# Patient Record
Sex: FEMALE | Race: WHITE | NOT HISPANIC OR LATINO | Employment: FULL TIME | URBAN - METROPOLITAN AREA
[De-identification: names, ages, dates, MRNs, and addresses within clinical notes are randomized per-mention and may not be internally consistent; named-entity substitution may affect disease eponyms.]

---

## 2014-06-11 LAB — HCV AB SERPL QL IA: NEGATIVE

## 2014-12-09 LAB — PAP SMEAR: NORMAL

## 2016-11-28 LAB
Lab: 1.4 (ref 1.2–4.9)
T3 UPTAKE: 23 (ref 24–39)
T4,THYROXINE: 6.2 (ref 4.5–12)
THYROPEROXIDASE AB SERPL-ACNC: 15 [IU]/ML (ref 0–34)
TRIIODOTHYRONINE (T-3), SERUM: 3 (ref 2–4.4)
TSH SERPL DL<=0.005 MIU/L-ACNC: 1.91 UIU/ML (ref 0.46–4.6)

## 2016-12-28 LAB
Lab: 1.3 (ref 1.2–4.9)
Lab: 116 (ref 71–160)
Lab: 119 (ref 71–180)
Lab: 5.4 (ref 4.5–12)
T3 UPTAKE: 24 (ref 24–39)
THYROPEROXIDASE AB SERPL-ACNC: 7 [IU]/ML (ref 0–34)
TRIIODOTHYRONINE (T-3), SERUM: 2.9 (ref 2–4.4)
TSH SERPL DL<=0.005 MIU/L-ACNC: 0.96 UIU/ML (ref 0.46–4.6)

## 2017-01-06 ENCOUNTER — OFFICE VISIT (OUTPATIENT)
Dept: NEUROLOGY | Facility: CLINIC | Age: 27
End: 2017-01-06
Payer: COMMERCIAL

## 2017-01-06 VITALS
HEIGHT: 64 IN | DIASTOLIC BLOOD PRESSURE: 70 MMHG | BODY MASS INDEX: 40.89 KG/M2 | SYSTOLIC BLOOD PRESSURE: 100 MMHG | WEIGHT: 239.5 LBS

## 2017-01-06 DIAGNOSIS — G43.009 MIGRAINE WITHOUT AURA AND WITHOUT STATUS MIGRAINOSUS, NOT INTRACTABLE: ICD-10-CM

## 2017-01-06 PROCEDURE — 1159F MED LIST DOCD IN RCRD: CPT | Mod: S$GLB,,, | Performed by: NEUROMUSCULOSKELETAL MEDICINE & OMM

## 2017-01-06 PROCEDURE — 99214 OFFICE O/P EST MOD 30 MIN: CPT | Mod: S$GLB,,, | Performed by: NEUROMUSCULOSKELETAL MEDICINE & OMM

## 2017-01-06 PROCEDURE — 99999 PR PBB SHADOW E&M-EST. PATIENT-LVL III: CPT | Mod: PBBFAC,,, | Performed by: NEUROMUSCULOSKELETAL MEDICINE & OMM

## 2017-01-06 RX ORDER — RIZATRIPTAN BENZOATE 10 MG/1
TABLET ORAL
Qty: 10 TABLET | Refills: 5 | Status: SHIPPED | OUTPATIENT
Start: 2017-01-06 | End: 2018-01-29 | Stop reason: SDUPTHER

## 2017-01-06 RX ORDER — MEDROXYPROGESTERONE ACETATE 5 MG/1
TABLET ORAL
Refills: 0 | COMMUNITY
Start: 2016-12-08 | End: 2017-01-24

## 2017-01-09 PROBLEM — G43.009 MIGRAINE WITHOUT AURA AND WITHOUT STATUS MIGRAINOSUS, NOT INTRACTABLE: Status: ACTIVE | Noted: 2017-01-09

## 2017-01-09 NOTE — PROGRESS NOTES
This history is dictated on [employer] word recognition program. There are word recognition mistakes that are occasionally missed on review.     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 She has a sister with migraine headaches.     Present Medical History: Patient presents for follow-up for her headaches.  MRI scan of the brain was normal.  Natalia has been working good for her headaches which occur approximately one-2 times per month.  She started using half tablets but now needs to use a whole tablet.  She does not feel like she needs any preventive medicine at this time.  Her TMJ problems have resolved.    Previous note: 1-8-16: This is a 25-year-old white female who presents with a history of 2 types of headaches. Her first headache is described as a frontal burning pain radiating through the head to the back of the head associated with pressure over the right eye with intensity of 6-7/10. This headache is associated with nausea, photophobia, sonophobia and triggered by smells. Headaches typically will last for one-2 days. She has tried multiple over-the-counter medications which do not work. She denies any aura but does have an arc of light that frequently occurs during the headache in her right eye vision. She was previously on Zoloft however this caused low blood sugar and she is had to stop it. Although she has grossly irregular periods the headaches are not any worse during her menstrual cycle. She has a sister with migraines and she tried one her Axert pills which did not help. She has never tried Imitrex.     Second type of headache is described as a right temporal mandibular joint pain which she associates with her wisdom teeth which she needs to schedule to have removed. She has some sensitivity with sensory changes radiating into the right cheek originating from the TMJ joint.  Gen. Appearance: Well-developed with no obvious deformities  Carotid arteries symmetrical  pulses  Peripheral vascular shows symmetrical pulses with no obvious edema or tenderness  Neurological Exam:  Mental status-alert and oriented to person, place, and time; attention span and concentration is good. Fund of knowledge-patient is aware of current events and able to give detailed history of the current problem.recent and remote memory seems intact. Language function is normal with no evidence of aphasia     Cranial nerves:Visual acuity to hand chart -normal; visual fields to confrontation normal;pupils were equal and reactive to light ; funduscopic examination was normal with sharp disc margins. external ocular movements were full with no nystagmus. Facial sensation to pinprick and corneal reflexes intact; Facial muscles were symmetrical. Hearing is unimpaired symmetrical finger rub; Tongue and palate movements were normal with swallowing unimpaired. Shoulder shrug was intact with good strength Speech was normal     Motor examination: Upper and Lower extremities - Normal and symmetrical;muscle tone was normal ; right-handed  Sensory examination:Upper and lower extremities - Pinprick and soft touch were normal and symmetrical. Vibration sense was normal at the toes for age 15-20 seconds  Deep tendon reflexes were 1-2+ symmetrical. Both plantar responses were flexor  Cerebellar examination upper: Normal finger to nose and rapid alternating movements  Gait: Steady with no ataxia; heel and toe walk normal  Romberg test: negative Tandem gait: Normal   Involuntary movements: Negative  Cervical examination: Full range of motion with no pain Cervical tenderness:negative  Lumbar examination: Low back tenderness-negative Sciatic notch tenderness-negative Straight leg raising test-negative  TMJ-percussion causes tenderness as well as pain with opening and closing the jaw on the right  Impression: Migraine without aura-probable hormonal related; 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

## 2017-01-24 ENCOUNTER — OFFICE VISIT (OUTPATIENT)
Dept: ENDOCRINOLOGY | Facility: CLINIC | Age: 27
End: 2017-01-24
Payer: COMMERCIAL

## 2017-01-24 VITALS
SYSTOLIC BLOOD PRESSURE: 120 MMHG | HEIGHT: 64 IN | WEIGHT: 232.13 LBS | DIASTOLIC BLOOD PRESSURE: 76 MMHG | BODY MASS INDEX: 39.63 KG/M2 | HEART RATE: 77 BPM

## 2017-01-24 DIAGNOSIS — E16.2 HYPOGLYCEMIA: Primary | ICD-10-CM

## 2017-01-24 PROCEDURE — 99214 OFFICE O/P EST MOD 30 MIN: CPT | Mod: S$GLB,,, | Performed by: INTERNAL MEDICINE

## 2017-01-24 PROCEDURE — 99999 PR PBB SHADOW E&M-EST. PATIENT-LVL III: CPT | Mod: PBBFAC,,, | Performed by: INTERNAL MEDICINE

## 2017-01-24 PROCEDURE — 1159F MED LIST DOCD IN RCRD: CPT | Mod: S$GLB,,, | Performed by: INTERNAL MEDICINE

## 2017-01-24 NOTE — MR AVS SNAPSHOT
[facility]  [address]  [place] [address]  Phone: [phone]  Fax: [phone]                  Krupa Melton   2017 2:00 PM   Office Visit    Description:  Female : 1990   Provider:  Estevan Dooley II, MD   Department:  [facility]           Reason for Visit     Hypoglycemia           Diagnoses this Visit        Comments    Hypoglycemia    -  Primary            To Do List           Future Appointments        Provider Department Dept Phone    2017 2:40 PM LAB, APPOINTMENT [place] [facility] [phone]      Goals (5 Years of Data)     None      Follow-Up and Disposition     Return in about 1 year (around 2018).      [facility]     [facility] Care [facility] -  Assistance  Registered nurses in the [facility] provide clinical advisement, health education, appointment booking, and other advisory services.  Call for this free service at [phone].             Medications           Message regarding Medications     Verify the changes and/or additions to your medication regime listed below are the same as discussed with your clinician today.  If any of these changes or additions are incorrect, please notify your healthcare provider.        STOP taking these medications     brexpiprazole (REXULTI) 0.5 mg Tab     ibuprofen (ADVIL,MOTRIN) 800 MG tablet TK 1 T PO  Q 6 TO 8 H PRN P    lamotrigine (LAMICTAL) 25 MG tablet     medroxyPROGESTERone (PROVERA) 5 MG tablet TK 1 T PO QD    PRENATAL VIT #91/FE FUM/FA/DHA (PRENATAL + DHA ORAL) Take 1 capsule by mouth once daily.           Verify that the below list of medications is an accurate representation of the medications you are currently taking.  If none reported, the list may be blank. If incorrect, please contact your healthcare provider. Carry this list with you in case of emergency.           Current Medications     blood sugar  "diagnostic (ACCU-CHEK SMARTVIEW TEST STRIP) Strp Use to check sugars 4 times daily dispense strips and lancets. accucheck veronica meter    metformin (GLUCOPHAGE-XR) 500 MG 24 hr tablet Take 1 tablet (500 mg total) by mouth daily with breakfast.    NATURE-THROID 48.75 mg Tab Take 2 tablets by mouth once daily.     rizatriptan (MAXALT) 10 MG tablet Take one tablet at onset of severe migraine.  May repeat in 2 hours but not to exceed 2 tablets in 24 hours           Clinical Reference Information           Vital Signs - Last Recorded  Most recent update: 1/24/2017  2:06 PM by Pam Nelson MA    BP Pulse Ht Wt LMP BMI    120/76 (BP Location: Left arm, Patient Position: Sitting, BP Method: Manual) 77 5' 4" (1.626 m) 105.3 kg (232 lb 2.3 oz) 12/17/2016 39.85 kg/m2      Blood Pressure          Most Recent Value    BP  120/76      Allergies as of 1/24/2017     No Known Allergies      Immunizations Administered on Date of Encounter - 1/24/2017     None      Orders Placed During Today's Visit     Future Labs/Procedures Expected by Expires    GLUCOSE, 2 HR. PC  1/24/2017 3/25/2018    TSH  1/24/2017 3/25/2018      "

## 2017-01-24 NOTE — PROGRESS NOTES
Subjective:       Patient ID: Krupa Melton is a 26 y.o. female.    Chief Complaint: Hypoglycemia  Consult from Dr. Alfred for hypoglycemia.  HPI     Ms. Melton is a 26 year old lady whose only medical history is of morbid obesity who presents as an urgent consult from primary care clinic for unexplained episodes of hypoglycemia. 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 She began having symptoms approximately 1 month ago and prior to this was in her usual state of health with no complaints. She notes that she has episodes of feeling unwell following by mental fogginess and mild confusion along with tremulousness and diaphoresis and when she would check her blood sugar at work found herself to be hypoglycemic, sometimes down to the 50's. The episodes almost exclusively happen in the late morning/early afternoon and only occasionally happen in the evening. It never happens in the morning and she has not noticed any issues overnight. At her PCP visit she felt unwell and her BGL at that time was 64. She does say that eating some food helps, but not completely and then the symptoms spontaneously resolve by the end of the day. She does note that she has been attempting to lose weight but has been unsuccessful.     She was recently diagnosed with ovarian cysts and has always had highly irregular menses and is undergoing workup for PCOS which her mother had. She has no other medical history and has never been a diabetic. Nobody in her family has a history of diabetes or other endocrine disorders. Her only family history is HTN and heart disease.     She is a never smoker, denies illicit or IV drug use, and is a social drinker though not much lately since having a baby.   Low glucose of 66 mg/dl about 1 month ago.  Review of Systems   Constitutional: Positive for fatigue ( attributes to being a new mother). Negative for activity change, appetite change  and unexpected weight change.   HENT: Negative for sore throat, trouble swallowing and voice change.    Eyes: Negative for photophobia and visual disturbance.   Respiratory: Negative for cough, shortness of breath and wheezing.    Cardiovascular: Negative for chest pain, palpitations and leg swelling.   Gastrointestinal: Positive for nausea. Negative for abdominal distention, abdominal pain, blood in stool, constipation, diarrhea and vomiting.   Endocrine: Negative for cold intolerance, heat intolerance, polydipsia, polyphagia and polyuria.   Genitourinary: Negative for difficulty urinating, dysuria, frequency and hematuria.   Musculoskeletal: Negative for arthralgias, back pain, gait problem, joint swelling, myalgias, neck pain and neck stiffness.   Skin: Negative for color change, pallor, rash and wound.   Neurological: Positive for dizziness, tremors, light-headedness and headaches. Negative for seizures, syncope, facial asymmetry, speech difficulty, weakness and numbness.   Psychiatric/Behavioral: Negative for sleep disturbance. The patient is nervous/anxious.        Objective:      Physical Exam   Constitutional: She is oriented to person, place, and time. She appears well-developed and well-nourished. No distress.   HENT:   Head: Normocephalic and atraumatic.   Right Ear: External ear normal.   Left Ear: External ear normal.   Nose: Nose normal.   Mouth/Throat: Oropharynx is clear and moist. No oropharyngeal exudate.   Eyes: Conjunctivae and EOM are normal. Pupils are equal, round, and reactive to light. Right eye exhibits no discharge. Left eye exhibits no discharge. No scleral icterus.   Neck: Normal range of motion. Neck supple. No JVD present. No tracheal deviation present. No thyromegaly present.   Cardiovascular: Normal rate, regular rhythm, normal heart sounds and intact distal pulses.  Exam reveals no gallop and no friction rub.    No murmur heard.  Pulmonary/Chest: Effort normal and breath sounds  normal. No stridor. No respiratory distress. She has no rales. She exhibits no tenderness.   Abdominal: Soft. Bowel sounds are normal. She exhibits no distension and no mass. There is no tenderness. There is no rebound and no guarding. No hernia.   Musculoskeletal: Normal range of motion. She exhibits no edema or tenderness.   Lymphadenopathy:     She has no cervical adenopathy.   Neurological: She is alert and oriented to person, place, and time. She has normal reflexes. She displays normal reflexes. No cranial nerve deficit. She exhibits normal muscle tone. Coordination normal.   Skin: Skin is warm and dry. No rash noted. She is not diaphoretic. No erythema. No pallor.   Psychiatric: She has a normal mood and affect. Her behavior is normal. Judgment and thought content normal.       Assessment:       No diagnosis found.  Plan:     Check TSH and glucose.

## 2017-06-10 ENCOUNTER — PATIENT MESSAGE (OUTPATIENT)
Dept: FAMILY MEDICINE | Facility: CLINIC | Age: 27
End: 2017-06-10

## 2017-06-10 ENCOUNTER — OFFICE VISIT (OUTPATIENT)
Dept: FAMILY MEDICINE | Facility: CLINIC | Age: 27
End: 2017-06-10
Payer: COMMERCIAL

## 2017-06-10 VITALS
DIASTOLIC BLOOD PRESSURE: 85 MMHG | OXYGEN SATURATION: 98 % | WEIGHT: 211.88 LBS | BODY MASS INDEX: 36.17 KG/M2 | HEART RATE: 98 BPM | HEIGHT: 64 IN | SYSTOLIC BLOOD PRESSURE: 134 MMHG | TEMPERATURE: 99 F

## 2017-06-10 DIAGNOSIS — R10.9 LEFT FLANK PAIN: Primary | ICD-10-CM

## 2017-06-10 DIAGNOSIS — M54.50 ACUTE LEFT-SIDED LOW BACK PAIN WITHOUT SCIATICA: ICD-10-CM

## 2017-06-10 DIAGNOSIS — Z87.442 HISTORY OF RENAL STONE: ICD-10-CM

## 2017-06-10 DIAGNOSIS — R10.9 FLANK PAIN: Primary | ICD-10-CM

## 2017-06-10 PROCEDURE — 99999 PR PBB SHADOW E&M-EST. PATIENT-LVL III: CPT | Mod: PBBFAC,,, | Performed by: NURSE PRACTITIONER

## 2017-06-10 PROCEDURE — 99214 OFFICE O/P EST MOD 30 MIN: CPT | Mod: S$GLB,,, | Performed by: NURSE PRACTITIONER

## 2017-06-10 RX ORDER — LISDEXAMFETAMINE DIMESYLATE 40 MG/1
40 CAPSULE ORAL DAILY
COMMUNITY
End: 2017-10-23 | Stop reason: CLARIF

## 2017-06-10 NOTE — PROGRESS NOTES
Subjective:       Patient ID: Krupa Melton is a 26 y.o. female.    Chief Complaint: Flank Pain    HPI Ms Melton is here for some L sided flank pain that began yesterday.  She denies any dysuria, frequency or fever. She has tried OTC NSAID without relief.  Review of Systems   Constitutional: Negative for fever.   Genitourinary: Negative for dysuria.   Musculoskeletal: Positive for back pain.       Objective:      Physical Exam   Constitutional: She is oriented to person, place, and time. She appears well-developed and well-nourished. She does not appear ill. No distress.   Cardiovascular: Normal rate, regular rhythm and normal heart sounds.  Exam reveals no friction rub.    No murmur heard.  Pulmonary/Chest: Effort normal and breath sounds normal. No respiratory distress. She has no wheezes. She has no rales.   Abdominal: There is CVA tenderness.   LCVA tenderness   Musculoskeletal: Normal range of motion. She exhibits no edema.   Neurological: She is alert and oriented to person, place, and time.   Psychiatric: She has a normal mood and affect. Her behavior is normal.   Vitals reviewed.      Assessment:       1. Flank pain    2. Acute left-sided low back pain without sciatica    3. History of renal stone        Plan:       Flank pain  -     POCT urinalysis, dipstick or tablet reag  -     CT Renal Stone Study ABD Pelvis WO; Future; Expected date: 06/10/2017    Acute left-sided low back pain without sciatica  -     CT Renal Stone Study ABD Pelvis WO; Future; Expected date: 06/10/2017    History of renal stone  -     CT Renal Stone Study ABD Pelvis WO; Future; Expected date: 06/10/2017    Trace blood in her urine.  She states that this feels exactly the same as when she had a renal stone in December 2016,will get imaging.  F/u if not improved  Go to ER for new worse or concerning symptoms    Verbalized understandng

## 2017-06-11 ENCOUNTER — TELEPHONE (OUTPATIENT)
Dept: FAMILY MEDICINE | Facility: CLINIC | Age: 27
End: 2017-06-11

## 2017-06-11 ENCOUNTER — HOSPITAL ENCOUNTER (OUTPATIENT)
Dept: RADIOLOGY | Facility: HOSPITAL | Age: 27
Discharge: HOME | End: 2017-06-11
Attending: NURSE PRACTITIONER
Payer: COMMERCIAL

## 2017-06-11 DIAGNOSIS — R10.9 FLANK PAIN: ICD-10-CM

## 2017-06-11 DIAGNOSIS — N20.0 NEPHROLITHIASIS: Primary | ICD-10-CM

## 2017-06-11 DIAGNOSIS — Z87.442 HISTORY OF RENAL STONE: ICD-10-CM

## 2017-06-11 DIAGNOSIS — M54.50 ACUTE LEFT-SIDED LOW BACK PAIN WITHOUT SCIATICA: ICD-10-CM

## 2017-06-11 PROCEDURE — 74176 CT ABD & PELVIS W/O CONTRAST: CPT | Mod: 26,,, | Performed by: RADIOLOGY

## 2017-06-11 PROCEDURE — 74176 CT ABD & PELVIS W/O CONTRAST: CPT | Mod: TC

## 2017-07-10 ENCOUNTER — OFFICE VISIT (OUTPATIENT)
Dept: PODIATRY | Facility: CLINIC | Age: 27
End: 2017-07-10
Payer: COMMERCIAL

## 2017-07-10 VITALS
WEIGHT: 215.31 LBS | SYSTOLIC BLOOD PRESSURE: 110 MMHG | DIASTOLIC BLOOD PRESSURE: 72 MMHG | BODY MASS INDEX: 36.76 KG/M2 | HEIGHT: 64 IN | HEART RATE: 75 BPM

## 2017-07-10 DIAGNOSIS — Z87.81 HISTORY OF FRACTURE OF RIGHT ANKLE: ICD-10-CM

## 2017-07-10 DIAGNOSIS — M77.41 METATARSALGIA OF RIGHT FOOT: ICD-10-CM

## 2017-07-10 DIAGNOSIS — M62.469 GASTROCNEMIUS EQUINUS, UNSPECIFIED LATERALITY: ICD-10-CM

## 2017-07-10 DIAGNOSIS — M72.2 PLANTAR FASCIITIS: Primary | ICD-10-CM

## 2017-07-10 PROCEDURE — 99999 PR PBB SHADOW E&M-EST. PATIENT-LVL III: CPT | Mod: PBBFAC,,, | Performed by: PODIATRIST

## 2017-07-10 PROCEDURE — 99203 OFFICE O/P NEW LOW 30 MIN: CPT | Mod: S$GLB,,, | Performed by: PODIATRIST

## 2017-07-10 RX ORDER — METHYLPREDNISOLONE 4 MG/1
TABLET ORAL
Qty: 1 PACKAGE | Refills: 0 | Status: SHIPPED | OUTPATIENT
Start: 2017-07-10 | End: 2017-11-24

## 2017-07-10 NOTE — PATIENT INSTRUCTIONS
Supportive shoes with stiff or rocker sole, arch support, wide or soft toe box as needed (Altra, HOKA,  shape up, MBT, New Balance, Tali, Naot, Propet, Vionoic, Fit flop sandals)    Full length orthotic brands to consider: spenco, superfeet, sof sole fit series, powerstep      (Varsity sports, Phidippides, [place] [employer], Masseys, Goodfeet, Cantilever, Feet First, Foot Solutions, Therapeutic shoes, SAS)    [email or web address]      1. Stretch calf and fascia 10x per day for 30 sec and before getting out of bed.  2. Supportive shoes at all times (conn, new balance, asics)    5. ICE massage with frozen water bottle 2x per day for 30 minutes.  6. Consider night splint and/or steroid injection    What Is Plantar Fasciitis?   The plantar fascia is a ligament-like band running from your heel to the ball of your foot. This band pulls on the heel bone, raising the arch of your foot as it pushes off the ground. But if your foot moves incorrectly, the plantar fascia may become strained. The fascia may swell and its tiny fibers may begin to fray, causing plantar fasciitis.  Causes  Plantar fasciitis is often caused by poor foot mechanics. If your foot flattens too much, the fascia may overstretch and swell. If your foot flattens too little, the fascia may ache from being pulled too tight.      Symptoms  With plantar fasciitis, the bottom of your foot may hurt when you stand, especially first thing in the morning. Pain usually occurs on the inside of the foot, near the spot where your heel and arch meet. Pain may lessen after a few steps, but it comes back after rest or with prolonged movement.  Related Problems  A heel spur is extra bone that may grow near the spot where the plantar fascia attaches to the heel. The heel spur may form in response to the plantar fascias tug on the heel bone.  Bursitis is the swelling of a bursa, a fluid-filled sac that reduces friction between a ligament and a bone.  Bursitis may develop if a swollen plantar fascia presses against a plantar bursa.  © 3069-5052 [employer]. [address] [place] [address], [place] [address]. All rights reserved. This information is not intended as a substitute for professional medical care. Always follow your healthcare professional's instructions.    Treating Plantar Fasciitis    First, your doctor relieves pain. Then, the cause of your problem may be found and corrected. If your pain is due to poor foot mechanics, custom-made shoe inserts (orthoses) may help.    Reduce Symptoms:  · To relieve mild symptoms, try aspirin, ibuprofen, or other medications as directed. Rubbing ice on the affected area may also help.  · To reduce severe pain and swelling, your doctor may prescribe pills or injections or a walking cast in some instances. Physical therapy, such as ultrasound or a daily stretching program, may also be recommended. Surgery is rarely required.  · To reduce symptoms caused by poor foot mechanics, your foot may be taped. This supports the arch and temporarily controls movement. Night splints may also help by stretching the fascia.    Control Movement  If taping helps, your doctor may prescribe orthoses. Built from plaster casts of your feet, these inserts control the way your foot moves. As a result, your symptoms should go away.  If Surgery Is Needed  Your doctor may consider surgery if other types of treatment don't control your pain. During surgery, the plantar fascia is partially cut to release tension. As you heal, fibrous tissue fills the space between the heel bone and the plantar fascia.   Reduce Overuse  Every time your foot strikes the ground, the plantar fascia is stretched. You can reduce the strain on the plantar fascia and the possibility of overuse by following these suggestions:  · Lose any excess weight.  · Avoid running on hard or uneven ground.  · Use orthoses at all times in your shoes and house slippers.  ©  6530-4303 [employer]. [address] [place] [address]. All rights reserved. This information is not intended as a substitute for professional medical care. Always follow your healthcare professional's instructions.            Lower Body Exercises: Calf Stretch    This exercise both stretches and strengthens your lower body to help your back. Do the exercise as often as suggested by your health care provider. As you work out, dont rush or strain. Use an exercise mat, pillow, or folded towel to protect your knees and other sensitive areas.  · Face a wall 2 feet away. Step toward the wall with one foot.  · Place both palms on the wall and bend your front knee.  · Lean forward, keeping the back leg straight and the heel on the floor.  · Hold for 20 seconds. Switch legs.  © 6586-4395 [employer]. [address] [place] [address]. All rights reserved. This information is not intended as a substitute for professional medical care. Always follow your healthcare professional's instructions.        - Pedifix metatarsal pads, Gel ball of foot pad/protector sleeve, Budin splints for 2nd and 3rd toes (PediFix Double-Toe  #P57)    - Gel toe sleeves for corns    (found at [employer].com, [facility], [employer] [facility]....)    - Supportive shoes with stiff or rocker sole (HOKA,  Shape-up, Dansko)    - Full length orthotic brands to consider: spenco, superfeet, sof sole fit series, Powerstep    (Varsity sports, [employer] fitness, J[employer], Therapeutic shoes, Quaam, Cantilever)    What is Metatarsalgia?  Metatarsalgia is pain in the ball of your foot. It is often caused by wearing shoes with thin soles and high heels. This puts extra pressure on the bones in the ball of the foot. Standing or walking on a hard surface for long periods also puts added pressure on the bones, causing pain. The pain can occur under any of the five  "metatarsal bones, although it's most common in the second. Bent or twisted toes and bunions can make the problem worse. So can being overweight. Sometimes high arches or arthritis can also cause metatarsalgia.    Inside the ball of your foot  The long bones in the middle of your foot are called the metatarsal bones. Each metatarsal bone ends in the ball of the foot. When you walk, these bones bear the weight of your body as your foot pushes off the ground. If there is more pressure on the end of one bone, it presses on the skin beneath it. This causes pain and inflammation in the ball of the foot (metatarsalgia). A callus (a hard growth of skin) may also form on the ball of the foot.    Symptoms  The most common symptom of metatarsalgia is pain in the ball of the foot. It may feel as if you have a stone in your shoe. The ball of the foot may also become red and inflamed, and a callus may form under the end of the metatarsal bone.   Date Last Reviewed: 9/29/2015 © 2000-2016 [employer]. [address] [place] [address]. All rights reserved. This information is not intended as a substitute for professional medical care. Always follow your healthcare professional's instructions.        Treating Metatarsalgia  Metatarsalgia is pain in the "ball of your foot," the area between your arch and your toes. The pain usually starts in one or more of the five bones in this area under the toes (these bones are called the metatarsals). Often, a callus builds up in this area.In most cases, wearing low-heeled, well-cushioned shoes and filing down the callus will relieve the pain. If these steps dont work, your healthcare provider may suggest surgery to remove part of the bone. To take pressure off the ball of your foot and relieve the pain, your healthcare provider may suggest that you do one or more of the following.  Wear shoes with padded soles  Wear shoes with thick padding in the soles. Keep heels low, " and make sure the shoe is wide across the ball of your foot and your toes.    Using a metatarsal pad  Put a metatarsal pad in your shoe. This lifts and takes pressure off the painful area. To insert the pad:  · Put a small lipstick ade on the callus.  · Step into the shoe to leave a ade on the insole.  · Peel the backing off the pad. Then put the pad in the shoe just behind the lipstick ade.  Inserts with built-in metatarsal pads may also be available.  Filing the callus  · Soak your foot in warm water for a few minutes to soften the skin.  · Dry the foot.  · Then gently rub the callus with a pumice stone or nail file to remove the hard skin. Stop if bleeding or pain occurs.  · Diabetes should get foot care from healthcare providers familiar with diabetes care.  Date Last Reviewed: 9/29/2015  © 3653-7536 [employer]. [address] [place] [address]. All rights reserved. This information is not intended as a substitute for professional medical care. Always follow your healthcare professional's instructions.      What Are Neuromas of the Foot?  The ball of your foot is the bottom part just behind your toes. Bands of tissue (ligaments) connect the bones in the ball of your foot. Nerves run between the bones and underneath the ligaments. When a nerve becomes pinched, this causes it to swell and become painful due to the thickening of the tissue that surrounds the nerve. The painful, swollen nerve is called a neuroma (also called Reza's neuroma).     A neuroma most often occurs at the base of either the third and fourth toes or the second and third toes.   What causes a neuroma?  Wearing tight or high-heeled shoes can cause a neuroma. Shoes that are too narrow or too pointed squeeze the bones in the ball of the foot. Shoes with high heels put extra pressure on the ends of the bones. When the bones are squeezed together, they pinch the nerve that runs between them.  Symptoms  The most common  symptom of a neuroma is pain in the ball of the foot between two toes. The pain may be dull or sharp. It may feel as if you have a stone in your shoe. You may also have tingling or numbness in one or both of the toes. Symptoms may occur after you have been walking or standing for a while. Taking off your shoes and rubbing the ball of your foot may decrease or relieve the pain.  Preventing future problems  To prevent a future neuroma, buy shoes with plenty of room across the ball of the foot and in the toes. This keeps the bones from being squeezed together. Wearing low-heeled shoes (less than 2 inches) also puts less pressure on the bones and nerves in the ball of the foot.   Date Last Reviewed: 9/10/2015  © 0085-6171 [employer]. [address] [place] [address], [place] [address]. All rights reserved. This information is not intended as a substitute for professional medical care. Always follow your healthcare professional's instructions.

## 2017-07-10 NOTE — PROGRESS NOTES
Subjective:      Patient ID: Krupa Melton is a 26 y.o. female.    Chief Complaint: Foot Problem (right ft. PCP Dr. Frazier 11/16/16) and Foot Pain    Krupa is a 26 y.o. female who presents to the clinic complaining of heel pain in the right foot, especially with the first step in the morning. She also has intermittent pain at ball of right lateral foot with massage and walking on some uneven surfaces. The pain is described as Aching and Sharp. The onset of the pain was gradual and has slightly improved over the past several (7-9) months. Krupa rates the pain as 6/10. She denies a history of trauma. Prior treatments include naproxen, .    Review of Systems   Constitution: Negative for chills, fever, weakness, malaise/fatigue and night sweats.   Cardiovascular: Negative for chest pain, leg swelling, orthopnea and palpitations.   Respiratory: Negative for cough, shortness of breath and wheezing.    Skin: Negative for color change, itching, poor wound healing and rash.   Musculoskeletal: Positive for joint pain. Negative for arthritis, gout, joint swelling, muscle weakness and myalgias.   Gastrointestinal: Negative for abdominal pain, constipation and nausea.   Neurological: Negative for disturbances in coordination, dizziness, focal weakness, numbness and tremors.           Objective:      Physical Exam   Constitutional: She is oriented to person, place, and time. Vital signs are normal. She appears well-developed. She is cooperative. No distress.   Cardiovascular: Intact distal pulses.    Pulses:       Dorsalis pedis pulses are 2+ on the right side, and 2+ on the left side.        Posterior tibial pulses are 2+ on the right side, and 2+ on the left side.   Musculoskeletal:        Right ankle: Normal.        Left ankle: Normal.        Right foot: There is normal range of motion, no bony tenderness, normal capillary refill, no crepitus and no deformity.        Left foot: There is normal range of motion, no bony  tenderness, normal capillary refill, no crepitus and no deformity.   Mild pain on palpation plantar medial and central heel, right. No pain with ROM or MMT. No pain with medial and lateral compression of heel.    Tenderness with palpation just proximal to plantar 3rd/4th metatarsal heads, right       Neurological: She is alert and oriented to person, place, and time. She has normal strength. No sensory deficit. She exhibits normal muscle tone. Gait normal.   Reflex Scores:       Achilles reflexes are 2+ on the right side and 2+ on the left side.  Negative Tinels sign and Anali's click, bilat.   Skin: Skin is intact. No ecchymosis and no lesion noted. No erythema. Nails show no clubbing.   No foot and ankle edema.  No open wounds.               Assessment:       Encounter Diagnoses   Name Primary?    Plantar fasciitis - Right Foot Yes    Gastrocnemius equinus, unspecified laterality     History of fracture of right ankle     Metatarsalgia of right foot          Plan:       Krupa was seen today for foot problem and foot pain.    Diagnoses and all orders for this visit:    Plantar fasciitis - Right Foot  -     X-Ray Foot Complete Right; Future  -     methylPREDNISolone (MEDROL DOSEPACK) 4 mg tablet; Use as instructed on dose pack    Gastrocnemius equinus, unspecified laterality    History of fracture of right ankle    Metatarsalgia of right foot  -     X-Ray Foot Complete Right; Future  -     methylPREDNISolone (MEDROL DOSEPACK) 4 mg tablet; Use as instructed on dose pack      I counseled the patient on her conditions, their implications and medical management.    1. Stretch calf and plantar fascia 10x per day for 30 sec and before getting out of bed.    2. Supportive shoes at all times    3. Orthotic, OTC. Will consider custom as needed.    4. NSAIDS daily x 2-3 weeks    5. ICE massage with frozen water bottle 2x per day for 30 minutes.    6. Will consider walking boot, cast, night splint, steroid injection and/or  physical therapy, MRI as needed.    F/u 6-8 weeks.

## 2017-10-23 ENCOUNTER — OFFICE VISIT (OUTPATIENT)
Dept: FAMILY MEDICINE | Facility: CLINIC | Age: 27
End: 2017-10-23
Payer: COMMERCIAL

## 2017-10-23 VITALS
BODY MASS INDEX: 39.37 KG/M2 | DIASTOLIC BLOOD PRESSURE: 80 MMHG | SYSTOLIC BLOOD PRESSURE: 120 MMHG | HEART RATE: 91 BPM | HEIGHT: 64 IN | TEMPERATURE: 98 F | OXYGEN SATURATION: 98 % | WEIGHT: 230.63 LBS

## 2017-10-23 DIAGNOSIS — Z00.00 ANNUAL PHYSICAL EXAM: Primary | ICD-10-CM

## 2017-10-23 DIAGNOSIS — R53.83 FATIGUE, UNSPECIFIED TYPE: ICD-10-CM

## 2017-10-23 DIAGNOSIS — E03.9 HYPOTHYROIDISM, UNSPECIFIED TYPE: ICD-10-CM

## 2017-10-23 DIAGNOSIS — M54.42 ACUTE LOW BACK PAIN WITH BILATERAL SCIATICA, UNSPECIFIED BACK PAIN LATERALITY: ICD-10-CM

## 2017-10-23 DIAGNOSIS — M54.41 ACUTE LOW BACK PAIN WITH BILATERAL SCIATICA, UNSPECIFIED BACK PAIN LATERALITY: ICD-10-CM

## 2017-10-23 PROCEDURE — 99395 PREV VISIT EST AGE 18-39: CPT | Mod: S$GLB,,, | Performed by: FAMILY MEDICINE

## 2017-10-23 PROCEDURE — 99999 PR PBB SHADOW E&M-EST. PATIENT-LVL III: CPT | Mod: PBBFAC,,, | Performed by: FAMILY MEDICINE

## 2017-10-23 RX ORDER — ESZOPICLONE 1 MG/1
TABLET, FILM COATED ORAL
Refills: 2 | COMMUNITY
Start: 2017-07-24 | End: 2018-01-29

## 2017-10-23 RX ORDER — CYCLOBENZAPRINE HCL 10 MG
10 TABLET ORAL 3 TIMES DAILY PRN
Qty: 40 TABLET | Refills: 0 | Status: SHIPPED | OUTPATIENT
Start: 2017-10-23 | End: 2017-11-02

## 2017-10-23 RX ORDER — LISDEXAMFETAMINE DIMESYLATE 30 MG/1
30 CAPSULE ORAL EVERY MORNING
COMMUNITY
End: 2017-10-27 | Stop reason: SDUPTHER

## 2017-10-23 NOTE — PATIENT INSTRUCTIONS

## 2017-10-25 NOTE — PROGRESS NOTES
Routine Office Visit    Patient Name: Krupa Melton    : 1990  MRN: [id number]    Subjective:  Krupa is a 27 y.o. female who presents today for     1. Back pain - low back pain - pt with hx of sciatic back pain with infrequent flare ups. She was helping her mom move and was lifting more than normally and now has low back/buttock pain radiating down to her knees (R>L). Pain is described as a burning pain that is aggravated with movement and alleviated with rest. She has been using tylenol and naprosyn with minimal relief of symptoms. She has also been using heating pad.   2. Depression / fatigue - pt was previously seeing psych, but since changing insurance has not been able to see her psychiatrist. She is on vyvanse and is trying to get prior authorization to see psychiatry. Dr. Snyder has a 6 month wait period. She states the vyvanse was helping her with her fatigue and binge eating disorder. She has been out of this medication since  and has gained back the weight she loss. She will try to see if her psychiatrist can write her new prescriptions as her old ones have . She plans on trying to find a new psychiatrist if she is unable to see her current psychiatrist.   3. Fatigue - she was diagnosed with having low T3 by her ob-gyn. She was referred to endocrinology who states that ob-gyn needed to manage her T3 due to starting her on medication. She is unable to follow-up with ob-gyn due to distance (in [place]) and would like to continue care here. She has been off this medication since May. While on it, she did find that it helped with her fatigue. She felt that the vyvanse helped more, but has not been on vyvanse either.     Review of Systems   Constitutional: Negative for chills and fever.   HENT: Negative for congestion.    Eyes: Negative for blurred vision.   Respiratory: Negative for cough.    Cardiovascular: Negative for chest pain.   Gastrointestinal: Negative for abdominal pain,  constipation, diarrhea, heartburn, nausea and vomiting.   Genitourinary: Negative for dysuria.   Musculoskeletal: Negative for myalgias.   Skin: Negative for itching and rash.   Neurological: Negative for dizziness and headaches.   Psychiatric/Behavioral: Negative for depression.       Active Problem List  Patient Active Problem List   Diagnosis    Hypoglycemia    Migraine without aura    TMJ syndrome    Nephrolithiasis    Migraine without aura and without status migrainosus, not intractable       Past Surgical History  Past Surgical History:   Procedure Laterality Date    ANKLE SURGERY       SECTION  10/2014    KIDNEY STONE SURGERY      TONSILLECTOMY      WISDOM TOOTH EXTRACTION         Family History  Family History   Problem Relation Age of Onset    Hypertension Mother     Hypertension Father     Heart disease Father     No Known Problems Sister     No Known Problems Daughter     No Known Problems Sister     No Known Problems Sister        Social History  Social History     Social History    Marital status: Single     Spouse name: N/A    Number of children: N/A    Years of education: N/A     Occupational History    Not on file.     Social History Main Topics    Smoking status: Never Smoker    Smokeless tobacco: Never Used    Alcohol use Yes    Drug use: No    Sexual activity: Yes     Partners: Male     Other Topics Concern    Not on file     Social History Narrative    No narrative on file       Medications and Allergies  Reviewed and updated.   Current Outpatient Prescriptions   Medication Sig    blood sugar diagnostic (ACCU-CHEK SMARTVIEW TEST STRIP) Strp Use to check sugars 4 times daily dispense strips and lancets. accucheck veronica meter    eszopiclone (LUNESTA) 1 MG Tab TK 1 TO 2 TS PO QHS PRN    lisdexamfetamine (VYVANSE) 30 MG capsule Take 30 mg by mouth every morning.    NATURE-THROID 48.75 mg Tab Take 2 tablets by mouth once daily.     rizatriptan (MAXALT) 10 MG  "tablet Take one tablet at onset of severe migraine.  May repeat in 2 hours but not to exceed 2 tablets in 24 hours    cyclobenzaprine (FLEXERIL) 10 MG tablet Take 1 tablet (10 mg total) by mouth 3 (three) times daily as needed.    methylPREDNISolone (MEDROL DOSEPACK) 4 mg tablet Use as instructed on dose pack     No current facility-administered medications for this visit.        Physical Exam  /80 (BP Location: Right arm, Patient Position: Sitting, BP Method: Medium (Manual))   Pulse 91   Temp 98.4 °F (36.9 °C) (Oral)   Ht 5' 4" (1.626 m)   Wt 104.6 kg (230 lb 9.6 oz)   LMP 10/16/2017   SpO2 98%   BMI 39.58 kg/m²   Physical Exam   Constitutional: She is oriented to person, place, and time. She appears well-developed and well-nourished.   HENT:   Head: Normocephalic and atraumatic.   Eyes: Conjunctivae and EOM are normal. Pupils are equal, round, and reactive to light.   Neck: Normal range of motion. Neck supple.   Cardiovascular: Normal rate, regular rhythm and normal heart sounds.  Exam reveals no gallop and no friction rub.    No murmur heard.  Pulmonary/Chest: Breath sounds normal. No respiratory distress.   Abdominal: Soft. Bowel sounds are normal. She exhibits no distension. There is no tenderness.   Musculoskeletal: Normal range of motion.   Lymphadenopathy:     She has no cervical adenopathy.   Neurological: She is alert and oriented to person, place, and time.   Skin: Skin is warm.   Psychiatric: She has a normal mood and affect.         Assessment/Plan:  Krupa Melton is a 27 y.o. female who presents today for :    Annual physical exam  -     CBC auto differential; Future; Expected date: 10/23/2017  -     Comprehensive metabolic panel; Future; Expected date: 10/23/2017  -     Lipid panel; Future; Expected date: 10/23/2017  -     TSH; Future; Expected date: 10/23/2017  Health Maintenance       Date Due Completion Date    HPV Vaccines (1 of 3 - Female 3 Dose Series) 08/12/2002 ---    " TETANUS VACCINE 08/12/2009 ---    Pap Smear 01/16/2015 1/16/2014 (Done)    Override on 1/16/2014: Done    Influenza Vaccine 08/01/2017 9/14/2015          Hypothyroidism, unspecified type  -     T3; Future; Expected date: 10/23/2017  -     T3, free; Future; Expected date: 10/23/2017  -     T4, free; Future; Expected date: 10/23/2017  Obtain MR from ob-gyn   F/u with T3 and refill natural thyroid after lab work completed     Fatigue, unspecified type  Recommend to f/u with psychiatry for prescription for vyvanse  Advise pt that if she is unable to see psychiatry soon and needs refill on vyvanse, can give 1 month supply     Acute low back pain with bilateral sciatica, unspecified back pain laterality  -     cyclobenzaprine (FLEXERIL) 10 MG tablet; Take 1 tablet (10 mg total) by mouth 3 (three) times daily as needed.  Dispense: 40 tablet; Refill: 0  Common side effects of this medication were discussed with the patient. Questions regarding medications were discussed during this visit.   Recommend strengthening / stretching exercises for back         Return in about 6 months (around 4/23/2018).

## 2017-10-26 ENCOUNTER — LAB VISIT (OUTPATIENT)
Dept: LAB | Facility: HOSPITAL | Age: 27
End: 2017-10-26
Attending: FAMILY MEDICINE
Payer: COMMERCIAL

## 2017-10-26 ENCOUNTER — PATIENT MESSAGE (OUTPATIENT)
Dept: FAMILY MEDICINE | Facility: CLINIC | Age: 27
End: 2017-10-26

## 2017-10-26 DIAGNOSIS — R53.83 FATIGUE, UNSPECIFIED TYPE: ICD-10-CM

## 2017-10-26 DIAGNOSIS — E03.9 HYPOTHYROIDISM, UNSPECIFIED TYPE: ICD-10-CM

## 2017-10-26 DIAGNOSIS — Z00.00 ANNUAL PHYSICAL EXAM: ICD-10-CM

## 2017-10-26 LAB
ALBUMIN SERPL BCP-MCNC: 3.9 G/DL
ALP SERPL-CCNC: 78 U/L
ALT SERPL W/O P-5'-P-CCNC: 12 U/L
ANION GAP SERPL CALC-SCNC: 7 MMOL/L
AST SERPL-CCNC: 21 U/L
BASOPHILS # BLD AUTO: 0.03 K/UL
BASOPHILS NFR BLD: 0.4 %
BILIRUB SERPL-MCNC: 0.6 MG/DL
BUN SERPL-MCNC: 20 MG/DL
CALCIUM SERPL-MCNC: 9.4 MG/DL
CHLORIDE SERPL-SCNC: 105 MMOL/L
CHOLEST SERPL-MCNC: 197 MG/DL
CHOLEST/HDLC SERPL: 3.2 {RATIO}
CO2 SERPL-SCNC: 28 MMOL/L
CREAT SERPL-MCNC: 0.8 MG/DL
DIFFERENTIAL METHOD: ABNORMAL
EOSINOPHIL # BLD AUTO: 0.1 K/UL
EOSINOPHIL NFR BLD: 1.2 %
ERYTHROCYTE [DISTWIDTH] IN BLOOD BY AUTOMATED COUNT: 12.4 %
EST. GFR  (AFRICAN AMERICAN): >60 ML/MIN/1.73 M^2
EST. GFR  (NON AFRICAN AMERICAN): >60 ML/MIN/1.73 M^2
GLUCOSE SERPL-MCNC: 83 MG/DL
HCT VFR BLD AUTO: 40.7 %
HDLC SERPL-MCNC: 61 MG/DL
HDLC SERPL: 31 %
HGB BLD-MCNC: 14.1 G/DL
LDLC SERPL CALC-MCNC: 119.2 MG/DL
LYMPHOCYTES # BLD AUTO: 3 K/UL
LYMPHOCYTES NFR BLD: 40.9 %
MCH RBC QN AUTO: 32.1 PG
MCHC RBC AUTO-ENTMCNC: 34.6 G/DL
MCV RBC AUTO: 93 FL
MONOCYTES # BLD AUTO: 0.6 K/UL
MONOCYTES NFR BLD: 7.6 %
NEUTROPHILS # BLD AUTO: 3.7 K/UL
NEUTROPHILS NFR BLD: 49.9 %
NONHDLC SERPL-MCNC: 136 MG/DL
PLATELET # BLD AUTO: 250 K/UL
PMV BLD AUTO: 9.7 FL
POTASSIUM SERPL-SCNC: 3.9 MMOL/L
PROT SERPL-MCNC: 7.5 G/DL
RBC # BLD AUTO: 4.39 M/UL
SODIUM SERPL-SCNC: 140 MMOL/L
T3 SERPL-MCNC: 103 NG/DL
T3FREE SERPL-MCNC: 2.5 PG/ML
T4 FREE SERPL-MCNC: 0.86 NG/DL
TRIGL SERPL-MCNC: 84 MG/DL
TSH SERPL DL<=0.005 MIU/L-ACNC: 2.24 UIU/ML
WBC # BLD AUTO: 7.4 K/UL

## 2017-10-26 PROCEDURE — 84480 ASSAY TRIIODOTHYRONINE (T3): CPT

## 2017-10-26 PROCEDURE — 84481 FREE ASSAY (FT-3): CPT

## 2017-10-26 PROCEDURE — 36415 COLL VENOUS BLD VENIPUNCTURE: CPT

## 2017-10-26 PROCEDURE — 84439 ASSAY OF FREE THYROXINE: CPT

## 2017-10-26 PROCEDURE — 80061 LIPID PANEL: CPT

## 2017-10-26 PROCEDURE — 80053 COMPREHEN METABOLIC PANEL: CPT

## 2017-10-26 PROCEDURE — 85025 COMPLETE CBC W/AUTO DIFF WBC: CPT

## 2017-10-26 PROCEDURE — 84443 ASSAY THYROID STIM HORMONE: CPT

## 2017-10-27 ENCOUNTER — PATIENT MESSAGE (OUTPATIENT)
Dept: FAMILY MEDICINE | Facility: CLINIC | Age: 27
End: 2017-10-27

## 2017-10-27 ENCOUNTER — TELEPHONE (OUTPATIENT)
Dept: FAMILY MEDICINE | Facility: CLINIC | Age: 27
End: 2017-10-27

## 2017-10-27 RX ORDER — LISDEXAMFETAMINE DIMESYLATE 30 MG/1
30 CAPSULE ORAL EVERY MORNING
Qty: 30 CAPSULE | Refills: 0 | Status: SHIPPED | OUTPATIENT
Start: 2017-10-27 | End: 2018-01-29

## 2017-11-15 ENCOUNTER — TELEPHONE (OUTPATIENT)
Dept: ADMINISTRATIVE | Facility: HOSPITAL | Age: 27
End: 2017-11-15

## 2017-11-22 ENCOUNTER — OFFICE VISIT (OUTPATIENT)
Dept: URGENT CARE | Facility: CLINIC | Age: 27
End: 2017-11-22
Payer: COMMERCIAL

## 2017-11-22 VITALS
BODY MASS INDEX: 39.27 KG/M2 | RESPIRATION RATE: 18 BRPM | DIASTOLIC BLOOD PRESSURE: 78 MMHG | OXYGEN SATURATION: 98 % | WEIGHT: 230 LBS | TEMPERATURE: 100 F | SYSTOLIC BLOOD PRESSURE: 135 MMHG | HEART RATE: 106 BPM | HEIGHT: 64 IN

## 2017-11-22 DIAGNOSIS — N30.00 ACUTE CYSTITIS WITHOUT HEMATURIA: Primary | ICD-10-CM

## 2017-11-22 LAB
B-HCG UR QL: NEGATIVE
BILIRUB UR QL STRIP: NEGATIVE
CTP QC/QA: YES
GLUCOSE UR QL STRIP: NEGATIVE
KETONES UR QL STRIP: NEGATIVE
LEUKOCYTE ESTERASE UR QL STRIP: POSITIVE
PH, POC UA: 6.5 (ref 5–8)
POC BLOOD, URINE: NEGATIVE
POC NITRATES, URINE: NEGATIVE
PROT UR QL STRIP: POSITIVE
SP GR UR STRIP: 1.01 (ref 1–1.03)
UROBILINOGEN UR STRIP-ACNC: ABNORMAL (ref 0.1–1.1)

## 2017-11-22 PROCEDURE — 81003 URINALYSIS AUTO W/O SCOPE: CPT | Mod: QW,S$GLB,, | Performed by: EMERGENCY MEDICINE

## 2017-11-22 PROCEDURE — 81025 URINE PREGNANCY TEST: CPT | Mod: S$GLB,,, | Performed by: EMERGENCY MEDICINE

## 2017-11-22 PROCEDURE — 99214 OFFICE O/P EST MOD 30 MIN: CPT | Mod: 25,S$GLB,, | Performed by: EMERGENCY MEDICINE

## 2017-11-22 RX ORDER — SULFAMETHOXAZOLE AND TRIMETHOPRIM 800; 160 MG/1; MG/1
1 TABLET ORAL 2 TIMES DAILY
Qty: 14 TABLET | Refills: 0 | Status: SHIPPED | OUTPATIENT
Start: 2017-11-22 | End: 2017-11-27 | Stop reason: ALTCHOICE

## 2017-11-22 RX ORDER — SULFAMETHOXAZOLE AND TRIMETHOPRIM 800; 160 MG/1; MG/1
1 TABLET ORAL 2 TIMES DAILY
Qty: 14 TABLET | Refills: 0 | Status: SHIPPED | OUTPATIENT
Start: 2017-11-22 | End: 2017-11-22 | Stop reason: SDUPTHER

## 2017-11-22 RX ORDER — PHENAZOPYRIDINE HYDROCHLORIDE 200 MG/1
200 TABLET, FILM COATED ORAL 3 TIMES DAILY PRN
Qty: 6 TABLET | Refills: 0 | Status: SHIPPED | OUTPATIENT
Start: 2017-11-22 | End: 2017-11-22 | Stop reason: SDUPTHER

## 2017-11-22 RX ORDER — PHENAZOPYRIDINE HYDROCHLORIDE 200 MG/1
200 TABLET, FILM COATED ORAL 3 TIMES DAILY PRN
Qty: 6 TABLET | Refills: 0 | Status: SHIPPED | OUTPATIENT
Start: 2017-11-22 | End: 2017-11-24

## 2017-11-22 NOTE — PROGRESS NOTES
"Subjective:       Patient ID: Krupa Melton is a 27 y.o. female.    Vitals:  height is 5' 4" (1.626 m) and weight is 104.3 kg (230 lb). Her temperature is 100.4 °F (38 °C) (abnormal). Her blood pressure is 135/78 and her pulse is 106. Her respiration is 18 and oxygen saturation is 98%.     Chief Complaint: Dysuria    This is a 27 y.o. female with Past Medical History:  No date: Hypoglycemia  No date: Kidney calculi   who presents today with a chief complaint of dysuria. Patient passed a kidney stone on Monday.  Has PCP appt in 3 d, no sore throat/cough, is not pregnant, states bactrim works well.        Dysuria    This is a new problem. The current episode started in the past 7 days (Sunday ). The problem occurs every urination. The problem has been unchanged. The quality of the pain is described as burning and aching. The pain is at a severity of 4/10. The pain is moderate. The maximum temperature recorded prior to her arrival was 100 - 100.9 F. She is sexually active. Associated symptoms include frequency, hematuria and urgency. Pertinent negatives include no chills, nausea or vomiting. She has tried nothing for the symptoms. Her past medical history is significant for kidney stones.     Review of Systems   Constitution: Negative for chills and fever.   Skin: Negative for itching.   Musculoskeletal: Negative for back pain.   Gastrointestinal: Negative for abdominal pain, nausea and vomiting.   Genitourinary: Positive for dysuria, frequency, hematuria and urgency. Negative for genital sores, missed menses and non-menstrual bleeding.       Objective:      Physical Exam   Constitutional: She is oriented to person, place, and time. She appears well-developed and well-nourished.   Overweight   HENT:   Head: Normocephalic and atraumatic.   Nose: Nose normal.   Mouth/Throat: Oropharynx is clear and moist.   Neck: Normal range of motion. Neck supple.   Cardiovascular: Normal rate, regular rhythm and normal heart " sounds.    Pulmonary/Chest: Breath sounds normal.   Abdominal: Soft. There is no tenderness. There is no guarding.   Left CVAT, no bladder discomfort to palp   Musculoskeletal: Normal range of motion.   Neurological: She is alert and oriented to person, place, and time.   Skin: Skin is warm and dry.   Psychiatric: She has a normal mood and affect. Her behavior is normal.       Assessment:       1. Acute cystitis without hematuria        Plan:         Acute cystitis without hematuria  -     POCT Urinalysis, Dipstick, Automated, W/O Scope  -     POCT urine pregnancy  -     sulfamethoxazole-trimethoprim 800-160mg (BACTRIM DS) 800-160 mg Tab; Take 1 tablet by mouth 2 (two) times daily.  Dispense: 14 tablet; Refill: 0  -     phenazopyridine (PYRIDIUM) 200 MG tablet; Take 1 tablet (200 mg total) by mouth 3 (three) times daily as needed for Pain.  Dispense: 6 tablet; Refill: 0      Jeovanny Marshall MD  Go to the Emergency Department for any problems  Call your PCP for follow up next available.

## 2017-11-22 NOTE — LETTER
November 22, 2017      [facility]  [address] [address], [address]  [place] [address]  Phone: [phone]  Fax: [phone]       Patient: Krupa Melton   YOB: 1990  Date of Visit: 11/22/2017    To Whom It May Concern:    Radha Melton  was at [facility] on 11/22/2017. She may return to work/school on 11/24/17, earlier if feels better  with no restrictions. If you have any questions or concerns, or if I can be of further assistance, please do not hesitate to contact me.    Sincerely,          Jeovanny Marshall MD

## 2017-11-22 NOTE — PATIENT INSTRUCTIONS
"Jeovanny Marshall MD  Go to the Emergency Department for any problems  Call your PCP for follow up next available.    Bladder Infection, Female (Adult)    Urine is normally doesn't have any bacteria in it. But bacteria can get into the urinary tract from the skin around the rectum. Or they can travel in the blood from elsewhere in the body. Once they are in your urinary tract, they can cause infection in the urethra (urethritis), the bladder (cystitis), or the kidneys (pyelonephritis).  The most common place for an infection is in the bladder. This is called a bladder infection. This is one of the most common infections in women. Most bladder infections are easily treated. They are not serious unless the infection spreads to the kidney.  The phrases "bladder infection," "UTI," and "cystitis" are often used to describe the same thing. But they are not always the same. Cystitis is an inflammation of the bladder. The most common cause of cystitis is an infection.  Symptoms  The infection causes inflammation in the urethra and bladder. This causes many of the symptoms. The most common symptoms of a bladder infection are:  · Pain or burning when urinating  · Having to urinate more often than usual  · Urgent need to urinate  · Only a small amount of urine comes out  · Blood in urine  · Abdominal discomfort. This is usually in the lower abdomen above the pubic bone.  · Cloudy urine  · Strong- or bad-smelling urine  · Unable to urinate (urinary retention)  · Unable to hold urine in (urinary incontinence)  · Fever  · Loss of appetite  · Confusion (in older adults)  Causes  Bladder infections are not contagious. You can't get one from someone else, from a toilet seat, or from sharing a bath.  The most common cause of bladder infections is bacteria from the bowels. The bacteria get onto the skin around the opening of the urethra. From there, they can get into the urine and travel up to the bladder, causing inflammation and " infection. This usually happens because of:  · Wiping improperly after urinating. Always wipe from front to back.  · Bowel incontinence  · Pregnancy  · Procedures such as having a catheter inserted  · Older age  · Not emptying your bladder. This can allow bacteria a chance to grow in your urine.  · Dehydration  · Constipation  · Sex  · Use of a diaphragm for birth control   Treatment  Bladder infections are diagnosed by a urine test. They are treated with antibiotics and usually clear up quickly without complications. Treatment helps prevent a more serious kidney infection.  Medicines  Medicines can help in the treatment of a bladder infection:  · Take antibiotics until they are used up, even if you feel better. It is important to finish them to make sure the infection has cleared.  · You can use acetaminophen or ibuprofen for pain, fever, or discomfort, unless another medicine was prescribed. If you have chronic liver or kidney disease, talk with your healthcare provider before using these medicines. Also talk with your provider if you've ever had a stomach ulcer or gastrointestinal bleeding, or are taking blood-thinner medicines.  · If you are given phenazopydridine to reduce burning with urination, it will cause your urine to become a bright orange color. This can stain clothing.  Care and prevention  These self-care steps can help prevent future infections:  · Drink plenty of fluids to prevent dehydration and flush out your bladder. Do this unless you must restrict fluids for other health reasons, or your doctor told you not to.  · Proper cleaning after going to the bathroom is important. Wipe from front to back after using the toilet to prevent the spread of bacteria.  · Urinate more often. Don't try to hold urine in for a long time.  · Wear loose-fitting clothes and cotton underwear. Avoid tight-fitting pants.  · Improve your diet and prevent constipation. Eat more fresh fruit and vegetables, and fiber, and  less junk and fatty foods.  · Avoid sex until your symptoms are gone.  · Avoid caffeine, alcohol, and spicy foods. These can irritate your bladder.  · Urinate right after intercourse to flush out your bladder.  · If you use birth control pills and have frequent bladder infections, discuss it with your doctor.  Follow-up care  Call your healthcare provider if all symptoms are not gone after 3 days of treatment. This is especially important if you have repeat infections.  If a culture was done, you will be told if your treatment needs to be changed. If directed, you can call to find out the results.  If X-rays were done, you will be told if the results will affect your treatment.  Call 911  Call 911 if any of the following occur:  · Trouble breathing  · Hard to wake up or confusion  · Fainting or loss of consciousness  · Rapid heart rate  When to seek medical advice  Call your healthcare provider right away if any of these occur:  · Fever of 100.4ºF (38.0ºC) or higher, or as directed by your healthcare provider  · Symptoms are not better by the third day of treatment  · Back or belly (abdominal) pain that gets worse  · Repeated vomiting, or unable to keep medicine down  · Weakness or dizziness  · Vaginal discharge  · Pain, redness, or swelling in the outer vaginal area (labia)  Date Last Reviewed: 10/1/2016  © 1468-7698 [employer]. [address] [place] [address], [place] [id number]. All rights reserved. This information is not intended as a substitute for professional medical care. Always follow your healthcare professional's instructions.        Urinary Tract Infections in Women    Urinary tract infections (UTIs) are most often caused by bacteria (germs). These bacteria enter the urinary tract. The bacteria may come from outside the body. Or they may travel from the skin outside the rectum or vagina into the urethra. Female anatomy makes it easy for bacteria from the bowel to enter a womans urinary tract,  which is the most common source of UTI. This means women develop UTIs more often than men. Pain in or around the urinary tract is a common UTI symptom. But the only way to know for sure if you have a UTI for the healthcare provider to test your urine. The two tests that may be done are the urinalysis and urine culture.  Types of UTIs  · Cystitis: A bladder infection (cystitis) is the most common UTI in women. You may have urgent or frequent urination. You may also have pain, burning when you urinate, and bloody urine.  · Urethritis: This is an inflamed urethra, which is the tube that carries urine from the bladder to outside the body. You may have lower stomach or back pain. You may also have urgent or frequent urination.  · Pyelonephritis: This is a kidney infection. If not treated, it can be serious and damage your kidneys. In severe cases, you may be hospitalized. You may have a fever and lower back pain.  Medicines to treat a UTI  Most UTIs are treated with antibiotics. These kill the bacteria. The length of time you need to take them depends on the type of infection. It may be as short as 3 days. If you have repeated UTIs, a low-dose antibiotic may be needed for several months. Take antibiotics exactly as directed. Dont stop taking them until all of the medicine is gone. If you stop taking the antibiotic too soon, the infection may not go away, and you may develop a resistance to the antibiotic. This can make it much harder to treat.  Lifestyle changes to treat and prevent UTIs  The lifestyle changes below will help get rid of your UTI. They may also help prevent future UTIs.  · Drink plenty of fluids. This includes water, juice, or other caffeine-free drinks. Fluids help flush bacteria out of your body.  · Empty your bladder. Always empty your bladder when you feel the urge to urinate. And always urinate before going to sleep. Urine that stays in your bladder can lead to infection. Try to urinate before and  after sex as well.  · Practice good personal hygiene. Wipe yourself from front to back after using the toilet. This helps keep bacteria from getting into the urethra.  · Use condoms during sex. These help prevent UTIs caused by sexually transmitted bacteria. Also, avoid using spermicides during sex. These can increase the risk of UTIs. Choose other forms of birth control instead. For women who tend to get UTIs after sex, a low-dose of a preventive antibiotic may be used. Be sure to discuss this option with your healthcare provider.  · Follow up with your healthcare provider as directed. He or she may test to make sure the infection has cleared. If needed, more treatment may be started.  Date Last Reviewed: 1/1/2017  © 2995-4750 [employer]. [address] [place] [address], [place] [address]. All rights reserved. This information is not intended as a substitute for professional medical care. Always follow your healthcare professional's instructions.

## 2017-11-24 ENCOUNTER — PATIENT MESSAGE (OUTPATIENT)
Dept: UROLOGY | Facility: CLINIC | Age: 27
End: 2017-11-24

## 2017-11-24 ENCOUNTER — OFFICE VISIT (OUTPATIENT)
Dept: UROLOGY | Facility: CLINIC | Age: 27
End: 2017-11-24
Payer: COMMERCIAL

## 2017-11-24 VITALS
HEART RATE: 101 BPM | SYSTOLIC BLOOD PRESSURE: 131 MMHG | BODY MASS INDEX: 39.48 KG/M2 | DIASTOLIC BLOOD PRESSURE: 86 MMHG | WEIGHT: 230 LBS

## 2017-11-24 DIAGNOSIS — N20.0 NEPHROLITHIASIS: Primary | ICD-10-CM

## 2017-11-24 PROCEDURE — 81002 URINALYSIS NONAUTO W/O SCOPE: CPT | Mod: S$GLB,,, | Performed by: STUDENT IN AN ORGANIZED HEALTH CARE EDUCATION/TRAINING PROGRAM

## 2017-11-24 PROCEDURE — 99999 PR PBB SHADOW E&M-EST. PATIENT-LVL III: CPT | Mod: PBBFAC,,, | Performed by: STUDENT IN AN ORGANIZED HEALTH CARE EDUCATION/TRAINING PROGRAM

## 2017-11-24 PROCEDURE — 99204 OFFICE O/P NEW MOD 45 MIN: CPT | Mod: 25,S$GLB,, | Performed by: STUDENT IN AN ORGANIZED HEALTH CARE EDUCATION/TRAINING PROGRAM

## 2017-11-24 PROCEDURE — 82365 CALCULUS SPECTROSCOPY: CPT

## 2017-11-24 NOTE — PROGRESS NOTES
Subjective:       Patient ID: Krupa Melton is a 27 y.o. female.    Chief Complaint:  nephrolithiasis  This is a 27 y.o.  female patient that is new to me. She has a history of dysuria and noted that she passed a kidney stone on . She was started on bactrim and pyridium at the urgent care and noted that her dysuria has improved.  Her last imaging was a CT scan performed on 6/10/17 - which demonstrated a small punctate left upper pole calculus. 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 Has experienced low grade temperature 99.6-100 degrees F. She has had kidney stones in the past, she was treated in 2016 (last year) at [facility]. She notes they proceeded with what sounds like a ureteroscopy and did not see a stone. Later when she still had pain they performed an IVP and discovered that she had a duplicated system and proceeded with a ureteroscopy but did not visualize a stone. She notes that she had a ureteral stent and it was very painful for her. Current symptoms significant for low grade fevers, she is taking bactrim. Pain is much improved.     Social history - works at [employer] as a nurse.     I reviewed Dr. Marshall's note from urgent care.     Lab Results   Component Value Date    CREATININE 0.8 10/26/2017     I personally reviewed the images: CT 6/10/17 - small punctate left upper pole renal calculus, duplicated system. Personally reviewed and showed the patient the images.   No results found in the last 24 hours.    ---  Past Medical History:   Diagnosis Date    Hypoglycemia     Kidney calculi        Past Surgical History:   Procedure Laterality Date    ANKLE SURGERY  2008     SECTION  10/2014    KIDNEY STONE SURGERY      TONSILLECTOMY      WISDOM TOOTH EXTRACTION         Family History   Problem Relation Age of Onset    Hypertension Mother     Hypertension Father     Heart disease Father     No Known  Problems Sister     No Known Problems Daughter     No Known Problems Sister     No Known Problems Sister        Social History   Substance Use Topics    Smoking status: Never Smoker    Smokeless tobacco: Never Used    Alcohol use Yes       Current Outpatient Prescriptions on File Prior to Visit   Medication Sig Dispense Refill    blood sugar diagnostic (ACCU-CHEK SMARTVIEW TEST STRIP) Strp Use to check sugars 4 times daily dispense strips and lancets. accucheck veronica meter 150 each 4    eszopiclone (LUNESTA) 1 MG Tab TK 1 TO 2 TS PO QHS PRN  2    lisdexamfetamine (VYVANSE) 30 MG capsule Take 1 capsule (30 mg total) by mouth every morning. 30 capsule 0    NATURE-THROID 48.75 mg Tab Take 2 tablets by mouth once daily.   0    phenazopyridine (PYRIDIUM) 200 MG tablet Take 1 tablet (200 mg total) by mouth 3 (three) times daily as needed for Pain. 6 tablet 0    rizatriptan (MAXALT) 10 MG tablet Take one tablet at onset of severe migraine.  May repeat in 2 hours but not to exceed 2 tablets in 24 hours 10 tablet 5    sulfamethoxazole-trimethoprim 800-160mg (BACTRIM DS) 800-160 mg Tab Take 1 tablet by mouth 2 (two) times daily. 14 tablet 0    [DISCONTINUED] methylPREDNISolone (MEDROL DOSEPACK) 4 mg tablet Use as instructed on dose pack 1 Package 0     No current facility-administered medications on file prior to visit.        Review of patient's allergies indicates:  No Known Allergies    Review of Systems   Constitutional: Negative for activity change.   HENT: Negative for congestion.    Eyes: Negative for visual disturbance.   Respiratory: Negative for shortness of breath.    Cardiovascular: Negative for chest pain.   Gastrointestinal: Negative for abdominal distention.   Musculoskeletal: Negative for gait problem.   Skin: Negative for color change.   Neurological: Negative for dizziness.   Psychiatric/Behavioral: Negative for agitation.       Objective:      Physical Exam   Constitutional: She is oriented to  person, place, and time. She appears well-developed.   HENT:   Head: Normocephalic and atraumatic.   Eyes: EOM are normal.   Neck: Normal range of motion.   Cardiovascular: Intact distal pulses.    Pulmonary/Chest: Effort normal.   Abdominal: Soft. She exhibits no distension. There is no tenderness.   Musculoskeletal: Normal range of motion.   Neurological: She is alert and oriented to person, place, and time.   Skin: Skin is warm and dry.   Psychiatric: She has a normal mood and affect.       Assessment:       1. Nephrolithiasis        Plan:       1. Kidney stone - patient passed it and brought with her to clinic. Will send for stone chemical analysis. Call on cell phone with stone chemical analysis results.   2. POCT urinalysis today - only +leukocytes. Patient's dysuria significantly improved. Offered to send for culture but her being on bactrim will likely result in the urine culture returning as no growth. She declined sending her urine for a culture.   3. General risk factors for kidney stones and the conservative measures to prevent kidney stones in the future were discussed with the patient in detail.  They were cautioned to try to limit salt and red meat intake.  The patient was encouraged to hydrate and add citrate to the diet with the addition of lesly or lemon juice to their water or alternatively with crystal light.    Nephrolithiasis  -     Urinary Stone Analysis

## 2017-11-27 ENCOUNTER — OFFICE VISIT (OUTPATIENT)
Dept: UROLOGY | Facility: CLINIC | Age: 27
End: 2017-11-27
Payer: COMMERCIAL

## 2017-11-27 VITALS
SYSTOLIC BLOOD PRESSURE: 144 MMHG | WEIGHT: 230 LBS | TEMPERATURE: 100 F | DIASTOLIC BLOOD PRESSURE: 90 MMHG | HEIGHT: 64 IN | BODY MASS INDEX: 39.27 KG/M2 | HEART RATE: 118 BPM

## 2017-11-27 DIAGNOSIS — R10.9 FLANK PAIN: ICD-10-CM

## 2017-11-27 DIAGNOSIS — N21.8 CALCULUS OF OTHER LOWER URINARY TRACT LOCATION: Primary | ICD-10-CM

## 2017-11-27 PROCEDURE — 99213 OFFICE O/P EST LOW 20 MIN: CPT | Mod: 25,S$GLB,, | Performed by: STUDENT IN AN ORGANIZED HEALTH CARE EDUCATION/TRAINING PROGRAM

## 2017-11-27 PROCEDURE — 99999 PR PBB SHADOW E&M-EST. PATIENT-LVL III: CPT | Mod: PBBFAC,,, | Performed by: STUDENT IN AN ORGANIZED HEALTH CARE EDUCATION/TRAINING PROGRAM

## 2017-11-27 PROCEDURE — 81002 URINALYSIS NONAUTO W/O SCOPE: CPT | Mod: S$GLB,,, | Performed by: STUDENT IN AN ORGANIZED HEALTH CARE EDUCATION/TRAINING PROGRAM

## 2017-11-27 PROCEDURE — 87086 URINE CULTURE/COLONY COUNT: CPT

## 2017-11-27 RX ORDER — KETOROLAC TROMETHAMINE 10 MG/1
10 TABLET, FILM COATED ORAL EVERY 6 HOURS
Qty: 20 TABLET | Refills: 0 | Status: SHIPPED | OUTPATIENT
Start: 2017-11-27 | End: 2018-01-29

## 2017-11-27 RX ORDER — CIPROFLOXACIN 500 MG/1
500 TABLET ORAL EVERY 12 HOURS
Qty: 14 TABLET | Refills: 0 | Status: SHIPPED | OUTPATIENT
Start: 2017-11-27 | End: 2017-12-04

## 2017-11-27 RX ORDER — KETOROLAC TROMETHAMINE 10 MG/1
10 TABLET, FILM COATED ORAL EVERY 6 HOURS
Qty: 20 TABLET | Refills: 0 | Status: SHIPPED | OUTPATIENT
Start: 2017-11-27 | End: 2017-11-27 | Stop reason: SDUPTHER

## 2017-11-27 RX ORDER — CIPROFLOXACIN 500 MG/1
500 TABLET ORAL EVERY 12 HOURS
Qty: 14 TABLET | Refills: 0 | Status: SHIPPED | OUTPATIENT
Start: 2017-11-27 | End: 2017-11-27 | Stop reason: SDUPTHER

## 2017-11-27 RX ORDER — CIPROFLOXACIN 500 MG/1
500 TABLET ORAL
COMMUNITY
End: 2017-11-27 | Stop reason: SDUPTHER

## 2017-11-27 NOTE — PROGRESS NOTES
Subjective:       Patient ID: Krupa Melton is a 27 y.o. female.    Chief Complaint:  followup  This is a 27 y.o.  female patient that is an established patient of mine. She has a history of dysuria and noted that she passed a kidney stone on Monday 11/20. She was started on bactrim and pyridium at the urgent care and noted that her dysuria has improved.  Her last imaging was a CT scan performed on 6/10/17 - which demonstrated a small punctate left upper pole calculus. 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 Has experienced low grade temperature 99.6-100 degrees F. She has had kidney stones in the past, she was treated in 2016 (last year) at [facility]. She notes they proceeded with what sounds like a ureteroscopy and did not see a stone. Later when she still had pain they performed an IVP and discovered that she had a duplicated system and proceeded with a ureteroscopy but did not visualize a stone. She notes that she had a ureteral stent and it was very painful for her. Current symptoms significant for low grade fevers, she is taking bactrim. Pain is much improved.     She saw me last week and at that time felt better and she brought the stone which we sent off for analysis and the results are still pending. She made another appointment because she is still having persistent low grade fevers, flank pain, and general feelings of fatigue. She notes that on Friday (day after Thanksgiving) she felt better and actually was able to do some shopping. But then later on that evening went to an urgent care because she started to feel worse and they started her on cipro. She has taken cipro since Friday, still has  low grade fevers 99F. She notes she still has left flank pain it is mild but uncomfortable. She is taking tylenol. Denies any sneezing, coughing, chills, other flu-like symptoms. 0    Social history - works at [employer] as a nurse.        Lab Results   Component Value Date    CREATININE 0.8 10/26/2017       ---  Past Medical History:   Diagnosis Date    Hypoglycemia     Kidney calculi     STD (sexually transmitted disease)     Urinary tract infection        Past Surgical History:   Procedure Laterality Date    ANKLE SURGERY       SECTION  10/2014    KIDNEY STONE SURGERY      TONSILLECTOMY      WISDOM TOOTH EXTRACTION         Family History   Problem Relation Age of Onset    Hypertension Mother     Hypertension Father     Heart disease Father     No Known Problems Sister     No Known Problems Daughter     No Known Problems Sister     No Known Problems Sister        Social History   Substance Use Topics    Smoking status: Never Smoker    Smokeless tobacco: Never Used    Alcohol use Yes       Current Outpatient Prescriptions on File Prior to Visit   Medication Sig Dispense Refill    blood sugar diagnostic (ACCU-CHEK SMARTVIEW TEST STRIP) Strp Use to check sugars 4 times daily dispense strips and lancets. accucheck veronica meter 150 each 4    eszopiclone (LUNESTA) 1 MG Tab TK 1 TO 2 TS PO QHS PRN  2    lisdexamfetamine (VYVANSE) 30 MG capsule Take 1 capsule (30 mg total) by mouth every morning. 30 capsule 0    NATURE-THROID 48.75 mg Tab Take 2 tablets by mouth once daily.   0    rizatriptan (MAXALT) 10 MG tablet Take one tablet at onset of severe migraine.  May repeat in 2 hours but not to exceed 2 tablets in 24 hours 10 tablet 5    [DISCONTINUED] sulfamethoxazole-trimethoprim 800-160mg (BACTRIM DS) 800-160 mg Tab Take 1 tablet by mouth 2 (two) times daily. 14 tablet 0     No current facility-administered medications on file prior to visit.        Review of patient's allergies indicates:  No Known Allergies    Review of Systems   Constitutional: Negative for activity change.   HENT: Negative for congestion.    Eyes: Negative for visual disturbance.   Respiratory: Negative for shortness of breath.    Cardiovascular:  Negative for chest pain.   Gastrointestinal: Negative for abdominal distention.   Musculoskeletal: Negative for gait problem.   Skin: Negative for color change.   Neurological: Negative for dizziness.   Psychiatric/Behavioral: Negative for agitation.       Objective:      Physical Exam   Constitutional: She is oriented to person, place, and time. She appears well-developed.   HENT:   Head: Normocephalic and atraumatic.   Eyes: EOM are normal.   Neck: Normal range of motion.   Cardiovascular: Intact distal pulses.    Pulmonary/Chest: Effort normal.   Abdominal: Soft. She exhibits no distension. There is tenderness (left flank tenderness to palpation).   Musculoskeletal: Normal range of motion.   Neurological: She is alert and oriented to person, place, and time.   Skin: Skin is warm and dry.   Psychiatric: She has a normal mood and affect.       Assessment:       1. Calculus of other lower urinary tract location    2. Flank pain        Plan:       1. Left urolithiasis - patient still with persistent flank pain and low grade fevers. Started on cipro by outside Er/urgent care. She has a 1 week course of antibiotics, I prescribed an additional week for a total of 2 weeks of antibiotics for presumed pyelonephritis. This may be a possible explanation for her persistent left flank pain. Will send urine for culture. I am expecting it to be no growth as she has been on antibiotics.   2. Will prescribe toradol to see if this will help ease her left flank pain. Sometimes passing a kidney stone may have some residual ureteral spasms which contributes to pain.   3. Will order a renal US to evaluate the anatomy of the left kidney, rule out hydronephrosis.   4. Continue tylenol prn.  5. If ever unable to tolerate PO antibiotics, must present to ER may need inpatient treatment.       Calculus of other lower urinary tract location  -     US Kidney Only; Future; Expected date: 11/27/2017  -     CULTURE, URINE  -     POCT URINE  DIPSTICK WITHOUT MICROSCOPE    Flank pain  -     POCT URINE DIPSTICK WITHOUT MICROSCOPE    Other orders  -     Discontinue: ketorolac (TORADOL) 10 mg tablet; Take 1 tablet (10 mg total) by mouth every 6 (six) hours.  Dispense: 20 tablet; Refill: 0  -     Discontinue: ciprofloxacin HCl (CIPRO) 500 MG tablet; Take 1 tablet (500 mg total) by mouth every 12 (twelve) hours.  Dispense: 14 tablet; Refill: 0  -     ciprofloxacin HCl (CIPRO) 500 MG tablet; Take 1 tablet (500 mg total) by mouth every 12 (twelve) hours.  Dispense: 14 tablet; Refill: 0  -     ketorolac (TORADOL) 10 mg tablet; Take 1 tablet (10 mg total) by mouth every 6 (six) hours.  Dispense: 20 tablet; Refill: 0

## 2017-11-27 NOTE — LETTER
November 27, 2017      [facility]  [address] [place] [address] Ave  [place] [address]  Phone: [phone]       Patient: Krupa Melton   YOB: 1990  Date of Visit: 11/27/2017    To Whom It May Concern:    Radha Melton  was at [facility] on 11/27/2017. She may return to work/school on Wednesday 11/29/17 with no restrictions. If you have any questions or concerns, or if I can be of further assistance, please do not hesitate to contact me.    Sincerely,    Jeaneth Beth MD

## 2017-11-28 ENCOUNTER — HOSPITAL ENCOUNTER (OUTPATIENT)
Dept: RADIOLOGY | Facility: HOSPITAL | Age: 27
Discharge: HOME | End: 2017-11-28
Attending: STUDENT IN AN ORGANIZED HEALTH CARE EDUCATION/TRAINING PROGRAM
Payer: COMMERCIAL

## 2017-11-28 DIAGNOSIS — N21.8 CALCULUS OF OTHER LOWER URINARY TRACT LOCATION: ICD-10-CM

## 2017-11-28 LAB — BACTERIA UR CULT: NO GROWTH

## 2017-11-28 PROCEDURE — 76770 US EXAM ABDO BACK WALL COMP: CPT | Mod: TC

## 2017-11-28 PROCEDURE — 76770 US EXAM ABDO BACK WALL COMP: CPT | Mod: 26,,, | Performed by: RADIOLOGY

## 2017-11-29 ENCOUNTER — TELEPHONE (OUTPATIENT)
Dept: UROLOGY | Facility: CLINIC | Age: 27
End: 2017-11-29

## 2017-11-29 LAB
ANNOTATION COMMENT IMP: NORMAL
COMPN STONE: NORMAL
SPECIMEN SOURCE: NORMAL
STONE ANALYSIS IR-IMP: NORMAL

## 2017-11-29 NOTE — TELEPHONE ENCOUNTER
----- Message from Jeaneth Beth MD sent at 11/28/2017  8:46 PM CST -----  Please call patient and notify of negative urine culture. Thank you.

## 2017-12-20 ENCOUNTER — OFFICE VISIT (OUTPATIENT)
Dept: URGENT CARE | Facility: CLINIC | Age: 27
End: 2017-12-20
Payer: COMMERCIAL

## 2017-12-20 VITALS
TEMPERATURE: 100 F | SYSTOLIC BLOOD PRESSURE: 132 MMHG | WEIGHT: 230 LBS | HEIGHT: 64 IN | RESPIRATION RATE: 18 BRPM | HEART RATE: 115 BPM | BODY MASS INDEX: 39.27 KG/M2 | DIASTOLIC BLOOD PRESSURE: 88 MMHG | OXYGEN SATURATION: 97 %

## 2017-12-20 DIAGNOSIS — R68.89 FLU-LIKE SYMPTOMS: Primary | ICD-10-CM

## 2017-12-20 DIAGNOSIS — Z20.828 EXPOSURE TO THE FLU: ICD-10-CM

## 2017-12-20 PROCEDURE — 99214 OFFICE O/P EST MOD 30 MIN: CPT | Mod: S$GLB,,, | Performed by: EMERGENCY MEDICINE

## 2017-12-20 RX ORDER — OSELTAMIVIR PHOSPHATE 75 MG/1
75 CAPSULE ORAL 2 TIMES DAILY
Qty: 10 CAPSULE | Refills: 0 | Status: SHIPPED | OUTPATIENT
Start: 2017-12-20 | End: 2017-12-25

## 2017-12-20 NOTE — PATIENT INSTRUCTIONS
Jeovanny Marshall MD  Go to the Emergency Department for any problems  Call your PCP for follow up next available.    Influenza (Adult)    Influenza is also called the flu. It is a viral illness that affects the air passages of your lungs. It is different from the common cold. The flu can easily be passed from one to person to another. It may be spread through the air by coughing and sneezing. Or it can be spread by touching the sick person and then touching your own eyes, nose, or mouth.  The flu starts 1 to 3 days after you are exposed to the flu virus. It may last for 1 to 2 weeks but many people feel tired or fatigued for many weeks afterward. You usually dont need to take antibiotics unless you have a complication. This might be an ear or sinus infection or pneumonia.  Symptoms of the flu may be mild or severe. They can include extreme tiredness (wanting to stay in bed all day), chills, fevers, muscle aches, soreness with eye movement, headache, and a dry, hacking cough.  Home care  Follow these guidelines when caring for yourself at home:  · Avoid being around cigarette smoke, whether yours or other peoples.  · Acetaminophen or ibuprofen will help ease your fever, muscle aches, and headache. Dont give aspirin to anyone younger than 18 who has the flu. Aspirin can harm the liver.  · Nausea and loss of appetite are common with the flu. Eat light meals. Drink 6 to 8 glasses of liquids every day. Good choices are water, sport drinks, soft drinks without caffeine, juices, tea, and soup. Extra fluids will also help loosen secretions in your nose and lungs.  · Over-the-counter cold medicines will not make the flu go away faster. But the medicines may help with coughing, sore throat, and congestion in your nose and sinuses. Dont use a decongestant if you have high blood pressure.  · Stay home until your fever has been gone for at least 24 hours without using medicine to reduce fever.  Follow-up care  Follow up with  "your healthcare provider, or as advised, if you are not getting better over the next week.  If you are age 65 or older, talk with your provider about getting a pneumococcal vaccine every 5 years. You should also get this vaccine if you have chronic asthma or COPD. All adults should get a flu vaccine every fall. Ask your provider about this.  When to seek medical advice  Call your healthcare provider right away if any of these occur:  · Cough with lots of colored mucus (sputum) or blood in your mucus  · Chest pain, shortness of breath, wheezing, or trouble breathing  · Severe headache, or face, neck, or ear pain  · New rash with fever  · Fever of 100.4°F (38°C) or higher, or as directed by your healthcare provider  · Confusion, behavior change, or seizure  · Severe weakness or dizziness  · You get a new fever or cough after getting better for a few days  Date Last Reviewed: 1/1/2017  © 8608-8662 [employer]. [address] [place] [address]. All rights reserved. This information is not intended as a substitute for professional medical care. Always follow your healthcare professional's instructions.        Viral Syndrome (Adult)  A viral illness may cause a number of symptoms. The symptoms depend on the part of the body that the virus affects. If it settles in your nose, throat, and lungs, it may cause cough, sore throat, congestion, and sometimes headache. If it settles in your stomach and intestinal tract, it may cause vomiting and diarrhea. Sometimes it causes vague symptoms like "aching all over," feeling tired, loss of appetite, or fever.  A viral illness usually lasts 1 to 2 weeks, but sometimes it lasts longer. In some cases, a more serious infection can look like a viral syndrome in the first few days of the illness. You may need another exam and additional tests to know the difference. Watch for the warning signs listed below.  Home care  Follow these guidelines for taking care of " yourself at home:  · If symptoms are severe, rest at home for the first 2 to 3 days.  · Stay away from cigarette smoke - both your smoke and the smoke from others.  · You may use over-the-counter acetaminophen or ibuprofen for fever, muscle aching, and headache, unless another medicine was prescribed for this. If you have chronic liver or kidney disease or ever had a stomach ulcer or GI bleeding, talk with your doctor before using these medicines. No one who is younger than 18 and ill with a fever should take aspirin. It may cause severe disease or death.  · Your appetite may be poor, so a light diet is fine. Avoid dehydration by drinking 8 to 12 8-ounce glasses of fluids each day. This may include water; orange juice; lemonade; apple, grape, and cranberry juice; clear fruit drinks; electrolyte replacement and sports drinks; and decaffeinated teas and coffee. If you have been diagnosed with a kidney disease, ask your doctor how much and what types of fluids you should drink to prevent dehydration. If you have kidney disease, drinking too much fluid can cause it build up in the your body and be dangerous to your health.  · Over-the-counter remedies won't shorten the length of the illness but may be helpful for cough, sore throat; and nasal and sinus congestion. Don't use decongestants if you have high blood pressure.  Follow-up care  Follow up with your healthcare provider if you do not improve over the next week.  Call 911  Get emergency medical care if any of the following occur:  · Convulsion  · Feeling weak, dizzy, or like you are going to faint  · Chest pain, shortness of breath, wheezing, or difficulty breathing  When to seek medical advice  Call your healthcare provider right away if any of these occur:  · Cough with lots of colored sputum (mucus) or blood in your sputum  · Chest pain, shortness of breath, wheezing, or difficulty breathing  · Severe headache; face, neck, or ear pain  · Severe, constant pain in  the lower right side of your belly (abdominal)  · Continued vomiting (cant keep liquids down)  · Frequent diarrhea (more than 5 times a day); blood (red or black color) or mucus in diarrhea  · Feeling weak, dizzy, or like you are going to faint  · Extreme thirst  · Fever of 100.4°F (38°C) or higher, or as directed by your healthcare provider  Date Last Reviewed: 9/25/2015  © 5706-1961 [employer]. [address] [place] [address]. All rights reserved. This information is not intended as a substitute for professional medical care. Always follow your healthcare professional's instructions.

## 2017-12-20 NOTE — PROGRESS NOTES
"Subjective:       Patient ID: Krupa Melton is a 27 y.o. female.    Vitals:  height is 5' 4" (1.626 m) and weight is 104.3 kg (230 lb). Her temperature is 100 °F (37.8 °C). Her blood pressure is 132/88 and her pulse is 115 (abnormal). Her respiration is 18 and oxygen saturation is 97%.     Chief Complaint: Fever    This is a 27 y.o. female with Past Medical History:  No date: Hypoglycemia  No date: Kidney calculi  No date: STD (sexually transmitted disease)  No date: Urinary tract infection   who presents today with a chief complaint of fever, body aches, and exposure to the flu.    States is not pregnant.      Fever    This is a new problem. The current episode started today. The problem occurs intermittently. The problem has been unchanged. The maximum temperature noted was 99 to 99.9 F. The temperature was taken using an oral thermometer. Associated symptoms include diarrhea and nausea. Pertinent negatives include no abdominal pain, chest pain, congestion, coughing, ear pain, headaches, sore throat or wheezing. She has tried nothing for the symptoms.     Review of Systems   Constitution: Positive for chills, decreased appetite, fever and malaise/fatigue.        Body Aches    HENT: Negative for congestion, ear pain, hoarse voice and sore throat.    Eyes: Negative for discharge and redness.   Cardiovascular: Negative for chest pain, dyspnea on exertion and leg swelling.   Respiratory: Negative for cough, shortness of breath, sputum production and wheezing.    Musculoskeletal: Negative for myalgias.   Gastrointestinal: Positive for diarrhea and nausea. Negative for abdominal pain.   Neurological: Negative for headaches.       Objective:      Physical Exam   Constitutional: She is oriented to person, place, and time.   Overweight   HENT:   Head: Normocephalic and atraumatic.   Right Ear: Tympanic membrane, external ear and ear canal normal.   Left Ear: Tympanic membrane, external ear and ear canal normal. "   Nose: Mucosal edema and rhinorrhea present. Right sinus exhibits no maxillary sinus tenderness and no frontal sinus tenderness. Left sinus exhibits no maxillary sinus tenderness and no frontal sinus tenderness.   Mouth/Throat: Uvula is midline, oropharynx is clear and moist and mucous membranes are normal.   Eyes: EOM are normal. Pupils are equal, round, and reactive to light.   Neck: Normal range of motion. Neck supple.   Cardiovascular: Normal rate, regular rhythm and normal heart sounds.    Pulmonary/Chest: Breath sounds normal.   Abdominal: Soft.   Musculoskeletal: Normal range of motion.   Lymphadenopathy:     She has no cervical adenopathy.   Neurological: She is alert and oriented to person, place, and time.   Skin: Skin is warm and dry.   Psychiatric: She has a normal mood and affect. Her behavior is normal.       Assessment:       1. Flu-like symptoms    2. Exposure to the flu        Plan:         Flu-like symptoms  -     oseltamivir (TAMIFLU) 75 MG capsule; Take 1 capsule (75 mg total) by mouth 2 (two) times daily.  Dispense: 10 capsule; Refill: 0    Exposure to the flu  -     oseltamivir (TAMIFLU) 75 MG capsule; Take 1 capsule (75 mg total) by mouth 2 (two) times daily.  Dispense: 10 capsule; Refill: 0      Jeovanny Marshall MD  Go to the Emergency Department for any problems  Call your PCP for follow up next available.

## 2017-12-20 NOTE — LETTER
December 20, 2017      [facility]  [address] [address], [address]  [place] [address]  Phone: [phone]  Fax: [phone]       Patient: Krupa Melton   YOB: 1990  Date of Visit: 12/20/2017    To Whom It May Concern:    Radha Melton  was at [facility] on 12/20/2017. She may return to work/school on 12/26/17, earlier if feels better and no fever for 24 hours with no restrictions. If you have any questions or concerns, or if I can be of further assistance, please do not hesitate to contact me.    Sincerely,          Jeovanny Marshall MD

## 2017-12-24 ENCOUNTER — TELEPHONE (OUTPATIENT)
Dept: URGENT CARE | Facility: CLINIC | Age: 27
End: 2017-12-24

## 2018-01-10 ENCOUNTER — OFFICE VISIT (OUTPATIENT)
Dept: PSYCHIATRY | Facility: CLINIC | Age: 28
End: 2018-01-10
Payer: COMMERCIAL

## 2018-01-10 VITALS
WEIGHT: 226.19 LBS | DIASTOLIC BLOOD PRESSURE: 86 MMHG | SYSTOLIC BLOOD PRESSURE: 137 MMHG | HEIGHT: 64 IN | BODY MASS INDEX: 38.62 KG/M2 | HEART RATE: 101 BPM

## 2018-01-10 DIAGNOSIS — F40.10 SOCIAL ANXIETY DISORDER: ICD-10-CM

## 2018-01-10 DIAGNOSIS — F33.1 MODERATE EPISODE OF RECURRENT MAJOR DEPRESSIVE DISORDER: Primary | ICD-10-CM

## 2018-01-10 PROCEDURE — 99999 PR PBB SHADOW E&M-EST. PATIENT-LVL III: CPT | Mod: PBBFAC,,, | Performed by: STUDENT IN AN ORGANIZED HEALTH CARE EDUCATION/TRAINING PROGRAM

## 2018-01-10 PROCEDURE — 99203 OFFICE O/P NEW LOW 30 MIN: CPT | Mod: S$GLB,,, | Performed by: STUDENT IN AN ORGANIZED HEALTH CARE EDUCATION/TRAINING PROGRAM

## 2018-01-10 RX ORDER — FLUOXETINE 10 MG/1
10 CAPSULE ORAL DAILY
Qty: 60 CAPSULE | Refills: 1 | Status: SHIPPED | OUTPATIENT
Start: 2018-01-10 | End: 2018-02-20 | Stop reason: SDUPTHER

## 2018-01-10 NOTE — PROGRESS NOTES
"Outpatient Psychiatry Initial Visit (MD/NP)    1/10/2018    Krupa Melton, a 27 y.o. female, presenting for initial evaluation visit. Met with patient.    Reason for Encounter: self-referral. Patient complains of depression & anxiety.    History of Present Illness:  27yoF w/a hx of depression & anxiety.  Noted she's had depression and anxiety for as long as she can remember.  Her dad was in  and they moved a lot when she was a child, from [place] -> [place] -> [place] -> [place] all before 6th grade.  Then moved to [place] in 6th grade.  Recalls sitting alone and feeling isolated in first grade.  Had a lot of social anxiety starting young.  Had trouble focusing or answering questions in class b/c she was paranoid others were judging her.  "I was always shy."  Still sometimes stutters when she's feeling socially anxious.  Continued to feel depression/anxiety.  Was not officially diagnosed with depression or anxiety until college.  Has never clearly seen a psychiatrist.  Was initially getting medications from PCP who was looking into thyroid abnormalities.  Tried wellbutrin, but started this at the same time as metformin, had a lot of diarrhea and she wasn't sure if wellbutrin caused this (now aware that metformin causes persistent diarrhea).  Took lexapro for the longest amount of time, but it made her feel numb.  More recently, she's been seeing a prescribing psychologist - Zully Olsen at [facility].  They obtained genetic testing and she's tried the other meds below w/o much success.  Does not have her genetic testing results with her, noted she'll try to obtain them.  Does recall it noted she has altered metabolism to drugs and tends to be sensitive to them.  Consistent with those results, she tried trazodone in the past and even at 25mg she had excessive next-day drowsiness.  "I could take 25mg benadryl and sleep for 12 hours."  DEBORA was prescribing naturethyroid, but then she saw " endocrinologist who said she didn't have to stop this but they wouldn't give it.  Her most recent thyroid labs are WNL, but she's concerned that her free T4 is close to the low range (result 0.86, range 0.71-1.51).  Unsure if the thyroid supplement helped with her depression.  Most recently, she was prescribed vyvanse for motivation & binge eating (by Zully Olsen, PhD); does not have ADHD.  She's had the same 1 mo prescription since Sept, not taking regularly.  Took today b/c she expected a long day.  Noted that it improved her depression initially (at first time taking the med), but it increases irritability, causes hyperfocus, she crashes late day, has worse depression before bed with crying, and it causes some insomnia.  She's no longer seeing Zully Olsen, PhD due to insurance change.  She works for [employer] and has to see an [facility] provider to be covered by insurance.  Also had a  providing therapy at [facility].  She would like to find another therapist, felt it was helpful.  Unsure what kind of therapy she had, did some journaling, longest time in therapy was Jan of last year until Aug when her insurance changed.    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  This, in addition to having a 2yo, makes it difficult to have a regular sleep schedule.  She gets insomnia, but also small doses of sleep aids can be overly sedating for her.  Takes melatonin which helps, but still has trouble staying asleep.  Also has some hx of missing antidepressants for a day or two, discussed she'd possibly do better on a longer acting med.  She believes her genetic analysis showed prozac may be one to avoid, but based on symptom goals we discussed trying prozac as a good option.  Due to binge eating, significant anxiety, needing a longer half life, concern for avoiding wt, low energy and lack of prior trial with this med.  Also discussed trying wellbutrin again for motivation/energy  "due to lack of adequate trial.    Has shared custody of her 4yo daughter with the child's father, but has the child for more time.  As an added stressor, she's been dating her current boyfriend x ~1yr and her boyfriend has had a lot of health issues.  He had bariatric surgery a year ago and has had many complications.  Had trouble with excess rapid wt loss (250->140lb), was in an ICU w/slow HR and electrolyte imbalance, has been on TPN, had fistula then infection and led to osteomyelitis.  Now today he's having surgery to have part of his sternum and clavicle removed.  They broke up for a couople mo in Feb.  Boyfriend is a ; ex-BF is a .      Past meds:  wellbutrin (started at same time as metformin and was having diarrhea & wt gain so stopped), lexapro (made her feel numb, lack of enjoyment), trintillex (made throw up a lot), lamictal (didn't do anything), trazodone (lowest dose thinks only 25mg, cutting in half, making very sleepy the next day), benadryl 25mg (will sleep for 12 hrs straight and feel very tired), Lunesta (still wakes in the night), ambien (horrible nightmares), enlyte (took for 1 month, unsure if it helped), Fetzima (seemed was helping but was $500/mo with insurance), vyvanse (some irritability, insomnia, crashing with more depression), topamax (does not know dose, did not lose weight)      Review Of Systems:     Psychiatric Review Of Systems - Is patient experiencing or having changes in:  sleep: yes, no trouble falling asleep, but trouble staying asleep; average 7-8hrs/night  appetite: yes, "I want to eat everything all the time", mostly sweets  weight: yes, was down to 211lbs in June ; I've lost and gained 50lbs my entire life (range 211 to 260, today 226)  energy/anergy: yes, low all the time  interest/pleasure/anhedonia: yes, "the only thing I do is watch netflix"; used to like reading but can't get through book now  somatic symptoms: yes, gets palpitations and chest pain " "when really anxious  anxiety/panic: yes, average 6/10, range 4-6/10 w/10 the highest  guilty/hopelessness: yes  concentration: yes, may be mostly related to social anxiety/depression  S.I.B.s/risky behavior: no, wanted to in HS but it hurt too much  Irritability: yes, "way worse on the vyvanse"  Racing thoughts: yes, "it's like constant" - money, bills, trying to keep up with things at work  Impulsive behaviors: yes, "I am when it comes to spending money which is part of the financial problem"; it gets better when I'm not as depressed  Manic sxs:  No, never  Paranoia: yes, just social related but a lot of social anxiety  AVH:no  SI:  No but has a couple times/mo, not active since college; lmited due to guilt of hurting others; "I know it's not gonna benefit anyone if I commit suicide"; "I wouldn't want to put my family through it"        PSYCHOSOCIAL HISTORY:       Home Meds: vyvanse, not daily    Allergies:  Review of patient's allergies indicates:  No Known Allergies    Past Medical History:    Past Medical History:   Diagnosis Date    Hypoglycemia     Kidney calculi     STD (sexually transmitted disease)     Urinary tract infection      No hx of seizures    Past Psychiatric History:  Previous Medication Trials: yes, please see HPI  Previous Psychiatric Hospitalizations:no  Previous Suicide Attempts: no  History of Violence: no  Outpatient psychiatrist: No, previously was seeing a prescribing psychologist      Social History:   Parents  when she was 16yoa; mom remarried recently; pt was shared custody w/2 younger sisters at that time; doesn't recall them fighting much before the divorce; good job of hiding it, they were actually confused when informed of divorce.  Dad lives in [facility].  mom lives in [place]  Marital Status: never , has boyfriend who is not father of child  Children: 2yo daughter; share custody but she watches her more  Employment Status/Info: ICU nurse at [employer]  Education: " "Nursing school, BSN  Housing Status: lives with daughter in her house  History of phys/sexual abuse: no, but parents really strict  Access to gun: no      Substance Use:  Recreational Drugs: denied  Use of Alcohol: not often, but when does binge drinks, normally ~2 drinks every 2 months  Tobacco Use:no  Rehab History:no    Legal History:  Past Charges/Incarcerations: no    Family Psychiatric History:    Dad - depression & anxiety, used to threaten suicide to get things, drinks a lot but never reall  Mom - a lot of anxiety, took lexapro around time of separation and takes xanax, she smokes and drinks a lot  Younger sister - depression, ?anorexia in the past  Paternal cousin Cousin - bipolar disorder, possibly from dad's side  Paternal grandmother - takes prozac      Current Evaluation:     Nutritional Screening: Considering the patient's height and weight, medications, medical history and preferences, should a referral be made to the dietitian? no and yes    Constitutional  Vitals:  Most recent vital signs, dated less than 90 days prior to this appointment, were reviewed.    Vitals:    01/10/18 0938   BP: 137/86   Pulse: 101   Weight: 102.6 kg (226 lb 3.2 oz)   Height: 5' 4" (1.626 m)        General:  unremarkable, age appropriate, well nourished     Musculoskeletal  Muscle Strength/Tone:  no dyskinesia, no dystonia, no tremor, no tic   Gait & Station:  non-ataxic     Psychiatric  Speech:  no latency; no press, soft, some inhibition   Mood & Affect:  depressed  dysphoric   Thought Process:  normal and logical   Associations:  intact   Thought Content:  normal, no suicidality, no homicidality, delusions, or paranoia   Insight:  intact   Judgement: behavior is adequate to circumstances   Orientation:  grossly intact   Memory: intact for content of interview   Language: grossly intact   Attention Span & Concentration:  able to focus   Fund of Knowledge:  intact and appropriate to age and level of education "       Relevant Elements of Neurological Exam: normal gait    Functioning in Relationships:  Spouse/partner: boyfriend with multiple health issues  Peers:   Employers: limitations with social anxiety    Laboratory Data  No visits with results within 1 Month(s) from this visit.   Latest known visit with results is:   Office Visit on 11/27/2017   Component Date Value Ref Range Status    Urine Culture, Routine 11/27/2017 No growth   Final         Medications  Outpatient Encounter Prescriptions as of 1/10/2018   Medication Sig Dispense Refill    blood sugar diagnostic (ACCU-CHEK SMARTVIEW TEST STRIP) Strp Use to check sugars 4 times daily dispense strips and lancets. accucheck veronica meter 150 each 4    eszopiclone (LUNESTA) 1 MG Tab TK 1 TO 2 TS PO QHS PRN  2    FLUoxetine (PROZAC) 10 MG capsule Take 1 capsule (10 mg total) by mouth once daily. Take 1 daily (10mg), can increase to 2 caps daily if no issues 60 capsule 1    ketorolac (TORADOL) 10 mg tablet Take 1 tablet (10 mg total) by mouth every 6 (six) hours. 20 tablet 0    lisdexamfetamine (VYVANSE) 30 MG capsule Take 1 capsule (30 mg total) by mouth every morning. 30 capsule 0    NATURE-THROID 48.75 mg Tab Take 2 tablets by mouth once daily.   0    rizatriptan (MAXALT) 10 MG tablet Take one tablet at onset of severe migraine.  May repeat in 2 hours but not to exceed 2 tablets in 24 hours 10 tablet 5     No facility-administered encounter medications on file as of 1/10/2018.            Assessment - Diagnosis - Goals:     Impression:     ICD-10-CM ICD-9-CM   1. Moderate episode of recurrent major depressive disorder F33.1 296.32   2. Social anxiety disorder F40.10 300.23       Strengths and Liabilities: Strength: Patient accepts guidance/feedback, Strength: Patient is expressive/articulate., Strength: Patient is intelligent., Strength: Patient is motivated for change., Strength: Patient has positive support network., Strength: Patient has reasonable judgment.,  Strength: Patient is stable.    Treatment Goals:  Specify outcomes written in observable, behavioral terms:   Anxiety: eliminating all anxiety symptoms (SCL-90-R scores in normal range)  Depression: eliminating all depressive symptoms (BDI score <10 for 1 month)    Treatment Plan/Recommendations:   · The treatment plan and follow up plan were reviewed with the patient.    Meds:  - Start prozac 10mg daily, can increase to 20mg daily if no issues  - Continue Melatonin qhs PRN insomnia  - Continue benadryl 12.5mg qhs PRN insomnia  - Wants to try Enlyte in the future    - May consider continuing vyvanse in the future for motivation, binge eating, social anxiety; but after her depression/anxiety improves    - Note, she takes Rizatriptan maybe twice/month for migraine; discussed risks w/SSRI    - She will obtain her prior genetic testing regarding medications and bring to future appointment    Therapy:  - Encouraged to start therapy here at [employer], pt noted she'll make appointment    Health:  - Has PCP here  - May want referral to wt loss clinic      Risks, benefits, side effects and alternative treatments discussed with patient. Patient agrees with the current plan as documented.  Encouraged Patient to keep future appointments.  Take medications as prescribed and abstain from substance abuse.  Pt to present to ED for thoughts to harm herself or others        Return to Clinic: 3-4 weeks      Total time: >60min  Consulting clinician was informed of the encounter and consult note.      Sonia Pennington MD  Psychiatry PGY-3  [phone]

## 2018-01-24 ENCOUNTER — PATIENT MESSAGE (OUTPATIENT)
Dept: FAMILY MEDICINE | Facility: CLINIC | Age: 28
End: 2018-01-24

## 2018-01-29 ENCOUNTER — OFFICE VISIT (OUTPATIENT)
Dept: FAMILY MEDICINE | Facility: CLINIC | Age: 28
End: 2018-01-29
Payer: COMMERCIAL

## 2018-01-29 ENCOUNTER — PATIENT MESSAGE (OUTPATIENT)
Dept: NEUROLOGY | Facility: CLINIC | Age: 28
End: 2018-01-29

## 2018-01-29 VITALS
WEIGHT: 231.69 LBS | TEMPERATURE: 98 F | OXYGEN SATURATION: 98 % | BODY MASS INDEX: 39.55 KG/M2 | SYSTOLIC BLOOD PRESSURE: 120 MMHG | HEIGHT: 64 IN | DIASTOLIC BLOOD PRESSURE: 80 MMHG | HEART RATE: 102 BPM

## 2018-01-29 DIAGNOSIS — F33.1 MODERATE EPISODE OF RECURRENT MAJOR DEPRESSIVE DISORDER: ICD-10-CM

## 2018-01-29 DIAGNOSIS — G43.009 MIGRAINE WITHOUT AURA AND WITHOUT STATUS MIGRAINOSUS, NOT INTRACTABLE: ICD-10-CM

## 2018-01-29 DIAGNOSIS — N76.0 ACUTE VAGINITIS: Primary | ICD-10-CM

## 2018-01-29 PROBLEM — N30.00 ACUTE CYSTITIS WITHOUT HEMATURIA: Status: RESOLVED | Noted: 2017-11-22 | Resolved: 2018-01-29

## 2018-01-29 PROBLEM — R68.89 FLU-LIKE SYMPTOMS: Status: RESOLVED | Noted: 2017-12-20 | Resolved: 2018-01-29

## 2018-01-29 LAB
CANDIDA RRNA VAG QL PROBE: NEGATIVE
G VAGINALIS RRNA GENITAL QL PROBE: NEGATIVE
T VAGINALIS RRNA GENITAL QL PROBE: NEGATIVE

## 2018-01-29 PROCEDURE — 87480 CANDIDA DNA DIR PROBE: CPT

## 2018-01-29 PROCEDURE — 87491 CHLMYD TRACH DNA AMP PROBE: CPT

## 2018-01-29 PROCEDURE — 99999 PR PBB SHADOW E&M-EST. PATIENT-LVL IV: CPT | Mod: PBBFAC,,, | Performed by: FAMILY MEDICINE

## 2018-01-29 PROCEDURE — 99214 OFFICE O/P EST MOD 30 MIN: CPT | Mod: S$GLB,,, | Performed by: FAMILY MEDICINE

## 2018-01-29 NOTE — PROGRESS NOTES
Routine Office Visit    Patient Name: Krupa Melton    : 1990  MRN: [id number]    Subjective:  Krupa is a 27 y.o. female who presents today for     1. Vaginal discharge - started 1 week ago. Pt c/o thick whitish discharge that is described as an irritating feeling. She also notes associated pelvic pain. There is some associated burning sensation associated with urination, but symptoms are resolving. She used an otc treatment, one day yeast treatment, which did not help. She is now using a 7 day treatment, which has resolved some of the irritation. Irregular menses - last period in October; pt does have IUD      Review of Systems   Constitutional: Negative for chills and fever.   HENT: Negative for congestion.    Eyes: Negative for blurred vision.   Respiratory: Negative for cough.    Cardiovascular: Negative for chest pain.   Gastrointestinal: Negative for abdominal pain, constipation, diarrhea, heartburn, nausea and vomiting.   Genitourinary: Negative for dysuria.   Musculoskeletal: Negative for myalgias.   Skin: Negative for itching and rash.   Neurological: Negative for dizziness and headaches.   Psychiatric/Behavioral: Negative for depression.       Active Problem List  Patient Active Problem List   Diagnosis    Hypoglycemia    Migraine without aura    TMJ syndrome    Migraine without aura and without status migrainosus, not intractable    Exposure to the flu    Moderate episode of recurrent major depressive disorder    Social anxiety disorder       Past Surgical History  Past Surgical History:   Procedure Laterality Date    ANKLE SURGERY       SECTION  10/2014    KIDNEY STONE SURGERY      TONSILLECTOMY      WISDOM TOOTH EXTRACTION         Family History  Family History   Problem Relation Age of Onset    Hypertension Mother     Hypertension Father     Heart disease Father     No Known Problems Sister     No Known Problems Daughter     No Known Problems Sister     No  "Known Problems Sister        Social History  Social History     Social History    Marital status: Single     Spouse name: N/A    Number of children: N/A    Years of education: N/A     Occupational History    Not on file.     Social History Main Topics    Smoking status: Never Smoker    Smokeless tobacco: Never Used    Alcohol use Yes    Drug use: No    Sexual activity: Yes     Partners: Male     Other Topics Concern    Not on file     Social History Narrative    No narrative on file       Medications and Allergies  Reviewed and updated.   Current Outpatient Prescriptions   Medication Sig    blood sugar diagnostic (ACCU-CHEK SMARTVIEW TEST STRIP) Strp Use to check sugars 4 times daily dispense strips and lancets. accucheck veronica meter    FLUoxetine (PROZAC) 10 MG capsule Take 1 capsule (10 mg total) by mouth once daily. Take 1 daily (10mg), can increase to 2 caps daily if no issues    rizatriptan (MAXALT) 10 MG tablet Take one tablet at onset of severe migraine.  May repeat in 2 hours but not to exceed 2 tablets in 24 hours     No current facility-administered medications for this visit.        Physical Exam  /80 (BP Location: Left arm, Patient Position: Sitting, BP Method: Large (Manual))   Pulse 102   Temp 98.4 °F (36.9 °C) (Oral)   Ht 5' 4" (1.626 m)   Wt 105.1 kg (231 lb 11.3 oz)   SpO2 98%   BMI 39.77 kg/m²   Physical Exam   Constitutional: She is oriented to person, place, and time. She appears well-developed and well-nourished.   HENT:   Head: Normocephalic and atraumatic.   Eyes: Conjunctivae and EOM are normal. Pupils are equal, round, and reactive to light.   Neck: Normal range of motion. Neck supple.   Cardiovascular: Normal rate, regular rhythm and normal heart sounds.  Exam reveals no gallop and no friction rub.    No murmur heard.  Pulmonary/Chest: Breath sounds normal. No respiratory distress.   Abdominal: Soft. Bowel sounds are normal. She exhibits no distension. There is no " tenderness.   Musculoskeletal: Normal range of motion.   Lymphadenopathy:     She has no cervical adenopathy.   Neurological: She is alert and oriented to person, place, and time.   Skin: Skin is warm.   Psychiatric: She has a normal mood and affect.         Assessment/Plan:  Krupa Melton is a 27 y.o. female who presents today for :    Acute vaginitis  -     Vaginosis Screen by DNA Probe  -     C. trachomatis/N. gonorrhoeae by AMP DNA Vagina  Will f/u with cutlure and treat as indicated    Migraine without aura and without status migrainosus, not intractable  The current medical regimen is effective;  continue present plan and medications.    Moderate episode of recurrent major depressive disorder  The current medical regimen is effective;  continue present plan and medications.        Follow-up if symptoms worsen or fail to improve.

## 2018-01-29 NOTE — PATIENT INSTRUCTIONS
Preventing Vaginal Infection  These steps can help you stay comfortable during treatment of a vaginal infection. They also help prevent vaginal infections in the future.  Keeping a healthy balance  Factors that change the normal balance in the vagina can lead to a vaginal infection. To help keep the balance normal, try these tips:  · Change out of wet bathing suits and damp exercise clothes as soon as possible. Yeast thrive in a warm, moist environment.  · Avoid wearing tight pants. Choose cotton underwear and pantyhose that have a cotton crotch. Cotton keeps you cooler and drier than synthetics.  · Don't douche unless directed by your health care provider. Douching can destroy friendly bacteria and change the vagina's normal balance.  · Wipe from front to back after using the toilet. This prevents bacteria from spreading from the anus to the vulva.  · Wash the vulva with mild, unscented soap or with plain water.  · Wash your diaphragm, spermicide applicators, and sex toys with mild soap and water after use. Dry them thoroughly before putting them away.  · Change tampons often (every 2 hours to 4 hours). Leaving a tampon in for too long may disrupt the balance of vaginal bacteria.  · Avoid vaginal sprays, scented toilet paper and soaps, and deodorant tampons or pads, which can cause vaginal irritation  Staying healthy overall  Good overall health can help you resist infection. To be healthier:  · Help protect yourself from STDs by using latex condoms for intercourse. Ask your health care provider for more information about safer sex.  · Eat a variety of healthy foods.  · Exercise regularly.  · Get enough rest and sleep.  · Maintain a healthy weight. If you need to lose weight, ask your health care provider for advice on how to start.  Date Last Reviewed: 5/18/2015  © 5415-7117 [employer]. [address] [place] [address], [place] [address]. All rights reserved. This information is not intended as a substitute  for professional medical care. Always follow your healthcare professional's instructions.

## 2018-01-30 LAB
C TRACH DNA SPEC QL NAA+PROBE: NOT DETECTED
N GONORRHOEA DNA SPEC QL NAA+PROBE: NOT DETECTED

## 2018-01-30 RX ORDER — RIZATRIPTAN BENZOATE 10 MG/1
TABLET ORAL
Qty: 10 TABLET | Refills: 5 | Status: SHIPPED | OUTPATIENT
Start: 2018-01-30 | End: 2019-04-15 | Stop reason: SDUPTHER

## 2018-02-20 ENCOUNTER — OFFICE VISIT (OUTPATIENT)
Dept: PSYCHIATRY | Facility: CLINIC | Age: 28
End: 2018-02-20
Payer: COMMERCIAL

## 2018-02-20 VITALS
BODY MASS INDEX: 39.33 KG/M2 | HEART RATE: 78 BPM | SYSTOLIC BLOOD PRESSURE: 138 MMHG | WEIGHT: 230.38 LBS | DIASTOLIC BLOOD PRESSURE: 79 MMHG | HEIGHT: 64 IN

## 2018-02-20 DIAGNOSIS — F40.10 SOCIAL ANXIETY DISORDER: ICD-10-CM

## 2018-02-20 DIAGNOSIS — F33.1 MODERATE EPISODE OF RECURRENT MAJOR DEPRESSIVE DISORDER: Primary | ICD-10-CM

## 2018-02-20 DIAGNOSIS — F41.9 ANXIETY: ICD-10-CM

## 2018-02-20 PROCEDURE — 3008F BODY MASS INDEX DOCD: CPT | Mod: S$GLB,,, | Performed by: STUDENT IN AN ORGANIZED HEALTH CARE EDUCATION/TRAINING PROGRAM

## 2018-02-20 PROCEDURE — 99213 OFFICE O/P EST LOW 20 MIN: CPT | Mod: S$GLB,,, | Performed by: STUDENT IN AN ORGANIZED HEALTH CARE EDUCATION/TRAINING PROGRAM

## 2018-02-20 PROCEDURE — 99999 PR PBB SHADOW E&M-EST. PATIENT-LVL II: CPT | Mod: PBBFAC,,, | Performed by: STUDENT IN AN ORGANIZED HEALTH CARE EDUCATION/TRAINING PROGRAM

## 2018-02-20 RX ORDER — FLUOXETINE HYDROCHLORIDE 40 MG/1
40 CAPSULE ORAL DAILY
Qty: 60 CAPSULE | Refills: 2 | Status: SHIPPED | OUTPATIENT
Start: 2018-02-20 | End: 2018-03-21

## 2018-02-20 RX ORDER — ARIPIPRAZOLE 5 MG/1
TABLET ORAL
Qty: 15 TABLET | Refills: 1 | Status: SHIPPED | OUTPATIENT
Start: 2018-02-20 | End: 2018-03-21

## 2018-02-20 RX ORDER — GABAPENTIN 100 MG/1
100 CAPSULE ORAL 2 TIMES DAILY PRN
Qty: 60 CAPSULE | Refills: 1 | Status: SHIPPED | OUTPATIENT
Start: 2018-02-20 | End: 2018-11-12

## 2018-02-20 NOTE — PROGRESS NOTES
Outpatient Psychiatry Follow-Up Visit (MD/NP)    2/20/2018    Clinical Status of Patient:  Outpatient (Ambulatory)    Chief Complaint:  Krupa Melton is a 27 y.o. female who presents today for follow-up of depression and anxiety.  Met with patient.      26yo w/depression/anxiety & social anxiety here for f/u.  At our initial eval (1/10/18), pt was started on prozac and continued on melatonin/benadryl 12.5mg & discussed considering enlyte in the future.  She described anxiety since she was a child.  Moving around a lot [place] -> [place] -> [place] -> [place] before 6th grade.  [place] isolated w/social anxiety since grade school, stutters w/anxiety. Dx'd depression in college, but has never seen a psychiatrist.  Was getting meds from PCP, investigating thyroid.  Took lexapro a long time, made her feel numb.  Recently seeing prescribing psychologist - Zully Olsen at [facility], they obtained genetic tests, she'll bring to session.  Tried other meds w/o much success.  Genetic tests showed she tends to be sensitive to meds.  Consistent w/results, she has excessive drowsiness w/25mg or trazodone or benadryl despite insomnia w/o.  Was taking naturethyroid from OBGYN, but endocrine did not continue med.  Her most recent thyroid labs are WNL, but she's concerned that her free T4 is close to the low range (result 0.86, range 0.71-1.51).  Her prescribing psychologist recently prescribed vyvanse for motivation & binge eating (no ADHD), has had same 1 mo script since Sept not taking regularly (AEs:  Irritability, hyperfocus, crashes late day, worse depression before bed w/crying, some insomnia).      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  Would also like to find another therapist.  Pt has hx of forgetting to take antidepressants for a day or 2.  Also has issue w/binge eating, weight, low energy, motivation.  Discussed prozac vs wellbutrin.    Current  Meds:  Prozac 20mg daily (took 30mg a couple days then cut back b/c didn't want to run out)  Vyvanse (stopped since last appt)  Melatonin 3mg qhs    Interval History and Content of Current Session:  Interim Events/Subjective Report/Content of Current Session:  TODAY, pt reported feeling no change whatsoever on the prozac aside from less crying spells.  Started at 10mg x 4 day, then 20mg a little over a month.  She tried 30mg a couple days hoping to see more effect, but didn't notice much and went back to 20mg b/c she was worried about running out.  No adverse effects.  She's sleeping fine w/melatonin 3mg, but tired all the time, the fatigue was consistent before starting the prozac.  Discussed dropping melatonin to 1.5mg.  Would really like some energy.  Noted she's never really felt much effect from any antidepressants despite her excessive effect w/sedating meds.  Also vyvanse caused increased anxiety, so she had a clear effect from that med.  She's unsure if the prozac has helped w/binge eating, noted she still binges but didn't keep track of her frequency of binges prior to starting prozac.  Pt appeared hopeless regarding whether any med could help, but open to trying pretty much anything.  We discussed going up further on her prozac to 40mg for a few days, if no improvement, she can start Abilify 2.5mg daily for augmentation.  Also discussed trial of low dose neurontin PRN anxiety.  Full psych ROS below, no SI/HI/AVH.        Past meds:  wellbutrin (started at same time as metformin and was having diarrhea & wt gain so stopped), lexapro (made her feel numb, lack of enjoyment), trintillex (made throw up a lot), lamictal got up to 75mg daily (didn't do anything), trazodone (lowest dose thinks only 25mg, cutting in half, making very sleepy the next day), benadryl 25mg (will sleep for 12 hrs straight and feel very tired), vistaril 10mg (made too tired), Lunesta (still wakes in the night), ambien (horrible  "nightmares), enlyte (took for 1 month, unsure if it helped), Fetzima (seemed was helping but was $500/mo with insurance), vyvanse (exacerbation of anxiety, chest tightness, too wired, irritability, insomnia, crashing with more depression), topamax (does not know dose, did not lose weight)    Review of Systems     Psychiatric Review Of Systems - Is patient experiencing or having changes in:  sleep: yes, with melatonin 3mg sleeping 7-8hrs/night; sleeps 10 hrs if not working  appetite: yes, still wants to eat all the time   weight: yes, thinks has lost a little weight intentionally  energy/anergy: yes, tired all the time  interest/pleasure/anhedonia: yes, "the only thing I do is watch netflix"; used to like reading but can't get through book now  somatic symptoms: yes, gets palpitations and chest pain when really anxious; all the same as last appt  Less crying spells is only thing she's really noticed changed  anxiety/panic: yes, average 4-5/10 w/10 the highest; really bad day is 7-8/10  guilty/hopelessness: yes  concentration: yes, may be mostly related to social anxiety/depression  S.I.B.s/risky behavior: no, wanted to in HS but it hurt too much  Irritability: maybe slightly better; was a lot worse on the vyvanse  Racing thoughts: yes, "it's like constant" - money, bills, trying to keep up with things at work  Impulsive behaviors: yes, "I booked a trip to [place] so I'd say yeah" and got a cat  Manic sxs:  No, never; only felt very talkative irritable, impulsive on vyvanse, couldn't sit still on vyvanse  Paranoia: yes, just social related but a lot of social anxiety  AVH: no  SI:  No but has a couple passive thoughts/mo but won't act, not active since college; lmited due to guilt of hurting others; "I know it's not gonna benefit anyone if I commit suicide"; "I wouldn't want to put my family through it"      Past Medical, Family and Social History: The patient's past medical, family and social history have been " "reviewed and updated as appropriate within the electronic medical record - see encounter notes.    ICU nurse at [employer].  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  Has a boyfriend of ~1 yr and still communicates w/father of 2yo due to shared custody.  Current boyfriend () w/a lot of health issues after bariatric surgery -> infections.      Parents  when she was 16yoa; mom remarried recently; pt was shared custody w/2 younger sisters at that time; doesn't recall them fighting much before the divorce; good job of hiding it, pt & sibs were actually confused when informed of divorce.  Dad lives in [facility].  Mom lives in [place].  Pt's never been .  Lives w/daughter in her house.  Denied drugs.  Doesn't drink often, but when does she binge drinks.  Otherwise ~2 drinks every 2 mo.        Family Psychiatric History:  Dad - depression & anxiety, used to threaten suicide to get things, drinks a lot  Mom - a lot of anxiety, took lexapro around time of separation and takes xanax, she smokes and drinks a lot  Younger sister - depression, ?anorexia in the past  Paternal Cousin - bipolar disorder, possibly from dad's side  Paternal grandmother - takes prozac      Compliance: yes  Hx of forgetting to take antidepressants for a couple days at a time.    Side effects: None    Risk Parameters:  Patient reports no suicidal ideation  Patient reports no homicidal ideation  Patient reports no self-injurious behavior  Patient reports no violent behavior    Exam (detailed: at least 9 elements; comprehensive: all 15 elements)   Constitutional  Vitals:  Most recent vital signs, dated less than 90 days prior to this appointment, were reviewed.   Vitals:    02/20/18 1102   BP: 138/79   Pulse: 78   Weight: 104.5 kg (230 lb 6.1 oz)   Height: 5' 4" (1.626 m)        General:  unremarkable, age appropriate, well nourished     Musculoskeletal  Muscle Strength/Tone:  no dystonia, no tremor, no tic   Gait & Station:  non-ataxic "     Psychiatric  Speech:  no latency; no press   Mood & Affect:  depressed  dysphoric   Thought Process:  normal and logical   Associations:  intact   Thought Content:  normal, no suicidality, no homicidality, delusions, or paranoia   Insight:  has awareness of illness   Judgement: behavior is adequate to circumstances   Orientation:  grossly intact   Memory: intact for content of interview   Language: grossly intact   Attention Span & Concentration:  able to focus   Fund of Knowledge:  intact and appropriate to age and level of education     Assessment and Diagnosis   Status/Progress: Based on the examination today, the patient's problem(s) is/are inadequately controlled.  New problems have not been presented today.   Diagnostic uncertainty and Lack of compliance are complicating management of the primary condition.  The working differential for this patient includes MDD, JAVI, social anxiety, R/o substance induced mood disorder (yet denying regular use), R/o cluster B traits.     General Impression:     ICD-10-CM ICD-9-CM   1. Moderate episode of recurrent major depressive disorder F33.1 296.32   2. Social anxiety disorder F40.10 300.23   3. Anxiety F41.9 300.00   R/o Cluster B Traits    Intervention/Counseling/Treatment Plan     Meds:  - Increase prozac 20mg -> 40mg daily   - Start abilify 2.5mg daily (will start after trial of higher dose of prozac)  - Continue Melatonin qhs PRN insomnia (drop to 1.5mg qhs)  - Start Neurontin 100mg BID PRN anxiety    Future Med Options:  - May want to try Enlyte in the future  - May consider wellbutrin for motivation/energy/depression, but watch for interaction w/prozac  - May consider continuing vyvanse in the future for motivation, binge eating, social anxiety; but after her depression/anxiety improves    Other:  - Note, she takes Rizatriptan maybe twice/month for migraine; discussed risks w/SSRI  - She will obtain her prior genetic testing regarding medications and bring to  future appointment      Therapy:  - Appt scheduled to see Dr. Bradley and begin therapy    Health:  - Has PCP here  - May want referral to wt loss clinic      Risks, benefits, side effects and alternative treatments discussed with patient. Patient agrees with the current plan as documented.  Encouraged Patient to keep future appointments.  Take medications as prescribed and abstain from substance abuse.  Pt to present to ED for thoughts to harm herself or others        Return to Clinic: ~4 weeks        Sonia Pennington MD  Psychiatry PGY-3  [phone]

## 2018-03-12 ENCOUNTER — PATIENT MESSAGE (OUTPATIENT)
Dept: PSYCHIATRY | Facility: CLINIC | Age: 28
End: 2018-03-12

## 2018-03-13 ENCOUNTER — PATIENT MESSAGE (OUTPATIENT)
Dept: PSYCHIATRY | Facility: CLINIC | Age: 28
End: 2018-03-13

## 2018-03-21 ENCOUNTER — OFFICE VISIT (OUTPATIENT)
Dept: PSYCHIATRY | Facility: CLINIC | Age: 28
End: 2018-03-21
Payer: COMMERCIAL

## 2018-03-21 VITALS
SYSTOLIC BLOOD PRESSURE: 127 MMHG | HEIGHT: 64 IN | WEIGHT: 235 LBS | DIASTOLIC BLOOD PRESSURE: 77 MMHG | HEART RATE: 97 BPM | BODY MASS INDEX: 40.12 KG/M2

## 2018-03-21 DIAGNOSIS — F40.10 SOCIAL ANXIETY DISORDER: ICD-10-CM

## 2018-03-21 DIAGNOSIS — F41.9 ANXIETY: ICD-10-CM

## 2018-03-21 DIAGNOSIS — F90.0 ATTENTION DEFICIT HYPERACTIVITY DISORDER (ADHD), PREDOMINANTLY INATTENTIVE TYPE: ICD-10-CM

## 2018-03-21 DIAGNOSIS — F33.1 MODERATE EPISODE OF RECURRENT MAJOR DEPRESSIVE DISORDER: Primary | ICD-10-CM

## 2018-03-21 PROCEDURE — 99999 PR PBB SHADOW E&M-EST. PATIENT-LVL III: CPT | Mod: PBBFAC,,, | Performed by: STUDENT IN AN ORGANIZED HEALTH CARE EDUCATION/TRAINING PROGRAM

## 2018-03-21 PROCEDURE — 99213 OFFICE O/P EST LOW 20 MIN: CPT | Mod: S$GLB,,, | Performed by: STUDENT IN AN ORGANIZED HEALTH CARE EDUCATION/TRAINING PROGRAM

## 2018-03-21 RX ORDER — METHYLPHENIDATE HYDROCHLORIDE 10 MG/1
10 TABLET ORAL 2 TIMES DAILY
Qty: 60 TABLET | Refills: 0 | Status: SHIPPED | OUTPATIENT
Start: 2018-03-21 | End: 2018-05-19 | Stop reason: ALTCHOICE

## 2018-03-21 RX ORDER — SERTRALINE HYDROCHLORIDE 50 MG/1
50 TABLET, FILM COATED ORAL DAILY
Qty: 30 TABLET | Refills: 1 | Status: SHIPPED | OUTPATIENT
Start: 2018-03-21 | End: 2018-08-01 | Stop reason: SDUPTHER

## 2018-03-21 NOTE — PROGRESS NOTES
Outpatient Psychiatry Follow-Up Visit (MD/NP)    3/21/2018    Clinical Status of Patient:  Outpatient (Ambulatory)    Chief Complaint:  Krupa Melton is a 27 y.o. female who presents today for follow-up of depression and anxiety.  Met with patient.      26yo w/depression/anxiety & social anxiety here for f/u.  At our initial eval (1/10/18), pt was started on prozac and continued on melatonin/benadryl 12.5mg & discussed considering enlyte in the future.  She described anxiety since she was a child.  Moving around a lot [place] -> [place] -> [place] -> [place] before 6th grade.  [place] isolated w/social anxiety since grade school, stutters w/anxiety. Dx'd depression in college, but has never seen a psychiatrist.  Was getting meds from PCP, investigating thyroid.  Took lexapro a long time, made her feel numb.  Recently seeing prescribing psychologist - Zully Olsen at [facility], they obtained genetic tests, she'll bring to session.  Tried other meds w/o much success.  Genetic tests showed she tends to be sensitive to meds.  Consistent w/results, she has excessive drowsiness w/25mg or trazodone or benadryl despite insomnia w/o.  Was taking naturethyroid from OBGYN, but endocrine did not continue med.  Her most recent thyroid labs are WNL, but she's concerned that her free T4 is close to the low range (result 0.86, range 0.71-1.51).  Her prescribing psychologist recently prescribed vyvanse for motivation & binge eating (no ADHD), has had same 1 mo script since Sept not taking regularly (AEs:  Irritability, hyperfocus, crashes late day, worse depression before bed w/crying, some insomnia).      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  Would also like to find another therapist.  Pt has hx of forgetting to take antidepressants for a day or 2.  Also has issue w/binge eating, weight, low energy, motivation.      Current Meds:  Currently none  Prozac 40mg,  "stopped 4 days ago due to AEs  Abilify 2.5mg only tried for 2 days  Melatonin 3mg qhs (not currently taking, hasn't needed)  Neurontin 100mg PRN anxiety(tried a few times qhs, not much effect)    Interval History and Content of Current Session:  Interim Events/Subjective Report/Content of Current Session:  Pt last seen 2/20/18 and at that time we went up on her prozac and added abilify.  She didn't really feel much effect from prozac initially, so this was just a trial.  Since last appt, she sent this writer a message via [facility] to report constant fatigue on prozac, was instructed to try moving dose to night.  She also noted that she tried abilify a couple days and it made her not sleep for 38hrs straight.    TODAY, has been off prozac x 4 days.  Gave it an adequate trial, but constantly felt very heavy, fatigued, even when moving dose to nighttime.  As noted above, the abilify caused insomnia despite her taking it in the morning.  She did not feel restless, no movement side effects.  Did note some muscle twitching in her leg, unsure if it was related to abilify.  However, she was up 38hrs straight and laying in bed with "couldn't turn brain off".  Still felt tired, but couldn't sleep.    Pt brought her genetic testing today.  Spent time going through results (below) and comparing to our started list of past med trials and effects.  Also, reviewed in detail hx of other possible diagnoses again.      +social anxiety sxs, +JAVI sxs, +ADHD sxs, ?OCPD - perfectionistic and bothered by messes, no OCD    On discussion, a lot of pt's anxiety may be related to trouble attention and feeling overwhelmed.  But also she reports a lot of +JAVI and social anxiety that are in addition to the anxiety related to trouble w/regulating attention.  Completed ADHD scale below and she scored 34 on part B (highly likely to have adhd).  She noted having trouble with these sxs her entire life.  Was on vyvanse just before presenting to this " clinic, but primarily for weight loss.    Primary Current Concerns:  Low motivation, chronic fatigue, chronic trouble w/attention and productivity, which all increase her anxiety and depression, social anxiety.      Both Zoloft and Ritalin are in green on her genetic testing.  She hasn't tried either.  Discussed starting Zoloft x 1 week, then may try low dose of ritalin for attention.  She will contact this writer via [facility] w/updates.    Full psych ROS below, no SI/HI/AVH.        Past meds:  wellbutrin (inadequate trial; started at same time as metformin and was having diarrhea & wt gain so stopped), lexapro (made her feel numb, lack of enjoyment), trintillex (made throw up a lot, took 2 days vomited the whole time), lamictal (unsure, per her records trouble w/compliance so stopped w/o adequate trial), trazodone (lowest dose thinks only 25mg, cutting in half, making very sleepy the next day), benadryl 25mg (will sleep for 12 hrs straight and feel very tired), vistaril 10mg (made too tired), Lunesta (still wakes in the night), ambien (horrible nightmares), enlyte (took for 1 month, unsure if it helped), Fetzima up to 40mg (seemed was helping but was $500/mo with insurance), vyvanse (exacerbation of anxiety, chest tightness, too wired, irritability, insomnia, crashing with more depression), topamax (does not know dose, did not lose weight), Rexulti (nothing bad, couldn't afford), abilify (trouble w/sleep, up 38 hrs straight despite feeling tired, couldn't turn brain off), prozac (less crying spells but very sleepy, heavy feeling even w/night dose trial), neurontin 100mg (didn't feel much)    Has anything ever made you feel better?  Thinks she did with either fetzima 40mg (green on genetic testing, notes from appt said wasn't working) or rexulti 1mg (rexulti too new to be on genetic testing below)      Gene testing:  RED:  Wellbutrin (may need lower doses, increase risk AEs), remeron, elavil, clomipramine,  "desipramine, doxepin, cymbalta, fluvoxamine, imipramine, nortriptyline, vortioxetine, paroxetine  Propranolol  Thorazine, abilify, rexulti, fanapt, perphenazine, risperdal, mellaril  Tegretol  Clonidine, straterra    YELLOW:  Trazodone (lower doses may be required), selegiline , citalopram (difficult to predict dose adjustment due to conflicting variations in metabolism), lexapro (difficult to predict dose adjustment due to conflicting variations in metabolism), prozac (difficult to predict dose adjustment due to conflicting variations in metabolism), effexor (difficult to predict dose adjustment due to conflicting variations in metabolism)  Librium, clorazepate, diazepam, lorazepam, oxazepam  Prolixin, zyprexa, seroquel, clozaril, haldol  Vyvanse, adderall, dextroamphetamine    GREEN:  Pristiq, fetzima (old insurance paid only part and new none), Zoloft (never tried), Viibryd  Alprazolam, buspirone, clonazepam, eszopiclone, temazepam, zolpidem  Asenapine, lurasidone, paliperidone, thiothixene, ziprasidone  Lamictal, trileptal, depakote  Focalin, intuniv, ritalin, concerta, metadate, daytrana    No Proven Genetic Markers:  Neurontin, topamax, lithium      Review of Systems     Psychiatric Review Of Systems - Is patient experiencing or having changes in:  sleep: better, not taking anything, sleeping 8-9hrs/night, wakes a few times, better effort to get in bed on time  appetite: yes, still wants to eat all the time   weight: yes, thinks gained  energy/anergy: yes, tired all the time  interest/pleasure/anhedonia: yes, "the only thing I do is watch netflix"; used to like reading but can't get through book now  somatic symptoms: yes, gets palpitations and chest pain when really anxious; all the same as last appt; pressure when feeling overwhelmed w/too many things to do  Less crying spells is only thing she's really noticed changed  anxiety/panic: yes, 5-6/10 w/10 highest  guilty/hopelessness: yes, some  concentration: " "yes, since childhood  S.I.B.s/risky behavior: no, wanted to in HS but it hurt too much  Irritability: yes, maybe slightly better; was a lot worse on the vyvanse  Racing thoughts: yes, "it's like constant" - money, bills, trying to keep up with things at work, organization trouble  Impulsive behaviors: yes, "I booked a trip to [place] so I'd say yeah" and got a cat  Manic sxs:  No, never; only felt very talkative irritable, impulsive on vyvanse, couldn't sit still on vyvanse  Paranoia: yes, just social related but a lot of social anxiety  AVH: no  SI:  No but has a couple passive thoughts/mo but won't act, not active since college; lmited due to guilt of hurting others; "I know it's not gonna benefit anyone if I commit suicide"; "I wouldn't want to put my family through it"    +social anxiety sxs, +JAVI sxs, +ADHD sxs, ?OCPD - perfectionistic and bothered by laura, no OCD        03/21/18  1140         ADULT ADHD Part A   How often do you have trouble wrapping up the final details of a project once the chanllenging parts have been done? Often     How often do you have difficulty getting things in order when you have to do a task that requires organization? Sometimes     How often do you have problems remembering appointments or obligations? Often     When you have a task that requires a lot of thought, how often do you avoid or delay getting started? Very often     How often do you fidget or squirm with your hands or feet when you have to sit down for a long time? Very often     How often do you feel overly active and compelled to do things, like you were driven by a motor? Never     Part A Score 16 (calculated)     ADULT ADHD Part B   How often do you make careless mistakes when you have to work on a boring or difficult project? Often  by Sonia Pennington MD  at 03/21/18 1140    How often do you have difficulty keeping your attention when you are doing boring or repetitive work? Often     How often do you have " difficulty concentrating on what people say to you, even when they are speaking to you directly? Often  by Sonia Pennington MD  at 03/21/18 1140    How often do you misplace or have difficulty finding things at home or at work? Often  by Sonia Pennington MD  at 03/21/18 1140    How often are you distracted by activity or noise around you? Often  by Sonia Pennington MD  at 03/21/18 1140    How often do you leave your seat in meetings or other situations in which you are expected to remain seated? Often     How often do you feel restless or fidgety? Often     How often do you have difficulty unwinding and relaxing when you have time to yourself? Often     How often do you find yourself talking too much when you are in social situations? Rarely     When you're in a conversation, how often do you find yourself finishing the sentences of the people you are talking to before they can finish them themselves? Very often  by Sonia Pennington MD  at 03/21/18 1140    How often do you have difficulty waiting your turn in situations when turn taking is required? Sometimes     How often do you interrupt others when they are busy? Often     Part B Score 34 (calculated)           ADHD Assessment Instructions:  Scoring Part A & Part B separately  0-16 in each part = Unlikely to have ADHD  17-23 = Likely to have ADHD  24 or + = Highly likely to have ADHD   Add the patients score for Part A (Inattentive)   Add the patients score for Part B (Hyperactive/Impulsive)   If the score is in the likely or highly likely category for either Part A or Part B, the patient  has symptoms consistent with ADHD and a more thorough clinical evaluation to understand  impairments and history is warranted.   If the score is in the unlikely category for either Part A or Part B, but you still suspect ADHD,  consider evaluating them for impairments based on the symptoms present. Sometimes adults with  ADHD suffer  significant impairment due to only a few symptoms.   An adult with ADHD may have symptoms that manifest quite differently when compared with a  child. The ASRS checklist reflects the adult manifestation of ADHD symptoms.        Past Medical, Family and Social History: The patient's past medical, family and social history have been reviewed and updated as appropriate within the electronic medical record - see encounter notes.    ICU nurse at [employer].  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  Has a boyfriend of ~1 yr and still communicates w/father of 2yo due to shared custody.  Current boyfriend () w/a lot of health issues after bariatric surgery -> infections.      Parents  when she was 16yoa; mom remarried recently; pt was shared custody w/2 younger sisters at that time; doesn't recall them fighting much before the divorce; good job of hiding it, pt & sibs were actually confused when informed of divorce.  Dad lives in [place].  Mom lives in [place].  Pt's never been .  Lives w/daughter in her house.  Denied drugs.  Doesn't drink often, but when does she binge drinks.  Otherwise ~2 drinks every 2 mo.        Family Psychiatric History:  Dad - depression & anxiety, used to threaten suicide to get things, drinks a lot  Mom - a lot of anxiety, took lexapro around time of separation and takes xanax, she smokes and drinks a lot  Younger sister - depression, ?anorexia in the past  Paternal Cousin - bipolar disorder, possibly from dad's side  Paternal grandmother - takes prozac      Compliance: yes  Hx of forgetting to take antidepressants for a couple days at a time.    Side effects: None    Risk Parameters:  Patient reports no suicidal ideation  Patient reports no homicidal ideation  Patient reports no self-injurious behavior  Patient reports no violent behavior    Exam (detailed: at least 9 elements; comprehensive: all 15 elements)   Constitutional  Vitals:  Most recent vital signs, dated less  "than 90 days prior to this appointment, were reviewed.   Vitals:    03/21/18 1059   BP: 127/77   Pulse: 97   Weight: 106.6 kg (235 lb 0.2 oz)   Height: 5' 4" (1.626 m)        General:  unremarkable, age appropriate, well nourished     Musculoskeletal  Muscle Strength/Tone:  no dystonia, no tremor, no tic   Gait & Station:  non-ataxic     Psychiatric  Speech:  no latency; no press   Mood & Affect:  depressed  dysphoric   Thought Process:  normal and logical   Associations:  intact   Thought Content:  normal, no suicidality, no homicidality, delusions, or paranoia   Insight:  has awareness of illness   Judgement: behavior is adequate to circumstances   Orientation:  grossly intact   Memory: intact for content of interview   Language: grossly intact   Attention Span & Concentration:  able to focus   Fund of Knowledge:  intact and appropriate to age and level of education     Assessment and Diagnosis   Status/Progress: Based on the examination today, the patient's problem(s) is/are inadequately controlled.  New problems have not been presented today.   Diagnostic uncertainty and Lack of compliance are complicating management of the primary condition.  The working differential for this patient includes MDD, JAVI, social anxiety, ADHD, R/o substance induced mood disorder (yet denying regular use), R/o cluster B traits.     General Impression:   No diagnosis found.     Intervention/Counseling/Treatment Plan     Meds:  - Already Stopped prozac and abilify  - Start Zoloft 50mg daily  - Will wait 1 week, then start trial of Ritalin 10mg daily to 10mg BID    PRNs:  - Can Increase Neurontin 100mg -> 300mg daily PRN anxiety  - Continue Melatonin 1.5mg qhs PRN insomnia (appears to be taking rarely)    Future Med Options:  - May want to retry Enlyte in the future      Other:  - Note, she takes Rizatriptan maybe twice/month for migraine; discussed risks w/SSRI  - Upload genetic testing to chart      Therapy:  - Appt scheduled to see " Dr. Bradley and begin therapy    [facility]:  - Has PCP here  - May want referral to wt loss clinic      Risks, benefits, side effects and alternative treatments discussed with patient. Patient agrees with the current plan as documented.  Encouraged Patient to keep future appointments.  Take medications as prescribed and abstain from substance abuse.  Pt to present to ED for thoughts to harm herself or others        Return to Clinic: ~4 weeks        Sonia Pennington MD  Psychiatry PGY-3  [phone]

## 2018-05-07 ENCOUNTER — LAB VISIT (OUTPATIENT)
Dept: LAB | Facility: HOSPITAL | Age: 28
End: 2018-05-07
Attending: FAMILY MEDICINE
Payer: COMMERCIAL

## 2018-05-07 ENCOUNTER — OFFICE VISIT (OUTPATIENT)
Dept: FAMILY MEDICINE | Facility: CLINIC | Age: 28
End: 2018-05-07
Payer: COMMERCIAL

## 2018-05-07 VITALS
HEART RATE: 87 BPM | SYSTOLIC BLOOD PRESSURE: 110 MMHG | TEMPERATURE: 99 F | OXYGEN SATURATION: 97 % | HEIGHT: 64 IN | BODY MASS INDEX: 39.55 KG/M2 | DIASTOLIC BLOOD PRESSURE: 80 MMHG | WEIGHT: 231.69 LBS

## 2018-05-07 DIAGNOSIS — R73.9 BLOOD GLUCOSE ELEVATED: ICD-10-CM

## 2018-05-07 DIAGNOSIS — E16.2 HYPOGLYCEMIA: ICD-10-CM

## 2018-05-07 DIAGNOSIS — R73.9 BLOOD GLUCOSE ELEVATED: Primary | ICD-10-CM

## 2018-05-07 LAB
ESTIMATED AVG GLUCOSE: 94 MG/DL
GLUCOSE SERPL-MCNC: 74 MG/DL (ref 70–110)
HBA1C MFR BLD HPLC: 4.9 %

## 2018-05-07 PROCEDURE — 3008F BODY MASS INDEX DOCD: CPT | Mod: CPTII,S$GLB,, | Performed by: FAMILY MEDICINE

## 2018-05-07 PROCEDURE — 99214 OFFICE O/P EST MOD 30 MIN: CPT | Mod: S$GLB,,, | Performed by: FAMILY MEDICINE

## 2018-05-07 PROCEDURE — 36415 COLL VENOUS BLD VENIPUNCTURE: CPT | Mod: PO

## 2018-05-07 PROCEDURE — 83036 HEMOGLOBIN GLYCOSYLATED A1C: CPT

## 2018-05-07 PROCEDURE — 82948 REAGENT STRIP/BLOOD GLUCOSE: CPT | Mod: S$GLB,,, | Performed by: FAMILY MEDICINE

## 2018-05-07 PROCEDURE — 99999 PR PBB SHADOW E&M-EST. PATIENT-LVL III: CPT | Mod: PBBFAC,,, | Performed by: FAMILY MEDICINE

## 2018-05-07 NOTE — PROGRESS NOTES
Routine Office Visit    Patient Name: Krupa Melton    : 1990  MRN: [id number]    Subjective:  Krupa is a 27 y.o. female who presents today for     1. Weakness and fatigue - pt went biking yesterday and rode approximately 30 miles. She drank 2 bottles of water on her trip. She states after her exercise, she felt very weak and lightheaded. She was concerned about having low blood glucose. She checked her blood glucose and noted it was 201 and fasting this AM it was 163. She is concerned she may have diabetes. She states she usually has hypoglycemia and not hyperglycemia. Pt is on a diet regimen and has lost 7#. She is on a low carbohydrate regimen.     Review of Systems   Constitutional: Positive for malaise/fatigue. Negative for chills and fever.   HENT: Negative for congestion.    Eyes: Negative for blurred vision.   Respiratory: Negative for cough.    Cardiovascular: Negative for chest pain.   Gastrointestinal: Negative for abdominal pain, constipation, diarrhea, heartburn, nausea and vomiting.   Genitourinary: Negative for dysuria.   Musculoskeletal: Negative for myalgias.   Skin: Negative for itching and rash.   Neurological: Negative for dizziness and headaches.   Psychiatric/Behavioral: Negative for depression.       Active Problem List  Patient Active Problem List   Diagnosis    Hypoglycemia    Migraine without aura    TMJ syndrome    Migraine without aura and without status migrainosus, not intractable    Exposure to the flu    Moderate episode of recurrent major depressive disorder    Social anxiety disorder    Anxiety    Attention deficit hyperactivity disorder (ADHD), predominantly inattentive type       Past Surgical History  Past Surgical History:   Procedure Laterality Date    ANKLE SURGERY       SECTION  10/2014    KIDNEY STONE SURGERY      TONSILLECTOMY      WISDOM TOOTH EXTRACTION         Family History  Family History   Problem Relation Age of Onset     "Hypertension Mother     Hypertension Father     Heart disease Father     No Known Problems Sister     No Known Problems Daughter     No Known Problems Sister     No Known Problems Sister        Social History  Social History     Social History    Marital status: Single     Spouse name: N/A    Number of children: N/A    Years of education: N/A     Occupational History    Not on file.     Social History Main Topics    Smoking status: Never Smoker    Smokeless tobacco: Never Used    Alcohol use Yes    Drug use: No    Sexual activity: Yes     Partners: Male     Other Topics Concern    Not on file     Social History Narrative    No narrative on file       Medications and Allergies  Reviewed and updated.   Current Outpatient Prescriptions   Medication Sig    blood sugar diagnostic (ACCU-CHEK SMARTVIEW TEST STRIP) Strp Use to check sugars 4 times daily dispense strips and lancets. accucheck veronica meter    rizatriptan (MAXALT) 10 MG tablet Take one tablet at onset of severe migraine.  May repeat in 2 hours but not to exceed 2 tablets in 24 hours    sertraline (ZOLOFT) 50 MG tablet Take 1 tablet (50 mg total) by mouth once daily.    gabapentin (NEURONTIN) 100 MG capsule Take 1 capsule (100 mg total) by mouth 2 (two) times daily as needed.    methylphenidate HCl (RITALIN) 10 MG tablet Take 1 tablet (10 mg total) by mouth 2 (two) times daily.     No current facility-administered medications for this visit.        Physical Exam  /80 (BP Location: Right arm, Patient Position: Sitting, BP Method: Medium (Manual))   Pulse 87   Temp 98.5 °F (36.9 °C) (Oral)   Ht 5' 4" (1.626 m)   Wt 105.1 kg (231 lb 11.3 oz)   SpO2 97%   BMI 39.77 kg/m²   Physical Exam   Constitutional: She is oriented to person, place, and time. She appears well-developed and well-nourished.   HENT:   Head: Normocephalic and atraumatic.   Eyes: Conjunctivae and EOM are normal. Pupils are equal, round, and reactive to light.   Neck: " Normal range of motion. Neck supple.   Cardiovascular: Normal rate, regular rhythm and normal heart sounds.  Exam reveals no gallop and no friction rub.    No murmur heard.  Pulmonary/Chest: Breath sounds normal. No respiratory distress.   Abdominal: Soft. Bowel sounds are normal. She exhibits no distension. There is no tenderness.   Musculoskeletal: Normal range of motion.   Lymphadenopathy:     She has no cervical adenopathy.   Neurological: She is alert and oriented to person, place, and time.   Skin: Skin is warm.   Psychiatric: She has a normal mood and affect.         Assessment/Plan:  Krupa Melton is a 27 y.o. female who presents today for :    Problem List Items Addressed This Visit        Endocrine    Hypoglycemia  Noted in chart  Continue to monitor        Other Visit Diagnoses     Blood glucose elevated    -  Primary    Relevant Medications    blood sugar diagnostic (ACCU-CHEK SMARTVIEW TEST STRIP) Strp    Other Relevant Orders    Hemoglobin A1c    POCT Glucose (Completed)    Pt brought in her glucometer. Our poct blood glucose was 74. Her glucometer measured her blood glucose of 140. Glucometer is off. Needs to be re calibrated.             Follow-up if symptoms worsen or fail to improve.

## 2018-05-19 ENCOUNTER — OFFICE VISIT (OUTPATIENT)
Dept: URGENT CARE | Facility: CLINIC | Age: 28
End: 2018-05-19
Payer: COMMERCIAL

## 2018-05-19 VITALS
TEMPERATURE: 98 F | HEART RATE: 82 BPM | BODY MASS INDEX: 39.44 KG/M2 | WEIGHT: 231 LBS | DIASTOLIC BLOOD PRESSURE: 73 MMHG | SYSTOLIC BLOOD PRESSURE: 116 MMHG | HEIGHT: 64 IN | RESPIRATION RATE: 18 BRPM | OXYGEN SATURATION: 98 %

## 2018-05-19 DIAGNOSIS — B35.4 TINEA CORPORIS: Primary | ICD-10-CM

## 2018-05-19 PROCEDURE — 99214 OFFICE O/P EST MOD 30 MIN: CPT | Mod: S$GLB,,, | Performed by: NURSE PRACTITIONER

## 2018-05-19 RX ORDER — RIZATRIPTAN BENZOATE 10 MG/1
TABLET ORAL
COMMUNITY
End: 2018-11-12

## 2018-05-19 RX ORDER — TERBINAFINE HYDROCHLORIDE 250 MG/1
250 TABLET ORAL DAILY
Qty: 14 TABLET | Refills: 0 | Status: SHIPPED | OUTPATIENT
Start: 2018-05-19 | End: 2018-06-02

## 2018-05-19 RX ORDER — METFORMIN HYDROCHLORIDE 500 MG/1
TABLET ORAL
COMMUNITY
End: 2018-05-19 | Stop reason: ALTCHOICE

## 2018-05-19 RX ORDER — PHENAZOPYRIDINE HYDROCHLORIDE 200 MG/1
TABLET, FILM COATED ORAL
COMMUNITY
End: 2018-05-19 | Stop reason: ALTCHOICE

## 2018-05-19 NOTE — PROGRESS NOTES
"Subjective:       Patient ID: Krupa Melton is a 27 y.o. female.    Vitals:  height is 5' 4" (1.626 m) and weight is 104.8 kg (231 lb). Her oral temperature is 97.6 °F (36.4 °C). Her blood pressure is 116/73 and her pulse is 82. Her respiration is 18 and oxygen saturation is 98%.     Chief Complaint: Rash (right groin)    Rash   This is a recurrent problem. The current episode started in the past 7 days. The problem has been gradually worsening since onset. The affected locations include the groin. The rash is characterized by redness, peeling, pain, dryness and draining. She was exposed to nothing. Pertinent negatives include no fever, joint pain, shortness of breath or sore throat. Treatments tried: powder and yeast infection cream. The treatment provided no relief.     Review of Systems   Constitution: Negative for chills and fever.   HENT: Negative for sore throat.    Respiratory: Negative for shortness of breath.    Skin: Positive for rash. Negative for itching.   Musculoskeletal: Negative for joint pain.       Objective:      Physical Exam   Constitutional: She is oriented to person, place, and time. She appears well-developed and well-nourished.   HENT:   Head: Normocephalic and atraumatic. Head is without abrasion, without contusion and without laceration.   Right Ear: External ear normal.   Left Ear: External ear normal.   Nose: Nose normal.   Mouth/Throat: Oropharynx is clear and moist.   Eyes: Conjunctivae, EOM and lids are normal. Pupils are equal, round, and reactive to light.   Neck: Trachea normal, full passive range of motion without pain and phonation normal. Neck supple.   Cardiovascular: Normal rate, regular rhythm and normal heart sounds.    Pulmonary/Chest: Effort normal and breath sounds normal. No stridor. No respiratory distress.   Musculoskeletal: Normal range of motion.   Lymphadenopathy:        Right: No inguinal adenopathy present.        Left: No inguinal adenopathy present. "   Neurological: She is alert and oriented to person, place, and time.   Skin: Skin is warm, dry and intact. Capillary refill takes less than 2 seconds. No abrasion, no bruising, no burn, no ecchymosis, no laceration, no lesion and no rash noted. There is erythema (excoriated and erythamatous area to bilateral skin folds (see diagram)). No cyanosis. Nails show no clubbing.        Psychiatric: She has a normal mood and affect. Her speech is normal and behavior is normal. Judgment and thought content normal. Cognition and memory are normal.   Nursing note and vitals reviewed.      Assessment:       1. Tinea corporis        Plan:         Tinea corporis  -     terbinafine HCl (LAMISIL) 250 mg tablet; Take 1 tablet (250 mg total) by mouth once daily.  Dispense: 14 tablet; Refill: 0      Patient Instructions   Please follow up with your Primary care provider within 2-5 days if your signs and symptoms have not resolved or worsen.     If your condition worsens or fails to improve we recommend that you receive another evaluation at the emergency room immediately or contact your primary medical clinic to discuss your concerns.    You must understand that you have received an Urgent Care treatment only and that you may be released before all of your medical problems are known or treated.   You, the patient, will arrange for follow up care as instructed.       Fungal Skin Infection (Tinea)    A fungal infection occurs when too much fungus grows on or in the body. Fungus normally lives on the skin in small amounts and does not cause harm. But when too much grows on the skin, it causes an infection. This is also known as tinea. Fungal skin infections are common and not usually serious.  The infection often starts as a small red area the size of a pea. The skin may turn dry and flaky. The area may itch. As the fungus grows, it spreads out in a red [place]. Because of how it looks, fungal skin infection is often called ringworm, but  it is not caused by a worm. Fungal skin infections can occur on many parts of the body. They can grow on the head, chest, arms, or legs. They can occur on the buttocks. On the feet, fungal infection is known as athletes foot. It causes itchy, sometimes painful sores between the toes and the bottom or sides of the feet. In the groin, the rash is called jock itch.  People with weak immune systems can get a fungal infection more easily. This includes people with diabetes or HIV, or who are being treated for cancer. In these cases, the fungal infection can spread and cause severe illness. Fungal infections are also more common in people who are overweight.  In most cases, treatment is done with antifungal cream or ointment. If the infection is on your scalp, you may take oral medicine. In some cases, a tiny piece of the skin (biopsy) may be taken. This is so it can be tested in a lab.  Common fungal infections are treated with creams on the skin or oral medicine.  Home care  Follow all instructions when using antifungal cream or ointment on your skin. Your healthcare provider may advise using cornstarch powder to keep your skin dry or petroleum jelly to provide a barrier.  General care:  · If you were prescribed an oral medicine, read the patient information. Talk with your healthcare provider about the risks and side effects.  · Let your skin dry completely after bathing. Carefully dry your feet and between your toes.  · Dress in loose cotton clothing.  · Dont scratch the affected area. This can delay healing and may spread the infection. It can also cause a bacterial infection.  · Keep your skin clean, but dont wash the skin too much. This can irritate your skin.  · Keep in mind that it may take a week before the fungus starts to go away. It can take 2 to 4 weeks to fully clear. To prevent it from coming back, use the medicine until the rash is all gone.  Follow-up care  Follow up with your healthcare provider  if the rash does not get better after 10 days of treatment. Also follow up if the rash spreads to other parts of your body.  When to seek medical advice  Call your healthcare provider right away if any of these occur:  · Fever of 100.4°F (38°C) or higher  · Redness or swelling that gets worse  · Pain that gets worse  · Foul-smelling fluid leaking from the skin  Date Last Reviewed: 11/1/2016  © 9124-2448 [employer]. [address] [place] [address], [place] [address]. All rights reserved. This information is not intended as a substitute for professional medical care. Always follow your healthcare professional's instructions.

## 2018-05-19 NOTE — LETTER
May 19, 2018      [facility]  [address] [address], [address]  [place] [address]  Phone: [phone]  Fax: [phone]       Patient: Krupa Melton   YOB: 1990  Date of Visit: 05/19/2018    To Whom It May Concern:    Radha Melton  was at [facility] on 05/19/2018. She may return to work/school on 5/21/2018 with no restrictions. If you have any questions or concerns, or if I can be of further assistance, please do not hesitate to contact me.    Sincerely,    Alysia Mejia, NP

## 2018-05-19 NOTE — PATIENT INSTRUCTIONS
Please follow up with your Primary care provider within 2-5 days if your signs and symptoms have not resolved or worsen.     If your condition worsens or fails to improve we recommend that you receive another evaluation at the emergency room immediately or contact your primary medical clinic to discuss your concerns.    You must understand that you have received an Urgent Care treatment only and that you may be released before all of your medical problems are known or treated.   You, the patient, will arrange for follow up care as instructed.       Fungal Skin Infection (Tinea)    A fungal infection occurs when too much fungus grows on or in the body. Fungus normally lives on the skin in small amounts and does not cause harm. But when too much grows on the skin, it causes an infection. This is also known as tinea. Fungal skin infections are common and not usually serious.  The infection often starts as a small red area the size of a pea. The skin may turn dry and flaky. The area may itch. As the fungus grows, it spreads out in a red [place]. Because of how it looks, fungal skin infection is often called ringworm, but it is not caused by a worm. Fungal skin infections can occur on many parts of the body. They can grow on the head, chest, arms, or legs. They can occur on the buttocks. On the feet, fungal infection is known as athletes foot. It causes itchy, sometimes painful sores between the toes and the bottom or sides of the feet. In the groin, the rash is called jock itch.  People with weak immune systems can get a fungal infection more easily. This includes people with diabetes or HIV, or who are being treated for cancer. In these cases, the fungal infection can spread and cause severe illness. Fungal infections are also more common in people who are overweight.  In most cases, treatment is done with antifungal cream or ointment. If the infection is on your scalp, you may take oral medicine. In some cases, a  tiny piece of the skin (biopsy) may be taken. This is so it can be tested in a lab.  Common fungal infections are treated with creams on the skin or oral medicine.  Home care  Follow all instructions when using antifungal cream or ointment on your skin. Your healthcare provider may advise using cornstarch powder to keep your skin dry or petroleum jelly to provide a barrier.  General care:  · If you were prescribed an oral medicine, read the patient information. Talk with your healthcare provider about the risks and side effects.  · Let your skin dry completely after bathing. Carefully dry your feet and between your toes.  · Dress in loose cotton clothing.  · Dont scratch the affected area. This can delay healing and may spread the infection. It can also cause a bacterial infection.  · Keep your skin clean, but dont wash the skin too much. This can irritate your skin.  · Keep in mind that it may take a week before the fungus starts to go away. It can take 2 to 4 weeks to fully clear. To prevent it from coming back, use the medicine until the rash is all gone.  Follow-up care  Follow up with your healthcare provider if the rash does not get better after 10 days of treatment. Also follow up if the rash spreads to other parts of your body.  When to seek medical advice  Call your healthcare provider right away if any of these occur:  · Fever of 100.4°F (38°C) or higher  · Redness or swelling that gets worse  · Pain that gets worse  · Foul-smelling fluid leaking from the skin  Date Last Reviewed: 11/1/2016  © 3295-5212 [employer]. [address] [place] [address], [place] [address]. All rights reserved. This information is not intended as a substitute for professional medical care. Always follow your healthcare professional's instructions.

## 2018-05-24 ENCOUNTER — TELEPHONE (OUTPATIENT)
Dept: URGENT CARE | Facility: CLINIC | Age: 28
End: 2018-05-24

## 2018-05-29 ENCOUNTER — OFFICE VISIT (OUTPATIENT)
Dept: URGENT CARE | Facility: CLINIC | Age: 28
End: 2018-05-29
Payer: COMMERCIAL

## 2018-05-29 VITALS
WEIGHT: 231 LBS | HEIGHT: 64 IN | DIASTOLIC BLOOD PRESSURE: 79 MMHG | OXYGEN SATURATION: 97 % | HEART RATE: 90 BPM | TEMPERATURE: 100 F | RESPIRATION RATE: 18 BRPM | BODY MASS INDEX: 39.44 KG/M2 | SYSTOLIC BLOOD PRESSURE: 117 MMHG

## 2018-05-29 DIAGNOSIS — R50.9 FEVER, UNSPECIFIED FEVER CAUSE: ICD-10-CM

## 2018-05-29 DIAGNOSIS — Z20.818 STREP THROAT EXPOSURE: Primary | ICD-10-CM

## 2018-05-29 PROBLEM — J02.9 ACUTE PHARYNGITIS: Status: ACTIVE | Noted: 2018-05-29

## 2018-05-29 PROCEDURE — 96372 THER/PROPH/DIAG INJ SC/IM: CPT | Mod: S$GLB,,, | Performed by: EMERGENCY MEDICINE

## 2018-05-29 PROCEDURE — 99214 OFFICE O/P EST MOD 30 MIN: CPT | Mod: 25,S$GLB,, | Performed by: EMERGENCY MEDICINE

## 2018-05-29 RX ORDER — BETAMETHASONE SODIUM PHOSPHATE AND BETAMETHASONE ACETATE 3; 3 MG/ML; MG/ML
6 INJECTION, SUSPENSION INTRA-ARTICULAR; INTRALESIONAL; INTRAMUSCULAR; SOFT TISSUE
Status: COMPLETED | OUTPATIENT
Start: 2018-05-29 | End: 2018-05-29

## 2018-05-29 RX ORDER — PENICILLIN V POTASSIUM 500 MG/1
500 TABLET, FILM COATED ORAL 2 TIMES DAILY
Qty: 20 TABLET | Refills: 0 | Status: SHIPPED | OUTPATIENT
Start: 2018-05-29 | End: 2018-06-08

## 2018-05-29 RX ADMIN — BETAMETHASONE SODIUM PHOSPHATE AND BETAMETHASONE ACETATE 6 MG: 3; 3 INJECTION, SUSPENSION INTRA-ARTICULAR; INTRALESIONAL; INTRAMUSCULAR; SOFT TISSUE at 06:05

## 2018-05-29 NOTE — PROGRESS NOTES
"Subjective:       Patient ID: Krupa Melton is a 27 y.o. female.    Vitals:  height is 5' 4" (1.626 m) and weight is 104.8 kg (231 lb). Her temperature is 100 °F (37.8 °C). Her blood pressure is 117/79 and her pulse is 90. Her respiration is 18 and oxygen saturation is 97%.     Chief Complaint: Sore Throat    This is a 27 y.o. female with   who presents today with a chief complaint of a sore throat and a fever since Saturday.  Daughter with strep throat.   Pt. States is not pregnant.        Sore Throat    This is a new problem. The current episode started in the past 7 days. The problem has been unchanged. Maximum temperature: 101.3 on Sunday. The pain is at a severity of 2/10. The pain is mild. Associated symptoms include swollen glands. Pertinent negatives include no abdominal pain, congestion, coughing, ear pain, headaches, hoarse voice or shortness of breath. Associated symptoms comments: Fatigue . She has had exposure to strep. She has tried acetaminophen and NSAIDs for the symptoms. The treatment provided mild relief.     Review of Systems   Constitution: Positive for fever. Negative for chills and malaise/fatigue.   HENT: Positive for sore throat. Negative for congestion, ear pain and hoarse voice.    Eyes: Negative for discharge and redness.   Cardiovascular: Negative for chest pain, dyspnea on exertion and leg swelling.   Respiratory: Negative for cough, shortness of breath, sputum production and wheezing.    Musculoskeletal: Negative for myalgias.   Gastrointestinal: Negative for abdominal pain and nausea.   Neurological: Negative for headaches.       Objective:      Physical Exam   Constitutional: She is oriented to person, place, and time.   Overweight   HENT:   Head: Normocephalic and atraumatic.   Right Ear: Tympanic membrane, external ear and ear canal normal.   Left Ear: Tympanic membrane, external ear and ear canal normal.   Nose: Nose normal. Right sinus exhibits no maxillary sinus " tenderness and no frontal sinus tenderness. Left sinus exhibits no maxillary sinus tenderness and no frontal sinus tenderness.   Mouth/Throat: Uvula is midline and mucous membranes are normal. Posterior oropharyngeal erythema present. No oropharyngeal exudate or posterior oropharyngeal edema.   Eyes: EOM are normal. Pupils are equal, round, and reactive to light.   Neck: Normal range of motion. Neck supple.    Bilat anterior cervical LN tender to palp   Cardiovascular: Normal rate, regular rhythm and normal heart sounds.    Pulmonary/Chest: Breath sounds normal.   Musculoskeletal: Normal range of motion.   Neurological: She is alert and oriented to person, place, and time.   Skin: Skin is warm and dry.   Psychiatric: She has a normal mood and affect. Her behavior is normal.       Assessment:       1. Strep throat exposure    2. Fever, unspecified fever cause        Plan:         Strep throat exposure  -     POCT rapid strep A  -     betamethasone acetate-betamethasone sodium phosphate injection 6 mg; Inject 1 mL (6 mg total) into the muscle one time.  -     penicillin v potassium (VEETID) 500 MG tablet; Take 1 tablet (500 mg total) by mouth 2 (two) times daily.  Dispense: 20 tablet; Refill: 0    Fever, unspecified fever cause  -     penicillin v potassium (VEETID) 500 MG tablet; Take 1 tablet (500 mg total) by mouth 2 (two) times daily.  Dispense: 20 tablet; Refill: 0      Jeovanny Marshall MD  Go to the Emergency Department for any problems  Call your PCP for follow up next available.

## 2018-05-29 NOTE — PATIENT INSTRUCTIONS
Jeovanny Marshall MD  Go to the Emergency Department for any problems  Call your PCP for follow up next available.    Febrile Illness, Uncertain Cause (Adult)  You have a fever, but the cause is not certain. A fever is a natural reaction of the body to an illness such as infection due to a virus or bacteria. In most cases, the temperature itself is not harmful. It actually helps the body fight infections. A fever does not need to be treated unless you feel very uncomfortable.  Sometimes a fever can be an early sign of a more serious infection, so make sure to follow up if your condition worsens.  Home care  Unless given other instructions by your healthcare provider, follow these guidelines when caring for yourself at home.  General care  · If your symptoms are severe, rest at home for the first 2 to 3 days. When you resume activity, don't let yourself get too tired.  · Do not smoke. Also avoid being exposed to secondhand smoke.  · Your appetite may be poor, so a light diet is fine. Avoid dehydration by drinking 6 to 8 glasses of fluids per day (such as water, soft drinks, sports drinks, juices, tea, or soup). Extra fluids will help loosen secretions in the nose and lungs.  Medicines  · You can take acetaminophen or ibuprofen for pain, unless you were given a different fever-reducing/pain medicine to use. (Note: If you have chronic liver or kidney disease or have ever had a stomach ulcer or gastrointestinal bleeding, talk with your healthcare provider before using these medicines. Also talk to your provider if you are taking medicine to prevent blood clots.) Aspirin should never be given to anyone younger than 18 years of age who is ill with a viral infection or fever. It may cause severe liver or brain damage.  · If you were given antibiotics, take them until they are used up, or your healthcare provider tells you to stop. It is important to finish the antibiotics even though you feel better. This is to make sure the  infection has cleared. Be aware that antibiotics are not usually given for a fever with an unknown cause.  · Over-the-counter medicines will not shorten the duration of the illness. However, they may be helpful for the following symptoms: cough, sore throat, or nasal and sinus congestion. Ask your pharmacist for product suggestions. (Note: Do not use decongestants if you have high blood pressure.)  Follow-up care  Follow up with your healthcare provider, or as advised.  · If a culture was done, you will be notified if your treatment needs to be changed. You can call as directed for the results.  · If X-rays, a CT, or an ultrasound were done, a specialist will review them. You will be notified of any findings that may affect your care.  Call 911  Contact emergency services right away if any of these occur:  · Trouble breathing or swallowing, or wheezing  · Chest pain  · Confusion  · Extreme drowsiness or trouble awakening  · Fainting or loss of consciousness  · Rapid heart rate  · Low blood pressure  · Vomiting blood, or large amounts of blood in stool  · Seizure  When to seek medical advice  Call your healthcare provider right away if any of these occur:  · Cough with lots of colored sputum (mucus) or blood in your sputum  · Severe headache  · Face, neck, throat, or ear pain  · Feeling drowsy  · Abdominal pain  · Repeated vomiting or diarrhea  · Joint pain or a new rash  · Burning when urinating  · Fever of 100.4°F (38°C) or higher, that does not get better after taking fever-reducing medicine  · Feeling weak or dizzy  Date Last Reviewed: 7/30/2015  © 1366-8024 [employer]. [address] [place] [address], [place] [address]. All rights reserved. This information is not intended as a substitute for professional medical care. Always follow your healthcare professional's instructions.        Pharyngitis: Strep (Presumed)    You have pharyngitis (sore throat). The cause is thought to be the streptococcus, or  strep, bacterium. Strep throat infection can cause throat pain that is worse when swallowing, aching all over, headache, and fever. The infection may be spread by coughing, kissing, or touching others after touching your mouth or nose. Antibiotic medications are given to treat the infection.  Home care  · Rest at home. Drink plenty of fluids to avoid dehydration.  · No work or school for the first 2 days of taking the antibiotics. After this time, you will not be contagious. You can then return to work or school if you are feeling better.   · The antibiotic medication must be taken for the full 10 days, even if you feel better. This is very important to ensure the infection is treated. It is also important to prevent drug-resistant organisms from developing. If you were given an antibiotic shot, no more antibiotics are needed.  · You may use acetaminophen or ibuprofen to control pain or fever, unless another medicine was prescribed for this. If you have chronic liver or kidney disease or ever had a stomach ulcer or GI bleeding, talk with your doctor before using these medicines.  · Throat lozenges or a throat-numbing sprays can help reduce throat pain. Gargling with warm salt water can also help. Dissolve 1/2 teaspoon of salt in 1 8 ounce glass of warm water.   · Avoid salty or spicy foods, which can irritate the throat.  Follow-up care  Follow up with your healthcare provider or our staff if you are not improving over the next week.  When to seek medical advice  Call your healthcare provider right away if any of these occur:  · Fever as directed by your doctor.   · New or worsening ear pain, sinus pain, or headache  · Painful lumps in the back of neck  · Stiff neck  · Lymph nodes are getting larger  · Inability to swallow liquids, excessive drooling, or inability to open mouth wide due to throat pain  · Signs of dehydration (very dark urine or no urine, sunken eyes, dizziness)  · Trouble breathing or noisy  breathing  · Muffled voice  · New rash  Date Last Reviewed: 4/13/2015  © 4884-0460 [employer]. [address] [place] [address], [place] [address]. All rights reserved. This information is not intended as a substitute for professional medical care. Always follow your healthcare professional's instructions.

## 2018-05-30 ENCOUNTER — OFFICE VISIT (OUTPATIENT)
Dept: PSYCHIATRY | Facility: CLINIC | Age: 28
End: 2018-05-30
Payer: COMMERCIAL

## 2018-05-30 DIAGNOSIS — F33.1 MODERATE EPISODE OF RECURRENT MAJOR DEPRESSIVE DISORDER: Primary | ICD-10-CM

## 2018-05-30 DIAGNOSIS — F40.10 SOCIAL ANXIETY DISORDER: ICD-10-CM

## 2018-05-30 PROCEDURE — 90791 PSYCH DIAGNOSTIC EVALUATION: CPT | Mod: S$GLB,,, | Performed by: SOCIAL WORKER

## 2018-05-31 NOTE — PROGRESS NOTES
Psychiatry Initial Visit (PhD/LCSW)  Diagnostic Interview - CPT 99876    Date: 5/30/2018    Site: [facility]    Referral source: Dr. Pennington    Clinical status of patient: Outpatient    Krupa Melton, a 27 y.o. female, for initial evaluation visit.  Met with patient.    Chief complaint/reason for encounter: depression and anxiety    History of present illness: Pt is 45 minutes late for appointment, stating she thought her appointment was at 11:30 instead of 11.  She has her 3 year old daughter with her as she is sick today and could not go to .  Pt was seen during my lunch hour.  She and her daughter live with her finace who has recently moved in with them.  They are planning to get  in December.  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  She states she has social anxiety about her weight which she has never been able to lose.  She is quiet around people and often misperceived by others due to this.  She states she has never been able to make her anxious and negative thoughts go away.  She states she is here because Dr. Pennington told her she needed to go to therapy.    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  Her parents are  and mother lives in [place] with her .  Pt has 2 sisters who live here and she close to them.  Her father also lives in the area.  Dad was in the  and not around a  lot when she was a child.  She has a 3 year old daughter from a man who she had ended the relationship with before she knew she was pregnant.  He is involved somewhat in her daughter's lif.    Substance use:   Alcohol: infrequent   Drugs: none   Tobacco: none   Caffeine: none    Current medications and drug reactions (include OTC, herbal): see medication list     Strengths and liabilities: Strength: Patient accepts guidance/feedback, Strength: Patient is expressive/articulate., Strength: Patient is intelligent., Strength: Patient is motivated for change., Strength: Patient is physically healthy., Strength: Patient has positive support network., Strength: Patient has reasonable judgment., Strength: Patient is stable., Liability: Patient lacks coping skills.    Current Evaluation:     Mental Status Exam:  General Appearance:  unremarkable, age appropriate, overweight   Speech: normal tone, normal rate, normal pitch, normal volume      Level of Cooperation: cooperative      Thought Processes: normal and logical   Mood: steady      Thought Content: normal, no suicidality, no homicidality, delusions, or paranoia   Affect: congruent and appropriate, guarded   Orientation: Oriented x3   Memory: intact   Attention Span & Concentration: intact   Fund of General Knowledge: intact and appropriate to age and level of education   Abstract Reasoning: did not assess   Judgment & Insight: fair     Language  intact     Diagnostic Impression - Plan:       ICD-10-CM ICD-9-CM   1. Moderate episode of recurrent major depressive disorder F33.1 296.32   2. Social anxiety disorder F40.10 300.23       Plan:individual psychotherapy and medication management by physician    Return to Clinic: 1 month    Length of Service (minutes): 45

## 2018-07-02 ENCOUNTER — OFFICE VISIT (OUTPATIENT)
Dept: PSYCHIATRY | Facility: CLINIC | Age: 28
End: 2018-07-02
Payer: COMMERCIAL

## 2018-07-02 DIAGNOSIS — F90.0 ATTENTION DEFICIT HYPERACTIVITY DISORDER (ADHD), PREDOMINANTLY INATTENTIVE TYPE: ICD-10-CM

## 2018-07-02 DIAGNOSIS — F40.10 SOCIAL ANXIETY DISORDER: ICD-10-CM

## 2018-07-02 DIAGNOSIS — F33.1 MODERATE EPISODE OF RECURRENT MAJOR DEPRESSIVE DISORDER: Primary | ICD-10-CM

## 2018-07-02 PROCEDURE — 90834 PSYTX W PT 45 MINUTES: CPT | Mod: S$GLB,,, | Performed by: SOCIAL WORKER

## 2018-07-03 NOTE — PROGRESS NOTES
Individual Psychotherapy (PhD/LCSW)    7/2/2018    Site:  [facility]         Therapeutic Intervention: Met with patient.  Outpatient - Insight oriented psychotherapy 45 min - CPT code 17372    Chief complaint/reason for encounter: depression and anxiety     Interval history and content of current session: First follow-up with pt.  She talks about recent trip to [place] to visit her mother and step father. She states her biggest problem is lack of energy and not wanting to do anything but stay in bed when she is off work.  She describes her house as a mess and she doesn't feel like cleaning up; she gets overwhelmed if she tries to do something and then gives up doing anything.  Discussed how this has been an issue for her for some time and no medication she has taken has energized her except for Vyvanse and that energized her too much.  She states she had a test to determine which medications she is sensitive to and there is only a small list as she is overly sensitive to most medications.  Also discussed her low self esteem and possible origins for that despite her feeling that she had a good childhood.  She cites her social anxiety beginning in annabelle high and her weight gain at that time as major factors.  She is the only severely overweight person in her immediate family.  She states she is definitely a binge and emotional eater and has lost weight at various times but cannot maintain it.  Her fiance has had weight loss surgery but she does not want to go that route.    Treatment plan:  · Target symptoms: depression, lack of focus, anxiety   · Why chosen therapy is appropriate versus another modality: relevant to diagnosis  · Outcome monitoring methods: self-report, observation  · Therapeutic intervention type: insight oriented psychotherapy    Risk parameters:  Patient reports no suicidal ideation  Patient reports no homicidal ideation  Patient reports no self-injurious behavior  Patient reports no violent  behavior    Verbal deficits: None    Patient's response to intervention:  The patient's response to intervention is accepting.    Progress toward goals and other mental status changes:  The patient's progress toward goals is limited.    Diagnosis:     ICD-10-CM ICD-9-CM   1. Moderate episode of recurrent major depressive disorder F33.1 296.32   2. Social anxiety disorder F40.10 300.23   3. Attention deficit hyperactivity disorder (ADHD), predominantly inattentive type F90.0 314.00       Plan:  individual psychotherapy and medication management by physician    Return to clinic: 1 month    Length of Service (minutes): 45

## 2018-07-26 ENCOUNTER — LAB VISIT (OUTPATIENT)
Dept: LAB | Facility: HOSPITAL | Age: 28
End: 2018-07-26
Attending: OBSTETRICS & GYNECOLOGY
Payer: COMMERCIAL

## 2018-07-26 DIAGNOSIS — E28.2 POLYCYSTIC OVARIES: Primary | ICD-10-CM

## 2018-07-26 PROCEDURE — 84402 ASSAY OF FREE TESTOSTERONE: CPT

## 2018-07-26 PROCEDURE — 84403 ASSAY OF TOTAL TESTOSTERONE: CPT

## 2018-07-26 PROCEDURE — 83002 ASSAY OF GONADOTROPIN (LH): CPT

## 2018-07-26 PROCEDURE — 83001 ASSAY OF GONADOTROPIN (FSH): CPT

## 2018-07-26 PROCEDURE — 36415 COLL VENOUS BLD VENIPUNCTURE: CPT

## 2018-07-26 PROCEDURE — 83498 ASY HYDROXYPROGESTERONE 17-D: CPT

## 2018-07-27 ENCOUNTER — PATIENT MESSAGE (OUTPATIENT)
Dept: FAMILY MEDICINE | Facility: CLINIC | Age: 28
End: 2018-07-27

## 2018-07-27 LAB
FSH SERPL-ACNC: 7.7 MIU/ML
LH SERPL-ACNC: 8.9 MIU/ML
TESTOST SERPL-MCNC: NORMAL NG/DL

## 2018-07-30 LAB
17OHP SERPL-MCNC: 85 NG/DL
MAYO MISCELLANEOUS RESULT (REF): NORMAL
TESTOST FREE SERPL-MCNC: 2.2 PG/ML

## 2018-08-01 ENCOUNTER — OFFICE VISIT (OUTPATIENT)
Dept: PSYCHIATRY | Facility: CLINIC | Age: 28
End: 2018-08-01
Payer: COMMERCIAL

## 2018-08-01 VITALS
WEIGHT: 236.25 LBS | BODY MASS INDEX: 40.33 KG/M2 | HEIGHT: 64 IN | HEART RATE: 89 BPM | DIASTOLIC BLOOD PRESSURE: 77 MMHG | SYSTOLIC BLOOD PRESSURE: 130 MMHG

## 2018-08-01 DIAGNOSIS — F33.1 MODERATE EPISODE OF RECURRENT MAJOR DEPRESSIVE DISORDER: Primary | ICD-10-CM

## 2018-08-01 DIAGNOSIS — F90.0 ATTENTION DEFICIT HYPERACTIVITY DISORDER (ADHD), PREDOMINANTLY INATTENTIVE TYPE: ICD-10-CM

## 2018-08-01 DIAGNOSIS — F40.10 SOCIAL ANXIETY DISORDER: ICD-10-CM

## 2018-08-01 DIAGNOSIS — F41.1 GAD (GENERALIZED ANXIETY DISORDER): ICD-10-CM

## 2018-08-01 PROBLEM — J02.9 ACUTE PHARYNGITIS: Status: RESOLVED | Noted: 2018-05-29 | Resolved: 2018-08-01

## 2018-08-01 PROBLEM — R50.9 FEVER: Status: RESOLVED | Noted: 2018-05-29 | Resolved: 2018-08-01

## 2018-08-01 PROCEDURE — 3008F BODY MASS INDEX DOCD: CPT | Mod: CPTII,S$GLB,, | Performed by: STUDENT IN AN ORGANIZED HEALTH CARE EDUCATION/TRAINING PROGRAM

## 2018-08-01 PROCEDURE — 99213 OFFICE O/P EST LOW 20 MIN: CPT | Mod: S$GLB,,, | Performed by: STUDENT IN AN ORGANIZED HEALTH CARE EDUCATION/TRAINING PROGRAM

## 2018-08-01 PROCEDURE — 99999 PR PBB SHADOW E&M-EST. PATIENT-LVL II: CPT | Mod: PBBFAC,,, | Performed by: STUDENT IN AN ORGANIZED HEALTH CARE EDUCATION/TRAINING PROGRAM

## 2018-08-01 RX ORDER — SERTRALINE HYDROCHLORIDE 50 MG/1
50 TABLET, FILM COATED ORAL DAILY
Qty: 30 TABLET | Refills: 1 | Status: SHIPPED | OUTPATIENT
Start: 2018-08-01 | End: 2018-08-01 | Stop reason: SDUPTHER

## 2018-08-01 RX ORDER — METHYLPHENIDATE HYDROCHLORIDE 10 MG/1
TABLET ORAL
Qty: 60 TABLET | Refills: 0 | Status: SHIPPED | OUTPATIENT
Start: 2018-08-01 | End: 2018-11-12

## 2018-08-01 RX ORDER — SERTRALINE HYDROCHLORIDE 50 MG/1
50 TABLET, FILM COATED ORAL DAILY
Qty: 30 TABLET | Refills: 1 | Status: SHIPPED | OUTPATIENT
Start: 2018-08-01 | End: 2018-11-12

## 2018-08-01 RX ORDER — ETONOGESTREL AND ETHINYL ESTRADIOL .12; .015 MG/D; MG/D
INSERT, EXTENDED RELEASE VAGINAL
COMMUNITY
Start: 2018-07-25 | End: 2018-11-12

## 2018-08-01 NOTE — PATIENT INSTRUCTIONS
RTC 4-6 weeks or sooner if needed, please call or message via [facility] with any questions or concerns. Concentrate on consistency with medications.

## 2018-08-01 NOTE — PROGRESS NOTES
Outpatient Psychiatry Follow-Up Visit (MD/NP)    8/1/2018    Clinical Status of Patient:  Outpatient (Ambulatory)    Chief Complaint:  Krupa Melton is a 27 y.o. female who presents today for follow-up of depression and anxiety.  Formerly a patient of Dr. Pennington, last seen 3/21/18; chart reviewed. Met with patient.      Brief previous care summary   Krupa Melton is a 26yo w/depression/anxiety & social anxiety here for f/u of depression and anxiety.  Pt previously seeing prescribing Psychologist and obtained genetic testing (below). At initial eval (1/10/18) with Dr. Pennington, pt was started on prozac and continued on melatonin/benadryl 12.5mg & discussed considering enlyte in the future. Genetic tests showed she tends to be sensitive to meds.  Consistent w/results, she has excessive drowsiness w/25mg or trazodone or benadryl despite insomnia w/o.  Her most recent thyroid labs are WNL, but was concerned that her free T4 is close to the low range (result 0.86, range 0.71-1.51).  Her prescribing psychologist had recently prescribed vyvanse for motivation & binge eating (not ADHD), but had not been taking regularly (AEs:  Irritability, hyperfocus, crashes late day, worse depression before bed w/crying, some insomnia).  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  Would also like to find another therapist.  Pt has hx of forgetting to take antidepressants for a day or 2.  Also has issue w/binge eating, weight, low energy, motivation.  Had previously been on Lexapro which caused emptional numbness.     At visit 2/20/18, increased Prozac and added Abilify. Has since stopped both. Fatigue with Prozac, slept 38h with Abilify. At last visit in March, started Zoloft and, shortly after, Ritalin for likely ADHD sx (on self-eval, scored 16 on part A and 34 on Part B).     Substance: drinks once every 2-3 months, a drink or two with dinner usually. Got drunk  "once a few weeks ago. Never tried illicit drugs including cannabis. Nonsmoker    Recent med trials  Prozac 40mg, stopped before last visit due to AEs  Abilify 2.5mg only tried for 2 days  Melatonin 3mg qhs   Neurontin 100mg PRN anxiety (tried a few times, not much effect)  Zoloft  Ritalin    Interval History and Content of Current Session:  Interim Events/Subjective Report/Content of Current Session:  TODAY, patient reports Zoloft caused fatigue when she takes it in the morning. On average, only taking it 3-4 nights a week. Has taken it at night about twice and noticed a slight AM hangover effect, but again, not taking it consistently.  Has taken Ritalin 10mg qAM about 6 times and found that it made her hand have a slight but not bothersome tremor. Did not notice any benefit to her attention, but never took it on a morning without the Zoloft, so unsure if the Zoloft's sedating effect may have masked any helpful effects.   Reports continued low mood, averaging 4/10, anxiety symptoms 6/10, both social and generalized. Very tired during the day, sleep is currently fair (though had a period of about a week a few weeks ago where sleep was horrible without known reason). ADHD symptoms (restlessness, poor concentration, fidgeting, driven by a motor) unchanged, though she does mention she is surprised at a potential ADHD diagnosis given that she made good grades in school and was able to study for hours in college. Denies increased irritability. Admits to thinking about death a lot and wishing her problems would "fall into place" but denies active SI. No HI. No AVH. Feels overwhelmed. 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 Her 4 year old daughter is doing well--patient is a single parent, which is a stressor. Daughter is in day care. Does say that she feels "better the last 2 days because I got rid of a lot of stuff" from her house.  Plans to get  in December.       Per " "chart review:  Past meds:  wellbutrin (inadequate trial; started at same time as metformin and was having diarrhea & wt gain so stopped), lexapro (made her feel numb, lack of enjoyment), trintillex (made throw up a lot, took 2 days vomited the whole time), lamictal (unsure, per her records trouble w/compliance so stopped w/o adequate trial), trazodone (lowest dose thinks only 25mg, cutting in half, making very sleepy the next day), benadryl 25mg (will sleep for 12 hrs straight and feel very tired), vistaril 10mg (made too tired), Lunesta (still wakes in the night and gets terrible nightmares), ambien (horrible nightmares), enlyte (took for 1 month, unsure if it helped), Fetzima up to 40mg (seemed was helping but was $500/mo with insurance), vyvanse (helped mood but felt "wired," exacerbation of anxiety, chest tightness, too wired, irritability, insomnia, crashing with more depression even at low doses (eg 20mg). Could not sit still on this but very productive at work, overly focused, would stay up too late at night), topamax (does not know dose, did not lose weight), Rexulti (nothing bad, couldn't afford), abilify (trouble w/sleep, up 38 hrs straight despite feeling tired, couldn't turn brain off), prozac (less crying spells but very sleepy, heavy feeling even w/night dose trial), neurontin 100mg (didn't feel much)    Has anything ever made you feel better?  Thinks she did with either fetzima 40mg (green on genetic testing, notes from appt said wasn't working) or rexulti 1mg (rexulti too new to be on genetic testing below)      Gene testing:    RED:  Wellbutrin (may need lower doses, increase risk AEs), remeron, elavil, clomipramine, desipramine, doxepin, cymbalta, fluvoxamine, imipramine, nortriptyline, vortioxetine, paroxetine  Propranolol  Thorazine, abilify, rexulti, fanapt, perphenazine, risperdal, mellaril  Tegretol  Clonidine, straterra    YELLOW:  Trazodone (lower doses may be required), selegiline , " "citalopram (difficult to predict dose adjustment due to conflicting variations in metabolism), lexapro (difficult to predict dose adjustment due to conflicting variations in metabolism), prozac (difficult to predict dose adjustment due to conflicting variations in metabolism), effexor (difficult to predict dose adjustment due to conflicting variations in metabolism)  Librium, clorazepate, diazepam, lorazepam, oxazepam  Prolixin, zyprexa, seroquel, clozaril, haldol  Vyvanse, adderall, dextroamphetamine    GREEN:  Pristiq, fetzima (old insurance paid only part and new none), Zoloft (never tried), Viibryd  Alprazolam, buspirone, clonazepam, eszopiclone, temazepam, zolpidem  Asenapine, lurasidone, paliperidone, thiothixene, ziprasidone  Lamictal, trileptal, depakote  Focalin, intuniv, ritalin, concerta, metadate, daytrana    No Proven Genetic Markers:  Neurontin, topamax, lithium      Review of Systems     Psychiatric Review Of Systems - Is patient experiencing or having changes in:  sleep: really variable  appetite: yes, still always hungry but thinks she eats a lot for psychological reasons (eg boredom, stress)  weight: yes, some gain  energy/anergy: yes, tired all the time still  interest/pleasure/anhedonia: yes, unchanged, still cannot get through a book due to lack of interest  somatic symptoms: yes, gets palpitations and chest pain when really anxious; unchanged from last appt;   Same number of crying spells   anxiety/panic: yes, 6-7/10 w/10 highest  guilty/hopelessness: yes, more guilty than hopeless  concentration: yes, ongoing  S.I.B.s/risky behavior: no   Irritability: yes, unchanged but better than when was on Vyvanse  Racing thoughts: yes, about "stuff I need to do, my to-do list"  Impulsive behaviors: no, and no recent impulsive spending "August is a no-spend month" and is optimistic about it  Manic sxs:  No, never; (confirms that as per chart: only felt very talkative irritable, impulsive on vyvanse, " couldn't sit still on vyvanse)  Paranoia: yes, just social related but a lot of social anxiety  AVH: no  SI:  No, has a couple passive thoughts/mo but won't act, never any planning or intention (says only period of active SI was during postpartum depression)    +social anxiety sxs, +JAVI sxs, +ADHD sxs, ?OCPD - perfectionistic and bothered by messes, no OCD      At last visit scored 16 on part A and 34 on part B  ADHD Assessment Instructions:  Scoring Part A & Part B separately  0-16 in each part = Unlikely to have ADHD  17-23 = Likely to have ADHD  24 or + = Highly likely to have ADHD   Add the patients score for Part A (Inattentive)   Add the patients score for Part B (Hyperactive/Impulsive)   If the score is in the likely or highly likely category for either Part A or Part B, the patient  has symptoms consistent with ADHD and a more thorough clinical evaluation to understand  impairments and history is warranted.   If the score is in the unlikely category for either Part A or Part B, but you still suspect ADHD,  consider evaluating them for impairments based on the symptoms present. Sometimes adults with  ADHD suffer significant impairment due to only a few symptoms.   An adult with ADHD may have symptoms that manifest quite differently when compared with a  child. The ASRS checklist reflects the adult manifestation of ADHD symptoms.    :  Past Medical, Family and Social History: The patient's past medical, family and social history have been reviewed and updated as appropriate within the electronic medical record - see encounter notes.  Switched Mirena to Nuvaring, taking prenatal vitamin  Per chart review:  ICU nurse at [employer].  Has a 4yo daughter w/shared custody.  Has a boyfriend of ~1 yr and still communicates w/father of 4yo due to shared custody.  Current boyfriend () w/a lot of health issues after bariatric surgery -> infections.      Parents  when she was 16yoa; mom  "remarried recently; pt was shared custody w/2 younger sisters at that time; doesn't recall them fighting much before the divorce; good job of hiding it, pt & sibs were actually confused when informed of divorce.  Dad lives in [facility] [place] [facility].  Mom lives in [place].  Pt's never been .  Lives w/daughter in her house.  Denied drugs.  Doesn't drink often, but when does she binge drinks.  Otherwise ~2 drinks every 2 mo.      Family Psychiatric History:  Dad - depression & anxiety, used to threaten suicide to get things, drinks a lot  Mom - a lot of anxiety, took lexapro around time of separation and takes xanax, she smokes and drinks a lot  Younger sister - depression, ?anorexia in the past  Paternal Cousin - bipolar disorder, possibly from dad's side  Paternal grandmother - takes prozac      Compliance: yes  Hx of forgetting to take antidepressants for a couple days at a time. Continues to take Zoloft spottily and Ritalin seldom if at all    Side effects: None    Risk Parameters:  Patient reports no suicidal ideation  Patient reports no homicidal ideation  Patient reports no self-injurious behavior  Patient reports no violent behavior    Exam (detailed: at least 9 elements; comprehensive: all 15 elements)   Constitutional  Vitals:  Most recent vital signs, dated less than 90 days prior to this appointment, were reviewed.   There were no vitals filed for this visit.     General:  unremarkable, age appropriate, well nourished     Musculoskeletal  Muscle Strength/Tone:  no dystonia, no tremor, no tic   Gait & Station:  non-ataxic     Psychiatric  Speech:  no latency; no press   Mood & Affect:  depressed "overall it hasn't changed"  dysphoric   Thought Process:  normal and logical   Associations:  intact   Thought Content:  normal, no suicidality, no homicidality, delusions, or paranoia   Insight:  has awareness of illness   Judgement: behavior is adequate to circumstances   Orientation:  grossly intact   Memory: intact for " content of interview   Language: grossly intact   Attention Span & Concentration:  able to focus   Fund of Knowledge:  intact and appropriate to age and level of education     Assessment and Diagnosis   Status/Progress: Based on the examination today, the patient's problem(s) is/are inadequately controlled.  New problems have not been presented today.   Diagnostic uncertainty and Lack of compliance are complicating management of the primary condition.  The working differential for this patient includes MDD, JAVI, social anxiety, ADHD,  R/o cluster B traits.     General Impression:     ICD-10-CM ICD-9-CM   1. Moderate episode of recurrent major depressive disorder F33.1 296.32   2. Social anxiety disorder F40.10 300.23   3. Attention deficit hyperactivity disorder (ADHD), predominantly inattentive type F90.0 314.00   4. JAVI (generalized anxiety disorder) F41.1 300.02        Intervention/Counseling/Treatment Plan     Meds:  - Continue Zoloft 50mg nightly, with improved compliance this time. Will take at night due to sedating effect. Refilled today.    - may consider return to [facility] or [facility] in the future now that she has better insurance, if no effect with proper trial of Zoloft  - Continue trial of Ritalin 10mg daily to 10mg BID, will take in AM without Zoloft and see if there is any effect once not masked by sedating effect from Zoloft. Refilled today    PRNs:  - Can Continue Neurontin 100mg (not taking much, does not feel much effect) (no refill given today)  - Continue Melatonin 1.5mg qhs PRN insomnia (rarely taking if ever)    Future Med Options:  - May want to retry [facility] in the future      Other:  - Note, she still takes Rizatriptan maybe twice/month for migraine; discussed risks w/SSRI  - Upload genetic testing to chart      Therapy:  - Began therapy with Dr. Bradley, saw twice so far. Has not liked either appointment so far; plans to attend next scheduled appointment on 8/13 and then decide.      Health:  - Has PCP here      Risks, benefits, side effects and alternative treatments discussed with patient. Patient agrees with the current plan as documented.  Encouraged Patient to keep future appointments.  Take medications as prescribed and abstain from substance abuse.  Pt to present to ED for thoughts to harm herself or others        Return to Clinic:  4-6 weeks        Peewee Guerra MD  Resident, [facility]   Pager [phone]

## 2018-08-13 ENCOUNTER — OFFICE VISIT (OUTPATIENT)
Dept: PSYCHIATRY | Facility: CLINIC | Age: 28
End: 2018-08-13
Payer: COMMERCIAL

## 2018-08-13 DIAGNOSIS — F40.10 SOCIAL ANXIETY DISORDER: ICD-10-CM

## 2018-08-13 DIAGNOSIS — F33.1 MODERATE EPISODE OF RECURRENT MAJOR DEPRESSIVE DISORDER: Primary | ICD-10-CM

## 2018-08-13 PROCEDURE — 90834 PSYTX W PT 45 MINUTES: CPT | Mod: S$GLB,,, | Performed by: SOCIAL WORKER

## 2018-08-13 NOTE — PROGRESS NOTES
Individual Psychotherapy (PhD/LCSW)    8/13/2018    Site:  [facility]         Therapeutic Intervention: Met with patient.  Outpatient - Insight oriented psychotherapy 45 min - CPT code 56806    Chief complaint/reason for encounter: depression and anxiety     Interval history and content of current session: Pt seen for individual session.  She brings in CBT packet which I gave her and we discussed which of the techniques described that she thought useful.  Discussed her awareness of a constant stream of negative thoughts that are always going through her mind and which keep her anxious and depressed.  She is able to think of more balanced thoughts but is not yet able to call up those thoughts easily.  Discussed how this is a difficult process to accomplish, it's easier said than done and how she needs to work on it to be able to do it.  She also gives additional information about her wedding plans, worries about the future and fialexander's ADHD which keeps him changing what he wants to do frequently.  She has not been taking her Zoloft because it upset her stomach too much and says the Ritalin makes her feel restless but not productive so she is basically not taking any medication at this time.    Treatment plan:  · Target symptoms: depression, lack of focus, anxiety   · Why chosen therapy is appropriate versus another modality: relevant to diagnosis  · Outcome monitoring methods: self-report, observation  · Therapeutic intervention type: insight oriented psychotherapy    Risk parameters:  Patient reports no suicidal ideation  Patient reports no homicidal ideation  Patient reports no self-injurious behavior  Patient reports no violent behavior    Verbal deficits: None    Patient's response to intervention:  The patient's response to intervention is accepting.    Progress toward goals and other mental status changes:  The patient's progress toward goals is limited.    Diagnosis:     ICD-10-CM ICD-9-CM   1. Moderate  episode of recurrent major depressive disorder F33.1 296.32   2. Social anxiety disorder F40.10 300.23       Plan:  individual psychotherapy and medication management by physician    Return to clinic: 1 month    Length of Service (minutes): 45

## 2018-10-15 ENCOUNTER — DOCUMENTATION ONLY (OUTPATIENT)
Dept: BARIATRICS | Facility: CLINIC | Age: 28
End: 2018-10-15

## 2018-10-15 NOTE — PROGRESS NOTES
Bariatric Surgery Online Course Form Submission  Someone has submitted a Bariatric Surgery Online Course Form Submission on this page.  Date: 10- 10:59:02  Patient's Name: Krupa Melton  YOB: 1990 Email: [email or web address]  Phone: [phone]   Patient Address: [address] [place] [address]  Preferred Surgical Location: [facility]   I certify that I am 18 years of age or older: Yes   Confirmation Code: [id number]  Verification of Bariatric Seminar: yes  Insurance Information  Insurance or Self Pay? Insurance - Fill out fields below  Insurance Company Name: [employer]   Type of Coverage/Coverage Plan (i.e. PPO, HMO):   Group Name:   Subscriber Name: Krupa Geronimo   Member Name (patient's name): Krupa Melton   Member ID/Policy #:   Plan Effective Date:   Card Issuance date:

## 2018-10-22 ENCOUNTER — TELEPHONE (OUTPATIENT)
Dept: BARIATRICS | Facility: CLINIC | Age: 28
End: 2018-10-22

## 2018-10-22 NOTE — TELEPHONE ENCOUNTER
Called patient to schedule consult appointment. Patient understands date, time and location of appointment.

## 2018-10-30 ENCOUNTER — LAB VISIT (OUTPATIENT)
Dept: LAB | Facility: HOSPITAL | Age: 28
End: 2018-10-30
Attending: OBSTETRICS & GYNECOLOGY
Payer: COMMERCIAL

## 2018-10-30 DIAGNOSIS — E28.2 POLYCYSTIC OVARIES: Primary | ICD-10-CM

## 2018-10-30 PROCEDURE — 84481 FREE ASSAY (FT-3): CPT

## 2018-10-30 PROCEDURE — 36415 COLL VENOUS BLD VENIPUNCTURE: CPT

## 2018-10-31 LAB — T3FREE SERPL-MCNC: 2.6 PG/ML

## 2018-11-09 ENCOUNTER — TELEPHONE (OUTPATIENT)
Dept: BARIATRICS | Facility: CLINIC | Age: 28
End: 2018-11-09

## 2018-11-09 ENCOUNTER — PATIENT MESSAGE (OUTPATIENT)
Dept: BARIATRICS | Facility: CLINIC | Age: 28
End: 2018-11-09

## 2018-11-09 NOTE — TELEPHONE ENCOUNTER
Called patient to reschedule her 4:00pm consult to just thirty minutes earlier. Patients voicemail is full, unable to leave a message.

## 2018-11-12 ENCOUNTER — INITIAL CONSULT (OUTPATIENT)
Dept: BARIATRICS | Facility: CLINIC | Age: 28
End: 2018-11-12
Payer: COMMERCIAL

## 2018-11-12 VITALS
BODY MASS INDEX: 39.67 KG/M2 | HEIGHT: 64 IN | WEIGHT: 232.38 LBS | SYSTOLIC BLOOD PRESSURE: 128 MMHG | TEMPERATURE: 94 F | DIASTOLIC BLOOD PRESSURE: 80 MMHG

## 2018-11-12 DIAGNOSIS — E66.01 MORBID OBESITY WITH BMI OF 40.0-44.9, ADULT: Primary | ICD-10-CM

## 2018-11-12 DIAGNOSIS — E16.2 HYPOGLYCEMIA: ICD-10-CM

## 2018-11-12 DIAGNOSIS — K21.9 GASTROESOPHAGEAL REFLUX DISEASE, ESOPHAGITIS PRESENCE NOT SPECIFIED: ICD-10-CM

## 2018-11-12 PROBLEM — Z20.828 EXPOSURE TO THE FLU: Status: RESOLVED | Noted: 2017-12-20 | Resolved: 2018-11-12

## 2018-11-12 PROCEDURE — 99205 OFFICE O/P NEW HI 60 MIN: CPT | Mod: S$GLB,,, | Performed by: PHYSICIAN ASSISTANT

## 2018-11-12 PROCEDURE — 3008F BODY MASS INDEX DOCD: CPT | Mod: CPTII,S$GLB,, | Performed by: PHYSICIAN ASSISTANT

## 2018-11-12 PROCEDURE — 99999 PR PBB SHADOW E&M-EST. PATIENT-LVL IV: CPT | Mod: PBBFAC,,, | Performed by: PHYSICIAN ASSISTANT

## 2018-11-12 RX ORDER — CHLORHEXIDINE GLUCONATE ORAL RINSE 1.2 MG/ML
SOLUTION DENTAL
COMMUNITY
Start: 2018-09-19 | End: 2018-11-12

## 2018-11-12 RX ORDER — METFORMIN HYDROCHLORIDE 500 MG/1
TABLET, EXTENDED RELEASE ORAL
COMMUNITY
Start: 2018-08-21 | End: 2019-02-04

## 2018-11-12 RX ORDER — HYDROCODONE BITARTRATE AND ACETAMINOPHEN 5; 325 MG/1; MG/1
TABLET ORAL
COMMUNITY
Start: 2018-09-26 | End: 2018-11-12

## 2018-11-12 RX ORDER — SPIRONOLACTONE 100 MG/1
TABLET, FILM COATED ORAL
COMMUNITY
Start: 2018-08-21 | End: 2019-02-04

## 2018-11-12 RX ORDER — OMEPRAZOLE 20 MG/1
20 CAPSULE, DELAYED RELEASE ORAL 2 TIMES DAILY
Qty: 28 CAPSULE | Refills: 0 | Status: SHIPPED | OUTPATIENT
Start: 2018-11-12 | End: 2019-02-04 | Stop reason: ALTCHOICE

## 2018-11-12 RX ORDER — PROGESTERONE 200 MG/1
CAPSULE ORAL
COMMUNITY
Start: 2018-08-21 | End: 2019-02-04

## 2018-11-12 RX ORDER — IBUPROFEN 800 MG/1
TABLET ORAL
COMMUNITY
Start: 2018-09-19 | End: 2018-11-12

## 2018-11-12 RX ORDER — LEVOTHYROXINE, LIOTHYRONINE 38; 9 UG/1; UG/1
TABLET ORAL
COMMUNITY
Start: 2018-08-21 | End: 2019-02-04 | Stop reason: ALTCHOICE

## 2018-11-12 NOTE — PROGRESS NOTES
BARIATRIC NEW PATIENT EVALUATION    CHIEF COMPLAINT:   Morbid Obesity Body mass index is 40.52 kg/m². and inability to lose weight and maintaining weight loss.    HISTORY OF PRESENT ILLNESS:  Krupa Melton  is a 28 y.o. female presenting for morbid obesity Body mass index is 40.52 kg/m². and inability to lose weight and maintain weight loss . Pt states that she started gaining weight in middle school and it never got better. States summer before senior year, she went to a weight loss camp and three weeks in she broke her ankle and had to leave, states was out of activity for 2-3 months and she gained lots of her weight back. 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  States that her college experience she gained a lot The patient has tried diets (HCGZ diet) 21 day challenge (calorie restriction to either 500-800 calories), weight watchers. Currently working out with  (riding her bike 15 miles three times daily along with , states that she has done that for about 3 months, and only lose loss 8 lbs. Pt states that she lost 30 lbs in 3 months in 2016 with a weight loss competition at work, but states she did it the unhealthy way. States Lowest weight 199. Highest about 260. States that she is a mild emotional eater.    Pt states that she has heartburn all day every day. Flares up with red substances like tomatoes, one cup of coffee, anything with acid or caffeine makes it worse.    PAST MEDICAL HISTORY:  Past Medical History:   Diagnosis Date    Anxiety     Depression     GERD (gastroesophageal reflux disease)     Hypertension     gestational     Hypoglycemia     Kidney calculi         STD (sexually transmitted disease)     Urinary tract infection          PAST SURGICAL HISTORY:  Past Surgical History:   Procedure Laterality Date    ANKLE SURGERY       SECTION  10/2014     KIDNEY STONE SURGERY      TONSILLECTOMY      WISDOM TOOTH EXTRACTION         FAMILY HISTORY:  Family History   Problem Relation Age of Onset    Hypertension Mother     Hypertension Father     Heart disease Father     No Known Problems Sister     Nephrolithiasis Daughter     No Known Problems Sister     No Known Problems Sister        SOCIAL HISTORY:  Social History     Socioeconomic History    Marital status: Single     Spouse name: Not on file    Number of children: Not on file    Years of education: Not on file    Highest education level: Not on file   Social Needs    Financial resource strain: Not on file    Food insecurity - worry: Not on file    Food insecurity - inability: Not on file    Transportation needs - medical: Not on file    Transportation needs - non-medical: Not on file   Occupational History    Not on file   Tobacco Use    Smoking status: Never Smoker    Smokeless tobacco: Never Used   Substance and Sexual Activity    Alcohol use: Yes     Frequency: Never     Comment: socaillu     Drug use: No    Sexual activity: Yes     Partners: Male     Birth control/protection: None, Condom   Other Topics Concern    Not on file   Social History Narrative    Not on file       MEDICATIONS:  Current Outpatient Medications   Medication Sig Dispense Refill    blood sugar diagnostic (ACCU-CHEK SMARTVIEW TEST STRIP) Strp Use to check sugars 4 times daily dispense strips and lancets. accucheck veronica meter 150 each 4    multivit with calcium,iron,min (WOMEN'S DAILY MULTIVITAMIN ORAL) Women's Daily Multivitamin      rizatriptan (MAXALT) 10 MG tablet Take one tablet at onset of severe migraine.  May repeat in 2 hours but not to exceed 2 tablets in 24 hours 10 tablet 5    spironolactone (ALDACTONE) 100 MG tablet       metFORMIN (GLUCOPHAGE-XR) 500 MG 24 hr tablet       NP THYROID 60 mg Tab       progesterone (PROMETRIUM) 200 MG capsule        No current facility-administered medications for  "this visit.        ALLERGIES:  Review of patient's allergies indicates:  No Known Allergies    ROS:  Review of Systems   Constitutional: Negative for chills, fever and weight loss.   HENT: Negative for tinnitus.    Eyes: Negative for blurred vision and double vision.   Respiratory: Negative for cough, sputum production and shortness of breath.    Cardiovascular: Negative for chest pain, palpitations and leg swelling.   Gastrointestinal: Positive for diarrhea (medication induced metformin ) and heartburn. Negative for abdominal pain, blood in stool, constipation, nausea and vomiting.   Genitourinary: Negative for dysuria and hematuria.   Musculoskeletal: Positive for back pain (sciatica) and joint pain (right ankle right wrist ). Negative for myalgias and neck pain.   Skin: Negative for rash (intermittent, under pannus).   Neurological: Negative for dizziness, tingling, seizures, weakness and headaches.   Psychiatric/Behavioral: Positive for depression. Negative for hallucinations, memory loss, substance abuse and suicidal ideas. The patient is nervous/anxious.        PE:  Vitals:    11/12/18 1456   BP: 128/80   Temp: (!) 94 °F (34.4 °C)   Weight: 105.4 kg (232 lb 5.8 oz)   Height: 5' 3.5" (1.613 m)       Physical Exam   Constitutional: She is oriented to person, place, and time. She appears well-developed and well-nourished.   HENT:   Head: Normocephalic and atraumatic.   Eyes: EOM are normal. Pupils are equal, round, and reactive to light.   Neck: Normal range of motion. Neck supple. No JVD present. No thyromegaly present.   Cardiovascular: Normal rate, regular rhythm, normal heart sounds and intact distal pulses.   No murmur heard.  Pulmonary/Chest: Effort normal and breath sounds normal.   Abdominal: Soft. Bowel sounds are normal. There is no tenderness. No hernia.   Musculoskeletal: Normal range of motion. She exhibits tenderness (ankle ). She exhibits no edema.   Neurological: She is alert and oriented to " person, place, and time. She displays normal reflexes.   Skin: Skin is warm and dry. Capillary refill takes less than 2 seconds. She is not diaphoretic. No erythema.   Psychiatric: She has a normal mood and affect. Her behavior is normal. Judgment and thought content normal.        DIAGNOSIS:  1. Morbid Obesity with Body mass index is 40.52 kg/m². and inability to lose weight and maintaining weight loss .  2. Co-morbidities: GERD anxiety and depression    PLAN:  The patient is Good candidate for Bariatric Surgery. she is interested in sleeve gastrectomy with Dr. Alfonso. The surgery and post-op care were discussed in detail with the patient. All questions were answered.     Explained to patient that bypass may be better for her due to her dieting history and she cannot seem to lose weight along with the hx of significant GERD, pt states that she does not want to have the complications.    she understands that bariatric surgery is a tool to aid in weight loss and that she needs to be committed to the diet and exercise post-operatively for successful weight loss.  Discussed expected weight loss outcomes after surgery which is 50% of the excess weight on her frame. Discussed with patient that bariatric surgery is not the easy way out and that it will take plenty of dedication on the patient's part to be successful. Also discussed the possibility of weight regain if the patient strays from the diet guidelines or exercise requirements. Patient verbalized understanding and wishes to proceed with the work-up.    ORDERS:  1. Chest X-Ray, EKG and EGD  2. Psychological Consult, Bariatric Dietician Consult and PCP Clearance  3. Bariatric Labs: BMP, CBC, Folate Serum, H. pylori, HgA1C, Hepatic Panel/LFT, Iron & TIBC, Lipid Profile, Magnesium, Phosphate, T3, T4, TSH, Free T4, Vitamin B12, and Vitamin B1.  4. Mental health contract     Primary Physician: Roshni Frazier MD  RTC: As scheduled.    60 minute visit, over 50% of time  spent counseling patient face to face on surgical options, risks, benefits, expected diet, recommended exercise regimen, and expected weight loss.

## 2018-11-12 NOTE — LETTER
November 12, 2018        Roshni Frazier MD  [address]  [place] [address]             [facility] - Bariatric Surgery  [address]  [place] [address]  Phone: [phone]  Fax: [phone]   Patient: Krupa Melton   MR Number: [id number]   YOB: 1990   Date of Visit: 11/12/2018       Dear Dr. Frazier:    Thank you for referring Krupa Melton to me for evaluation. Below are the relevant portions of my assessment and plan of care.            If you have questions, please do not hesitate to call me. I look forward to following Krupa along with you.    Sincerely,      Desmond Luna PA-C           CC  No Recipients

## 2018-11-12 NOTE — PATIENT INSTRUCTIONS
Prior to surgery you will need to complete:  - Dietitian consult and follow up appointments as needed  - PCP clearance  - Labs  - Chest X-ray  - EKG  - Psychological evaluation, Please call psychiatry [phone] to schedule  - EGD    In preparation for bariatric surgery, please complete the following:   · Discuss your current medications with your primary care provider, remember your medications will need to be crushed, chewable, or in liquid form for the first 3-6 months after a gastric bypass or sleeve.  For a gastric band, your medications will need to be crushed indefinitely.    · If you take a blood thinner such as: Coumadin (warfarin), Pradaxa (dabigatran), or Plavix (clopidogrel), you will need to speak with your prescribing provider on how or if this medication can be stopped before surgery.   · If you take a medication for depression or anxiety, you will need to begin crushing or opening the capsule 1-3 months prior to surgery.  Remember to discuss this with the psychologist or psychiatrist that you see.   · If you take medication for arthritis on a daily basis that is considered a non-steroidal anti-inflammatory (NSAID), please discuss with your prescribing physician an alternative medication.  After having gastric bypass or gastric sleeve, this group of medications is not appropriate to take due to increased risk of bleeding stomach ulcers.      DEFINITIONS  Appointments: Pre-scheduled meetings or consultations with any physician, advanced practice provider, dietitian, or psychologist, and labs, imaging studies, sleep studies, etc.   Late cancellation: Cancelling an appointment 24-48 hours prior to scheduled time.  No-Show appointment:  is when    You do NOT arrive to your appointment at the time its scheduled.   You call to cancel or cancel via [facility] less than 24 hours in advance of your scheduled appointment   You show up 15 minutes AFTER your scheduled appointment time without any  notification of being late.     POLICY  1. You are allowed up to 3 cancellations for appointments.    After 3 cancellations your case will be placed on hold for 2 months and after that time you can resume the program.   2. You are allowed only 1 no-show for an appointment.    You will be re-scheduled one time and if there is a 2nd no-show at any point, your case will be placed on hold for 3 months.  After 3 months you can resume the program.     3. Upon resuming the program after being placed on hold for either above mentioned reasons, if you have a late cancel or no show for any appointment, the bariatric team will review if youre an appropriate candidate for surgery at the monthly meeting.

## 2018-11-13 ENCOUNTER — DOCUMENTATION ONLY (OUTPATIENT)
Dept: BARIATRICS | Facility: CLINIC | Age: 28
End: 2018-11-13

## 2018-11-13 ENCOUNTER — HOSPITAL ENCOUNTER (OUTPATIENT)
Dept: CARDIOLOGY | Facility: CLINIC | Age: 28
Discharge: HOME | End: 2018-11-13
Payer: COMMERCIAL

## 2018-11-13 ENCOUNTER — HOSPITAL ENCOUNTER (OUTPATIENT)
Dept: RADIOLOGY | Facility: HOSPITAL | Age: 28
Discharge: HOME | End: 2018-11-13
Attending: PHYSICIAN ASSISTANT
Payer: COMMERCIAL

## 2018-11-13 ENCOUNTER — CLINICAL SUPPORT (OUTPATIENT)
Dept: BARIATRICS | Facility: CLINIC | Age: 28
End: 2018-11-13
Payer: COMMERCIAL

## 2018-11-13 VITALS — WEIGHT: 231.94 LBS | BODY MASS INDEX: 40.44 KG/M2

## 2018-11-13 DIAGNOSIS — E66.01 MORBID OBESITY WITH BMI OF 40.0-44.9, ADULT: ICD-10-CM

## 2018-11-13 DIAGNOSIS — Z71.3 DIETARY COUNSELING: ICD-10-CM

## 2018-11-13 DIAGNOSIS — E16.2 HYPOGLYCEMIA: ICD-10-CM

## 2018-11-13 PROCEDURE — 71046 X-RAY EXAM CHEST 2 VIEWS: CPT | Mod: 26,,, | Performed by: RADIOLOGY

## 2018-11-13 PROCEDURE — 99499 UNLISTED E&M SERVICE: CPT | Mod: S$GLB,,, | Performed by: DIETITIAN, REGISTERED

## 2018-11-13 PROCEDURE — 71046 X-RAY EXAM CHEST 2 VIEWS: CPT | Mod: TC,FY

## 2018-11-13 PROCEDURE — 97802 MEDICAL NUTRITION INDIV IN: CPT | Mod: S$GLB,,, | Performed by: DIETITIAN, REGISTERED

## 2018-11-13 PROCEDURE — 93000 ELECTROCARDIOGRAM COMPLETE: CPT | Mod: S$GLB,,, | Performed by: INTERNAL MEDICINE

## 2018-11-13 NOTE — PROGRESS NOTES
NUTRITIONAL CONSULT    Referring Physician: Parag Alfonso M.D.  Reason for MNT Referral: Initial assessment for sleeve gastrectomy work-up    PAST MEDICAL HISTORY:   28 y.o. female  Body mass index is 40.44 kg/m².     Pt states that she started gaining weight in middle school and it never got better. States summer before senior year, she went to a weight loss camp and three weeks in she broke her ankle and had to leave, states was out of activity for 2-3 months and she gained lots of her weight back. 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  States that her college experience she gained a lot The patient has tried diets (HCGZ diet) 21 day challenge (calorie restriction to either 500-800 calories), weight watchers. Pt states that she lost 30 lbs in 3 months in 2016 with a weight loss competition at work, but states she did it the unhealthy way. States Lowest weight 199. Highest about 260. States that she is a mild emotional eater.    Currently using [employer] meal prep and exercising (riding her bike 15 miles 2-3 times weekly, along with  1-2 times/week doing circuit weights) States that over the past 3 months she has only lost 8 lbs.     Pt is getting  next month and going on a honeymoon to [place] in January. She will not be having the surgery until after that.    Past Medical History:   Diagnosis Date    Anxiety     Depression     GERD (gastroesophageal reflux disease)     Hypertension     gestational     Hypoglycemia     Kidney calculi     2016    STD (sexually transmitted disease)     Urinary tract infection        CLINICAL DATA:  28 y.o.-year-old White female.  Height: 5 ft. 3 in.  Weight: 231 lbs  IBW: 135 lbs  BMI: 40.4  The patient's goal weight (50% EBW): 183 lbs  Personal goal weight: under 200 lbs    Goal for Bariatric Surgery: to improve health, to improve quality of life,  to lose weight and to prevent future medical conditions    NUTRITIONAL NEEDS:  5589-7405 Calories (for 1-2 lbs wt loss per week)   Grams Protein    NUTRITION & HEALTH HISTORY:  Greatest challenge: sweets, starchy CHO and emotional eating    Current diet recall:     - coffee with hazelnut creamer and Splenda  B: 1.5 scrambled eggs, banana (or [employer] meal - egg, sausage, mini waffle)  L 2pm: 3 mozz cheese sticks wrapped in pepperoni (or [employer] meal - meatballs, roasted veg, mashed sweet potatoes)  - kiwi strawberry [employer]  - bottle water  D 5:30pm: [employer] chicken nuggets, fries, Coke (or [employer] meal - lasagna and broccoli)    Current Diet:  Meal pattern: 3 meals + snacking  Protein supplements: none  Snacking: candy, fruit, yogurt  Vegetables: Likes a few. Caesar salad, cucumbers, celery, carrots, broccoli, green beans, grilled asparagus. Eats 2-3 times per week.  Fruits: Likes a variety. Eats daily.  Beverages: water, diet soda, coffee with sugar and Fairlife 2% milk for cereal  Dining out: 3-4 times Weekly. Mostly fast food, restaurants and take-out. 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  Cooking at home: Weekly. Mostly baked, grilled, smothered and fried meat, fish, starchy CHO and vegetables.    Exercise:  Past exercise: Adequate    Current exercise: Adequate  Restrictions to exercise: none    Vitamins / Minerals / Herbs:   MV      Labs:   none available at this time    Food Allergies:   None known  Mild lactose intolerance    Social:  Works regular daytime shifts. Varied hours. Nurse.  Lives with her keena and her daughter (4yrs).  Grocery shopping and food prep done by the pt. She also uses [employer] meal delivery.   Patient believes the household will be supportive after surgery.  Alcohol: Socially. Once/mth. Wine or cocktail.  Smoking: None.    ASSESSMENT:  · Patient reports attempts at weight loss, only to regain lost weight.  · Patient demonstrated  knowledge of healthy eating behaviors and exercise patterns; admits to not eating healthy and not exercising at this point.  · Patient demonstrates willingness to change lifestyle and make behavior modifications as evidenced by good exercise, dietary changes, including protein drinks and increased vegetables.    Insurance requires no medically supervised diet prior to consideration for bariatric surgery.    Barriers to Education: none    Stage of change: action    NUTRITION DIAGNOSIS:    Obesity related to Excessive carbohydrate intake as evidence by BMI.    BARIATRIC DIET DISCUSSION/PLAN:  Discussed diet after surgery and related to patient's food record.  Reviewed nutrition guidelines for before and after surgery.  Answered all questions.  Continue to review Bariatric Nutrition Guidebook at home and call with any questions.  Work on Bariatric Nutrition Checklist.  Work on expanding variety of vegetables.  Work on gradually cutting back on starchy CHO in the diet.  1200-calorie diet.  5-6 meals per day.  Start including protein supplements in the diet plan daily.    RECOMMENDATIONS:  Patient is a potential candidate for bariatric surgery - Sleeve.  Will follow up by phone in January [facility] for [facility]her and to see if she needs to schedule RD appointment if she has been way off track with diet.    Patient verbalized understanding.    Communicated nutrition plan with bariatric team.    SESSION TIME:  60 minutes

## 2018-11-14 ENCOUNTER — TELEPHONE (OUTPATIENT)
Dept: ENDOSCOPY | Facility: HOSPITAL | Age: 28
End: 2018-11-14

## 2018-12-05 ENCOUNTER — PATIENT MESSAGE (OUTPATIENT)
Dept: FAMILY MEDICINE | Facility: CLINIC | Age: 28
End: 2018-12-05

## 2018-12-05 ENCOUNTER — LAB VISIT (OUTPATIENT)
Dept: LAB | Facility: HOSPITAL | Age: 28
End: 2018-12-05
Attending: FAMILY MEDICINE
Payer: COMMERCIAL

## 2018-12-05 DIAGNOSIS — N92.6 MISSED MENSES: Primary | ICD-10-CM

## 2018-12-05 DIAGNOSIS — N92.6 MISSED MENSES: ICD-10-CM

## 2018-12-05 LAB — HCG INTACT+B SERPL-ACNC: <1.2 MIU/ML

## 2018-12-05 PROCEDURE — 84702 CHORIONIC GONADOTROPIN TEST: CPT

## 2018-12-05 PROCEDURE — 36415 COLL VENOUS BLD VENIPUNCTURE: CPT

## 2019-01-10 ENCOUNTER — PATIENT MESSAGE (OUTPATIENT)
Dept: PSYCHIATRY | Facility: CLINIC | Age: 29
End: 2019-01-10

## 2019-01-11 ENCOUNTER — OFFICE VISIT (OUTPATIENT)
Dept: URGENT CARE | Facility: CLINIC | Age: 29
End: 2019-01-11
Payer: COMMERCIAL

## 2019-01-11 ENCOUNTER — TELEPHONE (OUTPATIENT)
Dept: BARIATRICS | Facility: CLINIC | Age: 29
End: 2019-01-11

## 2019-01-11 ENCOUNTER — PATIENT MESSAGE (OUTPATIENT)
Dept: BARIATRICS | Facility: CLINIC | Age: 29
End: 2019-01-11

## 2019-01-11 VITALS
BODY MASS INDEX: 41.11 KG/M2 | SYSTOLIC BLOOD PRESSURE: 118 MMHG | DIASTOLIC BLOOD PRESSURE: 83 MMHG | TEMPERATURE: 99 F | RESPIRATION RATE: 16 BRPM | WEIGHT: 232 LBS | HEART RATE: 75 BPM | HEIGHT: 63 IN | OXYGEN SATURATION: 99 %

## 2019-01-11 DIAGNOSIS — R05.9 COUGH: ICD-10-CM

## 2019-01-11 DIAGNOSIS — J32.9 SINUSITIS, UNSPECIFIED CHRONICITY, UNSPECIFIED LOCATION: Primary | ICD-10-CM

## 2019-01-11 PROCEDURE — 99214 OFFICE O/P EST MOD 30 MIN: CPT | Mod: 25,S$GLB,, | Performed by: NURSE PRACTITIONER

## 2019-01-11 PROCEDURE — 96372 THER/PROPH/DIAG INJ SC/IM: CPT | Mod: S$GLB,,, | Performed by: FAMILY MEDICINE

## 2019-01-11 PROCEDURE — 96372 PR INJECTION,THERAP/PROPH/DIAG2ST, IM OR SUBCUT: ICD-10-PCS | Mod: S$GLB,,, | Performed by: FAMILY MEDICINE

## 2019-01-11 PROCEDURE — 3008F PR BODY MASS INDEX (BMI) DOCUMENTED: ICD-10-PCS | Mod: CPTII,S$GLB,, | Performed by: NURSE PRACTITIONER

## 2019-01-11 PROCEDURE — 3008F BODY MASS INDEX DOCD: CPT | Mod: CPTII,S$GLB,, | Performed by: NURSE PRACTITIONER

## 2019-01-11 PROCEDURE — 99214 PR OFFICE/OUTPT VISIT, EST, LEVL IV, 30-39 MIN: ICD-10-PCS | Mod: 25,S$GLB,, | Performed by: NURSE PRACTITIONER

## 2019-01-11 RX ORDER — DEXAMETHASONE SODIUM PHOSPHATE 100 MG/10ML
6 INJECTION INTRAMUSCULAR; INTRAVENOUS
Status: COMPLETED | OUTPATIENT
Start: 2019-01-11 | End: 2019-01-11

## 2019-01-11 RX ORDER — BENZONATATE 100 MG/1
100 CAPSULE ORAL EVERY 6 HOURS PRN
Qty: 30 CAPSULE | Refills: 1 | Status: SHIPPED | OUTPATIENT
Start: 2019-01-11 | End: 2019-02-04 | Stop reason: ALTCHOICE

## 2019-01-11 RX ORDER — AMOXICILLIN AND CLAVULANATE POTASSIUM 875; 125 MG/1; MG/1
1 TABLET, FILM COATED ORAL 2 TIMES DAILY
Qty: 20 TABLET | Refills: 0 | Status: SHIPPED | OUTPATIENT
Start: 2019-01-11 | End: 2019-01-21

## 2019-01-11 RX ADMIN — DEXAMETHASONE SODIUM PHOSPHATE 6 MG: 100 INJECTION INTRAMUSCULAR; INTRAVENOUS at 01:01

## 2019-01-11 NOTE — TELEPHONE ENCOUNTER
Called to check in. Voice mailbox full, unable to leave message. Will try emailing through my chart.

## 2019-01-11 NOTE — TELEPHONE ENCOUNTER
----- Message from Yue Gan sent at 11/13/2018  1:53 PM CST -----  Regarding: check in  Called to check in after wedding and honeymoon.

## 2019-01-11 NOTE — LETTER
January 11, 2019      [facility]  [address] [address], [address]  [place] [address]  Phone: [phone]  Fax: [phone]       Patient: Krupa Melton   YOB: 1990  Date of Visit: 01/11/2019    To Whom It May Concern:    Radha Melton  was at [facility] on 01/11/2019. She may return to work/school on 1/14/2019 or fever free x 24 hours with no restrictions. If you have any questions or concerns, or if I can be of further assistance, please do not hesitate to contact me.    Sincerely,      Alysia Mejia, NP

## 2019-01-11 NOTE — PROGRESS NOTES
"Subjective:       Patient ID: Krupa Melton is a 28 y.o. female.    Vitals:  height is 5' 3" (1.6 m) and weight is 105.2 kg (232 lb). Her oral temperature is 98.8 °F (37.1 °C). Her blood pressure is 118/83 and her pulse is 75. Her respiration is 16 and oxygen saturation is 99%.     Chief Complaint: Cough    Patient presents today with cough producing green sputum production. She says she became sick December 30 th, with sore throat. Her sore throat has subsided and feels better , but now she right ear pain and her chest hurts from coughing so much. She says she has a constant drip draining.       Cough   This is a new problem. The current episode started 1 to 4 weeks ago. The problem has been gradually improving. The problem occurs every few minutes. The cough is productive of sputum. Associated symptoms include ear pain and postnasal drip. Pertinent negatives include no chest pain, chills, fever, headaches, myalgias, rash, sore throat or shortness of breath. Nothing aggravates the symptoms. Treatments tried: mucinex , sudafed  The treatment provided mild relief.       Constitution: Negative for chills, fatigue and fever.   HENT: Positive for ear pain, congestion, postnasal drip, sinus pain and sinus pressure. Negative for sore throat.    Neck: Negative for painful lymph nodes.   Cardiovascular: Negative for chest pain and leg swelling.   Eyes: Negative for double vision and blurred vision.   Respiratory: Positive for cough and sputum production. Negative for shortness of breath.    Gastrointestinal: Negative for nausea, vomiting and diarrhea.   Genitourinary: Negative for dysuria, frequency, urgency and history of kidney stones.   Musculoskeletal: Negative for joint pain, joint swelling, muscle cramps and muscle ache.   Skin: Negative for color change, pale, rash and bruising.   Allergic/Immunologic: Negative for seasonal allergies.   Neurological: Negative for dizziness, history of vertigo, " light-headedness, passing out and headaches.   Hematologic/Lymphatic: Negative for swollen lymph nodes.   Psychiatric/Behavioral: Negative for nervous/anxious, sleep disturbance and depression. The patient is not nervous/anxious.        Objective:      Physical Exam   Constitutional: She is oriented to person, place, and time. She appears well-developed and well-nourished. She is cooperative.  Non-toxic appearance. She does not appear ill. No distress.   HENT:   Head: Normocephalic and atraumatic.   Right Ear: Hearing, tympanic membrane, external ear and ear canal normal.   Left Ear: Hearing, tympanic membrane, external ear and ear canal normal.   Nose: Nose normal. No mucosal edema, rhinorrhea or nasal deformity. No epistaxis. Right sinus exhibits no maxillary sinus tenderness and no frontal sinus tenderness. Left sinus exhibits no maxillary sinus tenderness and no frontal sinus tenderness.   Mouth/Throat: Uvula is midline, oropharynx is clear and moist and mucous membranes are normal. No trismus in the jaw. Normal dentition. No uvula swelling. No posterior oropharyngeal erythema.   Eyes: Conjunctivae and lids are normal. No scleral icterus.   Sclera clear bilat   Neck: Trachea normal, full passive range of motion without pain and phonation normal. Neck supple.   Cardiovascular: Normal rate, regular rhythm, normal heart sounds, intact distal pulses and normal pulses.   Pulmonary/Chest: Effort normal and breath sounds normal. No respiratory distress.   Abdominal: Soft. Normal appearance and bowel sounds are normal. She exhibits no distension. There is no tenderness.   Musculoskeletal: Normal range of motion. She exhibits no edema or deformity.   Neurological: She is alert and oriented to person, place, and time. She exhibits normal muscle tone. Coordination normal.   Skin: Skin is warm, dry and intact. She is not diaphoretic. No pallor.   Psychiatric: She has a normal mood and affect. Her speech is normal and  "behavior is normal. Judgment and thought content normal. Cognition and memory are normal.   Nursing note and vitals reviewed.      Assessment:       1. Sinusitis, unspecified chronicity, unspecified location    2. Cough        Plan:         Sinusitis, unspecified chronicity, unspecified location  -     amoxicillin-clavulanate 875-125mg (AUGMENTIN) 875-125 mg per tablet; Take 1 tablet by mouth 2 (two) times daily. for 10 days  Dispense: 20 tablet; Refill: 0  -     dexamethasone injection 6 mg    Cough  -     dexamethasone injection 6 mg  -     benzonatate (TESSALON PERLES) 100 MG capsule; Take 1 capsule (100 mg total) by mouth every 6 (six) hours as needed for Cough.  Dispense: 30 capsule; Refill: 1      Patient Instructions   Please follow up with your Primary care provider within 2-5 days if your signs and symptoms have not resolved or worsen.  The usual course of cold symptoms are 10-14 days.     If your condition worsens or fails to improve we recommend that you receive another evaluation at the emergency room immediately or contact your primary medical clinic to discuss your concerns.     You must understand that you have received an Urgent Care treatment only and that you may be released before all of your medical problems are known or treated.   You, the patient, will arrange for follow up care as instructed.     Tylenol or Ibuprofen can also be used as directed for pain/fever unless you have an allergy to them or medical condition such as stomach ulcers, kidney or liver disease or blood thinners etc for which you should not be taking these type of medications.     Take over the counter cough medication as directed as needed for cough.  You should avoid medications with pseudoephedrine or phenylephrine (any medication with "D") if you have high blood pressure as this can cause an elevation in your blood pressure. Instead consider Corcidin HBP as needed to prevent an elevated blood pressure.     Natural " remedies of symptoms (as needed) include humidification, saline nasal sprays, and/or steamy showers.  Increase fluids, warm tea with honey, cough drops as needed.  You may also use salt water gargles for sore throat.    IF you received a steroid shot today - As discussed, this can elevate your blood pressure, elevate your blood sugar, water weight gain, nervous energy, redness to the face and dimpling of the skin at the injection site.   You have been given Augmentin for an infection today.  Please take all the antibiotic as prescribed on the bottle.      Augmentin is hard on the stomach and should always be taken with food.      Augmentin can cause diarrhea.  As with any antibiotics, use probiotics and/or high culture yogurt about 2 hours apart from the antibiotic and about 1 week after the antibiotic to replace the gut robert lost with antibiotic use.      If you are female and on BCP use additional methods to prevent pregnancy while on antibiotics and for one cycle after.       Sinusitis (Antibiotic Treatment)    The sinuses are air-filled spaces within the bones of the face. They connect to the inside of the nose. Sinusitis is an inflammation of the tissue lining the sinus cavity. Sinus inflammation can occur during a cold. It can also be due to allergies to pollens and other particles in the air. Sinusitis can cause symptoms of sinus congestion and fullness. A sinus infection causes fever, headache and facial pain. There is often green or yellow drainage from the nose or into the back of the throat (post-nasal drip). You have been given antibiotics to treat this condition.  Home care:  · Take the full course of antibiotics as instructed. Do not stop taking them, even if you feel better.  · Drink plenty of water, hot tea, and other liquids. This may help thin mucus. It also may promote sinus drainage.  · Heat may help soothe painful areas of the face. Use a towel soaked in hot water. Or,  the shower and  direct the hot spray onto your face. Using a vaporizer along with a menthol rub at night may also help.   · An expectorant containing guaifenesin may help thin the mucus and promote drainage from the sinuses.  · Over-the-counter decongestants may be used unless a similar medicine was prescribed. Nasal sprays work the fastest. Use one that contains phenylephrine or oxymetazoline. First blow the nose gently. Then use the spray. Do not use these medicines more often than directed on the label or symptoms may get worse. You may also use tablets containing pseudoephedrine. Avoid products that combine ingredients, because side effects may be increased. Read labels. You can also ask the pharmacist for help. (NOTE: Persons with high blood pressure should not use decongestants. They can raise blood pressure.)  · Over-the-counter antihistamines may help if allergies contributed to your sinusitis.    · Do not use nasal rinses or irrigation during an acute sinus infection, unless told to by your health care provider. Rinsing may spread the infection to other sinuses.  · Use acetaminophen or ibuprofen to control pain, unless another pain medicine was prescribed. (If you have chronic liver or kidney disease or ever had a stomach ulcer, talk with your doctor before using these medicines. Aspirin should never be used in anyone under 18 years of age who is ill with a fever. It may cause severe liver damage.)  · Don't smoke. This can worsen symptoms.  Follow-up care  Follow up with your healthcare provider or our staff if you are not improving within the next week.  When to seek medical advice  Call your healthcare provider if any of these occur:  · Facial pain or headache becoming more severe  · Stiff neck  · Unusual drowsiness or confusion  · Swelling of the forehead or eyelids  · Vision problems, including blurred or double vision  · Fever of 100.4ºF (38ºC) or higher, or as directed by your healthcare  provider  · Seizure  · Breathing problems  · Symptoms not resolving within 10 days  Date Last Reviewed: 4/13/2015  © 8446-6364 [employer]. [address] [place] [address], [place] [address]. All rights reserved. This information is not intended as a substitute for professional medical care. Always follow your healthcare professional's instructions.

## 2019-01-11 NOTE — PATIENT INSTRUCTIONS
"Please follow up with your Primary care provider within 2-5 days if your signs and symptoms have not resolved or worsen.  The usual course of cold symptoms are 10-14 days.     If your condition worsens or fails to improve we recommend that you receive another evaluation at the emergency room immediately or contact your primary medical clinic to discuss your concerns.     You must understand that you have received an Urgent Care treatment only and that you may be released before all of your medical problems are known or treated.   You, the patient, will arrange for follow up care as instructed.     Tylenol or Ibuprofen can also be used as directed for pain/fever unless you have an allergy to them or medical condition such as stomach ulcers, kidney or liver disease or blood thinners etc for which you should not be taking these type of medications.     Take over the counter cough medication as directed as needed for cough.  You should avoid medications with pseudoephedrine or phenylephrine (any medication with "D") if you have high blood pressure as this can cause an elevation in your blood pressure. Instead consider Corcidin HBP as needed to prevent an elevated blood pressure.     Natural remedies of symptoms (as needed) include humidification, saline nasal sprays, and/or steamy showers.  Increase fluids, warm tea with honey, cough drops as needed.  You may also use salt water gargles for sore throat.    IF you received a steroid shot today - As discussed, this can elevate your blood pressure, elevate your blood sugar, water weight gain, nervous energy, redness to the face and dimpling of the skin at the injection site.   You have been given Augmentin for an infection today.  Please take all the antibiotic as prescribed on the bottle.      Augmentin is hard on the stomach and should always be taken with food.      Augmentin can cause diarrhea.  As with any antibiotics, use probiotics and/or high culture yogurt about 2 " hours apart from the antibiotic and about 1 week after the antibiotic to replace the gut robert lost with antibiotic use.      If you are female and on BCP use additional methods to prevent pregnancy while on antibiotics and for one cycle after.       Sinusitis (Antibiotic Treatment)    The sinuses are air-filled spaces within the bones of the face. They connect to the inside of the nose. Sinusitis is an inflammation of the tissue lining the sinus cavity. Sinus inflammation can occur during a cold. It can also be due to allergies to pollens and other particles in the air. Sinusitis can cause symptoms of sinus congestion and fullness. A sinus infection causes fever, headache and facial pain. There is often green or yellow drainage from the nose or into the back of the throat (post-nasal drip). You have been given antibiotics to treat this condition.  Home care:  · Take the full course of antibiotics as instructed. Do not stop taking them, even if you feel better.  · Drink plenty of water, hot tea, and other liquids. This may help thin mucus. It also may promote sinus drainage.  · Heat may help soothe painful areas of the face. Use a towel soaked in hot water. Or,  the shower and direct the hot spray onto your face. Using a vaporizer along with a menthol rub at night may also help.   · An expectorant containing guaifenesin may help thin the mucus and promote drainage from the sinuses.  · Over-the-counter decongestants may be used unless a similar medicine was prescribed. Nasal sprays work the fastest. Use one that contains phenylephrine or oxymetazoline. First blow the nose gently. Then use the spray. Do not use these medicines more often than directed on the label or symptoms may get worse. You may also use tablets containing pseudoephedrine. Avoid products that combine ingredients, because side effects may be increased. Read labels. You can also ask the pharmacist for help. (NOTE: Persons with high blood  pressure should not use decongestants. They can raise blood pressure.)  · Over-the-counter antihistamines may help if allergies contributed to your sinusitis.    · Do not use nasal rinses or irrigation during an acute sinus infection, unless told to by your health care provider. Rinsing may spread the infection to other sinuses.  · Use acetaminophen or ibuprofen to control pain, unless another pain medicine was prescribed. (If you have chronic liver or kidney disease or ever had a stomach ulcer, talk with your doctor before using these medicines. Aspirin should never be used in anyone under 18 years of age who is ill with a fever. It may cause severe liver damage.)  · Don't smoke. This can worsen symptoms.  Follow-up care  Follow up with your healthcare provider or our staff if you are not improving within the next week.  When to seek medical advice  Call your healthcare provider if any of these occur:  · Facial pain or headache becoming more severe  · Stiff neck  · Unusual drowsiness or confusion  · Swelling of the forehead or eyelids  · Vision problems, including blurred or double vision  · Fever of 100.4ºF (38ºC) or higher, or as directed by your healthcare provider  · Seizure  · Breathing problems  · Symptoms not resolving within 10 days  Date Last Reviewed: 4/13/2015  © 1993-3487 [employer]. [address] [place] [address], [place] [address]. All rights reserved. This information is not intended as a substitute for professional medical care. Always follow your healthcare professional's instructions.

## 2019-01-14 ENCOUNTER — TELEPHONE (OUTPATIENT)
Dept: URGENT CARE | Facility: CLINIC | Age: 29
End: 2019-01-14

## 2019-01-14 ENCOUNTER — PATIENT MESSAGE (OUTPATIENT)
Dept: BARIATRICS | Facility: CLINIC | Age: 29
End: 2019-01-14

## 2019-01-18 DIAGNOSIS — N94.10 DYSPAREUNIA, FEMALE: Primary | ICD-10-CM

## 2019-01-31 ENCOUNTER — OFFICE VISIT (OUTPATIENT)
Dept: OBSTETRICS AND GYNECOLOGY | Facility: CLINIC | Age: 29
End: 2019-01-31
Attending: OBSTETRICS & GYNECOLOGY
Payer: COMMERCIAL

## 2019-01-31 VITALS
WEIGHT: 240.31 LBS | SYSTOLIC BLOOD PRESSURE: 108 MMHG | HEIGHT: 63 IN | BODY MASS INDEX: 42.58 KG/M2 | DIASTOLIC BLOOD PRESSURE: 64 MMHG

## 2019-01-31 DIAGNOSIS — N83.209 CYST OF OVARY, UNSPECIFIED LATERALITY: ICD-10-CM

## 2019-01-31 DIAGNOSIS — N97.0 ANOVULATION: ICD-10-CM

## 2019-01-31 DIAGNOSIS — R19.8 ALTERNATING CONSTIPATION AND DIARRHEA: ICD-10-CM

## 2019-01-31 DIAGNOSIS — R10.2 PELVIC PAIN IN FEMALE: Primary | ICD-10-CM

## 2019-01-31 DIAGNOSIS — E03.9 HYPOTHYROIDISM, UNSPECIFIED TYPE: ICD-10-CM

## 2019-01-31 PROCEDURE — 99204 PR OFFICE/OUTPT VISIT, NEW, LEVL IV, 45-59 MIN: ICD-10-PCS | Mod: 25,S$GLB,, | Performed by: OBSTETRICS & GYNECOLOGY

## 2019-01-31 PROCEDURE — 99999 PR PBB SHADOW E&M-EST. PATIENT-LVL III: CPT | Mod: PBBFAC,,, | Performed by: OBSTETRICS & GYNECOLOGY

## 2019-01-31 PROCEDURE — 3008F BODY MASS INDEX DOCD: CPT | Mod: CPTII,S$GLB,, | Performed by: OBSTETRICS & GYNECOLOGY

## 2019-01-31 PROCEDURE — 99999 PR PBB SHADOW E&M-EST. PATIENT-LVL III: ICD-10-PCS | Mod: PBBFAC,,, | Performed by: OBSTETRICS & GYNECOLOGY

## 2019-01-31 PROCEDURE — 3008F PR BODY MASS INDEX (BMI) DOCUMENTED: ICD-10-PCS | Mod: CPTII,S$GLB,, | Performed by: OBSTETRICS & GYNECOLOGY

## 2019-01-31 PROCEDURE — 99204 OFFICE O/P NEW MOD 45 MIN: CPT | Mod: 25,S$GLB,, | Performed by: OBSTETRICS & GYNECOLOGY

## 2019-01-31 RX ORDER — MEDROXYPROGESTERONE ACETATE 10 MG/1
10 TABLET ORAL DAILY
Qty: 10 TABLET | Refills: 0 | Status: SHIPPED | OUTPATIENT
Start: 2019-01-31 | End: 2019-02-04 | Stop reason: ALTCHOICE

## 2019-01-31 NOTE — PROGRESS NOTES
"CC:ovarian cyst    HPI:patient is a 29 yo female  who presents to discuss u/s demonstrating ovarian cyst.  Pelvic pain is worsening over the past 6 months and is daily, reports pain with intercourse, especially with certain positions.  Reports alternating constipation and diarrhea.  Periods are irregular and long, reports she is on CD #50 today, two previous were 38 and 39 days long.      ROS:  GENERAL: Feeling well overall. Denies fever or chills.   SKIN: Denies rash or lesions.   HEAD: Denies head injury or headache.   NODES: Denies enlarged lymph nodes.   CHEST: Denies chest pain or shortness of breath.   CARDIOVASCULAR: Denies palpitations or left sided chest pain.   ABDOMEN: No abdominal pain, constipation, diarrhea, nausea, vomiting or rectal bleeding.   URINARY: No dysuria, hematuria, or burning on urination.  REPRODUCTIVE: See HPI.   BREASTS: Denies pain, lumps, or nipple discharge.   HEMATOLOGIC: No easy bruisability or excessive bleeding.   MUSCULOSKELETAL: Denies joint pain or swelling.   NEUROLOGIC: Denies syncope or weakness.   PSYCHIATRIC: Denies depression, anxiety or mood swings.    PE:   /64   Ht 5' 3" (1.6 m)   Wt 109 kg (240 lb 4.8 oz)   LMP 2018   BMI 42.57 kg/m²     APPEARANCE: Well nourished, well developed, White female in no acute distress. Obese   NODES: no cervical, supraclavicular, or inguinal lymphadenopathy  BREASTS: Symmetrical, no skin changes or visible lesions. No palpable masses, nipple discharge or adenopathy bilaterally.  ABDOMEN: Soft. No tenderness or masses. No distention. No hernias palpated. No CVA tenderness. Obese  VULVA: No lesions. Normal external female genitalia.  URETHRAL MEATUS: Normal size and location, no lesions, no prolapse.  URETHRA: No masses, tenderness, or prolapse.  VAGINA: Moist. No lesions or lacerations noted. No abnormal discharge present. No odor present.   CERVIX: No lesions or discharge. No cervical motion tenderness.   UTERUS: " Normal size, regular shape, mobile, non-tender. anteverted  ADNEXA: No tenderness. No fullness or masses palpated in the adnexal regions.   ANUS PERINEUM: Normal.      Diagnosis:  1. Pelvic pain in female    2. Cyst of ovary, unspecified laterality    3. Hypothyroidism, unspecified type    4. Anovulation    5. Alternating constipation and diarrhea        Plan:   Provera for oligomenorrhea  reimaging of cyst in 6 weeks  Will consider diagnostic scope for pain if not improvement or if cyst is enlarged  Discussed diet changes and weight loss to normalize cycles and achieve pregnancy  Orders Placed This Encounter    US Pelvis Comp with Transvag NON-OB (xpd    medroxyPROGESTERone (PROVERA) 10 MG tablet         Follow-up with me based on pain, results    Coty Crocker DO

## 2019-02-01 ENCOUNTER — TELEPHONE (OUTPATIENT)
Dept: BARIATRICS | Facility: CLINIC | Age: 29
End: 2019-02-01

## 2019-02-01 NOTE — TELEPHONE ENCOUNTER
Bariatric Nutrition  Called to check in on progress with dietary changes in preparation for surgery.    Pt states she plans to start today:  - eating more fruits and vegetables  - less sugar    Example:    B: eggs, yogurt, fruit  Sn: banana or granola bar  L: grilled chicken over salad  Sn: celery/bell pepper with Ranch  D: grilled chicken or fish, vegetables    Exercise:   - Plans to start walking on incline at the gym and riding bicycle  - Goal of 4-5 days/week    Pt states she had to cancel psych appt and EGD d/t being sick and then transportation issues. She is planning to reschedule these appointments soon.    Will set reminder to call back in 2 weeks to check on progress.

## 2019-02-04 ENCOUNTER — OFFICE VISIT (OUTPATIENT)
Dept: UROLOGY | Facility: CLINIC | Age: 29
End: 2019-02-04
Payer: COMMERCIAL

## 2019-02-04 VITALS
BODY MASS INDEX: 42.57 KG/M2 | HEIGHT: 63 IN | HEART RATE: 84 BPM | TEMPERATURE: 99 F | SYSTOLIC BLOOD PRESSURE: 115 MMHG | DIASTOLIC BLOOD PRESSURE: 82 MMHG

## 2019-02-04 DIAGNOSIS — Q63.0 KIDNEY DUPLICATION: ICD-10-CM

## 2019-02-04 DIAGNOSIS — R30.0 DYSURIA: Primary | ICD-10-CM

## 2019-02-04 DIAGNOSIS — R35.0 FREQUENCY OF URINATION: ICD-10-CM

## 2019-02-04 DIAGNOSIS — Z87.442 HISTORY OF NEPHROLITHIASIS: ICD-10-CM

## 2019-02-04 DIAGNOSIS — R39.15 URGENCY OF URINATION: ICD-10-CM

## 2019-02-04 LAB
BACTERIA #/AREA URNS AUTO: ABNORMAL /HPF
BILIRUB SERPL-MCNC: ABNORMAL MG/DL
BILIRUB UR QL STRIP: NEGATIVE
BLOOD URINE, POC: 250
CLARITY UR REFRACT.AUTO: CLEAR
COLOR UR AUTO: YELLOW
COLOR, POC UA: ABNORMAL
GLUCOSE UR QL STRIP: ABNORMAL
GLUCOSE UR QL STRIP: NEGATIVE
HGB UR QL STRIP: ABNORMAL
KETONES UR QL STRIP: ABNORMAL
KETONES UR QL STRIP: NEGATIVE
LEUKOCYTE ESTERASE UR QL STRIP: ABNORMAL
LEUKOCYTE ESTERASE URINE, POC: ABNORMAL
MICROSCOPIC COMMENT: ABNORMAL
NITRITE UR QL STRIP: NEGATIVE
NITRITE, POC UA: ABNORMAL
PH UR STRIP: 6 [PH] (ref 5–8)
PH, POC UA: 5
PROT UR QL STRIP: NEGATIVE
PROTEIN, POC: ABNORMAL
RBC #/AREA URNS AUTO: 23 /HPF (ref 0–4)
SP GR UR STRIP: 1.02 (ref 1–1.03)
SPECIFIC GRAVITY, POC UA: 1.01
SQUAMOUS #/AREA URNS AUTO: 1 /HPF
URN SPEC COLLECT METH UR: ABNORMAL
UROBILINOGEN, POC UA: ABNORMAL
WBC #/AREA URNS AUTO: >100 /HPF (ref 0–5)

## 2019-02-04 PROCEDURE — 3008F BODY MASS INDEX DOCD: CPT | Mod: CPTII,S$GLB,, | Performed by: STUDENT IN AN ORGANIZED HEALTH CARE EDUCATION/TRAINING PROGRAM

## 2019-02-04 PROCEDURE — 87186 SC STD MICRODIL/AGAR DIL: CPT

## 2019-02-04 PROCEDURE — 87077 CULTURE AEROBIC IDENTIFY: CPT

## 2019-02-04 PROCEDURE — 87088 URINE BACTERIA CULTURE: CPT

## 2019-02-04 PROCEDURE — 99214 OFFICE O/P EST MOD 30 MIN: CPT | Mod: 25,S$GLB,, | Performed by: STUDENT IN AN ORGANIZED HEALTH CARE EDUCATION/TRAINING PROGRAM

## 2019-02-04 PROCEDURE — 99214 PR OFFICE/OUTPT VISIT, EST, LEVL IV, 30-39 MIN: ICD-10-PCS | Mod: 25,S$GLB,, | Performed by: STUDENT IN AN ORGANIZED HEALTH CARE EDUCATION/TRAINING PROGRAM

## 2019-02-04 PROCEDURE — 81001 URINALYSIS AUTO W/SCOPE: CPT

## 2019-02-04 PROCEDURE — 3008F PR BODY MASS INDEX (BMI) DOCUMENTED: ICD-10-PCS | Mod: CPTII,S$GLB,, | Performed by: STUDENT IN AN ORGANIZED HEALTH CARE EDUCATION/TRAINING PROGRAM

## 2019-02-04 PROCEDURE — 87086 URINE CULTURE/COLONY COUNT: CPT

## 2019-02-04 PROCEDURE — 81002 URINALYSIS NONAUTO W/O SCOPE: CPT | Mod: S$GLB,,, | Performed by: STUDENT IN AN ORGANIZED HEALTH CARE EDUCATION/TRAINING PROGRAM

## 2019-02-04 PROCEDURE — 81002 POCT URINE DIPSTICK WITHOUT MICROSCOPE: ICD-10-PCS | Mod: S$GLB,,, | Performed by: STUDENT IN AN ORGANIZED HEALTH CARE EDUCATION/TRAINING PROGRAM

## 2019-02-04 PROCEDURE — 99999 PR PBB SHADOW E&M-EST. PATIENT-LVL III: ICD-10-PCS | Mod: PBBFAC,,, | Performed by: STUDENT IN AN ORGANIZED HEALTH CARE EDUCATION/TRAINING PROGRAM

## 2019-02-04 PROCEDURE — 99999 PR PBB SHADOW E&M-EST. PATIENT-LVL III: CPT | Mod: PBBFAC,,, | Performed by: STUDENT IN AN ORGANIZED HEALTH CARE EDUCATION/TRAINING PROGRAM

## 2019-02-04 RX ORDER — CIPROFLOXACIN 500 MG/1
500 TABLET ORAL EVERY 12 HOURS
Qty: 28 TABLET | Refills: 0 | Status: SHIPPED | OUTPATIENT
Start: 2019-02-04 | End: 2019-02-18

## 2019-02-04 NOTE — PROGRESS NOTES
Subjective:       Patient ID: Krupa Melton is a 28 y.o. female.    Chief Complaint:  UTI  This is a 28 y.o.  female patient that is an established patient of mine.   She has a history of dysuria and noted that she passed a kidney stone on Monday 11/20. She was started on bactrim and pyridium at the urgent care and noted that her dysuria has improved.  Her last imaging was a CT scan performed on 6/10/17 - which demonstrated a small punctate left upper pole calculus. 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 Has experienced low grade temperature 99.6-100 degrees F. She has had kidney stones in the past, she was treated in 2016 (last year) at [facility]. She notes they proceeded with what sounds like a ureteroscopy and did not see a stone. Later when she still had pain they performed an IVP and discovered that she had a duplicated system and proceeded with a ureteroscopy but did not visualize a stone. She notes that she had a ureteral stent and it was very painful for her. Current symptoms significant for low grade fevers, she is taking bactrim. Pain is much improved.     She saw me last week and at that time felt better and she brought the stone which we sent off for analysis and the results are still pending. She made another appointment because she is still having persistent low grade fevers, flank pain, and general feelings of fatigue. She notes that on Friday (day after Thanksgiving) she felt better and actually was able to do some shopping. But then later on that evening went to an urgent care because she started to feel worse and they started her on cipro. She has taken cipro since Friday, still has  low grade fevers 99F. She notes she still has left flank pain it is mild but uncomfortable. She is taking tylenol. Denies any sneezing, coughing, chills, other flu-like symptoms. 0    We obtained an US 11/2017 - demonstrated a left sided  nonobstructing upper pole 4mm stone in the upper pole of her duplicated kidney.     2019  She returns back today for a visit for her symptoms of low grade fever, dysuria, frequency, lower back/hip pain consistent with her previous symptoms of a UTI. She has not had any antibiotics nor urine studies. She has not seen a provider for this yet.  She notes that she has been hydrating - 45 -60 ounces of water daily. She was slowly starting to feel better but decided to come in for urine studies and evaluation.     Also of note, she is trying to get pregnant! She has a textbook about fertility with her today.   Social history - works at [employer] as a nurse.       Lab Results   Component Value Date    CREATININE 0.7 2018       ---  Past Medical History:   Diagnosis Date    Anxiety     Depression     GERD (gastroesophageal reflux disease)     Hypertension     gestational     Hypoglycemia     Kidney calculi         STD (sexually transmitted disease)     Urinary tract infection        Past Surgical History:   Procedure Laterality Date    ANKLE SURGERY       SECTION  10/2014    KIDNEY STONE SURGERY      TONSILLECTOMY      WISDOM TOOTH EXTRACTION         Family History   Problem Relation Age of Onset    Hypertension Mother     Hypertension Father     Heart disease Father     No Known Problems Sister     Nephrolithiasis Daughter     No Known Problems Sister     No Known Problems Sister        Social History     Tobacco Use    Smoking status: Never Smoker    Smokeless tobacco: Never Used   Substance Use Topics    Alcohol use: Yes     Frequency: Never     Comment: socaillu     Drug use: No       Current Outpatient Medications on File Prior to Visit   Medication Sig Dispense Refill    multivit with calcium,iron,min (WOMEN'S DAILY MULTIVITAMIN ORAL) Women's Daily Multivitamin      rizatriptan (MAXALT) 10 MG tablet Take one tablet at onset of severe migraine.  May repeat in  2 hours but not to exceed 2 tablets in 24 hours 10 tablet 5    [DISCONTINUED] benzonatate (TESSALON PERLES) 100 MG capsule Take 1 capsule (100 mg total) by mouth every 6 (six) hours as needed for Cough. 30 capsule 1    [DISCONTINUED] blood sugar diagnostic (ACCU-CHEK SMARTVIEW TEST STRIP) Strp Use to check sugars 4 times daily dispense strips and lancets. accucheck veronica meter 150 each 4    [DISCONTINUED] medroxyPROGESTERone (PROVERA) 10 MG tablet Take 1 tablet (10 mg total) by mouth once daily. 10 tablet 0    [DISCONTINUED] metFORMIN (GLUCOPHAGE-XR) 500 MG 24 hr tablet       [DISCONTINUED] NP THYROID 60 mg Tab       [DISCONTINUED] omeprazole (PRILOSEC) 20 MG capsule Take 1 capsule (20 mg total) by mouth 2 (two) times daily. for 14 days 28 capsule 0    [DISCONTINUED] progesterone (PROMETRIUM) 200 MG capsule       [DISCONTINUED] spironolactone (ALDACTONE) 100 MG tablet        No current facility-administered medications on file prior to visit.        Review of patient's allergies indicates:  No Known Allergies    Review of Systems   Constitutional: Negative for activity change.   HENT: Negative for congestion.    Eyes: Negative for visual disturbance.   Respiratory: Negative for shortness of breath.    Cardiovascular: Negative for chest pain.   Gastrointestinal: Negative for abdominal distention.   Musculoskeletal: Negative for gait problem.   Skin: Negative for color change.   Neurological: Negative for dizziness.   Psychiatric/Behavioral: Negative for agitation.       Objective:      Physical Exam   Constitutional: She is oriented to person, place, and time. She appears well-developed.   HENT:   Head: Normocephalic and atraumatic.   Eyes: EOM are normal.   Neck: Normal range of motion.   Cardiovascular: Intact distal pulses.   Pulmonary/Chest: Effort normal.   Musculoskeletal: Normal range of motion.   Neurological: She is alert and oriented to person, place, and time.   Skin: Skin is warm and dry.    Psychiatric: She has a normal mood and affect.       Assessment:       1. Dysuria    2. Urgency of urination    3. Frequency of urination    4. History of nephrolithiasis    5. Kidney duplication        Plan:         1. Will send clean catch urine for urinalysis and culture to rule out UTI. Even if urine culture negative, will likely recommend for her to continue and finish antibiotics for possible pyelonephritis.   2. Cipro x 2 weeks.   3. Screening kidney US to continue monitoring her left nonobstructing stone in her duplicated kidney.     Dysuria  -     Urinalysis  -     Urinalysis Microscopic  -     Urine culture  -     ciprofloxacin HCl (CIPRO) 500 MG tablet; Take 1 tablet (500 mg total) by mouth every 12 (twelve) hours. for 14 days  Dispense: 28 tablet; Refill: 0  -     POCT URINE DIPSTICK WITHOUT MICROSCOPE    Urgency of urination  -     Urinalysis  -     Urinalysis Microscopic  -     Urine culture  -     ciprofloxacin HCl (CIPRO) 500 MG tablet; Take 1 tablet (500 mg total) by mouth every 12 (twelve) hours. for 14 days  Dispense: 28 tablet; Refill: 0  -     POCT URINE DIPSTICK WITHOUT MICROSCOPE    Frequency of urination  -     Urinalysis  -     Urinalysis Microscopic  -     Urine culture  -     ciprofloxacin HCl (CIPRO) 500 MG tablet; Take 1 tablet (500 mg total) by mouth every 12 (twelve) hours. for 14 days  Dispense: 28 tablet; Refill: 0  -     POCT URINE DIPSTICK WITHOUT MICROSCOPE    History of nephrolithiasis  -     US Retroperitoneal Complete (Kidney and; Future; Expected date: 02/04/2019    Kidney duplication

## 2019-02-06 LAB — BACTERIA UR CULT: NORMAL

## 2019-02-07 ENCOUNTER — TELEPHONE (OUTPATIENT)
Dept: UROLOGY | Facility: CLINIC | Age: 29
End: 2019-02-07

## 2019-02-07 NOTE — TELEPHONE ENCOUNTER
----- Message from Jeaneth Beth MD sent at 2/7/2019  8:29 AM CST -----  Please call patient and notify of positive culture and antibiotics. The urine culture grew out bacteria concerning for a urinary tract infection. It was sensitive to the cipro that I called in for her. She should continue and finish the entire course I called in for her.

## 2019-02-07 NOTE — TELEPHONE ENCOUNTER
Spoke with Ms Krupa about her urine culture, she stated that she has read her portal and will take the Cipro, I voiced my understanding.

## 2019-02-15 ENCOUNTER — DOCUMENTATION ONLY (OUTPATIENT)
Dept: BARIATRICS | Facility: CLINIC | Age: 29
End: 2019-02-15

## 2019-02-15 NOTE — PROGRESS NOTES
Called re: check in on dietary changes in preparation for bariatric surgery.    Pt states she is no longer interested in having surgery. She is not in a good/strong place mentally to make dietary changes. At times she has been making some better choices (grilled vs fried chicken, salads vs mac and cheese). Other times she feels out of control of her food choices, eating jackie cake at work, sweets and chips at home. No exercise. Eating healthy too monotonous. She also states she is afraid of having surgery and how her body/hair will look after.     Pt wishes to end work up. Dashboard Updated

## 2019-02-18 ENCOUNTER — PATIENT MESSAGE (OUTPATIENT)
Dept: OBSTETRICS AND GYNECOLOGY | Facility: CLINIC | Age: 29
End: 2019-02-18

## 2019-02-27 ENCOUNTER — PATIENT MESSAGE (OUTPATIENT)
Dept: BARIATRICS | Facility: CLINIC | Age: 29
End: 2019-02-27

## 2019-03-15 ENCOUNTER — OFFICE VISIT (OUTPATIENT)
Dept: OBSTETRICS AND GYNECOLOGY | Facility: CLINIC | Age: 29
End: 2019-03-15
Attending: OBSTETRICS & GYNECOLOGY
Payer: COMMERCIAL

## 2019-03-15 VITALS
HEIGHT: 63 IN | SYSTOLIC BLOOD PRESSURE: 118 MMHG | BODY MASS INDEX: 42.6 KG/M2 | WEIGHT: 240.44 LBS | DIASTOLIC BLOOD PRESSURE: 82 MMHG

## 2019-03-15 PROCEDURE — 3008F BODY MASS INDEX DOCD: CPT | Mod: CPTII,S$GLB,, | Performed by: OBSTETRICS & GYNECOLOGY

## 2019-03-15 PROCEDURE — 99213 OFFICE O/P EST LOW 20 MIN: CPT | Mod: S$GLB,,, | Performed by: OBSTETRICS & GYNECOLOGY

## 2019-03-15 PROCEDURE — 99213 PR OFFICE/OUTPT VISIT, EST, LEVL III, 20-29 MIN: ICD-10-PCS | Mod: S$GLB,,, | Performed by: OBSTETRICS & GYNECOLOGY

## 2019-03-15 PROCEDURE — 3008F PR BODY MASS INDEX (BMI) DOCUMENTED: ICD-10-PCS | Mod: CPTII,S$GLB,, | Performed by: OBSTETRICS & GYNECOLOGY

## 2019-03-15 NOTE — PROGRESS NOTES
"CC:Follow up ultrasound, fertility    HPI:Krupa Melton is a 29 yo female who presents to follow up with me regarding ultrasound, menstrual cycles etc... Reports that cycles have normalized after using progesterone at her last visit.  Is still working on weight loss and in interested in referrals.  Normal semen analysis for .    ROS:  GENERAL: Feeling well overall. Denies fever or chills.   SKIN: Denies rash or lesions.   HEAD: Denies head injury or headache.   NODES: Denies enlarged lymph nodes.   CHEST: Denies chest pain or shortness of breath.   CARDIOVASCULAR: Denies palpitations or left sided chest pain.   ABDOMEN: No abdominal pain, constipation, diarrhea, nausea, vomiting or rectal bleeding.   URINARY: No dysuria, hematuria, or burning on urination.  REPRODUCTIVE: See HPI.   BREASTS: Denies pain, lumps, or nipple discharge.   HEMATOLOGIC: No easy bruisability or excessive bleeding.   MUSCULOSKELETAL: Denies joint pain or swelling.   NEUROLOGIC: Denies syncope or weakness.   PSYCHIATRIC: Denies depression, anxiety or mood swings.    PE:   /82   Ht 5' 3" (1.6 m)   Wt 109.1 kg (240 lb 6.6 oz)   LMP 03/12/2019   BMI 42.59 kg/m²     APPEARANCE: Well nourished, well developed, White female in no acute distress.  NODES: no cervical, supraclavicular, or inguinal lymphadenopathy  BREASTS: Symmetrical, no skin changes or visible lesions. No palpable masses, nipple discharge or adenopathy bilaterally.  ABDOMEN: Soft. No tenderness or masses. No distention. No hernias palpated. No CVA tenderness.  VULVA: No lesions. Normal external female genitalia.  URETHRAL MEATUS: Normal size and location, no lesions, no prolapse.  URETHRA: No masses, tenderness, or prolapse.  VAGINA: Moist. No lesions or lacerations noted. No abnormal discharge present. No odor present.   CERVIX: No lesions or discharge. No cervical motion tenderness.   UTERUS: Normal size, regular shape, mobile, non-tender.  ADNEXA: No " tenderness. No fullness or masses palpated in the adnexal regions.   ANUS PERINEUM: Normal.      Diagnosis:  1. BMI 40.0-44.9, adult        Plan:   Referred to weight loss  Diet and exercise changes  Continue tracking cycles and using OPKs  Orders Placed This Encounter    Ambulatory Referral to Nutrition Services         Coty Crocker DO

## 2019-03-21 ENCOUNTER — TELEPHONE (OUTPATIENT)
Dept: INTERNAL MEDICINE | Facility: CLINIC | Age: 29
End: 2019-03-21

## 2019-03-28 ENCOUNTER — OFFICE VISIT (OUTPATIENT)
Dept: URGENT CARE | Facility: CLINIC | Age: 29
End: 2019-03-28
Payer: COMMERCIAL

## 2019-03-28 VITALS
RESPIRATION RATE: 16 BRPM | DIASTOLIC BLOOD PRESSURE: 79 MMHG | HEART RATE: 82 BPM | WEIGHT: 230 LBS | SYSTOLIC BLOOD PRESSURE: 125 MMHG | TEMPERATURE: 99 F | OXYGEN SATURATION: 99 % | HEIGHT: 63 IN | BODY MASS INDEX: 40.75 KG/M2

## 2019-03-28 DIAGNOSIS — B34.9 VIRAL SYNDROME: ICD-10-CM

## 2019-03-28 DIAGNOSIS — R51.9 INTRACTABLE HEADACHE, UNSPECIFIED CHRONICITY PATTERN, UNSPECIFIED HEADACHE TYPE: Primary | ICD-10-CM

## 2019-03-28 PROCEDURE — 96372 THER/PROPH/DIAG INJ SC/IM: CPT | Mod: S$GLB,,, | Performed by: FAMILY MEDICINE

## 2019-03-28 PROCEDURE — 99214 OFFICE O/P EST MOD 30 MIN: CPT | Mod: 25,S$GLB,, | Performed by: NURSE PRACTITIONER

## 2019-03-28 PROCEDURE — 3008F PR BODY MASS INDEX (BMI) DOCUMENTED: ICD-10-PCS | Mod: CPTII,S$GLB,, | Performed by: NURSE PRACTITIONER

## 2019-03-28 PROCEDURE — 99214 PR OFFICE/OUTPT VISIT, EST, LEVL IV, 30-39 MIN: ICD-10-PCS | Mod: 25,S$GLB,, | Performed by: NURSE PRACTITIONER

## 2019-03-28 PROCEDURE — 3008F BODY MASS INDEX DOCD: CPT | Mod: CPTII,S$GLB,, | Performed by: NURSE PRACTITIONER

## 2019-03-28 PROCEDURE — 96372 PR INJECTION,THERAP/PROPH/DIAG2ST, IM OR SUBCUT: ICD-10-PCS | Mod: S$GLB,,, | Performed by: FAMILY MEDICINE

## 2019-03-28 RX ORDER — ACETAMINOPHEN 500 MG
TABLET ORAL
COMMUNITY
Start: 2019-01-21 | End: 2019-06-06

## 2019-03-28 RX ORDER — ACETAMINOPHEN AND PHENYLEPHRINE HCL 325; 5 MG/1; MG/1
TABLET ORAL
COMMUNITY
End: 2019-06-06

## 2019-03-28 RX ORDER — KETOROLAC TROMETHAMINE 30 MG/ML
30 INJECTION, SOLUTION INTRAMUSCULAR; INTRAVENOUS
Status: COMPLETED | OUTPATIENT
Start: 2019-03-28 | End: 2019-03-28

## 2019-03-28 RX ORDER — ONDANSETRON 2 MG/ML
4 INJECTION INTRAMUSCULAR; INTRAVENOUS
Status: DISCONTINUED | OUTPATIENT
Start: 2019-03-28 | End: 2019-03-28

## 2019-03-28 RX ORDER — ONDANSETRON 4 MG/1
4 TABLET, ORALLY DISINTEGRATING ORAL EVERY 8 HOURS PRN
Qty: 12 TABLET | Refills: 0 | Status: SHIPPED | OUTPATIENT
Start: 2019-03-28 | End: 2019-06-06

## 2019-03-28 RX ORDER — ONDANSETRON 2 MG/ML
4 INJECTION INTRAMUSCULAR; INTRAVENOUS
Status: COMPLETED | OUTPATIENT
Start: 2019-03-28 | End: 2019-03-28

## 2019-03-28 RX ADMIN — ONDANSETRON 4 MG: 2 INJECTION INTRAMUSCULAR; INTRAVENOUS at 07:03

## 2019-03-28 RX ADMIN — KETOROLAC TROMETHAMINE 30 MG: 30 INJECTION, SOLUTION INTRAMUSCULAR; INTRAVENOUS at 07:03

## 2019-03-28 NOTE — PROGRESS NOTES
"Subjective:       Patient ID: Krupa Melton is a 28 y.o. female.    Vitals:  height is 5' 3" (1.6 m) and weight is 104.3 kg (230 lb). Her oral temperature is 98.8 °F (37.1 °C). Her blood pressure is 125/79 and her pulse is 82. Her respiration is 16 and oxygen saturation is 99%.     Chief Complaint: Migraine    Patient presents today with migraine , diarrhea , and vomiting for two days now. Symptoms started with migraine . No other symptoms reported .     Migraine    This is a recurrent problem. The current episode started yesterday. The problem occurs constantly. The problem has been gradually worsening. The pain is located in the right unilateral (back of neck ) region. The pain does not radiate. The pain quality is not similar to prior headaches. The quality of the pain is described as throbbing. The pain is at a severity of 7/10. The pain is moderate. Associated symptoms include nausea, neck pain and vomiting. Associated symptoms comments: Jaw pain   . Exacerbated by: light. Treatments tried: xofran  The treatment provided no relief. Her past medical history is significant for migraine headaches.       Neck: Positive for neck pain.   Gastrointestinal: Positive for nausea, vomiting and diarrhea.   Neurological: Positive for headaches and history of migraines.       Objective:      Physical Exam   Constitutional: She is oriented to person, place, and time. She appears well-developed and well-nourished.   HENT:   Head: Normocephalic and atraumatic.   Right Ear: External ear normal.   Left Ear: External ear normal.   Nose: Nose normal.   Mouth/Throat: Mucous membranes are normal.   Eyes: Pupils are equal, round, and reactive to light. Conjunctivae, EOM and lids are normal.   Neck: Trachea normal and full passive range of motion without pain. Neck supple.   Cardiovascular: Normal rate, regular rhythm and normal heart sounds.   Pulmonary/Chest: Effort normal and breath sounds normal. No respiratory distress. "   Abdominal: Soft. Normal appearance and bowel sounds are normal. She exhibits no distension, no abdominal bruit, no pulsatile midline mass and no mass. There is no tenderness.   Musculoskeletal: Normal range of motion. She exhibits no edema.   Neurological: She is alert and oriented to person, place, and time. She has normal strength. She is not disoriented. No cranial nerve deficit or sensory deficit. She displays a negative Romberg sign. Coordination and gait normal. GCS eye subscore is 4. GCS verbal subscore is 5. GCS motor subscore is 6.   Skin: Skin is warm, dry and intact. She is not diaphoretic. No pallor.   Psychiatric: She has a normal mood and affect. Her speech is normal and behavior is normal. Judgment and thought content normal. Cognition and memory are normal.   Nursing note and vitals reviewed.      Assessment:       1. Intractable headache, unspecified chronicity pattern, unspecified headache type    2. Viral syndrome        Plan:         Intractable headache, unspecified chronicity pattern, unspecified headache type  -     ketorolac injection 30 mg  -     Discontinue: ondansetron injection 4 mg  -     ondansetron (ZOFRAN-ODT) 4 MG TbDL; Take 1 tablet (4 mg total) by mouth every 8 (eight) hours as needed (nausea).  Dispense: 12 tablet; Refill: 0  -     ondansetron injection 4 mg    Viral syndrome  -     ondansetron (ZOFRAN-ODT) 4 MG TbDL; Take 1 tablet (4 mg total) by mouth every 8 (eight) hours as needed (nausea).  Dispense: 12 tablet; Refill: 0  -     ondansetron injection 4 mg

## 2019-03-31 ENCOUNTER — TELEPHONE (OUTPATIENT)
Dept: URGENT CARE | Facility: CLINIC | Age: 29
End: 2019-03-31

## 2019-04-15 DIAGNOSIS — G43.009 MIGRAINE WITHOUT AURA AND WITHOUT STATUS MIGRAINOSUS, NOT INTRACTABLE: ICD-10-CM

## 2019-04-15 RX ORDER — RIZATRIPTAN BENZOATE 10 MG/1
TABLET ORAL
Qty: 10 TABLET | Refills: 5 | Status: SHIPPED | OUTPATIENT
Start: 2019-04-15 | End: 2019-06-06

## 2019-05-13 ENCOUNTER — LAB VISIT (OUTPATIENT)
Dept: LAB | Facility: OTHER | Age: 29
End: 2019-05-13
Attending: OBSTETRICS & GYNECOLOGY
Payer: COMMERCIAL

## 2019-05-13 ENCOUNTER — OFFICE VISIT (OUTPATIENT)
Dept: OBSTETRICS AND GYNECOLOGY | Facility: CLINIC | Age: 29
End: 2019-05-13
Attending: OBSTETRICS & GYNECOLOGY
Payer: COMMERCIAL

## 2019-05-13 VITALS
HEIGHT: 63 IN | BODY MASS INDEX: 43.32 KG/M2 | DIASTOLIC BLOOD PRESSURE: 68 MMHG | SYSTOLIC BLOOD PRESSURE: 110 MMHG | WEIGHT: 244.5 LBS

## 2019-05-13 DIAGNOSIS — N92.6 MISSED PERIOD: Primary | ICD-10-CM

## 2019-05-13 DIAGNOSIS — I10 HYPERTENSION, UNSPECIFIED TYPE: ICD-10-CM

## 2019-05-13 DIAGNOSIS — F41.9 ANXIETY: ICD-10-CM

## 2019-05-13 DIAGNOSIS — Z86.39 HISTORY OF THYROID DISORDER: ICD-10-CM

## 2019-05-13 DIAGNOSIS — N92.6 MISSED PERIOD: ICD-10-CM

## 2019-05-13 LAB
ABO + RH BLD: NORMAL
ANION GAP SERPL CALC-SCNC: 5 MMOL/L (ref 8–16)
B-HCG UR QL: POSITIVE
BASOPHILS # BLD AUTO: 0.04 K/UL (ref 0–0.2)
BASOPHILS NFR BLD: 0.4 % (ref 0–1.9)
BLD GP AB SCN CELLS X3 SERPL QL: NORMAL
BUN SERPL-MCNC: 13 MG/DL (ref 6–20)
CALCIUM SERPL-MCNC: 9.6 MG/DL (ref 8.7–10.5)
CHLORIDE SERPL-SCNC: 105 MMOL/L (ref 95–110)
CO2 SERPL-SCNC: 28 MMOL/L (ref 23–29)
CREAT SERPL-MCNC: 0.8 MG/DL (ref 0.5–1.4)
CTP QC/QA: YES
DIFFERENTIAL METHOD: ABNORMAL
EOSINOPHIL # BLD AUTO: 0.1 K/UL (ref 0–0.5)
EOSINOPHIL NFR BLD: 0.7 % (ref 0–8)
ERYTHROCYTE [DISTWIDTH] IN BLOOD BY AUTOMATED COUNT: 12.5 % (ref 11.5–14.5)
EST. GFR  (AFRICAN AMERICAN): >60 ML/MIN/1.73 M^2
EST. GFR  (NON AFRICAN AMERICAN): >60 ML/MIN/1.73 M^2
GLUCOSE SERPL-MCNC: 85 MG/DL (ref 70–110)
HCT VFR BLD AUTO: 39.5 % (ref 37–48.5)
HGB BLD-MCNC: 13.9 G/DL (ref 12–16)
LYMPHOCYTES # BLD AUTO: 3.4 K/UL (ref 1–4.8)
LYMPHOCYTES NFR BLD: 33.6 % (ref 18–48)
MCH RBC QN AUTO: 32 PG (ref 27–31)
MCHC RBC AUTO-ENTMCNC: 35.2 G/DL (ref 32–36)
MCV RBC AUTO: 91 FL (ref 82–98)
MONOCYTES # BLD AUTO: 0.7 K/UL (ref 0.3–1)
MONOCYTES NFR BLD: 6.8 % (ref 4–15)
NEUTROPHILS # BLD AUTO: 6 K/UL (ref 1.8–7.7)
NEUTROPHILS NFR BLD: 58.3 % (ref 38–73)
PLATELET # BLD AUTO: 327 K/UL (ref 150–350)
PMV BLD AUTO: 10.4 FL (ref 9.2–12.9)
POTASSIUM SERPL-SCNC: 3.8 MMOL/L (ref 3.5–5.1)
RBC # BLD AUTO: 4.35 M/UL (ref 4–5.4)
RH BLD: NORMAL
SODIUM SERPL-SCNC: 138 MMOL/L (ref 136–145)
T4 FREE SERPL-MCNC: 0.97 NG/DL (ref 0.71–1.51)
TSH SERPL DL<=0.005 MIU/L-ACNC: 1.4 UIU/ML (ref 0.4–4)
WBC # BLD AUTO: 10.25 K/UL (ref 3.9–12.7)

## 2019-05-13 PROCEDURE — 86762 RUBELLA ANTIBODY: CPT

## 2019-05-13 PROCEDURE — 84443 ASSAY THYROID STIM HORMONE: CPT

## 2019-05-13 PROCEDURE — 87491 CHLMYD TRACH DNA AMP PROBE: CPT

## 2019-05-13 PROCEDURE — 81025 URINE PREGNANCY TEST: CPT | Mod: S$GLB,,, | Performed by: OBSTETRICS & GYNECOLOGY

## 2019-05-13 PROCEDURE — 87340 HEPATITIS B SURFACE AG IA: CPT

## 2019-05-13 PROCEDURE — 86592 SYPHILIS TEST NON-TREP QUAL: CPT

## 2019-05-13 PROCEDURE — 3078F DIAST BP <80 MM HG: CPT | Mod: CPTII,S$GLB,, | Performed by: OBSTETRICS & GYNECOLOGY

## 2019-05-13 PROCEDURE — 36415 COLL VENOUS BLD VENIPUNCTURE: CPT

## 2019-05-13 PROCEDURE — 86901 BLOOD TYPING SEROLOGIC RH(D): CPT

## 2019-05-13 PROCEDURE — 99214 OFFICE O/P EST MOD 30 MIN: CPT | Mod: S$GLB,,, | Performed by: OBSTETRICS & GYNECOLOGY

## 2019-05-13 PROCEDURE — 86703 HIV-1/HIV-2 1 RESULT ANTBDY: CPT

## 2019-05-13 PROCEDURE — 85025 COMPLETE CBC W/AUTO DIFF WBC: CPT

## 2019-05-13 PROCEDURE — 3074F SYST BP LT 130 MM HG: CPT | Mod: CPTII,S$GLB,, | Performed by: OBSTETRICS & GYNECOLOGY

## 2019-05-13 PROCEDURE — 81220 CFTR GENE COM VARIANTS: CPT

## 2019-05-13 PROCEDURE — 99214 PR OFFICE/OUTPT VISIT, EST, LEVL IV, 30-39 MIN: ICD-10-PCS | Mod: S$GLB,,, | Performed by: OBSTETRICS & GYNECOLOGY

## 2019-05-13 PROCEDURE — 81025 POCT URINE PREGNANCY: ICD-10-PCS | Mod: S$GLB,,, | Performed by: OBSTETRICS & GYNECOLOGY

## 2019-05-13 PROCEDURE — 3074F PR MOST RECENT SYSTOLIC BLOOD PRESSURE < 130 MM HG: ICD-10-PCS | Mod: CPTII,S$GLB,, | Performed by: OBSTETRICS & GYNECOLOGY

## 2019-05-13 PROCEDURE — 86901 BLOOD TYPING SEROLOGIC RH(D): CPT | Mod: 91

## 2019-05-13 PROCEDURE — 3078F PR MOST RECENT DIASTOLIC BLOOD PRESSURE < 80 MM HG: ICD-10-PCS | Mod: CPTII,S$GLB,, | Performed by: OBSTETRICS & GYNECOLOGY

## 2019-05-13 PROCEDURE — 84439 ASSAY OF FREE THYROXINE: CPT

## 2019-05-13 PROCEDURE — 99999 PR PBB SHADOW E&M-EST. PATIENT-LVL III: ICD-10-PCS | Mod: PBBFAC,,, | Performed by: OBSTETRICS & GYNECOLOGY

## 2019-05-13 PROCEDURE — 99999 PR PBB SHADOW E&M-EST. PATIENT-LVL III: CPT | Mod: PBBFAC,,, | Performed by: OBSTETRICS & GYNECOLOGY

## 2019-05-13 PROCEDURE — 87086 URINE CULTURE/COLONY COUNT: CPT

## 2019-05-13 PROCEDURE — 80048 BASIC METABOLIC PNL TOTAL CA: CPT

## 2019-05-13 PROCEDURE — 3008F PR BODY MASS INDEX (BMI) DOCUMENTED: ICD-10-PCS | Mod: CPTII,S$GLB,, | Performed by: OBSTETRICS & GYNECOLOGY

## 2019-05-13 PROCEDURE — 3008F BODY MASS INDEX DOCD: CPT | Mod: CPTII,S$GLB,, | Performed by: OBSTETRICS & GYNECOLOGY

## 2019-05-13 NOTE — PROGRESS NOTES
"HPI: Pt is a 28 y.o. female who presents complaining of missed menses and (+) home UPT. She denies vaginal bleeding or abdominal pain. She does have nausea and vomiting.     ROS:  GENERAL: Feeling well overall.   SKIN: Denies rash or lesions.   HEAD: Denies head injury or headache.   NODES: Denies enlarged lymph nodes.   CHEST: Denies chest pain or shortness of breath.   CARDIOVASCULAR: Denies palpitations or left sided chest pain.   ABDOMEN: No abdominal pain, constipation, diarrhea or rectal bleeding.   URINARY: No dysuria, hematuria, or burning on urination.  REPRODUCTIVE: See HPI.   BREASTS: Denies pain, lumps, or nipple discharge.   HEMATOLOGIC: No easy bruisability or excessive bleeding.   MUSCULOSKELETAL: Denies joint pain or swelling.   NEUROLOGIC: Denies syncope or weakness.   PSYCHIATRIC: Denies depression, anxiety or mood swings.    PE:   /68   Ht 5' 3" (1.6 m)   Wt 110.9 kg (244 lb 7.8 oz)   LMP 04/12/2019 (Approximate) Comment: OPK + 5/1/19  BMI 43.31 kg/m²     APPEARANCE: Well nourished, well developed, in no acute distress.  AFFECT: WNL, alert and oriented x 3.  SKIN: No acne or hirsutism.  NECK: Neck symmetric, without masses or thyromegaly.  NODES: No inguinal, cervical, axillary or femoral lymph node enlargement.  CHEST: Good respiratory effort.   ABDOMEN: Soft. No tenderness or masses. No hepatosplenomegaly. No hernias.  BREASTS: Symmetrical, no skin changes or visible lesions. No palpable masses, adenopathy, or nipple discharge bilaterally.  PELVIC: External female genitalia without lesions. Female hair distribution. Adequate perineal body, Normal urethral meatus. Vagina moist and well rugated without lesions or discharge. There is no significant cystocele or rectocele. Cervix pink without lesions, discharge or tenderness. Uterus is 5 week size, regular, mobile and nontender. Adnexa without masses.  EXTREMITIES: No edema.    PROCEDURES:  - UPT: positive  - Urine dip: negative  - Dating " U/S: to be scheduled with GynUS      Diagnosis:  1. Missed period    2. Hypertension, unspecified type    3. Anxiety    4. History of thyroid disorder        Plan:     Orders Placed This Encounter    Urine culture    HIV 1/2 Ag/Ab (4th Gen)    RPR    Hepatitis B surface antigen    Rubella antibody, IgG    CBC auto differential    Basic metabolic panel    Cystic Fibrosis Screen    TSH    T4, free    POCT Urine Pregnancy    Type & Screen    US MFM Procedure ([facility])    US OB/GYN Procedure ([facility])       - Rx: Prenatal vitamins  - She does want 1st trimester screening with MFM.     Patient was counseled today on routine 1st trimester precautions, including vaginal bleeding and abdominal pain. We also discussed proper weight gain based on the [employer]'s recommendations based on her pre-pregnancy weight, foods to avoid in pregnancy (i.e. sushi, fish that are high in mercury, cold deli meat, and unpasteurized cheeses), environmental precautions such as cat litter, and prenatal vitamin options (i.e. stool softener, DHA).     Total face to face time spent with the patient was 30 minutes and over half spent in counseling on the above related issues.     Follow-up with me in 4 weeks for OB check

## 2019-05-14 LAB
BACTERIA UR CULT: NORMAL
C TRACH DNA SPEC QL NAA+PROBE: NOT DETECTED
HBV SURFACE AG SERPL QL IA: NEGATIVE
HIV 1+2 AB+HIV1 P24 AG SERPL QL IA: NEGATIVE
N GONORRHOEA DNA SPEC QL NAA+PROBE: NOT DETECTED
RPR SER QL: NORMAL
RUBV IGG SER-ACNC: 135 IU/ML
RUBV IGG SER-IMP: REACTIVE

## 2019-05-15 LAB — CFTR MUT ANL BLD/T: NORMAL

## 2019-05-16 ENCOUNTER — PATIENT MESSAGE (OUTPATIENT)
Dept: OBSTETRICS AND GYNECOLOGY | Facility: CLINIC | Age: 29
End: 2019-05-16

## 2019-06-06 ENCOUNTER — INITIAL PRENATAL (OUTPATIENT)
Dept: OBSTETRICS AND GYNECOLOGY | Facility: CLINIC | Age: 29
End: 2019-06-06
Attending: OBSTETRICS & GYNECOLOGY
Payer: COMMERCIAL

## 2019-06-06 VITALS — WEIGHT: 245.56 LBS | SYSTOLIC BLOOD PRESSURE: 130 MMHG | DIASTOLIC BLOOD PRESSURE: 80 MMHG | BODY MASS INDEX: 43.5 KG/M2

## 2019-06-06 DIAGNOSIS — N92.6 MISSED PERIOD: ICD-10-CM

## 2019-06-06 DIAGNOSIS — Z34.90 PREGNANCY WITH ONE FETUS, ANTEPARTUM: Primary | ICD-10-CM

## 2019-06-06 PROCEDURE — 76801 PR US, OB <14WKS, TRANSABD, SINGLE GESTATION: ICD-10-PCS | Mod: S$GLB,,, | Performed by: OBSTETRICS & GYNECOLOGY

## 2019-06-06 PROCEDURE — 99999 PR PBB SHADOW E&M-EST. PATIENT-LVL II: CPT | Mod: PBBFAC,,, | Performed by: OBSTETRICS & GYNECOLOGY

## 2019-06-06 PROCEDURE — 0502F PR SUBSEQUENT PRENATAL CARE: ICD-10-PCS | Mod: CPTII,S$GLB,, | Performed by: OBSTETRICS & GYNECOLOGY

## 2019-06-06 PROCEDURE — 0502F SUBSEQUENT PRENATAL CARE: CPT | Mod: CPTII,S$GLB,, | Performed by: OBSTETRICS & GYNECOLOGY

## 2019-06-06 PROCEDURE — 76801 OB US < 14 WKS SINGLE FETUS: CPT | Mod: S$GLB,,, | Performed by: OBSTETRICS & GYNECOLOGY

## 2019-06-06 PROCEDURE — 99999 PR PBB SHADOW E&M-EST. PATIENT-LVL II: ICD-10-PCS | Mod: PBBFAC,,, | Performed by: OBSTETRICS & GYNECOLOGY

## 2019-06-06 NOTE — PROCEDURES
Procedures   Obstetrical ultrasound completed today.  See report in imaging section of [facility].

## 2019-06-08 ENCOUNTER — PATIENT MESSAGE (OUTPATIENT)
Dept: ENDOSCOPY | Facility: HOSPITAL | Age: 29
End: 2019-06-08

## 2019-07-11 ENCOUNTER — ROUTINE PRENATAL (OUTPATIENT)
Dept: OBSTETRICS AND GYNECOLOGY | Facility: CLINIC | Age: 29
End: 2019-07-11
Attending: OBSTETRICS & GYNECOLOGY
Payer: COMMERCIAL

## 2019-07-11 ENCOUNTER — PROCEDURE VISIT (OUTPATIENT)
Dept: MATERNAL FETAL MEDICINE | Facility: CLINIC | Age: 29
End: 2019-07-11
Attending: OBSTETRICS & GYNECOLOGY
Payer: COMMERCIAL

## 2019-07-11 ENCOUNTER — LAB VISIT (OUTPATIENT)
Dept: LAB | Facility: OTHER | Age: 29
End: 2019-07-11
Attending: OBSTETRICS & GYNECOLOGY
Payer: COMMERCIAL

## 2019-07-11 VITALS
BODY MASS INDEX: 43.15 KG/M2 | SYSTOLIC BLOOD PRESSURE: 130 MMHG | DIASTOLIC BLOOD PRESSURE: 90 MMHG | WEIGHT: 243.63 LBS

## 2019-07-11 VITALS — WEIGHT: 247.38 LBS | BODY MASS INDEX: 43.82 KG/M2

## 2019-07-11 DIAGNOSIS — F33.1 MODERATE EPISODE OF RECURRENT MAJOR DEPRESSIVE DISORDER: Primary | ICD-10-CM

## 2019-07-11 DIAGNOSIS — Z36.82 ENCOUNTER FOR NUCHAL TRANSLUCENCY TESTING: ICD-10-CM

## 2019-07-11 DIAGNOSIS — Z36.82 ENCOUNTER FOR NUCHAL TRANSLUCENCY TESTING: Primary | ICD-10-CM

## 2019-07-11 DIAGNOSIS — N92.6 MISSED PERIOD: ICD-10-CM

## 2019-07-11 DIAGNOSIS — Z34.90 PREGNANCY WITH ONE FETUS, ANTEPARTUM: ICD-10-CM

## 2019-07-11 DIAGNOSIS — F41.9 ANXIETY: ICD-10-CM

## 2019-07-11 DIAGNOSIS — I10 CHRONIC HYPERTENSION: ICD-10-CM

## 2019-07-11 PROCEDURE — 84163 PAPPA SERUM: CPT

## 2019-07-11 PROCEDURE — 36415 COLL VENOUS BLD VENIPUNCTURE: CPT

## 2019-07-11 PROCEDURE — 99499 UNLISTED E&M SERVICE: CPT | Mod: S$GLB,,, | Performed by: OBSTETRICS & GYNECOLOGY

## 2019-07-11 PROCEDURE — 99999 PR PBB SHADOW E&M-EST. PATIENT-LVL II: ICD-10-PCS | Mod: PBBFAC,,, | Performed by: OBSTETRICS & GYNECOLOGY

## 2019-07-11 PROCEDURE — 76813 PR US, OB NUCHAL, TRANSABDOM/TRANSVAG, FIRST GESTATION: ICD-10-PCS | Mod: S$GLB,,, | Performed by: OBSTETRICS & GYNECOLOGY

## 2019-07-11 PROCEDURE — 99499 NO LOS: ICD-10-PCS | Mod: S$GLB,,, | Performed by: OBSTETRICS & GYNECOLOGY

## 2019-07-11 PROCEDURE — 76813 OB US NUCHAL MEAS 1 GEST: CPT | Mod: S$GLB,,, | Performed by: OBSTETRICS & GYNECOLOGY

## 2019-07-11 PROCEDURE — 0502F SUBSEQUENT PRENATAL CARE: CPT | Mod: CPTII,S$GLB,, | Performed by: OBSTETRICS & GYNECOLOGY

## 2019-07-11 PROCEDURE — 0502F PR SUBSEQUENT PRENATAL CARE: ICD-10-PCS | Mod: CPTII,S$GLB,, | Performed by: OBSTETRICS & GYNECOLOGY

## 2019-07-11 PROCEDURE — 99999 PR PBB SHADOW E&M-EST. PATIENT-LVL II: CPT | Mod: PBBFAC,,, | Performed by: OBSTETRICS & GYNECOLOGY

## 2019-07-11 NOTE — PROGRESS NOTES
Obstetrical ultrasound completed today.  See report in imaging section of [facility].

## 2019-07-15 LAB — INTEGRATED SCN PATIENT-IMP NAR: NORMAL

## 2019-07-19 ENCOUNTER — PATIENT MESSAGE (OUTPATIENT)
Dept: ADMINISTRATIVE | Facility: HOSPITAL | Age: 29
End: 2019-07-19

## 2019-07-23 ENCOUNTER — HOSPITAL ENCOUNTER (OUTPATIENT)
Dept: PERINATAL CARE | Facility: OTHER | Age: 29
Discharge: HOME | End: 2019-07-23
Attending: STUDENT IN AN ORGANIZED HEALTH CARE EDUCATION/TRAINING PROGRAM
Payer: COMMERCIAL

## 2019-07-23 ENCOUNTER — INITIAL PRENATAL (OUTPATIENT)
Dept: OBSTETRICS AND GYNECOLOGY | Facility: CLINIC | Age: 29
End: 2019-07-23
Payer: COMMERCIAL

## 2019-07-23 VITALS — SYSTOLIC BLOOD PRESSURE: 132 MMHG | WEIGHT: 251 LBS | DIASTOLIC BLOOD PRESSURE: 82 MMHG | BODY MASS INDEX: 44.46 KG/M2

## 2019-07-23 DIAGNOSIS — E66.9 OBESITY (BMI 35.0-39.9 WITHOUT COMORBIDITY): Primary | ICD-10-CM

## 2019-07-23 DIAGNOSIS — E66.9 OBESITY (BMI 35.0-39.9 WITHOUT COMORBIDITY): ICD-10-CM

## 2019-07-23 PROCEDURE — 76815 OB US LIMITED FETUS(S): CPT | Mod: 26,,, | Performed by: OBSTETRICS & GYNECOLOGY

## 2019-07-23 PROCEDURE — 76801 OB US < 14 WKS SINGLE FETUS: CPT

## 2019-07-23 PROCEDURE — 0502F PR SUBSEQUENT PRENATAL CARE: ICD-10-PCS | Mod: CPTII,S$GLB,, | Performed by: ADVANCED PRACTICE MIDWIFE

## 2019-07-23 PROCEDURE — 99999 PR PBB SHADOW E&M-EST. PATIENT-LVL III: ICD-10-PCS | Mod: PBBFAC,,, | Performed by: ADVANCED PRACTICE MIDWIFE

## 2019-07-23 PROCEDURE — 76815 PR  US,PREGNANT UTERUS,LIMITED, 1/> FETUSES: ICD-10-PCS | Mod: 26,,, | Performed by: OBSTETRICS & GYNECOLOGY

## 2019-07-23 PROCEDURE — 99999 PR PBB SHADOW E&M-EST. PATIENT-LVL III: CPT | Mod: PBBFAC,,, | Performed by: ADVANCED PRACTICE MIDWIFE

## 2019-07-23 PROCEDURE — 0502F SUBSEQUENT PRENATAL CARE: CPT | Mod: CPTII,S$GLB,, | Performed by: ADVANCED PRACTICE MIDWIFE

## 2019-07-23 NOTE — PROGRESS NOTES
Krupa Melton is a 28 y.o. , presents today for a consultation/ possible transfer of prenatal care within the [facility] system.  She desires a / RASHEED and interested in options with CNM team and birth center and possible natural options    C/C: transfer of prenatal care    Dating via 7 week sonogram  Reports no FM (only 13 weeks), denies VB, LOF or cramping.     SOCIAL HISTORY: Denies emotional/mental/physical/sexual violence or abuse. Feels safe at home.   Reports Chronic hypertension (on baby asa), c/s secondary to arrest of dilation x 10h, morbid obesity    Review of patient's allergies indicates:  No Known Allergies  Past Medical History:   Diagnosis Date    Anxiety     Depression     GERD (gastroesophageal reflux disease)     Hypertension     gestational     Hypoglycemia     Kidney calculi     2016    Migraines     STD (sexually transmitted disease)     Urinary tract infection      Past Surgical History:   Procedure Laterality Date    ANKLE SURGERY       SECTION  10/2014    KIDNEY STONE SURGERY      TONSILLECTOMY      WISDOM TOOTH EXTRACTION       Past Surgical History:   Procedure Laterality Date    ANKLE SURGERY       SECTION  10/2014    KIDNEY STONE SURGERY      TONSILLECTOMY      WISDOM TOOTH EXTRACTION       OB History    Para Term  AB Living   2 1 1     1   SAB TAB Ectopic Multiple Live Births           1      # Outcome Date GA Lbr Boni/2nd Weight Sex Delivery Anes PTL Lv   2 Current            1 Term 10/14/14 41w2d   F CS-LTranv EPI  KAVITHA      Complications: Failure to Progress in Second Stage, Failure to progress in labor, Incisional infection, Gestational HTN     OB History        2    Para   1    Term   1            AB        Living   1       SAB        TAB        Ectopic        Multiple        Live Births   1               Social History     Socioeconomic History    Marital status:      Spouse name: Not on  file    Number of children: Not on file    Years of education: Not on file    Highest education level: Not on file   Occupational History    Not on file   Social Needs    Financial resource strain: Not on file    Food insecurity:     Worry: Not on file     Inability: Not on file    Transportation needs:     Medical: Not on file     Non-medical: Not on file   Tobacco Use    Smoking status: Never Smoker    Smokeless tobacco: Never Used   Substance and Sexual Activity    Alcohol use: Yes     Frequency: Never     Comment: socially when no pregnant    Drug use: No    Sexual activity: Yes     Partners: Male     Birth control/protection: None, Condom   Lifestyle    Physical activity:     Days per week: Not on file     Minutes per session: Not on file    Stress: Not on file   Relationships    Social connections:     Talks on phone: Not on file     Gets together: Not on file     Attends Gnosticist service: Not on file     Active member of club or organization: Not on file     Attends meetings of clubs or organizations: Not on file     Relationship status: Not on file   Other Topics Concern    Not on file   Social History Narrative    Not on file     Family History   Problem Relation Age of Onset    Hypertension Mother     Hypertension Father     Heart disease Father     No Known Problems Sister     Nephrolithiasis Daughter     No Known Problems Sister     No Known Problems Sister      Social History     Substance and Sexual Activity   Sexual Activity Yes    Partners: Male    Birth control/protection: None, Condom       OBJECTIVE: see prenatal flow sheet  /82   Wt 113.9 kg (250 lb 15.9 oz)   LMP 04/12/2019 (Approximate) Comment: OPK + 5/1/19  BMI 44.46 kg/m²   Constitutional/Gen: NAD, appears stated age, well groomed  Lung: normal resp effort  Extremities: FROM, with no edema or tenderness.  Neurologic: A&O x 4, non-focal, cranial nerves 2-12 grossly intact  Psych: affect appropriate and  without signs of mood, thought or memory difficulty appreciated    ASSESSMENT:  28 y.o. female  at 13w6d for possible transfer of prenatal care  Body mass index is 44.46 kg/m².  Patient Active Problem List   Diagnosis    Hypoglycemia    TMJ syndrome    Migraine without aura and without status migrainosus, not intractable    Moderate episode of recurrent major depressive disorder    Social anxiety disorder    Anxiety    Attention deficit hyperactivity disorder (ADHD), predominantly inattentive type    history of gestational Hypertension starting at 30 weeks.    Pregnancy with one fetus, antepartum    Chronic hypertension       PLAN:  1. Transfer of prenatal care within [facility] system  -- Continue prenatal care      2. BMI 44  -- Discussed IOM recommended weight gain of:   Weight category Pre pre BMI  Recommended TWG   Underweight Less than 18.5 28-40    Normal Weight 18.5-24.9  25-35    Overweight 25-29.9  15-25    Obese   30 and greater  11-20   -- Discussed criteria for delivery at [facility] r/t excessive pre-preg weight or excessive weight gain:   Pre-pregnancy BMI over 40 or excess pregnancy weight gain defined as:   Pre-preg BMI < 18.5; Excess weight gain = > 60 pound   Pre-preg BMI 18.5-24.9;  Excess weight gain = > 53 pounds   Pre-preg BMI 25-29.9;  Excess weight gain = > 38 pounds   Pre-preg BMI > 30;  Excess weight gain = > 30 pounds    3. Oriented to practice and midwifery service  -- Pregnancy education and couseling; handouts and booklet provided  -- She has not already attended [facility] and has not toured facility  -- Reviewed anticipated prenatal course of care and how to contact us  -- Reviewed [facility] guidelines and admission, exclusion, and transfer criteria  -- Precautions/warning signs reviewed  -- Common complaints of pregnancy  -- Routine prenatal labs including HIV  -- Ultrasounds  -- Childbirth education/hospital/[facility] facilities  -- Nutrition, prepregnant BMI, and recommended weight  gain  -- Toxoplasmosis precautions (Cats/Raw Meat)  -- Sexual activity and exercise  -- Environmental/Work hazards  -- Travel  -- Tobacco (Ask, Advise, Assess, Assist, and Arrange), as well as alcohol and drug use  -- Use of any medications (Including supplements, Vitamins, Herbs, or OTC Drugs)  -- Domestic violence screen negative    4. Reviewed genetic testing options. (discussed with MD team as well)    6.  w/ co-morbidities and obesity: not ABC candidate discussed benefits of midwifery care with patient and risk she may end up with MD care and /or need for co-management of care and she may decide to just stay with MD team. She is not a candidate for tub labor or birth due to BMI.      7. Reviewed warning signs, precautions, and how/when to contact us.       Updated Medication List:  Current Outpatient Medications   Medication Sig Dispense Refill    multivit with calcium,iron,min (WOMEN'S DAILY MULTIVITAMIN ORAL) Women's Daily Multivitamin      ranitidine (ZANTAC) 150 MG tablet        No current facility-administered medications for this visit.          Tita Baptiste CNM, MSN  2019  3:27 PM      2019 3:27 PM

## 2019-08-05 ENCOUNTER — PATIENT MESSAGE (OUTPATIENT)
Dept: OBSTETRICS AND GYNECOLOGY | Facility: CLINIC | Age: 29
End: 2019-08-05

## 2019-08-09 RX ORDER — BUTALBITAL, ACETAMINOPHEN AND CAFFEINE 50; 325; 40 MG/1; MG/1; MG/1
1 TABLET ORAL EVERY 4 HOURS PRN
Qty: 30 TABLET | Refills: 1 | Status: SHIPPED | OUTPATIENT
Start: 2019-08-09 | End: 2019-09-08

## 2019-08-12 ENCOUNTER — ROUTINE PRENATAL (OUTPATIENT)
Dept: OBSTETRICS AND GYNECOLOGY | Facility: CLINIC | Age: 29
End: 2019-08-12
Attending: OBSTETRICS & GYNECOLOGY
Payer: COMMERCIAL

## 2019-08-12 ENCOUNTER — LAB VISIT (OUTPATIENT)
Dept: LAB | Facility: OTHER | Age: 29
End: 2019-08-12
Attending: OBSTETRICS & GYNECOLOGY
Payer: COMMERCIAL

## 2019-08-12 ENCOUNTER — PATIENT MESSAGE (OUTPATIENT)
Dept: ADMINISTRATIVE | Facility: OTHER | Age: 29
End: 2019-08-12

## 2019-08-12 VITALS
WEIGHT: 248.88 LBS | DIASTOLIC BLOOD PRESSURE: 68 MMHG | BODY MASS INDEX: 44.09 KG/M2 | SYSTOLIC BLOOD PRESSURE: 114 MMHG

## 2019-08-12 DIAGNOSIS — Z34.90 PREGNANCY WITH ONE FETUS, ANTEPARTUM: ICD-10-CM

## 2019-08-12 DIAGNOSIS — Z34.92 PREGNANCY WITH ONE FETUS IN SECOND TRIMESTER: ICD-10-CM

## 2019-08-12 DIAGNOSIS — Z67.91 RH NEGATIVE STATE IN ANTEPARTUM PERIOD: ICD-10-CM

## 2019-08-12 DIAGNOSIS — I10 CHRONIC HYPERTENSION: ICD-10-CM

## 2019-08-12 DIAGNOSIS — O26.899 RH NEGATIVE STATE IN ANTEPARTUM PERIOD: ICD-10-CM

## 2019-08-12 DIAGNOSIS — I10 HYPERTENSION, UNSPECIFIED TYPE: ICD-10-CM

## 2019-08-12 DIAGNOSIS — Z34.92 PREGNANCY WITH ONE FETUS IN SECOND TRIMESTER: Primary | ICD-10-CM

## 2019-08-12 PROCEDURE — 99999 PR PBB SHADOW E&M-EST. PATIENT-LVL III: CPT | Mod: PBBFAC,,, | Performed by: OBSTETRICS & GYNECOLOGY

## 2019-08-12 PROCEDURE — 99999 PR PBB SHADOW E&M-EST. PATIENT-LVL III: ICD-10-PCS | Mod: PBBFAC,,, | Performed by: OBSTETRICS & GYNECOLOGY

## 2019-08-12 PROCEDURE — 0502F SUBSEQUENT PRENATAL CARE: CPT | Mod: CPTII,S$GLB,, | Performed by: OBSTETRICS & GYNECOLOGY

## 2019-08-12 PROCEDURE — 36415 COLL VENOUS BLD VENIPUNCTURE: CPT

## 2019-08-12 PROCEDURE — 0502F PR SUBSEQUENT PRENATAL CARE: ICD-10-PCS | Mod: CPTII,S$GLB,, | Performed by: OBSTETRICS & GYNECOLOGY

## 2019-08-12 RX ORDER — BUTALBITAL, ACETAMINOPHEN AND CAFFEINE 50; 325; 40 MG/1; MG/1; MG/1
1 TABLET ORAL EVERY 4 HOURS PRN
Qty: 30 TABLET | Refills: 2 | Status: SHIPPED | OUTPATIENT
Start: 2019-08-12 | End: 2019-09-12

## 2019-08-12 NOTE — PROGRESS NOTES
Recent migraine, fioricet Rx given.  Reports headaches are less frequent than usual, discussed mag oxide for ppx as needed.  Will likely stay with this group rather than transfer to CNM practice.  Starting ASA.  Will get serum integrated 2 today.  F/U in 4 weeks or PRN

## 2019-08-15 LAB
# FETUSES US: NORMAL
AFP MOM SERPL: 1
AFP SERPL-MCNC: 27.4 NG/ML
AGE AT DELIVERY: 28
B-HCG MOM SERPL: 1.77
B-HCG SERPL-ACNC: 39.5 IU/ML
COLLECT DATE BLD: NORMAL
COLLECT DATE: NORMAL
FET TS 21 RISK FROM MAT AGE: NORMAL
GA (DAYS): 1 D
GA (WEEKS): 12 WK
GA METHOD: NORMAL
GEST. AGE (DAYS) 2ND SAMPLE (SI2): 5
GEST. AGE (WKS) 2ND SAMPLE (SI2): 16
IDDM PATIENT QL: NORMAL
INHIBIN A MOM SERPL: 1.17
INHIBIN A SERPL-MCNC: 141.4 PG/ML
INTEGRATED SCN PATIENT-IMP NAR: NORMAL
INTEGRATED SCN PATIENT-IMP: NEGATIVE
PAPP-A MOM SERPL: 1.35
PAPP-A SERPL-MCNC: 486.5 NG/ML
SMOKING STATUS FTND: NO
TS 18 RISK FETUS: NORMAL
TS 21 RISK FETUS: NORMAL
U ESTRIOL MOM SERPL: 0.93
U ESTRIOL SERPL-MCNC: 0.86 NG/ML

## 2019-08-19 NOTE — PROGRESS NOTES
Doing well, discussed prior delivery and desire for TOLAC and possible tub labor, discussed limitations of our practice for tub birth, will consider CNM practive.  Needs HBA1c today.  MFM scan ordered for NT/ sequentia, all discussed.  F/U in 4 weeks.

## 2019-08-26 NOTE — PROGRESS NOTES
Continuing to have some headaches, will monitor.  MFM for NT.  Discussed serum integrated as NT could not be measured.   Will consider change to ABC.  F/U in 4 weeks either location

## 2019-08-29 ENCOUNTER — PROCEDURE VISIT (OUTPATIENT)
Dept: MATERNAL FETAL MEDICINE | Facility: CLINIC | Age: 29
End: 2019-08-29
Payer: COMMERCIAL

## 2019-08-29 DIAGNOSIS — O99.212 OBESITY AFFECTING PREGNANCY IN SECOND TRIMESTER: ICD-10-CM

## 2019-08-29 DIAGNOSIS — Z36.89 ENCOUNTER FOR ULTRASOUND TO CHECK FETAL GROWTH: Primary | ICD-10-CM

## 2019-08-29 DIAGNOSIS — Z34.90 PREGNANCY WITH ONE FETUS, ANTEPARTUM: ICD-10-CM

## 2019-08-29 DIAGNOSIS — Z36.89 ENCOUNTER FOR FETAL ANATOMIC SURVEY: ICD-10-CM

## 2019-08-29 PROCEDURE — 76811 OB US DETAILED SNGL FETUS: CPT | Mod: S$GLB,,, | Performed by: OBSTETRICS & GYNECOLOGY

## 2019-08-29 PROCEDURE — 99499 NO LOS: ICD-10-PCS | Mod: S$GLB,,, | Performed by: OBSTETRICS & GYNECOLOGY

## 2019-08-29 PROCEDURE — 76811 PR US, OB FETAL EVAL & EXAM, TRANSABDOM,FIRST GESTATION: ICD-10-PCS | Mod: S$GLB,,, | Performed by: OBSTETRICS & GYNECOLOGY

## 2019-08-29 PROCEDURE — 99499 UNLISTED E&M SERVICE: CPT | Mod: S$GLB,,, | Performed by: OBSTETRICS & GYNECOLOGY

## 2019-09-04 ENCOUNTER — PATIENT MESSAGE (OUTPATIENT)
Dept: ADMINISTRATIVE | Facility: OTHER | Age: 29
End: 2019-09-04

## 2019-09-10 ENCOUNTER — PATIENT MESSAGE (OUTPATIENT)
Dept: OBSTETRICS AND GYNECOLOGY | Facility: CLINIC | Age: 29
End: 2019-09-10

## 2019-09-12 ENCOUNTER — ROUTINE PRENATAL (OUTPATIENT)
Dept: OBSTETRICS AND GYNECOLOGY | Facility: CLINIC | Age: 29
End: 2019-09-12
Attending: OBSTETRICS & GYNECOLOGY
Payer: COMMERCIAL

## 2019-09-12 VITALS
BODY MASS INDEX: 44.44 KG/M2 | WEIGHT: 250.88 LBS | DIASTOLIC BLOOD PRESSURE: 78 MMHG | SYSTOLIC BLOOD PRESSURE: 122 MMHG

## 2019-09-12 DIAGNOSIS — E55.9 VITAMIN D DEFICIENCY: ICD-10-CM

## 2019-09-12 DIAGNOSIS — O09.892 SUPERVISION OF OTHER HIGH RISK PREGNANCIES, SECOND TRIMESTER: Primary | ICD-10-CM

## 2019-09-12 PROCEDURE — 0502F PR SUBSEQUENT PRENATAL CARE: ICD-10-PCS | Mod: CPTII,S$GLB,, | Performed by: OBSTETRICS & GYNECOLOGY

## 2019-09-12 PROCEDURE — 99999 PR PBB SHADOW E&M-EST. PATIENT-LVL II: CPT | Mod: PBBFAC,,, | Performed by: OBSTETRICS & GYNECOLOGY

## 2019-09-12 PROCEDURE — 0502F SUBSEQUENT PRENATAL CARE: CPT | Mod: CPTII,S$GLB,, | Performed by: OBSTETRICS & GYNECOLOGY

## 2019-09-12 PROCEDURE — 99999 PR PBB SHADOW E&M-EST. PATIENT-LVL II: ICD-10-PCS | Mod: PBBFAC,,, | Performed by: OBSTETRICS & GYNECOLOGY

## 2019-09-12 NOTE — PROGRESS NOTES
Good FM. Denies VB, LOF, CTX. Labor, ROM and bleeding precautions. Vit D ordered given def in the past. F/U 4 weeks with glucola.

## 2019-10-01 ENCOUNTER — HOSPITAL ENCOUNTER (OUTPATIENT)
Dept: RADIOLOGY | Facility: OTHER | Age: 29
Discharge: HOME | End: 2019-10-01
Attending: OBSTETRICS & GYNECOLOGY
Payer: COMMERCIAL

## 2019-10-01 ENCOUNTER — OFFICE VISIT (OUTPATIENT)
Dept: OBSTETRICS AND GYNECOLOGY | Facility: CLINIC | Age: 29
End: 2019-10-01
Attending: OBSTETRICS & GYNECOLOGY
Payer: COMMERCIAL

## 2019-10-01 ENCOUNTER — OFFICE VISIT (OUTPATIENT)
Dept: UROLOGY | Facility: CLINIC | Age: 29
End: 2019-10-01
Payer: COMMERCIAL

## 2019-10-01 VITALS
SYSTOLIC BLOOD PRESSURE: 117 MMHG | WEIGHT: 251 LBS | HEART RATE: 103 BPM | HEIGHT: 64 IN | DIASTOLIC BLOOD PRESSURE: 68 MMHG | BODY MASS INDEX: 42.85 KG/M2

## 2019-10-01 VITALS
DIASTOLIC BLOOD PRESSURE: 88 MMHG | SYSTOLIC BLOOD PRESSURE: 140 MMHG | BODY MASS INDEX: 42.95 KG/M2 | WEIGHT: 251.56 LBS | HEIGHT: 64 IN

## 2019-10-01 DIAGNOSIS — O26.899 RH NEGATIVE STATE IN ANTEPARTUM PERIOD: ICD-10-CM

## 2019-10-01 DIAGNOSIS — R31.9 HEMATURIA, UNSPECIFIED TYPE: ICD-10-CM

## 2019-10-01 DIAGNOSIS — O09.892 SUPERVISION OF OTHER HIGH RISK PREGNANCIES, SECOND TRIMESTER: ICD-10-CM

## 2019-10-01 DIAGNOSIS — Z87.442 HISTORY OF RENAL STONE: ICD-10-CM

## 2019-10-01 DIAGNOSIS — Q62.5 DUPLICATED URETER, RIGHT: ICD-10-CM

## 2019-10-01 DIAGNOSIS — Z67.91 RH NEGATIVE STATE IN ANTEPARTUM PERIOD: ICD-10-CM

## 2019-10-01 DIAGNOSIS — R10.9 FLANK PAIN: Primary | ICD-10-CM

## 2019-10-01 DIAGNOSIS — N20.0 NEPHROLITHIASIS: ICD-10-CM

## 2019-10-01 DIAGNOSIS — I10 CHRONIC HYPERTENSION: Primary | ICD-10-CM

## 2019-10-01 DIAGNOSIS — R10.9 ACUTE LEFT FLANK PAIN: ICD-10-CM

## 2019-10-01 DIAGNOSIS — Z34.90 PREGNANCY WITH ONE FETUS, ANTEPARTUM: ICD-10-CM

## 2019-10-01 LAB
BILIRUB SERPL-MCNC: ABNORMAL MG/DL
BLOOD URINE, POC: 50
COLOR, POC UA: YELLOW
GLUCOSE UR QL STRIP: ABNORMAL
KETONES UR QL STRIP: ABNORMAL
LEUKOCYTE ESTERASE URINE, POC: ABNORMAL
NITRITE, POC UA: ABNORMAL
PH, POC UA: 6
PROTEIN, POC: ABNORMAL
SPECIFIC GRAVITY, POC UA: 1.01
UROBILINOGEN, POC UA: ABNORMAL

## 2019-10-01 PROCEDURE — 99213 OFFICE O/P EST LOW 20 MIN: CPT | Mod: S$GLB,,, | Performed by: OBSTETRICS & GYNECOLOGY

## 2019-10-01 PROCEDURE — 76770 US EXAM ABDO BACK WALL COMP: CPT | Mod: TC

## 2019-10-01 PROCEDURE — 81002 POCT URINE DIPSTICK WITHOUT MICROSCOPE: ICD-10-PCS | Mod: S$GLB,,, | Performed by: NURSE PRACTITIONER

## 2019-10-01 PROCEDURE — 3008F BODY MASS INDEX DOCD: CPT | Mod: CPTII,S$GLB,, | Performed by: OBSTETRICS & GYNECOLOGY

## 2019-10-01 PROCEDURE — 87086 URINE CULTURE/COLONY COUNT: CPT | Mod: 59

## 2019-10-01 PROCEDURE — 3008F BODY MASS INDEX DOCD: CPT | Mod: CPTII,S$GLB,, | Performed by: NURSE PRACTITIONER

## 2019-10-01 PROCEDURE — 76770 US RETROPERITONEAL COMPLETE: ICD-10-PCS | Mod: 26,,, | Performed by: RADIOLOGY

## 2019-10-01 PROCEDURE — 99214 OFFICE O/P EST MOD 30 MIN: CPT | Mod: 25,S$GLB,, | Performed by: NURSE PRACTITIONER

## 2019-10-01 PROCEDURE — 3008F PR BODY MASS INDEX (BMI) DOCUMENTED: ICD-10-PCS | Mod: CPTII,S$GLB,, | Performed by: OBSTETRICS & GYNECOLOGY

## 2019-10-01 PROCEDURE — 99213 PR OFFICE/OUTPT VISIT, EST, LEVL III, 20-29 MIN: ICD-10-PCS | Mod: S$GLB,,, | Performed by: OBSTETRICS & GYNECOLOGY

## 2019-10-01 PROCEDURE — 76770 US EXAM ABDO BACK WALL COMP: CPT | Mod: 26,,, | Performed by: RADIOLOGY

## 2019-10-01 PROCEDURE — 3008F PR BODY MASS INDEX (BMI) DOCUMENTED: ICD-10-PCS | Mod: CPTII,S$GLB,, | Performed by: NURSE PRACTITIONER

## 2019-10-01 PROCEDURE — 81002 URINALYSIS NONAUTO W/O SCOPE: CPT | Mod: S$GLB,,, | Performed by: NURSE PRACTITIONER

## 2019-10-01 PROCEDURE — 87088 URINE BACTERIA CULTURE: CPT

## 2019-10-01 PROCEDURE — 99214 PR OFFICE/OUTPT VISIT, EST, LEVL IV, 30-39 MIN: ICD-10-PCS | Mod: 25,S$GLB,, | Performed by: NURSE PRACTITIONER

## 2019-10-01 RX ORDER — OXYCODONE AND ACETAMINOPHEN 5; 325 MG/1; MG/1
1 TABLET ORAL EVERY 4 HOURS PRN
Qty: 30 TABLET | Refills: 0 | Status: SHIPPED | OUTPATIENT
Start: 2019-10-01 | End: 2020-03-10

## 2019-10-01 RX ORDER — ONDANSETRON 4 MG/1
4 TABLET, FILM COATED ORAL 2 TIMES DAILY
Qty: 30 TABLET | Refills: 0 | Status: SHIPPED | OUTPATIENT
Start: 2019-10-01 | End: 2020-03-10

## 2019-10-01 NOTE — PROGRESS NOTES
Patient was seen by ED at [facility] this am, was in and out in 30 minutes.  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  Has had some improvement in pain with percocet but not marked improvement.  Will get renal ultrasound today as well as labs to eval WBC count and Cr.   Strict precautions for pyelo given.

## 2019-10-01 NOTE — PROGRESS NOTES
"Subjective:      Krupa Melton is a 29 y.o. female who was referred by Dr. Crocker for evaluation of her flank pain.  She is an established patient of Dr. Calls.    The patient has a history of nephrolithiasis. Ureteroscopy was performed in 2016 for a stone at [facility]. 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     CT scan in 2017 showed a 3 mm left renal stone.     The patient is currently 23 weeks pregnant. She presented to the ED early this morning with left flank pain that radiates to her left inguinal area that began Sunday afternoon. The pain has been intermittent but severe at times. + nausea, denies vomiting. Also reports urinary frequency and urgency. Denies dysuria and hematuria.     CHARLES was competed earlier today showing "a duplicated collecting system on the left with mild hydronephrosis of the upper pole moiety.  A discrete renal calculus is not identified. Findings could be due to gravid state, although a recently passed small calculus may also be considered."    The following portions of the patient's history were reviewed and updated as appropriate: allergies, current medications, past family history, past medical history, past social history, past surgical history and problem list.    Review of Systems  Constitutional: no fever or chills  ENT: no nasal congestion or sore throat  Respiratory: no cough or shortness of breath  Cardiovascular: no chest pain or palpitations  Gastrointestinal: no nausea or vomiting, tolerating diet  Genitourinary: as per HPI  Hematologic/Lymphatic: no easy bruising or lymphadenopathy  Musculoskeletal: no arthralgias or myalgias  Neurological: no seizures or tremors  Behavioral/Psych: no auditory or visual hallucinations     Objective:   Vital Signs:  Vitals:    10/01/19 1510   BP: 117/68   Pulse: 103       Physical Exam   General: alert and oriented, no acute distress  Head: " "normocephalic, atraumatic  Neck: supple, no lymphadenopathy, normal ROM, no masses  Respiratory: Symmetric expansion, non-labored breathing  Cardiovascular: regular rate and rhythm, nomal pulses, no peripheral edema  Abdomen: soft, non tender, non distended, no palpable masses, no hernias, no hepatomegaly or splenomegaly  Pelvic: not examined   Lymphatic: no inguinal nodes  Skin: normal coloration and turgor, no rashes, no suspicious skin lesions noted  Neuro: alert and oriented x3, no gross deficits  Psych: normal judgment and insight, normal mood/affect and non-anxious  +CVA tenderness, left    Lab Review   Urinalysis demonstrates positive for leukocytes (trace), red blood cells (50 katheryn/mL)  Lab Results   Component Value Date    WBC 10.25 05/13/2019    HGB 13.9 05/13/2019    HCT 39.5 05/13/2019    MCV 91 05/13/2019     05/13/2019     Lab Results   Component Value Date    CREATININE 0.8 05/13/2019    BUN 13 05/13/2019       Imaging  (all images personally reviewed; agree with report below)  CHARLES (10/1/19) -"Duplicated collecting system on the left with mild hydronephrosis of the upper pole moiety.  A discrete renal calculus is not identified.  Findings could be due to gravid state, although a recently passed small calculus may also be considered."    Assessment:   Flank pain  Nephrolithiasis    Plan:   Krupa was seen today for back pain and follow-up.    Diagnoses and all orders for this visit:    Flank pain  -     POCT URINE DIPSTICK WITHOUT MICROSCOPE  -     Urine culture  -     ondansetron (ZOFRAN) 4 MG tablet; Take 1 tablet (4 mg total) by mouth 2 (two) times daily.    Nephrolithiasis    Other orders  -     oxyCODONE-acetaminophen (PERCOCET) 5-325 mg per tablet; Take 1 tablet by mouth every 4 (four) hours as needed.    Plan: Discussed with Dr. Em   --Discussed US findings with the patient   --She is likely passing the 3 mm left renal stone noted on CT from 2017. Will hold on CT stone study at this " time to reduce radiation risk for baby and proceed with trial of passage  --Zofran and percocet PRN   --Push fluids   --Strain all urine   --Discussed indications to present to ED, including fever, intractable pain, and intractable nausea/vomitting  --Follow up in 1 week with CT stone study if stone has not passed

## 2019-10-02 ENCOUNTER — PATIENT MESSAGE (OUTPATIENT)
Dept: UROLOGY | Facility: CLINIC | Age: 29
End: 2019-10-02

## 2019-10-02 ENCOUNTER — ANESTHESIA (OUTPATIENT)
Dept: SURGERY | Facility: OTHER | Age: 29
DRG: 818 | End: 2019-10-02
Payer: COMMERCIAL

## 2019-10-02 ENCOUNTER — ANESTHESIA (OUTPATIENT)
Dept: EMERGENCY MEDICINE | Facility: OTHER | Age: 29
End: 2019-10-02

## 2019-10-02 ENCOUNTER — ANESTHESIA EVENT (OUTPATIENT)
Dept: SURGERY | Facility: OTHER | Age: 29
DRG: 818 | End: 2019-10-02
Payer: COMMERCIAL

## 2019-10-02 ENCOUNTER — HOSPITAL ENCOUNTER (INPATIENT)
Facility: OTHER | Age: 29
LOS: 3 days | Discharge: HOME | DRG: 818 | End: 2019-10-05
Attending: OBSTETRICS & GYNECOLOGY | Admitting: PEDIATRICS
Payer: COMMERCIAL

## 2019-10-02 ENCOUNTER — ANESTHESIA EVENT (OUTPATIENT)
Dept: EMERGENCY MEDICINE | Facility: OTHER | Age: 29
End: 2019-10-02

## 2019-10-02 DIAGNOSIS — Z67.91 RH NEGATIVE STATE IN ANTEPARTUM PERIOD: ICD-10-CM

## 2019-10-02 DIAGNOSIS — Z3A.24 24 WEEKS GESTATION OF PREGNANCY: ICD-10-CM

## 2019-10-02 DIAGNOSIS — N20.1 LEFT URETERAL CALCULUS: ICD-10-CM

## 2019-10-02 DIAGNOSIS — Q62.5 DUPLICATED LEFT RENAL COLLECTING SYSTEM: ICD-10-CM

## 2019-10-02 DIAGNOSIS — N20.0 NEPHROLITHIASIS: ICD-10-CM

## 2019-10-02 DIAGNOSIS — O26.899 RH NEGATIVE STATE IN ANTEPARTUM PERIOD: ICD-10-CM

## 2019-10-02 DIAGNOSIS — O99.212 OBESITY AFFECTING PREGNANCY IN SECOND TRIMESTER: ICD-10-CM

## 2019-10-02 DIAGNOSIS — I10 CHRONIC HYPERTENSION: ICD-10-CM

## 2019-10-02 DIAGNOSIS — O23.02 PYELONEPHRITIS AFFECTING PREGNANCY IN SECOND TRIMESTER: Primary | ICD-10-CM

## 2019-10-02 DIAGNOSIS — Z87.442 HISTORY OF NEPHROLITHIASIS: ICD-10-CM

## 2019-10-02 LAB
BASOPHILS # BLD AUTO: 0.04 K/UL (ref 0–0.2)
BASOPHILS NFR BLD: 0.3 % (ref 0–1.9)
BILIRUB UR QL STRIP: NEGATIVE
CLARITY UR: CLEAR
COLOR UR: YELLOW
DIFFERENTIAL METHOD: ABNORMAL
EOSINOPHIL # BLD AUTO: 0.1 K/UL (ref 0–0.5)
EOSINOPHIL NFR BLD: 0.4 % (ref 0–8)
ERYTHROCYTE [DISTWIDTH] IN BLOOD BY AUTOMATED COUNT: 12.9 % (ref 11.5–14.5)
GLUCOSE UR QL STRIP: NEGATIVE
HCT VFR BLD AUTO: 35.4 % (ref 37–48.5)
HGB BLD-MCNC: 12.3 G/DL (ref 12–16)
HGB UR QL STRIP: ABNORMAL
IMM GRANULOCYTES # BLD AUTO: 0.07 K/UL (ref 0–0.04)
IMM GRANULOCYTES NFR BLD AUTO: 0.5 % (ref 0–0.5)
INFLUENZA A, MOLECULAR: NEGATIVE
INFLUENZA B, MOLECULAR: NEGATIVE
KETONES UR QL STRIP: NEGATIVE
LEUKOCYTE ESTERASE UR QL STRIP: NEGATIVE
LYMPHOCYTES # BLD AUTO: 1.6 K/UL (ref 1–4.8)
LYMPHOCYTES NFR BLD: 11.3 % (ref 18–48)
MCH RBC QN AUTO: 31.6 PG (ref 27–31)
MCHC RBC AUTO-ENTMCNC: 34.7 G/DL (ref 32–36)
MCV RBC AUTO: 91 FL (ref 82–98)
MONOCYTES # BLD AUTO: 0.7 K/UL (ref 0.3–1)
MONOCYTES NFR BLD: 5.2 % (ref 4–15)
NEUTROPHILS # BLD AUTO: 11.5 K/UL (ref 1.8–7.7)
NEUTROPHILS NFR BLD: 82.3 % (ref 38–73)
NITRITE UR QL STRIP: NEGATIVE
NRBC BLD-RTO: 0 /100 WBC
PH UR STRIP: 8 [PH] (ref 5–8)
PLATELET # BLD AUTO: 275 K/UL (ref 150–350)
PMV BLD AUTO: 10.5 FL (ref 9.2–12.9)
PROT UR QL STRIP: NEGATIVE
RBC # BLD AUTO: 3.89 M/UL (ref 4–5.4)
SP GR UR STRIP: 1.01 (ref 1–1.03)
SPECIMEN SOURCE: NORMAL
URN SPEC COLLECT METH UR: ABNORMAL
UROBILINOGEN UR STRIP-ACNC: NEGATIVE EU/DL
WBC # BLD AUTO: 14 K/UL (ref 3.9–12.7)

## 2019-10-02 PROCEDURE — 99221 PR INITIAL HOSPITAL CARE,LEVL I: ICD-10-PCS | Mod: 25,,, | Performed by: PEDIATRICS

## 2019-10-02 PROCEDURE — 52332 PR CYSTOSCOPY,INSERT URETERAL STENT: ICD-10-PCS | Mod: LT,,, | Performed by: UROLOGY

## 2019-10-02 PROCEDURE — C2617 STENT, NON-COR, TEM W/O DEL: HCPCS | Performed by: UROLOGY

## 2019-10-02 PROCEDURE — 99223 1ST HOSP IP/OBS HIGH 75: CPT | Mod: ,,, | Performed by: UROLOGY

## 2019-10-02 PROCEDURE — 59025 PR FETAL 2N-STRESS TEST: ICD-10-PCS | Mod: 26,,, | Performed by: OBSTETRICS & GYNECOLOGY

## 2019-10-02 PROCEDURE — 99221 1ST HOSP IP/OBS SF/LOW 40: CPT | Mod: 25,,, | Performed by: PEDIATRICS

## 2019-10-02 PROCEDURE — 25000003 PHARM REV CODE 250: Performed by: STUDENT IN AN ORGANIZED HEALTH CARE EDUCATION/TRAINING PROGRAM

## 2019-10-02 PROCEDURE — 25000003 PHARM REV CODE 250: Performed by: NURSE ANESTHETIST, CERTIFIED REGISTERED

## 2019-10-02 PROCEDURE — 85025 COMPLETE CBC W/AUTO DIFF WBC: CPT

## 2019-10-02 PROCEDURE — 96374 THER/PROPH/DIAG INJ IV PUSH: CPT

## 2019-10-02 PROCEDURE — C1769 GUIDE WIRE: HCPCS | Performed by: UROLOGY

## 2019-10-02 PROCEDURE — 36415 COLL VENOUS BLD VENIPUNCTURE: CPT

## 2019-10-02 PROCEDURE — 37000009 HC ANESTHESIA EA ADD 15 MINS: Performed by: UROLOGY

## 2019-10-02 PROCEDURE — 63600175 PHARM REV CODE 636 W HCPCS: Performed by: STUDENT IN AN ORGANIZED HEALTH CARE EDUCATION/TRAINING PROGRAM

## 2019-10-02 PROCEDURE — 76998 PR  ULTRASONIC GUIDANCE, INTRAOPERATIVE: ICD-10-PCS | Mod: 26,,, | Performed by: UROLOGY

## 2019-10-02 PROCEDURE — 96361 HYDRATE IV INFUSION ADD-ON: CPT

## 2019-10-02 PROCEDURE — 76998 US GUIDE INTRAOP: CPT | Mod: 26,,, | Performed by: UROLOGY

## 2019-10-02 PROCEDURE — 36000706: Performed by: UROLOGY

## 2019-10-02 PROCEDURE — 81003 URINALYSIS AUTO W/O SCOPE: CPT

## 2019-10-02 PROCEDURE — 11000001 HC ACUTE MED/SURG PRIVATE ROOM

## 2019-10-02 PROCEDURE — 37000008 HC ANESTHESIA 1ST 15 MINUTES: Performed by: UROLOGY

## 2019-10-02 PROCEDURE — 87502 INFLUENZA DNA AMP PROBE: CPT

## 2019-10-02 PROCEDURE — 52332 CYSTOSCOPY AND TREATMENT: CPT | Mod: LT,,, | Performed by: UROLOGY

## 2019-10-02 PROCEDURE — 63600175 PHARM REV CODE 636 W HCPCS: Performed by: NURSE ANESTHETIST, CERTIFIED REGISTERED

## 2019-10-02 PROCEDURE — 25000003 PHARM REV CODE 250: Performed by: OBSTETRICS & GYNECOLOGY

## 2019-10-02 PROCEDURE — C1758 CATHETER, URETERAL: HCPCS | Performed by: UROLOGY

## 2019-10-02 PROCEDURE — 63600175 PHARM REV CODE 636 W HCPCS: Performed by: OBSTETRICS & GYNECOLOGY

## 2019-10-02 PROCEDURE — 71000033 HC RECOVERY, INTIAL HOUR: Performed by: UROLOGY

## 2019-10-02 PROCEDURE — 99223 PR INITIAL HOSPITAL CARE,LEVL III: ICD-10-PCS | Mod: ,,, | Performed by: UROLOGY

## 2019-10-02 PROCEDURE — 36000707: Performed by: UROLOGY

## 2019-10-02 PROCEDURE — 59025 FETAL NON-STRESS TEST: CPT | Mod: 26,,, | Performed by: OBSTETRICS & GYNECOLOGY

## 2019-10-02 PROCEDURE — 99284 PR EMERGENCY DEPT VISIT,LEVEL IV: ICD-10-PCS | Mod: 25,,, | Performed by: OBSTETRICS & GYNECOLOGY

## 2019-10-02 PROCEDURE — 99284 EMERGENCY DEPT VISIT MOD MDM: CPT | Mod: 25,,, | Performed by: OBSTETRICS & GYNECOLOGY

## 2019-10-02 PROCEDURE — 99285 EMERGENCY DEPT VISIT HI MDM: CPT | Mod: 25

## 2019-10-02 DEVICE — STENT URETERAL UNIV 6FR 24CM: Type: IMPLANTABLE DEVICE | Site: URETER | Status: FUNCTIONAL

## 2019-10-02 RX ORDER — FENTANYL CITRATE 50 UG/ML
INJECTION, SOLUTION INTRAMUSCULAR; INTRAVENOUS
Status: DISCONTINUED | OUTPATIENT
Start: 2019-10-02 | End: 2019-10-02

## 2019-10-02 RX ORDER — DIPHENHYDRAMINE HYDROCHLORIDE 50 MG/ML
25 INJECTION INTRAMUSCULAR; INTRAVENOUS EVERY 4 HOURS PRN
Status: DISCONTINUED | OUTPATIENT
Start: 2019-10-02 | End: 2019-10-05 | Stop reason: HOSPADM

## 2019-10-02 RX ORDER — SUCCINYLCHOLINE CHLORIDE 20 MG/ML
INJECTION INTRAMUSCULAR; INTRAVENOUS
Status: DISCONTINUED | OUTPATIENT
Start: 2019-10-02 | End: 2019-10-02

## 2019-10-02 RX ORDER — MEPERIDINE HYDROCHLORIDE 25 MG/ML
12.5 INJECTION INTRAMUSCULAR; INTRAVENOUS; SUBCUTANEOUS ONCE AS NEEDED
Status: DISCONTINUED | OUTPATIENT
Start: 2019-10-02 | End: 2019-10-02 | Stop reason: HOSPADM

## 2019-10-02 RX ORDER — LIDOCAINE HCL/PF 100 MG/5ML
SYRINGE (ML) INTRAVENOUS
Status: DISCONTINUED | OUTPATIENT
Start: 2019-10-02 | End: 2019-10-02

## 2019-10-02 RX ORDER — SODIUM CHLORIDE, SODIUM LACTATE, POTASSIUM CHLORIDE, CALCIUM CHLORIDE 600; 310; 30; 20 MG/100ML; MG/100ML; MG/100ML; MG/100ML
INJECTION, SOLUTION INTRAVENOUS CONTINUOUS
Status: DISCONTINUED | OUTPATIENT
Start: 2019-10-02 | End: 2019-10-05 | Stop reason: HOSPADM

## 2019-10-02 RX ORDER — OXYCODONE HYDROCHLORIDE 5 MG/1
5 TABLET ORAL
Status: DISCONTINUED | OUTPATIENT
Start: 2019-10-02 | End: 2019-10-02 | Stop reason: HOSPADM

## 2019-10-02 RX ORDER — OXYCODONE HYDROCHLORIDE 5 MG/1
5 TABLET ORAL EVERY 6 HOURS PRN
Status: DISCONTINUED | OUTPATIENT
Start: 2019-10-02 | End: 2019-10-05 | Stop reason: HOSPADM

## 2019-10-02 RX ORDER — PRENATAL WITH FERROUS FUM AND FOLIC ACID 3080; 920; 120; 400; 22; 1.84; 3; 20; 10; 1; 12; 200; 27; 25; 2 [IU]/1; [IU]/1; MG/1; [IU]/1; MG/1; MG/1; MG/1; MG/1; MG/1; MG/1; UG/1; MG/1; MG/1; MG/1; MG/1
1 TABLET ORAL DAILY
Status: DISCONTINUED | OUTPATIENT
Start: 2019-10-02 | End: 2019-10-05 | Stop reason: HOSPADM

## 2019-10-02 RX ORDER — ACETAMINOPHEN 500 MG
1000 TABLET ORAL ONCE
Status: COMPLETED | OUTPATIENT
Start: 2019-10-02 | End: 2019-10-02

## 2019-10-02 RX ORDER — ACETAMINOPHEN 325 MG/1
650 TABLET ORAL EVERY 4 HOURS PRN
Status: DISCONTINUED | OUTPATIENT
Start: 2019-10-02 | End: 2019-10-02

## 2019-10-02 RX ORDER — HYDROMORPHONE HYDROCHLORIDE 2 MG/ML
0.4 INJECTION, SOLUTION INTRAMUSCULAR; INTRAVENOUS; SUBCUTANEOUS EVERY 5 MIN PRN
Status: DISCONTINUED | OUTPATIENT
Start: 2019-10-02 | End: 2019-10-02 | Stop reason: HOSPADM

## 2019-10-02 RX ORDER — AMOXICILLIN 250 MG
1 CAPSULE ORAL NIGHTLY PRN
Status: DISCONTINUED | OUTPATIENT
Start: 2019-10-02 | End: 2019-10-05 | Stop reason: HOSPADM

## 2019-10-02 RX ORDER — ONDANSETRON 8 MG/1
8 TABLET, ORALLY DISINTEGRATING ORAL EVERY 8 HOURS PRN
Status: DISCONTINUED | OUTPATIENT
Start: 2019-10-02 | End: 2019-10-05 | Stop reason: HOSPADM

## 2019-10-02 RX ORDER — SIMETHICONE 80 MG
1 TABLET,CHEWABLE ORAL EVERY 6 HOURS PRN
Status: DISCONTINUED | OUTPATIENT
Start: 2019-10-02 | End: 2019-10-05 | Stop reason: HOSPADM

## 2019-10-02 RX ORDER — SODIUM CHLORIDE 0.9 % (FLUSH) 0.9 %
3 SYRINGE (ML) INJECTION
Status: DISCONTINUED | OUTPATIENT
Start: 2019-10-02 | End: 2019-10-05 | Stop reason: HOSPADM

## 2019-10-02 RX ORDER — DIPHENHYDRAMINE HCL 25 MG
25 CAPSULE ORAL EVERY 4 HOURS PRN
Status: DISCONTINUED | OUTPATIENT
Start: 2019-10-02 | End: 2019-10-05 | Stop reason: HOSPADM

## 2019-10-02 RX ORDER — ROCURONIUM BROMIDE 10 MG/ML
INJECTION, SOLUTION INTRAVENOUS
Status: DISCONTINUED | OUTPATIENT
Start: 2019-10-02 | End: 2019-10-02

## 2019-10-02 RX ORDER — ACETAMINOPHEN 325 MG/1
650 TABLET ORAL EVERY 4 HOURS
Status: DISCONTINUED | OUTPATIENT
Start: 2019-10-02 | End: 2019-10-05

## 2019-10-02 RX ORDER — PROPOFOL 10 MG/ML
VIAL (ML) INTRAVENOUS
Status: DISCONTINUED | OUTPATIENT
Start: 2019-10-02 | End: 2019-10-02

## 2019-10-02 RX ORDER — ONDANSETRON 2 MG/ML
4 INJECTION INTRAMUSCULAR; INTRAVENOUS DAILY PRN
Status: DISCONTINUED | OUTPATIENT
Start: 2019-10-02 | End: 2019-10-02 | Stop reason: HOSPADM

## 2019-10-02 RX ORDER — OXYCODONE HYDROCHLORIDE 5 MG/1
10 TABLET ORAL EVERY 6 HOURS PRN
Status: DISCONTINUED | OUTPATIENT
Start: 2019-10-02 | End: 2019-10-05 | Stop reason: HOSPADM

## 2019-10-02 RX ORDER — DIPHENHYDRAMINE HYDROCHLORIDE 50 MG/ML
25 INJECTION INTRAMUSCULAR; INTRAVENOUS EVERY 6 HOURS PRN
Status: DISCONTINUED | OUTPATIENT
Start: 2019-10-02 | End: 2019-10-02 | Stop reason: HOSPADM

## 2019-10-02 RX ADMIN — ACETAMINOPHEN 1000 MG: 500 TABLET ORAL at 08:10

## 2019-10-02 RX ADMIN — ACETAMINOPHEN 650 MG: 325 TABLET, FILM COATED ORAL at 10:10

## 2019-10-02 RX ADMIN — FENTANYL CITRATE 50 MCG: 50 INJECTION, SOLUTION INTRAMUSCULAR; INTRAVENOUS at 05:10

## 2019-10-02 RX ADMIN — LIDOCAINE HYDROCHLORIDE 50 MG: 20 INJECTION, SOLUTION INTRAVENOUS at 04:10

## 2019-10-02 RX ADMIN — FENTANYL CITRATE 50 MCG: 50 INJECTION, SOLUTION INTRAMUSCULAR; INTRAVENOUS at 04:10

## 2019-10-02 RX ADMIN — ROCURONIUM BROMIDE 10 MG: 10 INJECTION, SOLUTION INTRAVENOUS at 04:10

## 2019-10-02 RX ADMIN — OXYCODONE HYDROCHLORIDE 10 MG: 5 TABLET ORAL at 09:10

## 2019-10-02 RX ADMIN — SODIUM CHLORIDE, SODIUM LACTATE, POTASSIUM CHLORIDE, AND CALCIUM CHLORIDE: .6; .31; .03; .02 INJECTION, SOLUTION INTRAVENOUS at 09:10

## 2019-10-02 RX ADMIN — ONDANSETRON 8 MG: 8 TABLET, ORALLY DISINTEGRATING ORAL at 10:10

## 2019-10-02 RX ADMIN — SUCCINYLCHOLINE CHLORIDE 120 MG: 20 INJECTION, SOLUTION INTRAMUSCULAR; INTRAVENOUS at 04:10

## 2019-10-02 RX ADMIN — CARBOXYMETHYLCELLULOSE SODIUM 2 DROP: 2.5 SOLUTION/ DROPS OPHTHALMIC at 04:10

## 2019-10-02 RX ADMIN — ACETAMINOPHEN 650 MG: 325 TABLET, FILM COATED ORAL at 07:10

## 2019-10-02 RX ADMIN — ACETAMINOPHEN 1000 MG: 500 TABLET ORAL at 02:10

## 2019-10-02 RX ADMIN — CEFTRIAXONE SODIUM 2 G: 2 INJECTION, SOLUTION INTRAVENOUS at 08:10

## 2019-10-02 RX ADMIN — SODIUM CHLORIDE 750 ML: 0.9 INJECTION, SOLUTION INTRAVENOUS at 06:10

## 2019-10-02 RX ADMIN — PROPOFOL 180 MG: 10 INJECTION, EMULSION INTRAVENOUS at 04:10

## 2019-10-02 NOTE — H&P
[facility]  Obstetrics  History & Physical    Patient Name: Krupa Melton  MRN: [id number]  Admission Date: 10/2/2019  Primary Care Provider: Roshni Frazier MD    Subjective:     Principal Problem:Pyelonephritis affecting pregnancy in second trimester    History of Present Illness:     Krupa Melton is a 29 y.o. [id number] at 24w0d presents complaining of fever at home 100.9 orally x1. Patient denies sick contacts. Patient reports flank pain since this weekend and saw primary OB who referred her to urology. 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 Left renal US showed hydronephrosis, read pasted below. Urology noticed 3mm stone from 2017 CT and blood on urine dipstick. Patient counseled to return if had fever at home. On presentation patient afebrile but tachycardic to 110s. Denies dysuria, reports ongoing flank pain. Urine culture from yesterday pending.     In [facility] patient had continued leukocytosis with left shift and negative flu swab. Clinical symptoms improved with rocephin and fluids. Patient reported had history of stents placed in 2016 for stone.     Pregnancy complicated by above problems as well as history LTCS, records reviewed.  AOD at 4cm x 10h .      Retroperitoneal US 10/1:     Duplicated collecting system on the left with mild hydronephrosis of the upper pole moiety.  A discrete renal calculus is not identified.  Findings could be due to gravid state, although a recently passed small calculus may also be considered.        OB History    Para Term  AB Living   2 1 1 0 0 1   SAB TAB Ectopic Multiple Live Births   0 0 0 0 1      # Outcome Date GA Lbr Boni/2nd Weight Sex Delivery Anes PTL Lv   2 Current            1 Term 10/14/14 41w2d   F CS-LTranv EPI  KAVITHA      Complications: Failure to Progress in Second Stage, Failure to progress in labor, Incisional infection, Gestational HTN     Past Medical History:   Diagnosis Date    Anxiety      Depression     GERD (gastroesophageal reflux disease)     Hypertension     gestational     Hypoglycemia     Kidney calculi     2016    Migraines     STD (sexually transmitted disease)     Urinary tract infection      Past Surgical History:   Procedure Laterality Date    ANKLE SURGERY Right 2008     SECTION  10/2014    KIDNEY STONE SURGERY      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)    TONSILLECTOMY      WISDOM TOOTH EXTRACTION           (Not in a hospital admission)    Review of patient's allergies indicates:  No Known Allergies     Family History     Problem Relation (Age of Onset)    Breast cancer Paternal Grandmother    Heart disease Father    Hypertension Mother, Father    Nephrolithiasis Daughter    No Known Problems Sister, Sister, Sister        Tobacco Use    Smoking status: Never Smoker    Smokeless tobacco: Never Used   Substance and Sexual Activity    Alcohol use: Yes     Frequency: Never     Comment: socially when no pregnant    Drug use: No    Sexual activity: Yes     Partners: Male     Birth control/protection: None, Condom     Review of Systems   Constitutional: Negative.    HENT: Negative.    Eyes: Negative.    Respiratory: Negative.    Cardiovascular: Negative.    Gastrointestinal: Positive for abdominal pain.   Endocrine: Negative.    Genitourinary: Positive for flank pain.   Musculoskeletal: Positive for back pain.   Integumentary:  Negative.   Neurological: Negative.    Hematological: Negative.    Psychiatric/Behavioral: Negative.    Breast: negative.       Objective:     Vital Signs (Most Recent):  Temp: 98.8 °F (37.1 °C) (10/02/19 07)  Pulse: 107 (10/02/19 08)  Resp: 19 (10/02/19 08)  BP: 127/81 (10/02/19 0801)  SpO2: (!) 90 % (10/02/19 0805) Vital Signs (24h Range):  Temp:  [98.8 °F (37.1 °C)-99.8 °F (37.7 °C)] 98.8 °F (37.1 °C)  Pulse:  [] 107  Resp:  [16-20] 19  SpO2:  [90 %-100 %] 90 %  BP: (117-140)/(63-88)  127/81        There is no height or weight on file to calculate BMI.    FHT: Cat 1 (reassuring)  TOCO:  none    Physical Exam:   Constitutional: She appears well-developed and well-nourished.    HENT:   Head: Normocephalic.   Right Ear: External ear normal.   Left Ear: External ear normal.      Cardiovascular: Normal rate, regular rhythm and normal heart sounds.     Pulmonary/Chest: Effort normal and breath sounds normal. No respiratory distress. She has no wheezes.        Abdominal: Soft. Bowel sounds are normal. She exhibits no distension and no abdominal incision.                   Psychiatric: She has a normal mood and affect. Her behavior is normal. Judgment and thought content normal.       Cervix:  deferred     Significant Labs:  Lab Results   Component Value Date    GROUPTRH A NEG 2019    HEPBSAG Negative 2019       I have personallly reviewed all pertinent lab results from the last 24 hours.    Assessment/Plan:     29 y.o. female  at 24w0d for:    * Pyelonephritis affecting pregnancy in second trimester  - Rocephin 2g q24  - NST BID  - No BMZ  - Urology consulted for possible stone (see nephrolithiasis problem)  - IVF  - Urine culture pending    History of nephrolithiasis  - Urology consulted  - 3mm stone seen on 2017 CT scan, repeat CT renal stone study now  - Risks of CT discussed, see care update note regarding risks discussed. Patient agrees to imaging    24 weeks gestation of pregnancy  - NST BID        Junior Arce MD  Obstetrics  [facility]

## 2019-10-02 NOTE — ASSESSMENT & PLAN NOTE
- Urology consulted  - 3mm stone seen on 2017 CT scan, repeat CT renal stone study now  - Risks of CT discussed, see care update note regarding risks discussed. Patient agrees to imaging

## 2019-10-02 NOTE — TRANSFER OF CARE
"Anesthesia Transfer of Care Note    Patient: Krupa Melton    Procedure(s) Performed: Procedure(s) (LRB):  CYSTOSCOPY, WITH URETERAL STENT INSERTION - Under ultrasound guidance (Left)    Patient location: PACU    Anesthesia Type: general    Transport from OR: Transported from OR on 2-3 L/min O2 by NC with adequate spontaneous ventilation    Post pain: adequate analgesia    Post assessment: no apparent anesthetic complications    Post vital signs: stable    Level of consciousness: responds to stimulation and awake    Nausea/Vomiting: no nausea/vomiting    Complications: none    Transfer of care protocol was followed      Last vitals:   Visit Vitals  BP (!) 114/59   Pulse (!) 115   Temp 36.8 °C (98.3 °F) (Oral)   Resp 18   Ht 5' 4" (1.626 m)   Wt 113.9 kg (251 lb)   LMP 04/12/2019 (Approximate)   SpO2 96%   Breastfeeding? No   BMI 43.08 kg/m²     "

## 2019-10-02 NOTE — ANESTHESIA PREPROCEDURE EVALUATION
10/02/2019  Krupa Melton is a 29 y.o., female.    Anesthesia Evaluation    I have reviewed the Patient Summary Reports.    I have reviewed the Nursing Notes.   I have reviewed the Medications.     Review of Systems  Anesthesia Hx:  No problems with previous Anesthesia  Denies Family Hx of Anesthesia complications.   Denies Personal Hx of Anesthesia complications.   Social:  Non-Smoker    Cardiovascular:   Hypertension    Renal/:   renal calculi    Hepatic/GI:   GERD    OB/GYN/PEDS:  24 weeks pregnant       Physical Exam  General:  Morbid Obesity    Airway/Jaw/Neck:  Airway Findings: Mouth Opening: Normal Mallampati: II      Dental:  Dental Findings: In tact        Mental Status:  Mental Status Findings:  Cooperative, Alert and Oriented         Anesthesia Plan  Type of Anesthesia, risks & benefits discussed:  Anesthesia Type:  general  Patient's Preference:   Intra-op Monitoring Plan: standard ASA monitors  Intra-op Monitoring Plan Comments:   Post Op Pain Control Plan: multimodal analgesia  Post Op Pain Control Plan Comments:   Induction:   IV  Beta Blocker:         Informed Consent: Patient understands risks and agrees with Anesthesia plan.  Questions answered. Anesthesia consent signed with patient.  ASA Score: 2  emergent   Day of Surgery Review of History & Physical:    H&P update referred to the surgeon.     Anesthesia Plan Notes: 24 weeks pregnant with ureteral stone         Ready For Surgery From Anesthesia Perspective.

## 2019-10-02 NOTE — ED PROVIDER NOTES
Encounter Date: 10/2/2019       History     Chief Complaint   Patient presents with    Fever     Krupa Melton is a 29 y.o. [id number] at 24w0d presents complaining of fever at home 100.9 orally x1. Patient denies sick contacts. Patient reports flank pain since this weekend and saw primary OB who referred her to urology. 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 Left renal US showed hydronephrosis, read pasted below. Urology noticed 3mm stone from 2017 CT and blood on urine dipstick. Patient counseled to return if had fever at home. On presentation patient afebrile but tachycardic to 110s. Denies dysuria, reports ongoing flank pain. Urine culture from yesterday pending.     Retroperitoneal US 10/1:    Duplicated collecting system on the left with mild hydronephrosis of the upper pole moiety.  A discrete renal calculus is not identified.  Findings could be due to gravid state, although a recently passed small calculus may also be considered.          Review of patient's allergies indicates:  No Known Allergies  Past Medical History:   Diagnosis Date    Anxiety     Depression     GERD (gastroesophageal reflux disease)     Hypertension     gestational     Hypoglycemia     Kidney calculi         Migraines     STD (sexually transmitted disease)     Urinary tract infection      Past Surgical History:   Procedure Laterality Date    ANKLE SURGERY Right 2008     SECTION  10/2014    KIDNEY STONE SURGERY      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)    TONSILLECTOMY      WISDOM TOOTH EXTRACTION       Family History   Problem Relation Age of Onset    Hypertension Mother     Hypertension Father     Heart disease Father     No Known Problems Sister     Nephrolithiasis Daughter     No Known Problems Sister     No Known Problems Sister     Breast cancer Paternal Grandmother         50s at diagnosis    Colon cancer Neg Hx     Ovarian  cancer Neg Hx      Social History     Tobacco Use    Smoking status: Never Smoker    Smokeless tobacco: Never Used   Substance Use Topics    Alcohol use: Yes     Frequency: Never     Comment: socially when no pregnant    Drug use: No     Review of Systems    Physical Exam     Initial Vitals [10/02/19 0600]   BP Pulse Resp Temp SpO2   135/73 (!) 113 20 99.8 °F (37.7 °C) 97 %      MAP       --         Physical Exam    Constitutional: She appears well-developed and well-nourished. No distress.   HENT:   Head: Normocephalic and atraumatic.   Cardiovascular: Normal rate and regular rhythm.   Pulmonary/Chest: No respiratory distress.   Musculoskeletal:   Mild L CVA tenderness    Neurological: She is alert and oriented to person, place, and time.   Psychiatric: She has a normal mood and affect.     OB LABOR EXAM:                       Comments: 135 BPM, 15x15 accels, no decels, mod variability       ED Course   Procedures  Labs Reviewed   INFLUENZA A & B BY MOLECULAR   CBC W/ AUTO DIFFERENTIAL   URINALYSIS          Imaging Results    None          Medical Decision Making:   ED Management:  - CBC with continued leukocytosis, increased left shift. Will treat empirically with rocephin for pyelo given hx and clinical picture  - Message sent to urology for recs as just seen yesterday - recommend CT renal and will follow up with consult note  - Flu swab negative  - Consents for delivery, blood signed  - Patient understands risks of abd CT radiation to fetus and per ACOG below threshold for hazard (see H and P for official note)  - MFM consulted, primary OB aware  - Urine culture pending              Attending Attestation:   Physician Attestation Statement for Resident:  As the supervising MD   Physician Attestation Statement: I have personally seen and examined this patient.   I agree with the above history. -:   As the supervising MD I agree with the above PE.    As the supervising MD I agree with the above treatment,  course, plan, and disposition.  I have reviewed and agree with the residents interpretation of the following: lab data and x-rays.  I have reviewed the following: old records at this facility.                    ED Course as of Oct 02 0704   Wed Oct 02, 2019   0640 Fever 100.9 at home. Pt has hx of urinary stone and double collecting renal system.    [LB]   0641 +left flank pain  Collecting labs, IVF  Will contact urology    [LB]      ED Course User Index  [LB] Dayanna Goddard MD     Clinical Impression:       ICD-10-CM ICD-9-CM   1. Pyelonephritis affecting pregnancy in second trimester O23.02 646.63     590.80   2. 24 weeks gestation of pregnancy Z3A.24 V22.2   3. Obesity affecting pregnancy in second trimester O99.212 649.13   4. Duplicated left renal collecting system Q62.5 753.4   5. Rh negative state in antepartum period O09.899 V23.89    Z67.91    6. Chronic hypertension I10 401.9   7. History of nephrolithiasis Z87.442 V13.01                                Junior Arce MD  Resident  10/02/19 0915       Felecia Jaramillo MD  10/02/19 1127

## 2019-10-02 NOTE — SUBJECTIVE & OBJECTIVE
Past Medical History:   Diagnosis Date    Anxiety     Depression     GERD (gastroesophageal reflux disease)     Hypertension     gestational     Hypoglycemia     Kidney calculi     2016    Migraines     STD (sexually transmitted disease)     Urinary tract infection        Past Surgical History:   Procedure Laterality Date    ANKLE SURGERY Right 2008     SECTION  10/2014    KIDNEY STONE SURGERY      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)    TONSILLECTOMY      WISDOM TOOTH EXTRACTION         Review of patient's allergies indicates:  No Known Allergies    Family History     Problem Relation (Age of Onset)    Breast cancer Paternal Grandmother    Heart disease Father    Hypertension Mother, Father    Nephrolithiasis Daughter    No Known Problems Sister, Sister, Sister          Tobacco Use    Smoking status: Never Smoker    Smokeless tobacco: Never Used   Substance and Sexual Activity    Alcohol use: Yes     Frequency: Never     Comment: socially when no pregnant    Drug use: No    Sexual activity: Yes     Partners: Male     Birth control/protection: None, Condom       Review of Systems   Constitutional: Negative for appetite change and chills.   HENT: Negative for congestion, sore throat and trouble swallowing.    Eyes: Negative for pain and itching.   Respiratory: Negative for cough and shortness of breath.    Cardiovascular: Negative for chest pain, palpitations and leg swelling.   Gastrointestinal: Positive for abdominal pain and nausea. Negative for abdominal distention, constipation, diarrhea and vomiting.   Genitourinary: Positive for flank pain. Negative for difficulty urinating, dysuria, hematuria, nocturia and urgency.   Musculoskeletal: Negative for back pain, neck pain and neck stiffness.   Skin: Negative for rash and wound.   Neurological: Negative for dizziness and seizures.   Hematological: Negative for adenopathy. Does not bruise/bleed  easily.   Psychiatric/Behavioral: Negative for confusion. The patient is not nervous/anxious.    All other systems reviewed and are negative.      Objective:     Temp:  [97.2 °F (36.2 °C)-99.8 °F (37.7 °C)] 97.2 °F (36.2 °C)  Pulse:  [] 107  Resp:  [16-20] 16  SpO2:  [90 %-100 %] 97 %  BP: (108-136)/(60-88) 109/72     There is no height or weight on file to calculate BMI.           Drains     None                 Physical Exam   Vitals reviewed.  Constitutional: She is oriented to person, place, and time. She appears well-developed and well-nourished. No distress.   HENT:   Head: Normocephalic and atraumatic.   Neck: Normal range of motion.   Cardiovascular: Normal rate and regular rhythm.    Pulmonary/Chest: Effort normal and breath sounds normal. No respiratory distress.   Abdominal: Soft. She exhibits no distension and no mass. There is no tenderness. There is no rebound and no guarding.   Gravid uterus   Genitourinary:   Genitourinary Comments: No CVA tenderness   Musculoskeletal: Normal range of motion.   Neurological: She is alert and oriented to person, place, and time.   Skin: Skin is warm and dry. No rash noted. She is not diaphoretic. No erythema.     Psychiatric: She has a normal mood and affect. Her behavior is normal.       Significant Labs:    BMP:  Recent Labs   Lab 10/01/19  1326      K 3.7      CO2 22*   BUN 8   CREATININE 0.8   CALCIUM 9.2       CBC:  Recent Labs   Lab 10/01/19  1326 10/02/19  0650   WBC 13.47* 14.00*   HGB 11.8* 12.3   HCT 33.8* 35.4*    275       Blood Culture: No results for input(s): LABBLOO in the last 168 hours.  Urine Culture: No results for input(s): LABURIN in the last 168 hours.  Urine Studies:   Recent Labs   Lab 10/01/19  0419 10/01/19  0848 10/02/19  0712   COLORU Yellow yellow Yellow   APPEARANCEUA Hazy*  --  Clear   PHUR 7.0 6 8.0   SPECGRAV 1.020 1.015 1.015   PROTEINUA Negative  --  Negative   GLUCUA Negative  --  Negative   KETONESU  Negative neg Negative   BILIRUBINUA Negative  --  Negative   OCCULTUA Trace*  --  Trace*   NITRITE Negative neg Negative   UROBILINOGEN Negative neg Negative   LEUKOCYTESUR Trace*  --  Negative   RBCUA 6*  --   --    WBCUA 3  --   --    BACTERIA Few*  --   --    SQUAMEPITHEL 7  --   --        Significant Imaging:  All pertinent imaging results/findings from the past 24 hours have been reviewed.

## 2019-10-02 NOTE — CARE UPDATE
Risk of CT renal discussed with patient. Agrees to study.    Counseled patient on ACOG recommendations regarding imaging in pregnancy ():     The American College of Obstetricians and Gynecologists Committee on Obstetric Practice makes the following recommendations regarding diagnostic imaging procedures during pregnancy and lactation:     Ultrasonography and magnetic resonance imaging (MRI) are not associated with risk and are the imaging techniques of choice for the pregnant patient, but they should be used prudently and only when use is expected to answer a relevant clinical question or otherwise provide medical benefit to the patient.     With few exceptions, radiation exposure through radiography, computed tomography (CT) scan, or nuclear medicine imaging techniques is at a dose much lower than the exposure associated with fetal harm. If these techniques are necessary in addition to ultrasonography or MRI or are more readily available for the diagnosis in question, they should not be withheld from a pregnant patient.     The use of gadolinium contrast with MRI should be limited; it may be used as a contrast agent in a pregnant woman only if it significantly improves diagnostic performance and is expected to improve fetal or maternal outcome.     Breastfeeding should not be interrupted after gadolinium administration or for iodinated IV contrast for CT

## 2019-10-02 NOTE — LETTER
October 5, 2019         [address]  [address]  [facility] [address]  Phone: [phone]  Fax: [phone]       Patient: Krupa Melton   YOB: 1990  Date of Visit: 10/05/2019    To Whom It May Concern:    Radha Melton  was at [facility] from 10/2/2019 -  10/05/2019. She may not return to work/school at this time. She will be unable to return to work until at least 10/10/2019.  If you have any questions or concerns, or if I can be of further assistance, please do not hesitate to contact me.    Sincerely,            Megha Mariscal MD

## 2019-10-02 NOTE — PROGRESS NOTES
Fever 101.3 this afternoon. Recommend to proceed with cystoscopy and L ureteral stent placement. Once infection cleared, will plan for outpatient ureteroscopy during second trimester (in 1-2 weeks).     Discussed possible risks including, but not limited to, worsening infection, bleeding, stent irritation, bladder spasms, ureteral injury, injury to surrounding structures,  labor, loss of pregnancy, etc. Will proceed with cystoscopy and L ureteral stent placement with ultrasound guidance.

## 2019-10-02 NOTE — HOSPITAL COURSE
10/02/2019 - Admit for empiric treatment of pyelo as well as CT renal and possible stent per urology  10/3/2019 - POD #1 s/p stent with urology. Symptomatic improvement. Temp to 100.4 at 2030.  10/05/2019 POD #3. Afebrile overnight with no acute events. Clinically improved. Medically stable for discharge to home later today. PM NST was reactive and reassuring. UTI grew alpha hemolytic strep. Will send home with RX to continue keflex for 7 days.

## 2019-10-02 NOTE — OP NOTE
[facility]  Surgery Department  Urology Operative Note    SUMMARY     Date of Procedure: 10/2/2019     Surgeon(s) and Role:     * Marsha Guevara MD - Primary    Assisting Surgeon: None    Pre-Operative Diagnosis: Duplicated left renal collecting system [Q62.5]  Pyelonephritis affecting pregnancy in second trimester [O23.02]  Nephrolithiasis [N20.0]  Left ureteral calculus [N20.1]    Post-Operative Diagnosis: Post-Op Diagnosis Codes:     * Duplicated left renal collecting system [Q62.5]     * Pyelonephritis affecting pregnancy in second trimester [O23.02]     * Nephrolithiasis [N20.0]     * Left ureteral calculus [N20.1]    Procedure: Procedure(s) (LRB):  CYSTOSCOPY  LEFT URETERAL STENT INSERTION  INTRAOPERATIVE ULTRASOUND    Anesthesia: Choice    Indication for Procedure: 28yo F at 24wk GA with 6mm L midureteral stone and fever. Planned for L ureteral stent today with delayed stone treatment. Known L ureteral duplication.    Description of Procedure: The patient was brought to operating room and placed under general anesthesia. Full time out procedures were performed identifying correct patient, procedure and laterality. Appropriate antibiotics with Ancef were given prior to commencement of surgery. The patient was placed in dorsal lithotomy position and prepped and draped in the usual sterile fashion.    A 22Fr rigid cystoscopy was placed per urethral and passed into the bladder. No urethral strictures were noted. Cystoscopy did not reveal any abnormality of the bladder. One left ureteral orifice was noted in the expected orthotopic position. This was expected to be the lower pole moiety ureter. A second ureteral orifice was not readily identified immediately. The orthotopic left UO was cannulated with Sensor wire. Intraoperative ultrasound was performed that was able to identify the wire in the lower pole of the left kidney as expected. Further evaluation of trigone was performed. A 5Fr  open ended ureteral catheter was used to help guide the Sensor wire. A second UO was noted just inferomedial to the previously identified UO. This location as consistent with upper pole moiety ureter. This was cannulated with Sensor wire. Intraoperative ultrasound confirmed the upper pole placement of the wire. There was no significant resistance noted in placing the wire. Mildly cloudy urine drained with placement of wire.     The guidewire was then exchanged for a 6x24 double-J ureteral stent under cystoscopic and ultrasound guidance.  The tubular structure of the stent was noted in the upper pole of the kidney on ultrasound. Good curl was noted in bladder. The string was removed from the stent prior to placement. The bladder was drained and the patient was awaken and transferred to PACU in stable condition. Post-operative fetal monitoring will be deferred to OB team.     Findings: Complete L ureteral duplication. L ureteral stent placed in upper pole ureter/moiety.     Complications: No    Estimated Blood Loss (EBL): 0cc    Drains: 6Fr x 24cm JJ stent in L ureter (upper pole), no string           Implants:   Implant Name Type Inv. Item Serial No.  Lot No. LRB No. Used   STENT URETERAL UNIV 6FR 24CM - [id number]  STENT URETERAL UNIV 6FR 24CM  [employer] [id number] Left 1       Specimens:   Specimen (12h ago, onward)    None                  Condition: Good    Disposition: PACU - hemodynamically stable.    Attestation: I was present and scrubbed for the entire procedure.

## 2019-10-02 NOTE — CONSULTS
[facility]  Urology  Consult Note    Patient Name: Krupa Melton  MRN: [id number]  Admission Date: 10/2/2019  Hospital Length of Stay: 0   Code Status: Full Code   Attending Provider: Dorina Chicas MD   Consulting Provider: Marsha Guevara MD  Primary Care Physician: Roshni Frazier MD  Principal Problem:Pyelonephritis affecting pregnancy in second trimester    Inpatient consult to Urology  Consult performed by: Marsha Guevara MD  Consult ordered by: Junior Arce MD          Subjective:     HPI:  28yo F 24wk GA with L flank pain starting 3 days ago. She has h/o nephrolithiasis, passed 7mm stone several years ago. She has known complete L collecting system duplication. CT shows 6mm L mid ureteral stone with proximal hydronephrosis of upper pole moeity. She had LG fever 100.6 at home prior to ER presentation today. She has been afebrile since then. Denies dysuria.     Past Medical History:   Diagnosis Date    Anxiety     Depression     GERD (gastroesophageal reflux disease)     Hypertension     gestational     Hypoglycemia     Kidney calculi     2016    Migraines     STD (sexually transmitted disease)     Urinary tract infection        Past Surgical History:   Procedure Laterality Date    ANKLE SURGERY Right 2008     SECTION  10/2014    KIDNEY STONE SURGERY      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)    TONSILLECTOMY      WISDOM TOOTH EXTRACTION         Review of patient's allergies indicates:  No Known Allergies    Family History     Problem Relation (Age of Onset)    Breast cancer Paternal Grandmother    Heart disease Father    Hypertension Mother, Father    Nephrolithiasis Daughter    No Known Problems Sister, Sister, Sister          Tobacco Use    Smoking status: Never Smoker    Smokeless tobacco: Never Used   Substance and Sexual Activity    Alcohol use: Yes     Frequency: Never     Comment:  socially when no pregnant    Drug use: No    Sexual activity: Yes     Partners: Male     Birth control/protection: None, Condom       Review of Systems   Constitutional: Negative for appetite change and chills.   HENT: Negative for congestion, sore throat and trouble swallowing.    Eyes: Negative for pain and itching.   Respiratory: Negative for cough and shortness of breath.    Cardiovascular: Negative for chest pain, palpitations and leg swelling.   Gastrointestinal: Positive for abdominal pain and nausea. Negative for abdominal distention, constipation, diarrhea and vomiting.   Genitourinary: Positive for flank pain. Negative for difficulty urinating, dysuria, hematuria, nocturia and urgency.   Musculoskeletal: Negative for back pain, neck pain and neck stiffness.   Skin: Negative for rash and wound.   Neurological: Negative for dizziness and seizures.   Hematological: Negative for adenopathy. Does not bruise/bleed easily.   Psychiatric/Behavioral: Negative for confusion. The patient is not nervous/anxious.    All other systems reviewed and are negative.      Objective:     Temp:  [97.2 °F (36.2 °C)-99.8 °F (37.7 °C)] 97.2 °F (36.2 °C)  Pulse:  [] 107  Resp:  [16-20] 16  SpO2:  [90 %-100 %] 97 %  BP: (108-136)/(60-88) 109/72     There is no height or weight on file to calculate BMI.           Drains     None                 Physical Exam   Vitals reviewed.  Constitutional: She is oriented to person, place, and time. She appears well-developed and well-nourished. No distress.   HENT:   Head: Normocephalic and atraumatic.   Neck: Normal range of motion.   Cardiovascular: Normal rate and regular rhythm.    Pulmonary/Chest: Effort normal and breath sounds normal. No respiratory distress.   Abdominal: Soft. She exhibits no distension and no mass. There is no tenderness. There is no rebound and no guarding.   Gravid uterus   Genitourinary:   Genitourinary Comments: No CVA tenderness   Musculoskeletal: Normal  range of motion.   Neurological: She is alert and oriented to person, place, and time.   Skin: Skin is warm and dry. No rash noted. She is not diaphoretic. No erythema.     Psychiatric: She has a normal mood and affect. Her behavior is normal.       Significant Labs:    BMP:  Recent Labs   Lab 10/01/19  1326      K 3.7      CO2 22*   BUN 8   CREATININE 0.8   CALCIUM 9.2       CBC:  Recent Labs   Lab 10/01/19  1326 10/02/19  0650   WBC 13.47* 14.00*   HGB 11.8* 12.3   HCT 33.8* 35.4*    275       Blood Culture: No results for input(s): LABBLOO in the last 168 hours.  Urine Culture: No results for input(s): LABURIN in the last 168 hours.  Urine Studies:   Recent Labs   Lab 10/01/19  0419 10/01/19  0848 10/02/19  0712   COLORU Yellow yellow Yellow   APPEARANCEUA Hazy*  --  Clear   PHUR 7.0 6 8.0   SPECGRAV 1.020 1.015 1.015   PROTEINUA Negative  --  Negative   GLUCUA Negative  --  Negative   KETONESU Negative neg Negative   BILIRUBINUA Negative  --  Negative   OCCULTUA Trace*  --  Trace*   NITRITE Negative neg Negative   UROBILINOGEN Negative neg Negative   LEUKOCYTESUR Trace*  --  Negative   RBCUA 6*  --   --    WBCUA 3  --   --    BACTERIA Few*  --   --    SQUAMEPITHEL 7  --   --        Significant Imaging:  All pertinent imaging results/findings from the past 24 hours have been reviewed.          Assessment and Plan:     Left ureteral calculus   - 6mm L mid ureteral stone in ureter draining upper pole moiety    - Trial of passage for now   - Consider Flomax if safe from OB standpoint   - Antibiotics / IVF   - If worsening clinic picture (fever, HD unstable), recommend L ureteral stent placement. Would hope to avoid if possible.   - If stone does not pass soon and urine cultures clear/no fevers, consider for ureteroscopy to treat stone.   - Strain urine    Duplicated left renal collecting system   - Upper pole obstruction 2/2 stone        VTE Risk Mitigation (From admission, onward)         Ordered      Place sequential compression device  Until discontinued      10/02/19 0908     IP VTE HIGH RISK PATIENT  Once      10/02/19 0908                Thank you for your consult. I will follow-up with patient. Please contact us if you have any additional questions.    Marsha Guevara MD  Urology  [facility]

## 2019-10-02 NOTE — ANESTHESIA PREPROCEDURE EVALUATION
10/02/2019  Krupa Melton is a 29 y.o., female.    Anesthesia Evaluation    I have reviewed the Patient Summary Reports.     I have reviewed the Medications.     Review of Systems  Anesthesia Hx:  No problems with previous Anesthesia Denies Hx of Anesthetic complications  History of prior surgery of interest to airway management or planning: (CS) Previous anesthesia: Epidural Denies Family Hx of Anesthesia complications.   Denies Personal Hx of Anesthesia complications.   Social:  Non-Smoker, No Alcohol Use    Cardiovascular:   Hypertension    Renal/:   renal calculi    OB/GYN/PEDS:  Planned  Delivery Pt had  of first child in  Neuraxial Anesthesia - Previous History (Pt had high level with epidural, chest was heavy)    Neurological:   Headaches    Psych:   depression ADHD         Physical Exam  General:  Well nourished    Airway/Jaw/Neck:  Airway Findings: Mouth Opening: Normal Tongue: Normal  General Airway Assessment: Adult  Mallampati: II     Eyes/Ears/Nose:  EYES/EARS/NOSE FINDINGS: Normal   Dental:  Dental Findings: In tact        Mental Status:  Mental Status Findings:  Cooperative, Alert and Oriented         Anesthesia Plan  Type of Anesthesia, risks & benefits discussed:  Anesthesia Type:  epidural, spinal, general, CSE  Patient's Preference:   Intra-op Monitoring Plan: standard ASA monitors  Intra-op Monitoring Plan Comments:   Post Op Pain Control Plan: multimodal analgesia, IV/PO Opioids PRN and per primary service following discharge from PACU  Post Op Pain Control Plan Comments:   Induction:   IV  Beta Blocker:  Patient is not currently on a Beta-Blocker (No further documentation required).       Informed Consent: Patient understands risks and agrees with Anesthesia plan.  Questions answered. Anesthesia consent signed with patient.  ASA Score: 2     Day of Surgery  Review of History & Physical:    H&P update referred to the surgeon.         Ready For Surgery From Anesthesia Perspective.

## 2019-10-02 NOTE — SUBJECTIVE & OBJECTIVE
OB History    Para Term  AB Living   2 1 1 0 0 1   SAB TAB Ectopic Multiple Live Births   0 0 0 0 1      # Outcome Date GA Lbr Boni/2nd Weight Sex Delivery Anes PTL Lv   2 Current            1 Term 10/14/14 41w2d   F CS-LTranv EPI  KAVITHA      Complications: Failure to Progress in Second Stage, Failure to progress in labor, Incisional infection, Gestational HTN     Past Medical History:   Diagnosis Date    Anxiety     Depression     GERD (gastroesophageal reflux disease)     Hypertension     gestational     Hypoglycemia     Kidney calculi         Migraines     STD (sexually transmitted disease)     Urinary tract infection      Past Surgical History:   Procedure Laterality Date    ANKLE SURGERY Right 2008     SECTION  10/2014    KIDNEY STONE SURGERY      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)    TONSILLECTOMY      WISDOM TOOTH EXTRACTION           (Not in a hospital admission)    Review of patient's allergies indicates:  No Known Allergies     Family History     Problem Relation (Age of Onset)    Breast cancer Paternal Grandmother    Heart disease Father    Hypertension Mother, Father    Nephrolithiasis Daughter    No Known Problems Sister, Sister, Sister        Tobacco Use    Smoking status: Never Smoker    Smokeless tobacco: Never Used   Substance and Sexual Activity    Alcohol use: Yes     Frequency: Never     Comment: socially when no pregnant    Drug use: No    Sexual activity: Yes     Partners: Male     Birth control/protection: None, Condom     Review of Systems   Constitutional: Negative.    HENT: Negative.    Eyes: Negative.    Respiratory: Negative.    Cardiovascular: Negative.    Gastrointestinal: Positive for abdominal pain.   Endocrine: Negative.    Genitourinary: Positive for flank pain.   Musculoskeletal: Positive for back pain.   Integumentary:  Negative.   Neurological: Negative.    Hematological: Negative.     Psychiatric/Behavioral: Negative.    Breast: negative.       Objective:     Vital Signs (Most Recent):  Temp: 98.8 °F (37.1 °C) (10/02/19 0712)  Pulse: 107 (10/02/19 0805)  Resp: 19 (10/02/19 0805)  BP: 127/81 (10/02/19 0801)  SpO2: (!) 90 % (10/02/19 0805) Vital Signs (24h Range):  Temp:  [98.8 °F (37.1 °C)-99.8 °F (37.7 °C)] 98.8 °F (37.1 °C)  Pulse:  [] 107  Resp:  [16-20] 19  SpO2:  [90 %-100 %] 90 %  BP: (117-140)/(63-88) 127/81        There is no height or weight on file to calculate BMI.    FHT: Cat 1 (reassuring)  TOCO:  none    Physical Exam:   Constitutional: She appears well-developed and well-nourished.    HENT:   Head: Normocephalic.   Right Ear: External ear normal.   Left Ear: External ear normal.      Cardiovascular: Normal rate, regular rhythm and normal heart sounds.     Pulmonary/Chest: Effort normal and breath sounds normal. No respiratory distress. She has no wheezes.        Abdominal: Soft. Bowel sounds are normal. She exhibits no distension and no abdominal incision.                   Psychiatric: She has a normal mood and affect. Her behavior is normal. Judgment and thought content normal.       Cervix:  deferred     Significant Labs:  Lab Results   Component Value Date    GROUPTRH A NEG 05/13/2019    HEPBSAG Negative 05/13/2019       I have personallly reviewed all pertinent lab results from the last 24 hours.

## 2019-10-02 NOTE — ASSESSMENT & PLAN NOTE
- 6mm L mid ureteral stone in ureter draining upper pole moiety    - Trial of passage for now   - Consider Flomax if safe from OB standpoint   - Antibiotics / IVF   - If worsening clinic picture (fever, HD unstable), recommend L ureteral stent placement. Would hope to avoid if possible.   - If stone does not pass soon and urine cultures clear/no fevers, consider for ureteroscopy to treat stone.   - Strain urine

## 2019-10-02 NOTE — ASSESSMENT & PLAN NOTE
- Rocephin 2g q24  - NST BID  - No BMZ  - Urology consulted for possible stone (see nephrolithiasis problem)  - IVF  - Urine culture pending

## 2019-10-03 LAB
ALBUMIN SERPL BCP-MCNC: 2.3 G/DL (ref 3.5–5.2)
ALP SERPL-CCNC: 59 U/L (ref 55–135)
ALT SERPL W/O P-5'-P-CCNC: 6 U/L (ref 10–44)
ANION GAP SERPL CALC-SCNC: 9 MMOL/L (ref 8–16)
AST SERPL-CCNC: 13 U/L (ref 10–40)
BACTERIA UR CULT: ABNORMAL
BASOPHILS # BLD AUTO: 0.03 K/UL (ref 0–0.2)
BASOPHILS NFR BLD: 0.2 % (ref 0–1.9)
BILIRUB SERPL-MCNC: 0.2 MG/DL (ref 0.1–1)
BUN SERPL-MCNC: 6 MG/DL (ref 6–20)
CALCIUM SERPL-MCNC: 8.1 MG/DL (ref 8.7–10.5)
CHLORIDE SERPL-SCNC: 105 MMOL/L (ref 95–110)
CO2 SERPL-SCNC: 22 MMOL/L (ref 23–29)
CREAT SERPL-MCNC: 0.6 MG/DL (ref 0.5–1.4)
DIFFERENTIAL METHOD: ABNORMAL
EOSINOPHIL # BLD AUTO: 0 K/UL (ref 0–0.5)
EOSINOPHIL NFR BLD: 0.2 % (ref 0–8)
ERYTHROCYTE [DISTWIDTH] IN BLOOD BY AUTOMATED COUNT: 12.9 % (ref 11.5–14.5)
EST. GFR  (AFRICAN AMERICAN): >60 ML/MIN/1.73 M^2
EST. GFR  (NON AFRICAN AMERICAN): >60 ML/MIN/1.73 M^2
GLUCOSE SERPL-MCNC: 100 MG/DL (ref 70–110)
HCT VFR BLD AUTO: 28.4 % (ref 37–48.5)
HGB BLD-MCNC: 9.9 G/DL (ref 12–16)
IMM GRANULOCYTES # BLD AUTO: 0.07 K/UL (ref 0–0.04)
IMM GRANULOCYTES NFR BLD AUTO: 0.6 % (ref 0–0.5)
LYMPHOCYTES # BLD AUTO: 1.2 K/UL (ref 1–4.8)
LYMPHOCYTES NFR BLD: 10.1 % (ref 18–48)
MCH RBC QN AUTO: 32.4 PG (ref 27–31)
MCHC RBC AUTO-ENTMCNC: 34.9 G/DL (ref 32–36)
MCV RBC AUTO: 93 FL (ref 82–98)
MONOCYTES # BLD AUTO: 0.9 K/UL (ref 0.3–1)
MONOCYTES NFR BLD: 7.6 % (ref 4–15)
NEUTROPHILS # BLD AUTO: 9.8 K/UL (ref 1.8–7.7)
NEUTROPHILS NFR BLD: 81.3 % (ref 38–73)
NRBC BLD-RTO: 0 /100 WBC
PLATELET # BLD AUTO: 218 K/UL (ref 150–350)
PMV BLD AUTO: 10.2 FL (ref 9.2–12.9)
POTASSIUM SERPL-SCNC: 3.7 MMOL/L (ref 3.5–5.1)
PROT SERPL-MCNC: 5.6 G/DL (ref 6–8.4)
RBC # BLD AUTO: 3.06 M/UL (ref 4–5.4)
SODIUM SERPL-SCNC: 136 MMOL/L (ref 136–145)
WBC # BLD AUTO: 12.03 K/UL (ref 3.9–12.7)

## 2019-10-03 PROCEDURE — 25000003 PHARM REV CODE 250: Performed by: STUDENT IN AN ORGANIZED HEALTH CARE EDUCATION/TRAINING PROGRAM

## 2019-10-03 PROCEDURE — 99231 SBSQ HOSP IP/OBS SF/LOW 25: CPT | Mod: ,,, | Performed by: PEDIATRICS

## 2019-10-03 PROCEDURE — 99232 SBSQ HOSP IP/OBS MODERATE 35: CPT | Mod: ,,, | Performed by: UROLOGY

## 2019-10-03 PROCEDURE — 11000001 HC ACUTE MED/SURG PRIVATE ROOM

## 2019-10-03 PROCEDURE — 63600175 PHARM REV CODE 636 W HCPCS: Performed by: STUDENT IN AN ORGANIZED HEALTH CARE EDUCATION/TRAINING PROGRAM

## 2019-10-03 PROCEDURE — 25000003 PHARM REV CODE 250: Performed by: OBSTETRICS & GYNECOLOGY

## 2019-10-03 PROCEDURE — 85025 COMPLETE CBC W/AUTO DIFF WBC: CPT

## 2019-10-03 PROCEDURE — 99232 PR SUBSEQUENT HOSPITAL CARE,LEVL II: ICD-10-PCS | Mod: ,,, | Performed by: UROLOGY

## 2019-10-03 PROCEDURE — 99231 PR SUBSEQUENT HOSPITAL CARE,LEVL I: ICD-10-PCS | Mod: ,,, | Performed by: PEDIATRICS

## 2019-10-03 PROCEDURE — 36415 COLL VENOUS BLD VENIPUNCTURE: CPT

## 2019-10-03 PROCEDURE — 80053 COMPREHEN METABOLIC PANEL: CPT

## 2019-10-03 PROCEDURE — 94761 N-INVAS EAR/PLS OXIMETRY MLT: CPT

## 2019-10-03 RX ORDER — BUTALBITAL, ACETAMINOPHEN AND CAFFEINE 50; 325; 40 MG/1; MG/1; MG/1
1 TABLET ORAL EVERY 4 HOURS PRN
Status: DISCONTINUED | OUTPATIENT
Start: 2019-10-03 | End: 2019-10-05 | Stop reason: HOSPADM

## 2019-10-03 RX ORDER — PANTOPRAZOLE SODIUM 40 MG/1
40 TABLET, DELAYED RELEASE ORAL DAILY
Status: DISCONTINUED | OUTPATIENT
Start: 2019-10-03 | End: 2019-10-05 | Stop reason: HOSPADM

## 2019-10-03 RX ADMIN — OXYCODONE HYDROCHLORIDE 10 MG: 5 TABLET ORAL at 05:10

## 2019-10-03 RX ADMIN — SODIUM CHLORIDE, SODIUM LACTATE, POTASSIUM CHLORIDE, AND CALCIUM CHLORIDE: .6; .31; .03; .02 INJECTION, SOLUTION INTRAVENOUS at 09:10

## 2019-10-03 RX ADMIN — ACETAMINOPHEN 650 MG: 325 TABLET, FILM COATED ORAL at 09:10

## 2019-10-03 RX ADMIN — OXYCODONE HYDROCHLORIDE 10 MG: 5 TABLET ORAL at 11:10

## 2019-10-03 RX ADMIN — CEFTRIAXONE SODIUM 2 G: 2 INJECTION, SOLUTION INTRAVENOUS at 07:10

## 2019-10-03 RX ADMIN — SODIUM CHLORIDE, SODIUM LACTATE, POTASSIUM CHLORIDE, AND CALCIUM CHLORIDE: .6; .31; .03; .02 INJECTION, SOLUTION INTRAVENOUS at 04:10

## 2019-10-03 RX ADMIN — SODIUM CHLORIDE, SODIUM LACTATE, POTASSIUM CHLORIDE, AND CALCIUM CHLORIDE: .6; .31; .03; .02 INJECTION, SOLUTION INTRAVENOUS at 12:10

## 2019-10-03 RX ADMIN — PANTOPRAZOLE SODIUM 40 MG: 40 TABLET, DELAYED RELEASE ORAL at 09:10

## 2019-10-03 RX ADMIN — PRENATAL VIT W/ FE FUMARATE-FA TAB 27-0.8 MG 1 TABLET: 27-0.8 TAB at 09:10

## 2019-10-03 NOTE — PLAN OF CARE
Pt stable throughout shift. Pt reports 6/10 pain with moving and walking. PRN medication given for pain. Pt reports pain relief from medication. VSS. NTS BID. Pt on regular diet and ambulating to bathroom with little to no assistance. Call light within reach.

## 2019-10-03 NOTE — ASSESSMENT & PLAN NOTE
- 6mm L mid ureteral stone in ureter draining upper pole moiety    - S/p L ureteral stent placement in ureter draining upper pole moiety under ultrasound guidance 10/2   - Once infection cleared, will plan for outpatient ureteroscopy during second trimester (in 1-2 weeks).   - Consider Flomax if safe from OB standpoint   - Antibiotics / IVF   - Strain all urine

## 2019-10-03 NOTE — ASSESSMENT & PLAN NOTE
- Patient with flank pain, fevers on admission.  - CT 10/2: Mild hydroureteronephrosis of the left upper pole with a 6 mm calculus in the midportion of the ureter. There is a duplicated collecting system on the left.  No dilatation of the lower pole collecting system. Punctate nonobstructing calculi in the right interpolar kidney and left lower pole kidney.  - POD #1 s/p cysto, L ureteral stent placement per urology  - WBC 14 > 12  - Tlast: 100.4 @ 1930 10/2  - Continue rocephin 2g q24  - IVF until patient tolerating diet without nausea  - Urine culture pending

## 2019-10-03 NOTE — SUBJECTIVE & OBJECTIVE
POD #1 s/p cystoscopy, L ureteral stent placement per urology    Patient reports no contractions, active fetal movement, no vaginal bleeding , no loss of fluid. She continues to have flank pain and now complains of slight irritation when she urinates. Tmax of 100.4 at 1930 10/2.     Objective:     Vital Signs (Most Recent):  Temp: 99.7 °F (37.6 °C) (10/03/19 0434)  Pulse: 108 (10/03/19 0434)  Resp: 18 (10/03/19 0434)  BP: (!) 109/58 (10/03/19 0434)  SpO2: 96 % (10/03/19 0434) Vital Signs (24h Range):  Temp:  [97.2 °F (36.2 °C)-101.3 °F (38.5 °C)] 99.7 °F (37.6 °C)  Pulse:  [] 108  Resp:  [16-20] 18  SpO2:  [90 %-100 %] 96 %  BP: (108-136)/(56-88) 109/58     Weight: 113.9 kg (251 lb)  Body mass index is 43.08 kg/m².      Intake/Output Summary (Last 24 hours) at 10/3/2019 0709  Last data filed at 10/3/2019 0500  Gross per 24 hour   Intake 1954.17 ml   Output 3100 ml   Net -1145.83 ml       Cervical Exam: Deferred     Significant Labs:  Recent Lab Results       10/03/19  0616   10/02/19  0712        Albumin 2.3       Alkaline Phosphatase 59       ALT 6       Anion Gap 9       Appearance, UA   Clear     AST 13       Baso # 0.03       Basophil% 0.2       Bilirubin (UA)   Negative     BILIRUBIN TOTAL 0.2  Comment:  For infants and newborns, interpretation of results should be based  on gestational age, weight and in agreement with clinical  observations.  Premature Infant recommended reference ranges:  Up to 24 hours.............<8.0 mg/dL  Up to 48 hours............<12.0 mg/dL  3-5 days..................<15.0 mg/dL  6-29 days.................<15.0 mg/dL         BUN, Bld 6       Calcium 8.1       Chloride 105       CO2 22       Color, UA   Yellow     Creatinine 0.6       Differential Method Automated       eGFR if  >60       eGFR if non  >60  Comment:  Calculation used to obtain the estimated glomerular filtration  rate (eGFR) is the CKD-EPI equation.          Eos # 0.0        Eosinophil% 0.2       Glucose 100       Glucose, UA   Negative     Gran # (ANC) 9.8       Gran% 81.3       Hematocrit 28.4       Hemoglobin 9.9       Immature Grans (Abs) 0.07  Comment:  Mild elevation in immature granulocytes is non specific and   can be seen in a variety of conditions including stress response,   acute inflammation, trauma and pregnancy. Correlation with other   laboratory and clinical findings is essential.         Immature Granulocytes 0.6       Ketones, UA   Negative     Leukocytes, UA   Negative     Lymph # 1.2       Lymph% 10.1       MCH 32.4       MCHC 34.9       MCV 93       Mono # 0.9       Mono% 7.6       MPV 10.2       NITRITE UA   Negative     nRBC 0       Occult Blood UA   Trace     pH, UA   8.0     Platelets 218       Potassium 3.7       PROTEIN TOTAL 5.6       Protein, UA   Negative  Comment:  Recommend a 24 hour urine protein or a urine   protein/creatinine ratio if globulin induced proteinuria is  clinically suspected.       RBC 3.06       RDW 12.9       Sodium 136       Specific Gravity, UA   1.015     Specimen UA   Urine, Clean Catch     UROBILINOGEN UA   Negative     WBC 12.03             Physical Exam:   Constitutional: She is oriented to person, place, and time. She appears well-developed and well-nourished. No distress.    HENT:   Head: Normocephalic and atraumatic.    Eyes: Conjunctivae and EOM are normal.    Neck: Normal range of motion. Neck supple.    Cardiovascular: Normal rate and regular rhythm.     Pulmonary/Chest: Effort normal. No respiratory distress.        Abdominal: Soft. She exhibits no distension. There is no tenderness.             Musculoskeletal: Moves all extremeties. She exhibits no edema or tenderness.       Neurological: She is alert and oriented to person, place, and time.    Skin: Skin is warm and dry. No rash noted. She is not diaphoretic.    Psychiatric: She has a normal mood and affect. Her behavior is normal.

## 2019-10-03 NOTE — ANESTHESIA POSTPROCEDURE EVALUATION
Anesthesia Post Evaluation    Patient: Krupa Melton    Procedure(s) Performed: Procedure(s) (LRB):  CYSTOSCOPY, WITH URETERAL STENT INSERTION - Under ultrasound guidance (Left)    Final Anesthesia Type: general  Patient location during evaluation: PACU  Patient participation: Yes- Able to Participate  Level of consciousness: awake and alert  Post-procedure vital signs: reviewed and stable  Pain management: adequate  Airway patency: patent  PONV status at discharge: No PONV  Anesthetic complications: no      Cardiovascular status: blood pressure returned to baseline  Respiratory status: unassisted and room air  Hydration status: euvolemic  Follow-up not needed.          Vitals Value Taken Time   /59 10/2/2019  6:00 PM   Temp 36.8 °C (98.3 °F) 10/2/2019  6:00 PM   Pulse 115 10/2/2019  6:00 PM   Resp 18 10/2/2019  6:00 PM   SpO2 96 % 10/2/2019  6:00 PM         Event Time     Out of Recovery 18:14:29          Pain/Scott Score: Pain Rating Prior to Med Admin: 6 (10/2/2019  7:28 PM)  Pain Rating Post Med Admin: 1 (10/2/2019  3:10 PM)  Scott Score: 10 (10/2/2019  6:00 PM)

## 2019-10-03 NOTE — PROGRESS NOTES
[facility]  Urology  Progress Note    Patient Name: Krupa Melton  MRN: [id number]  Admission Date: 10/2/2019  Hospital Length of Stay: 1 days  Code Status: Full Code   Attending Provider: Dorina Chicas MD   Primary Care Physician: Roshni Frazier MD    Subjective:     HPI:  28yo F 24wk GA with L flank pain starting 3 days ago. She has h/o nephrolithiasis, passed 7mm stone several years ago. She has known complete L collecting system duplication. CT shows 6mm L mid ureteral stone with proximal hydronephrosis of upper pole moeity. She had LG fever 100.6 at home prior to ER presentation today. She has been afebrile since then. Denies dysuria.     Interval History:     S/p L ureteral stent placement in ureter draining upper pole moiety  WBC trending down  Cr WNL and UOP excellent  Afebrile overnight, vital signs stable    Objective:     Temp:  [97.2 °F (36.2 °C)-101.3 °F (38.5 °C)] 99.7 °F (37.6 °C)  Pulse:  [] 108  Resp:  [16-20] 18  SpO2:  [92 %-100 %] 96 %  BP: (108-136)/(56-76) 109/58     Body mass index is 43.08 kg/m².           Drains     None                 Physical Exam   Nursing note and vitals reviewed.  Constitutional: She is oriented to person, place, and time. She appears well-developed and well-nourished. No distress.   HENT:   Head: Normocephalic and atraumatic.   Eyes: Pupils are equal, round, and reactive to light. Right eye exhibits no discharge. Left eye exhibits no discharge.   Abdominal: Soft. She exhibits no distension. There is no tenderness.   Gravid  L CVA tenderness   Neurological: She is alert and oriented to person, place, and time.   Skin: Skin is warm and dry.     Psychiatric: She has a normal mood and affect. Her behavior is normal. Judgment and thought content normal.       Significant Labs:    BMP:  Recent Labs   Lab 10/01/19  1326 10/03/19  0616    136   K 3.7 3.7    105   CO2 22* 22*   BUN 8 6   CREATININE 0.8 0.6   CALCIUM 9.2 8.1*        CBC:   Recent Labs   Lab 10/01/19  1326 10/02/19  0650 10/03/19  0616   WBC 13.47* 14.00* 12.03   HGB 11.8* 12.3 9.9*   HCT 33.8* 35.4* 28.4*    275 218       All pertinent labs results from the past 24 hours have been reviewed.    Significant Imaging:  All pertinent imaging results/findings from the past 24 hours have been reviewed.      Assessment/Plan:     Left ureteral calculus   - 6mm L mid ureteral stone in ureter draining upper pole moiety    - S/p L ureteral stent placement in ureter draining upper pole moiety under ultrasound guidance 10/2   - Once infection cleared, will plan for outpatient ureteroscopy during second trimester (in 1-2 weeks).   - Consider Flomax if safe from OB standpoint   - Antibiotics / IVF   - Strain all urine    Duplicated left renal collecting system   - Upper pole obstruction 2/2 stone s/p stent placement in ureter draining upper pole moiety 10/2        VTE Risk Mitigation (From admission, onward)         Ordered     Place sequential compression device  Until discontinued      10/02/19 0908     IP VTE HIGH RISK PATIENT  Once      10/02/19 0908                Berny Alvarez MD  Urology  [facility]

## 2019-10-03 NOTE — SUBJECTIVE & OBJECTIVE
Interval History:     S/p L ureteral stent placement in ureter draining upper pole moiety  WBC trending down  Cr WNL and UOP excellent  Afebrile overnight, vital signs stable    Objective:     Temp:  [97.2 °F (36.2 °C)-101.3 °F (38.5 °C)] 99.7 °F (37.6 °C)  Pulse:  [] 108  Resp:  [16-20] 18  SpO2:  [92 %-100 %] 96 %  BP: (108-136)/(56-76) 109/58     Body mass index is 43.08 kg/m².           Drains     None                 Physical Exam   Nursing note and vitals reviewed.  Constitutional: She is oriented to person, place, and time. She appears well-developed and well-nourished. No distress.   HENT:   Head: Normocephalic and atraumatic.   Eyes: Pupils are equal, round, and reactive to light. Right eye exhibits no discharge. Left eye exhibits no discharge.   Abdominal: Soft. She exhibits no distension. There is no tenderness.   Gravid  L CVA tenderness   Neurological: She is alert and oriented to person, place, and time.   Skin: Skin is warm and dry.     Psychiatric: She has a normal mood and affect. Her behavior is normal. Judgment and thought content normal.       Significant Labs:    BMP:  Recent Labs   Lab 10/01/19  1326 10/03/19  0616    136   K 3.7 3.7    105   CO2 22* 22*   BUN 8 6   CREATININE 0.8 0.6   CALCIUM 9.2 8.1*       CBC:   Recent Labs   Lab 10/01/19  1326 10/02/19  0650 10/03/19  0616   WBC 13.47* 14.00* 12.03   HGB 11.8* 12.3 9.9*   HCT 33.8* 35.4* 28.4*    275 218       All pertinent labs results from the past 24 hours have been reviewed.    Significant Imaging:  All pertinent imaging results/findings from the past 24 hours have been reviewed.

## 2019-10-03 NOTE — PROGRESS NOTES
[facility]  Obstetrics  Antepartum Progress Note    Patient Name: Krupa Melton  MRN: [id number]  Admission Date: 10/2/2019  Hospital Length of Stay: 1 days  Attending Physician: Dorina Chicas MD  Primary Care Provider: Roshni Frazier MD    Subjective:     Principal Problem:Pyelonephritis affecting pregnancy in second trimester    HPI:     Krupa Melton is a 29 y.o. [id number] at 24w0d presents complaining of fever at home 100.9 orally x1. Patient denies sick contacts. Patient reports flank pain since this weekend and saw primary OB who referred her to urology. 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 Left renal US showed hydronephrosis, read pasted below. Urology noticed 3mm stone from 2017 CT and blood on urine dipstick. Patient counseled to return if had fever at home. On presentation patient afebrile but tachycardic to 110s. Denies dysuria, reports ongoing flank pain. Urine culture from yesterday pending.     In [facility] patient had continued leukocytosis with left shift and negative flu swab. Clinical symptoms improved with rocephin and fluids. Patient reported had history of stents placed in 2016 for stone.      Retroperitoneal US 10/1:     Duplicated collecting system on the left with mild hydronephrosis of the upper pole moiety.  A discrete renal calculus is not identified.  Findings could be due to gravid state, although a recently passed small calculus may also be considered.    Hospital Course:  10/02/2019 - Admitted for empiric treatment of pyelo as well as CT renal. To OR with urology for stent placement.    Subjective:  POD #1 s/p cystoscopy, L ureteral stent placement per urology    Patient reports no contractions, active fetal movement, no vaginal bleeding , no loss of fluid. She continues to have flank pain and now complains of slight irritation when she urinates. Tmax of 100.4 at 1930 10/2.     Objective:     Vital Signs (Most Recent):  Temp: 99.7 °F  (37.6 °C) (10/03/19 0434)  Pulse: 108 (10/03/19 0434)  Resp: 18 (10/03/19 0434)  BP: (!) 109/58 (10/03/19 0434)  SpO2: 96 % (10/03/19 0434) Vital Signs (24h Range):  Temp:  [97.2 °F (36.2 °C)-101.3 °F (38.5 °C)] 99.7 °F (37.6 °C)  Pulse:  [] 108  Resp:  [16-20] 18  SpO2:  [90 %-100 %] 96 %  BP: (108-136)/(56-88) 109/58     Weight: 113.9 kg (251 lb)  Body mass index is 43.08 kg/m².      Intake/Output Summary (Last 24 hours) at 10/3/2019 0709  Last data filed at 10/3/2019 0500  Gross per 24 hour   Intake 1954.17 ml   Output 3100 ml   Net -1145.83 ml       Cervical Exam: Deferred     Significant Labs:  Recent Lab Results       10/03/19  0616   10/02/19  0712        Albumin 2.3       Alkaline Phosphatase 59       ALT 6       Anion Gap 9       Appearance, UA   Clear     AST 13       Baso # 0.03       Basophil% 0.2       Bilirubin (UA)   Negative     BILIRUBIN TOTAL 0.2  Comment:  For infants and newborns, interpretation of results should be based  on gestational age, weight and in agreement with clinical  observations.  Premature Infant recommended reference ranges:  Up to 24 hours.............<8.0 mg/dL  Up to 48 hours............<12.0 mg/dL  3-5 days..................<15.0 mg/dL  6-29 days.................<15.0 mg/dL         BUN, Bld 6       Calcium 8.1       Chloride 105       CO2 22       Color, UA   Yellow     Creatinine 0.6       Differential Method Automated       eGFR if  >60       eGFR if non  >60  Comment:  Calculation used to obtain the estimated glomerular filtration  rate (eGFR) is the CKD-EPI equation.          Eos # 0.0       Eosinophil% 0.2       Glucose 100       Glucose, UA   Negative     Gran # (ANC) 9.8       Gran% 81.3       Hematocrit 28.4       Hemoglobin 9.9       Immature Grans (Abs) 0.07  Comment:  Mild elevation in immature granulocytes is non specific and   can be seen in a variety of conditions including stress response,   acute inflammation, trauma and  pregnancy. Correlation with other   laboratory and clinical findings is essential.         Immature Granulocytes 0.6       Ketones, UA   Negative     Leukocytes, UA   Negative     Lymph # 1.2       Lymph% 10.1       MCH 32.4       MCHC 34.9       MCV 93       Mono # 0.9       Mono% 7.6       MPV 10.2       NITRITE UA   Negative     nRBC 0       Occult Blood UA   Trace     pH, UA   8.0     Platelets 218       Potassium 3.7       PROTEIN TOTAL 5.6       Protein, UA   Negative  Comment:  Recommend a 24 hour urine protein or a urine   protein/creatinine ratio if globulin induced proteinuria is  clinically suspected.       RBC 3.06       RDW 12.9       Sodium 136       Specific Gravity, UA   1.015     Specimen UA   Urine, Clean Catch     UROBILINOGEN UA   Negative     WBC 12.03             Physical Exam:   Constitutional: She is oriented to person, place, and time. She appears well-developed and well-nourished. No distress.    HENT:   Head: Normocephalic and atraumatic.    Eyes: Conjunctivae and EOM are normal.    Neck: Normal range of motion. Neck supple.    Cardiovascular: Normal rate and regular rhythm.     Pulmonary/Chest: Effort normal. No respiratory distress.        Abdominal: Soft. She exhibits no distension. There is no tenderness.             Musculoskeletal: Moves all extremeties. She exhibits no edema or tenderness.       Neurological: She is alert and oriented to person, place, and time.    Skin: Skin is warm and dry. No rash noted. She is not diaphoretic.    Psychiatric: She has a normal mood and affect. Her behavior is normal.       Assessment/Plan:     29 y.o. female  at 24w1d for:    * Pyelonephritis affecting pregnancy in second trimester  - Patient with flank pain, fevers on admission.  - CT 10/2: Mild hydroureteronephrosis of the left upper pole with a 6 mm calculus in the midportion of the ureter. There is a duplicated collecting system on the left.  No dilatation of the lower pole collecting  system. Punctate nonobstructing calculi in the right interpolar kidney and left lower pole kidney.  - POD #1 s/p cysto, L ureteral stent placement per urology  - WBC 14 > 12  - Tlast: 100.4 @ 1930 10/2  - Continue rocephin 2g q24  - IVF until patient tolerating diet without nausea  - Urine culture pending    24 weeks gestation of pregnancy  - NST BID  - BMZ deferred          Megha Mariscal MD  Obstetrics  [facility]

## 2019-10-04 ENCOUNTER — TELEPHONE (OUTPATIENT)
Dept: UROLOGY | Facility: CLINIC | Age: 29
End: 2019-10-04

## 2019-10-04 LAB
BASOPHILS # BLD AUTO: 0.03 K/UL (ref 0–0.2)
BASOPHILS NFR BLD: 0.3 % (ref 0–1.9)
DIFFERENTIAL METHOD: ABNORMAL
EOSINOPHIL # BLD AUTO: 0.1 K/UL (ref 0–0.5)
EOSINOPHIL NFR BLD: 1.2 % (ref 0–8)
ERYTHROCYTE [DISTWIDTH] IN BLOOD BY AUTOMATED COUNT: 13.1 % (ref 11.5–14.5)
HCT VFR BLD AUTO: 29 % (ref 37–48.5)
HGB BLD-MCNC: 9.8 G/DL (ref 12–16)
IMM GRANULOCYTES # BLD AUTO: 0.06 K/UL (ref 0–0.04)
IMM GRANULOCYTES NFR BLD AUTO: 0.6 % (ref 0–0.5)
LYMPHOCYTES # BLD AUTO: 2 K/UL (ref 1–4.8)
LYMPHOCYTES NFR BLD: 18 % (ref 18–48)
MCH RBC QN AUTO: 31.5 PG (ref 27–31)
MCHC RBC AUTO-ENTMCNC: 33.8 G/DL (ref 32–36)
MCV RBC AUTO: 93 FL (ref 82–98)
MONOCYTES # BLD AUTO: 1.1 K/UL (ref 0.3–1)
MONOCYTES NFR BLD: 10.3 % (ref 4–15)
NEUTROPHILS # BLD AUTO: 7.6 K/UL (ref 1.8–7.7)
NEUTROPHILS NFR BLD: 69.6 % (ref 38–73)
NRBC BLD-RTO: 0 /100 WBC
PLATELET # BLD AUTO: 212 K/UL (ref 150–350)
PMV BLD AUTO: 9.7 FL (ref 9.2–12.9)
RBC # BLD AUTO: 3.11 M/UL (ref 4–5.4)
WBC # BLD AUTO: 10.89 K/UL (ref 3.9–12.7)

## 2019-10-04 PROCEDURE — 63600175 PHARM REV CODE 636 W HCPCS: Performed by: STUDENT IN AN ORGANIZED HEALTH CARE EDUCATION/TRAINING PROGRAM

## 2019-10-04 PROCEDURE — 25000003 PHARM REV CODE 250: Performed by: STUDENT IN AN ORGANIZED HEALTH CARE EDUCATION/TRAINING PROGRAM

## 2019-10-04 PROCEDURE — 85025 COMPLETE CBC W/AUTO DIFF WBC: CPT

## 2019-10-04 PROCEDURE — 99233 PR SUBSEQUENT HOSPITAL CARE,LEVL III: ICD-10-PCS | Mod: ,,, | Performed by: UROLOGY

## 2019-10-04 PROCEDURE — 99231 SBSQ HOSP IP/OBS SF/LOW 25: CPT | Mod: 25,,, | Performed by: OBSTETRICS & GYNECOLOGY

## 2019-10-04 PROCEDURE — 94761 N-INVAS EAR/PLS OXIMETRY MLT: CPT

## 2019-10-04 PROCEDURE — 59025 FETAL NON-STRESS TEST: CPT | Mod: 26,,, | Performed by: OBSTETRICS & GYNECOLOGY

## 2019-10-04 PROCEDURE — 25000003 PHARM REV CODE 250: Performed by: OBSTETRICS & GYNECOLOGY

## 2019-10-04 PROCEDURE — 99233 SBSQ HOSP IP/OBS HIGH 50: CPT | Mod: ,,, | Performed by: UROLOGY

## 2019-10-04 PROCEDURE — 36415 COLL VENOUS BLD VENIPUNCTURE: CPT

## 2019-10-04 PROCEDURE — 99231 PR SUBSEQUENT HOSPITAL CARE,LEVL I: ICD-10-PCS | Mod: 25,,, | Performed by: OBSTETRICS & GYNECOLOGY

## 2019-10-04 PROCEDURE — 59025 PR FETAL 2N-STRESS TEST: ICD-10-PCS | Mod: 26,,, | Performed by: OBSTETRICS & GYNECOLOGY

## 2019-10-04 PROCEDURE — 11000001 HC ACUTE MED/SURG PRIVATE ROOM

## 2019-10-04 RX ORDER — CEPHALEXIN 500 MG/1
500 CAPSULE ORAL EVERY 6 HOURS
Status: DISCONTINUED | OUTPATIENT
Start: 2019-10-04 | End: 2019-10-05 | Stop reason: HOSPADM

## 2019-10-04 RX ADMIN — SODIUM CHLORIDE, SODIUM LACTATE, POTASSIUM CHLORIDE, AND CALCIUM CHLORIDE: .6; .31; .03; .02 INJECTION, SOLUTION INTRAVENOUS at 01:10

## 2019-10-04 RX ADMIN — PANTOPRAZOLE SODIUM 40 MG: 40 TABLET, DELAYED RELEASE ORAL at 09:10

## 2019-10-04 RX ADMIN — CEPHALEXIN 500 MG: 500 CAPSULE ORAL at 08:10

## 2019-10-04 RX ADMIN — CEFTRIAXONE SODIUM 2 G: 2 INJECTION, SOLUTION INTRAVENOUS at 07:10

## 2019-10-04 RX ADMIN — PRENATAL VIT W/ FE FUMARATE-FA TAB 27-0.8 MG 1 TABLET: 27-0.8 TAB at 09:10

## 2019-10-04 RX ADMIN — ACETAMINOPHEN 650 MG: 325 TABLET, FILM COATED ORAL at 07:10

## 2019-10-04 NOTE — PROGRESS NOTES
Urine culture collected on 10/1 resulted today. Positive for alpha hemolytic strep. Susceptibility not performed. Discussed with Dr. Ordonez with infectious disease who recommended switching to keflex for further treatment. Rocephin discontinued. Will start keflex 500 mg q 6 hours.    Megha Mariscal MD  OBGYN PGY-2

## 2019-10-04 NOTE — PLAN OF CARE
Pt doing well.  Febrile x 1 occurrence, otherwise VS stable.  Complaining of 3-5/10 pain when active. Pt denies LOF, vaginal bleeding, and contractions.  Pt reports positive fetal movement. Pt denies headaches, blurry vision, and epigastric pain.  Urinary output and reflexes WNL.

## 2019-10-04 NOTE — PROGRESS NOTES
[facility]  Obstetrics  Antepartum Progress Note    Patient Name: Krupa Melton  MRN: [id number]  Admission Date: 10/2/2019  Hospital Length of Stay: 2 days  Attending Physician: Dorina Chicas MD  Primary Care Provider: Roshni Frazier MD    Subjective:     Principal Problem:Pyelonephritis affecting pregnancy in second trimester    HPI:     Krupa Melton is a 29 y.o. [id number] at 24w0d presents complaining of fever at home 100.9 orally x1. Patient denies sick contacts. Patient reports flank pain since this weekend and saw primary OB who referred her to urology. 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 Left renal US showed hydronephrosis, read pasted below. Urology noticed 3mm stone from 2017 CT and blood on urine dipstick. Patient counseled to return if had fever at home. On presentation patient afebrile but tachycardic to 110s. Denies dysuria, reports ongoing flank pain. Urine culture from yesterday pending.     In [facility] patient had continued leukocytosis with left shift and negative flu swab. Clinical symptoms improved with rocephin and fluids. Patient reported had history of stents placed in 2016 for stone.      Retroperitoneal US 10/1:     Duplicated collecting system on the left with mild hydronephrosis of the upper pole moiety.  A discrete renal calculus is not identified.  Findings could be due to gravid state, although a recently passed small calculus may also be considered.    Hospital Course:  10/02/2019 - Admit for empiric treatment of pyelo as well as CT renal and possible stent per urology  10/3/2019 - POD #1 s/p stent with urology. Symptomatic improvement. Temp to 100.4 at 2030.    Obstetric HPI:  Patient reports nocontractions, active fetal movement, no vaginal bleeding, no loss of fluid. She continues to have mild flank pain but states that it is improved. Mild irritation with urination but also feels that this is improved.     Objective:     Vital  Signs (Most Recent):  Temp: 97.8 °F (36.6 °C) (10/04/19 0341)  Pulse: 95 (10/04/19 0341)  Resp: 16 (10/04/19 0341)  BP: (!) 112/59 (10/04/19 0341)  SpO2: 98 % (10/04/19 0341) Vital Signs (24h Range):  Temp:  [97.8 °F (36.6 °C)-100.4 °F (38 °C)] 97.8 °F (36.6 °C)  Pulse:  [] 95  Resp:  [16] 16  SpO2:  [95 %-99 %] 98 %  BP: ()/(51-63) 112/59     Weight: 113.9 kg (251 lb)  Body mass index is 43.08 kg/m².    AM NST not yet performed.    No intake or output data in the 24 hours ending 10/04/19 0649    Cervical Exam: Deferred     Significant Labs:  Recent Lab Results     None          Physical Exam:   Constitutional: She is oriented to person, place, and time. She appears well-developed and well-nourished. No distress.    HENT:   Head: Normocephalic and atraumatic.    Eyes: Conjunctivae and EOM are normal.    Neck: Normal range of motion. Neck supple.    Cardiovascular: Normal rate and regular rhythm.     Pulmonary/Chest: Effort normal. No respiratory distress.        Abdominal: Soft. She exhibits no distension. There is no tenderness.   Gravid             Musculoskeletal: Moves all extremeties. She exhibits no edema or tenderness.       Neurological: She is alert and oriented to person, place, and time.    Skin: Skin is warm and dry. No rash noted. She is not diaphoretic.    Psychiatric: She has a normal mood and affect. Her behavior is normal.       Assessment/Plan:     29 y.o. female  at 24w2d for:    * Pyelonephritis affecting pregnancy in second trimester  - Patient with flank pain, fevers on admission.  - CT 10/2: Mild hydroureteronephrosis of the left upper pole with a 6 mm calculus in the midportion of the ureter. There is a duplicated collecting system on the left.  No dilatation of the lower pole collecting system. Punctate nonobstructing calculi in the right interpolar kidney and left lower pole kidney.  - POD #2 s/p cysto, L ureteral stent placement per urology  - WBC 14 > 12 > pending  -  Tlast: 100.4 @ 2030 10/3  - Continue rocephin 2g q24  - IVF until patient tolerating diet without nausea  - Urine culture pending    History of nephrolithiasis  - Urology consulted  - 3mm stone seen on 2017 CT scan, repeat CT renal stone study now  - Risks of CT discussed, see care update note regarding risks discussed. Patient agrees to imaging    24 weeks gestation of pregnancy  - NST BID  - BMZ deferred          Megha Mariscal MD  Obstetrics  [facility]

## 2019-10-04 NOTE — TELEPHONE ENCOUNTER
----- Message from Skyler Em MD sent at 10/4/2019  4:03 PM CDT -----  Pt needs ureteroscopy   She is in the hospital but will probably go home tomorrow  Please see Rosangela to check urine cs then schedule this with Dr Padilla or Dr Guevara    Thanks    Sc

## 2019-10-04 NOTE — SUBJECTIVE & OBJECTIVE
Interval History:     Still endorsing stent pain. UOP good. WBC trending down. Febrile overnight, vital signs stable.    Objective:     Temp:  [97.8 °F (36.6 °C)-100.4 °F (38 °C)] 97.8 °F (36.6 °C)  Pulse:  [] 95  Resp:  [16] 16  SpO2:  [95 %-98 %] 98 %  BP: ()/(51-63) 112/59     Body mass index is 43.08 kg/m².           Drains     None                 Physical Exam   Nursing note and vitals reviewed.  Constitutional: She is oriented to person, place, and time. She appears well-developed and well-nourished. No distress.   HENT:   Head: Normocephalic and atraumatic.   Eyes: Pupils are equal, round, and reactive to light. Right eye exhibits no discharge. Left eye exhibits no discharge.   Abdominal: Soft. She exhibits no distension. There is no tenderness.   Gravid  L CVA tenderness   Neurological: She is alert and oriented to person, place, and time.   Skin: Skin is warm and dry.     Psychiatric: She has a normal mood and affect. Her behavior is normal. Judgment and thought content normal.       Significant Labs:    BMP:  Recent Labs   Lab 10/01/19  1326 10/03/19  0616    136   K 3.7 3.7    105   CO2 22* 22*   BUN 8 6   CREATININE 0.8 0.6   CALCIUM 9.2 8.1*       CBC:   Recent Labs   Lab 10/02/19  0650 10/03/19  0616 10/04/19  0700   WBC 14.00* 12.03 10.89   HGB 12.3 9.9* 9.8*   HCT 35.4* 28.4* 29.0*    218 212       All pertinent labs results from the past 24 hours have been reviewed.    Significant Imaging:  All pertinent imaging results/findings from the past 24 hours have been reviewed.

## 2019-10-04 NOTE — ASSESSMENT & PLAN NOTE
- Patient with flank pain, fevers on admission.  - CT 10/2: Mild hydroureteronephrosis of the left upper pole with a 6 mm calculus in the midportion of the ureter. There is a duplicated collecting system on the left.  No dilatation of the lower pole collecting system. Punctate nonobstructing calculi in the right interpolar kidney and left lower pole kidney.  - POD #2 s/p cysto, L ureteral stent placement per urology  - WBC 14 > 12 > pending  - Tlast: 100.4 @ 2030 10/3  - Continue rocephin 2g q24  - IVF until patient tolerating diet without nausea  - Urine culture pending

## 2019-10-04 NOTE — PROGRESS NOTES
[facility]  Urology  Progress Note    Patient Name: Krupa Melton  MRN: [id number]  Admission Date: 10/2/2019  Hospital Length of Stay: 2 days  Code Status: Full Code   Attending Provider: Dorina Chicas MD   Primary Care Physician: Roshni Frazier MD    Subjective:     HPI:  28yo F 24wk GA with L flank pain starting 3 days ago. She has h/o nephrolithiasis, passed 7mm stone several years ago. She has known complete L collecting system duplication. CT shows 6mm L mid ureteral stone with proximal hydronephrosis of upper pole moeity. She had LG fever 100.6 at home prior to ER presentation today. She has been afebrile since then. Denies dysuria.     Interval History:     Still endorsing stent pain. UOP good. WBC trending down. Febrile overnight, vital signs stable.    Objective:     Temp:  [97.8 °F (36.6 °C)-100.4 °F (38 °C)] 97.8 °F (36.6 °C)  Pulse:  [] 95  Resp:  [16] 16  SpO2:  [95 %-98 %] 98 %  BP: ()/(51-63) 112/59     Body mass index is 43.08 kg/m².           Drains     None                 Physical Exam   Nursing note and vitals reviewed.  Constitutional: She is oriented to person, place, and time. She appears well-developed and well-nourished. No distress.   HENT:   Head: Normocephalic and atraumatic.   Eyes: Pupils are equal, round, and reactive to light. Right eye exhibits no discharge. Left eye exhibits no discharge.   Abdominal: Soft. She exhibits no distension. There is no tenderness.   Gravid  L CVA tenderness   Neurological: She is alert and oriented to person, place, and time.   Skin: Skin is warm and dry.     Psychiatric: She has a normal mood and affect. Her behavior is normal. Judgment and thought content normal.       Significant Labs:    BMP:  Recent Labs   Lab 10/01/19  1326 10/03/19  0616    136   K 3.7 3.7    105   CO2 22* 22*   BUN 8 6   CREATININE 0.8 0.6   CALCIUM 9.2 8.1*       CBC:   Recent Labs   Lab 10/02/19  0650 10/03/19  0616  10/04/19  0700   WBC 14.00* 12.03 10.89   HGB 12.3 9.9* 9.8*   HCT 35.4* 28.4* 29.0*    218 212       All pertinent labs results from the past 24 hours have been reviewed.    Significant Imaging:  All pertinent imaging results/findings from the past 24 hours have been reviewed.      Assessment/Plan:     Left ureteral calculus   - 6mm L mid ureteral stone in ureter draining upper pole moiety    - S/p L ureteral stent placement in ureter draining upper pole moiety under ultrasound guidance 10/2   - Once infection cleared, will plan for outpatient ureteroscopy during second trimester (in 1-2 weeks).   - Consider Flomax if safe from OB standpoint   - Antibiotics / IVF   - Strain all urine    Duplicated left renal collecting system   - Upper pole obstruction 2/2 stone s/p stent placement in ureter draining upper pole moiety 10/2        VTE Risk Mitigation (From admission, onward)         Ordered     Place sequential compression device  Until discontinued      10/02/19 0908     IP VTE HIGH RISK PATIENT  Once      10/02/19 0908                Berny Alvarez MD  Urology  [facility]

## 2019-10-04 NOTE — PLAN OF CARE
10/04/19 1515   Discharge Assessment   Assessment Type Discharge Planning Assessment   Confirmed/corrected address and phone number on facesheet? Yes   Assessment information obtained from? Patient   Prior to hospitilization cognitive status: Alert/Oriented   Prior to hospitalization functional status: Independent   Current cognitive status: Alert/Oriented   Current Functional Status: Independent   Lives With child(med), dependent;spouse   Able to Return to Prior Arrangements yes   Do you have any problems affording any of your prescribed medications? No   Is the patient taking medications as prescribed? yes   Does the patient have transportation home? Yes   Transportation Anticipated family or friend will provide   Discharge Plan A Home with family       Introduced self to pt and explained sw role. No anticipated d/c needs at this time. Should any d/c needs arise, please consult social work. Emotional support provided.    Pam Mccall LCSW    [facility]  [email or web address]    (phone) [phone] or  [place]. [phone]  (fax) [phone]

## 2019-10-04 NOTE — TELEPHONE ENCOUNTER
All,    I  scheduled her on Wednesday to see Rosangela, with a preop to follow, although she will most likely not need it.  She would prefer to have her procedure done at [facility].  Thanks.

## 2019-10-04 NOTE — TELEPHONE ENCOUNTER
All,    I scheduled her on Wednesday to see Rosangela, with a preop to follow, although she will most likely not need it.  She would prefer to have her procedure done at [facility].  Thanks.

## 2019-10-04 NOTE — SUBJECTIVE & OBJECTIVE
Obstetric HPI:  Patient reports nocontractions, active fetal movement, no vaginal bleeding, no loss of fluid. She continues to have mild flank pain but states that it is improved. Mild irritation with urination but also feels that this is improved.     Objective:     Vital Signs (Most Recent):  Temp: 97.8 °F (36.6 °C) (10/04/19 0341)  Pulse: 95 (10/04/19 0341)  Resp: 16 (10/04/19 0341)  BP: (!) 112/59 (10/04/19 0341)  SpO2: 98 % (10/04/19 0341) Vital Signs (24h Range):  Temp:  [97.8 °F (36.6 °C)-100.4 °F (38 °C)] 97.8 °F (36.6 °C)  Pulse:  [] 95  Resp:  [16] 16  SpO2:  [95 %-99 %] 98 %  BP: ()/(51-63) 112/59     Weight: 113.9 kg (251 lb)  Body mass index is 43.08 kg/m².    AM NST not yet performed.    No intake or output data in the 24 hours ending 10/04/19 0649    Cervical Exam: Deferred     Significant Labs:  Recent Lab Results     None          Physical Exam:   Constitutional: She is oriented to person, place, and time. She appears well-developed and well-nourished. No distress.    HENT:   Head: Normocephalic and atraumatic.    Eyes: Conjunctivae and EOM are normal.    Neck: Normal range of motion. Neck supple.    Cardiovascular: Normal rate and regular rhythm.     Pulmonary/Chest: Effort normal. No respiratory distress.        Abdominal: Soft. She exhibits no distension. There is no tenderness.   Gravid             Musculoskeletal: Moves all extremeties. She exhibits no edema or tenderness.       Neurological: She is alert and oriented to person, place, and time.    Skin: Skin is warm and dry. No rash noted. She is not diaphoretic.    Psychiatric: She has a normal mood and affect. Her behavior is normal.

## 2019-10-04 NOTE — TELEPHONE ENCOUNTER
----- Message from Marsha Guevara MD sent at 10/4/2019  4:30 PM CDT -----  I sent a message to my staff earlier about her today also to get her scheduled (though I don't see an appt yet). 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 I hope to be able to get her in and stone treated in next 2-3 weeks at either [facility] or . Thanks!      ----- Message -----  From: Skyler Em MD  Sent: 10/4/2019   4:03 PM CDT  To: Marsha Guevara MD, #    Pt needs ureteroscopy   She is in the hospital but will probably go home tomorrow  Please see Rosangela to check urine cs then schedule this with Dr Padilla or Dr Guevara    Thanks    Sc

## 2019-10-05 VITALS
BODY MASS INDEX: 42.85 KG/M2 | OXYGEN SATURATION: 97 % | RESPIRATION RATE: 16 BRPM | WEIGHT: 251 LBS | SYSTOLIC BLOOD PRESSURE: 127 MMHG | DIASTOLIC BLOOD PRESSURE: 69 MMHG | HEIGHT: 64 IN | HEART RATE: 84 BPM | TEMPERATURE: 98 F

## 2019-10-05 PROCEDURE — 99238 PR HOSPITAL DISCHARGE DAY,<30 MIN: ICD-10-PCS | Mod: 25,,, | Performed by: OBSTETRICS & GYNECOLOGY

## 2019-10-05 PROCEDURE — 99238 HOSP IP/OBS DSCHRG MGMT 30/<: CPT | Mod: 25,,, | Performed by: OBSTETRICS & GYNECOLOGY

## 2019-10-05 PROCEDURE — 59025 PR FETAL 2N-STRESS TEST: ICD-10-PCS | Mod: 26,,, | Performed by: OBSTETRICS & GYNECOLOGY

## 2019-10-05 PROCEDURE — 99232 PR SUBSEQUENT HOSPITAL CARE,LEVL II: ICD-10-PCS | Mod: ,,, | Performed by: UROLOGY

## 2019-10-05 PROCEDURE — 94761 N-INVAS EAR/PLS OXIMETRY MLT: CPT

## 2019-10-05 PROCEDURE — 63600175 PHARM REV CODE 636 W HCPCS: Performed by: STUDENT IN AN ORGANIZED HEALTH CARE EDUCATION/TRAINING PROGRAM

## 2019-10-05 PROCEDURE — 59025 FETAL NON-STRESS TEST: CPT | Mod: 26,,, | Performed by: OBSTETRICS & GYNECOLOGY

## 2019-10-05 PROCEDURE — 99232 SBSQ HOSP IP/OBS MODERATE 35: CPT | Mod: ,,, | Performed by: UROLOGY

## 2019-10-05 PROCEDURE — 25000003 PHARM REV CODE 250: Performed by: STUDENT IN AN ORGANIZED HEALTH CARE EDUCATION/TRAINING PROGRAM

## 2019-10-05 RX ORDER — ACETAMINOPHEN 325 MG/1
650 TABLET ORAL EVERY 4 HOURS PRN
Status: DISCONTINUED | OUTPATIENT
Start: 2019-10-05 | End: 2019-10-05 | Stop reason: HOSPADM

## 2019-10-05 RX ORDER — CEPHALEXIN 500 MG/1
500 CAPSULE ORAL EVERY 6 HOURS
Qty: 52 CAPSULE | Refills: 0 | Status: SHIPPED | OUTPATIENT
Start: 2019-10-05 | End: 2019-10-18

## 2019-10-05 RX ORDER — OXYCODONE HYDROCHLORIDE 5 MG/1
5 TABLET ORAL EVERY 6 HOURS PRN
Qty: 12 TABLET | Refills: 0 | Status: SHIPPED | OUTPATIENT
Start: 2019-10-05 | End: 2019-10-09

## 2019-10-05 RX ORDER — OXYBUTYNIN CHLORIDE 5 MG/1
5 TABLET ORAL 3 TIMES DAILY
Status: DISCONTINUED | OUTPATIENT
Start: 2019-10-05 | End: 2019-10-05 | Stop reason: HOSPADM

## 2019-10-05 RX ORDER — CEPHALEXIN 500 MG/1
500 CAPSULE ORAL EVERY 6 HOURS
Qty: 24 CAPSULE | Refills: 0 | Status: SHIPPED | OUTPATIENT
Start: 2019-10-05 | End: 2019-10-05

## 2019-10-05 RX ORDER — OXYBUTYNIN CHLORIDE 5 MG/1
5 TABLET ORAL 3 TIMES DAILY
Qty: 90 TABLET | Refills: 11 | Status: SHIPPED | OUTPATIENT
Start: 2019-10-05 | End: 2020-03-10

## 2019-10-05 RX ADMIN — OXYCODONE HYDROCHLORIDE 10 MG: 5 TABLET ORAL at 12:10

## 2019-10-05 RX ADMIN — CEPHALEXIN 500 MG: 500 CAPSULE ORAL at 12:10

## 2019-10-05 RX ADMIN — CEPHALEXIN 500 MG: 500 CAPSULE ORAL at 05:10

## 2019-10-05 RX ADMIN — SODIUM CHLORIDE, SODIUM LACTATE, POTASSIUM CHLORIDE, AND CALCIUM CHLORIDE: .6; .31; .03; .02 INJECTION, SOLUTION INTRAVENOUS at 05:10

## 2019-10-05 NOTE — ASSESSMENT & PLAN NOTE
- Urology consulted  - 3mm stone seen on 2017 CT scan, repeat CT renal stone study now  - Risks of CT discussed, see care update note regarding risks discussed. Patient agreed to imaging: Mild hydroureteronephrosis of the left upper pole with a 6 mm calculus in the midportion of the ureter.  There is a duplicated collecting system on the left.  No dilatation of the lower pole collecting system.    Punctate nonobstructing calculi in the right interpolar kidney and left lower pole kidney.

## 2019-10-05 NOTE — PROGRESS NOTES
[facility]  Urology  Progress Note    Patient Name: Krupa Melton  MRN: [id number]  Admission Date: 10/2/2019  Hospital Length of Stay: 3 days  Code Status: Full Code   Attending Provider: Dorina Chicas MD   Primary Care Physician: Roshni Frazier MD    Subjective:     HPI:  30yo F 24wk GA with L flank pain starting 3 days ago. She has h/o nephrolithiasis, passed 7mm stone several years ago. She has known complete L collecting system duplication. CT shows 6mm L mid ureteral stone with proximal hydronephrosis of upper pole moeity. She had LG fever 100.6 at home prior to ER presentation today. She has been afebrile since then. Denies dysuria.     Interval History: Tolerating Stent.  Looking forward to going home.    Review of Systems   Constitutional: Negative.    HENT: Negative.    Eyes: Negative.    Respiratory: Negative for cough, chest tightness and shortness of breath.    Cardiovascular: Negative for chest pain.   Gastrointestinal: Negative.  Negative for constipation, diarrhea and nausea.   Musculoskeletal: Negative.    Neurological: Negative.    Psychiatric/Behavioral: Negative.      Objective:     Temp:  [96.3 °F (35.7 °C)-99.6 °F (37.6 °C)] 98.4 °F (36.9 °C)  Pulse:  [79-98] 84  Resp:  [16-20] 16  SpO2:  [95 %-99 %] 97 %  BP: (106-130)/(55-74) 127/69     Body mass index is 43.08 kg/m².           Drains     None                 Physical Exam   Nursing note and vitals reviewed.  Constitutional: She is oriented to person, place, and time. She appears well-developed.   HENT:   Head: Normocephalic.   Eyes: Conjunctivae are normal.   Neck: Normal range of motion. No tracheal deviation present. No thyromegaly present.   Cardiovascular: Normal rate, normal heart sounds and normal pulses.    Pulmonary/Chest: Effort normal and breath sounds normal. No respiratory distress. She has no wheezes.   Abdominal: Soft. She exhibits no distension and no mass. There is no hepatosplenomegaly. There is  no tenderness. There is no rebound, no guarding and no CVA tenderness. No hernia.   Musculoskeletal: Normal range of motion. She exhibits no edema or tenderness.   Lymphadenopathy:     She has no cervical adenopathy.   Neurological: She is alert and oriented to person, place, and time.   Skin: Skin is warm and dry. No rash noted. No erythema.     Psychiatric: She has a normal mood and affect. Her behavior is normal. Judgment and thought content normal.       Significant Labs:    BMP:  Recent Labs   Lab 10/01/19  1326 10/03/19  0616    136   K 3.7 3.7    105   CO2 22* 22*   BUN 8 6   CREATININE 0.8 0.6   CALCIUM 9.2 8.1*       CBC:   Recent Labs   Lab 10/02/19  0650 10/03/19  0616 10/04/19  0700   WBC 14.00* 12.03 10.89   HGB 12.3 9.9* 9.8*   HCT 35.4* 28.4* 29.0*    218 212       Blood Culture: No results for input(s): LABBLOO in the last 168 hours.  Urine Culture:   Recent Labs   Lab 10/01/19  0442 10/01/19  1546   LABURIN No significant growth ALPHA HEMOLYTIC STREP  50,000 - 99,999 cfu/ml  Susceptibility testing not routinely performed  *       Significant Imaging:                    Assessment/Plan:     Left ureteral calculus   - 6mm L mid ureteral stone in ureter draining upper pole moiety    - S/p L ureteral stent placement in ureter draining upper pole moiety under ultrasound guidance 10/2   - Once infection cleared, will plan for outpatient ureteroscopy during second trimester (in 1-2 weeks).    Ditropan for spasms     - Antibiotics / IVF   - Strain all urine    Okay to go home from a  standpoint  Follow up this next week with Dr. Guevara to set up ureteroscopy      Duplicated left renal collecting system   - Upper pole obstruction 2/2 stone s/p stent placement in ureter draining upper pole moiety 10/2        VTE Risk Mitigation (From admission, onward)         Ordered     Place sequential compression device  Until discontinued      10/02/19 0908     IP VTE HIGH RISK PATIENT  Once       10/02/19 0908                W Cirilo Vazquez MD  Urology  [facility]

## 2019-10-05 NOTE — SUBJECTIVE & OBJECTIVE
Interval History: Tolerating Stent.  Looking forward to going home.    Review of Systems   Constitutional: Negative.    HENT: Negative.    Eyes: Negative.    Respiratory: Negative for cough, chest tightness and shortness of breath.    Cardiovascular: Negative for chest pain.   Gastrointestinal: Negative.  Negative for constipation, diarrhea and nausea.   Musculoskeletal: Negative.    Neurological: Negative.    Psychiatric/Behavioral: Negative.      Objective:     Temp:  [96.3 °F (35.7 °C)-99.6 °F (37.6 °C)] 98.4 °F (36.9 °C)  Pulse:  [79-98] 84  Resp:  [16-20] 16  SpO2:  [95 %-99 %] 97 %  BP: (106-130)/(55-74) 127/69     Body mass index is 43.08 kg/m².           Drains     None                 Physical Exam   Nursing note and vitals reviewed.  Constitutional: She is oriented to person, place, and time. She appears well-developed.   HENT:   Head: Normocephalic.   Eyes: Conjunctivae are normal.   Neck: Normal range of motion. No tracheal deviation present. No thyromegaly present.   Cardiovascular: Normal rate, normal heart sounds and normal pulses.    Pulmonary/Chest: Effort normal and breath sounds normal. No respiratory distress. She has no wheezes.   Abdominal: Soft. She exhibits no distension and no mass. There is no hepatosplenomegaly. There is no tenderness. There is no rebound, no guarding and no CVA tenderness. No hernia.   Musculoskeletal: Normal range of motion. She exhibits no edema or tenderness.   Lymphadenopathy:     She has no cervical adenopathy.   Neurological: She is alert and oriented to person, place, and time.   Skin: Skin is warm and dry. No rash noted. No erythema.     Psychiatric: She has a normal mood and affect. Her behavior is normal. Judgment and thought content normal.       Significant Labs:    BMP:  Recent Labs   Lab 10/01/19  1326 10/03/19  0616    136   K 3.7 3.7    105   CO2 22* 22*   BUN 8 6   CREATININE 0.8 0.6   CALCIUM 9.2 8.1*       CBC:   Recent Labs   Lab  10/02/19  0650 10/03/19  0616 10/04/19  0700   WBC 14.00* 12.03 10.89   HGB 12.3 9.9* 9.8*   HCT 35.4* 28.4* 29.0*    218 212       Blood Culture: No results for input(s): LABBLOO in the last 168 hours.  Urine Culture:   Recent Labs   Lab 10/01/19  0442 10/01/19  1546   LABURIN No significant growth ALPHA HEMOLYTIC STREP  50,000 - 99,999 cfu/ml  Susceptibility testing not routinely performed  *       Significant Imaging:

## 2019-10-05 NOTE — PROGRESS NOTES
[facility]  Obstetrics  Antepartum Progress Note    Patient Name: Krupa Melton  MRN: [id number]  Admission Date: 10/2/2019  Hospital Length of Stay: 3 days  Attending Physician: Dorina Chicas MD  Primary Care Provider: Roshni Frazier MD    Subjective:     Principal Problem:Pyelonephritis affecting pregnancy in second trimester    HPI:     Krupa Melton is a 29 y.o. [id number] at 24w0d presents complaining of fever at home 100.9 orally x1. Patient denies sick contacts. Patient reports flank pain since this weekend and saw primary OB who referred her to urology. 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 Left renal US showed hydronephrosis, read pasted below. Urology noticed 3mm stone from 2017 CT and blood on urine dipstick. Patient counseled to return if had fever at home. On presentation patient afebrile but tachycardic to 110s. Denies dysuria, reports ongoing flank pain. Urine culture from yesterday pending.     In [facility] patient had continued leukocytosis with left shift and negative flu swab. Clinical symptoms improved with rocephin and fluids. Patient reported had history of stents placed in 2016 for stone.      Retroperitoneal US 10/1:     Duplicated collecting system on the left with mild hydronephrosis of the upper pole moiety.  A discrete renal calculus is not identified.  Findings could be due to gravid state, although a recently passed small calculus may also be considered.    Hospital Course:  10/02/2019 - Admit for empiric treatment of pyelo as well as CT renal and possible stent per urology  10/3/2019 - POD #1 s/p stent with urology. Symptomatic improvement. Temp to 100.4 at 2030.  10/05/2019 POD #3. Afebrile overnight with no acute events. Clinically improved. Medically stable for discharge to home later today. PM NST was reactive and reassuring. UTI grew alpha hemolytic strep. Will send home with RX to continue keflex for 7 days.    Obstetric HPI:  POD  #3. Afebrile overnight with no acute events. Clinically improved. Medically stable for discharge to home later today. PM NST was reactive and reassuring. UTI grew alpha hemolytic strep. Will send home with RX to continue keflex for 7 days.    Patient denies contractions, reports active fetal movement, denies vaginal bleeding, denies loss of fluid      Objective:     Vital Signs (Most Recent):  Temp: 98.8 °F (37.1 °C) (10/05/19 0020)  Pulse: 94 (10/05/19 0020)  Resp: 18 (10/05/19 0020)  BP: (!) 109/55 (10/05/19 0020)  SpO2: 99 % (10/05/19 0020) Vital Signs (24h Range):  Temp:  [97.8 °F (36.6 °C)-99.6 °F (37.6 °C)] 98.8 °F (37.1 °C)  Pulse:  [91-98] 94  Resp:  [16-20] 18  SpO2:  [95 %-99 %] 99 %  BP: (106-130)/(55-74) 109/55     Weight: 113.9 kg (251 lb)  Body mass index is 43.08 kg/m².    PM NST:  FHT: Cat 1 (reassuring) 135 bpm, + 15 x 15 accels, - decels, moderate BTBV  TOCO: no contractions      Intake/Output Summary (Last 24 hours) at 10/5/2019 0139  Last data filed at 10/4/2019 1750  Gross per 24 hour   Intake --   Output 1580 ml   Net -1580 ml       Significant Labs:  Recent Lab Results       10/04/19  0700        Baso # 0.03     Basophil% 0.3     Differential Method Automated     Eos # 0.1     Eosinophil% 1.2     Gran # (ANC) 7.6     Gran% 69.6     Hematocrit 29.0     Hemoglobin 9.8     Immature Grans (Abs) 0.06  Comment:  Mild elevation in immature granulocytes is non specific and   can be seen in a variety of conditions including stress response,   acute inflammation, trauma and pregnancy. Correlation with other   laboratory and clinical findings is essential.       Immature Granulocytes 0.6     Lymph # 2.0     Lymph% 18.0     MCH 31.5     MCHC 33.8     MCV 93     Mono # 1.1     Mono% 10.3     MPV 9.7     nRBC 0     Platelets 212     RBC 3.11     RDW 13.1     WBC 10.89         Physical Exam:   Constitutional: She is oriented to person, place, and time. She appears well-developed and well-nourished. No  distress.    HENT:   Head: Normocephalic and atraumatic.    Eyes: Conjunctivae and EOM are normal.    Neck: Normal range of motion. Neck supple.    Cardiovascular: Normal rate and regular rhythm.     Pulmonary/Chest: Effort normal. No respiratory distress.        Abdominal: Soft. She exhibits no distension. There is no tenderness.   Gravid             Musculoskeletal: Moves all extremeties. She exhibits no edema or tenderness.       Neurological: She is alert and oriented to person, place, and time.    Skin: Skin is warm and dry. No rash noted. She is not diaphoretic.    Psychiatric: She has a normal mood and affect. Her behavior is normal.       Assessment/Plan:     29 y.o. female  at 24w3d for:    * Pyelonephritis affecting pregnancy in second trimester  - Patient with flank pain, fevers on admission.  - CT 10/2: Mild hydroureteronephrosis of the left upper pole with a 6 mm calculus in the midportion of the ureter. There is a duplicated collecting system on the left.  No dilatation of the lower pole collecting system. Punctate nonobstructing calculi in the right interpolar kidney and left lower pole kidney.  - POD #3 s/p cysto, L ureteral stent placement per urology  - WBC 14 > 12 >10.9  - Tlast: 100.4 @ 2030 10/3  - Urine culture growing alpha hemolytic strep   - s/p rocephin 2g q24, transitioned to keflex 500 q6h on 10/4   - will send with RX to compete 7-day course  - patient tolerating diet without nausea      History of nephrolithiasis  - Urology consulted  - 3mm stone seen on  CT scan, repeat CT renal stone study now  - Risks of CT discussed, see care update note regarding risks discussed. Patient agreed to imaging: Mild hydroureteronephrosis of the left upper pole with a 6 mm calculus in the midportion of the ureter.  There is a duplicated collecting system on the left.  No dilatation of the lower pole collecting system.    Punctate nonobstructing calculi in the right interpolar kidney and left  lower pole kidney.    24 weeks gestation of pregnancy  - NST BID  - BMZ deferred    Patient is medically stable for discharge to home today. Will plan to have her RTC for routine prenatal care as scheduled.    Milly Jones MD  Obstetrics  [facility]

## 2019-10-05 NOTE — DISCHARGE SUMMARY
[facility]  Obstetrics  Discharge Summary      Patient Name: Krupa Melton  MRN: [id number]  Admission Date: 10/2/2019  Hospital Length of Stay: 3 days  Discharge Date and Time:  10/05/2019 9:46 AM  Attending Physician: Dorina Chicas MD   Discharging Provider: Megha Mariscal MD   Primary Care Provider: Roshni Frazier MD    HPI:    Krupa Melton is a 29 y.o. [id number] at 24w0d presents complaining of fever at home 100.9 orally x1. Patient denies sick contacts. Patient reports flank pain since this weekend and saw primary OB who referred her to urology. 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 Left renal US showed hydronephrosis, read pasted below. Urology noticed 3mm stone from 2017 CT and blood on urine dipstick. Patient counseled to return if had fever at home. On presentation patient afebrile but tachycardic to 110s. Denies dysuria, reports ongoing flank pain. Urine culture from yesterday pending.     In [facility] patient had continued leukocytosis with left shift and negative flu swab. Clinical symptoms improved with rocephin and fluids. Patient reported had history of stents placed in 2016 for stone.      Retroperitoneal US 10/1:     Duplicated collecting system on the left with mild hydronephrosis of the upper pole moiety.  A discrete renal calculus is not identified.  Findings could be due to gravid state, although a recently passed small calculus may also be considered.    FHT: 135 bpm, moderate variability, +accels, no decels Cat 1 (reassuring)  TOCO:  None    Procedure(s) (LRB):  CYSTOSCOPY, WITH URETERAL STENT INSERTION - Under ultrasound guidance (Left)     Hospital Course:   10/02/2019 - Admit for empiric treatment of pyelo as well as CT renal and possible stent per urology  10/3/2019 - POD #1 s/p stent with urology. Symptomatic improvement. Temp to 100.4 at 2030.  10/05/2019 POD #3. Afebrile overnight with no acute events. Clinically improved. Medically  stable for discharge to home later today. PM NST was reactive and reassuring. UTI grew alpha hemolytic strep. Will send home with RX to continue keflex for 14 days. Will need prophylaxis after those 14 days through 6 weeks postpartum.     Consults (From admission, onward)        Status Ordering Provider     Inpatient consult to Urology  Once     Provider:  (Not yet assigned)    HEBER Samuel          Final Active Diagnoses:    Diagnosis Date Noted POA    PRINCIPAL PROBLEM:  Pyelonephritis affecting pregnancy in second trimester [O23.02] 10/02/2019 Yes    Duplicated left renal collecting system [Q62.5] 10/02/2019 Not Applicable    Obesity affecting pregnancy in second trimester [O99.212] 10/02/2019 Yes    24 weeks gestation of pregnancy [Z3A.24] 10/02/2019 Not Applicable    History of nephrolithiasis [Z87.442] 10/02/2019 Yes    Left ureteral calculus [N20.1] 10/02/2019 Yes      Problems Resolved During this Admission:        Immunizations     None          This patient has no babies on file.  Pending Diagnostic Studies:     None          Discharged Condition: good    Disposition: Home or Self Care    Follow Up:  Follow-up Information     Coty Crocker DO. Schedule an appointment as soon as possible for a visit in 1 week.    Specialty:  Obstetrics and Gynecology  Why:  Follow up  Contact information:  [address] [place] [address] [id number] [phone]                 Patient Instructions:      Diet Adult Regular     Notify your health care provider if you experience any of the following:  increased confusion or weakness     Notify your health care provider if you experience any of the following:  persistent dizziness, light-headedness, or visual disturbances     Notify your health care provider if you experience any of the following:  severe persistent headache     Notify your health care provider if you experience any of the following:  difficulty breathing or increased cough      Notify your health care provider if you experience any of the following:  severe uncontrolled pain     Notify your health care provider if you experience any of the following:  persistent nausea and vomiting or diarrhea     Notify your health care provider if you experience any of the following:  temperature >100.4     Activity as tolerated     Medications:  Current Discharge Medication List      START taking these medications    Details   cephALEXin (KEFLEX) 500 MG capsule Take 1 capsule (500 mg total) by mouth every 6 (six) hours. for 13 days  Qty: 52 capsule, Refills: 0      oxyCODONE (ROXICODONE) 5 MG immediate release tablet Take 1 tablet (5 mg total) by mouth every 6 (six) hours as needed.  Qty: 12 tablet, Refills: 0    Comments: Quantity prescribed more than 7 day supply? No         CONTINUE these medications which have NOT CHANGED    Details   BABY ASPIRIN ORAL Take by mouth once daily.      multivit with calcium,iron,min (WOMEN'S DAILY MULTIVITAMIN ORAL) Women's Daily Multivitamin      ondansetron (ZOFRAN) 4 MG tablet Take 1 tablet (4 mg total) by mouth 2 (two) times daily.  Qty: 30 tablet, Refills: 0    Associated Diagnoses: Flank pain      oxyCODONE-acetaminophen (PERCOCET) 5-325 mg per tablet Take 1 tablet by mouth every 4 (four) hours as needed.  Qty: 30 tablet, Refills: 0    Comments: Quantity prescribed more than 7 day supply? Yes, quantity medically necessary      ranitidine (ZANTAC) 150 MG tablet Take 150 mg by mouth once daily.              Megha Mariscal MD  Obstetrics  [facility]

## 2019-10-05 NOTE — SUBJECTIVE & OBJECTIVE
Obstetric HPI:  POD #3. Afebrile overnight with no acute events. Clinically improved. Medically stable for discharge to home later today. PM NST was reactive and reassuring. UTI grew alpha hemolytic strep. Will send home with RX to continue keflex for 7 days.    Patient denies contractions, reports active fetal movement, denies vaginal bleeding, denies loss of fluid      Objective:     Vital Signs (Most Recent):  Temp: 98.8 °F (37.1 °C) (10/05/19 0020)  Pulse: 94 (10/05/19 0020)  Resp: 18 (10/05/19 0020)  BP: (!) 109/55 (10/05/19 0020)  SpO2: 99 % (10/05/19 0020) Vital Signs (24h Range):  Temp:  [97.8 °F (36.6 °C)-99.6 °F (37.6 °C)] 98.8 °F (37.1 °C)  Pulse:  [91-98] 94  Resp:  [16-20] 18  SpO2:  [95 %-99 %] 99 %  BP: (106-130)/(55-74) 109/55     Weight: 113.9 kg (251 lb)  Body mass index is 43.08 kg/m².    PM NST:  FHT: Cat 1 (reassuring) 135 bpm, + 15 x 15 accels, - decels, moderate BTBV  TOCO: no contractions      Intake/Output Summary (Last 24 hours) at 10/5/2019 0139  Last data filed at 10/4/2019 1750  Gross per 24 hour   Intake --   Output 1580 ml   Net -1580 ml       Significant Labs:  Recent Lab Results       10/04/19  0700        Baso # 0.03     Basophil% 0.3     Differential Method Automated     Eos # 0.1     Eosinophil% 1.2     Gran # (ANC) 7.6     Gran% 69.6     Hematocrit 29.0     Hemoglobin 9.8     Immature Grans (Abs) 0.06  Comment:  Mild elevation in immature granulocytes is non specific and   can be seen in a variety of conditions including stress response,   acute inflammation, trauma and pregnancy. Correlation with other   laboratory and clinical findings is essential.       Immature Granulocytes 0.6     Lymph # 2.0     Lymph% 18.0     MCH 31.5     MCHC 33.8     MCV 93     Mono # 1.1     Mono% 10.3     MPV 9.7     nRBC 0     Platelets 212     RBC 3.11     RDW 13.1     WBC 10.89         Physical Exam:   Constitutional: She is oriented to person, place, and time. She appears well-developed and  well-nourished. No distress.    HENT:   Head: Normocephalic and atraumatic.    Eyes: Conjunctivae and EOM are normal.    Neck: Normal range of motion. Neck supple.    Cardiovascular: Normal rate and regular rhythm.     Pulmonary/Chest: Effort normal. No respiratory distress.        Abdominal: Soft. She exhibits no distension. There is no tenderness.   Gravid             Musculoskeletal: Moves all extremeties. She exhibits no edema or tenderness.       Neurological: She is alert and oriented to person, place, and time.    Skin: Skin is warm and dry. No rash noted. She is not diaphoretic.    Psychiatric: She has a normal mood and affect. Her behavior is normal.

## 2019-10-05 NOTE — ASSESSMENT & PLAN NOTE
- Patient with flank pain, fevers on admission.  - CT 10/2: Mild hydroureteronephrosis of the left upper pole with a 6 mm calculus in the midportion of the ureter. There is a duplicated collecting system on the left.  No dilatation of the lower pole collecting system. Punctate nonobstructing calculi in the right interpolar kidney and left lower pole kidney.  - POD #3 s/p cysto, L ureteral stent placement per urology  - WBC 14 > 12 >10.9  - Tlast: 100.4 @ 2030 10/3  - Urine culture growing alpha hemolytic strep   - s/p rocephin 2g q24, transitioned to keflex 500 q6h on 10/4   - will send with RX to compete 7-day course  - patient tolerating diet without nausea

## 2019-10-05 NOTE — ASSESSMENT & PLAN NOTE
- 6mm L mid ureteral stone in ureter draining upper pole moiety    - S/p L ureteral stent placement in ureter draining upper pole moiety under ultrasound guidance 10/2   - Once infection cleared, will plan for outpatient ureteroscopy during second trimester (in 1-2 weeks).    Ditropan for spasms     - Antibiotics / IVF   - Strain all urine    Okay to go home from a  standpoint  Follow up this next week with Dr. Guevara to set up ureteroscopy

## 2019-10-08 NOTE — H&P (VIEW-ONLY)
Subjective:      Krupa Melton is a 29 y.o. female who returns today regarding her nephrolithiasis.     The patient presented to the ED on 10/2 with left flank pain and fever (T max 100.6). She has h/o nephrolithiasis, passed 7mm stone several years ago. She has a known complete L collecting system duplication. CT showed a 6mm L mid ureteral stone with proximal hydronephrosis of upper pole moeity. Cysto with stent placement was performed under ultrasound guidance on 10/2 to drain the upper pole moiety. Urine culture was positive for alpha hemolytic strep. She remains on keflex.    She presents today to discuss treatment of the stone. Reports urinary frequency and urgency. Mild left flank pain. Denies fever/chills. Remains on keflex.     The following portions of the patient's history were reviewed and updated as appropriate: allergies, current medications, past family history, past medical history, past social history, past surgical history and problem list.    Review of Systems  Constitutional: no fever or chills  ENT: no nasal congestion or sore throat  Respiratory: no cough or shortness of breath  Cardiovascular: no chest pain or palpitations  Gastrointestinal: no nausea or vomiting, tolerating diet  Genitourinary: as per HPI  Hematologic/Lymphatic: no easy bruising or lymphadenopathy  Musculoskeletal: no arthralgias or myalgias  Neurological: no seizures or tremors  Behavioral/Psych: no auditory or visual hallucinations     Objective:   Vital Signs:   Vitals:    10/09/19 1302   BP: 119/78   Pulse: 77     Physical Exam   General: alert and oriented, no acute distress  Head: normocephalic, atraumatic  Neck: supple, no lymphadenopathy, normal ROM, no masses  Respiratory: Symmetric expansion, non-labored breathing  Cardiovascular: regular rate and rhythm, nomal pulses, no peripheral edema  Abdomen: soft, non tender, non distended, no palpable masses, no hernias, no hepatomegaly or splenomegaly  Pelvic:not  "examined   Lymphatic: no inguinal nodes  Skin: normal coloration and turgor, no rashes, no suspicious skin lesions noted  Neuro: alert and oriented x3, no gross deficits  Psych: normal judgment and insight, normal mood/affect and non-anxious  Mild left CVA tenderness    Lab Review   Urinalysis demonstrates positive for leukocytes (+), red blood cells (250 katheryn/mL)  Lab Results   Component Value Date    WBC 10.89 10/04/2019    HGB 9.8 (L) 10/04/2019    HCT 29.0 (L) 10/04/2019    MCV 93 10/04/2019     10/04/2019     Lab Results   Component Value Date    CREATININE 0.6 10/03/2019    BUN 6 10/03/2019       Imaging   (all images personally reviewed; agree with report below)  CT stone study (10/'2/19) -"Mild hydroureteronephrosis of the left upper pole with a 6 mm calculus in the midportion of the ureter.  There is a duplicated collecting system on the left.  No dilatation of the lower pole collecting system. Punctate nonobstructing calculi in the right interpolar kidney and left lower pole kidney."    Assessment:   Acute cystitis without hematuria  Ureteral stone with hydronephrosis  Urinary frequency  Urinary urgency     Plan:   Krupa was seen today for other.    Diagnoses and all orders for this visit:    Acute cystitis without hematuria  -     POCT URINE DIPSTICK WITHOUT MICROSCOPE  -     Urine culture    Ureteral stone with hydronephrosis  -     Case Request Operating Room: REMOVAL, CALCULUS, URETER, URETEROSCOPIC  -     Place in Outpatient; Standing  -     Vital Signs ; Standing  -     Insert peripheral IV; Standing  -     Notify physician ; Standing  -     Diet NPO; Standing  -     Place FATEMEH hose; Standing  -     Place sequential compression device; Standing  -     Diet NPO    Urinary frequency    Urinary urgency    Other orders  -     IP VTE LOW RISK PATIENT; Standing  -     Progressive Mobility Protocol (mobilize patient to their highest level of functioning at least twice daily); Standing  -     ceFAZolin " (ANCEF) 2 g in dextrose 5 % 50 mL IVPB  -     Progressive Mobility Protocol (mobilize patient to their highest level of functioning at least twice daily)  -     Progressive Mobility Protocol (mobilize patient to their highest level of functioning at least twice daily)  -     Progressive Mobility Protocol (mobilize patient to their highest level of functioning at least twice daily)    Plan:  --Urine culture today  --Continue keflex for now  --Advised of the options of watch and wait vs intervention via ureteroscopic approach vs ESWL. Will proceed with left ureteroscopy LL and stent placement under ultrasound guidance by Dr. Guevara on 10/16. Discussed the risks and benefits of the procedure. Answered all questions. Consent signed.   --Strain all urine.  --Encourage fluids.  --Recommend general preventive measures, to include increased hydration, low Na diet, and increased citrus intake  --Discussed indications to present to ED, including fever, intractable pain, and intractable nausea/vomitting

## 2019-10-09 ENCOUNTER — ANESTHESIA EVENT (OUTPATIENT)
Dept: SURGERY | Facility: OTHER | Age: 29
End: 2019-10-09
Payer: COMMERCIAL

## 2019-10-09 ENCOUNTER — HOSPITAL ENCOUNTER (OUTPATIENT)
Dept: PREADMISSION TESTING | Facility: OTHER | Age: 29
Discharge: HOME | End: 2019-10-09
Attending: OBSTETRICS & GYNECOLOGY | Admitting: OBSTETRICS & GYNECOLOGY
Payer: COMMERCIAL

## 2019-10-09 ENCOUNTER — OFFICE VISIT (OUTPATIENT)
Dept: UROLOGY | Facility: CLINIC | Age: 29
End: 2019-10-09
Payer: COMMERCIAL

## 2019-10-09 VITALS
DIASTOLIC BLOOD PRESSURE: 74 MMHG | TEMPERATURE: 99 F | SYSTOLIC BLOOD PRESSURE: 115 MMHG | HEART RATE: 83 BPM | WEIGHT: 249 LBS | BODY MASS INDEX: 42.51 KG/M2 | HEIGHT: 64 IN | OXYGEN SATURATION: 97 %

## 2019-10-09 VITALS
WEIGHT: 249 LBS | DIASTOLIC BLOOD PRESSURE: 78 MMHG | BODY MASS INDEX: 42.51 KG/M2 | HEART RATE: 77 BPM | HEIGHT: 64 IN | SYSTOLIC BLOOD PRESSURE: 119 MMHG

## 2019-10-09 DIAGNOSIS — R35.0 URINARY FREQUENCY: ICD-10-CM

## 2019-10-09 DIAGNOSIS — N30.00 ACUTE CYSTITIS WITHOUT HEMATURIA: Primary | ICD-10-CM

## 2019-10-09 DIAGNOSIS — R39.15 URINARY URGENCY: ICD-10-CM

## 2019-10-09 DIAGNOSIS — N13.2 URETERAL STONE WITH HYDRONEPHROSIS: ICD-10-CM

## 2019-10-09 LAB
BILIRUB SERPL-MCNC: ABNORMAL MG/DL
BLOOD URINE, POC: 250
COLOR, POC UA: ABNORMAL
GLUCOSE UR QL STRIP: ABNORMAL
KETONES UR QL STRIP: ABNORMAL
LEUKOCYTE ESTERASE URINE, POC: ABNORMAL
NITRITE, POC UA: ABNORMAL
PH, POC UA: 8
PROTEIN, POC: ABNORMAL
SPECIFIC GRAVITY, POC UA: 1
UROBILINOGEN, POC UA: ABNORMAL

## 2019-10-09 PROCEDURE — 3008F BODY MASS INDEX DOCD: CPT | Mod: CPTII,S$GLB,, | Performed by: NURSE PRACTITIONER

## 2019-10-09 PROCEDURE — 3008F PR BODY MASS INDEX (BMI) DOCUMENTED: ICD-10-PCS | Mod: CPTII,S$GLB,, | Performed by: NURSE PRACTITIONER

## 2019-10-09 PROCEDURE — 87086 URINE CULTURE/COLONY COUNT: CPT

## 2019-10-09 PROCEDURE — 99214 OFFICE O/P EST MOD 30 MIN: CPT | Mod: 25,S$GLB,, | Performed by: NURSE PRACTITIONER

## 2019-10-09 PROCEDURE — 99214 PR OFFICE/OUTPT VISIT, EST, LEVL IV, 30-39 MIN: ICD-10-PCS | Mod: 25,S$GLB,, | Performed by: NURSE PRACTITIONER

## 2019-10-09 PROCEDURE — 81002 POCT URINE DIPSTICK WITHOUT MICROSCOPE: ICD-10-PCS | Mod: S$GLB,,, | Performed by: NURSE PRACTITIONER

## 2019-10-09 PROCEDURE — 81002 URINALYSIS NONAUTO W/O SCOPE: CPT | Mod: S$GLB,,, | Performed by: NURSE PRACTITIONER

## 2019-10-09 RX ORDER — LIDOCAINE HYDROCHLORIDE 10 MG/ML
0.5 INJECTION, SOLUTION EPIDURAL; INFILTRATION; INTRACAUDAL; PERINEURAL ONCE
Status: CANCELLED | OUTPATIENT
Start: 2019-10-09 | End: 2019-10-09

## 2019-10-09 RX ORDER — SODIUM CHLORIDE, SODIUM LACTATE, POTASSIUM CHLORIDE, CALCIUM CHLORIDE 600; 310; 30; 20 MG/100ML; MG/100ML; MG/100ML; MG/100ML
INJECTION, SOLUTION INTRAVENOUS CONTINUOUS
Status: CANCELLED | OUTPATIENT
Start: 2019-10-09

## 2019-10-09 RX ORDER — ACETAMINOPHEN 500 MG
1000 TABLET ORAL
Status: CANCELLED | OUTPATIENT
Start: 2019-10-09 | End: 2019-10-09

## 2019-10-09 NOTE — PRE ADMISSION SCREENING
Patient here for preop appointment for surgery on 10/16/2019. Dr. Lucia with anesthesia requesting patient have fetal monitoring before and after procedure. Spoke with Hannah in Labor and Delivery who stated just to call them before and after procedure and someone will come do fetal monitoring.

## 2019-10-09 NOTE — DISCHARGE INSTRUCTIONS
PRE-ADMIT TESTING -  [phone]    [address]  [place] [facility]          Your surgery has been scheduled at [facility]. We are pleased to have the opportunity to serve you. For Further Information please call [phone].    On the day of surgery please report to the Information Desk on the 1st floor.    · CONTACT YOUR PHYSICIAN'S OFFICE THE DAY PRIOR TO YOUR SURGERY TO OBTAIN YOUR ARRIVAL TIME.     · The evening before surgery do not eat anything after 9 p.m. ( this includes hard candy, chewing gum and mints).  You may only have GATORADE, POWERADE AND WATER  from 9 p.m. until you leave your home.   DO NOT DRINK ANY LIQUIDS ON THE WAY TO THE HOSPITAL.      SPECIAL MEDICATION INSTRUCTIONS: TAKE medications checked off by the Anesthesiologist on your Medication List.    Angiogram Patients: Take medications as instructed by your physician, including aspirin.     Surgery Patients:    If you take ASPIRIN - Your PHYSICIAN/SURGEON will need to inform you IF/OR when you need to stop taking aspirin prior to your surgery.     Do Not take any medications containing IBUPROFEN.  Do Not Wear any make-up or dark nail polish   (especially eye make-up) to surgery. If you come to surgery with makeup on you will be required to remove the makeup or nail polish.    Do not shave your surgical area at least 5 days prior to your surgery. The surgical prep will be performed at the hospital according to Infection Control regulations.    Leave all valuables at home.   Do Not wear any jewelry or watches, including any metal in body piercings. Jewelry must be removed prior to coming to the hospital.  There is a possibility that rings that are unable to be removed may be cut off if they are on the surgical extremity.    Contact Lens must be removed before surgery. Either do not wear the contact lens or bring a case and solution for storage.  Please bring a container for eyeglasses or dentures as required.  Bring  any paperwork your physician has provided, such as consent forms,  history and physicals, doctor's orders, etc.   Bring comfortable clothes that are loose fitting to wear upon discharge. Take into consideration the type of surgery being performed.  Maintain your diet as advised per your physician the day prior to surgery.      Adequate rest the night before surgery is advised.   Park in the Parking lot behind the hospital or in the [facility] across the street from the parking lot. Parking is complimentary.  If you will be discharged the same day as your procedure, please arrange for a responsible adult to drive you home or to accompany you if traveling by taxi.   YOU WILL NOT BE PERMITTED TO DRIVE OR TO LEAVE THE HOSPITAL ALONE AFTER SURGERY.   It is strongly recommended that you arrange for someone to remain with you for the first 24 hrs following your surgery.    The Surgeon will speak to your family/visitor after your surgery regarding the outcome of your surgery and post op care.  The Surgeon may speak to you after your surgery, but there is a possibility you may not remember the details.  Please check with your family members regarding the conversation with the Surgeon.    We strongly recommend whoever is bringing you home be present for discharge instructions.  This will ensure a thorough understanding for your post op home care.      Thank you for your cooperation.  The Staff of [facility].                Bathing Instructions with Hibiclens     Shower the evening before and morning of your procedure with Hibiclens:   Wash your face with water and your regular face wash/soap   Apply Hibiclens directly on your skin or on a wet washcloth and wash gently. When showering: Move away from the shower stream when applying Hibiclens to avoid rinsing off too soon.   Rinse thoroughly with warm water   Do not dilute Hibiclens         Dry off as usual, do not use any deodorant,  powder, body lotions, perfume, after shave or cologne.

## 2019-10-09 NOTE — ANESTHESIA PREPROCEDURE EVALUATION
10/09/2019  Krupa Melton is a 29 y.o., female.    Anesthesia Evaluation    I have reviewed the Patient Summary Reports.    I have reviewed the Nursing Notes.   I have reviewed the Medications.     Review of Systems  Anesthesia Hx:  Hx of Anesthetic complications PONV Denies Family Hx of Anesthesia complications.   Denies Personal Hx of Anesthesia complications.   Social:  Non-Smoker    Hematology/Oncology:  Hematology Normal   Oncology Normal     EENT/Dental:EENT/Dental Normal   Cardiovascular:   Exercise tolerance: good Hypertension    Pulmonary:  Pulmonary Normal    Renal/:   renal calculi    Hepatic/GI:   GERD    Musculoskeletal:  Musculoskeletal Normal    OB/GYN/PEDS:  25 weeks pregnant   Neurological:   Headaches    Endocrine:  Endocrine Normal    Psych:   Psychiatric History anxiety depression          Physical Exam  General:  Morbid Obesity    Airway/Jaw/Neck:  Airway Findings: Mouth Opening: Normal Mallampati: II      Dental:  Dental Findings: In tact        Mental Status:  Mental Status Findings:  Cooperative, Alert and Oriented         Anesthesia Plan  Type of Anesthesia, risks & benefits discussed:  Anesthesia Type:  general  Patient's Preference:   Intra-op Monitoring Plan: standard ASA monitors  Intra-op Monitoring Plan Comments:   Post Op Pain Control Plan: multimodal analgesia and per primary service following discharge from PACU  Post Op Pain Control Plan Comments:   Induction:   IV  Beta Blocker:         Informed Consent: Patient understands risks and agrees with Anesthesia plan.  Questions answered. Anesthesia consent signed with patient.  ASA Score: 2  emergent   Day of Surgery Review of History & Physical:    H&P update referred to the surgeon.     Anesthesia Plan Notes: 25 weeks pregnant with ureteral stone. Will need fetal MONITORING.        Ready For Surgery From Anesthesia  Perspective.

## 2019-10-10 LAB — BACTERIA UR CULT: NO GROWTH

## 2019-10-11 ENCOUNTER — ROUTINE PRENATAL (OUTPATIENT)
Dept: OBSTETRICS AND GYNECOLOGY | Facility: CLINIC | Age: 29
End: 2019-10-11
Attending: OBSTETRICS & GYNECOLOGY
Payer: COMMERCIAL

## 2019-10-11 ENCOUNTER — PROCEDURE VISIT (OUTPATIENT)
Dept: MATERNAL FETAL MEDICINE | Facility: CLINIC | Age: 29
End: 2019-10-11
Attending: OBSTETRICS & GYNECOLOGY
Payer: COMMERCIAL

## 2019-10-11 VITALS
BODY MASS INDEX: 42.89 KG/M2 | DIASTOLIC BLOOD PRESSURE: 78 MMHG | WEIGHT: 249.88 LBS | SYSTOLIC BLOOD PRESSURE: 116 MMHG

## 2019-10-11 DIAGNOSIS — I10 CHRONIC HYPERTENSION: ICD-10-CM

## 2019-10-11 DIAGNOSIS — Z36.89 ENCOUNTER FOR ULTRASOUND TO CHECK FETAL GROWTH: ICD-10-CM

## 2019-10-11 DIAGNOSIS — Z34.90 PREGNANCY WITH ONE FETUS, ANTEPARTUM: ICD-10-CM

## 2019-10-11 DIAGNOSIS — Q62.5 DUPLICATED URETER, RIGHT: ICD-10-CM

## 2019-10-11 DIAGNOSIS — O26.899 RH NEGATIVE STATE IN ANTEPARTUM PERIOD: ICD-10-CM

## 2019-10-11 DIAGNOSIS — Z67.91 RH NEGATIVE STATE IN ANTEPARTUM PERIOD: ICD-10-CM

## 2019-10-11 PROCEDURE — 99499 NO LOS: ICD-10-PCS | Mod: S$GLB,,, | Performed by: OBSTETRICS & GYNECOLOGY

## 2019-10-11 PROCEDURE — 0502F PR SUBSEQUENT PRENATAL CARE: ICD-10-PCS | Mod: CPTII,S$GLB,, | Performed by: OBSTETRICS & GYNECOLOGY

## 2019-10-11 PROCEDURE — 0502F SUBSEQUENT PRENATAL CARE: CPT | Mod: CPTII,S$GLB,, | Performed by: OBSTETRICS & GYNECOLOGY

## 2019-10-11 PROCEDURE — 99499 UNLISTED E&M SERVICE: CPT | Mod: S$GLB,,, | Performed by: OBSTETRICS & GYNECOLOGY

## 2019-10-11 PROCEDURE — 76816 OB US FOLLOW-UP PER FETUS: CPT | Mod: S$GLB,,, | Performed by: OBSTETRICS & GYNECOLOGY

## 2019-10-11 PROCEDURE — 76816 PR  US,PREGNANT UTERUS,F/U,TRANSABD APP: ICD-10-PCS | Mod: S$GLB,,, | Performed by: OBSTETRICS & GYNECOLOGY

## 2019-10-11 NOTE — PROGRESS NOTES
Dwindling FMLA due to recent hospitalization, patient very tearful about this, discussed trying to donate time.    Will get all paperwork done.  Stent to come out next week, a lot of discomfort, cultures yesterday negative with urology.  Messaged Dr. Guevara about ppx abx through rest of preg.   Good FM, though discomfort on stent with movement.   precautions reinforced.  F/U in 2 weeks, glucose and u/s today.

## 2019-10-15 ENCOUNTER — TELEPHONE (OUTPATIENT)
Dept: UROLOGY | Facility: CLINIC | Age: 29
End: 2019-10-15

## 2019-10-16 ENCOUNTER — ANESTHESIA (OUTPATIENT)
Dept: SURGERY | Facility: OTHER | Age: 29
End: 2019-10-16
Payer: COMMERCIAL

## 2019-10-16 ENCOUNTER — HOSPITAL ENCOUNTER (OUTPATIENT)
Facility: OTHER | Age: 29
Discharge: HOME | End: 2019-10-16
Attending: UROLOGY | Admitting: UROLOGY
Payer: COMMERCIAL

## 2019-10-16 VITALS
HEART RATE: 87 BPM | TEMPERATURE: 98 F | RESPIRATION RATE: 18 BRPM | WEIGHT: 249 LBS | HEIGHT: 64 IN | OXYGEN SATURATION: 95 % | BODY MASS INDEX: 42.51 KG/M2 | SYSTOLIC BLOOD PRESSURE: 127 MMHG | DIASTOLIC BLOOD PRESSURE: 72 MMHG

## 2019-10-16 DIAGNOSIS — N30.00 ACUTE CYSTITIS WITHOUT HEMATURIA: ICD-10-CM

## 2019-10-16 DIAGNOSIS — Q62.5 DUPLICATED LEFT RENAL COLLECTING SYSTEM: ICD-10-CM

## 2019-10-16 DIAGNOSIS — N13.2 URETERAL STONE WITH HYDRONEPHROSIS: ICD-10-CM

## 2019-10-16 DIAGNOSIS — Z34.90 PREGNANCY WITH ONE FETUS, ANTEPARTUM: ICD-10-CM

## 2019-10-16 DIAGNOSIS — N20.1 LEFT URETERAL CALCULUS: Primary | ICD-10-CM

## 2019-10-16 PROCEDURE — 71000015 HC POSTOP RECOV 1ST HR: Performed by: UROLOGY

## 2019-10-16 PROCEDURE — 52356 PR CYSTO/URETERO W/LITHOTRIPSY: ICD-10-PCS | Mod: LT,,, | Performed by: UROLOGY

## 2019-10-16 PROCEDURE — 27201423 OPTIME MED/SURG SUP & DEVICES STERILE SUPPLY: Performed by: UROLOGY

## 2019-10-16 PROCEDURE — 63600175 PHARM REV CODE 636 W HCPCS: Performed by: ANESTHESIOLOGY

## 2019-10-16 PROCEDURE — 63600175 PHARM REV CODE 636 W HCPCS: Performed by: NURSE ANESTHETIST, CERTIFIED REGISTERED

## 2019-10-16 PROCEDURE — 37000008 HC ANESTHESIA 1ST 15 MINUTES: Performed by: UROLOGY

## 2019-10-16 PROCEDURE — 25000003 PHARM REV CODE 250: Performed by: NURSE ANESTHETIST, CERTIFIED REGISTERED

## 2019-10-16 PROCEDURE — C2617 STENT, NON-COR, TEM W/O DEL: HCPCS | Performed by: UROLOGY

## 2019-10-16 PROCEDURE — 63600175 PHARM REV CODE 636 W HCPCS: Performed by: UROLOGY

## 2019-10-16 PROCEDURE — 36000706: Performed by: UROLOGY

## 2019-10-16 PROCEDURE — 76998 PR  ULTRASONIC GUIDANCE, INTRAOPERATIVE: ICD-10-PCS | Mod: 26,,, | Performed by: UROLOGY

## 2019-10-16 PROCEDURE — 36000707: Performed by: UROLOGY

## 2019-10-16 PROCEDURE — 63600175 PHARM REV CODE 636 W HCPCS: Performed by: NURSE PRACTITIONER

## 2019-10-16 PROCEDURE — 63600175 PHARM REV CODE 636 W HCPCS: Performed by: SPECIALIST

## 2019-10-16 PROCEDURE — 76998 US GUIDE INTRAOP: CPT | Mod: 26,,, | Performed by: UROLOGY

## 2019-10-16 PROCEDURE — 25000003 PHARM REV CODE 250: Performed by: SPECIALIST

## 2019-10-16 PROCEDURE — 71000016 HC POSTOP RECOV ADDL HR: Performed by: UROLOGY

## 2019-10-16 PROCEDURE — 71000033 HC RECOVERY, INTIAL HOUR: Performed by: UROLOGY

## 2019-10-16 PROCEDURE — 37000009 HC ANESTHESIA EA ADD 15 MINS: Performed by: UROLOGY

## 2019-10-16 PROCEDURE — 52356 CYSTO/URETERO W/LITHOTRIPSY: CPT | Mod: LT,,, | Performed by: UROLOGY

## 2019-10-16 PROCEDURE — C1769 GUIDE WIRE: HCPCS | Performed by: UROLOGY

## 2019-10-16 DEVICE — STENT URETERAL UNIV 6FR 24CM: Type: IMPLANTABLE DEVICE | Site: URETER | Status: FUNCTIONAL

## 2019-10-16 RX ORDER — FENTANYL CITRATE 50 UG/ML
INJECTION, SOLUTION INTRAMUSCULAR; INTRAVENOUS
Status: DISCONTINUED | OUTPATIENT
Start: 2019-10-16 | End: 2019-10-16

## 2019-10-16 RX ORDER — ONDANSETRON 2 MG/ML
4 INJECTION INTRAMUSCULAR; INTRAVENOUS EVERY 12 HOURS PRN
Status: DISCONTINUED | OUTPATIENT
Start: 2019-10-16 | End: 2019-10-16 | Stop reason: HOSPADM

## 2019-10-16 RX ORDER — ACETAMINOPHEN 500 MG
1000 TABLET ORAL
Status: COMPLETED | OUTPATIENT
Start: 2019-10-16 | End: 2019-10-16

## 2019-10-16 RX ORDER — PROPOFOL 10 MG/ML
VIAL (ML) INTRAVENOUS
Status: DISCONTINUED | OUTPATIENT
Start: 2019-10-16 | End: 2019-10-16

## 2019-10-16 RX ORDER — MEPERIDINE HYDROCHLORIDE 25 MG/ML
12.5 INJECTION INTRAMUSCULAR; INTRAVENOUS; SUBCUTANEOUS ONCE AS NEEDED
Status: DISCONTINUED | OUTPATIENT
Start: 2019-10-16 | End: 2019-10-16 | Stop reason: HOSPADM

## 2019-10-16 RX ORDER — HYDROCODONE BITARTRATE AND ACETAMINOPHEN 5; 325 MG/1; MG/1
1 TABLET ORAL EVERY 6 HOURS PRN
Qty: 6 TABLET | Refills: 0 | Status: SHIPPED | OUTPATIENT
Start: 2019-10-16 | End: 2020-03-10

## 2019-10-16 RX ORDER — GLYCOPYRROLATE 0.2 MG/ML
INJECTION INTRAMUSCULAR; INTRAVENOUS
Status: DISCONTINUED | OUTPATIENT
Start: 2019-10-16 | End: 2019-10-16

## 2019-10-16 RX ORDER — CEFAZOLIN SODIUM 1 G/3ML
2 INJECTION, POWDER, FOR SOLUTION INTRAMUSCULAR; INTRAVENOUS
Status: DISCONTINUED | OUTPATIENT
Start: 2019-10-16 | End: 2019-10-16 | Stop reason: HOSPADM

## 2019-10-16 RX ORDER — ROCURONIUM BROMIDE 10 MG/ML
INJECTION, SOLUTION INTRAVENOUS
Status: DISCONTINUED | OUTPATIENT
Start: 2019-10-16 | End: 2019-10-16

## 2019-10-16 RX ORDER — NEOSTIGMINE METHYLSULFATE 1 MG/ML
INJECTION, SOLUTION INTRAVENOUS
Status: DISCONTINUED | OUTPATIENT
Start: 2019-10-16 | End: 2019-10-16

## 2019-10-16 RX ORDER — LIDOCAINE HYDROCHLORIDE 10 MG/ML
0.5 INJECTION, SOLUTION EPIDURAL; INFILTRATION; INTRACAUDAL; PERINEURAL ONCE
Status: DISCONTINUED | OUTPATIENT
Start: 2019-10-16 | End: 2019-10-16 | Stop reason: HOSPADM

## 2019-10-16 RX ORDER — HYDROCODONE BITARTRATE AND ACETAMINOPHEN 5; 325 MG/1; MG/1
1 TABLET ORAL EVERY 6 HOURS PRN
Qty: 6 TABLET | Refills: 0 | Status: SHIPPED | OUTPATIENT
Start: 2019-10-16 | End: 2019-10-16 | Stop reason: SDUPTHER

## 2019-10-16 RX ORDER — OXYCODONE HYDROCHLORIDE 5 MG/1
5 TABLET ORAL
Status: DISCONTINUED | OUTPATIENT
Start: 2019-10-16 | End: 2019-10-16 | Stop reason: HOSPADM

## 2019-10-16 RX ORDER — ACETAMINOPHEN 325 MG/1
650 TABLET ORAL EVERY 4 HOURS PRN
Status: DISCONTINUED | OUTPATIENT
Start: 2019-10-16 | End: 2019-10-16 | Stop reason: HOSPADM

## 2019-10-16 RX ORDER — SODIUM CHLORIDE 0.9 % (FLUSH) 0.9 %
3 SYRINGE (ML) INJECTION
Status: DISCONTINUED | OUTPATIENT
Start: 2019-10-16 | End: 2019-10-16 | Stop reason: HOSPADM

## 2019-10-16 RX ORDER — HYDROCODONE BITARTRATE AND ACETAMINOPHEN 5; 325 MG/1; MG/1
1 TABLET ORAL EVERY 4 HOURS PRN
Status: DISCONTINUED | OUTPATIENT
Start: 2019-10-16 | End: 2019-10-16 | Stop reason: HOSPADM

## 2019-10-16 RX ORDER — HYDROMORPHONE HYDROCHLORIDE 2 MG/ML
0.4 INJECTION, SOLUTION INTRAMUSCULAR; INTRAVENOUS; SUBCUTANEOUS EVERY 5 MIN PRN
Status: DISCONTINUED | OUTPATIENT
Start: 2019-10-16 | End: 2019-10-16 | Stop reason: HOSPADM

## 2019-10-16 RX ORDER — LIDOCAINE HCL/PF 100 MG/5ML
SYRINGE (ML) INTRAVENOUS
Status: DISCONTINUED | OUTPATIENT
Start: 2019-10-16 | End: 2019-10-16

## 2019-10-16 RX ORDER — SODIUM CHLORIDE, SODIUM LACTATE, POTASSIUM CHLORIDE, CALCIUM CHLORIDE 600; 310; 30; 20 MG/100ML; MG/100ML; MG/100ML; MG/100ML
INJECTION, SOLUTION INTRAVENOUS CONTINUOUS
Status: DISCONTINUED | OUTPATIENT
Start: 2019-10-16 | End: 2019-10-16 | Stop reason: HOSPADM

## 2019-10-16 RX ORDER — ONDANSETRON 2 MG/ML
4 INJECTION INTRAMUSCULAR; INTRAVENOUS DAILY PRN
Status: DISCONTINUED | OUTPATIENT
Start: 2019-10-16 | End: 2019-10-16 | Stop reason: HOSPADM

## 2019-10-16 RX ADMIN — FENTANYL CITRATE 100 MCG: 50 INJECTION, SOLUTION INTRAMUSCULAR; INTRAVENOUS at 11:10

## 2019-10-16 RX ADMIN — PROPOFOL 150 MG: 10 INJECTION, EMULSION INTRAVENOUS at 09:10

## 2019-10-16 RX ADMIN — NEOSTIGMINE METHYLSULFATE 5 MG: 1 INJECTION INTRAVENOUS at 10:10

## 2019-10-16 RX ADMIN — SODIUM CHLORIDE, SODIUM LACTATE, POTASSIUM CHLORIDE, AND CALCIUM CHLORIDE: 600; 310; 30; 20 INJECTION, SOLUTION INTRAVENOUS at 11:10

## 2019-10-16 RX ADMIN — ONDANSETRON 4 MG: 2 INJECTION INTRAMUSCULAR; INTRAVENOUS at 12:10

## 2019-10-16 RX ADMIN — ONDANSETRON 4 MG: 2 INJECTION INTRAMUSCULAR; INTRAVENOUS at 09:10

## 2019-10-16 RX ADMIN — CARBOXYMETHYLCELLULOSE SODIUM 2 DROP: 2.5 SOLUTION/ DROPS OPHTHALMIC at 09:10

## 2019-10-16 RX ADMIN — CEFAZOLIN 2 G: 330 INJECTION, POWDER, FOR SOLUTION INTRAMUSCULAR; INTRAVENOUS at 09:10

## 2019-10-16 RX ADMIN — ROCURONIUM BROMIDE 30 MG: 10 INJECTION, SOLUTION INTRAVENOUS at 09:10

## 2019-10-16 RX ADMIN — GLYCOPYRROLATE 0.8 MG: 0.2 INJECTION, SOLUTION INTRAMUSCULAR; INTRAVENOUS at 10:10

## 2019-10-16 RX ADMIN — LIDOCAINE HYDROCHLORIDE 50 MG: 20 INJECTION, SOLUTION INTRAVENOUS at 09:10

## 2019-10-16 RX ADMIN — SODIUM CHLORIDE, SODIUM LACTATE, POTASSIUM CHLORIDE, AND CALCIUM CHLORIDE: 600; 310; 30; 20 INJECTION, SOLUTION INTRAVENOUS at 08:10

## 2019-10-16 RX ADMIN — ACETAMINOPHEN 1000 MG: 500 TABLET, FILM COATED ORAL at 08:10

## 2019-10-16 RX ADMIN — FENTANYL CITRATE 100 MCG: 50 INJECTION, SOLUTION INTRAMUSCULAR; INTRAVENOUS at 09:10

## 2019-10-16 NOTE — INTERVAL H&P NOTE
The patient has been examined and the H&P has been reviewed:    I concur with the findings and no changes have occurred since H&P was written.    Anesthesia/Surgery risks, benefits and alternative options discussed and understood by patient/family.          Active Hospital Problems    Diagnosis  POA    Acute cystitis without hematuria [N30.00]  Yes      Resolved Hospital Problems   No resolved problems to display.

## 2019-10-16 NOTE — OR NURSING
FHTs verified in PACU; pt's vs stable; no distress noted; strings to inner Rt thigh taped down with tegaderm;

## 2019-10-16 NOTE — OR NURSING
Assisted up to bathroom, voided 200ccs clear yellow urine, assisted to dress for discharge, tolerated well, iv discontinued.

## 2019-10-16 NOTE — NURSING
fhts  monitored from [id number] before surgical procedure . 140bpm, +fm noted, no ctxs.  DR breaux notified.   Per DR Breaux 5 minute strip needed

## 2019-10-16 NOTE — ANESTHESIA POSTPROCEDURE EVALUATION
Anesthesia Post Evaluation    Patient: Krupa Melton    Procedure(s) Performed: Procedure(s) (LRB):  REMOVAL, CALCULUS, URETER, URETEROSCOPIC possible retro pyelo, possible lasere litho, possible stent (Left)  LITHOTRIPSY, USING LASER (Left)  ULTRASOUND GUIDANCE, for laser litho (Left)  STENT, URETERAL, remove and replace (Left)    Final Anesthesia Type: general  Patient location during evaluation: PACU  Patient participation: Yes- Able to Participate  Level of consciousness: awake and alert  Post-procedure vital signs: reviewed and stable  Pain management: adequate  Airway patency: patent  PONV status at discharge: No PONV  Anesthetic complications: no      Cardiovascular status: blood pressure returned to baseline  Respiratory status: unassisted and spontaneous ventilation  Hydration status: euvolemic  Follow-up not needed.          Vitals Value Taken Time   /58 10/16/2019 11:37 AM   Temp 36.6 °C (97.9 °F) 10/16/2019 11:14 AM   Pulse 91 10/16/2019 11:40 AM   Resp 16 10/16/2019 11:30 AM   SpO2 91 % 10/16/2019 11:40 AM   Vitals shown include unvalidated device data.      No case tracking events are documented in the log.      Pain/Scott Score: Pain Rating Prior to Med Admin: 3 (10/16/2019  8:05 AM)  Scott Score: 8 (10/16/2019 11:14 AM)

## 2019-10-16 NOTE — OP NOTE
[facility]  Surgery Department  Urology Operative Note    SUMMARY     Date of Procedure: 10/16/2019     Surgeon(s) and Role:     * Marsha Guevara MD - Primary    Assisting Surgeon: None    Pre-Operative Diagnosis: Ureteral stone with hydronephrosis [N13.2]; left ureteral calculus, duplicated left collecting system, pregnancy    Post-Operative Diagnosis: Post-Op Diagnosis Codes:     * Ureteral stone with hydronephrosis [N13.2], left ureteral calculus, duplicated left collecting system, pregnancy    Procedure: Procedure(s) (LRB):  Cystoscopy  Left ureteroscopy with laser lithotripsy  Stent exchange on left  Intraoperative ultrasound    Anesthesia: General    Indication for Procedure:  29-year-old female currently 26 weeks GA who previously underwent a left ureteral stent placement in the upper pole moiety of her left collecting system secondary to a 6 mm left mid ureteral stone.  She presents today for definitive treatment of this.    Description of Procedure:  She was brought to the operating room and placed under general anesthesia.  Full time-out procedures were performed identifying the correct patient and procedure and laterality.  Appropriate IV antibiotics with Ancef was given prior commencement of surgery.  Fetal monitoring was done per the OB team preoperatively.  She was placed in a dorsal lithotomy position and prepped and draped in usual sterile fashion.    A 22 Namibian rigid cystoscope was passed per urethra into the bladder. The previously placed left ureteral stent was noted to be coming from her upper pole moiety ureter which was more lateral and inferior from the lower pole moiety ureteral orifice.  The stent was noted to be moderately encrusted.  Of note the stent was only in place for 2 weeks.  The stent was grasped and gently removed retracted to the urethral meatus and secured to the drapes without further tension on the stent.  The cystoscope was replaced back into the  bladder and a Sensor wire was passed alongside the stent and up to the level of the kidney as confirmed on intraoperative ultrasound.    Gentle traction was placed on the stent was noted to have significant resistance therefore further attempts at stent removal this time were ceased.  Decision was made to perform the ureteroscopy alongside the stent for further evaluation and for stone treatment.    Next, a flexible ureteral scope was passed per urethra and then cannulated into the left ureteral orifice.  A 2nd wire was used to help guide the ureteral scope into the appropriate ureter.  The ureteral scope was passed alongside the stent and up to the level of the mid ureteral stone.  Two hundred micron laser fiber was then used to the fragment the stone into tiny pieces.  Once the stone was adequately dusted, attention was turned back to removal of the previously placed left ureteral stent.  The ureteral scope was passed alongside the stent was noted to have a curl in the stent consistent with encrustation in the proximal ureter.  There was no ureteral injury or other abnormality noted except for the curl noted as the stent in the ureter.  A wire was attempted to be passed through the lumen of the stent to straighten the stent which was unsuccessful due to encrustation of the lumen of the stent.  Multiple attempts with the flexible ureteral scope were made to help straighten the stent.  Cautious laser lithotripsy was done on the encrusted portion of the stent in attempt to straighten the stent.  This was unsuccessful with the flexible ureteral scope.    Decision was made to switch to the semi rigid ureteral scope.  This was then placed alongside the stent up to the level of the curl of the stent.  The semi rigid ureteral scope was able to help straighten the stent to allow for atraumatic removal.  Under direct visualization the stent was then removed ensuring that there was no damage to the ureter.  The semi-rigid  ureteral scope was then passed back into the ureter after the stent was removed in its entirety.  There were few small stone fragments that were visualized and were further dusted with the laser.  The flexible ureteral scope was then repassed into the ureter for full evaluation of the ureter and upper pole moiety.  There was no residual stones or other abnormalities noted.  The urethra ureteral scope was removed measuring no stone fragments or ureteral injuries noted.    The 22 Serbian rigid cystoscope was then replaced back into the bladder over the wire.  A 6 Serbian by 24 cm double-J ureteral stent on a string was then passed over the wire.  The stent was passed up to the level of the kidney as confirmed on intraoperative ultrasound.  The wire was removed and a good curl was noted in the distal portion of the stent.  The stent was able to be visualized on intraoperative ultrasound in the upper pole moiety in the appropriate position. The bladder was then drained a final time and the cystoscope was removed.  A Tegaderm dressing was used to secure the patient's stent to her inner right thigh.    Patient was awakened from general anesthesia and transferred to the PACU in a stable condition.  Fetal monitoring will be performed per protocol postoperatively.    Findings:  Very difficult stent removal due to moderate encrustation of the proximal portion of the stent.  Mid ureteral stone completely dusted, stone extraction not required.    Complications: No    Estimated Blood Loss (EBL): * No values recorded between 10/16/2019 12:00 AM and 10/16/2019  8:51 AM *    Drains:  6 Serbian by 24 cm double-J ureteral stent in the left upper pole moiety.  Stent on string placed.           Implants:   Implant Name Type Inv. Item Serial No.  Lot No. LRB No. Used   STENT URETERAL UNIV 6FR 24CM - [id number]  STENT URETERAL UNIV 6FR 24CM  Written. [id number] Left 1       Specimens:   Specimen (12h ago, onward)    None                   Condition: Good    Disposition: PACU - hemodynamically stable.    Attestation: I was present and scrubbed for the entire procedure.    Discharge Note    SUMMARY     Admit Date: 10/16/2019    Discharge Date and Time:  10/16/2019 12:09 PM    Hospital Course (synopsis of major diagnoses, care, treatment, and services provided during the course of the hospital stay):  Uncomplicated left ureteroscopy with laser lithotripsy directed by intraoperative ultrasound     Final Diagnosis: Post-Op Diagnosis Codes:     * Ureteral stone with hydronephrosis [N13.2], left ureteral calculus, duplicated left collecting system, pregnancy    Disposition: Home or Self Care    Follow Up/Patient Instructions:     Medications:  Reconciled Home Medications:      Medication List      START taking these medications    HYDROcodone-acetaminophen 5-325 mg per tablet  Commonly known as:  NORCO  Take 1 tablet by mouth every 6 (six) hours as needed for Pain.        CONTINUE taking these medications    BABY ASPIRIN ORAL  Take by mouth once daily.     cephALEXin 500 MG capsule  Commonly known as:  KEFLEX  Take 1 capsule (500 mg total) by mouth every 6 (six) hours. for 13 days     ondansetron 4 MG tablet  Commonly known as:  ZOFRAN  Take 1 tablet (4 mg total) by mouth 2 (two) times daily.     oxybutynin 5 MG Tab  Commonly known as:  DITROPAN  Take 1 tablet (5 mg total) by mouth 3 (three) times daily.     oxyCODONE-acetaminophen 5-325 mg per tablet  Commonly known as:  PERCOCET  Take 1 tablet by mouth every 4 (four) hours as needed.     WOMEN'S DAILY MULTIVITAMIN ORAL  Women's Daily Multivitamin          Discharge Procedure Orders   Diet general     Call MD for:  temperature >100.4     Call MD for:  persistent nausea and vomiting     Call MD for:  severe uncontrolled pain     Call MD for:  difficulty breathing, headache or visual disturbances     No dressing needed     Activity as tolerated     Follow-up Information     Marsha Guevara MD In  3 weeks.    Specialty:  Urology  Why:  For post-op follow up  Contact information:  [address] [place] [address] [id number] [phone]

## 2019-10-16 NOTE — TRANSFER OF CARE
"Anesthesia Transfer of Care Note    Patient: Krupa Melton    Procedure(s) Performed: Procedure(s) (LRB):  REMOVAL, CALCULUS, URETER, URETEROSCOPIC possible retro pyelo, possible lasere litho, possible stent (Left)  LITHOTRIPSY, USING LASER (Left)  ULTRASOUND GUIDANCE, for laser litho (Left)  STENT, URETERAL, remove and replace (Left)    Patient location: PACU    Anesthesia Type: general    Transport from OR: Transported from OR on 2-3 L/min O2 by NC with adequate spontaneous ventilation    Post pain: adequate analgesia    Post assessment: no apparent anesthetic complications    Post vital signs: stable    Level of consciousness: awake, alert and oriented    Nausea/Vomiting: no nausea/vomiting    Complications: none    Transfer of care protocol was followed      Last vitals:   Visit Vitals  /70 (BP Location: Right arm, Patient Position: Lying)   Pulse 74   Temp 37.3 °C (99.2 °F) (Oral)   Resp 16   Ht 5' 4" (1.626 m)   Wt 112.9 kg (249 lb)   LMP 04/12/2019 (Approximate)   SpO2 96%   Breastfeeding? No   BMI 42.74 kg/m²     "

## 2019-10-16 NOTE — DISCHARGE SUMMARY
[facility]  Discharge Note  Short Stay    Admit Date: 10/16/2019    Discharge Date and Time: 10/16/2019 11:11 AM      Attending Physician: Marsha Guevara MD     Discharge Provider: Berny Alvarez    Diagnoses:  Active Hospital Problems    Diagnosis  POA    *Left ureteral calculus [N20.1]  Yes    Duplicated left renal collecting system [Q62.5]  Not Applicable      Resolved Hospital Problems   No resolved problems to display.       Discharged Condition: good    Hospital Course: Patient was admitted for left ureteroscopy with laser lithotripsy and left ureteral stent exchange and tolerated the procedure well with no complications. The patient was discharged home in good condition on the same day.       Final Diagnoses: Same as principal problem.    Disposition: Home or Self Care    Follow up/Patient Instructions:    Medications:  Reconciled Home Medications:   Current Discharge Medication List      START taking these medications    Details   HYDROcodone-acetaminophen (NORCO) 5-325 mg per tablet Take 1 tablet by mouth every 6 (six) hours as needed for Pain.  Qty: 6 tablet, Refills: 0    Comments: Quantity prescribed more than 7 day supply? No         CONTINUE these medications which have NOT CHANGED    Details   BABY ASPIRIN ORAL Take by mouth once daily.      cephALEXin (KEFLEX) 500 MG capsule Take 1 capsule (500 mg total) by mouth every 6 (six) hours. for 13 days  Qty: 52 capsule, Refills: 0      multivit with calcium,iron,min (WOMEN'S DAILY MULTIVITAMIN ORAL) Women's Daily Multivitamin      ondansetron (ZOFRAN) 4 MG tablet Take 1 tablet (4 mg total) by mouth 2 (two) times daily.  Qty: 30 tablet, Refills: 0    Associated Diagnoses: Flank pain      oxybutynin (DITROPAN) 5 MG Tab Take 1 tablet (5 mg total) by mouth 3 (three) times daily.  Qty: 90 tablet, Refills: 11      oxyCODONE-acetaminophen (PERCOCET) 5-325 mg per tablet Take 1 tablet by mouth every 4 (four) hours as needed.  Qty: 30 tablet,  Refills: 0    Comments: Quantity prescribed more than 7 day supply? Yes, quantity medically necessary           Discharge Procedure Orders   Diet general     Call MD for:  temperature >100.4     Call MD for:  persistent nausea and vomiting     Call MD for:  severe uncontrolled pain     Call MD for:  difficulty breathing, headache or visual disturbances     No dressing needed     Activity as tolerated     Follow-up Information     Marsha Guevara MD In 3 weeks.    Specialty:  Urology  Why:  For post-op follow up  Contact information:  [address] [place] [id number]  [phone]                   Discharge Procedure Orders (must include Diet, Follow-up, Activity):   Discharge Procedure Orders (must include Diet, Follow-up, Activity)   Diet general     Call MD for:  temperature >100.4     Call MD for:  persistent nausea and vomiting     Call MD for:  severe uncontrolled pain     Call MD for:  difficulty breathing, headache or visual disturbances     No dressing needed     Activity as tolerated

## 2019-10-18 ENCOUNTER — PATIENT MESSAGE (OUTPATIENT)
Dept: OBSTETRICS AND GYNECOLOGY | Facility: CLINIC | Age: 29
End: 2019-10-18

## 2019-10-21 ENCOUNTER — PATIENT MESSAGE (OUTPATIENT)
Dept: OBSTETRICS AND GYNECOLOGY | Facility: CLINIC | Age: 29
End: 2019-10-21

## 2019-10-23 ENCOUNTER — TELEPHONE (OUTPATIENT)
Dept: OBSTETRICS AND GYNECOLOGY | Facility: CLINIC | Age: 29
End: 2019-10-23

## 2019-10-23 NOTE — TELEPHONE ENCOUNTER
----- Message from Rosalia Patricia sent at 10/23/2019 12:10 PM CDT -----  Contact: Self            Name of Who is Calling: YOLIE YOUNG [[id number]]      What is the request in detail: Pt states she would like to speak directly to the clinical staff to provide the dates she needs for FMLA. Pt states she prefers to speak to someone. Please contact to further discuss and advise.        Can the clinic reply by [facility]: n      [facility]: [phone]            Returned patient call, patient states someone wrote her about her FMLA papers. Inform patient it was someone from disability, number was given to the patient. Patient verbalized and understand

## 2019-10-25 ENCOUNTER — ROUTINE PRENATAL (OUTPATIENT)
Dept: OBSTETRICS AND GYNECOLOGY | Facility: CLINIC | Age: 29
End: 2019-10-25
Attending: OBSTETRICS & GYNECOLOGY
Payer: COMMERCIAL

## 2019-10-25 VITALS
SYSTOLIC BLOOD PRESSURE: 120 MMHG | BODY MASS INDEX: 43.23 KG/M2 | WEIGHT: 251.88 LBS | DIASTOLIC BLOOD PRESSURE: 78 MMHG

## 2019-10-25 DIAGNOSIS — I10 CHRONIC HYPERTENSION: ICD-10-CM

## 2019-10-25 DIAGNOSIS — Z30.09 BIRTH CONTROL COUNSELING: ICD-10-CM

## 2019-10-25 DIAGNOSIS — N30.00 ACUTE CYSTITIS WITHOUT HEMATURIA: ICD-10-CM

## 2019-10-25 DIAGNOSIS — Z67.91 RH NEGATIVE STATE IN ANTEPARTUM PERIOD: ICD-10-CM

## 2019-10-25 DIAGNOSIS — N20.0 KIDNEY CALCULI: ICD-10-CM

## 2019-10-25 DIAGNOSIS — O26.899 RH NEGATIVE STATE IN ANTEPARTUM PERIOD: ICD-10-CM

## 2019-10-25 DIAGNOSIS — Z34.90 PREGNANCY WITH ONE FETUS, ANTEPARTUM: Primary | ICD-10-CM

## 2019-10-25 DIAGNOSIS — Q62.5 DUPLICATED URETER, RIGHT: ICD-10-CM

## 2019-10-25 DIAGNOSIS — Q62.5 DUPLICATED LEFT RENAL COLLECTING SYSTEM: ICD-10-CM

## 2019-10-25 DIAGNOSIS — O99.891 BACK PAIN AFFECTING PREGNANCY IN THIRD TRIMESTER: ICD-10-CM

## 2019-10-25 DIAGNOSIS — M54.9 BACK PAIN AFFECTING PREGNANCY IN THIRD TRIMESTER: ICD-10-CM

## 2019-10-25 PROCEDURE — 0502F SUBSEQUENT PRENATAL CARE: CPT | Mod: CPTII,S$GLB,, | Performed by: OBSTETRICS & GYNECOLOGY

## 2019-10-25 PROCEDURE — 0502F PR SUBSEQUENT PRENATAL CARE: ICD-10-PCS | Mod: CPTII,S$GLB,, | Performed by: OBSTETRICS & GYNECOLOGY

## 2019-10-25 NOTE — PROGRESS NOTES
Doing well, stents out, stone removed.  Per Dr. Guevara, not a true diagnosis of pyelo, will not need PPX abx through pregnancy, has stopped.  Otherwise doing well.

## 2019-10-30 ENCOUNTER — CLINICAL SUPPORT (OUTPATIENT)
Dept: OBSTETRICS AND GYNECOLOGY | Facility: CLINIC | Age: 29
End: 2019-10-30
Attending: OBSTETRICS & GYNECOLOGY
Payer: COMMERCIAL

## 2019-10-30 ENCOUNTER — OFFICE VISIT (OUTPATIENT)
Dept: UROLOGY | Facility: CLINIC | Age: 29
End: 2019-10-30
Attending: UROLOGY
Payer: COMMERCIAL

## 2019-10-30 VITALS
WEIGHT: 251 LBS | BODY MASS INDEX: 42.85 KG/M2 | HEIGHT: 64 IN | DIASTOLIC BLOOD PRESSURE: 79 MMHG | HEART RATE: 93 BPM | SYSTOLIC BLOOD PRESSURE: 125 MMHG

## 2019-10-30 DIAGNOSIS — Z34.90 PREGNANCY WITH ONE FETUS, ANTEPARTUM: ICD-10-CM

## 2019-10-30 DIAGNOSIS — N20.0 NEPHROLITHIASIS: Primary | ICD-10-CM

## 2019-10-30 DIAGNOSIS — O26.899 RH NEGATIVE STATE IN ANTEPARTUM PERIOD: ICD-10-CM

## 2019-10-30 DIAGNOSIS — N20.1 LEFT URETERAL CALCULUS: ICD-10-CM

## 2019-10-30 DIAGNOSIS — Z67.91 RH NEGATIVE STATE IN ANTEPARTUM PERIOD: ICD-10-CM

## 2019-10-30 PROCEDURE — 99214 PR OFFICE/OUTPT VISIT, EST, LEVL IV, 30-39 MIN: ICD-10-PCS | Mod: S$GLB,,, | Performed by: UROLOGY

## 2019-10-30 PROCEDURE — 99214 OFFICE O/P EST MOD 30 MIN: CPT | Mod: S$GLB,,, | Performed by: UROLOGY

## 2019-10-30 PROCEDURE — 96372 RHO (D) IMMUNE GLOBULIN: ICD-10-PCS | Mod: 59,S$GLB,, | Performed by: OBSTETRICS & GYNECOLOGY

## 2019-10-30 PROCEDURE — 90715 TDAP VACCINE GREATER THAN OR EQUAL TO 7YO IM: ICD-10-PCS | Mod: S$GLB,,, | Performed by: OBSTETRICS & GYNECOLOGY

## 2019-10-30 PROCEDURE — 99999 PR PBB SHADOW E&M-EST. PATIENT-LVL I: CPT | Mod: PBBFAC,,,

## 2019-10-30 PROCEDURE — 99999 PR PBB SHADOW E&M-EST. PATIENT-LVL I: ICD-10-PCS | Mod: PBBFAC,,,

## 2019-10-30 PROCEDURE — 96372 THER/PROPH/DIAG INJ SC/IM: CPT | Mod: 59,S$GLB,, | Performed by: OBSTETRICS & GYNECOLOGY

## 2019-10-30 PROCEDURE — 3008F BODY MASS INDEX DOCD: CPT | Mod: CPTII,S$GLB,, | Performed by: UROLOGY

## 2019-10-30 PROCEDURE — 90715 TDAP VACCINE 7 YRS/> IM: CPT | Mod: S$GLB,,, | Performed by: OBSTETRICS & GYNECOLOGY

## 2019-10-30 PROCEDURE — 3008F PR BODY MASS INDEX (BMI) DOCUMENTED: ICD-10-PCS | Mod: CPTII,S$GLB,, | Performed by: UROLOGY

## 2019-10-30 RX ORDER — OMEPRAZOLE 20 MG/1
20 CAPSULE, DELAYED RELEASE ORAL DAILY
COMMUNITY
End: 2020-03-10

## 2019-10-30 NOTE — PROGRESS NOTES
"  Subjective:       Krupa Melton is a 29 y.o. female who is an established patient who was seen for evaluation of kidney stones.      She had 6mm L mid ureteral stone in duplicated L collecting system (in upper pole moiety ureter). She is currently pregnant at 28wk GA. She underwent initial stent placement due to fever and subsequent URS/LL/stent on 10/16/19. Stent was encrusted at time of surgery but able to be removed without issue.     She has been doing well since surgery. Pain resolved.    CT - 6mm L mid ureteral stone (treated), b/l stones x 1 in each kidney (about 2-3 mm)      The following portions of the patient's history were reviewed and updated as appropriate: allergies, current medications, past family history, past medical history, past social history, past surgical history and problem list.    Review of Systems  Constitutional: no fever or chills  ENT: no nasal congestion or sore throat  Respiratory: no cough or shortness of breath  Cardiovascular: no chest pain or palpitations  Gastrointestinal: no nausea or vomiting, tolerating diet  Genitourinary: as per HPI  Hematologic/Lymphatic: no easy bruising or lymphadenopathy  Musculoskeletal: no arthralgias or myalgias  Skin: no rashes or lesions  Neurological: no seizures or tremors  Behavioral/Psych: no auditory or visual hallucinations        Objective:    Vitals: /79 (BP Location: Left arm, Patient Position: Sitting)   Pulse 93   Ht 5' 4" (1.626 m)   Wt 113.9 kg (251 lb)   LMP 04/12/2019 (Approximate) Comment: OPK + 5/1/19  BMI 43.08 kg/m²     Physical Exam   General: well developed, well nourished in no acute distress  Head: normocephalic, atraumatic  Neck: supple, trachea midline, no obvious enlargement of thyroid  HEENT: EOMI, mucus membranes moist, sclera anicteric, no hearing impairment  Lungs: symmetric expansion, non-labored breathing  Cardiovascular: regular rate and rhythm, normal pulses  Abdomen: soft, non tender, non " distended, no palpable masses, no hepatosplenomegaly, no hernias, no CVA tenderness  Musculoskeletal: no peripheral edema, normal ROM in bilateral upper and lower extremities  Lymphatics: no cervical or inguinal lymphadenopathy  Skin: no rashes or lesions  Neuro: alert and oriented x 3, no gross deficits  Psych: normal judgment and insight, normal mood/affect and non-anxious  Genitourinary:   patient declined exam      Lab Review   Urine analysis today in clinic shows negative for all components     Lab Results   Component Value Date    WBC 14.84 (H) 10/11/2019    HGB 12.1 10/11/2019    HCT 34.3 (L) 10/11/2019    MCV 91 10/11/2019     (H) 10/11/2019     Lab Results   Component Value Date    CREATININE 0.6 10/03/2019    BUN 6 10/03/2019         Imaging  NA         Assessment/Plan:      1. Nephrolithiasis    - 6mm L mid ureteral stone in upper pole moiety   - Duplicated system (two UOs)   - CHARLES/KUB 6 months   - Recommend LithoLink at next visit for metabolic workup (after delivery).     2. Left ureteral calculus    - 6mm L mid ureteral stone - treated with URS in 2nd trimester   - CHARLES/KUB 6 mths         Follow up in 6 months

## 2019-11-11 ENCOUNTER — PROCEDURE VISIT (OUTPATIENT)
Dept: MATERNAL FETAL MEDICINE | Facility: CLINIC | Age: 29
End: 2019-11-11
Payer: COMMERCIAL

## 2019-11-11 DIAGNOSIS — Z36.89 ENCOUNTER FOR ULTRASOUND TO CHECK FETAL GROWTH: ICD-10-CM

## 2019-11-11 PROCEDURE — 76816 OB US FOLLOW-UP PER FETUS: CPT | Mod: S$GLB,,, | Performed by: PEDIATRICS

## 2019-11-11 PROCEDURE — 99499 NO LOS: ICD-10-PCS | Mod: S$GLB,,, | Performed by: PEDIATRICS

## 2019-11-11 PROCEDURE — 99499 UNLISTED E&M SERVICE: CPT | Mod: S$GLB,,, | Performed by: PEDIATRICS

## 2019-11-11 PROCEDURE — 76816 PR  US,PREGNANT UTERUS,F/U,TRANSABD APP: ICD-10-PCS | Mod: S$GLB,,, | Performed by: PEDIATRICS

## 2019-11-13 ENCOUNTER — TELEPHONE (OUTPATIENT)
Dept: OBSTETRICS AND GYNECOLOGY | Facility: CLINIC | Age: 29
End: 2019-11-13

## 2019-11-13 ENCOUNTER — PATIENT MESSAGE (OUTPATIENT)
Dept: OBSTETRICS AND GYNECOLOGY | Facility: CLINIC | Age: 29
End: 2019-11-13

## 2019-11-15 ENCOUNTER — PATIENT MESSAGE (OUTPATIENT)
Dept: OBSTETRICS AND GYNECOLOGY | Facility: CLINIC | Age: 29
End: 2019-11-15

## 2019-11-21 PROBLEM — R29.898 HIP WEAKNESS: Status: ACTIVE | Noted: 2019-11-21

## 2019-11-21 PROBLEM — M53.3 SI (SACROILIAC) PAIN: Status: ACTIVE | Noted: 2019-11-21

## 2019-11-22 ENCOUNTER — ROUTINE PRENATAL (OUTPATIENT)
Dept: OBSTETRICS AND GYNECOLOGY | Facility: CLINIC | Age: 29
End: 2019-11-22
Attending: OBSTETRICS & GYNECOLOGY
Payer: COMMERCIAL

## 2019-11-22 VITALS
SYSTOLIC BLOOD PRESSURE: 128 MMHG | DIASTOLIC BLOOD PRESSURE: 70 MMHG | WEIGHT: 252.44 LBS | BODY MASS INDEX: 43.33 KG/M2

## 2019-11-22 DIAGNOSIS — F41.9 ANXIETY: Primary | ICD-10-CM

## 2019-11-22 DIAGNOSIS — O26.899 RH NEGATIVE STATE IN ANTEPARTUM PERIOD: ICD-10-CM

## 2019-11-22 DIAGNOSIS — I10 CHRONIC HYPERTENSION: ICD-10-CM

## 2019-11-22 DIAGNOSIS — Q62.5 DUPLICATED URETER, RIGHT: ICD-10-CM

## 2019-11-22 DIAGNOSIS — Z34.90 PREGNANCY WITH ONE FETUS, ANTEPARTUM: ICD-10-CM

## 2019-11-22 DIAGNOSIS — Z67.91 RH NEGATIVE STATE IN ANTEPARTUM PERIOD: ICD-10-CM

## 2019-11-22 PROCEDURE — 0502F PR SUBSEQUENT PRENATAL CARE: ICD-10-PCS | Mod: CPTII,S$GLB,, | Performed by: OBSTETRICS & GYNECOLOGY

## 2019-11-22 PROCEDURE — 0502F SUBSEQUENT PRENATAL CARE: CPT | Mod: CPTII,S$GLB,, | Performed by: OBSTETRICS & GYNECOLOGY

## 2019-11-22 RX ORDER — BUSPIRONE HYDROCHLORIDE 10 MG/1
10 TABLET ORAL 2 TIMES DAILY
Qty: 60 TABLET | Refills: 11 | Status: SHIPPED | OUTPATIENT
Start: 2019-11-22 | End: 2020-03-10

## 2019-12-04 ENCOUNTER — LAB VISIT (OUTPATIENT)
Dept: LAB | Facility: HOSPITAL | Age: 29
End: 2019-12-04
Attending: OBSTETRICS & GYNECOLOGY
Payer: COMMERCIAL

## 2019-12-04 DIAGNOSIS — Z34.90 PREGNANCY WITH ONE FETUS, ANTEPARTUM: ICD-10-CM

## 2019-12-04 DIAGNOSIS — F41.9 ANXIETY: ICD-10-CM

## 2019-12-04 LAB
ALBUMIN SERPL BCP-MCNC: 2.8 G/DL (ref 3.5–5.2)
ALP SERPL-CCNC: 103 U/L (ref 55–135)
ALT SERPL W/O P-5'-P-CCNC: 6 U/L (ref 10–44)
ANION GAP SERPL CALC-SCNC: 7 MMOL/L (ref 8–16)
AST SERPL-CCNC: 12 U/L (ref 10–40)
BILIRUB SERPL-MCNC: 0.2 MG/DL (ref 0.1–1)
BUN SERPL-MCNC: 8 MG/DL (ref 6–20)
CALCIUM SERPL-MCNC: 8.8 MG/DL (ref 8.7–10.5)
CHLORIDE SERPL-SCNC: 107 MMOL/L (ref 95–110)
CO2 SERPL-SCNC: 23 MMOL/L (ref 23–29)
CREAT SERPL-MCNC: 0.7 MG/DL (ref 0.5–1.4)
EST. GFR  (AFRICAN AMERICAN): >60 ML/MIN/1.73 M^2
EST. GFR  (NON AFRICAN AMERICAN): >60 ML/MIN/1.73 M^2
GLUCOSE SERPL-MCNC: 96 MG/DL (ref 70–110)
POTASSIUM SERPL-SCNC: 3.7 MMOL/L (ref 3.5–5.1)
PROT SERPL-MCNC: 6.6 G/DL (ref 6–8.4)
SODIUM SERPL-SCNC: 137 MMOL/L (ref 136–145)
T4 FREE SERPL-MCNC: 0.67 NG/DL (ref 0.71–1.51)
TSH SERPL DL<=0.005 MIU/L-ACNC: 2.24 UIU/ML (ref 0.4–4)

## 2019-12-04 PROCEDURE — 84443 ASSAY THYROID STIM HORMONE: CPT

## 2019-12-04 PROCEDURE — 36415 COLL VENOUS BLD VENIPUNCTURE: CPT

## 2019-12-04 PROCEDURE — 84439 ASSAY OF FREE THYROXINE: CPT

## 2019-12-04 PROCEDURE — 83036 HEMOGLOBIN GLYCOSYLATED A1C: CPT

## 2019-12-04 PROCEDURE — 80053 COMPREHEN METABOLIC PANEL: CPT

## 2019-12-05 LAB
ESTIMATED AVG GLUCOSE: 94 MG/DL (ref 68–131)
HBA1C MFR BLD HPLC: 4.9 % (ref 4–5.6)

## 2019-12-06 ENCOUNTER — ROUTINE PRENATAL (OUTPATIENT)
Dept: OBSTETRICS AND GYNECOLOGY | Facility: CLINIC | Age: 29
End: 2019-12-06
Payer: COMMERCIAL

## 2019-12-06 VITALS
SYSTOLIC BLOOD PRESSURE: 120 MMHG | DIASTOLIC BLOOD PRESSURE: 86 MMHG | BODY MASS INDEX: 44.09 KG/M2 | WEIGHT: 256.81 LBS

## 2019-12-06 DIAGNOSIS — Z3A.33 33 WEEKS GESTATION OF PREGNANCY: Primary | ICD-10-CM

## 2019-12-06 PROCEDURE — 99999 PR PBB SHADOW E&M-EST. PATIENT-LVL III: CPT | Mod: PBBFAC,,, | Performed by: NURSE PRACTITIONER

## 2019-12-06 PROCEDURE — 0502F SUBSEQUENT PRENATAL CARE: CPT | Mod: CPTII,S$GLB,, | Performed by: NURSE PRACTITIONER

## 2019-12-06 PROCEDURE — 0502F PR SUBSEQUENT PRENATAL CARE: ICD-10-PCS | Mod: CPTII,S$GLB,, | Performed by: NURSE PRACTITIONER

## 2019-12-06 PROCEDURE — 99999 PR PBB SHADOW E&M-EST. PATIENT-LVL III: ICD-10-PCS | Mod: PBBFAC,,, | Performed by: NURSE PRACTITIONER

## 2019-12-06 NOTE — PATIENT INSTRUCTIONS
LABOR AND DELIVERY PHONE NUMBER, [phone] (OPEN 24/7, LOCATED ON 6TH FLOOR OF HOSPITAL)  [address] PHONE NUMBER, [phone] (OPEN MON-FRI, 8a-5p)

## 2019-12-06 NOTE — PROGRESS NOTES
Here for routine OB appt at 33w2d, with c/o fatigue. Felt good in the pregnancy until about a week ago and feels the LBP and fatigue more.  Reports good FM.  Denies LOF, denies VB, denies contractions. Still getting nose bleeds but they resolve quickly. Planning on working up to her due date. Previous c/s for FTP, interested in TOLAC. Daughter at home who is now 5. 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 [employer] [facility] in [place]. Plans to breast feed, already got pump. Using  for labor.   Reviewed warning signs of Labor and Preeclampsia.  Daily FM counts reinforced.  F/U scheduled 2 weeks  Labs/GBS/growth scan next visit

## 2019-12-07 ENCOUNTER — PATIENT MESSAGE (OUTPATIENT)
Dept: UROLOGY | Facility: CLINIC | Age: 29
End: 2019-12-07

## 2019-12-09 NOTE — TELEPHONE ENCOUNTER
I called the patient to see if she would like a appt. With the NP in the office .. She stated that the [ain left and hasn't returned and will wait to see if it does and contact us than.. B.N.

## 2019-12-17 ENCOUNTER — ROUTINE PRENATAL (OUTPATIENT)
Dept: OBSTETRICS AND GYNECOLOGY | Facility: CLINIC | Age: 29
End: 2019-12-17
Attending: OBSTETRICS & GYNECOLOGY
Payer: COMMERCIAL

## 2019-12-17 ENCOUNTER — PROCEDURE VISIT (OUTPATIENT)
Dept: MATERNAL FETAL MEDICINE | Facility: CLINIC | Age: 29
End: 2019-12-17
Attending: OBSTETRICS & GYNECOLOGY
Payer: COMMERCIAL

## 2019-12-17 VITALS — BODY MASS INDEX: 44.37 KG/M2 | SYSTOLIC BLOOD PRESSURE: 124 MMHG | DIASTOLIC BLOOD PRESSURE: 70 MMHG | WEIGHT: 258.5 LBS

## 2019-12-17 DIAGNOSIS — O26.899 RH NEGATIVE STATE IN ANTEPARTUM PERIOD: ICD-10-CM

## 2019-12-17 DIAGNOSIS — Z36.89 ENCOUNTER FOR ULTRASOUND TO CHECK FETAL GROWTH: ICD-10-CM

## 2019-12-17 DIAGNOSIS — R10.11 RUQ PAIN: Primary | ICD-10-CM

## 2019-12-17 DIAGNOSIS — Z34.90 PREGNANCY WITH ONE FETUS, ANTEPARTUM: ICD-10-CM

## 2019-12-17 DIAGNOSIS — O99.210 MATERNAL OBESITY AFFECTING PREGNANCY, ANTEPARTUM: ICD-10-CM

## 2019-12-17 DIAGNOSIS — I10 CHRONIC HYPERTENSION: ICD-10-CM

## 2019-12-17 DIAGNOSIS — Q62.5 DUPLICATED URETER, RIGHT: ICD-10-CM

## 2019-12-17 DIAGNOSIS — Z67.91 RH NEGATIVE STATE IN ANTEPARTUM PERIOD: ICD-10-CM

## 2019-12-17 PROCEDURE — 76816 OB US FOLLOW-UP PER FETUS: CPT | Mod: S$GLB,,, | Performed by: OBSTETRICS & GYNECOLOGY

## 2019-12-17 PROCEDURE — 76816 PR  US,PREGNANT UTERUS,F/U,TRANSABD APP: ICD-10-PCS | Mod: S$GLB,,, | Performed by: OBSTETRICS & GYNECOLOGY

## 2019-12-17 PROCEDURE — 0502F PR SUBSEQUENT PRENATAL CARE: ICD-10-PCS | Mod: CPTII,S$GLB,, | Performed by: OBSTETRICS & GYNECOLOGY

## 2019-12-17 PROCEDURE — 0502F SUBSEQUENT PRENATAL CARE: CPT | Mod: CPTII,S$GLB,, | Performed by: OBSTETRICS & GYNECOLOGY

## 2019-12-19 ENCOUNTER — LAB VISIT (OUTPATIENT)
Dept: LAB | Facility: HOSPITAL | Age: 29
End: 2019-12-19
Attending: OBSTETRICS & GYNECOLOGY
Payer: COMMERCIAL

## 2019-12-19 DIAGNOSIS — R10.11 RUQ PAIN: ICD-10-CM

## 2019-12-19 DIAGNOSIS — Z34.90 PREGNANCY WITH ONE FETUS, ANTEPARTUM: ICD-10-CM

## 2019-12-19 LAB
ALBUMIN SERPL BCP-MCNC: 2.7 G/DL (ref 3.5–5.2)
ALP SERPL-CCNC: 114 U/L (ref 55–135)
ALT SERPL W/O P-5'-P-CCNC: 6 U/L (ref 10–44)
ANION GAP SERPL CALC-SCNC: 4 MMOL/L (ref 8–16)
AST SERPL-CCNC: 12 U/L (ref 10–40)
BASOPHILS # BLD AUTO: 0.03 K/UL (ref 0–0.2)
BASOPHILS NFR BLD: 0.3 % (ref 0–1.9)
BILIRUB SERPL-MCNC: 0.3 MG/DL (ref 0.1–1)
BUN SERPL-MCNC: 9 MG/DL (ref 6–20)
CALCIUM SERPL-MCNC: 9.2 MG/DL (ref 8.7–10.5)
CHLORIDE SERPL-SCNC: 107 MMOL/L (ref 95–110)
CO2 SERPL-SCNC: 24 MMOL/L (ref 23–29)
CREAT SERPL-MCNC: 0.6 MG/DL (ref 0.5–1.4)
DIFFERENTIAL METHOD: ABNORMAL
EOSINOPHIL # BLD AUTO: 0.1 K/UL (ref 0–0.5)
EOSINOPHIL NFR BLD: 0.6 % (ref 0–8)
ERYTHROCYTE [DISTWIDTH] IN BLOOD BY AUTOMATED COUNT: 13 % (ref 11.5–14.5)
EST. GFR  (AFRICAN AMERICAN): >60 ML/MIN/1.73 M^2
EST. GFR  (NON AFRICAN AMERICAN): >60 ML/MIN/1.73 M^2
GLUCOSE SERPL-MCNC: 80 MG/DL (ref 70–110)
HCT VFR BLD AUTO: 33.7 % (ref 37–48.5)
HGB BLD-MCNC: 11.2 G/DL (ref 12–16)
IMM GRANULOCYTES # BLD AUTO: 0.08 K/UL (ref 0–0.04)
IMM GRANULOCYTES NFR BLD AUTO: 0.7 % (ref 0–0.5)
LYMPHOCYTES # BLD AUTO: 2.1 K/UL (ref 1–4.8)
LYMPHOCYTES NFR BLD: 18.1 % (ref 18–48)
MCH RBC QN AUTO: 30.9 PG (ref 27–31)
MCHC RBC AUTO-ENTMCNC: 33.2 G/DL (ref 32–36)
MCV RBC AUTO: 93 FL (ref 82–98)
MONOCYTES # BLD AUTO: 0.8 K/UL (ref 0.3–1)
MONOCYTES NFR BLD: 6.6 % (ref 4–15)
NEUTROPHILS # BLD AUTO: 8.5 K/UL (ref 1.8–7.7)
NEUTROPHILS NFR BLD: 73.7 % (ref 38–73)
NRBC BLD-RTO: 0 /100 WBC
PLATELET # BLD AUTO: 290 K/UL (ref 150–350)
PMV BLD AUTO: 9.8 FL (ref 9.2–12.9)
POTASSIUM SERPL-SCNC: 4 MMOL/L (ref 3.5–5.1)
PROT SERPL-MCNC: 6.7 G/DL (ref 6–8.4)
RBC # BLD AUTO: 3.63 M/UL (ref 4–5.4)
SODIUM SERPL-SCNC: 135 MMOL/L (ref 136–145)
WBC # BLD AUTO: 11.59 K/UL (ref 3.9–12.7)

## 2019-12-19 PROCEDURE — 80053 COMPREHEN METABOLIC PANEL: CPT

## 2019-12-19 PROCEDURE — 86703 HIV-1/HIV-2 1 RESULT ANTBDY: CPT

## 2019-12-19 PROCEDURE — 86592 SYPHILIS TEST NON-TREP QUAL: CPT

## 2019-12-19 PROCEDURE — 36415 COLL VENOUS BLD VENIPUNCTURE: CPT

## 2019-12-19 PROCEDURE — 85025 COMPLETE CBC W/AUTO DIFF WBC: CPT

## 2019-12-20 LAB
HIV 1+2 AB+HIV1 P24 AG SERPL QL IA: NEGATIVE
RPR SER QL: NORMAL

## 2019-12-27 ENCOUNTER — ROUTINE PRENATAL (OUTPATIENT)
Dept: OBSTETRICS AND GYNECOLOGY | Facility: CLINIC | Age: 29
End: 2019-12-27
Attending: OBSTETRICS & GYNECOLOGY
Payer: COMMERCIAL

## 2019-12-27 VITALS — BODY MASS INDEX: 44.9 KG/M2 | DIASTOLIC BLOOD PRESSURE: 64 MMHG | WEIGHT: 261.56 LBS | SYSTOLIC BLOOD PRESSURE: 110 MMHG

## 2019-12-27 DIAGNOSIS — F41.9 ANXIETY: ICD-10-CM

## 2019-12-27 DIAGNOSIS — I10 CHRONIC HYPERTENSION: ICD-10-CM

## 2019-12-27 DIAGNOSIS — Z67.91 RH NEGATIVE STATE IN ANTEPARTUM PERIOD: ICD-10-CM

## 2019-12-27 DIAGNOSIS — I10 HYPERTENSION, UNSPECIFIED TYPE: ICD-10-CM

## 2019-12-27 DIAGNOSIS — O26.899 RH NEGATIVE STATE IN ANTEPARTUM PERIOD: ICD-10-CM

## 2019-12-27 DIAGNOSIS — Q62.5 DUPLICATED URETER, RIGHT: ICD-10-CM

## 2019-12-27 DIAGNOSIS — N20.0 NEPHROLITHIASIS: ICD-10-CM

## 2019-12-27 DIAGNOSIS — Z34.90 PREGNANCY WITH ONE FETUS, ANTEPARTUM: Primary | ICD-10-CM

## 2019-12-27 PROCEDURE — 87186 SC STD MICRODIL/AGAR DIL: CPT | Mod: 59

## 2019-12-27 PROCEDURE — 87081 CULTURE SCREEN ONLY: CPT

## 2019-12-27 PROCEDURE — 87077 CULTURE AEROBIC IDENTIFY: CPT

## 2019-12-27 PROCEDURE — 0502F SUBSEQUENT PRENATAL CARE: CPT | Mod: CPTII,S$GLB,, | Performed by: OBSTETRICS & GYNECOLOGY

## 2019-12-27 PROCEDURE — 0502F PR SUBSEQUENT PRENATAL CARE: ICD-10-PCS | Mod: CPTII,S$GLB,, | Performed by: OBSTETRICS & GYNECOLOGY

## 2019-12-27 PROCEDURE — 87086 URINE CULTURE/COLONY COUNT: CPT

## 2019-12-27 PROCEDURE — 87088 URINE BACTERIA CULTURE: CPT

## 2019-12-27 PROCEDURE — 87184 SC STD DISK METHOD PER PLATE: CPT

## 2019-12-27 PROCEDURE — 87147 CULTURE TYPE IMMUNOLOGIC: CPT

## 2019-12-27 NOTE — PROGRESS NOTES
Patient seen and examined.  No complaints, denies VB/LOF/Ctxns, reports good fetal movement. Precautions for Bleeding/ ROM/ Decreased fetal movement/ Pre-E reviewed.  Urine culture and GBS today  F/U scheduled 1 weeks

## 2019-12-30 LAB
BACTERIA UR CULT: ABNORMAL
BACTERIA UR CULT: ABNORMAL

## 2019-12-31 ENCOUNTER — PATIENT MESSAGE (OUTPATIENT)
Dept: OBSTETRICS AND GYNECOLOGY | Facility: CLINIC | Age: 29
End: 2019-12-31

## 2019-12-31 DIAGNOSIS — N39.0 URINARY TRACT INFECTION WITHOUT HEMATURIA, SITE UNSPECIFIED: Primary | ICD-10-CM

## 2019-12-31 LAB — BACTERIA SPEC AEROBE CULT: ABNORMAL

## 2019-12-31 RX ORDER — NITROFURANTOIN 25; 75 MG/1; MG/1
100 CAPSULE ORAL 2 TIMES DAILY
Qty: 14 CAPSULE | Refills: 0 | Status: SHIPPED | OUTPATIENT
Start: 2019-12-31 | End: 2020-01-07

## 2020-01-01 ENCOUNTER — PATIENT MESSAGE (OUTPATIENT)
Dept: OBSTETRICS AND GYNECOLOGY | Facility: CLINIC | Age: 30
End: 2020-01-01

## 2020-01-03 ENCOUNTER — TELEPHONE (OUTPATIENT)
Dept: OBSTETRICS AND GYNECOLOGY | Facility: CLINIC | Age: 30
End: 2020-01-03

## 2020-01-03 ENCOUNTER — ROUTINE PRENATAL (OUTPATIENT)
Dept: OBSTETRICS AND GYNECOLOGY | Facility: CLINIC | Age: 30
End: 2020-01-03
Attending: OBSTETRICS & GYNECOLOGY
Payer: COMMERCIAL

## 2020-01-03 VITALS — BODY MASS INDEX: 44.9 KG/M2 | SYSTOLIC BLOOD PRESSURE: 118 MMHG | DIASTOLIC BLOOD PRESSURE: 64 MMHG | WEIGHT: 261.56 LBS

## 2020-01-03 DIAGNOSIS — Q62.5 DUPLICATED URETER, RIGHT: ICD-10-CM

## 2020-01-03 DIAGNOSIS — I10 CHRONIC HYPERTENSION: ICD-10-CM

## 2020-01-03 DIAGNOSIS — Z34.90 PREGNANCY WITH ONE FETUS, ANTEPARTUM: Primary | ICD-10-CM

## 2020-01-03 PROCEDURE — 0502F SUBSEQUENT PRENATAL CARE: CPT | Mod: CPTII,S$GLB,, | Performed by: OBSTETRICS & GYNECOLOGY

## 2020-01-03 PROCEDURE — 0502F PR SUBSEQUENT PRENATAL CARE: ICD-10-PCS | Mod: CPTII,S$GLB,, | Performed by: OBSTETRICS & GYNECOLOGY

## 2020-01-03 NOTE — TELEPHONE ENCOUNTER
----- Message from Sharon Sofia sent at 1/2/2020  1:43 PM CST -----  Contact: YOLIE YOUNG [[id number]]  Name of Who is Calling : YOLIE YOUNG [[id number]]    Patient is returning a call from staff in regards to rescheduling appointment  .....Please contact to further discuss and advise.    Can the clinic reply by [facility] : No    What Number to Call Back :  [phone]      Left message for patient to give the office a call back.

## 2020-01-03 NOTE — PROGRESS NOTES
Patient seen and examined.  No complaints, denies VB/LOF/Ctxns, reports good fetal movement. Precautions for Bleeding/ ROM/ Decreased fetal movement/ Pre-E reviewed.  Taking antibiotics for UTI, no symptoms of pyelo.  Urine cultures to be repeated at next visit.  F/U scheduled 1 weeks

## 2020-01-07 ENCOUNTER — PATIENT MESSAGE (OUTPATIENT)
Dept: ADMINISTRATIVE | Facility: HOSPITAL | Age: 30
End: 2020-01-07

## 2020-01-09 ENCOUNTER — HOSPITAL ENCOUNTER (OUTPATIENT)
Dept: PERINATAL CARE | Facility: OTHER | Age: 30
Discharge: HOME | End: 2020-01-09
Attending: OBSTETRICS & GYNECOLOGY
Payer: COMMERCIAL

## 2020-01-09 ENCOUNTER — ROUTINE PRENATAL (OUTPATIENT)
Dept: OBSTETRICS AND GYNECOLOGY | Facility: CLINIC | Age: 30
End: 2020-01-09
Attending: OBSTETRICS & GYNECOLOGY
Payer: COMMERCIAL

## 2020-01-09 VITALS
SYSTOLIC BLOOD PRESSURE: 118 MMHG | DIASTOLIC BLOOD PRESSURE: 72 MMHG | BODY MASS INDEX: 44.65 KG/M2 | WEIGHT: 260.13 LBS

## 2020-01-09 DIAGNOSIS — O16.3 ELEVATED BLOOD PRESSURE COMPLICATING PREGNANCY IN THIRD TRIMESTER, ANTEPARTUM: Primary | ICD-10-CM

## 2020-01-09 DIAGNOSIS — I10 CHRONIC HYPERTENSION: ICD-10-CM

## 2020-01-09 DIAGNOSIS — Q62.5 DUPLICATED URETER, RIGHT: ICD-10-CM

## 2020-01-09 DIAGNOSIS — O16.3 ELEVATED BLOOD PRESSURE COMPLICATING PREGNANCY IN THIRD TRIMESTER, ANTEPARTUM: ICD-10-CM

## 2020-01-09 LAB
CREAT UR-MCNC: 73 MG/DL (ref 15–325)
PROT UR-MCNC: <7 MG/DL (ref 0–15)
PROT/CREAT UR: NORMAL MG/G{CREAT} (ref 0–0.2)

## 2020-01-09 PROCEDURE — 99999 PR PBB SHADOW E&M-EST. PATIENT-LVL III: ICD-10-PCS | Mod: PBBFAC,,, | Performed by: OBSTETRICS & GYNECOLOGY

## 2020-01-09 PROCEDURE — 76815 OB US LIMITED FETUS(S): CPT | Mod: 26,,, | Performed by: OBSTETRICS & GYNECOLOGY

## 2020-01-09 PROCEDURE — 76819 FETAL BIOPHYS PROFIL W/O NST: CPT | Mod: 26,,, | Performed by: OBSTETRICS & GYNECOLOGY

## 2020-01-09 PROCEDURE — 76819 PR US, OB, FETAL BIOPHYSICAL, W/O NST: ICD-10-PCS | Mod: 26,,, | Performed by: OBSTETRICS & GYNECOLOGY

## 2020-01-09 PROCEDURE — 0502F SUBSEQUENT PRENATAL CARE: CPT | Mod: CPTII,S$GLB,, | Performed by: OBSTETRICS & GYNECOLOGY

## 2020-01-09 PROCEDURE — 59025 FETAL NON-STRESS TEST: CPT

## 2020-01-09 PROCEDURE — 76815 OB US LIMITED FETUS(S): CPT

## 2020-01-09 PROCEDURE — 82570 ASSAY OF URINE CREATININE: CPT

## 2020-01-09 PROCEDURE — 76815 PR  US,PREGNANT UTERUS,LIMITED, 1/> FETUSES: ICD-10-PCS | Mod: 26,,, | Performed by: OBSTETRICS & GYNECOLOGY

## 2020-01-09 PROCEDURE — 87086 URINE CULTURE/COLONY COUNT: CPT

## 2020-01-09 PROCEDURE — 99999 PR PBB SHADOW E&M-EST. PATIENT-LVL III: CPT | Mod: PBBFAC,,, | Performed by: OBSTETRICS & GYNECOLOGY

## 2020-01-09 PROCEDURE — 0502F PR SUBSEQUENT PRENATAL CARE: ICD-10-PCS | Mod: CPTII,S$GLB,, | Performed by: OBSTETRICS & GYNECOLOGY

## 2020-01-10 LAB — BACTERIA UR CULT: NO GROWTH

## 2020-01-14 ENCOUNTER — HOSPITAL ENCOUNTER (OUTPATIENT)
Dept: PERINATAL CARE | Facility: OTHER | Age: 30
Discharge: HOME | End: 2020-01-14
Attending: OBSTETRICS & GYNECOLOGY
Payer: COMMERCIAL

## 2020-01-14 ENCOUNTER — ROUTINE PRENATAL (OUTPATIENT)
Dept: OBSTETRICS AND GYNECOLOGY | Facility: CLINIC | Age: 30
End: 2020-01-14
Attending: OBSTETRICS & GYNECOLOGY
Payer: COMMERCIAL

## 2020-01-14 VITALS — WEIGHT: 261.5 LBS | DIASTOLIC BLOOD PRESSURE: 70 MMHG | SYSTOLIC BLOOD PRESSURE: 122 MMHG | BODY MASS INDEX: 44.89 KG/M2

## 2020-01-14 DIAGNOSIS — Z34.90 PREGNANCY WITH ONE FETUS, ANTEPARTUM: ICD-10-CM

## 2020-01-14 DIAGNOSIS — O26.899 RH NEGATIVE STATE IN ANTEPARTUM PERIOD: ICD-10-CM

## 2020-01-14 DIAGNOSIS — O16.3 ELEVATED BLOOD PRESSURE COMPLICATING PREGNANCY IN THIRD TRIMESTER, ANTEPARTUM: ICD-10-CM

## 2020-01-14 DIAGNOSIS — I10 CHRONIC HYPERTENSION: ICD-10-CM

## 2020-01-14 DIAGNOSIS — Q62.5 DUPLICATED URETER, RIGHT: ICD-10-CM

## 2020-01-14 DIAGNOSIS — Z67.91 RH NEGATIVE STATE IN ANTEPARTUM PERIOD: ICD-10-CM

## 2020-01-14 PROCEDURE — 76815 OB US LIMITED FETUS(S): CPT

## 2020-01-14 PROCEDURE — 0502F PR SUBSEQUENT PRENATAL CARE: ICD-10-PCS | Mod: CPTII,S$GLB,, | Performed by: OBSTETRICS & GYNECOLOGY

## 2020-01-14 PROCEDURE — 76818 PR US, OB, FETAL BIOPHYSICAL, W/NST: ICD-10-PCS | Mod: 26,,, | Performed by: OBSTETRICS & GYNECOLOGY

## 2020-01-14 PROCEDURE — 76818 FETAL BIOPHYS PROFILE W/NST: CPT | Mod: 26,,, | Performed by: OBSTETRICS & GYNECOLOGY

## 2020-01-14 PROCEDURE — 59025 FETAL NON-STRESS TEST: CPT

## 2020-01-14 PROCEDURE — 0502F SUBSEQUENT PRENATAL CARE: CPT | Mod: CPTII,S$GLB,, | Performed by: OBSTETRICS & GYNECOLOGY

## 2020-01-14 NOTE — PROGRESS NOTES
External os 2cm, internal os is closed, abled to check more easily.  Patient seen and examined.  No complaints, denies VB/LOF/Ctxns, reports good fetal movement. Precautions for Bleeding/ ROM/ Decreased fetal movement/ Pre-E reviewed.  F/U scheduled 1 weeks.

## 2020-01-15 ENCOUNTER — PATIENT MESSAGE (OUTPATIENT)
Dept: OBSTETRICS AND GYNECOLOGY | Facility: CLINIC | Age: 30
End: 2020-01-15

## 2020-01-17 ENCOUNTER — HOSPITAL ENCOUNTER (OUTPATIENT)
Dept: PERINATAL CARE | Facility: OTHER | Age: 30
Discharge: HOME | End: 2020-01-17
Attending: OBSTETRICS & GYNECOLOGY
Payer: COMMERCIAL

## 2020-01-17 DIAGNOSIS — O16.3 ELEVATED BLOOD PRESSURE COMPLICATING PREGNANCY IN THIRD TRIMESTER, ANTEPARTUM: ICD-10-CM

## 2020-01-17 PROCEDURE — 59025 FETAL NON-STRESS TEST: CPT

## 2020-01-17 PROCEDURE — 59025 FETAL NON-STRESS TEST: CPT | Mod: 26,,, | Performed by: OBSTETRICS & GYNECOLOGY

## 2020-01-17 PROCEDURE — 59025 PR FETAL 2N-STRESS TEST: ICD-10-PCS | Mod: 26,,, | Performed by: OBSTETRICS & GYNECOLOGY

## 2020-01-20 RX ORDER — AZITHROMYCIN 250 MG/1
TABLET, FILM COATED ORAL
Qty: 6 TABLET | Refills: 0 | Status: SHIPPED | OUTPATIENT
Start: 2020-01-20 | End: 2020-03-10

## 2020-01-20 RX ORDER — ALBUTEROL SULFATE 90 UG/1
2 AEROSOL, METERED RESPIRATORY (INHALATION) EVERY 6 HOURS PRN
Qty: 18 G | Refills: 0 | Status: SHIPPED | OUTPATIENT
Start: 2020-01-20 | End: 2020-03-10

## 2020-01-22 ENCOUNTER — HOSPITAL ENCOUNTER (INPATIENT)
Facility: OTHER | Age: 30
LOS: 3 days | Discharge: HOME | End: 2020-01-25
Attending: OBSTETRICS & GYNECOLOGY | Admitting: OBSTETRICS & GYNECOLOGY
Payer: COMMERCIAL

## 2020-01-22 ENCOUNTER — RESEARCH ENCOUNTER (OUTPATIENT)
Dept: RESEARCH | Facility: HOSPITAL | Age: 30
End: 2020-01-22

## 2020-01-22 ENCOUNTER — ANESTHESIA EVENT (OUTPATIENT)
Dept: OBSTETRICS AND GYNECOLOGY | Facility: OTHER | Age: 30
End: 2020-01-22
Payer: COMMERCIAL

## 2020-01-22 ENCOUNTER — ANESTHESIA (OUTPATIENT)
Dept: OBSTETRICS AND GYNECOLOGY | Facility: OTHER | Age: 30
End: 2020-01-22
Payer: COMMERCIAL

## 2020-01-22 DIAGNOSIS — I10 CHRONIC HYPERTENSION: ICD-10-CM

## 2020-01-22 PROBLEM — O13.3 GESTATIONAL HYPERTENSION, THIRD TRIMESTER: Status: ACTIVE | Noted: 2019-07-11

## 2020-01-22 PROBLEM — O13.3 GESTATIONAL HYPERTENSION, THIRD TRIMESTER: Status: RESOLVED | Noted: 2019-07-11 | Resolved: 2020-01-22

## 2020-01-22 LAB
ABO + RH BLD: NORMAL
BASOPHILS # BLD AUTO: 0.03 K/UL (ref 0–0.2)
BASOPHILS NFR BLD: 0.3 % (ref 0–1.9)
BLD GP AB SCN CELLS X3 SERPL QL: NORMAL
BLOOD GROUP ANTIBODIES SERPL: NORMAL
DIFFERENTIAL METHOD: ABNORMAL
EOSINOPHIL # BLD AUTO: 0.1 K/UL (ref 0–0.5)
EOSINOPHIL NFR BLD: 1.2 % (ref 0–8)
ERYTHROCYTE [DISTWIDTH] IN BLOOD BY AUTOMATED COUNT: 13.2 % (ref 11.5–14.5)
HCT VFR BLD AUTO: 31.4 % (ref 37–48.5)
HGB BLD-MCNC: 10.4 G/DL (ref 12–16)
IMM GRANULOCYTES # BLD AUTO: 0.09 K/UL (ref 0–0.04)
IMM GRANULOCYTES NFR BLD AUTO: 0.8 % (ref 0–0.5)
LYMPHOCYTES # BLD AUTO: 2.8 K/UL (ref 1–4.8)
LYMPHOCYTES NFR BLD: 25.3 % (ref 18–48)
MCH RBC QN AUTO: 29.9 PG (ref 27–31)
MCHC RBC AUTO-ENTMCNC: 33.1 G/DL (ref 32–36)
MCV RBC AUTO: 90 FL (ref 82–98)
MONOCYTES # BLD AUTO: 0.8 K/UL (ref 0.3–1)
MONOCYTES NFR BLD: 7 % (ref 4–15)
NEUTROPHILS # BLD AUTO: 7.3 K/UL (ref 1.8–7.7)
NEUTROPHILS NFR BLD: 65.4 % (ref 38–73)
NRBC BLD-RTO: 0 /100 WBC
PLATELET # BLD AUTO: 304 K/UL (ref 150–350)
PMV BLD AUTO: 10.5 FL (ref 9.2–12.9)
RBC # BLD AUTO: 3.48 M/UL (ref 4–5.4)
WBC # BLD AUTO: 11.21 K/UL (ref 3.9–12.7)

## 2020-01-22 PROCEDURE — 59200 INSERT CERVICAL DILATOR: CPT

## 2020-01-22 PROCEDURE — 25000003 PHARM REV CODE 250: Performed by: STUDENT IN AN ORGANIZED HEALTH CARE EDUCATION/TRAINING PROGRAM

## 2020-01-22 PROCEDURE — 99900035 HC TECH TIME PER 15 MIN (STAT)

## 2020-01-22 PROCEDURE — C1751 CATH, INF, PER/CENT/MIDLINE: HCPCS | Performed by: ANESTHESIOLOGY

## 2020-01-22 PROCEDURE — 62326 NJX INTERLAMINAR LMBR/SAC: CPT | Performed by: STUDENT IN AN ORGANIZED HEALTH CARE EDUCATION/TRAINING PROGRAM

## 2020-01-22 PROCEDURE — 85025 COMPLETE CBC W/AUTO DIFF WBC: CPT

## 2020-01-22 PROCEDURE — 51702 INSERT TEMP BLADDER CATH: CPT

## 2020-01-22 PROCEDURE — 86900 BLOOD TYPING SEROLOGIC ABO: CPT

## 2020-01-22 PROCEDURE — S0020 INJECTION, BUPIVICAINE HYDRO: HCPCS | Performed by: STUDENT IN AN ORGANIZED HEALTH CARE EDUCATION/TRAINING PROGRAM

## 2020-01-22 PROCEDURE — 27200710 HC EPIDURAL INFUSION PUMP SET: Performed by: ANESTHESIOLOGY

## 2020-01-22 PROCEDURE — 63600175 PHARM REV CODE 636 W HCPCS: Performed by: STUDENT IN AN ORGANIZED HEALTH CARE EDUCATION/TRAINING PROGRAM

## 2020-01-22 PROCEDURE — 59400 PRA FULL ROUT OBSTE CARE,VAGINAL DELIV: ICD-10-PCS | Mod: AA,ICN,, | Performed by: ANESTHESIOLOGY

## 2020-01-22 PROCEDURE — 72100002 HC LABOR CARE, 1ST 8 HOURS

## 2020-01-22 PROCEDURE — 11000001 HC ACUTE MED/SURG PRIVATE ROOM

## 2020-01-22 PROCEDURE — 27000181 HC CABLE, IUPC

## 2020-01-22 PROCEDURE — 59400 OBSTETRICAL CARE: CPT | Mod: AA,ICN,, | Performed by: ANESTHESIOLOGY

## 2020-01-22 PROCEDURE — 86870 RBC ANTIBODY IDENTIFICATION: CPT

## 2020-01-22 RX ORDER — METHYLERGONOVINE MALEATE 0.2 MG/ML
INJECTION INTRAVENOUS
Status: DISCONTINUED
Start: 2020-01-22 | End: 2020-01-22 | Stop reason: WASHOUT

## 2020-01-22 RX ORDER — FENTANYL CITRATE 50 UG/ML
INJECTION, SOLUTION INTRAMUSCULAR; INTRAVENOUS
Status: DISCONTINUED | OUTPATIENT
Start: 2020-01-22 | End: 2020-01-23

## 2020-01-22 RX ORDER — SIMETHICONE 80 MG
1 TABLET,CHEWABLE ORAL 4 TIMES DAILY PRN
Status: DISCONTINUED | OUTPATIENT
Start: 2020-01-22 | End: 2020-01-23

## 2020-01-22 RX ORDER — BUPIVACAINE HYDROCHLORIDE 2.5 MG/ML
INJECTION, SOLUTION EPIDURAL; INFILTRATION; INTRACAUDAL
Status: DISPENSED
Start: 2020-01-22 | End: 2020-01-22

## 2020-01-22 RX ORDER — ALBUTEROL SULFATE 90 UG/1
2 AEROSOL, METERED RESPIRATORY (INHALATION) EVERY 6 HOURS PRN
Status: DISCONTINUED | OUTPATIENT
Start: 2020-01-22 | End: 2020-01-25 | Stop reason: HOSPADM

## 2020-01-22 RX ORDER — BUSPIRONE HYDROCHLORIDE 10 MG/1
10 TABLET ORAL 2 TIMES DAILY
Status: DISCONTINUED | OUTPATIENT
Start: 2020-01-22 | End: 2020-01-25 | Stop reason: HOSPADM

## 2020-01-22 RX ORDER — BUPIVACAINE HYDROCHLORIDE 2.5 MG/ML
INJECTION, SOLUTION EPIDURAL; INFILTRATION; INTRACAUDAL
Status: DISPENSED
Start: 2020-01-22 | End: 2020-01-23

## 2020-01-22 RX ORDER — SODIUM CITRATE AND CITRIC ACID MONOHYDRATE 334; 500 MG/5ML; MG/5ML
30 SOLUTION ORAL ONCE
Status: CANCELLED | OUTPATIENT
Start: 2020-01-22 | End: 2020-01-22

## 2020-01-22 RX ORDER — FENTANYL/BUPIVACAINE/NS/PF 2MCG/ML-.1
PLASTIC BAG, INJECTION (ML) INJECTION
Status: DISPENSED
Start: 2020-01-22 | End: 2020-01-22

## 2020-01-22 RX ORDER — FENTANYL CITRATE 50 UG/ML
INJECTION, SOLUTION INTRAMUSCULAR; INTRAVENOUS
Status: COMPLETED
Start: 2020-01-22 | End: 2020-01-22

## 2020-01-22 RX ORDER — FENTANYL/BUPIVACAINE/NS/PF 2MCG/ML-.1
PLASTIC BAG, INJECTION (ML) INJECTION CONTINUOUS PRN
Status: DISCONTINUED | OUTPATIENT
Start: 2020-01-22 | End: 2020-01-23

## 2020-01-22 RX ORDER — CALCIUM CARBONATE 200(500)MG
500 TABLET,CHEWABLE ORAL 3 TIMES DAILY PRN
Status: DISCONTINUED | OUTPATIENT
Start: 2020-01-22 | End: 2020-01-23

## 2020-01-22 RX ORDER — PANTOPRAZOLE SODIUM 40 MG/1
40 TABLET, DELAYED RELEASE ORAL DAILY
Status: DISCONTINUED | OUTPATIENT
Start: 2020-01-22 | End: 2020-01-25 | Stop reason: HOSPADM

## 2020-01-22 RX ORDER — OXYTOCIN/RINGER'S LACTATE 30/500 ML
2 PLASTIC BAG, INJECTION (ML) INTRAVENOUS CONTINUOUS
Status: DISCONTINUED | OUTPATIENT
Start: 2020-01-22 | End: 2020-01-23

## 2020-01-22 RX ORDER — BUPIVACAINE HYDROCHLORIDE 2.5 MG/ML
INJECTION, SOLUTION INFILTRATION; PERINEURAL
Status: DISCONTINUED | OUTPATIENT
Start: 2020-01-22 | End: 2020-01-23

## 2020-01-22 RX ORDER — FAMOTIDINE 10 MG/ML
20 INJECTION INTRAVENOUS ONCE
Status: CANCELLED | OUTPATIENT
Start: 2020-01-22 | End: 2020-01-22

## 2020-01-22 RX ORDER — MISOPROSTOL 200 UG/1
TABLET ORAL
Status: DISPENSED
Start: 2020-01-22 | End: 2020-01-23

## 2020-01-22 RX ORDER — SODIUM CHLORIDE 9 MG/ML
INJECTION, SOLUTION INTRAVENOUS
Status: DISCONTINUED | OUTPATIENT
Start: 2020-01-22 | End: 2020-01-23

## 2020-01-22 RX ORDER — ONDANSETRON 8 MG/1
8 TABLET, ORALLY DISINTEGRATING ORAL EVERY 8 HOURS PRN
Status: DISCONTINUED | OUTPATIENT
Start: 2020-01-22 | End: 2020-01-23

## 2020-01-22 RX ORDER — SODIUM CHLORIDE 9 MG/ML
INJECTION, SOLUTION INTRAVENOUS CONTINUOUS
Status: DISCONTINUED | OUTPATIENT
Start: 2020-01-22 | End: 2020-01-23

## 2020-01-22 RX ORDER — LIDOCAINE HYDROCHLORIDE AND EPINEPHRINE 15; 5 MG/ML; UG/ML
INJECTION, SOLUTION EPIDURAL
Status: DISCONTINUED | OUTPATIENT
Start: 2020-01-22 | End: 2020-01-23

## 2020-01-22 RX ORDER — OXYTOCIN/RINGER'S LACTATE 30/500 ML
95 PLASTIC BAG, INJECTION (ML) INTRAVENOUS ONCE
Status: DISCONTINUED | OUTPATIENT
Start: 2020-01-22 | End: 2020-01-23

## 2020-01-22 RX ORDER — FENTANYL/BUPIVACAINE/NS/PF 2MCG/ML-.1
PLASTIC BAG, INJECTION (ML) INJECTION CONTINUOUS
Status: CANCELLED | OUTPATIENT
Start: 2020-01-22

## 2020-01-22 RX ORDER — OXYTOCIN/RINGER'S LACTATE 30/500 ML
334 PLASTIC BAG, INJECTION (ML) INTRAVENOUS ONCE
Status: DISCONTINUED | OUTPATIENT
Start: 2020-01-22 | End: 2020-01-23

## 2020-01-22 RX ORDER — TERBUTALINE SULFATE 1 MG/ML
0.25 INJECTION SUBCUTANEOUS
Status: DISCONTINUED | OUTPATIENT
Start: 2020-01-22 | End: 2020-01-23

## 2020-01-22 RX ADMIN — PENICILLIN G POTASSIUM 3 MILLION UNITS: 20000000 INJECTION, POWDER, FOR SOLUTION INTRAVENOUS at 03:01

## 2020-01-22 RX ADMIN — BUPIVACAINE HYDROCHLORIDE 10 ML: 2.5 INJECTION, SOLUTION INFILTRATION; PERINEURAL at 08:01

## 2020-01-22 RX ADMIN — SODIUM CHLORIDE, SODIUM LACTATE, POTASSIUM CHLORIDE, AND CALCIUM CHLORIDE 1000 ML: .6; .31; .03; .02 INJECTION, SOLUTION INTRAVENOUS at 08:01

## 2020-01-22 RX ADMIN — ONDANSETRON 8 MG: 8 TABLET, ORALLY DISINTEGRATING ORAL at 07:01

## 2020-01-22 RX ADMIN — DEXTROSE 5 MILLION UNITS: 50 INJECTION, SOLUTION INTRAVENOUS at 02:01

## 2020-01-22 RX ADMIN — FENTANYL CITRATE 100 MCG: 50 INJECTION, SOLUTION INTRAMUSCULAR; INTRAVENOUS at 04:01

## 2020-01-22 RX ADMIN — FENTANYL CITRATE 10 MCG: 50 INJECTION, SOLUTION INTRAMUSCULAR; INTRAVENOUS at 11:01

## 2020-01-22 RX ADMIN — Medication 2 MILLI-UNITS/MIN: at 02:01

## 2020-01-22 RX ADMIN — Medication 8 ML: at 04:01

## 2020-01-22 RX ADMIN — PENICILLIN G POTASSIUM 3 MILLION UNITS: 20000000 INJECTION, POWDER, FOR SOLUTION INTRAVENOUS at 10:01

## 2020-01-22 RX ADMIN — Medication 10 ML/HR: at 11:01

## 2020-01-22 RX ADMIN — PANTOPRAZOLE SODIUM 40 MG: 40 TABLET, DELAYED RELEASE ORAL at 10:01

## 2020-01-22 RX ADMIN — LIDOCAINE HYDROCHLORIDE,EPINEPHRINE BITARTRATE 3 ML: 15; .005 INJECTION, SOLUTION EPIDURAL; INFILTRATION; INTRACAUDAL; PERINEURAL at 11:01

## 2020-01-22 RX ADMIN — PENICILLIN G POTASSIUM 3 MILLION UNITS: 20000000 INJECTION, POWDER, FOR SOLUTION INTRAVENOUS at 06:01

## 2020-01-22 RX ADMIN — BUPIVACAINE HYDROCHLORIDE 1 ML: 2.5 INJECTION, SOLUTION INFILTRATION; PERINEURAL at 11:01

## 2020-01-22 RX ADMIN — Medication 30 MILLI-UNITS/MIN: at 11:01

## 2020-01-22 NOTE — PROGRESS NOTES
TXA .561 Consent note:    Ms. Geiger was admitted on today for induction of labor.  She was screened for eligibility in the Txa study and met all inclusion/exclusion criteria.  She was approached in private L&D suite and voluntarily consented to TXA trial.  We discussed the study in detail, including the purpose, numbers/length, procedures, risk/benefit, cost/payment, contacts (investigators/IRB), alternatives/voluntary nature/withdrawal and confidentiality/HIPPA.  Dr. Escalante was available for questions, but patient had no further questions. Patient did not consent to be contacted for future studies. The consent form was signed on today 2020 at 0923 and patient was given a copy of signed consent.  Patient will be randomized if a  is called.

## 2020-01-22 NOTE — ANESTHESIA PREPROCEDURE EVALUATION
"[facility]  Anesthesia Pre-Operative Evaluation       Patient Name: Krupa Geiger  YOB: 1990  MRN: [id number]    SUBJECTIVE:     Krupa Geiger is a 29 y.o. female  40w0d presenting for scheduled IOL.    Current pregnancy complicated by TOLAC, prev csx x1 with epidural for failure to progress, TMJ, nephrolithiasis, HTN, GERD, anxiety, PONV, migraines.    For this pregnancy, patient states she's a "maybe" for the epidural.  I discussed that given her TOLAC status she is at higher risk of complications and the benefits of having a functioning epidural in the case of an emergency.    Denies previous issues with neuraxial anesthesia.  Denies previous issues with general anesthesia.  Denies family history of issues with anesthesia.    Denies hx of seizures, strokes, heart failure, smoking, asthma, COPD, liver disease, kidney disease, bleeding disorders, taking blood thinners, back surgery.  Lab Results   Component Value Date     2020       OB History    Para Term  AB Living   2 1 1     1   SAB TAB Ectopic Multiple Live Births           1      # Outcome Date GA Lbr Boni/2nd Weight Sex Delivery Anes PTL Lv   2 Current            1 Term 10/14/14 41w2d   F CS-LTranv EPI  KAVITHA      Complications: Failure to Progress in Second Stage, Failure to progress in labor, Incisional infection, Gestational HTN       Past Surgical History:   Procedure Laterality Date    ANKLE SURGERY Right      SECTION  10/2014    CYSTOSCOPY W/ URETERAL STENT PLACEMENT Left 10/2/2019    Procedure: CYSTOSCOPY, WITH URETERAL STENT INSERTION - Under ultrasound guidance;  Surgeon: Marsha Guevara MD;  Location: [facility];  Service: Urology;  Laterality: Left;    KIDNEY STONE SURGERY      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)    LASER LITHOTRIPSY Left 10/16/2019    Procedure: LITHOTRIPSY, USING LASER;  Surgeon: Marsha SAMSON" MD [place];  Location: [facility];  Service: Urology;  Laterality: Left;    TONSILLECTOMY      ULTRASOUND GUIDANCE Left 10/16/2019    Procedure: ULTRASOUND GUIDANCE, for laser litho;  Surgeon: Marsha Guevara MD;  Location: [facility];  Service: Urology;  Laterality: Left;    URETEROSCOPIC REMOVAL OF URETERIC CALCULUS Left 10/16/2019    Procedure: REMOVAL, CALCULUS, URETER, URETEROSCOPIC possible retro pyelo, possible lasere litho, possible stent;  Surgeon: Marsha Guevara MD;  Location: [facility];  Service: Urology;  Laterality: Left;  25 WEEKS PREGNANT / FETAL MONITORING BEFORE AND AFTER PROCEDURE/   CALL PELON ELDER [phone]      WISDOM TOOTH EXTRACTION          Patient Active Problem List   Diagnosis    Hypoglycemia    TMJ syndrome    Nephrolithiasis    Migraine without aura and without status migrainosus, not intractable    Moderate episode of recurrent major depressive disorder    Social anxiety disorder    Anxiety    Attention deficit hyperactivity disorder (ADHD), predominantly inattentive type    history of gestational Hypertension starting at 30 weeks.    Pregnancy with one fetus, antepartum    Chronic hypertension    Rh negative state in antepartum period    Supervision of other high risk pregnancies, second trimester    Duplicated ureter, right    Duplicated left renal collecting system    Obesity affecting pregnancy in second trimester    24 weeks gestation of pregnancy    History of nephrolithiasis    Left ureteral calculus    Hip weakness    SI (sacroiliac) pain    Elevated blood pressure complicating pregnancy in third trimester, antepartum       Review of patient's allergies indicates:  No Known Allergies    Social History     Socioeconomic History    Marital status:      Spouse name: Not on file    Number of children: Not on file    Years of education: Not on file    Highest education level: Not on file   Occupational History    Not on file   Social Needs     Financial resource strain: Not on file    Food insecurity:     Worry: Not on file     Inability: Not on file    Transportation needs:     Medical: Not on file     Non-medical: Not on file   Tobacco Use    Smoking status: Never Smoker    Smokeless tobacco: Never Used   Substance and Sexual Activity    Alcohol use: Not Currently     Frequency: Never     Comment: socially when not pregnant    Drug use: No    Sexual activity: Yes     Partners: Male     Birth control/protection: None, Condom   Lifestyle    Physical activity:     Days per week: Not on file     Minutes per session: Not on file    Stress: Not on file   Relationships    Social connections:     Talks on phone: Not on file     Gets together: Not on file     Attends Restoration[employer] service: Not on file     Active member of club or organization: Not on file     Attends meetings of clubs or organizations: Not on file     Relationship status: Not on file   Other Topics Concern    Not on file   Social History Narrative    Not on file       OBJECTIVE:     Weight:  Wt Readings from Last 4 Encounters:   01/22/20 117.9 kg (260 lb)   01/14/20 118.6 kg (261 lb 8.2 oz)   01/09/20 118 kg (260 lb 2.3 oz)   01/03/20 118.6 kg (261 lb 9.2 oz)     Body mass index is 46.06 kg/m².    Outpatient Medications:  No current facility-administered medications on file prior to encounter.      Current Outpatient Medications on File Prior to Encounter   Medication Sig Dispense Refill    albuterol (VENTOLIN HFA) 90 mcg/actuation inhaler Inhale 2 puffs into the lungs every 6 (six) hours as needed for Wheezing. Rescue 18 g 0    azithromycin (ZITHROMAX Z-LYNETTE) 250 MG tablet Take 2 tablets (500 mg) on  Day 1,  followed by 1 tablet (250 mg) once daily on Days 2 through 5. 6 tablet 0    BABY ASPIRIN ORAL Take by mouth once daily.      busPIRone (BUSPAR) 10 MG tablet Take 1 tablet (10 mg total) by mouth 2 (two) times daily. 60 tablet 11    HYDROcodone-acetaminophen (NORCO) 5-325 mg per  tablet Take 1 tablet by mouth every 6 (six) hours as needed for Pain. (Patient not taking: Reported on 10/25/2019) 6 tablet 0    multivit with calcium,iron,min (WOMEN'S DAILY MULTIVITAMIN ORAL) Women's Daily Multivitamin      omeprazole (PRILOSEC) 20 MG capsule Take 20 mg by mouth once daily.      ondansetron (ZOFRAN) 4 MG tablet Take 1 tablet (4 mg total) by mouth 2 (two) times daily. (Patient not taking: Reported on 10/25/2019) 30 tablet 0    oxybutynin (DITROPAN) 5 MG Tab Take 1 tablet (5 mg total) by mouth 3 (three) times daily. (Patient not taking: Reported on 10/25/2019) 90 tablet 11    oxyCODONE-acetaminophen (PERCOCET) 5-325 mg per tablet Take 1 tablet by mouth every 4 (four) hours as needed. (Patient not taking: Reported on 10/25/2019) 30 tablet 0        Current Inpatient Medications:      BP Readings from Last 3 Encounters:   01/22/20 128/71   01/14/20 122/70   01/09/20 118/72         Chemistry        Component Value Date/Time     (L) 12/19/2019 1029    K 4.0 12/19/2019 1029     12/19/2019 1029    CO2 24 12/19/2019 1029    BUN 9 12/19/2019 1029    CREATININE 0.6 12/19/2019 1029    GLU 80 12/19/2019 1029        Component Value Date/Time    CALCIUM 9.2 12/19/2019 1029    ALKPHOS 114 12/19/2019 1029    AST 16 01/09/2020 1438    ALT 9 (L) 01/09/2020 1438    BILITOT 0.3 12/19/2019 1029    ESTGFRAFRICA >60 12/19/2019 1029    EGFRNONAA >60 12/19/2019 1029            Lab Results   Component Value Date    WBC 12.01 01/09/2020    HGB 10.8 (L) 01/09/2020    HCT 33.1 (L) 01/09/2020    MCV 91 01/09/2020     01/09/2020       No results for input(s): PT, INR, PROTIME, APTT in the last 72 hours.      Anesthesia Evaluation    I have reviewed the Patient Summary Reports.    I have reviewed the Nursing Notes.   I have reviewed the Medications.     Review of Systems  Anesthesia Hx:  Hx of Anesthetic complications PONV History of prior surgery of interest to airway management or planning: Denies Family  Hx of Anesthesia complications.  Personal Hx of Anesthesia complications, Post-Operative Nausea/Vomiting   Social:  Non-Smoker    Hematology/Oncology:  Hematology Normal   Oncology Normal     EENT/Dental:EENT/Dental Normal   Cardiovascular:   Exercise tolerance: good Hypertension    Pulmonary:  Pulmonary Normal    Renal/:   renal calculi    Hepatic/GI:   GERD    Musculoskeletal:  Musculoskeletal Normal    Neurological:   Headaches    Endocrine:  Endocrine Normal    Psych:   Psychiatric History anxiety depression          Physical Exam  General:  Morbid Obesity    Airway/Jaw/Neck:  Airway Findings: Mouth Opening: Normal Mallampati: II      Dental:  Dental Findings: In tact   Chest/Lungs:  Chest/Lungs Findings: Clear to auscultation, Normal Respiratory Rate     Heart/Vascular:  Heart Findings: Rate: Normal  Rhythm: Regular Rhythm  Sounds: Normal        Mental Status:  Mental Status Findings:  Cooperative, Alert and Oriented         Anesthesia Plan  Type of Anesthesia, risks & benefits discussed:  Anesthesia Type:  epidural, CSE, general, spinal  Patient's Preference:   Intra-op Monitoring Plan: standard ASA monitors  Intra-op Monitoring Plan Comments:   Post Op Pain Control Plan: multimodal analgesia, IV/PO Opioids PRN, epidural analgesia, per primary service following discharge from PACU and intrathecal opioid  Post Op Pain Control Plan Comments:   Induction:   IV  Beta Blocker:  Patient is not currently on a Beta-Blocker (No further documentation required).       Informed Consent: Patient understands risks and agrees with Anesthesia plan.  Questions answered. Anesthesia consent signed with patient.  ASA Score: 3     Day of Surgery Review of History & Physical:  There are no significant changes.  H&P update referred to the provider.         Ready For Surgery From Anesthesia Perspective.

## 2020-01-22 NOTE — H&P
HISTORY AND PHYSICAL                                                OBSTETRICS          Subjective:       Krupa Geiger is a 29 y.o.  female with IUP at 40w0d weeks gestation who presents for scheduled IOL (TOLAC).    Patient denies contractions, denies vaginal bleeding, denies LOF.   Fetal Movement: normal.    This IUP is complicated by LTCS x 1 for arrest of dilation (though likely was not given enough time), Rh neg, GBS pos, depression/anxiety, TMJ, and migraines. She also has a duplicated renal collecting system on the left with a history of stones.    Review of Systems   Constitutional: Negative for appetite change, chills, fatigue and fever.   Respiratory: Negative for cough and shortness of breath.    Cardiovascular: Negative for chest pain and leg swelling.   Gastrointestinal: Negative for abdominal pain, bloating, constipation, diarrhea, nausea and vomiting.   Genitourinary: Negative for dysuria, hematuria, pelvic pain, vaginal discharge and vaginal pain.   Musculoskeletal: Negative for myalgias.   Integumentary:  Negative for rash.   Psychiatric/Behavioral: Positive for depression. The patient is not nervous/anxious.        PMHx:   Past Medical History:   Diagnosis Date    Anxiety     Depression     GERD (gastroesophageal reflux disease)     Gestational hypertension, third trimester 2019    Hypertension     gestational     Hypoglycemia     Kidney calculi     2016    Migraines     PONV (postoperative nausea and vomiting)     STD (sexually transmitted disease)     Urinary tract infection        PSHx:   Past Surgical History:   Procedure Laterality Date    ANKLE SURGERY Right      SECTION  10/2014    CYSTOSCOPY W/ URETERAL STENT PLACEMENT Left 10/2/2019    Procedure: CYSTOSCOPY, WITH URETERAL STENT INSERTION - Under ultrasound guidance;  Surgeon: Marsha Guevara MD;  Location: [facility];  Service: Urology;  Laterality: Left;    KIDNEY STONE SURGERY       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)    LASER LITHOTRIPSY Left 10/16/2019    Procedure: LITHOTRIPSY, USING LASER;  Surgeon: Marsha Guevara MD;  Location: [facility];  Service: Urology;  Laterality: Left;    TONSILLECTOMY      ULTRASOUND GUIDANCE Left 10/16/2019    Procedure: ULTRASOUND GUIDANCE, for laser litho;  Surgeon: Marsha Guevara MD;  Location: [facility] [place] [facility];  Service: Urology;  Laterality: Left;    URETEROSCOPIC REMOVAL OF URETERIC CALCULUS Left 10/16/2019    Procedure: REMOVAL, CALCULUS, URETER, URETEROSCOPIC possible retro pyelo, possible lasere litho, possible stent;  Surgeon: Marsha Guevara MD;  Location: [facility] [place] [facility];  Service: Urology;  Laterality: Left;  25 WEEKS PREGNANT / FETAL MONITORING BEFORE AND AFTER PROCEDURE/   CALL PELON JERROD [phone]      WISDOM TOOTH EXTRACTION         All: Review of patient's allergies indicates:  No Known Allergies    Meds:   Medications Prior to Admission   Medication Sig Dispense Refill Last Dose    albuterol (VENTOLIN HFA) 90 mcg/actuation inhaler Inhale 2 puffs into the lungs every 6 (six) hours as needed for Wheezing. Rescue 18 g 0     azithromycin (ZITHROMAX Z-LYNETTE) 250 MG tablet Take 2 tablets (500 mg) on  Day 1,  followed by 1 tablet (250 mg) once daily on Days 2 through 5. 6 tablet 0     BABY ASPIRIN ORAL Take by mouth once daily.   Not Taking    busPIRone (BUSPAR) 10 MG tablet Take 1 tablet (10 mg total) by mouth 2 (two) times daily. 60 tablet 11 Taking    HYDROcodone-acetaminophen (NORCO) 5-325 mg per tablet Take 1 tablet by mouth every 6 (six) hours as needed for Pain. (Patient not taking: Reported on 10/25/2019) 6 tablet 0 Not Taking    multivit with calcium,iron,min (WOMEN'S DAILY MULTIVITAMIN ORAL) Women's Daily Multivitamin   Taking    omeprazole (PRILOSEC) 20 MG capsule Take 20 mg by mouth once daily.   Taking    ondansetron (ZOFRAN) 4 MG tablet Take 1 tablet (4 mg total) by mouth 2 (two)  times daily. (Patient not taking: Reported on 10/25/2019) 30 tablet 0 Not Taking    oxybutynin (DITROPAN) 5 MG Tab Take 1 tablet (5 mg total) by mouth 3 (three) times daily. (Patient not taking: Reported on 10/25/2019) 90 tablet 11 Not Taking    oxyCODONE-acetaminophen (PERCOCET) 5-325 mg per tablet Take 1 tablet by mouth every 4 (four) hours as needed. (Patient not taking: Reported on 10/25/2019) 30 tablet 0 Not Taking       SH:   Social History     Socioeconomic History    Marital status:      Spouse name: Not on file    Number of children: Not on file    Years of education: Not on file    Highest education level: Not on file   Occupational History    Not on file   Social Needs    Financial resource strain: Not on file    Food insecurity:     Worry: Not on file     Inability: Not on file    Transportation needs:     Medical: Not on file     Non-medical: Not on file   Tobacco Use    Smoking status: Never Smoker    Smokeless tobacco: Never Used   Substance and Sexual Activity    Alcohol use: Not Currently     Frequency: Never     Comment: socially when not pregnant    Drug use: No    Sexual activity: Yes     Partners: Male     Birth control/protection: None, Condom   Lifestyle    Physical activity:     Days per week: Not on file     Minutes per session: Not on file    Stress: Not on file   Relationships    Social connections:     Talks on phone: Not on file     Gets together: Not on file     Attends Shinto service: Not on file     Active member of club or organization: Not on file     Attends meetings of clubs or organizations: Not on file     Relationship status: Not on file   Other Topics Concern    Not on file   Social History Narrative    Not on file       FH:   Family History   Problem Relation Age of Onset    Hypertension Mother     Hypertension Father     Heart disease Father     No Known Problems Sister     Nephrolithiasis Daughter     No Known Problems Sister     No  "Known Problems Sister     Breast cancer Paternal Grandmother         50s at diagnosis    Colon cancer Neg Hx     Ovarian cancer Neg Hx        OBHx:   OB History    Para Term  AB Living   2 1 1 0 0 1   SAB TAB Ectopic Multiple Live Births   0 0 0 0 1      # Outcome Date GA Lbr Boni/2nd Weight Sex Delivery Anes PTL Lv   2 Current            1 Term 10/14/14 41w2d   F CS-LTranv EPI  KAVITHA      Complications: Failure to Progress in Second Stage, Failure to progress in labor, Incisional infection, Gestational HTN       Objective:       /71   Pulse 107   Temp 99 °F (37.2 °C) (Temporal)   Resp 20   Ht 5' 3" (1.6 m)   Wt 117.9 kg (260 lb)   LMP 2019 (Approximate) Comment: OPK + 19  SpO2 97%   Breastfeeding? No   BMI 46.06 kg/m²     Vitals:    20 0031 20 0033 20 0034   BP:   128/71   Pulse: 103  107   Resp:   20   Temp:  99 °F (37.2 °C)    TempSrc:  Temporal    SpO2: 97%     Weight:   117.9 kg (260 lb)   Height:   5' 3" (1.6 m)       General:   alert, appears stated age and cooperative, no apparent distress   HENT:  normocephalic, atraumatic   Eyes:  extraocular movements and conjunctivae normal   Neck:  supple, range of motion normal, no thyromegaly   Lungs:   no respiratory distress   Heart:   regular rate   Abdomen:  soft, non-tender, non-distended but gravid, no rebound or guarding    Extremities negative edema, negative erythema   FHT: 130, moderate BTBV, +accels, -decels;  Cat 1 (reassuring)                 TOCO: irritable   Presentations: cephalic by ultrasound   Cervix:     Dilation: Closed    Effacement: 75%    Station:  -3    Consistency: soft    Position: middle     Lab Review  Blood Type A NEG  GBBS: positive  Rubella: Immune  RPR: NR  HIV: negative  HepB: negative       Assessment:       40w0d weeks gestation with IOL    Active Hospital Problems   No active problems to display.      Resolved Hospital Problems    Diagnosis Date Resolved POA    Gestational " hypertension, third trimester [O13.3] 01/22/2020 Yes          Plan:     1. IOL (tolac)   Risks, benefits, alternatives and possible complications have been discussed in detail with the patient.   - Consents signed and to chart  - Admit to Labor and Delivery unit  - Pitocin, balloon when able   - PCN for GBS pos  - Epidural per Anesthesia  - Draw CBC, T&S  - Recheck in 2-4 hrs or PRN  - Post-Partum Hemorrhage risk - medium    2. Depression/anxiety  - continue home buspar    3. GERD  - pantoprazole daily    Sarai Espino MD  OB/GYN, PGY-4

## 2020-01-22 NOTE — PROGRESS NOTES
"LABOR NOTE    S:  Complaints: No.  Epidural working:  no  MD to bedside for cervical check.    O: /60   Pulse 93   Temp 98 °F (36.7 °C)   Resp 18   Ht 5' 3" (1.6 m)   Wt 117.9 kg (260 lb)   LMP 2019 (Approximate) Comment: OPK + 19  SpO2 98%   Breastfeeding? No   BMI 46.06 kg/m²       FHT: 140, moderate variability, + accels, - decels. Cat 1 (reassuring)  CTX: q 2-3 minutes  SVE: 5/90/-2, unchanged  FSE and IUPC in place - MVUs in the 190s-240s      ASSESSMENT:   29 y.o.  at 40w0d, TOLAC    FHT reassuring    Active Hospital Problems   No active problems to display.      Resolved Hospital Problems    Diagnosis Date Resolved POA    Gestational hypertension, third trimester [O13.3] 2020 Yes     0200: cl/60/-3, pitocin started  0600: 60/-3, cook placed, pit @ 10 mU/min  0945: 2/70/-3, cook in place  1330: 4/80/-3, cook removed  1400: ruptured spontaneously; 5/80/-2, FSE placed  1530: 5/80/-2  1800: 5/90/-2, pit @ 30 mU/min  2100: 5/90/-2, pit @ 30 mU/min, MVUs in the 190s-240s    PLAN:    Continue Close Maternal/Fetal Monitoring  Pitocin Augmentation per protocol  Recheck 2 hours or PRN    Fidelina Watson MD  OB/GYN  PGY-2    "

## 2020-01-22 NOTE — ANESTHESIA PROCEDURE NOTES
CSE    Patient location during procedure: OB  Start time: 1/22/2020 11:31 AM  Timeout: 1/22/2020 11:30 AM  End time: 1/22/2020 11:45 AM    Staffing  Authorizing Provider: Maeve Arzate MD  Performing Provider: Paxton Candelario MD    Preanesthetic Checklist  Completed: patient identified, site marked, surgical consent, pre-op evaluation, timeout performed, IV checked, risks and benefits discussed and monitors and equipment checked  CSE  Patient position: sitting  Prep: ChloraPrep  Patient monitoring: heart rate, frequent blood pressure checks and continuous pulse ox  Approach: midline  Spinal Needle  Needle type: pencil-tip   Needle gauge: 25 G  Needle length: 5 in  Epidural Needle  Injection technique: MARY saline  Needle type: Tuohy   Needle gauge: 18 G  Needle length: 3.5 in  Needle insertion depth: 7 cm  Location: L3-4  Needle localization: anatomical landmarks  Catheter  Catheter type: [employer]  Catheter size: 19 G  Catheter at skin depth: 12 cm  Test dose: lidocaine 1.5% with Epi 1-to-200,000  Additional Documentation: incremental injection, negative aspiration for CSF, negative aspiration for heme, no paresthesia on injection and negative test dose

## 2020-01-22 NOTE — PROGRESS NOTES
"LABOR NOTE    S:  Complaints: No.  Epidural working:  no  MD to bedside for cervical check.    O: /72   Pulse 94   Temp 98 °F (36.7 °C)   Resp 18   Ht 5' 3" (1.6 m)   Wt 117.9 kg (260 lb)   LMP 2019 (Approximate) Comment: OPK + 19  SpO2 98%   Breastfeeding? No   BMI 46.06 kg/m²       FHT: 135, moderate variability, + accels, - decels. Cat 1 (reassuring)  CTX: q 3 minutes  SVE: 70/-3, cook balloon in place      ASSESSMENT:   29 y.o.  at 40w0d, TOLAC    FHT reassuring    Active Hospital Problems   No active problems to display.      Resolved Hospital Problems    Diagnosis Date Resolved POA    Gestational hypertension, third trimester [O13.3] 2020 Yes     0200: cl/60/-3, pitocin started  0600: 60/-3, cook placed, pit @ 10 mU/min  0945: 70/-3, cook in place    PLAN:    Continue Close Maternal/Fetal Monitoring  Pitocin Augmentation per protocol  Recheck 2 hours or PRN    Zulma Fitzgerald M.D.   OB/GYN  PGY-1    "

## 2020-01-22 NOTE — PROGRESS NOTES
"LABOR NOTE    S:  Complaints: No.  Epidural working:  no  MD to bedside for cervical check.    O: /70   Pulse 86   Temp 99 °F (37.2 °C) (Temporal)   Resp 20   Ht 5' 3" (1.6 m)   Wt 117.9 kg (260 lb)   LMP 2019 (Approximate) Comment: OPK + 19  SpO2 98%   Breastfeeding? No   BMI 46.06 kg/m²       FHT: 135, moderate variability, + accels, - decels. Cat 1 (reassuring)  CTX: q 3 minutes  SVE: 160/-3, cook balloon placed      ASSESSMENT:   29 y.o.  at 40w0d, TOLAC    FHT reassuring    Active Hospital Problems   No active problems to display.      Resolved Hospital Problems    Diagnosis Date Resolved POA    Gestational hypertension, third trimester [O13.3] 2020 Yes     0200: cl/60/-3, pitocin started  0400: 1/60/-3, cook placed, pit @ 10 mU/min    PLAN:    Continue Close Maternal/Fetal Monitoring  Pitocin Augmentation per protocol  Recheck 2 hours or PRN    Sushma K. Reddy, MD  PGY-3, OBGYN  "

## 2020-01-22 NOTE — PROGRESS NOTES
Doing well.  Discussed buspar for anxiety.  TSH and free T4 today.  F/U in 2 weeks.  magalys lneed repeat urine cultures with next visit.

## 2020-01-22 NOTE — PROGRESS NOTES
"LABOR NOTE    S:  Complaints: No.  Epidural working:  no  MD to bedside for cervical check.    O: /72   Pulse 94   Temp 98 °F (36.7 °C)   Resp 18   Ht 5' 3" (1.6 m)   Wt 117.9 kg (260 lb)   LMP 2019 (Approximate) Comment: OPK + 19  SpO2 98%   Breastfeeding? No   BMI 46.06 kg/m²       FHT: 140, moderate variability, + accels, - decels. Cat 1 (reassuring)  CTX: q 2-4 minutes  SVE: 580/-2, patient ruptured spontaneously @ 1300. FSE placed for more adequate fetal monitoring.       ASSESSMENT:   29 y.o.  at 40w0d, TOLAC    FHT reassuring    Active Hospital Problems   No active problems to display.      Resolved Hospital Problems    Diagnosis Date Resolved POA    Gestational hypertension, third trimester [O13.3] 2020 Yes     0200: cl/60/-3, pitocin started  0600: 160/-3, cook placed, pit @ 10 mU/min  0945: 270/-3, cook in place  1330: 480/-3, cook removed  1400: ruptured spontaneously; 80/-2, FSE placed  1530: 580/-2    PLAN:    Continue Close Maternal/Fetal Monitoring  Pitocin Augmentation per protocol  Recheck 2 hours or PRN    Dorina Ragsdale M.D.  OBGLYNNN PGY1  "

## 2020-01-23 VITALS — TEMPERATURE: 99 F

## 2020-01-23 LAB
ABO + RH BLD: NORMAL
ABO + RH BLD: NORMAL
ALLENS TEST: ABNORMAL
ALLENS TEST: ABNORMAL
BASOPHILS # BLD AUTO: ABNORMAL K/UL (ref 0–0.2)
BASOPHILS NFR BLD: 1 % (ref 0–1.9)
BLD GP AB SCN CELLS X3 SERPL QL: NORMAL
BLD GP AB SCN CELLS X3 SERPL QL: NORMAL
DELSYS: ABNORMAL
DELSYS: ABNORMAL
DIFFERENTIAL METHOD: ABNORMAL
EOSINOPHIL # BLD AUTO: ABNORMAL K/UL (ref 0–0.5)
EOSINOPHIL NFR BLD: 0 % (ref 0–8)
ERYTHROCYTE [DISTWIDTH] IN BLOOD BY AUTOMATED COUNT: 13.1 % (ref 11.5–14.5)
FETAL CELL SCN BLD QL ROSETTE: NORMAL
FETAL CELL SCN BLD QL ROSETTE: NORMAL
HCO3 UR-SCNC: 22.6 MMOL/L (ref 24–28)
HCO3 UR-SCNC: 23.7 MMOL/L (ref 24–28)
HCT VFR BLD AUTO: 29.2 % (ref 37–48.5)
HGB BLD-MCNC: 9.6 G/DL (ref 12–16)
IMM GRANULOCYTES # BLD AUTO: ABNORMAL K/UL (ref 0–0.04)
IMM GRANULOCYTES NFR BLD AUTO: ABNORMAL % (ref 0–0.5)
LYMPHOCYTES # BLD AUTO: ABNORMAL K/UL (ref 1–4.8)
LYMPHOCYTES NFR BLD: 7 % (ref 18–48)
MCH RBC QN AUTO: 29.8 PG (ref 27–31)
MCHC RBC AUTO-ENTMCNC: 32.9 G/DL (ref 32–36)
MCV RBC AUTO: 91 FL (ref 82–98)
MODE: ABNORMAL
MODE: ABNORMAL
MONOCYTES # BLD AUTO: ABNORMAL K/UL (ref 0.3–1)
MONOCYTES NFR BLD: 5 % (ref 4–15)
NEUTROPHILS NFR BLD: 83 % (ref 38–73)
NEUTS BAND NFR BLD MANUAL: 4 %
NRBC BLD-RTO: 0 /100 WBC
PCO2 BLDA: 44.7 MMHG (ref 35–45)
PCO2 BLDA: 72.4 MMHG (ref 35–45)
PH SMN: 7.12 [PH] (ref 7.35–7.45)
PH SMN: 7.31 [PH] (ref 7.35–7.45)
PLATELET # BLD AUTO: 253 K/UL (ref 150–350)
PLATELET BLD QL SMEAR: ABNORMAL
PMV BLD AUTO: 10.2 FL (ref 9.2–12.9)
PO2 BLDA: 20 MMHG (ref 80–100)
PO2 BLDA: 25 MMHG (ref 80–100)
POC BE: -4 MMOL/L
POC BE: -6 MMOL/L
POC SATURATED O2: 19 % (ref 95–100)
POC SATURATED O2: 39 % (ref 95–100)
RBC # BLD AUTO: 3.22 M/UL (ref 4–5.4)
SAMPLE: ABNORMAL
SAMPLE: ABNORMAL
SITE: ABNORMAL
SITE: ABNORMAL
WBC # BLD AUTO: 24.08 K/UL (ref 3.9–12.7)

## 2020-01-23 PROCEDURE — 25000003 PHARM REV CODE 250: Performed by: STUDENT IN AN ORGANIZED HEALTH CARE EDUCATION/TRAINING PROGRAM

## 2020-01-23 PROCEDURE — 85027 COMPLETE CBC AUTOMATED: CPT

## 2020-01-23 PROCEDURE — 63600175 PHARM REV CODE 636 W HCPCS: Performed by: STUDENT IN AN ORGANIZED HEALTH CARE EDUCATION/TRAINING PROGRAM

## 2020-01-23 PROCEDURE — 85007 BL SMEAR W/DIFF WBC COUNT: CPT

## 2020-01-23 PROCEDURE — 99900035 HC TECH TIME PER 15 MIN (STAT)

## 2020-01-23 PROCEDURE — 72200005 HC VAGINAL DELIVERY LEVEL II

## 2020-01-23 PROCEDURE — 11000001 HC ACUTE MED/SURG PRIVATE ROOM

## 2020-01-23 PROCEDURE — 36415 COLL VENOUS BLD VENIPUNCTURE: CPT

## 2020-01-23 PROCEDURE — 82803 BLOOD GASES ANY COMBINATION: CPT

## 2020-01-23 PROCEDURE — 85461 HEMOGLOBIN FETAL: CPT

## 2020-01-23 PROCEDURE — 85461 HEMOGLOBIN FETAL: CPT | Mod: 91

## 2020-01-23 PROCEDURE — 86901 BLOOD TYPING SEROLOGIC RH(D): CPT | Mod: 91

## 2020-01-23 PROCEDURE — 86850 RBC ANTIBODY SCREEN: CPT

## 2020-01-23 PROCEDURE — 25000003 PHARM REV CODE 250

## 2020-01-23 RX ORDER — PRENATAL WITH FERROUS FUM AND FOLIC ACID 3080; 920; 120; 400; 22; 1.84; 3; 20; 10; 1; 12; 200; 27; 25; 2 [IU]/1; [IU]/1; MG/1; [IU]/1; MG/1; MG/1; MG/1; MG/1; MG/1; MG/1; UG/1; MG/1; MG/1; MG/1; MG/1
1 TABLET ORAL DAILY
Status: DISCONTINUED | OUTPATIENT
Start: 2020-01-23 | End: 2020-01-25 | Stop reason: HOSPADM

## 2020-01-23 RX ORDER — METHYLERGONOVINE MALEATE 0.2 MG/ML
INJECTION INTRAVENOUS
Status: DISPENSED
Start: 2020-01-23 | End: 2020-01-23

## 2020-01-23 RX ORDER — MISOPROSTOL 200 UG/1
TABLET ORAL
Status: COMPLETED
Start: 2020-01-23 | End: 2020-01-23

## 2020-01-23 RX ORDER — DIPHENHYDRAMINE HCL 25 MG
25 CAPSULE ORAL EVERY 4 HOURS PRN
Status: DISCONTINUED | OUTPATIENT
Start: 2020-01-23 | End: 2020-01-25 | Stop reason: HOSPADM

## 2020-01-23 RX ORDER — OXYTOCIN/RINGER'S LACTATE 30/500 ML
95 PLASTIC BAG, INJECTION (ML) INTRAVENOUS ONCE
Status: DISCONTINUED | OUTPATIENT
Start: 2020-01-23 | End: 2020-01-25 | Stop reason: HOSPADM

## 2020-01-23 RX ORDER — HYDROCODONE BITARTRATE AND ACETAMINOPHEN 10; 325 MG/1; MG/1
1 TABLET ORAL EVERY 4 HOURS PRN
Status: DISCONTINUED | OUTPATIENT
Start: 2020-01-23 | End: 2020-01-25 | Stop reason: HOSPADM

## 2020-01-23 RX ORDER — HYDROCORTISONE 25 MG/G
CREAM TOPICAL 3 TIMES DAILY PRN
Status: DISCONTINUED | OUTPATIENT
Start: 2020-01-23 | End: 2020-01-25 | Stop reason: HOSPADM

## 2020-01-23 RX ORDER — SIMETHICONE 80 MG
1 TABLET,CHEWABLE ORAL EVERY 6 HOURS PRN
Status: DISCONTINUED | OUTPATIENT
Start: 2020-01-23 | End: 2020-01-25 | Stop reason: HOSPADM

## 2020-01-23 RX ORDER — ONDANSETRON 8 MG/1
8 TABLET, ORALLY DISINTEGRATING ORAL EVERY 8 HOURS PRN
Status: DISCONTINUED | OUTPATIENT
Start: 2020-01-23 | End: 2020-01-25 | Stop reason: HOSPADM

## 2020-01-23 RX ORDER — HYDROCODONE BITARTRATE AND ACETAMINOPHEN 5; 325 MG/1; MG/1
1 TABLET ORAL EVERY 4 HOURS PRN
Status: DISCONTINUED | OUTPATIENT
Start: 2020-01-23 | End: 2020-01-25 | Stop reason: HOSPADM

## 2020-01-23 RX ORDER — CARBOPROST TROMETHAMINE 250 UG/ML
INJECTION, SOLUTION INTRAMUSCULAR
Status: DISPENSED
Start: 2020-01-23 | End: 2020-01-23

## 2020-01-23 RX ORDER — ACETAMINOPHEN 325 MG/1
650 TABLET ORAL EVERY 6 HOURS PRN
Status: DISCONTINUED | OUTPATIENT
Start: 2020-01-23 | End: 2020-01-25 | Stop reason: HOSPADM

## 2020-01-23 RX ORDER — DIPHENHYDRAMINE HYDROCHLORIDE 50 MG/ML
25 INJECTION INTRAMUSCULAR; INTRAVENOUS EVERY 4 HOURS PRN
Status: DISCONTINUED | OUTPATIENT
Start: 2020-01-23 | End: 2020-01-25 | Stop reason: HOSPADM

## 2020-01-23 RX ORDER — DOCUSATE SODIUM 100 MG/1
200 CAPSULE, LIQUID FILLED ORAL 2 TIMES DAILY PRN
Status: DISCONTINUED | OUTPATIENT
Start: 2020-01-23 | End: 2020-01-25 | Stop reason: HOSPADM

## 2020-01-23 RX ORDER — FENTANYL CITRATE 50 UG/ML
INJECTION, SOLUTION INTRAMUSCULAR; INTRAVENOUS
Status: COMPLETED
Start: 2020-01-23 | End: 2020-01-23

## 2020-01-23 RX ORDER — BUPIVACAINE HYDROCHLORIDE 2.5 MG/ML
INJECTION, SOLUTION EPIDURAL; INFILTRATION; INTRACAUDAL
Status: DISPENSED
Start: 2020-01-23 | End: 2020-01-23

## 2020-01-23 RX ORDER — OXYTOCIN 10 [USP'U]/ML
INJECTION, SOLUTION INTRAMUSCULAR; INTRAVENOUS
Status: DISPENSED
Start: 2020-01-23 | End: 2020-01-23

## 2020-01-23 RX ORDER — IBUPROFEN 600 MG/1
600 TABLET ORAL EVERY 6 HOURS
Status: DISCONTINUED | OUTPATIENT
Start: 2020-01-23 | End: 2020-01-25 | Stop reason: HOSPADM

## 2020-01-23 RX ADMIN — IBUPROFEN 600 MG: 600 TABLET, FILM COATED ORAL at 11:01

## 2020-01-23 RX ADMIN — FENTANYL CITRATE 100 MCG: 50 INJECTION, SOLUTION INTRAMUSCULAR; INTRAVENOUS at 02:01

## 2020-01-23 RX ADMIN — MISOPROSTOL 800 MCG: 200 TABLET ORAL at 03:01

## 2020-01-23 RX ADMIN — HYDROCODONE BITARTRATE AND ACETAMINOPHEN 1 TABLET: 5; 325 TABLET ORAL at 10:01

## 2020-01-23 RX ADMIN — PENICILLIN G POTASSIUM 3 MILLION UNITS: 20000000 INJECTION, POWDER, FOR SOLUTION INTRAVENOUS at 02:01

## 2020-01-23 RX ADMIN — IBUPROFEN 600 MG: 600 TABLET, FILM COATED ORAL at 06:01

## 2020-01-23 RX ADMIN — CEFTRIAXONE 1 G: 1 INJECTION, SOLUTION INTRAVENOUS at 05:01

## 2020-01-23 NOTE — PROGRESS NOTES
"LABOR NOTE    S:  Complaints: No.  Epidural working:  Yes but feeling lots of pressure    O: BP (!) 147/92   Pulse 92   Temp 98 °F (36.7 °C)   Resp 16   Ht 5' 3" (1.6 m)   Wt 117.9 kg (260 lb)   LMP 2019 (Approximate) Comment: OPK + 19  SpO2 99%   Breastfeeding? No   BMI 46.06 kg/m²       FHT: 160, min to moderate variability, - accels, + run of late decels now resolved. Cat 2 (overall reassuring)  CTX: q 3 minutes  SVE: 10/100/+1      ASSESSMENT:   29 y.o.  at 40w0d, TOLAC    FHT reassuring    Active Hospital Problems   No active problems to display.      Resolved Hospital Problems    Diagnosis Date Resolved POA    Gestational hypertension, third trimester [O13.3] 2020 Yes     0200: cl/60/-3, pitocin started  0600: 60/-3, cook placed, pit @ 10 mU/min  0945: 270/-3, cook in place  1330: 80/-3, cook removed  1400: ruptured spontaneously; 580/-2, FSE placed  1530: 5/80/-2  1800: 5/90/-2, pit @ 30 mU/min  2100: 5/90/-2, pit @ 30 mU/min, MVUs in the 190s-240s  2300: 7/90/-2  0230: 10/100/+1    PLAN:    Continue Close Maternal/Fetal Monitoring  Pitocin Augmentation per protocol  Temp 100.2 and FHR baseline up to 160 will recheck temp and start abx if needed    Sarai Espino MD  OB/GYN, PGY-4          "

## 2020-01-23 NOTE — ANESTHESIA POSTPROCEDURE EVALUATION
Anesthesia Post Evaluation    Patient: Krupa Geiger    Procedure(s) Performed: * No procedures listed *    Final Anesthesia Type: CSE    Patient location during evaluation: floor  Patient participation: Yes- Able to Participate  Level of consciousness: awake and alert  Post-procedure vital signs: reviewed and stable  Pain management: adequate  Airway patency: patent    PONV status at discharge: No PONV  Anesthetic complications: no      Cardiovascular status: blood pressure returned to baseline and hemodynamically stable  Respiratory status: unassisted, spontaneous ventilation and room air  Hydration status: euvolemic  Follow-up not needed.          Vitals Value Taken Time   /71 1/23/2020  8:18 AM   Temp 37.4 °C (99.3 °F) 1/23/2020  8:18 AM   Pulse 104 1/23/2020  8:18 AM   Resp 18 1/23/2020  8:18 AM   SpO2 95 % 1/23/2020  8:18 AM         No case tracking events are documented in the log.      Pain/Scott Score: No data recorded

## 2020-01-23 NOTE — L&D DELIVERY NOTE
[facility]  Vaginal Delivery   Operative Note    SUMMARY     MD to bedside as patient was complete/+1. After approximately 45 minutes of maternal pushing efforts, patient successfully achieved normal spontaneous vaginal delivery of live infant, was placed on mothers abdomen for skin to skin and bulb suctioning performed however was brought to the warmer shortly after due to fetal stunning requiring evaluation by nursing staff.     Infant delivered position OA over intact perineum.  Nuchal cord: Yes, cord reduced at perineum.    Manual delivery of placenta due to cord avulsion and IV pitocin given noting uterine atony with bleeding requiring cytotec 800 mcg NC x1.   2nd degree laceration noted and repaired in normal fashion with 2-0 Vicryl on CT.  Patient tolerated delivery well. Sponge needle and lap counted correctly x2. Additional rocephin was given due to manual placental delivery.     Indications: <principal problem not specified>  Pregnancy complicated by:   Patient Active Problem List   Diagnosis    Hypoglycemia    TMJ syndrome    Nephrolithiasis    Migraine without aura and without status migrainosus, not intractable    Moderate episode of recurrent major depressive disorder    Social anxiety disorder    Anxiety    Attention deficit hyperactivity disorder (ADHD), predominantly inattentive type    Pregnancy with one fetus, antepartum    Rh negative state in antepartum period    Supervision of other high risk pregnancies, second trimester    Duplicated ureter, right    Duplicated left renal collecting system    Obesity affecting pregnancy in second trimester    24 weeks gestation of pregnancy    History of nephrolithiasis    Left ureteral calculus    Hip weakness    SI (sacroiliac) pain    Elevated blood pressure complicating pregnancy in third trimester, antepartum     (spontaneous vaginal delivery)     Admitting GA: 40w1d    Delivery Information for Girl Krupa  Juanjo    Birth information:  YOB: 2019   Time of birth: 4:08 AM   Sex: female   Head Delivery Date/Time: 2020  4:08 AM   Delivery type: Vaginal, Spontaneous   Gestational Age: 40w1d    Delivery Providers    Delivering clinician:             Measurements    Weight:  3800 g  Length:  50.8 cm  Head circumference:  35.6 cm  Chest circumference:  35.6 cm         Apgars    Living status:  Living  Apgars:   1 min.:   5 min.:   10 min.:   15 min.:   20 min.:     Skin color:   1  1       Heart rate:   2  2       Reflex irritability:   1  2       Muscle tone:   1  2       Respiratory effort:   1  2       Total:   6  9       Apgars assigned by:  TANJA MARIA RN         Operative Delivery    Forceps attempted?:  No  Vacuum extractor attempted?:  No         Shoulder Dystocia    Shoulder dystocia present?:  No           Presentation    Presentation:  Vertex           Interventions/Resuscitation    Method:  Bulb Suctioning, Tactile Stimulation, Deep Suctioning, PPV, Blow By Oxygen       Cord    Vessels:  3 vessels  Complications:  Nuchal  Nuchal Intervention:  reduced  Nuchal Cord Description:  loose nuchal cord  Number of Loops:  1  Cord Blood Disposition:  Sent with Baby  Gases Sent?:  Yes  Stem Cell Collection (by MD):  No       Placenta    Placenta delivery date/time:  2020 0412  Placenta removal:  Spontaneous  Placenta appearance:  Intact  Placenta disposition:  discarded           Labor Events:       labor: No     Labor Onset Date/Time:         Dilation Complete Date/Time:         Start Pushing Date/Time:         Start Pushing Date/Time:       Rupture Date/Time:              Rupture type:           Fluid Amount: Small      Fluid Color: Clear      Fluid Odor:        Membrane Status: SRM (Spontaneous Rupture)               steroids: None     Antibiotics given for GBS: Yes     Induction: balloon dilation (Pink)     Indications for induction:  Elective     Augmentation:        Indications for augmentation: Ineffective Contraction Pattern     Labor complications: None     Additional complications:          Cervical ripening:                     Delivery:      Episiotomy: None     Indication for Episiotomy:       Perineal Lacerations: 2nd Repaired:  Yes   Periurethral Laceration:   Repaired:     Labial Laceration:   Repaired:     Sulcus Laceration:   Repaired:     Vaginal Laceration:   Repaired:     Cervical Laceration:   Repaired:     Repair suture:       Repair # of packets: 1     Last Value - EBL - Nursing (mL): 300     Sum - EBL - Nursing (mL): 300     Last Value - EBL - Anesthesia (mL):      Calculated QBL (mL):        Vaginal Sweep Performed: Yes     Surgicount Correct: Yes       Other providers:       Anesthesia    Method:  Epidural          Details (if applicable):  Trial of Labor      Categorization:      Priority:     Indications for :     Incision Type:       Additional  information:  Forceps:    Vacuum:    Breech:    Observed anomalies    Other (Comments):

## 2020-01-23 NOTE — LACTATION NOTE
01/23/20 1400   Equipment Type   Breast Pump Type double electric, hospital grade   Breast Pump Flange Type hard   Breast Pump Flange Size 21 mm   Breast Pumping   Breast Pumping Interventions frequent pumping encouraged   Breast Pumping double electric breast pump utilized   Breast pump initiated secondary to infant transfer to NICU. Pump use and care reviewed.

## 2020-01-23 NOTE — DISCHARGE INSTRUCTIONS
Breastfeeding Discharge Instructions       Feed the baby at the earliest sign of hunger or comfort  o Hands to mouth, sucking motions  o Rooting or searching for something to suck on  o Dont wait for crying - it is a sign of distress     The feedings may be 8-12 times per 24hrs and will not follow a schedule   Avoid pacifiers and bottles for the first 4 weeks   Alternate the breast you start the feeding with, or start with the breast that feels the fullest   Switch breasts when the baby takes himself off the breast or falls asleep   Keep offering breasts until the baby looks full, no longer gives hunger signs, and stays asleep when placed on his back in the crib   If the baby is sleepy and wont wake for a feeding, put the baby skin-to-skin dressed in a diaper against the mothers bare chest   Sleep near your baby   The baby should be positioned and latched on to the breast correctly  o Chest-to-chest, chin in the breast  o Babys lips are flipped outward  o Babys mouth is stretched open wide like a shout  o Babys sucking should feel like tugging to the mother  - The baby should be drinking at the breast:  o You should hear swallowing or gulping throughout the feeding  o You should see milk on the babys lips when he comes off the breast  o Your breasts should be softer when the baby is finished feeding  o The baby should look relaxed at the end of feedings  o After the 4th day and your milk is in:  o The babys poop should turn bright yellow and be loose, watery, and seedy  o The baby should have at least 3-4 poops the size of the palm of your hand per day  o The baby should have at least 5-6 wet diapers per day  o The urine should be light yellow in color  You should drink when you are thirsty and eat a healthy diet when you are    hungry.     Take naps to get the rest you need.   Take medications and/or drink alcohol only with permission of your obstetrician    or the babys pediatrician.  You can  also call the [facility],   ([phone]), Monday-Friday, 8am-5pm Central time, to get the most   up-to-date evidence-based information on the use of medications during   pregnancy and breastfeeding.      The baby should be examined by a pediatrician at 3-5 days of age.  Once your   milk comes in, the baby should be gaining at least ½ - 1oz each day and should be back to birthweight no later than 10-14 days of age.          Community Resources    [facility] Breastfeeding Warmline: [phone]   Local [facility] clinics: provide incentives and breastpumps to eligible mothers  [employer] ([employer]LI):  mother-to-mother support group website        www.[employer]  Local La Leche League mother-to-mother support groups:        www.[employer]        La Leche League [facility]   Dr. Rusty Alvarado website for latch videos and general information:        www.breastfeedinginc.ca  [facility] is a call center that provides information about the safety of taking medications while breastfeeding.  Call [phone], M-F, 8am-5pm, [place].  International Lactation Consultant Association provides resources for assistance:        www.ilca.org  [place] Breastfeeding [employer] provides informationand resources for parents  and the community    www.LaBreastfeedingSupport.org     Clara Phelps is a mom-to-mom support group:                             www.[employer].com//breastfeedng-support/  [employer] for Healthy Babies:  5-888-258-BABY(1419)  [employer]: a breastfeeding support group for women of color, [phone]

## 2020-01-23 NOTE — LACTATION NOTE
01/23/20 1210   Breasts WDL   Breast WDL WDL   Maternal Feeding Assessment   Maternal Emotional State relaxed   Infant Positioning clutch/football   Signs of Milk Transfer infant jaw motion present   Pain with Feeding no   Comfort Measures Before/During Feeding infant position adjusted;latch adjusted;maternal position adjusted   Latch Assistance yes   Reproductive Interventions   Breast Care: Breastfeeding open to air   Breastfeeding Assistance assisted with positioning;feeding cue recognition promoted;feeding on demand promoted;infant latch-on verified;infant suck/swallow verified;support offered   Lactation rounds. Latch assessment provided, infant latched football hold, nursing , latch shallow. LC assisted pt with repositioning and re- latching infant, infant spit up large amount of clear fluid out of mouth and nose. Bulb syringe used to suction nose and mouth. No color change noted. Infant latched to right breast  , football hold, nice wide latch good tugs and pulls. Coordinated suck swallows noted. Swallows audible with compression. Infant nursing without difficulty.   Lc reported spit up to MD and Sarai Martin RN.

## 2020-01-24 LAB — INJECT RH IG VOL PATIENT: NORMAL ML

## 2020-01-24 PROCEDURE — 27000181 HC CABLE, IUPC

## 2020-01-24 PROCEDURE — 63600175 PHARM REV CODE 636 W HCPCS: Performed by: STUDENT IN AN ORGANIZED HEALTH CARE EDUCATION/TRAINING PROGRAM

## 2020-01-24 PROCEDURE — 99024 PR POST-OP FOLLOW-UP VISIT: ICD-10-PCS | Mod: ,,, | Performed by: OBSTETRICS & GYNECOLOGY

## 2020-01-24 PROCEDURE — 99024 POSTOP FOLLOW-UP VISIT: CPT | Mod: ,,, | Performed by: OBSTETRICS & GYNECOLOGY

## 2020-01-24 PROCEDURE — 63600519 RHOGAM PHARM REV CODE 636 ALT 250 W HCPCS: Performed by: OBSTETRICS & GYNECOLOGY

## 2020-01-24 PROCEDURE — 11000001 HC ACUTE MED/SURG PRIVATE ROOM

## 2020-01-24 PROCEDURE — 99900035 HC TECH TIME PER 15 MIN (STAT)

## 2020-01-24 PROCEDURE — 72100003 HC LABOR CARE, EA. ADDL. 8 HRS

## 2020-01-24 PROCEDURE — 25000003 PHARM REV CODE 250: Performed by: STUDENT IN AN ORGANIZED HEALTH CARE EDUCATION/TRAINING PROGRAM

## 2020-01-24 RX ORDER — ENOXAPARIN SODIUM 100 MG/ML
40 INJECTION SUBCUTANEOUS EVERY 24 HOURS
Status: DISCONTINUED | OUTPATIENT
Start: 2020-01-24 | End: 2020-01-25 | Stop reason: HOSPADM

## 2020-01-24 RX ORDER — IBUPROFEN 600 MG/1
600 TABLET ORAL EVERY 6 HOURS
Qty: 30 TABLET | Refills: 1 | Status: SHIPPED | OUTPATIENT
Start: 2020-01-24 | End: 2021-02-12

## 2020-01-24 RX ADMIN — HYDROCODONE BITARTRATE AND ACETAMINOPHEN 1 TABLET: 5; 325 TABLET ORAL at 04:01

## 2020-01-24 RX ADMIN — HYDROCODONE BITARTRATE AND ACETAMINOPHEN 1 TABLET: 5; 325 TABLET ORAL at 09:01

## 2020-01-24 RX ADMIN — IBUPROFEN 600 MG: 600 TABLET, FILM COATED ORAL at 05:01

## 2020-01-24 RX ADMIN — PRENATAL VIT W/ FE FUMARATE-FA TAB 27-0.8 MG 1 TABLET: 27-0.8 TAB at 08:01

## 2020-01-24 RX ADMIN — DOCUSATE SODIUM 200 MG: 100 CAPSULE, LIQUID FILLED ORAL at 08:01

## 2020-01-24 RX ADMIN — ENOXAPARIN SODIUM 40 MG: 100 INJECTION SUBCUTANEOUS at 12:01

## 2020-01-24 RX ADMIN — HUMAN RHO(D) IMMUNE GLOBULIN 300 MCG: 300 INJECTION, SOLUTION INTRAMUSCULAR at 05:01

## 2020-01-24 RX ADMIN — PANTOPRAZOLE SODIUM 40 MG: 40 TABLET, DELAYED RELEASE ORAL at 08:01

## 2020-01-24 RX ADMIN — IBUPROFEN 600 MG: 600 TABLET, FILM COATED ORAL at 12:01

## 2020-01-24 NOTE — LACTATION NOTE
01/24/20 0925   Maternal Infant Feeding   Maternal Emotional State relaxed;independent   Equipment Type   Breast Pump Type double electric, hospital grade   Breast Pump Flange Type hard   Breast Pump Flange Size 21 mm   Breast Pumping   Breast Pumping Interventions frequent pumping encouraged   Breast Pumping double electric breast pump utilized   Reviewed pumping frequency and duration with use of Initiation setting. Mother recently pumped about 1 ml and is sitting at the bedside. Assisted with labeling of breast milk and reviewed storage guidelines. Plans to go to the NICU shortly and will bring EBM. Demonstrated cleaning of pump kit and use of quick clean bag. Pieces sterilized now. LC number written on board to call as needed for assistance or questions.

## 2020-01-24 NOTE — PROGRESS NOTES
POSTPARTUM PROGRESS NOTE     Krupa Geiger is a 29 y.o. female PPD #1 status post  at 40w1d in a pregnancy complicated by cHTN, Rh negative, MO, dep/anxiety. Patient is doing well this morning. She denies nausea, vomiting, fever or chills.  Patient reports mild abdominal pain that is adequately relieved by oral pain medications. Lochia is mild to moderate  and stable. Patient is voiding without difficulty and ambulating with no difficulty. She has passed flatus.  Patient does plan to breast feed. Dr. Crocker will manage contraception.     Objective:       Temp:  [98.1 °F (36.7 °C)-98.7 °F (37.1 °C)] 98.3 °F (36.8 °C)  Pulse:  [] 75  Resp:  [18] 18  SpO2:  [97 %-98 %] 98 %  BP: (118-132)/(63-69) 132/67    General:   alert, appears stated age and cooperative   Lungs:   Non-labored respirations    Heart:   regular rate and rhythm   Abdomen:  Soft, nondistended    Uterus:  firm located at the umblicus.    Extremities: no pedal edema noted     Lab Review  No results found for this or any previous visit (from the past 4 hour(s)).    I/O    Intake/Output Summary (Last 24 hours) at 2020 0944  Last data filed at 2020 1500  Gross per 24 hour   Intake --   Output 1350 ml   Net -1350 ml        Assessment:     Patient Active Problem List   Diagnosis    Hypoglycemia    TMJ syndrome    Nephrolithiasis    Migraine without aura and without status migrainosus, not intractable    Moderate episode of recurrent major depressive disorder    Social anxiety disorder    Anxiety    Attention deficit hyperactivity disorder (ADHD), predominantly inattentive type    Pregnancy with one fetus, antepartum    Rh negative state in antepartum period    Supervision of other high risk pregnancies, second trimester    Duplicated ureter, right    Duplicated left renal collecting system    Obesity affecting pregnancy in second trimester    24 weeks gestation of pregnancy    History of nephrolithiasis    Left  ureteral calculus    Hip weakness    SI (sacroiliac) pain    Elevated blood pressure complicating pregnancy in third trimester, antepartum     (spontaneous vaginal delivery)        Plan:   1. Postpartum care:  - Patient doing well. Continue routine management and advances.  - Continue PO pain meds. Pain well controlled.  - Heme: H/h 10/31 >>>   - Encourage ambulation  - Contraception to be discussed at 6 week pp visit  - Lactation consult PRN    2. cHTN  - BP: (118-132)/(63-69) 132/67  - No meds    3. Morbid obesity  - Pre-pregnancy BMI: 42  - Daily ppx Lovenox postpartum    4. Depression/Anxiety  - No meds   - Mood ok this AM    5. Rh negative  - Infant is A+  - Will need RhoGam prior to D/C        Dispo: As patient meets milestones, will plan to discharge PPD#2 .    Dorina Ragsdale M.D.  [facility] PGY1

## 2020-01-24 NOTE — ANESTHESIA POSTPROCEDURE EVALUATION
Anesthesia Post Evaluation    Patient: Krupa Geiger    Procedure(s) Performed: * No procedures listed *    Final Anesthesia Type: CSE    Patient location during evaluation: floor  Patient participation: Yes- Able to Participate  Level of consciousness: awake and alert and awake  Post-procedure vital signs: reviewed and stable  Pain management: adequate  Airway patency: patent    PONV status at discharge: No PONV  Anesthetic complications: no      Cardiovascular status: blood pressure returned to baseline  Respiratory status: unassisted  Hydration status: euvolemic  Follow-up not needed.          Vitals Value Taken Time   /67 1/24/2020  8:04 AM   Temp 36.8 °C (98.3 °F) 1/24/2020  8:04 AM   Pulse 75 1/24/2020  8:04 AM   Resp 18 1/24/2020  8:04 AM   SpO2 98 % 1/24/2020  8:04 AM         No case tracking events are documented in the log.      Pain/Scott Score: Pain Rating Prior to Med Admin: 3 (1/24/2020 12:40 PM)  Pain Rating Post Med Admin: 2 (1/24/2020  5:20 AM)

## 2020-01-25 VITALS
WEIGHT: 260 LBS | HEART RATE: 84 BPM | DIASTOLIC BLOOD PRESSURE: 76 MMHG | HEIGHT: 63 IN | OXYGEN SATURATION: 98 % | BODY MASS INDEX: 46.07 KG/M2 | TEMPERATURE: 98 F | SYSTOLIC BLOOD PRESSURE: 128 MMHG | RESPIRATION RATE: 18 BRPM

## 2020-01-25 PROCEDURE — 99900035 HC TECH TIME PER 15 MIN (STAT)

## 2020-01-25 PROCEDURE — 99024 PR POST-OP FOLLOW-UP VISIT: ICD-10-PCS | Mod: ,,, | Performed by: OBSTETRICS & GYNECOLOGY

## 2020-01-25 PROCEDURE — 25000003 PHARM REV CODE 250: Performed by: STUDENT IN AN ORGANIZED HEALTH CARE EDUCATION/TRAINING PROGRAM

## 2020-01-25 PROCEDURE — 94761 N-INVAS EAR/PLS OXIMETRY MLT: CPT

## 2020-01-25 PROCEDURE — 99024 POSTOP FOLLOW-UP VISIT: CPT | Mod: ,,, | Performed by: OBSTETRICS & GYNECOLOGY

## 2020-01-25 RX ORDER — HYDROCODONE BITARTRATE AND ACETAMINOPHEN 5; 325 MG/1; MG/1
1 TABLET ORAL EVERY 4 HOURS PRN
Qty: 8 TABLET | Refills: 0 | Status: SHIPPED | OUTPATIENT
Start: 2020-01-25 | End: 2020-03-10

## 2020-01-25 RX ADMIN — DOCUSATE SODIUM 200 MG: 100 CAPSULE, LIQUID FILLED ORAL at 09:01

## 2020-01-25 RX ADMIN — HYDROCODONE BITARTRATE AND ACETAMINOPHEN 1 TABLET: 5; 325 TABLET ORAL at 03:01

## 2020-01-25 RX ADMIN — IBUPROFEN 600 MG: 600 TABLET, FILM COATED ORAL at 05:01

## 2020-01-25 RX ADMIN — IBUPROFEN 600 MG: 600 TABLET, FILM COATED ORAL at 11:01

## 2020-01-25 RX ADMIN — HYDROCORTISONE 2.5%: 25 CREAM TOPICAL at 09:01

## 2020-01-25 RX ADMIN — PRENATAL VIT W/ FE FUMARATE-FA TAB 27-0.8 MG 1 TABLET: 27-0.8 TAB at 09:01

## 2020-01-25 RX ADMIN — IBUPROFEN 600 MG: 600 TABLET, FILM COATED ORAL at 01:01

## 2020-01-25 RX ADMIN — PANTOPRAZOLE SODIUM 40 MG: 40 TABLET, DELAYED RELEASE ORAL at 09:01

## 2020-01-25 NOTE — PROGRESS NOTES
POSTPARTUM PROGRESS NOTE     Krupa Geiger is a 29 y.o. female PPD #2 status post  at 40w1d in a pregnancy complicated by cHTN, Rh negative, MO, dep/anxiety. Patient is doing well this morning. She denies nausea, vomiting, fever or chills.  Patient reports mild abdominal pain that is adequately relieved by oral pain medications. Lochia is mild to moderate  and stable. Patient is voiding without difficulty and ambulating with no difficulty. She has passed flatus.  Patient does plan to breast feed. Dr. Crocker will manage contraception.     Objective:       Temp:  [97.8 °F (36.6 °C)-98.3 °F (36.8 °C)] 97.8 °F (36.6 °C)  Pulse:  [75-91] 85  Resp:  [18-19] 19  SpO2:  [97 %-98 %] 97 %  BP: (120-132)/(62-76) 120/62    General:   alert, appears stated age and cooperative   Lungs:   Non-labored respirations    Heart:   regular rate and rhythm   Abdomen:  Soft, nondistended    Uterus:  firm located at the umblicus.    Extremities: no pedal edema noted     Lab Review  No results found for this or any previous visit (from the past 4 hour(s)).    I/O  No intake or output data in the 24 hours ending 20 0744     Assessment:     Patient Active Problem List   Diagnosis    Hypoglycemia    TMJ syndrome    Nephrolithiasis    Migraine without aura and without status migrainosus, not intractable    Moderate episode of recurrent major depressive disorder    Social anxiety disorder    Anxiety    Attention deficit hyperactivity disorder (ADHD), predominantly inattentive type    Pregnancy with one fetus, antepartum    Rh negative state in antepartum period    Supervision of other high risk pregnancies, second trimester    Duplicated ureter, right    Duplicated left renal collecting system    Obesity affecting pregnancy in second trimester    24 weeks gestation of pregnancy    History of nephrolithiasis    Left ureteral calculus    Hip weakness    SI (sacroiliac) pain    Elevated blood pressure  complicating pregnancy in third trimester, antepartum     (spontaneous vaginal delivery)        Plan:   1. Postpartum care:  - Patient doing well. Continue routine management and advances.  - Continue PO pain meds. Pain well controlled.  - Heme: H/h 10/31 >>>   - Encourage ambulation  - Contraception to be discussed at 6 week pp visit  - Lactation consult PRN    2. cHTN  - BP: (120-132)/(62-76) 120/62  - No meds    3. Morbid obesity  - Pre-pregnancy BMI: 42  - Daily ppx Lovenox postpartum    4. Depression/Anxiety  - No meds   - Mood ok this AM    5. Rh negative  - Infant is A+  - Will need RhoGam prior to D/C        Dispo: As patient meets milestones, will plan to discharge PPD#2 .    Jackeline Chery MD   OBGYN, PGY-2

## 2020-01-25 NOTE — LACTATION NOTE
LC did DC teaching for NICU mother pumping for her baby. Pt has NICU Mothers Lactation Booklet for lactation. Reviewed how to work pump, keep track of pumpings, label breastmilk, clean pump parts and safely store and transport milk. Pt aware to pump 8 or more times a day for 15-20 minutes. Pt has a Spectra pump at home and is aware that she can use the Symphony breastpump at the baby's bedside when she visits. Mother has lactation phone number to call for further questions. Pt verbalized understanding and questions answered.

## 2020-01-25 NOTE — DISCHARGE SUMMARY
Delivery Discharge Summary  Obstetrics      Primary OB Clinician: Coty Crocker DO      Admission date: 2020  Discharge date: 2020    Disposition: To home, self care    Discharge Diagnosis List:      Patient Active Problem List   Diagnosis    Hypoglycemia    TMJ syndrome    Nephrolithiasis    Migraine without aura and without status migrainosus, not intractable    Moderate episode of recurrent major depressive disorder    Social anxiety disorder    Anxiety    Attention deficit hyperactivity disorder (ADHD), predominantly inattentive type    Pregnancy with one fetus, antepartum    Rh negative state in antepartum period    Supervision of other high risk pregnancies, second trimester    Duplicated ureter, right    Duplicated left renal collecting system    Obesity affecting pregnancy in second trimester    24 weeks gestation of pregnancy    History of nephrolithiasis    Left ureteral calculus    Hip weakness    SI (sacroiliac) pain    Elevated blood pressure complicating pregnancy in third trimester, antepartum     (spontaneous vaginal delivery)       Procedure:     Hospital Course:  Krupa Geiger is a 29 y.o. now , PPD #2 who was admitted on 2020 at 40w0d for IOL (TOLAC). Patient was subsequently admitted to labor and delivery unit with signed consents.     Labor course was uncomplicated and resulted in  without complications.     Please see delivery note for further details. Her postpartum course was uncomplicated. On discharge day, patient's pain is controlled with oral pain medications. Pt is tolerating ambulation without SOB or CP, and regular diet without N/V. Reports lochia is mild. Denies any HA, vision changes, F/C, LE swelling. Denies any breast pain/soreness.    Pt in stable condition and ready for discharge. She has been instructed to start and/or continue medications and follow up with her obstetrics provider as listed  below.    Pertinent studies:  CBC  Recent Labs   Lab 01/22/20  0030 01/23/20  1340   WBC 11.21 24.08*   HGB 10.4* 9.6*   HCT 31.4* 29.2*   MCV 90 91    253          Immunization History   Administered Date(s) Administered    DTP 1991, 1991, 02/04/1992, 02/18/1993, 08/30/1995    HIB 1991, 1991, 02/04/1992, 11/25/1992    HPV Quadrivalent 06/15/2007, 08/18/2007, 12/26/2007    Hepatitis B, Pediatric/Adolescent 05/12/1992, 05/29/1992, 04/07/1995, 05/08/1995, 12/23/2010    Influenza 12/26/2007, 09/01/2011    Influenza - Quadrivalent 09/14/2015    Influenza - Quadrivalent - PF (6 months and older) 10/27/2016, 10/04/2017, 10/29/2018    MMR 11/15/1992, 08/30/1995    Meningococcal Conjugate (MCV4P) 06/15/2007, 09/01/2011    OPV 1991, 1991, 02/04/1992, 02/18/1993, 08/30/1995    PPD Test 05/23/1992    Rho (D) Immune Globulin 10/30/2019    Rho (D) Immune Globulin - IM 01/24/2020    Tdap 09/01/2011, 10/30/2019        Delivery:    Episiotomy: None   Lacerations: 2nd   Repair suture:     Repair # of packets: 1   Blood loss (ml): 300     Birth information:  YOB: 2019   Time of birth: 4:08 AM   Sex: female   Delivery type: Vaginal, Spontaneous   Gestational Age: 40w1d    Delivery Clinician:      Other providers:       Additional  information:  Forceps:    Vacuum:    Breech:    Observed anomalies      Living?:           APGARS  One minute Five minutes Ten minutes   Skin color:         Heart rate:         Grimace:         Muscle tone:         Breathing:         Totals: 6  9        Placenta: Delivered:       appearance      Patient Instructions:   Current Discharge Medication List      START taking these medications    Details   !! HYDROcodone-acetaminophen (NORCO) 5-325 mg per tablet Take 1 tablet by mouth every 4 (four) hours as needed.  Qty: 8 tablet, Refills: 0    Comments: Quantity prescribed more than 7 day supply? No      ibuprofen (ADVIL,MOTRIN) 600 MG  tablet Take 1 tablet (600 mg total) by mouth every 6 (six) hours.  Qty: 30 tablet, Refills: 1       !! - Potential duplicate medications found. Please discuss with provider.      CONTINUE these medications which have NOT CHANGED    Details   albuterol (VENTOLIN HFA) 90 mcg/actuation inhaler Inhale 2 puffs into the lungs every 6 (six) hours as needed for Wheezing. Rescue  Qty: 18 g, Refills: 0      azithromycin (ZITHROMAX Z-LYNETTE) 250 MG tablet Take 2 tablets (500 mg) on  Day 1,  followed by 1 tablet (250 mg) once daily on Days 2 through 5.  Qty: 6 tablet, Refills: 0      BABY ASPIRIN ORAL Take by mouth once daily.      busPIRone (BUSPAR) 10 MG tablet Take 1 tablet (10 mg total) by mouth 2 (two) times daily.  Qty: 60 tablet, Refills: 11    Associated Diagnoses: Anxiety      !! HYDROcodone-acetaminophen (NORCO) 5-325 mg per tablet Take 1 tablet by mouth every 6 (six) hours as needed for Pain.  Qty: 6 tablet, Refills: 0    Comments: Quantity prescribed more than 7 day supply? No      multivit with calcium,iron,min (WOMEN'S DAILY MULTIVITAMIN ORAL) Women's Daily Multivitamin      omeprazole (PRILOSEC) 20 MG capsule Take 20 mg by mouth once daily.      ondansetron (ZOFRAN) 4 MG tablet Take 1 tablet (4 mg total) by mouth 2 (two) times daily.  Qty: 30 tablet, Refills: 0    Associated Diagnoses: Flank pain      oxybutynin (DITROPAN) 5 MG Tab Take 1 tablet (5 mg total) by mouth 3 (three) times daily.  Qty: 90 tablet, Refills: 11      oxyCODONE-acetaminophen (PERCOCET) 5-325 mg per tablet Take 1 tablet by mouth every 4 (four) hours as needed.  Qty: 30 tablet, Refills: 0    Comments: Quantity prescribed more than 7 day supply? Yes, quantity medically necessary       !! - Potential duplicate medications found. Please discuss with provider.          Discharge Procedure Orders   Diet Adult Regular     Other restrictions (specify):   Order Comments: No heavy lifting over 5 lbs  No baths until 6 week check up. Showers only.   No  driving until off narcotic pain medication/not feeling drowsy or dizzy when driving.     Notify your health care provider if you experience any of the following:  temperature >100.4     Notify your health care provider if you experience any of the following:  persistent nausea and vomiting or diarrhea     Notify your health care provider if you experience any of the following:  severe uncontrolled pain     Notify your health care provider if you experience any of the following:  redness, tenderness, or signs of infection (pain, swelling, redness, odor or green/yellow discharge around incision site)     Notify your health care provider if you experience any of the following:  difficulty breathing or increased cough     Notify your health care provider if you experience any of the following:  severe persistent headache     Notify your health care provider if you experience any of the following:  worsening rash     Notify your health care provider if you experience any of the following:  persistent dizziness, light-headedness, or visual disturbances     Notify your health care provider if you experience any of the following:  increased confusion or weakness     Notify your health care provider if you experience any of the following:   Order Comments: Call if you are saturating more than 1 pad an hour for 2 consecutive hours.       Follow-up Information     Coty Crocker DO In 6 weeks.    Specialty:  Obstetrics and Gynecology  Why:  postpartum visit  Contact information:  [address] [place], [place] [id number] [phone]             Coty Crocker DO In 1 week.    Specialty:  Obstetrics and Gynecology  Why:  Blood pressure check  Contact information:  [address] [place], [place] [id number] [phone]                    Jackeline Chery MD   OBGYN, PGY-2

## 2020-01-27 NOTE — PHYSICIAN QUERY
PT Name: Krupa Geiger  MR #: [id number]     PHYSICIAN QUERY -  OB HEMATOLOGICAL CLARIFICATION     CDS/: ROSA Carmichael,RNC-MMN         Contact information:[email or web address]  This form is a permanent document in the medical record.     Query Date: January 27, 2020  By submitting this query, we are merely seeking further clarification of documentation. Please utilize your independent clinical judgment when addressing the question(s) below.      Indicators Supporting Clinical Findings Location in Medical Record   X Documentation of uterine atony with bleeding, post-partum bleeding, or hemorrhage uterine atony with bleeding L&D Delivery note 1/25    Documentation of retained placenta     X Delivery type with EBL normal spontaneous vaginal delivery of live infant    Sum - EBL - Nursing (mL):300 L&D Delivery note 1/25   X H/H Hgb=9.6-10.4  Hct=29.2-31.4 LAB 1/22-1/23    Vital signs     X Medications   Treatment Manual delivery of placenta due to cord avulsion and IV pitocin given     requiring cytotec 800 mcg OK x1.  L&D Delivery note 1/25    Blood transfusion      Other          Provider, please specify the diagnosis or diagnoses associated with the above clinical findings:                   [   ]   Immediate post-partum hemorrhage               [   ]   Third stage hemorrhage               [ x  ]   Uterine atony with bleeding, clinically insignificant               [   ]   Other hematological diagnosis (please specify): ________________________________               [   ]   Clinically undetermined       Please document in your progress notes daily for the duration of treatment, until resolved, and include in your discharge summary.    Marlee Farooq DO

## 2020-01-27 NOTE — PROGRESS NOTES
Patient seen and examined.  No complaints, denies VB/LOF/Ctxns, reports good fetal movement. Precautions for Bleeding/ ROM/ Decreased fetal movement/ Pre-E reviewed.  Will need to continue PNT.  F/U scheduled 1 weeks

## 2020-02-20 ENCOUNTER — PATIENT MESSAGE (OUTPATIENT)
Dept: OBSTETRICS AND GYNECOLOGY | Facility: CLINIC | Age: 30
End: 2020-02-20

## 2020-03-10 ENCOUNTER — POSTPARTUM VISIT (OUTPATIENT)
Dept: OBSTETRICS AND GYNECOLOGY | Facility: CLINIC | Age: 30
End: 2020-03-10
Attending: OBSTETRICS & GYNECOLOGY
Payer: COMMERCIAL

## 2020-03-10 VITALS
HEIGHT: 63 IN | WEIGHT: 232.94 LBS | BODY MASS INDEX: 41.27 KG/M2 | SYSTOLIC BLOOD PRESSURE: 140 MMHG | DIASTOLIC BLOOD PRESSURE: 88 MMHG

## 2020-03-10 DIAGNOSIS — O10.019 BENIGN ESSENTIAL HTN, CHRONIC, ANTEPARTUM, UNSPECIFIED TRIMESTER: ICD-10-CM

## 2020-03-10 PROCEDURE — 0503F POSTPARTUM CARE VISIT: CPT | Mod: S$GLB,,, | Performed by: OBSTETRICS & GYNECOLOGY

## 2020-03-10 PROCEDURE — 0503F PR POSTPARTUM CARE VISIT: ICD-10-PCS | Mod: S$GLB,,, | Performed by: OBSTETRICS & GYNECOLOGY

## 2020-03-10 RX ORDER — LABETALOL 200 MG/1
200 TABLET, FILM COATED ORAL 2 TIMES DAILY
Qty: 60 TABLET | Refills: 11 | Status: SHIPPED | OUTPATIENT
Start: 2020-03-10 | End: 2020-07-02

## 2020-03-10 RX ORDER — LABETALOL 200 MG/1
200 TABLET, FILM COATED ORAL 2 TIMES DAILY
Qty: 60 TABLET | Refills: 11 | Status: SHIPPED | OUTPATIENT
Start: 2020-03-10 | End: 2020-03-10

## 2020-03-10 NOTE — PROGRESS NOTES
"  Subjective:       Krupa Geiger is a 29 y.o. female who is an established patient who was seen for evaluation of kidney stones.      She had 6mm L mid ureteral stone in duplicated L collecting system (in upper pole moiety ureter). She is currently pregnant at 28wk GA. She underwent initial stent placement due to fever and subsequent L URS/LL/stent on 10/16/19. Stent was encrusted at time of surgery but able to be removed without issue.     She has been doing well since surgery. Pain resolved.    Doing well 6 months later. 7 weeks post-partum now.     CT 10/19 - 6mm L mid ureteral stone (treated), b/l stones x 1 in each kidney (about 2-3 mm)    KUB 3/20 - possible 2mm LUP stone  CHARLES 3/20 - 2mm stone in R kidney, no hydro      The following portions of the patient's history were reviewed and updated as appropriate: allergies, current medications, past family history, past medical history, past social history, past surgical history and problem list.    Review of Systems  Constitutional: no fever or chills  ENT: no nasal congestion or sore throat  Respiratory: no cough or shortness of breath  Cardiovascular: no chest pain or palpitations  Gastrointestinal: no nausea or vomiting, tolerating diet  Genitourinary: as per HPI  Hematologic/Lymphatic: no easy bruising or lymphadenopathy  Musculoskeletal: no arthralgias or myalgias  Skin: no rashes or lesions  Neurological: no seizures or tremors  Behavioral/Psych: no auditory or visual hallucinations        Objective:    Vitals: /84 (BP Location: Right arm, Patient Position: Sitting, BP Method: X-Large (Automatic))   Ht 5' 3" (1.6 m)   Wt 105.2 kg (232 lb)   LMP 04/12/2019 (Approximate) Comment: OPK + 5/1/19  BMI 41.10 kg/m²     Physical Exam   General: well developed, well nourished in no acute distress  Head: normocephalic, atraumatic  Neck: supple, trachea midline, no obvious enlargement of thyroid  HEENT: EOMI, mucus membranes moist, sclera " anicteric, no hearing impairment  Lungs: symmetric expansion, non-labored breathing  Cardiovascular: regular rate and rhythm, normal pulses  Abdomen: soft, non tender, non distended, no palpable masses, no hepatosplenomegaly, no hernias, no CVA tenderness  Musculoskeletal: no peripheral edema, normal ROM in bilateral upper and lower extremities  Lymphatics: no cervical or inguinal lymphadenopathy  Skin: no rashes or lesions  Neuro: alert and oriented x 3, no gross deficits  Psych: normal judgment and insight, normal mood/affect and non-anxious  Genitourinary:   patient declined exam      Lab Review   Urine analysis today in clinic shows negative for all components     Lab Results   Component Value Date    WBC 24.08 (H) 01/23/2020    HGB 9.6 (L) 01/23/2020    HCT 29.2 (L) 01/23/2020    MCV 91 01/23/2020     01/23/2020     Lab Results   Component Value Date    CREATININE 0.6 12/19/2019    BUN 9 12/19/2019         Imaging  Images and reports were personally reviewed by me and discussed with patient  CT, CHARLES, KUB reviewed       Assessment/Plan:      1. Nephrolithiasis    - 6mm L mid ureteral stone in upper pole moiety (s/p URS)   - Duplicated system (two UOs)   - CHARLES/KUB 3/20 - stable stones   - Known b/l renal stones (2mm on CT 10/19)   - LithoLink for metabolic workup - ordered now   - KUB 6 mths     2. Left ureteral calculus    - 6mm L mid ureteral stone - treated with URS in 2nd trimester   - CHARLES/KUB done today - stable stones, no hydro       Follow up in 6 months w KUB

## 2020-03-10 NOTE — PROGRESS NOTES
"   Krupa Geiger is a 29 y.o.  who presents for a postpartum visit.  She is status post   6 weeks ago.  Her hospitalization was not complicated.  She is breastfeeding.  She desires condomsfor contraception.  She denies abdominal pain signs and symptoms of postpartum depression. Reports anxiety that is her baseline and trouble with sleep, declines medications or counseling        Past Medical History:   Diagnosis Date    Anxiety     Depression     GERD (gastroesophageal reflux disease)     Gestational hypertension, third trimester 2019    Hypertension     gestational     Hypoglycemia     Kidney calculi     2016    Migraines     PONV (postoperative nausea and vomiting)     STD (sexually transmitted disease)     Urinary tract infection        Objective:     BP (!) 140/88   Ht 5' 3" (1.6 m)   Wt 105.7 kg (232 lb 14.7 oz)   LMP 2019 (Approximate) Comment: OPK + 19  BMI 41.26 kg/m²     General: healthy, alert, no distress  Abdomen: no masses, hepatosplenomegaly, no hernias  External Genitalia: normal, well-healed, without lesions or masses  Vagina: normal, well-healed, physiologic discharge, without lesions  Cervix: normal, well-healed, without lesions  Uterus: normal size, well involuted, firm, non-tender  Adnexa: no masses palpable and nontender  Psych: BEHAVIOR: cooperative, MOOD: appropriate, SPEECH: {normal  Assessment:     Routine postpartum follow-up    Benign essential HTN, chronic, antepartum, unspecified trimester  -     Discontinue: labetaloL (NORMODYNE) 200 MG tablet; Take 1 tablet (200 mg total) by mouth 2 (two) times daily.  Dispense: 60 tablet; Refill: 11  -     labetaloL (NORMODYNE) 200 MG tablet; Take 1 tablet (200 mg total) by mouth 2 (two) times daily.  Dispense: 60 tablet; Refill: 11      BP check in 1 week at home or PRN, still has connected mom items.  Plan:     1. Return to clinic in 3-6 months for annual exam or PRN for BP control.  "

## 2020-03-11 ENCOUNTER — TELEPHONE (OUTPATIENT)
Dept: UROLOGY | Facility: CLINIC | Age: 30
End: 2020-03-11

## 2020-03-11 ENCOUNTER — HOSPITAL ENCOUNTER (OUTPATIENT)
Dept: RADIOLOGY | Facility: OTHER | Age: 30
Discharge: HOME | End: 2020-03-11
Attending: UROLOGY
Payer: COMMERCIAL

## 2020-03-11 ENCOUNTER — OFFICE VISIT (OUTPATIENT)
Dept: UROLOGY | Facility: CLINIC | Age: 30
End: 2020-03-11
Attending: UROLOGY
Payer: COMMERCIAL

## 2020-03-11 VITALS
HEIGHT: 63 IN | WEIGHT: 232 LBS | BODY MASS INDEX: 41.11 KG/M2 | SYSTOLIC BLOOD PRESSURE: 130 MMHG | DIASTOLIC BLOOD PRESSURE: 84 MMHG

## 2020-03-11 DIAGNOSIS — N20.0 NEPHROLITHIASIS: ICD-10-CM

## 2020-03-11 DIAGNOSIS — N20.1 LEFT URETERAL CALCULUS: ICD-10-CM

## 2020-03-11 DIAGNOSIS — N20.0 NEPHROLITHIASIS: Primary | ICD-10-CM

## 2020-03-11 PROCEDURE — 74018 RADEX ABDOMEN 1 VIEW: CPT | Mod: TC,FY

## 2020-03-11 PROCEDURE — 76770 US RETROPERITONEAL COMPLETE: ICD-10-PCS | Mod: 26,,, | Performed by: RADIOLOGY

## 2020-03-11 PROCEDURE — 74018 RADEX ABDOMEN 1 VIEW: CPT | Mod: 26,,, | Performed by: INTERNAL MEDICINE

## 2020-03-11 PROCEDURE — 3008F PR BODY MASS INDEX (BMI) DOCUMENTED: ICD-10-PCS | Mod: CPTII,S$GLB,, | Performed by: UROLOGY

## 2020-03-11 PROCEDURE — 76770 US EXAM ABDO BACK WALL COMP: CPT | Mod: TC

## 2020-03-11 PROCEDURE — 74018 XR KUB: ICD-10-PCS | Mod: 26,,, | Performed by: INTERNAL MEDICINE

## 2020-03-11 PROCEDURE — 3008F BODY MASS INDEX DOCD: CPT | Mod: CPTII,S$GLB,, | Performed by: UROLOGY

## 2020-03-11 PROCEDURE — 99214 PR OFFICE/OUTPT VISIT, EST, LEVL IV, 30-39 MIN: ICD-10-PCS | Mod: S$GLB,,, | Performed by: UROLOGY

## 2020-03-11 PROCEDURE — 99214 OFFICE O/P EST MOD 30 MIN: CPT | Mod: S$GLB,,, | Performed by: UROLOGY

## 2020-03-11 PROCEDURE — 76770 US EXAM ABDO BACK WALL COMP: CPT | Mod: 26,,, | Performed by: RADIOLOGY

## 2020-03-11 RX ORDER — NAPROXEN SODIUM 220 MG
220 TABLET ORAL
COMMUNITY
End: 2021-11-24

## 2020-03-11 NOTE — TELEPHONE ENCOUNTER
CALLED PATIENT LEFT DETAIL MESSAGE TO CONTACT THE OFFICE DO TO A U/S AND X-RAY IS NOT COMPLETED THE APPOINTMENT WILL NEED TO BE RESCHEDULE

## 2020-03-11 NOTE — TELEPHONE ENCOUNTER
----- Message from Nohelia Arrington MA sent at 3/11/2020 10:41 AM CDT -----  Contact: YOLIE SAENZ [[id number]]      ----- Message -----  From: Shane Bradshaw  Sent: 3/11/2020  10:05 AM CDT  To: Paola Larson Staff    Type:  Patient Returning Call    Who Called: YOLIE SAENZ [[id number]]     Who Left Message for Patient: Rama     Does the patient know what this is regarding?:yes      Best Call Back Number: [phone] (home)        Additional Information:

## 2020-03-12 DIAGNOSIS — E21.3 PARATHYROID HORMONE EXCESS: Primary | ICD-10-CM

## 2020-04-01 ENCOUNTER — PATIENT MESSAGE (OUTPATIENT)
Dept: FAMILY MEDICINE | Facility: CLINIC | Age: 30
End: 2020-04-01

## 2020-04-01 DIAGNOSIS — L03.039 PARONYCHIA OF TOE, UNSPECIFIED LATERALITY: Primary | ICD-10-CM

## 2020-04-06 NOTE — PROGRESS NOTES
Patient seen and examined.  No complaints, denies VB/LOF/Ctxns, reports good fetal movement. Precautions for Bleeding/ ROM/ Decreased fetal movement/ Pre-E reviewed.  Denies urinary pain.  Discussed TOLAC and possible induction dates  F/U scheduled 1 weeks

## 2020-04-21 DIAGNOSIS — Z01.84 ANTIBODY RESPONSE EXAMINATION: ICD-10-CM

## 2020-04-22 ENCOUNTER — LAB VISIT (OUTPATIENT)
Dept: LAB | Facility: HOSPITAL | Age: 30
End: 2020-04-22
Attending: INTERNAL MEDICINE
Payer: COMMERCIAL

## 2020-04-22 DIAGNOSIS — Z01.84 ANTIBODY RESPONSE EXAMINATION: ICD-10-CM

## 2020-04-22 LAB — SARS-COV-2 IGG SERPL QL IA: NEGATIVE

## 2020-04-22 PROCEDURE — 36415 COLL VENOUS BLD VENIPUNCTURE: CPT

## 2020-04-22 PROCEDURE — 86769 SARS-COV-2 COVID-19 ANTIBODY: CPT

## 2020-04-27 ENCOUNTER — OFFICE VISIT (OUTPATIENT)
Dept: PODIATRY | Facility: CLINIC | Age: 30
End: 2020-04-27
Payer: COMMERCIAL

## 2020-04-27 VITALS
BODY MASS INDEX: 40.49 KG/M2 | HEIGHT: 63 IN | RESPIRATION RATE: 16 BRPM | DIASTOLIC BLOOD PRESSURE: 78 MMHG | SYSTOLIC BLOOD PRESSURE: 110 MMHG | HEART RATE: 82 BPM | WEIGHT: 228.5 LBS

## 2020-04-27 DIAGNOSIS — L60.0 INGROWN NAIL: Primary | ICD-10-CM

## 2020-04-27 PROCEDURE — 11730 NAIL REMOVAL: ICD-10-PCS | Mod: TA,S$GLB,, | Performed by: PODIATRIST

## 2020-04-27 PROCEDURE — 3008F PR BODY MASS INDEX (BMI) DOCUMENTED: ICD-10-PCS | Mod: CPTII,S$GLB,, | Performed by: PODIATRIST

## 2020-04-27 PROCEDURE — 99999 PR PBB SHADOW E&M-EST. PATIENT-LVL III: ICD-10-PCS | Mod: PBBFAC,,, | Performed by: PODIATRIST

## 2020-04-27 PROCEDURE — 99213 OFFICE O/P EST LOW 20 MIN: CPT | Mod: 25,S$GLB,, | Performed by: PODIATRIST

## 2020-04-27 PROCEDURE — 11730 AVULSION NAIL PLATE SIMPLE 1: CPT | Mod: TA,S$GLB,, | Performed by: PODIATRIST

## 2020-04-27 PROCEDURE — 99999 PR PBB SHADOW E&M-EST. PATIENT-LVL III: CPT | Mod: PBBFAC,,, | Performed by: PODIATRIST

## 2020-04-27 PROCEDURE — 99213 PR OFFICE/OUTPT VISIT, EST, LEVL III, 20-29 MIN: ICD-10-PCS | Mod: 25,S$GLB,, | Performed by: PODIATRIST

## 2020-04-27 PROCEDURE — 3008F BODY MASS INDEX DOCD: CPT | Mod: CPTII,S$GLB,, | Performed by: PODIATRIST

## 2020-04-27 NOTE — PROCEDURES
Nail Removal  Date/Time: 4/27/2020 3:15 PM  Performed by: Faby Murry DPM  Authorized by: Faby Murry DPM       Location:  Left foot  Location detail:  Left big toe  Anesthesia:  Local infiltration  Local anesthetic: bupivacaine 0.5% without epinephrine  Anesthetic total (ml):  5  Preparation:  Skin prepped with alcohol and skin prepped with Betadine    Amount removed:  Complete  Wedge excision of skin of nail fold: No    Nail bed sutured?: No    Nail matrix removed:  None  Removed nail replaced and anchored: No    Dressing applied:  4x4, gauze roll and antibiotic ointment  Patient tolerance:  Patient tolerated the procedure well with no immediate complications     Time-out was performed with the patient the room

## 2020-04-27 NOTE — PROGRESS NOTES
Subjective:      Patient ID: Krupa Geiger is a 29 y.o. female.    Chief Complaint: Nail Problem (dropped dresser on left big toe 1yr.ago now infected) and PCP visit (Dr. Frazier last virtual visit 2020)    29 y.o. female presenting with left hallux pain.  Recently had a birth.  Patient tells me she dropped dresser door on left hallux a year ago.  It got inflamed and noticed some redness associated with pain.  Ambulating in Crocs. Redness has been there for 8 weeks. Has been soaking. Not taking any antibiotics. Aching and throbbing pain.     Review of Systems   Constitution: Negative for chills, decreased appetite, fever and malaise/fatigue.   HENT: Negative for congestion, ear discharge and sore throat.    Eyes: Negative for discharge and pain.   Cardiovascular: Negative for chest pain, claudication and leg swelling.   Respiratory: Negative for cough and shortness of breath.    Skin: Positive for color change. Negative for nail changes and rash.   Musculoskeletal: Negative for arthritis, joint pain, joint swelling and muscle weakness.        L hallux pain    Gastrointestinal: Negative for bloating, abdominal pain, diarrhea, nausea and vomiting.   Genitourinary: Negative for flank pain and hematuria.   Neurological: Negative for headaches, numbness and weakness.   Psychiatric/Behavioral: Negative for altered mental status.             Past Medical History:   Diagnosis Date    Anxiety     Depression     GERD (gastroesophageal reflux disease)     Gestational hypertension, third trimester 2019    Hypertension     gestational     Hypoglycemia     Kidney calculi     2016    Migraines     PONV (postoperative nausea and vomiting)     STD (sexually transmitted disease)     Urinary tract infection        Past Surgical History:   Procedure Laterality Date    ANKLE SURGERY Right      SECTION  10/2014    CYSTOSCOPY W/ URETERAL STENT PLACEMENT Left 10/2/2019    Procedure:  CYSTOSCOPY, WITH URETERAL STENT INSERTION - Under ultrasound guidance;  Surgeon: Marsha Guevara MD;  Location: [facility], operated by [facility] OR;  Service: Urology;  Laterality: Left;    KIDNEY STONE SURGERY      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)    LASER LITHOTRIPSY Left 10/16/2019    Procedure: LITHOTRIPSY, USING LASER;  Surgeon: Marsha Guevara MD;  Location: [facility], operated by [facility] OR;  Service: Urology;  Laterality: Left;    TONSILLECTOMY      ULTRASOUND GUIDANCE Left 10/16/2019    Procedure: ULTRASOUND GUIDANCE, for laser litho;  Surgeon: Marsha Guevara MD;  Location: [facility], operated by [facility] OR;  Service: Urology;  Laterality: Left;    URETEROSCOPIC REMOVAL OF URETERIC CALCULUS Left 10/16/2019    Procedure: REMOVAL, CALCULUS, URETER, URETEROSCOPIC possible retro pyelo, possible lasere litho, possible stent;  Surgeon: Marsha Guevara MD;  Location: [facility];  Service: Urology;  Laterality: Left;  25 WEEKS PREGNANT / FETAL MONITORING BEFORE AND AFTER PROCEDURE/   CALL PELON ELDER [phone]      WISDOM TOOTH EXTRACTION         Family History   Problem Relation Age of Onset    Hypertension Mother     Hypertension Father     Heart disease Father     No Known Problems Sister     Nephrolithiasis Daughter     No Known Problems Sister     No Known Problems Sister     Breast cancer Paternal Grandmother         50s at diagnosis    Colon cancer Neg Hx     Ovarian cancer Neg Hx        Social History     Socioeconomic History    Marital status:      Spouse name: Not on file    Number of children: Not on file    Years of education: Not on file    Highest education level: Not on file   Occupational History    Not on file   Social Needs    Financial resource strain: Not on file    Food insecurity:     Worry: Not on file     Inability: Not on file    Transportation needs:     Medical: Not on file     Non-medical: Not on file   Tobacco Use    Smoking status: Never Smoker    Smokeless tobacco:  "Never Used   Substance and Sexual Activity    Alcohol use: Not Currently     Frequency: Never     Comment: socially when not pregnant    Drug use: No    Sexual activity: Yes     Partners: Male     Birth control/protection: None, Condom   Lifestyle    Physical activity:     Days per week: Not on file     Minutes per session: Not on file    Stress: Not on file   Relationships    Social connections:     Talks on phone: Not on file     Gets together: Not on file     Attends [employer] service: Not on file     Active member of club or organization: Not on file     Attends meetings of clubs or organizations: Not on file     Relationship status: Not on file   Other Topics Concern    Not on file   Social History Narrative    Not on file       Current Outpatient Medications   Medication Sig Dispense Refill    ibuprofen (ADVIL,MOTRIN) 600 MG tablet Take 1 tablet (600 mg total) by mouth every 6 (six) hours. 30 tablet 1    multivit with calcium,iron,min (WOMEN'S DAILY MULTIVITAMIN ORAL) Women's Daily Multivitamin      naproxen sodium (ANAPROX) 220 MG tablet Take 220 mg by mouth every 12 (twelve) hours.      labetaloL (NORMODYNE) 200 MG tablet Take 1 tablet (200 mg total) by mouth 2 (two) times daily. (Patient not taking: Reported on 3/11/2020) 60 tablet 11     No current facility-administered medications for this visit.        Review of patient's allergies indicates:  No Known Allergies    Vitals:    04/27/20 1526   BP: 110/78   Pulse: 82   Resp: 16   Weight: 103.6 kg (228 lb 8 oz)   Height: 5' 3" (1.6 m)   PainSc:   4   PainLoc: Foot       Objective:      Physical Exam   Constitutional: She is oriented to person, place, and time. She appears well-developed and well-nourished. No distress.   HENT:   Nose: Nose normal.   Eyes: Conjunctivae are normal.   Neck: Normal range of motion.   Pulmonary/Chest: Effort normal. She exhibits no tenderness.   Abdominal: There is no tenderness.   Neurological: She is alert and " oriented to person, place, and time.   Psychiatric: She has a normal mood and affect. Her behavior is normal.       Vascular: Distal DP/PT pulses palpable 2/4. CRT < 3 sec to tips of toes. No vericosities noted to LEs. Hair growth present LE, warm to touch LE  L hallux: mild edema     Dermatologic:   L hallux: inflamed eponychium with partial loose of the nail plate from the nail bed. No injury to nail bed. No open wounds. Mild rubor and erythema around eponychium.     Musculoskeletal: MMT 5/5 in DF/PF/Inv/Ev resistance with no reproduction of pain in any direction.  L hallux: ttp around eponychium.     Neurological: Light touch, proprioception, and sharp/dull sensation are all intact. Protective threshold with the Alcolu-Wienstein monofilament is intact. Vibratory sensation intact.         Assessment:       Encounter Diagnosis   Name Primary?    Ingrown nail - Left Foot Yes         Plan:       Krupa was seen today for nail problem and pcp visit.    Diagnoses and all orders for this visit:    Ingrown nail - Left Foot      I counseled the patient on her conditions, their implications and medical management.    29 y.o. female with L hallux ingrown toenail.    -s/p TNA w/o phenol.  Tolerated well.  See procedure note.   -recommend soaking instruction.  Instruction was given to patient.  Recommend weight-bearing as tolerated in Crocs.    -The nature of the condition, options for management, as well as potential risks and complications were discussed in detail with patient. Patient was amenable to my recommendations and left my office fully informed and will follow up as instructed or sooner if necessary.    -Patient was advised of signs and symptoms of infection including redness, drainage, purulence, odor, streaking, fever, chills and I advised patient to seek medical attention (ER or urgent care) if these symptoms arise.   -f/u 1 week      Note dictated with voice recognition software, please excuse any grammatical  errors.

## 2020-04-28 ENCOUNTER — PATIENT MESSAGE (OUTPATIENT)
Dept: PODIATRY | Facility: CLINIC | Age: 30
End: 2020-04-28

## 2020-04-29 RX ORDER — CEPHALEXIN 500 MG/1
500 CAPSULE ORAL EVERY 12 HOURS
Qty: 20 CAPSULE | Refills: 0 | Status: SHIPPED | OUTPATIENT
Start: 2020-04-29 | End: 2020-05-09

## 2020-05-05 ENCOUNTER — TELEPHONE (OUTPATIENT)
Dept: PODIATRY | Facility: CLINIC | Age: 30
End: 2020-05-05

## 2020-06-26 ENCOUNTER — TELEPHONE (OUTPATIENT)
Dept: NEUROLOGY | Facility: CLINIC | Age: 30
End: 2020-06-26

## 2020-06-26 NOTE — TELEPHONE ENCOUNTER
----- Message from Yana Pedro sent at 6/26/2020  9:38 AM CDT -----  Regarding: Appointment Access  Pt called attempting to schedule an appointment due to migraines / requesting to change prescription  previously prescribed.    [phone] ( Cell )

## 2020-06-26 NOTE — TELEPHONE ENCOUNTER
----- Message from Yana Pedro sent at 6/26/2020  9:38 AM CDT -----  Regarding: Appointment Access  Pt called attempting to schedule an appointment due to migraines / requesting to change prescription  previously prescribed.    [phone] ( Cell )

## 2020-07-02 ENCOUNTER — OFFICE VISIT (OUTPATIENT)
Dept: ENDOCRINOLOGY | Facility: CLINIC | Age: 30
End: 2020-07-02
Payer: COMMERCIAL

## 2020-07-02 VITALS
WEIGHT: 227.19 LBS | HEART RATE: 78 BPM | HEIGHT: 64 IN | BODY MASS INDEX: 38.79 KG/M2 | DIASTOLIC BLOOD PRESSURE: 82 MMHG | SYSTOLIC BLOOD PRESSURE: 124 MMHG

## 2020-07-02 DIAGNOSIS — E21.3 HYPERPARATHYROIDISM: Primary | ICD-10-CM

## 2020-07-02 DIAGNOSIS — N20.0 NEPHROLITHIASIS: ICD-10-CM

## 2020-07-02 PROBLEM — Z34.90 PREGNANCY WITH ONE FETUS, ANTEPARTUM: Status: RESOLVED | Noted: 2019-07-11 | Resolved: 2020-07-02

## 2020-07-02 PROBLEM — Z67.91 RH NEGATIVE STATE IN ANTEPARTUM PERIOD: Status: RESOLVED | Noted: 2019-08-12 | Resolved: 2020-07-02

## 2020-07-02 PROBLEM — Z3A.24 24 WEEKS GESTATION OF PREGNANCY: Status: RESOLVED | Noted: 2019-10-02 | Resolved: 2020-07-02

## 2020-07-02 PROBLEM — Q62.5 DUPLICATED URETER, RIGHT: Status: RESOLVED | Noted: 2019-10-01 | Resolved: 2020-07-02

## 2020-07-02 PROBLEM — O09.892 SUPERVISION OF OTHER HIGH RISK PREGNANCIES, SECOND TRIMESTER: Status: RESOLVED | Noted: 2019-09-12 | Resolved: 2020-07-02

## 2020-07-02 PROBLEM — R29.898 HIP WEAKNESS: Status: RESOLVED | Noted: 2019-11-21 | Resolved: 2020-07-02

## 2020-07-02 PROBLEM — Z87.442 HISTORY OF NEPHROLITHIASIS: Status: RESOLVED | Noted: 2019-10-02 | Resolved: 2020-07-02

## 2020-07-02 PROBLEM — O99.212 OBESITY AFFECTING PREGNANCY IN SECOND TRIMESTER: Status: RESOLVED | Noted: 2019-10-02 | Resolved: 2020-07-02

## 2020-07-02 PROBLEM — O26.899 RH NEGATIVE STATE IN ANTEPARTUM PERIOD: Status: RESOLVED | Noted: 2019-08-12 | Resolved: 2020-07-02

## 2020-07-02 PROCEDURE — 3008F BODY MASS INDEX DOCD: CPT | Mod: CPTII,S$GLB,, | Performed by: INTERNAL MEDICINE

## 2020-07-02 PROCEDURE — 99999 PR PBB SHADOW E&M-EST. PATIENT-LVL III: ICD-10-PCS | Mod: PBBFAC,,, | Performed by: INTERNAL MEDICINE

## 2020-07-02 PROCEDURE — 99204 OFFICE O/P NEW MOD 45 MIN: CPT | Mod: S$GLB,,, | Performed by: INTERNAL MEDICINE

## 2020-07-02 PROCEDURE — 3008F PR BODY MASS INDEX (BMI) DOCUMENTED: ICD-10-PCS | Mod: CPTII,S$GLB,, | Performed by: INTERNAL MEDICINE

## 2020-07-02 PROCEDURE — 99204 PR OFFICE/OUTPT VISIT, NEW, LEVL IV, 45-59 MIN: ICD-10-PCS | Mod: S$GLB,,, | Performed by: INTERNAL MEDICINE

## 2020-07-02 PROCEDURE — 99999 PR PBB SHADOW E&M-EST. PATIENT-LVL III: CPT | Mod: PBBFAC,,, | Performed by: INTERNAL MEDICINE

## 2020-07-02 NOTE — PROGRESS NOTES
Subjective:      Patient ID: Krupa Geiger is a 29 y.o. female.    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  There is a duplicated collecting system on the left.  No dilatation of the lower pole collecting system.     Punctate nonobstructing calculi in the right interpolar kidney and left lower pole kidney.      Of note.... she was diagnosed with having low T3 by her ob-gyn. She was referred to endocrinology who states that ob-gyn needed to manage her T3 due to starting her on medication.  She is off thyroid hormone at this time    With regards to obesity, Body mass index is 39.61 kg/m².    Denies symptoms of hyperglycemia such as polyuria, polydipsia, nocturia, unexplained weight loss or blurred vision    Weight loss 33 lbs -breastfeeding now   No menses - her daughter is 5 months   Older daughter is 5 1/2  Took 9.5 months to get pregnant     She was told that she probably had PCOS   Menses irreg in the past   C/o scalp hair loss     +faigue   Had toby eval in the past that was neg         Review of Systems   Constitutional: Negative for unexpected weight change.   Eyes: Negative for visual disturbance.   Respiratory: Negative for shortness of breath.    Cardiovascular: Negative for chest pain.   Gastrointestinal: Negative for abdominal pain.   Musculoskeletal: Negative for arthralgias.   Skin: Negative for wound.   Neurological: Negative for headaches.   Hematological: Does  "not bruise/bleed easily.   Psychiatric/Behavioral: Negative for sleep disturbance.       Objective:     /82 (BP Location: Right arm, Patient Position: Sitting)   Pulse 78   Ht 5' 3.5" (1.613 m)   Wt 103 kg (227 lb 2.9 oz)   Breastfeeding Yes   BMI 39.61 kg/m²   BP Readings from Last 3 Encounters:   07/02/20 124/82   04/27/20 110/78   03/11/20 130/84     Wt Readings from Last 1 Encounters:   07/02/20 0958 103 kg (227 lb 2.9 oz)     Body mass index is 39.61 kg/m².      Physical Exam  Vitals signs reviewed.   Constitutional:       Appearance: She is well-developed.   HENT:      Right Ear: External ear normal.      Left Ear: External ear normal.      Nose: Nose normal.   Neck:      Thyroid: No thyromegaly.      Trachea: No tracheal deviation.   Cardiovascular:      Rate and Rhythm: Normal rate.      Heart sounds: No murmur.   Pulmonary:      Effort: Pulmonary effort is normal.      Breath sounds: Normal breath sounds.   Abdominal:      Palpations: Abdomen is soft.      Tenderness: There is no abdominal tenderness.      Hernia: No hernia is present.   Musculoskeletal:      Comments: Gait normal  No clubbing or cyanosis noted   Skin:     Findings: No rash.      Comments: No nodules       Neurological:      Cranial Nerves: No cranial nerve deficit.      Deep Tendon Reflexes: Reflexes normal.   Psychiatric:         Judgment: Judgment normal.                Lab Review:   Lab Results   Component Value Date    HGBA1C 4.9 12/04/2019     Lab Results   Component Value Date    CHOL 220 (H) 11/13/2018    HDL 64 11/13/2018    LDLCALC 141.2 11/13/2018    TRIG 74 11/13/2018    CHOLHDL 29.1 11/13/2018     Lab Results   Component Value Date     (L) 12/19/2019    K 4.0 12/19/2019     12/19/2019    CO2 24 12/19/2019    GLU 80 12/19/2019    BUN 9 12/19/2019    CREATININE 0.6 12/19/2019    CALCIUM 9.5 03/11/2020    PROT 6.7 12/19/2019    ALBUMIN 2.7 (L) 12/19/2019    BILITOT 0.3 12/19/2019    ALKPHOS 114 12/19/2019    " AST 16 01/09/2020    ALT 9 (L) 01/09/2020    ANIONGAP 4 (L) 12/19/2019    ESTGFRAFRICA >60 12/19/2019    EGFRNONAA >60 12/19/2019    TSH 2.237 12/04/2019     Vit D, 25-Hydroxy   Date Value Ref Range Status   10/11/2019 49 30 - 96 ng/mL Final     Comment:     Vitamin D deficiency.........<10 ng/mL                              Vitamin D insufficiency......10-29 ng/mL       Vitamin D sufficiency........> or equal to 30 ng/mL  Vitamin D toxicity............>100 ng/mL         Assessment and Plan     Hyperparathyroidism  She will let me know when she hands in the 24 hr urine collection.  Unclear if this is hypercalciuria leading to elevated PTH or normocalcemic primary hyperparathyroidism.    Will follow over time    Nephrolithiasis  As above.  Need 24 hr urine collection    BMI 39.0-39.9,adult    alerted as to the increased risk for t2DM  Stressed diet and exercise     Periodic hba1c levels

## 2020-07-02 NOTE — ASSESSMENT & PLAN NOTE
She will let me know when she hands in the 24 hr urine collection.  Unclear if this is hypercalciuria leading to elevated PTH or normocalcemic primary hyperparathyroidism.    Will follow over time

## 2020-07-02 NOTE — LETTER
July 2, 2020      Marsha Guevara MD  [address] [facility] [address]  [address]  [facility] [address]           [facility] - Endocrinology [facility]  [address]  [place] [address]  Phone: [phone]  Fax: [phone]          Patient: Krupa Geiger   MR Number: [id number]   YOB: 1990   Date of Visit: 7/2/2020       Dear Dr. Marsha Guevara:    Thank you for referring Krupa Geiger to me for evaluation. Attached you will find relevant portions of my assessment and plan of care.    If you have questions, please do not hesitate to call me. I look forward to following Krupa Geiger along with you.    Sincerely,    Pascale Sharp MD    Enclosure  CC:  No Recipients    If you would like to receive this communication electronically, please contact [email or web address] or [phone] to request more information on [employer] Link access.    For providers and/or their staff who would like to refer a patient to [facility], please contact us through our one-stop-shop provider referral line, [facility], at [phone].    If you feel you have received this communication in error or would no longer like to receive these types of communications, please e-mail [email or web address]

## 2020-07-06 ENCOUNTER — PATIENT MESSAGE (OUTPATIENT)
Dept: ADMINISTRATIVE | Facility: HOSPITAL | Age: 30
End: 2020-07-06

## 2020-08-26 RX ORDER — ONDANSETRON 8 MG/1
8 TABLET, ORALLY DISINTEGRATING ORAL EVERY 6 HOURS PRN
Qty: 30 TABLET | Refills: 3 | Status: SHIPPED | OUTPATIENT
Start: 2020-08-26 | End: 2021-04-20

## 2020-08-26 RX ORDER — KETOROLAC TROMETHAMINE 10 MG/1
10 TABLET, FILM COATED ORAL EVERY 6 HOURS PRN
Qty: 20 TABLET | Refills: 0 | Status: SHIPPED | OUTPATIENT
Start: 2020-08-26 | End: 2020-08-31

## 2020-09-01 ENCOUNTER — PATIENT OUTREACH (OUTPATIENT)
Dept: ADMINISTRATIVE | Facility: HOSPITAL | Age: 30
End: 2020-09-01

## 2020-09-24 ENCOUNTER — OFFICE VISIT (OUTPATIENT)
Dept: URGENT CARE | Facility: CLINIC | Age: 30
End: 2020-09-24
Payer: COMMERCIAL

## 2020-09-24 ENCOUNTER — PATIENT MESSAGE (OUTPATIENT)
Dept: FAMILY MEDICINE | Facility: CLINIC | Age: 30
End: 2020-09-24

## 2020-09-24 VITALS
RESPIRATION RATE: 18 BRPM | OXYGEN SATURATION: 98 % | HEART RATE: 83 BPM | BODY MASS INDEX: 38.76 KG/M2 | WEIGHT: 227 LBS | TEMPERATURE: 97 F | SYSTOLIC BLOOD PRESSURE: 125 MMHG | HEIGHT: 64 IN | DIASTOLIC BLOOD PRESSURE: 84 MMHG

## 2020-09-24 DIAGNOSIS — G43.819 OTHER MIGRAINE WITHOUT STATUS MIGRAINOSUS, INTRACTABLE: Primary | ICD-10-CM

## 2020-09-24 DIAGNOSIS — G43.009 MIGRAINE WITHOUT AURA AND WITHOUT STATUS MIGRAINOSUS, NOT INTRACTABLE: ICD-10-CM

## 2020-09-24 PROCEDURE — 99214 OFFICE O/P EST MOD 30 MIN: CPT | Mod: S$GLB,,, | Performed by: NURSE PRACTITIONER

## 2020-09-24 PROCEDURE — 99214 PR OFFICE/OUTPT VISIT, EST, LEVL IV, 30-39 MIN: ICD-10-PCS | Mod: S$GLB,,, | Performed by: NURSE PRACTITIONER

## 2020-09-24 RX ORDER — RIZATRIPTAN BENZOATE 10 MG/1
TABLET ORAL
Qty: 10 TABLET | Refills: 0 | OUTPATIENT
Start: 2020-09-24

## 2020-09-24 NOTE — PROGRESS NOTES
"Subjective:       Patient ID: Krupa Geiger is a 30 y.o. female.    Vitals:  height is 5' 3.5" (1.613 m) and weight is 103 kg (227 lb). Her temperature is 97.3 °F (36.3 °C). Her blood pressure is 125/84 and her pulse is 83. Her respiration is 18 and oxygen saturation is 98%.     Chief Complaint: Migraine    This is a 30 y.o. female who presents today with a chief complaint of migraine that started yesterday.      Migraine   This is a new problem. The current episode started yesterday. The problem occurs constantly. The problem has been gradually worsening. The pain is located in the bilateral region. The pain radiates to the left neck and face. The pain quality is similar to prior headaches. The quality of the pain is described as stabbing, squeezing, aching and pulsating. The pain is at a severity of 7/10. The pain is moderate. Pertinent negatives include no blurred vision, dizziness, eye pain, fever, loss of balance, nausea, neck pain, photophobia, tinnitus, vomiting or weakness. The symptoms are aggravated by bright light. Treatments tried: percocet. The treatment provided no relief. Her past medical history is significant for migraine headaches.       Constitution: Negative for chills, sweating and fever.   HENT: Negative for tinnitus, facial swelling, congestion and sinus pain.    Neck: Negative for neck pain and neck stiffness.   Eyes: Negative for eye pain, photophobia, vision loss, double vision and blurred vision.   Gastrointestinal: Negative for nausea and vomiting.   Genitourinary: Negative for missed menses.   Musculoskeletal: Negative for trauma and muscle ache.   Skin: Negative for rash, wound and lesion.   Neurological: Positive for headaches and history of migraines. Negative for dizziness, history of vertigo, light-headedness, facial drooping, speech difficulty, coordination disturbances, loss of balance, disorientation and loss of consciousness.   Psychiatric/Behavioral: Negative for " disorientation, confusion, nervous/anxious, sleep disturbance and depression. The patient is not nervous/anxious.        Objective:      Physical Exam   Constitutional: She is oriented to person, place, and time. She appears well-developed.  Non-toxic appearance. She does not appear ill. No distress.   HENT:   Head: Normocephalic and atraumatic.   Ears:   Right Ear: External ear normal.   Left Ear: External ear normal.   Nose: Nose normal. No mucosal edema, rhinorrhea or nasal deformity. No epistaxis.   Mouth/Throat: Uvula is midline, oropharynx is clear and moist and mucous membranes are normal. No trismus in the jaw. Normal dentition. No uvula swelling. No posterior oropharyngeal erythema.   Eyes: Pupils are equal, round, and reactive to light. Conjunctivae, EOM and lids are normal. No scleral icterus. Pupils are equal. extraocular movement intact  Neck: Trachea normal, normal range of motion, full passive range of motion without pain and phonation normal. Neck supple. No neck rigidity.   Cardiovascular: Normal rate, regular rhythm, normal heart sounds and normal pulses.   Pulmonary/Chest: Effort normal and breath sounds normal. No respiratory distress.   Abdominal: Soft. Normal appearance and bowel sounds are normal. She exhibits no distension. There is no abdominal tenderness.   Musculoskeletal: Normal range of motion.         General: No deformity.   Neurological: She is alert and oriented to person, place, and time. She has normal motor skills and normal sensation. No cranial nerve deficit or sensory deficit. She exhibits normal muscle tone. Gait and coordination normal. Coordination normal. GCS eye subscore is 4. GCS verbal subscore is 5. GCS motor subscore is 6.   Skin: Skin is warm, dry, intact, not diaphoretic and not pale. Psychiatric: Her speech is normal and behavior is normal. Judgment and thought content normal.   Nursing note and vitals reviewed.        Assessment:       1. Other migraine without  status migrainosus, intractable        Plan:       Patient is breastfeeding and plans on using her frozen breastmilk when taking Maxalt.  It appears  did send in her maxalt today.  Called [facility] and relayed information for patient.  Patient sent to pharmacy for prescription.  Verbalized understanding.  Witness LAZARO Ribeiro.       Other migraine without status migrainosus, intractable      Patient Instructions     Please follow up with your Primary care provider within 2-5 days if your signs and symptoms have not resolved or worsen.     If your condition worsens or fails to improve we recommend that you receive another evaluation at the emergency room immediately or contact your primary medical clinic to discuss your concerns.    You must understand that you have received an Urgent Care treatment only and that you may be released before all of your medical problems are known or treated.   You, the patient, will arrange for follow up care as instructed.         What Are Migraine and Tension Headaches?    Although there are several types of headaches, migraine and tension headaches affect the most people. When you have a headache, it isn't your brain that's hurting. Your head aches because nerves in the bones, blood vessels, meninges, and muscles of your head are irritated. These irritated nerves send pain signals to the brain, which identifies where you hurt and how bad the pain is.  Talk with your healthcare provider about a treatment plan that may help relieve pain and prevent future headaches.   What causes your headache?  The actual headache process is not yet understood. Only rarely are headaches a sign of a serious medical problem. Headache pain may be caused by abnormal interaction between the brain and the nerves and blood vessels in the head. Environmental stresses or certain foods and drinks may trigger headache pain.  What is referred pain?  Headache pain can be referred pain, which is pain that  "has its source in one place but is felt in another. For example, pain behind the eyes may actually be caused by tense muscles in the neck and shoulders. This means that the place that hurts may not be the part of the body that needs treatment.  Is it a migraine?  Migraine is a vascular headache that causes throbbing pain felt on one or both sides of the head. You may feel nauseated or vomit. This headache may also be preceded or associated with changes in sight (like seeing spots or flashes of light), ability to speak, or sensation (aura). There are a wide variety of environmental and food-related triggers for migraines. The pain may last for 4 to 72 hours. Afterward, you may feel shaky for a day or so. If this is the first time you experience these symptoms, you should immediately seek medical attention because you could be having a stroke.  Is it a tension headache?  This type of headache is usually a dull ache or a sensation of pressure on both sides of the head. It may be associated with pain or tension in the neck and shoulders. Depression, anxiety, and stress can cause a tension headache. The pain may not have a definite beginning or end. It may come and go, or seem never to go away.  When to call the healthcare provider  Call your healthcare provider for headaches that happen along with any of these symptoms:  · Sudden, severe headache that is different from your usual headache pain  · High fever along with a stiff neck  · Recurring headache in children   · Ongoing numbness or muscle weakness  · Loss of vision  · Pain following a head injury  · Convulsions, or a change in mental awareness  · A headache you would call "the worst headache you've ever had"   Date Last Reviewed: 9/14/2015 © 2000-2017 [employer]. [address] [place] [address], [place] [address]. All rights reserved. This information is not intended as a substitute for professional medical care. Always follow your healthcare professional's " instructions.

## 2020-09-24 NOTE — PATIENT INSTRUCTIONS
Please follow up with your Primary care provider within 2-5 days if your signs and symptoms have not resolved or worsen.     If your condition worsens or fails to improve we recommend that you receive another evaluation at the emergency room immediately or contact your primary medical clinic to discuss your concerns.    You must understand that you have received an Urgent Care treatment only and that you may be released before all of your medical problems are known or treated.   You, the patient, will arrange for follow up care as instructed.         What Are Migraine and Tension Headaches?    Although there are several types of headaches, migraine and tension headaches affect the most people. When you have a headache, it isn't your brain that's hurting. Your head aches because nerves in the bones, blood vessels, meninges, and muscles of your head are irritated. These irritated nerves send pain signals to the brain, which identifies where you hurt and how bad the pain is.  Talk with your healthcare provider about a treatment plan that may help relieve pain and prevent future headaches.   What causes your headache?  The actual headache process is not yet understood. Only rarely are headaches a sign of a serious medical problem. Headache pain may be caused by abnormal interaction between the brain and the nerves and blood vessels in the head. Environmental stresses or certain foods and drinks may trigger headache pain.  What is referred pain?  Headache pain can be referred pain, which is pain that has its source in one place but is felt in another. For example, pain behind the eyes may actually be caused by tense muscles in the neck and shoulders. This means that the place that hurts may not be the part of the body that needs treatment.  Is it a migraine?  Migraine is a vascular headache that causes throbbing pain felt on one or both sides of the head. You may feel nauseated or vomit. This headache may also be  "preceded or associated with changes in sight (like seeing spots or flashes of light), ability to speak, or sensation (aura). There are a wide variety of environmental and food-related triggers for migraines. The pain may last for 4 to 72 hours. Afterward, you may feel shaky for a day or so. If this is the first time you experience these symptoms, you should immediately seek medical attention because you could be having a stroke.  Is it a tension headache?  This type of headache is usually a dull ache or a sensation of pressure on both sides of the head. It may be associated with pain or tension in the neck and shoulders. Depression, anxiety, and stress can cause a tension headache. The pain may not have a definite beginning or end. It may come and go, or seem never to go away.  When to call the healthcare provider  Call your healthcare provider for headaches that happen along with any of these symptoms:  · Sudden, severe headache that is different from your usual headache pain  · High fever along with a stiff neck  · Recurring headache in children   · Ongoing numbness or muscle weakness  · Loss of vision  · Pain following a head injury  · Convulsions, or a change in mental awareness  · A headache you would call "the worst headache you've ever had"   Date Last Reviewed: 9/14/2015  © 1752-6874 [employer]. [address] [place] [address], [place] [address]. All rights reserved. This information is not intended as a substitute for professional medical care. Always follow your healthcare professional's instructions.        "

## 2020-09-27 ENCOUNTER — TELEPHONE (OUTPATIENT)
Dept: URGENT CARE | Facility: CLINIC | Age: 30
End: 2020-09-27

## 2020-09-28 ENCOUNTER — PATIENT OUTREACH (OUTPATIENT)
Dept: ADMINISTRATIVE | Facility: HOSPITAL | Age: 30
End: 2020-09-28

## 2020-10-03 ENCOUNTER — CLINICAL SUPPORT (OUTPATIENT)
Dept: URGENT CARE | Facility: CLINIC | Age: 30
End: 2020-10-03
Payer: COMMERCIAL

## 2020-10-03 ENCOUNTER — OFFICE VISIT (OUTPATIENT)
Dept: URGENT CARE | Facility: CLINIC | Age: 30
End: 2020-10-03
Payer: COMMERCIAL

## 2020-10-03 VITALS
SYSTOLIC BLOOD PRESSURE: 125 MMHG | RESPIRATION RATE: 18 BRPM | OXYGEN SATURATION: 98 % | BODY MASS INDEX: 38.76 KG/M2 | WEIGHT: 227 LBS | TEMPERATURE: 98 F | DIASTOLIC BLOOD PRESSURE: 90 MMHG | HEART RATE: 80 BPM | HEIGHT: 64 IN

## 2020-10-03 VITALS — TEMPERATURE: 98 F

## 2020-10-03 DIAGNOSIS — R50.9 FEVER, UNSPECIFIED FEVER CAUSE: ICD-10-CM

## 2020-10-03 DIAGNOSIS — B34.9 VIRAL SYNDROME: Primary | ICD-10-CM

## 2020-10-03 DIAGNOSIS — R50.9 FEVER, UNSPECIFIED FEVER CAUSE: Primary | ICD-10-CM

## 2020-10-03 LAB
CTP QC/QA: YES
SARS-COV-2 RDRP RESP QL NAA+PROBE: NEGATIVE

## 2020-10-03 PROCEDURE — 99214 OFFICE O/P EST MOD 30 MIN: CPT | Mod: S$GLB,,, | Performed by: NURSE PRACTITIONER

## 2020-10-03 PROCEDURE — 99214 PR OFFICE/OUTPT VISIT, EST, LEVL IV, 30-39 MIN: ICD-10-PCS | Mod: S$GLB,,, | Performed by: NURSE PRACTITIONER

## 2020-10-03 PROCEDURE — U0002: ICD-10-PCS | Mod: QW,S$GLB,, | Performed by: INTERNAL MEDICINE

## 2020-10-03 PROCEDURE — U0002 COVID-19 LAB TEST NON-CDC: HCPCS | Mod: QW,S$GLB,, | Performed by: INTERNAL MEDICINE

## 2020-10-03 NOTE — PROGRESS NOTES
Patient is [facility] as an [facility] for a POCT Rapid Covid-19 screening with symptoms.

## 2020-10-03 NOTE — PATIENT INSTRUCTIONS
"Please follow up with your Primary care provider within 2-5 days if your signs and symptoms have not resolved or worsen.  The usual course of cold symptoms are 10-14 days.     If your condition worsens or fails to improve we recommend that you receive another evaluation at the emergency room immediately or contact your primary medical clinic to discuss your concerns.     You must understand that you have received an Urgent Care treatment only and that you may be released before all of your medical problems are known or treated.   You, the patient, will arrange for follow up care as instructed.     Tylenol or Ibuprofen can also be used as directed for pain/fever unless you have an allergy to them or medical condition such as stomach ulcers, kidney or liver disease or blood thinners etc for which you should not be taking these type of medications.     Take over the counter cough medication as directed as needed for cough.  You should avoid medications with pseudoephedrine or phenylephrine (any medication with "D") if you have high blood pressure as this can cause an elevation in your blood pressure. Instead consider Corcidin HBP as needed to prevent an elevated blood pressure.     Natural remedies of symptoms (as needed) include humidification, saline nasal sprays, and/or steamy showers.  Increase fluids, warm tea with honey, cough drops as needed.  You may also use salt water gargles for sore throat.    IF you received a oral steroid today - As discussed, this can elevate your blood pressure, elevate your blood sugar, water weight gain, nervous energy, redness to the face and dimpling of the skin at the injection site.         Viral Syndrome (Adult)  A viral illness may cause a number of symptoms. The symptoms depend on the part of the body that the virus affects. If it settles in your nose, throat, and lungs, it may cause cough, sore throat, congestion, and sometimes headache. If it settles in your stomach and " "intestinal tract, it may cause vomiting and diarrhea. Sometimes it causes vague symptoms like "aching all over," feeling tired, loss of appetite, or fever.  A viral illness usually lasts 1 to 2 weeks, but sometimes it lasts longer. In some cases, a more serious infection can look like a viral syndrome in the first few days of the illness. You may need another exam and additional tests to know the difference. Watch for the warning signs listed below.  Home care  Follow these guidelines for taking care of yourself at home:  · If symptoms are severe, rest at home for the first 2 to 3 days.  · Stay away from cigarette smoke - both your smoke and the smoke from others.  · You may use over-the-counter acetaminophen or ibuprofen for fever, muscle aching, and headache, unless another medicine was prescribed for this. If you have chronic liver or kidney disease or ever had a stomach ulcer or GI bleeding, talk with your doctor before using these medicines. No one who is younger than 18 and ill with a fever should take aspirin. It may cause severe disease or death.  · Your appetite may be poor, so a light diet is fine. Avoid dehydration by drinking 8 to 12 8-ounce glasses of fluids each day. This may include water; orange juice; lemonade; apple, grape, and cranberry juice; clear fruit drinks; electrolyte replacement and sports drinks; and decaffeinated teas and coffee. If you have been diagnosed with a kidney disease, ask your doctor how much and what types of fluids you should drink to prevent dehydration. If you have kidney disease, drinking too much fluid can cause it build up in the your body and be dangerous to your health.  · Over-the-counter remedies won't shorten the length of the illness but may be helpful for cough, sore throat; and nasal and sinus congestion. Don't use decongestants if you have high blood pressure.  Follow-up care  Follow up with your healthcare provider if you do not improve over the next " week.  Call 911  Get emergency medical care if any of the following occur:  · Convulsion  · Feeling weak, dizzy, or like you are going to faint  · Chest pain, shortness of breath, wheezing, or difficulty breathing  When to seek medical advice  Call your healthcare provider right away if any of these occur:  · Cough with lots of colored sputum (mucus) or blood in your sputum  · Chest pain, shortness of breath, wheezing, or difficulty breathing  · Severe headache; face, neck, or ear pain  · Severe, constant pain in the lower right side of your belly (abdominal)  · Continued vomiting (cant keep liquids down)  · Frequent diarrhea (more than 5 times a day); blood (red or black color) or mucus in diarrhea  · Feeling weak, dizzy, or like you are going to faint  · Extreme thirst  · Fever of 100.4°F (38°C) or higher, or as directed by your healthcare provider  Date Last Reviewed: 9/25/2015  © 5403-4514 [employer]. [address] [place] [address], [place] [address]. All rights reserved. This information is not intended as a substitute for professional medical care. Always follow your healthcare professional's instructions.

## 2020-10-03 NOTE — PROGRESS NOTES
"Subjective:       Patient ID: Krupa Geiger is a 30 y.o. female.    Vitals:  height is 5' 3.5" (1.613 m) and weight is 103 kg (227 lb). Her temperature is 97.5 °F (36.4 °C). Her blood pressure is 125/90 (abnormal) and her pulse is 80. Her respiration is 18 and oxygen saturation is 98%.     Chief Complaint: Cough and Sinus Problem    This is a 30 y.o. female who presents today with a chief complaint of sinus and cough for 4 days.      Cough  This is a new problem. The current episode started in the past 7 days. The problem has been gradually worsening. The problem occurs constantly. The cough is productive of sputum. Associated symptoms include ear pain, nasal congestion, postnasal drip, rhinorrhea and a sore throat. Pertinent negatives include no chills, eye redness, fever, hemoptysis, myalgias, rash, shortness of breath or wheezing. Nothing aggravates the symptoms. Treatments tried: claritin. The treatment provided no relief. Her past medical history is significant for bronchitis. There is no history of asthma or pneumonia.   Sinus Problem  This is a new problem. The current episode started in the past 7 days. The problem has been gradually worsening since onset. There has been no fever. Her pain is at a severity of 0/10. She is experiencing no pain. Associated symptoms include congestion, coughing, diaphoresis, ear pain and a sore throat. Pertinent negatives include no chills, shortness of breath or sinus pressure. Past treatments include spray decongestants, oral decongestants and acetaminophen. The treatment provided mild relief.       Constitution: Positive for sweating and fatigue. Negative for chills and fever.   HENT: Positive for ear pain, congestion, postnasal drip and sore throat. Negative for sinus pain, sinus pressure and voice change.    Neck: Negative for painful lymph nodes.   Eyes: Negative for eye redness.   Respiratory: Positive for cough and sputum production. Negative for chest " tightness, bloody sputum, COPD, shortness of breath, stridor, wheezing and asthma.    Gastrointestinal: Negative for nausea and vomiting.   Musculoskeletal: Negative for muscle ache.   Skin: Negative for rash.   Allergic/Immunologic: Negative for seasonal allergies and asthma.   Hematologic/Lymphatic: Negative for swollen lymph nodes.       Objective:      Physical Exam   Constitutional: She is oriented to person, place, and time. She appears well-developed. She is cooperative.  Non-toxic appearance. She does not appear ill. No distress.   HENT:   Head: Normocephalic and atraumatic.   Ears:   Right Ear: Hearing, tympanic membrane, external ear and ear canal normal.   Left Ear: Hearing, tympanic membrane, external ear and ear canal normal.   Nose: Mucosal edema, rhinorrhea and congestion present. No nasal deformity. No epistaxis. Right sinus exhibits no maxillary sinus tenderness and no frontal sinus tenderness. Left sinus exhibits no maxillary sinus tenderness and no frontal sinus tenderness.   Mouth/Throat: Uvula is midline, oropharynx is clear and moist and mucous membranes are normal. No trismus in the jaw. Normal dentition. No uvula swelling. No oropharyngeal exudate, posterior oropharyngeal edema or posterior oropharyngeal erythema.   Eyes: Conjunctivae and lids are normal. No scleral icterus.   Neck: Trachea normal, full passive range of motion without pain and phonation normal. Neck supple. No neck rigidity. No edema and no erythema present.   Cardiovascular: Normal rate, regular rhythm, normal heart sounds and normal pulses.   Pulmonary/Chest: Effort normal and breath sounds normal. No respiratory distress. She has no decreased breath sounds. She has no rhonchi.   Musculoskeletal: Normal range of motion.         General: No deformity.   Lymphadenopathy:     She has no cervical adenopathy.        Right cervical: No superficial cervical, no deep cervical and no posterior cervical adenopathy present.       Left  "cervical: No superficial cervical, no deep cervical and no posterior cervical adenopathy present.   Neurological: She is alert and oriented to person, place, and time. She exhibits normal muscle tone. Coordination normal.   Skin: Skin is warm, dry, intact, not diaphoretic and not pale. Psychiatric: Her speech is normal and behavior is normal. Judgment and thought content normal.   Nursing note and vitals reviewed.        Assessment:       1. Viral syndrome        Plan:         Viral syndrome      Patient Instructions   Please follow up with your Primary care provider within 2-5 days if your signs and symptoms have not resolved or worsen.  The usual course of cold symptoms are 10-14 days.     If your condition worsens or fails to improve we recommend that you receive another evaluation at the emergency room immediately or contact your primary medical clinic to discuss your concerns.     You must understand that you have received an Urgent Care treatment only and that you may be released before all of your medical problems are known or treated.   You, the patient, will arrange for follow up care as instructed.     Tylenol or Ibuprofen can also be used as directed for pain/fever unless you have an allergy to them or medical condition such as stomach ulcers, kidney or liver disease or blood thinners etc for which you should not be taking these type of medications.     Take over the counter cough medication as directed as needed for cough.  You should avoid medications with pseudoephedrine or phenylephrine (any medication with "D") if you have high blood pressure as this can cause an elevation in your blood pressure. Instead consider Corcidin HBP as needed to prevent an elevated blood pressure.     Natural remedies of symptoms (as needed) include humidification, saline nasal sprays, and/or steamy showers.  Increase fluids, warm tea with honey, cough drops as needed.  You may also use salt water gargles for sore " "throat.    IF you received a oral steroid today - As discussed, this can elevate your blood pressure, elevate your blood sugar, water weight gain, nervous energy, redness to the face and dimpling of the skin at the injection site.         Viral Syndrome (Adult)  A viral illness may cause a number of symptoms. The symptoms depend on the part of the body that the virus affects. If it settles in your nose, throat, and lungs, it may cause cough, sore throat, congestion, and sometimes headache. If it settles in your stomach and intestinal tract, it may cause vomiting and diarrhea. Sometimes it causes vague symptoms like "aching all over," feeling tired, loss of appetite, or fever.  A viral illness usually lasts 1 to 2 weeks, but sometimes it lasts longer. In some cases, a more serious infection can look like a viral syndrome in the first few days of the illness. You may need another exam and additional tests to know the difference. Watch for the warning signs listed below.  Home care  Follow these guidelines for taking care of yourself at home:  · If symptoms are severe, rest at home for the first 2 to 3 days.  · Stay away from cigarette smoke - both your smoke and the smoke from others.  · You may use over-the-counter acetaminophen or ibuprofen for fever, muscle aching, and headache, unless another medicine was prescribed for this. If you have chronic liver or kidney disease or ever had a stomach ulcer or GI bleeding, talk with your doctor before using these medicines. No one who is younger than 18 and ill with a fever should take aspirin. It may cause severe disease or death.  · Your appetite may be poor, so a light diet is fine. Avoid dehydration by drinking 8 to 12 8-ounce glasses of fluids each day. This may include water; orange juice; lemonade; apple, grape, and cranberry juice; clear fruit drinks; electrolyte replacement and sports drinks; and decaffeinated teas and coffee. If you have been diagnosed with a " kidney disease, ask your doctor how much and what types of fluids you should drink to prevent dehydration. If you have kidney disease, drinking too much fluid can cause it build up in the your body and be dangerous to your health.  · Over-the-counter remedies won't shorten the length of the illness but may be helpful for cough, sore throat; and nasal and sinus congestion. Don't use decongestants if you have high blood pressure.  Follow-up care  Follow up with your healthcare provider if you do not improve over the next week.  Call 911  Get emergency medical care if any of the following occur:  · Convulsion  · Feeling weak, dizzy, or like you are going to faint  · Chest pain, shortness of breath, wheezing, or difficulty breathing  When to seek medical advice  Call your healthcare provider right away if any of these occur:  · Cough with lots of colored sputum (mucus) or blood in your sputum  · Chest pain, shortness of breath, wheezing, or difficulty breathing  · Severe headache; face, neck, or ear pain  · Severe, constant pain in the lower right side of your belly (abdominal)  · Continued vomiting (cant keep liquids down)  · Frequent diarrhea (more than 5 times a day); blood (red or black color) or mucus in diarrhea  · Feeling weak, dizzy, or like you are going to faint  · Extreme thirst  · Fever of 100.4°F (38°C) or higher, or as directed by your healthcare provider  Date Last Reviewed: 9/25/2015  © 5426-0408 [employer]. [address] [place] [address], [place] [address]. All rights reserved. This information is not intended as a substitute for professional medical care. Always follow your healthcare professional's instructions.

## 2020-10-05 ENCOUNTER — TELEPHONE (OUTPATIENT)
Dept: PRIMARY CARE CLINIC | Facility: OTHER | Age: 30
End: 2020-10-05

## 2020-10-06 ENCOUNTER — TELEPHONE (OUTPATIENT)
Dept: URGENT CARE | Facility: CLINIC | Age: 30
End: 2020-10-06

## 2020-10-06 NOTE — TELEPHONE ENCOUNTER
Call Back DOS  10/03/2020 I attempted to call and check on the patient but no answer and the voicemail box is full.

## 2020-10-20 ENCOUNTER — TELEPHONE (OUTPATIENT)
Dept: OBSTETRICS AND GYNECOLOGY | Facility: CLINIC | Age: 30
End: 2020-10-20

## 2020-10-20 ENCOUNTER — PATIENT MESSAGE (OUTPATIENT)
Dept: OBSTETRICS AND GYNECOLOGY | Facility: CLINIC | Age: 30
End: 2020-10-20

## 2020-10-26 ENCOUNTER — OFFICE VISIT (OUTPATIENT)
Dept: FAMILY MEDICINE | Facility: CLINIC | Age: 30
End: 2020-10-26
Payer: COMMERCIAL

## 2020-10-26 VITALS
SYSTOLIC BLOOD PRESSURE: 132 MMHG | OXYGEN SATURATION: 97 % | TEMPERATURE: 98 F | WEIGHT: 235.44 LBS | DIASTOLIC BLOOD PRESSURE: 82 MMHG | HEART RATE: 102 BPM | BODY MASS INDEX: 41.05 KG/M2

## 2020-10-26 DIAGNOSIS — E66.01 CLASS 3 SEVERE OBESITY DUE TO EXCESS CALORIES WITHOUT SERIOUS COMORBIDITY WITH BODY MASS INDEX (BMI) OF 40.0 TO 44.9 IN ADULT: ICD-10-CM

## 2020-10-26 DIAGNOSIS — Q62.5 DUPLICATED LEFT RENAL COLLECTING SYSTEM: ICD-10-CM

## 2020-10-26 DIAGNOSIS — G43.009 MIGRAINE WITHOUT AURA AND WITHOUT STATUS MIGRAINOSUS, NOT INTRACTABLE: Primary | ICD-10-CM

## 2020-10-26 DIAGNOSIS — M26.629 TMJ SYNDROME: ICD-10-CM

## 2020-10-26 DIAGNOSIS — Z00.00 ANNUAL PHYSICAL EXAM: ICD-10-CM

## 2020-10-26 DIAGNOSIS — F41.9 ANXIETY: ICD-10-CM

## 2020-10-26 DIAGNOSIS — E21.3 HYPERPARATHYROIDISM: ICD-10-CM

## 2020-10-26 DIAGNOSIS — N20.0 NEPHROLITHIASIS: ICD-10-CM

## 2020-10-26 PROBLEM — E66.813 CLASS 3 SEVERE OBESITY DUE TO EXCESS CALORIES WITHOUT SERIOUS COMORBIDITY WITH BODY MASS INDEX (BMI) OF 40.0 TO 44.9 IN ADULT: Status: ACTIVE | Noted: 2020-07-02

## 2020-10-26 PROCEDURE — 99999 PR PBB SHADOW E&M-EST. PATIENT-LVL III: CPT | Mod: PBBFAC,,, | Performed by: INTERNAL MEDICINE

## 2020-10-26 PROCEDURE — 99214 OFFICE O/P EST MOD 30 MIN: CPT | Mod: S$GLB,,, | Performed by: INTERNAL MEDICINE

## 2020-10-26 PROCEDURE — 99214 PR OFFICE/OUTPT VISIT, EST, LEVL IV, 30-39 MIN: ICD-10-PCS | Mod: S$GLB,,, | Performed by: INTERNAL MEDICINE

## 2020-10-26 PROCEDURE — 3079F DIAST BP 80-89 MM HG: CPT | Mod: CPTII,S$GLB,, | Performed by: INTERNAL MEDICINE

## 2020-10-26 PROCEDURE — 3075F SYST BP GE 130 - 139MM HG: CPT | Mod: CPTII,S$GLB,, | Performed by: INTERNAL MEDICINE

## 2020-10-26 PROCEDURE — 99999 PR PBB SHADOW E&M-EST. PATIENT-LVL III: ICD-10-PCS | Mod: PBBFAC,,, | Performed by: INTERNAL MEDICINE

## 2020-10-26 PROCEDURE — 3075F PR MOST RECENT SYSTOLIC BLOOD PRESS GE 130-139MM HG: ICD-10-PCS | Mod: CPTII,S$GLB,, | Performed by: INTERNAL MEDICINE

## 2020-10-26 PROCEDURE — 3008F PR BODY MASS INDEX (BMI) DOCUMENTED: ICD-10-PCS | Mod: CPTII,S$GLB,, | Performed by: INTERNAL MEDICINE

## 2020-10-26 PROCEDURE — 3008F BODY MASS INDEX DOCD: CPT | Mod: CPTII,S$GLB,, | Performed by: INTERNAL MEDICINE

## 2020-10-26 PROCEDURE — 3079F PR MOST RECENT DIASTOLIC BLOOD PRESSURE 80-89 MM HG: ICD-10-PCS | Mod: CPTII,S$GLB,, | Performed by: INTERNAL MEDICINE

## 2020-10-26 RX ORDER — MELATONIN 5 MG
5 CAPSULE ORAL NIGHTLY PRN
COMMUNITY
End: 2021-10-26

## 2020-10-26 RX ORDER — RIZATRIPTAN BENZOATE 10 MG/1
TABLET ORAL
Qty: 10 TABLET | Refills: 5 | Status: SHIPPED | OUTPATIENT
Start: 2020-10-26 | End: 2021-09-27 | Stop reason: SDUPTHER

## 2020-10-26 NOTE — ASSESSMENT & PLAN NOTE
Has not been exercising, has 6 year old and , works at ICU nurse at [employer].   is in nursing school.  Mother in law cooks most meals, not always healthy.  Got bike.  Still breastfeeding.

## 2020-10-26 NOTE — ASSESSMENT & PLAN NOTE
Stable on Maxalt PRN, works well for her, no issues at present.  Usually period related, week before or last day or 2 of period.  Was on Topamax years ago.

## 2020-10-26 NOTE — ASSESSMENT & PLAN NOTE
Has side effects with most medications tried in past, or no relief.  Prozac worked but had decreased sex drive. Tried Zoloft, Lexapro, Wellbutrin (was in nursing school) with no relief in past. No panic attacks at present.

## 2020-10-26 NOTE — PROGRESS NOTES
[facility]    Chief Complaint      Chief Complaint   Patient presents with    Establish Care     est from Dr Frazier.      History of Present Illness      Krupa Geiger is a 30 y.o. female who presents today to establish care for migraines.  Patient comes to appointment alone.   Uro: Dr. Guevara, Neuro: Dr. Turner, GYN: Dr. Crocker, Endo: Dr. Sharp    Problem List Items Addressed This Visit     TMJ syndrome    Current Assessment & Plan     Takes Naproxen PRN, hurts every day, worst in AM.         Nephrolithiasis    Current Assessment & Plan     Last stone was in 10/2019.  Occasionally gets renal colic pain.         Migraine without aura and without status migrainosus, not intractable - Primary    Current Assessment & Plan     Stable on Maxalt PRN, works well for her, no issues at present.  Usually period related, week before or last day or 2 of period.  Was on Topamax years ago.         Relevant Medications    rizatriptan (MAXALT) 10 MG tablet    Anxiety    Current Assessment & Plan     Has side effects with most medications tried in past, or no relief.  Prozac worked but had decreased sex drive. Tried Zoloft, Lexapro, Wellbutrin (was in nursing school) with no relief in past. No panic attacks at present.         Duplicated left renal collecting system    Hyperparathyroidism    Current Assessment & Plan     Seeing Dr. Sharp, found during stone workup. Follow up pending 24 hour workup.         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  Mother in law cooks most meals, not always healthy.  Got bike.  Still breastfeeding.           Other Visit Diagnoses     Annual physical exam        Relevant Orders    CBC auto differential    Lipid Panel     Comprehensive Metabolic Panel    TSH          Health Maintenance   Topic Date Due    Pap Smear  2015    TETANUS VACCINE  10/30/2029    Hepatitis C Screening  Completed    Lipid Panel  Completed       Past Medical History:   Diagnosis Date    Anxiety     Depression     GERD (gastroesophageal reflux disease)     Gestational hypertension, third trimester 2019    Hypertension     gestational     Hypoglycemia     Kidney calculi     2016    Migraines     PONV (postoperative nausea and vomiting)     STD (sexually transmitted disease)     Urinary tract infection        Past Surgical History:   Procedure Laterality Date    ANKLE SURGERY Right 2008     SECTION  10/2014    CYSTOSCOPY W/ URETERAL STENT PLACEMENT Left 10/2/2019    Procedure: CYSTOSCOPY, WITH URETERAL STENT INSERTION - Under ultrasound guidance;  Surgeon: Marsha Guevara MD;  Location: [facility];  Service: Urology;  Laterality: Left;    KIDNEY STONE SURGERY      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)    LASER LITHOTRIPSY Left 10/16/2019    Procedure: LITHOTRIPSY, USING LASER;  Surgeon: Marsha Guevara MD;  Location: [facility];  Service: Urology;  Laterality: Left;    TONSILLECTOMY      ULTRASOUND GUIDANCE Left 10/16/2019    Procedure: ULTRASOUND GUIDANCE, for laser litho;  Surgeon: Mrasha Guevara MD;  Location: [facility];  Service: Urology;  Laterality: Left;    URETEROSCOPIC REMOVAL OF URETERIC CALCULUS Left 10/16/2019    Procedure: REMOVAL, CALCULUS, URETER, URETEROSCOPIC possible retro pyelo, possible lasere litho, possible stent;  Surgeon: Marsha Guevara MD;  Location: [facility];  Service: Urology;  Laterality: Left;  25 WEEKS PREGNANT / FETAL MONITORING BEFORE AND AFTER PROCEDURE/   CALL PELON ELDER [phone]      WISDOM TOOTH EXTRACTION         family history includes Breast cancer in her paternal grandmother; Cancer in her paternal grandmother; Depression in  her father; Heart disease in her father and paternal grandfather; Hyperlipidemia in her father, maternal grandfather, and paternal grandfather; Hypertension in her father, maternal grandmother, mother, paternal grandfather, and paternal grandmother; Nephrolithiasis in her daughter; No Known Problems in her sister, sister, and sister.    Social History     Tobacco Use    Smoking status: Never Smoker    Smokeless tobacco: Never Used   Substance Use Topics    Alcohol use: Yes     Alcohol/week: 0.0 standard drinks     Frequency: Never     Comment: 1 drink per month    Drug use: Never       Review of Systems   Constitutional: Negative for chills and fever.   HENT: Negative for congestion and sore throat.    Eyes: Negative for blurred vision and discharge.   Respiratory: Negative for cough and shortness of breath.    Cardiovascular: Negative for chest pain and palpitations.   Gastrointestinal: Negative for constipation, diarrhea, nausea and vomiting.   Genitourinary: Negative for dysuria and hematuria.   Musculoskeletal: Negative for falls and myalgias.   Skin: Negative for itching and rash.   Neurological: Negative for dizziness and headaches.        Outpatient Encounter Medications as of 10/26/2020   Medication Sig Dispense Refill    ibuprofen (ADVIL,MOTRIN) 600 MG tablet Take 1 tablet (600 mg total) by mouth every 6 (six) hours. 30 tablet 1    melatonin 5 mg Cap Take 5 mg by mouth nightly as needed.      multivit with calcium,iron,min (WOMEN'S DAILY MULTIVITAMIN ORAL) Women's Daily Multivitamin      naproxen sodium (ANAPROX) 220 MG tablet Take 220 mg by mouth every 12 (twelve) hours.      ondansetron (ZOFRAN-ODT) 8 MG TbDL Take 1 tablet (8 mg total) by mouth every 6 (six) hours as needed. 30 tablet 3    rizatriptan (MAXALT) 10 MG tablet Take one tablet as need for migraine, can take second tablet 2 hours later if needed. 10 tablet 5    [DISCONTINUED] rizatriptan (MAXALT) 10 MG tablet TAKE ONE TABLET BY MOUTH  AT ONSET OF SEVERE MIGRAINE. MAY REPEAT IN 2 HOURS BUT DO NOT  EXCEED 2 TABLETS IN 24 HOURS 10 tablet 0    [DISCONTINUED] teri navas ointment Apply topically. Apply after feeding. Do not wash off. This compounded medication expires in 30 days. (Patient not taking: Reported on 10/26/2020) 30 g 0    [DISCONTINUED] rizatriptan (MAXALT) 10 MG tablet TAKE ONE TABLET BY MOUTH AT ONSET OF SEVERE MIGRAINE. MAY REPEAT IN 2 HOURS BUT DO NOT  EXCEED 2 TABLETS IN 24 HOURS 10 tablet 0     No facility-administered encounter medications on file as of 10/26/2020.         Review of patient's allergies indicates:  No Known Allergies    Physical Exam      Vital Signs  Temp: 97.7 °F (36.5 °C)  Temp src: Temporal  Pulse: 102  SpO2: 97 %  BP: 132/82  BP Location: Left arm  Patient Position: Sitting  Pain Score: 0-No pain  Height and Weight  Weight: 106.8 kg (235 lb 7.2 oz)]  Body mass index is 41.05 kg/m².    Physical Exam  Constitutional:       Appearance: She is well-developed.   HENT:      Head: Normocephalic and atraumatic.      Right Ear: External ear normal.      Left Ear: External ear normal.   Eyes:      General:         Right eye: No discharge.         Left eye: No discharge.   Neck:      Musculoskeletal: Normal range of motion.      Thyroid: No thyromegaly.   Cardiovascular:      Rate and Rhythm: Normal rate and regular rhythm.      Heart sounds: Normal heart sounds. No murmur.   Pulmonary:      Effort: Pulmonary effort is normal. No respiratory distress.      Breath sounds: Normal breath sounds.   Abdominal:      General: Bowel sounds are normal. There is no distension.      Palpations: Abdomen is soft.      Tenderness: There is no abdominal tenderness.   Musculoskeletal: Normal range of motion.         General: No deformity.   Skin:     General: Skin is warm and dry.      Findings: No rash.   Neurological:      Mental Status: She is alert and oriented to person, place, and time.   Psychiatric:         Behavior: Behavior  normal.          Laboratory:  CBC:  No results for input(s): WBC, RBC, HGB, HCT, PLT, MCV, MCH, MCHC in the last 2160 hours.  CMP:  No results for input(s): GLU, CALCIUM, ALBUMIN, PROT, NA, K, CO2, CL, BUN, ALKPHOS, ALT, AST, BILITOT in the last 2160 hours.    Invalid input(s): CREATININ  URINALYSIS:  No results for input(s): COLORU, CLARITYU, SPECGRAV, PHUR, PROTEINUA, GLUCOSEU, BILIRUBINCON, BLOODU, WBCU, RBCU, BACTERIA, MUCUS, NITRITE, LEUKOCYTESUR, UROBILINOGEN, HYALINECASTS in the last 2160 hours.   LIPIDS:  No results for input(s): TSH, HDL, CHOL, TRIG, LDLCALC, CHOLHDL, NONHDLCHOL, TOTALCHOLEST in the last 2160 hours.  TSH:  No results for input(s): TSH in the last 2160 hours.  A1C:  No results for input(s): HGBA1C in the last 2160 hours.    Radiology:  No imaging on file    Assessment/Plan     Krupa Geiger is a 30 y.o.female with:    1. Migraine without aura and without status migrainosus, not intractable  - rizatriptan (MAXALT) 10 MG tablet; Take one tablet as need for migraine, can take second tablet 2 hours later if needed.  Dispense: 10 tablet; Refill: 5    2. Hyperparathyroidism    3. Nephrolithiasis    4. Duplicated left renal collecting system    5. TMJ syndrome    6. Class 3 severe obesity due to excess calories without serious comorbidity with body mass index (BMI) of 40.0 to 44.9 in adult    7. Annual physical exam  - CBC auto differential; Future  - Lipid Panel; Future  - Comprehensive Metabolic Panel; Future  - TSH; Future    8. Anxiety    -labs ordered  -consider Viibryd once no longer breastfeeding  -Continue current medications and maintain follow up with specialists.  Return to clinic in 6 months.      Marian Julio MD  [facility] [place]

## 2020-11-05 ENCOUNTER — LAB VISIT (OUTPATIENT)
Dept: LAB | Facility: HOSPITAL | Age: 30
End: 2020-11-05
Attending: INTERNAL MEDICINE
Payer: COMMERCIAL

## 2020-11-05 DIAGNOSIS — Z00.00 ANNUAL PHYSICAL EXAM: ICD-10-CM

## 2020-11-05 LAB
ALBUMIN SERPL BCP-MCNC: 3.9 G/DL (ref 3.5–5.2)
ALP SERPL-CCNC: 109 U/L (ref 55–135)
ALT SERPL W/O P-5'-P-CCNC: 7 U/L (ref 10–44)
ANION GAP SERPL CALC-SCNC: 8 MMOL/L (ref 8–16)
AST SERPL-CCNC: 15 U/L (ref 10–40)
BASOPHILS # BLD AUTO: 0.04 K/UL (ref 0–0.2)
BASOPHILS NFR BLD: 0.5 % (ref 0–1.9)
BILIRUB SERPL-MCNC: 0.4 MG/DL (ref 0.1–1)
BUN SERPL-MCNC: 14 MG/DL (ref 6–20)
CALCIUM SERPL-MCNC: 8.6 MG/DL (ref 8.7–10.5)
CHLORIDE SERPL-SCNC: 107 MMOL/L (ref 95–110)
CHOLEST SERPL-MCNC: 204 MG/DL (ref 120–199)
CHOLEST/HDLC SERPL: 3 {RATIO} (ref 2–5)
CO2 SERPL-SCNC: 27 MMOL/L (ref 23–29)
CREAT SERPL-MCNC: 0.7 MG/DL (ref 0.5–1.4)
DIFFERENTIAL METHOD: ABNORMAL
EOSINOPHIL # BLD AUTO: 0.1 K/UL (ref 0–0.5)
EOSINOPHIL NFR BLD: 1.3 % (ref 0–8)
ERYTHROCYTE [DISTWIDTH] IN BLOOD BY AUTOMATED COUNT: 12.4 % (ref 11.5–14.5)
EST. GFR  (AFRICAN AMERICAN): >60 ML/MIN/1.73 M^2
EST. GFR  (NON AFRICAN AMERICAN): >60 ML/MIN/1.73 M^2
GLUCOSE SERPL-MCNC: 87 MG/DL (ref 70–110)
HCT VFR BLD AUTO: 39.5 % (ref 37–48.5)
HDLC SERPL-MCNC: 68 MG/DL (ref 40–75)
HDLC SERPL: 33.3 % (ref 20–50)
HGB BLD-MCNC: 13.6 G/DL (ref 12–16)
IMM GRANULOCYTES # BLD AUTO: 0.02 K/UL (ref 0–0.04)
IMM GRANULOCYTES NFR BLD AUTO: 0.3 % (ref 0–0.5)
LDLC SERPL CALC-MCNC: 125.2 MG/DL (ref 63–159)
LYMPHOCYTES # BLD AUTO: 2.7 K/UL (ref 1–4.8)
LYMPHOCYTES NFR BLD: 36.7 % (ref 18–48)
MCH RBC QN AUTO: 31.7 PG (ref 27–31)
MCHC RBC AUTO-ENTMCNC: 34.4 G/DL (ref 32–36)
MCV RBC AUTO: 92 FL (ref 82–98)
MONOCYTES # BLD AUTO: 0.6 K/UL (ref 0.3–1)
MONOCYTES NFR BLD: 7.8 % (ref 4–15)
NEUTROPHILS # BLD AUTO: 4 K/UL (ref 1.8–7.7)
NEUTROPHILS NFR BLD: 53.4 % (ref 38–73)
NONHDLC SERPL-MCNC: 136 MG/DL
NRBC BLD-RTO: 0 /100 WBC
PLATELET # BLD AUTO: 274 K/UL (ref 150–350)
PMV BLD AUTO: 9.7 FL (ref 9.2–12.9)
POTASSIUM SERPL-SCNC: 4 MMOL/L (ref 3.5–5.1)
PROT SERPL-MCNC: 7.1 G/DL (ref 6–8.4)
RBC # BLD AUTO: 4.29 M/UL (ref 4–5.4)
SODIUM SERPL-SCNC: 142 MMOL/L (ref 136–145)
TRIGL SERPL-MCNC: 54 MG/DL (ref 30–150)
TSH SERPL DL<=0.005 MIU/L-ACNC: 1.78 UIU/ML (ref 0.4–4)
WBC # BLD AUTO: 7.43 K/UL (ref 3.9–12.7)

## 2020-11-05 PROCEDURE — 85025 COMPLETE CBC W/AUTO DIFF WBC: CPT

## 2020-11-05 PROCEDURE — 80061 LIPID PANEL: CPT

## 2020-11-05 PROCEDURE — 84443 ASSAY THYROID STIM HORMONE: CPT

## 2020-11-05 PROCEDURE — 80053 COMPREHEN METABOLIC PANEL: CPT

## 2020-11-05 PROCEDURE — 36415 COLL VENOUS BLD VENIPUNCTURE: CPT

## 2020-12-01 ENCOUNTER — OFFICE VISIT (OUTPATIENT)
Dept: OBSTETRICS AND GYNECOLOGY | Facility: CLINIC | Age: 30
End: 2020-12-01
Attending: OBSTETRICS & GYNECOLOGY
Payer: COMMERCIAL

## 2020-12-01 VITALS
BODY MASS INDEX: 39.97 KG/M2 | DIASTOLIC BLOOD PRESSURE: 84 MMHG | HEIGHT: 64 IN | WEIGHT: 234.13 LBS | SYSTOLIC BLOOD PRESSURE: 122 MMHG

## 2020-12-01 DIAGNOSIS — Z01.419 WELL WOMAN EXAM: Primary | ICD-10-CM

## 2020-12-01 PROCEDURE — 88175 CYTOPATH C/V AUTO FLUID REDO: CPT

## 2020-12-01 PROCEDURE — 99395 PREV VISIT EST AGE 18-39: CPT | Mod: S$GLB,,, | Performed by: OBSTETRICS & GYNECOLOGY

## 2020-12-01 PROCEDURE — 99999 PR PBB SHADOW E&M-EST. PATIENT-LVL III: ICD-10-PCS | Mod: PBBFAC,,, | Performed by: OBSTETRICS & GYNECOLOGY

## 2020-12-01 PROCEDURE — 3079F DIAST BP 80-89 MM HG: CPT | Mod: CPTII,S$GLB,, | Performed by: OBSTETRICS & GYNECOLOGY

## 2020-12-01 PROCEDURE — 99395 PR PREVENTIVE VISIT,EST,18-39: ICD-10-PCS | Mod: S$GLB,,, | Performed by: OBSTETRICS & GYNECOLOGY

## 2020-12-01 PROCEDURE — 1126F PR PAIN SEVERITY QUANTIFIED, NO PAIN PRESENT: ICD-10-PCS | Mod: S$GLB,,, | Performed by: OBSTETRICS & GYNECOLOGY

## 2020-12-01 PROCEDURE — 99999 PR PBB SHADOW E&M-EST. PATIENT-LVL III: CPT | Mod: PBBFAC,,, | Performed by: OBSTETRICS & GYNECOLOGY

## 2020-12-01 PROCEDURE — 3008F BODY MASS INDEX DOCD: CPT | Mod: CPTII,S$GLB,, | Performed by: OBSTETRICS & GYNECOLOGY

## 2020-12-01 PROCEDURE — 3008F PR BODY MASS INDEX (BMI) DOCUMENTED: ICD-10-PCS | Mod: CPTII,S$GLB,, | Performed by: OBSTETRICS & GYNECOLOGY

## 2020-12-01 PROCEDURE — 3074F SYST BP LT 130 MM HG: CPT | Mod: CPTII,S$GLB,, | Performed by: OBSTETRICS & GYNECOLOGY

## 2020-12-01 PROCEDURE — 3074F PR MOST RECENT SYSTOLIC BLOOD PRESSURE < 130 MM HG: ICD-10-PCS | Mod: CPTII,S$GLB,, | Performed by: OBSTETRICS & GYNECOLOGY

## 2020-12-01 PROCEDURE — 3079F PR MOST RECENT DIASTOLIC BLOOD PRESSURE 80-89 MM HG: ICD-10-PCS | Mod: CPTII,S$GLB,, | Performed by: OBSTETRICS & GYNECOLOGY

## 2020-12-01 PROCEDURE — 1126F AMNT PAIN NOTED NONE PRSNT: CPT | Mod: S$GLB,,, | Performed by: OBSTETRICS & GYNECOLOGY

## 2020-12-01 RX ORDER — SERTRALINE HYDROCHLORIDE 25 MG/1
25 TABLET, FILM COATED ORAL DAILY
Qty: 30 TABLET | Refills: 2 | Status: SHIPPED | OUTPATIENT
Start: 2020-12-01 | End: 2021-02-12

## 2020-12-01 NOTE — PROGRESS NOTES
CC: Well woman exam    Krupa Geiger is a 30 y.o. female  presents for well woman exam.  LMP: Patient's last menstrual period was 10/04/2020.. Increased anxiety and pre-occupation with bad things happening to her family.  Would like to start a medication for depression/ anxiety.    Past Medical History:   Diagnosis Date    Anxiety     Depression     GERD (gastroesophageal reflux disease)     Gestational hypertension, third trimester 2019    Hypertension     gestational     Hypoglycemia     Kidney calculi     2016    Migraines     PONV (postoperative nausea and vomiting)     STD (sexually transmitted disease)     Urinary tract infection      Past Surgical History:   Procedure Laterality Date    ANKLE SURGERY Right 2008     SECTION  10/2014    CYSTOSCOPY W/ URETERAL STENT PLACEMENT Left 10/2/2019    Procedure: CYSTOSCOPY, WITH URETERAL STENT INSERTION - Under ultrasound guidance;  Surgeon: Marsha Guevara MD;  Location: [facility];  Service: Urology;  Laterality: Left;    KIDNEY STONE SURGERY      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)    LASER LITHOTRIPSY Left 10/16/2019    Procedure: LITHOTRIPSY, USING LASER;  Surgeon: Marsha Guevara MD;  Location: [facility];  Service: Urology;  Laterality: Left;    TONSILLECTOMY      ULTRASOUND GUIDANCE Left 10/16/2019    Procedure: ULTRASOUND GUIDANCE, for laser litho;  Surgeon: Marsha Guevara MD;  Location: [facility];  Service: Urology;  Laterality: Left;    URETEROSCOPIC REMOVAL OF URETERIC CALCULUS Left 10/16/2019    Procedure: REMOVAL, CALCULUS, URETER, URETEROSCOPIC possible retro pyelo, possible lasere litho, possible stent;  Surgeon: Marsha Guevara MD;  Location: [facility];  Service: Urology;  Laterality: Left;  25 WEEKS PREGNANT / FETAL MONITORING BEFORE AND AFTER PROCEDURE/   CALL PELON ELDER [phone]      WISDOM TOOTH EXTRACTION       Social History      Socioeconomic History    Marital status:      Spouse name: Not on file    Number of children: Not on file    Years of education: Not on file    Highest education level: Not on file   Occupational History    Not on file   Social Needs    Financial resource strain: Not on file    Food insecurity     Worry: Not on file     Inability: Not on file    Transportation needs     Medical: Not on file     Non-medical: Not on file   Tobacco Use    Smoking status: Never Smoker    Smokeless tobacco: Never Used   Substance and Sexual Activity    Alcohol use: Yes     Alcohol/week: 0.0 standard drinks     Frequency: Never     Comment: 1 drink per month    Drug use: Never    Sexual activity: Yes     Partners: Male     Birth control/protection: Condom     Comment: IUD removed due to complications in July 2018   Lifestyle    Physical activity     Days per week: Not on file     Minutes per session: Not on file    Stress: Not on file   Relationships    Social connections     Talks on phone: Not on file     Gets together: Not on file     Attends Jew service: Not on file     Active member of club or organization: Not on file     Attends meetings of clubs or organizations: Not on file     Relationship status: Not on file   Other Topics Concern    Not on file   Social History Narrative    Not on file     Family History   Problem Relation Age of Onset    Hypertension Mother     Hypertension Father     Heart disease Father     Depression Father     Hyperlipidemia Father     No Known Problems Sister     Nephrolithiasis Daughter     No Known Problems Sister     No Known Problems Sister     Breast cancer Paternal Grandmother         50s at diagnosis    Cancer Paternal Grandmother         breast    Hypertension Paternal Grandmother     Heart disease Paternal Grandfather     Hyperlipidemia Paternal Grandfather     Hypertension Paternal Grandfather     Hyperlipidemia Maternal Grandfather      "Hypertension Maternal Grandmother     Colon cancer Neg Hx     Ovarian cancer Neg Hx      OB History        2    Para   2    Term   2            AB        Living   2       SAB        TAB        Ectopic        Multiple   0    Live Births   2                 /84   Ht 5' 4" (1.626 m)   Wt 106.2 kg (234 lb 2.1 oz)   LMP 10/04/2020   BMI 40.19 kg/m²       ROS:  GENERAL: Denies weight gain or weight loss. Feeling well overall.   SKIN: Denies rash or lesions.   HEAD: Denies head injury or headache.   NODES: Denies enlarged lymph nodes.   CHEST: Denies chest pain or shortness of breath.   CARDIOVASCULAR: Denies palpitations or left sided chest pain.   ABDOMEN: No abdominal pain, constipation, diarrhea, nausea, vomiting or rectal bleeding.   URINARY: No frequency, dysuria, hematuria, or burning on urination.  REPRODUCTIVE: See HPI.   BREASTS: The patient performs breast self-examination and denies pain, lumps, or nipple discharge.   HEMATOLOGIC: No easy bruisability or excessive bleeding.   MUSCULOSKELETAL: Denies joint pain or swelling.   NEUROLOGIC: Denies syncope or weakness.   PSYCHIATRIC: Denies depression, anxiety or mood swings.    PHYSICAL EXAM:  APPEARANCE: Well nourished, well developed, in no acute distress.  AFFECT: WNL, alert and oriented x 3  SKIN: No acne or hirsutism  NECK: Neck symmetric without masses or thyromegaly  NODES: No inguinal, cervical, axillary, or femoral lymph node enlargement  CHEST: Good respiratory effect  ABDOMEN: Soft.  No tenderness or masses.  No hepatosplenomegaly.  No hernias.  BREASTS: Symmetrical, no skin changes or visible lesions.  No palpable masses, nipple discharge bilaterally.  PELVIC: Normal external genitalia without lesions.  Normal hair distribution.  Adequate perineal body, normal urethral meatus.  Vagina moist and well rugated without lesions or discharge.  Cervix pink, without lesions, discharge or tenderness.  No significant cystocele or " rectocele.  Bimanual exam shows uterus to be normal size, regular, mobile and nontender.  Adnexa without masses or tenderness.    EXTREMITIES: No edema.    Well woman exam  -     Liquid-Based Pap Smear, Screening    Other orders  -     sertraline (ZOLOFT) 25 MG tablet; Take 1 tablet (25 mg total) by mouth once daily.  Dispense: 30 tablet; Refill: 2    if no improvement with zoloft or bad side effects will refer to Dr. Ohara, complex history with meds and has had gene testing for this, [employer] Foodspirscila appears to be one that works with her metabolism        Patient was counseled today on A.C.S. Pap guidelines and recommendations for yearly pelvic exams, mammograms and monthly self breast exams; to see her PCP for other health maintenance.     No follow-ups on file.

## 2020-12-09 LAB
FINAL PATHOLOGIC DIAGNOSIS: NORMAL
Lab: NORMAL

## 2020-12-10 ENCOUNTER — TELEPHONE (OUTPATIENT)
Dept: PRIMARY CARE CLINIC | Facility: CLINIC | Age: 30
End: 2020-12-10

## 2020-12-10 ENCOUNTER — CLINICAL SUPPORT (OUTPATIENT)
Dept: URGENT CARE | Facility: CLINIC | Age: 30
End: 2020-12-10
Payer: COMMERCIAL

## 2020-12-10 VITALS — TEMPERATURE: 98 F

## 2020-12-10 DIAGNOSIS — R53.83 FATIGUE, UNSPECIFIED TYPE: Primary | ICD-10-CM

## 2020-12-10 DIAGNOSIS — R05.9 COUGH: ICD-10-CM

## 2020-12-10 DIAGNOSIS — U07.1 COVID-19: Primary | ICD-10-CM

## 2020-12-10 DIAGNOSIS — U07.1 COVID-19 VIRUS DETECTED: ICD-10-CM

## 2020-12-10 DIAGNOSIS — R53.83 FATIGUE, UNSPECIFIED TYPE: ICD-10-CM

## 2020-12-10 DIAGNOSIS — R51.9 NONINTRACTABLE HEADACHE, UNSPECIFIED CHRONICITY PATTERN, UNSPECIFIED HEADACHE TYPE: ICD-10-CM

## 2020-12-10 LAB
CTP QC/QA: YES
SARS-COV-2 RDRP RESP QL NAA+PROBE: POSITIVE

## 2020-12-10 PROCEDURE — U0002 COVID-19 LAB TEST NON-CDC: HCPCS | Mod: QW,S$GLB,, | Performed by: INTERNAL MEDICINE

## 2020-12-10 PROCEDURE — U0002: ICD-10-PCS | Mod: QW,S$GLB,, | Performed by: INTERNAL MEDICINE

## 2021-01-06 ENCOUNTER — PATIENT MESSAGE (OUTPATIENT)
Dept: UROLOGY | Facility: CLINIC | Age: 31
End: 2021-01-06

## 2021-01-06 DIAGNOSIS — N20.0 NEPHROLITHIASIS: Primary | ICD-10-CM

## 2021-01-07 ENCOUNTER — HOSPITAL ENCOUNTER (OUTPATIENT)
Dept: RADIOLOGY | Facility: HOSPITAL | Age: 31
Discharge: HOME | End: 2021-01-07
Attending: UROLOGY
Payer: COMMERCIAL

## 2021-01-07 ENCOUNTER — PATIENT MESSAGE (OUTPATIENT)
Dept: UROLOGY | Facility: CLINIC | Age: 31
End: 2021-01-07

## 2021-01-07 DIAGNOSIS — N20.0 NEPHROLITHIASIS: ICD-10-CM

## 2021-01-07 DIAGNOSIS — N20.0 NEPHROLITHIASIS: Primary | ICD-10-CM

## 2021-01-07 PROCEDURE — 74176 CT ABD & PELVIS W/O CONTRAST: CPT | Mod: TC

## 2021-01-07 PROCEDURE — 74176 CT ABD & PELVIS W/O CONTRAST: CPT | Mod: 26,,, | Performed by: RADIOLOGY

## 2021-01-07 PROCEDURE — 74176 CT RENAL STONE STUDY ABD PELVIS WO: ICD-10-PCS | Mod: 26,,, | Performed by: RADIOLOGY

## 2021-01-20 ENCOUNTER — PATIENT MESSAGE (OUTPATIENT)
Dept: OBSTETRICS AND GYNECOLOGY | Facility: CLINIC | Age: 31
End: 2021-01-20

## 2021-01-20 DIAGNOSIS — F32.A DEPRESSION, UNSPECIFIED DEPRESSION TYPE: Primary | ICD-10-CM

## 2021-02-12 ENCOUNTER — OFFICE VISIT (OUTPATIENT)
Dept: FAMILY MEDICINE | Facility: CLINIC | Age: 31
End: 2021-02-12
Payer: COMMERCIAL

## 2021-02-12 VITALS
WEIGHT: 224.75 LBS | DIASTOLIC BLOOD PRESSURE: 96 MMHG | RESPIRATION RATE: 18 BRPM | BODY MASS INDEX: 38.37 KG/M2 | TEMPERATURE: 98 F | HEART RATE: 79 BPM | HEIGHT: 64 IN | SYSTOLIC BLOOD PRESSURE: 132 MMHG | OXYGEN SATURATION: 98 %

## 2021-02-12 DIAGNOSIS — F41.9 ANXIETY: ICD-10-CM

## 2021-02-12 PROCEDURE — 99214 OFFICE O/P EST MOD 30 MIN: CPT | Mod: S$GLB,,, | Performed by: INTERNAL MEDICINE

## 2021-02-12 PROCEDURE — 3080F PR MOST RECENT DIASTOLIC BLOOD PRESSURE >= 90 MM HG: ICD-10-PCS | Mod: CPTII,S$GLB,, | Performed by: INTERNAL MEDICINE

## 2021-02-12 PROCEDURE — 3075F SYST BP GE 130 - 139MM HG: CPT | Mod: CPTII,S$GLB,, | Performed by: INTERNAL MEDICINE

## 2021-02-12 PROCEDURE — 99999 PR PBB SHADOW E&M-EST. PATIENT-LVL IV: CPT | Mod: PBBFAC,,, | Performed by: INTERNAL MEDICINE

## 2021-02-12 PROCEDURE — 3080F DIAST BP >= 90 MM HG: CPT | Mod: CPTII,S$GLB,, | Performed by: INTERNAL MEDICINE

## 2021-02-12 PROCEDURE — 99999 PR PBB SHADOW E&M-EST. PATIENT-LVL IV: ICD-10-PCS | Mod: PBBFAC,,, | Performed by: INTERNAL MEDICINE

## 2021-02-12 PROCEDURE — 3008F BODY MASS INDEX DOCD: CPT | Mod: CPTII,S$GLB,, | Performed by: INTERNAL MEDICINE

## 2021-02-12 PROCEDURE — 99214 PR OFFICE/OUTPT VISIT, EST, LEVL IV, 30-39 MIN: ICD-10-PCS | Mod: S$GLB,,, | Performed by: INTERNAL MEDICINE

## 2021-02-12 PROCEDURE — 3008F PR BODY MASS INDEX (BMI) DOCUMENTED: ICD-10-PCS | Mod: CPTII,S$GLB,, | Performed by: INTERNAL MEDICINE

## 2021-02-12 PROCEDURE — 3075F PR MOST RECENT SYSTOLIC BLOOD PRESS GE 130-139MM HG: ICD-10-PCS | Mod: CPTII,S$GLB,, | Performed by: INTERNAL MEDICINE

## 2021-02-12 PROCEDURE — 1126F PR PAIN SEVERITY QUANTIFIED, NO PAIN PRESENT: ICD-10-PCS | Mod: S$GLB,,, | Performed by: INTERNAL MEDICINE

## 2021-02-12 PROCEDURE — 1126F AMNT PAIN NOTED NONE PRSNT: CPT | Mod: S$GLB,,, | Performed by: INTERNAL MEDICINE

## 2021-02-12 RX ORDER — MELOXICAM 15 MG/1
15 TABLET ORAL DAILY
Qty: 30 TABLET | Refills: 0 | Status: SHIPPED | OUTPATIENT
Start: 2021-02-12 | End: 2021-04-04

## 2021-02-12 RX ORDER — BUPROPION HYDROCHLORIDE 150 MG/1
150 TABLET ORAL DAILY
Qty: 30 TABLET | Refills: 11 | Status: SHIPPED | OUTPATIENT
Start: 2021-02-12 | End: 2021-03-12

## 2021-02-12 RX ORDER — ALPRAZOLAM 0.25 MG/1
0.25 TABLET ORAL DAILY PRN
Qty: 30 TABLET | Refills: 0 | Status: SHIPPED | OUTPATIENT
Start: 2021-02-12 | End: 2021-04-20

## 2021-02-12 RX ORDER — TIZANIDINE 4 MG/1
4 TABLET ORAL EVERY 6 HOURS PRN
Qty: 30 TABLET | Refills: 1 | Status: SHIPPED | OUTPATIENT
Start: 2021-02-12 | End: 2021-02-22

## 2021-02-13 ENCOUNTER — TELEPHONE (OUTPATIENT)
Dept: INTERNAL MEDICINE | Facility: CLINIC | Age: 31
End: 2021-02-13

## 2021-02-22 ENCOUNTER — OFFICE VISIT (OUTPATIENT)
Dept: PSYCHIATRY | Facility: CLINIC | Age: 31
End: 2021-02-22
Payer: COMMERCIAL

## 2021-02-22 ENCOUNTER — TELEPHONE (OUTPATIENT)
Dept: PSYCHIATRY | Facility: CLINIC | Age: 31
End: 2021-02-22

## 2021-02-22 DIAGNOSIS — F99 INSOMNIA DUE TO OTHER MENTAL DISORDER: ICD-10-CM

## 2021-02-22 DIAGNOSIS — F33.1 MODERATE EPISODE OF RECURRENT MAJOR DEPRESSIVE DISORDER: Primary | ICD-10-CM

## 2021-02-22 DIAGNOSIS — F51.05 INSOMNIA DUE TO OTHER MENTAL DISORDER: ICD-10-CM

## 2021-02-22 DIAGNOSIS — F41.1 GAD (GENERALIZED ANXIETY DISORDER): ICD-10-CM

## 2021-02-22 PROCEDURE — 90792 PR PSYCHIATRIC DIAGNOSTIC EVALUATION W/MEDICAL SERVICES: ICD-10-PCS | Mod: 95,,, | Performed by: INTERNAL MEDICINE

## 2021-02-22 PROCEDURE — 90792 PSYCH DIAG EVAL W/MED SRVCS: CPT | Mod: 95,,, | Performed by: INTERNAL MEDICINE

## 2021-02-22 RX ORDER — DESVENLAFAXINE SUCCINATE 25 MG/1
25 TABLET, EXTENDED RELEASE ORAL DAILY
Qty: 30 TABLET | Refills: 1 | Status: SHIPPED | OUTPATIENT
Start: 2021-02-22 | End: 2021-03-17

## 2021-02-26 ENCOUNTER — OFFICE VISIT (OUTPATIENT)
Dept: URGENT CARE | Facility: CLINIC | Age: 31
End: 2021-02-26
Payer: COMMERCIAL

## 2021-02-26 VITALS
WEIGHT: 224 LBS | TEMPERATURE: 98 F | DIASTOLIC BLOOD PRESSURE: 76 MMHG | OXYGEN SATURATION: 98 % | RESPIRATION RATE: 18 BRPM | HEIGHT: 64 IN | BODY MASS INDEX: 38.24 KG/M2 | HEART RATE: 70 BPM | SYSTOLIC BLOOD PRESSURE: 129 MMHG

## 2021-02-26 DIAGNOSIS — H92.01 RIGHT EAR PAIN: ICD-10-CM

## 2021-02-26 DIAGNOSIS — J06.9 UPPER RESPIRATORY TRACT INFECTION, UNSPECIFIED TYPE: Primary | ICD-10-CM

## 2021-02-26 LAB
CTP QC/QA: YES
POC MOLECULAR INFLUENZA A AGN: NEGATIVE
POC MOLECULAR INFLUENZA B AGN: NEGATIVE

## 2021-02-26 PROCEDURE — 3008F BODY MASS INDEX DOCD: CPT | Mod: CPTII,S$GLB,, | Performed by: NURSE PRACTITIONER

## 2021-02-26 PROCEDURE — 99214 PR OFFICE/OUTPT VISIT, EST, LEVL IV, 30-39 MIN: ICD-10-PCS | Mod: S$GLB,,, | Performed by: NURSE PRACTITIONER

## 2021-02-26 PROCEDURE — 87502 POCT INFLUENZA A/B MOLECULAR: ICD-10-PCS | Mod: QW,S$GLB,, | Performed by: NURSE PRACTITIONER

## 2021-02-26 PROCEDURE — 3008F PR BODY MASS INDEX (BMI) DOCUMENTED: ICD-10-PCS | Mod: CPTII,S$GLB,, | Performed by: NURSE PRACTITIONER

## 2021-02-26 PROCEDURE — 87502 INFLUENZA DNA AMP PROBE: CPT | Mod: QW,S$GLB,, | Performed by: NURSE PRACTITIONER

## 2021-02-26 PROCEDURE — 99214 OFFICE O/P EST MOD 30 MIN: CPT | Mod: S$GLB,,, | Performed by: NURSE PRACTITIONER

## 2021-02-28 PROBLEM — F41.1 GAD (GENERALIZED ANXIETY DISORDER): Status: ACTIVE | Noted: 2018-02-20

## 2021-02-28 PROBLEM — F51.05 INSOMNIA DUE TO OTHER MENTAL DISORDER: Status: ACTIVE | Noted: 2021-02-28

## 2021-02-28 PROBLEM — F33.1 MODERATE EPISODE OF RECURRENT MAJOR DEPRESSIVE DISORDER: Status: ACTIVE | Noted: 2021-02-28

## 2021-02-28 PROBLEM — F99 INSOMNIA DUE TO OTHER MENTAL DISORDER: Status: ACTIVE | Noted: 2021-02-28

## 2021-03-12 ENCOUNTER — OFFICE VISIT (OUTPATIENT)
Dept: FAMILY MEDICINE | Facility: CLINIC | Age: 31
End: 2021-03-12
Payer: COMMERCIAL

## 2021-03-12 DIAGNOSIS — F51.05 INSOMNIA DUE TO OTHER MENTAL DISORDER: ICD-10-CM

## 2021-03-12 DIAGNOSIS — F41.1 GAD (GENERALIZED ANXIETY DISORDER): ICD-10-CM

## 2021-03-12 DIAGNOSIS — F99 INSOMNIA DUE TO OTHER MENTAL DISORDER: ICD-10-CM

## 2021-03-12 PROCEDURE — 99214 OFFICE O/P EST MOD 30 MIN: CPT | Mod: 95,,, | Performed by: INTERNAL MEDICINE

## 2021-03-12 PROCEDURE — 99214 PR OFFICE/OUTPT VISIT, EST, LEVL IV, 30-39 MIN: ICD-10-PCS | Mod: 95,,, | Performed by: INTERNAL MEDICINE

## 2021-03-16 ENCOUNTER — PATIENT MESSAGE (OUTPATIENT)
Dept: PSYCHIATRY | Facility: CLINIC | Age: 31
End: 2021-03-16

## 2021-03-16 DIAGNOSIS — F41.1 GAD (GENERALIZED ANXIETY DISORDER): ICD-10-CM

## 2021-03-16 DIAGNOSIS — F33.1 MODERATE EPISODE OF RECURRENT MAJOR DEPRESSIVE DISORDER: ICD-10-CM

## 2021-03-17 RX ORDER — DESVENLAFAXINE SUCCINATE 50 MG/1
50 TABLET, EXTENDED RELEASE ORAL DAILY
Qty: 30 TABLET | Refills: 2 | Status: SHIPPED | OUTPATIENT
Start: 2021-03-17 | End: 2021-04-20

## 2021-03-25 ENCOUNTER — OFFICE VISIT (OUTPATIENT)
Dept: PSYCHIATRY | Facility: CLINIC | Age: 31
End: 2021-03-25
Payer: COMMERCIAL

## 2021-03-25 DIAGNOSIS — F41.1 GAD (GENERALIZED ANXIETY DISORDER): Primary | ICD-10-CM

## 2021-03-25 DIAGNOSIS — F99 INSOMNIA DUE TO OTHER MENTAL DISORDER: ICD-10-CM

## 2021-03-25 DIAGNOSIS — F90.0 ATTENTION DEFICIT HYPERACTIVITY DISORDER (ADHD), PREDOMINANTLY INATTENTIVE TYPE: ICD-10-CM

## 2021-03-25 DIAGNOSIS — F33.1 MODERATE EPISODE OF RECURRENT MAJOR DEPRESSIVE DISORDER: ICD-10-CM

## 2021-03-25 DIAGNOSIS — F51.05 INSOMNIA DUE TO OTHER MENTAL DISORDER: ICD-10-CM

## 2021-03-25 PROCEDURE — 99214 OFFICE O/P EST MOD 30 MIN: CPT | Mod: 95,,, | Performed by: INTERNAL MEDICINE

## 2021-03-25 PROCEDURE — 99214 PR OFFICE/OUTPT VISIT, EST, LEVL IV, 30-39 MIN: ICD-10-PCS | Mod: 95,,, | Performed by: INTERNAL MEDICINE

## 2021-04-04 ENCOUNTER — OFFICE VISIT (OUTPATIENT)
Dept: URGENT CARE | Facility: CLINIC | Age: 31
End: 2021-04-04
Payer: COMMERCIAL

## 2021-04-04 VITALS
DIASTOLIC BLOOD PRESSURE: 95 MMHG | SYSTOLIC BLOOD PRESSURE: 155 MMHG | RESPIRATION RATE: 18 BRPM | WEIGHT: 225 LBS | TEMPERATURE: 99 F | HEART RATE: 101 BPM | HEIGHT: 64 IN | OXYGEN SATURATION: 98 % | BODY MASS INDEX: 38.41 KG/M2

## 2021-04-04 DIAGNOSIS — G43.909 MIGRAINE WITHOUT STATUS MIGRAINOSUS, NOT INTRACTABLE, UNSPECIFIED MIGRAINE TYPE: ICD-10-CM

## 2021-04-04 DIAGNOSIS — J02.9 SORE THROAT: Primary | ICD-10-CM

## 2021-04-04 LAB
CTP QC/QA: YES
CTP QC/QA: YES
MOLECULAR STREP A: NEGATIVE
SARS-COV-2 RDRP RESP QL NAA+PROBE: NEGATIVE

## 2021-04-04 PROCEDURE — 96372 THER/PROPH/DIAG INJ SC/IM: CPT | Mod: S$GLB,,, | Performed by: NURSE PRACTITIONER

## 2021-04-04 PROCEDURE — 99213 OFFICE O/P EST LOW 20 MIN: CPT | Mod: 25,S$GLB,, | Performed by: NURSE PRACTITIONER

## 2021-04-04 PROCEDURE — 99213 PR OFFICE/OUTPT VISIT, EST, LEVL III, 20-29 MIN: ICD-10-PCS | Mod: 25,S$GLB,, | Performed by: NURSE PRACTITIONER

## 2021-04-04 PROCEDURE — 3008F BODY MASS INDEX DOCD: CPT | Mod: CPTII,S$GLB,, | Performed by: NURSE PRACTITIONER

## 2021-04-04 PROCEDURE — U0002 COVID-19 LAB TEST NON-CDC: HCPCS | Mod: QW,S$GLB,, | Performed by: NURSE PRACTITIONER

## 2021-04-04 PROCEDURE — 96372 PR INJECTION,THERAP/PROPH/DIAG2ST, IM OR SUBCUT: ICD-10-PCS | Mod: S$GLB,,, | Performed by: NURSE PRACTITIONER

## 2021-04-04 PROCEDURE — 87651 POCT STREP A MOLECULAR: ICD-10-PCS | Mod: QW,S$GLB,, | Performed by: NURSE PRACTITIONER

## 2021-04-04 PROCEDURE — 3008F PR BODY MASS INDEX (BMI) DOCUMENTED: ICD-10-PCS | Mod: CPTII,S$GLB,, | Performed by: NURSE PRACTITIONER

## 2021-04-04 PROCEDURE — 87651 STREP A DNA AMP PROBE: CPT | Mod: QW,S$GLB,, | Performed by: NURSE PRACTITIONER

## 2021-04-04 PROCEDURE — U0002: ICD-10-PCS | Mod: QW,S$GLB,, | Performed by: NURSE PRACTITIONER

## 2021-04-04 RX ORDER — KETOROLAC TROMETHAMINE 30 MG/ML
30 INJECTION, SOLUTION INTRAMUSCULAR; INTRAVENOUS
Status: COMPLETED | OUTPATIENT
Start: 2021-04-04 | End: 2021-04-04

## 2021-04-04 RX ADMIN — KETOROLAC TROMETHAMINE 30 MG: 30 INJECTION, SOLUTION INTRAMUSCULAR; INTRAVENOUS at 05:04

## 2021-04-09 ENCOUNTER — TELEPHONE (OUTPATIENT)
Dept: URGENT CARE | Facility: CLINIC | Age: 31
End: 2021-04-09

## 2021-04-09 LAB
BACTERIA SPEC RESP CULT: NORMAL
BACTERIA SPEC RESP CULT: NORMAL

## 2021-04-13 ENCOUNTER — TELEPHONE (OUTPATIENT)
Dept: URGENT CARE | Facility: CLINIC | Age: 31
End: 2021-04-13

## 2021-04-14 ENCOUNTER — TELEPHONE (OUTPATIENT)
Dept: URGENT CARE | Facility: CLINIC | Age: 31
End: 2021-04-14

## 2021-04-16 ENCOUNTER — TELEPHONE (OUTPATIENT)
Dept: URGENT CARE | Facility: CLINIC | Age: 31
End: 2021-04-16

## 2021-04-20 ENCOUNTER — OFFICE VISIT (OUTPATIENT)
Dept: PSYCHIATRY | Facility: CLINIC | Age: 31
End: 2021-04-20
Payer: COMMERCIAL

## 2021-04-20 DIAGNOSIS — F99 INSOMNIA DUE TO OTHER MENTAL DISORDER: ICD-10-CM

## 2021-04-20 DIAGNOSIS — F51.05 INSOMNIA DUE TO OTHER MENTAL DISORDER: ICD-10-CM

## 2021-04-20 DIAGNOSIS — F33.1 MODERATE EPISODE OF RECURRENT MAJOR DEPRESSIVE DISORDER: ICD-10-CM

## 2021-04-20 DIAGNOSIS — F39 MOOD DISORDER: ICD-10-CM

## 2021-04-20 DIAGNOSIS — F41.1 GAD (GENERALIZED ANXIETY DISORDER): Primary | ICD-10-CM

## 2021-04-20 PROCEDURE — 99214 PR OFFICE/OUTPT VISIT, EST, LEVL IV, 30-39 MIN: ICD-10-PCS | Mod: 95,,, | Performed by: INTERNAL MEDICINE

## 2021-04-20 PROCEDURE — 99214 OFFICE O/P EST MOD 30 MIN: CPT | Mod: 95,,, | Performed by: INTERNAL MEDICINE

## 2021-04-20 RX ORDER — OXCARBAZEPINE 150 MG/1
150 TABLET, FILM COATED ORAL 2 TIMES DAILY
Qty: 60 TABLET | Refills: 1 | Status: SHIPPED | OUTPATIENT
Start: 2021-04-20 | End: 2021-07-31

## 2021-04-26 ENCOUNTER — PATIENT MESSAGE (OUTPATIENT)
Dept: OBSTETRICS AND GYNECOLOGY | Facility: CLINIC | Age: 31
End: 2021-04-26

## 2021-05-04 ENCOUNTER — PATIENT MESSAGE (OUTPATIENT)
Dept: RESEARCH | Facility: HOSPITAL | Age: 31
End: 2021-05-04

## 2021-05-07 ENCOUNTER — PATIENT MESSAGE (OUTPATIENT)
Dept: PSYCHIATRY | Facility: CLINIC | Age: 31
End: 2021-05-07

## 2021-05-07 DIAGNOSIS — F41.1 GAD (GENERALIZED ANXIETY DISORDER): ICD-10-CM

## 2021-05-07 DIAGNOSIS — F33.1 MODERATE EPISODE OF RECURRENT MAJOR DEPRESSIVE DISORDER: ICD-10-CM

## 2021-05-07 DIAGNOSIS — Z51.81 MEDICATION MONITORING ENCOUNTER: Primary | ICD-10-CM

## 2021-05-07 DIAGNOSIS — M62.838 MUSCLE SPASM: ICD-10-CM

## 2021-06-02 ENCOUNTER — OFFICE VISIT (OUTPATIENT)
Dept: PSYCHIATRY | Facility: CLINIC | Age: 31
End: 2021-06-02
Payer: COMMERCIAL

## 2021-06-02 DIAGNOSIS — R25.3 MUSCLE TWITCHING: ICD-10-CM

## 2021-06-02 DIAGNOSIS — F33.1 MODERATE EPISODE OF RECURRENT MAJOR DEPRESSIVE DISORDER: Primary | ICD-10-CM

## 2021-06-02 DIAGNOSIS — F90.0 ATTENTION DEFICIT HYPERACTIVITY DISORDER (ADHD), PREDOMINANTLY INATTENTIVE TYPE: ICD-10-CM

## 2021-06-02 DIAGNOSIS — F51.05 INSOMNIA DUE TO OTHER MENTAL DISORDER: ICD-10-CM

## 2021-06-02 DIAGNOSIS — F99 INSOMNIA DUE TO OTHER MENTAL DISORDER: ICD-10-CM

## 2021-06-02 DIAGNOSIS — F41.1 GAD (GENERALIZED ANXIETY DISORDER): ICD-10-CM

## 2021-06-02 PROCEDURE — 99214 PR OFFICE/OUTPT VISIT, EST, LEVL IV, 30-39 MIN: ICD-10-PCS | Mod: 95,,, | Performed by: INTERNAL MEDICINE

## 2021-06-02 PROCEDURE — 99214 OFFICE O/P EST MOD 30 MIN: CPT | Mod: 95,,, | Performed by: INTERNAL MEDICINE

## 2021-06-04 ENCOUNTER — LAB VISIT (OUTPATIENT)
Dept: LAB | Facility: HOSPITAL | Age: 31
End: 2021-06-04
Attending: INTERNAL MEDICINE
Payer: COMMERCIAL

## 2021-06-04 DIAGNOSIS — Z51.81 MEDICATION MONITORING ENCOUNTER: ICD-10-CM

## 2021-06-04 DIAGNOSIS — M62.838 MUSCLE SPASM: ICD-10-CM

## 2021-06-04 DIAGNOSIS — F33.1 MODERATE EPISODE OF RECURRENT MAJOR DEPRESSIVE DISORDER: ICD-10-CM

## 2021-06-04 DIAGNOSIS — F41.1 GAD (GENERALIZED ANXIETY DISORDER): ICD-10-CM

## 2021-06-04 LAB
ALBUMIN SERPL BCP-MCNC: 4.1 G/DL (ref 3.5–5.2)
ALP SERPL-CCNC: 84 U/L (ref 55–135)
ALT SERPL W/O P-5'-P-CCNC: 8 U/L (ref 10–44)
ANION GAP SERPL CALC-SCNC: 6 MMOL/L (ref 8–16)
AST SERPL-CCNC: 19 U/L (ref 10–40)
BASOPHILS # BLD AUTO: 0.06 K/UL (ref 0–0.2)
BASOPHILS NFR BLD: 0.7 % (ref 0–1.9)
BILIRUB SERPL-MCNC: 0.5 MG/DL (ref 0.1–1)
BUN SERPL-MCNC: 14 MG/DL (ref 6–20)
CALCIUM SERPL-MCNC: 9.7 MG/DL (ref 8.7–10.5)
CHLORIDE SERPL-SCNC: 105 MMOL/L (ref 95–110)
CO2 SERPL-SCNC: 24 MMOL/L (ref 23–29)
CREAT SERPL-MCNC: 0.8 MG/DL (ref 0.5–1.4)
DIFFERENTIAL METHOD: ABNORMAL
EOSINOPHIL # BLD AUTO: 0.1 K/UL (ref 0–0.5)
EOSINOPHIL NFR BLD: 1 % (ref 0–8)
ERYTHROCYTE [DISTWIDTH] IN BLOOD BY AUTOMATED COUNT: 12.4 % (ref 11.5–14.5)
EST. GFR  (AFRICAN AMERICAN): >60 ML/MIN/1.73 M^2
EST. GFR  (NON AFRICAN AMERICAN): >60 ML/MIN/1.73 M^2
GLUCOSE SERPL-MCNC: 91 MG/DL (ref 70–110)
HCT VFR BLD AUTO: 41.8 % (ref 37–48.5)
HGB BLD-MCNC: 14.5 G/DL (ref 12–16)
IMM GRANULOCYTES # BLD AUTO: 0 K/UL (ref 0–0.04)
IMM GRANULOCYTES NFR BLD AUTO: 0 % (ref 0–0.5)
LYMPHOCYTES # BLD AUTO: 2.8 K/UL (ref 1–4.8)
LYMPHOCYTES NFR BLD: 33.5 % (ref 18–48)
MCH RBC QN AUTO: 31.9 PG (ref 27–31)
MCHC RBC AUTO-ENTMCNC: 34.7 G/DL (ref 32–36)
MCV RBC AUTO: 92 FL (ref 82–98)
MONOCYTES # BLD AUTO: 0.6 K/UL (ref 0.3–1)
MONOCYTES NFR BLD: 7.2 % (ref 4–15)
NEUTROPHILS # BLD AUTO: 4.8 K/UL (ref 1.8–7.7)
NEUTROPHILS NFR BLD: 57.6 % (ref 38–73)
NRBC BLD-RTO: 0 /100 WBC
PLATELET # BLD AUTO: 279 K/UL (ref 150–450)
PMV BLD AUTO: 9.9 FL (ref 9.2–12.9)
POTASSIUM SERPL-SCNC: 4.6 MMOL/L (ref 3.5–5.1)
PROT SERPL-MCNC: 8.1 G/DL (ref 6–8.4)
RBC # BLD AUTO: 4.54 M/UL (ref 4–5.4)
SODIUM SERPL-SCNC: 135 MMOL/L (ref 136–145)
WBC # BLD AUTO: 8.31 K/UL (ref 3.9–12.7)

## 2021-06-04 PROCEDURE — 36415 COLL VENOUS BLD VENIPUNCTURE: CPT | Performed by: INTERNAL MEDICINE

## 2021-06-04 PROCEDURE — 85025 COMPLETE CBC W/AUTO DIFF WBC: CPT | Performed by: INTERNAL MEDICINE

## 2021-06-04 PROCEDURE — 80053 COMPREHEN METABOLIC PANEL: CPT | Performed by: INTERNAL MEDICINE

## 2021-06-05 ENCOUNTER — PATIENT MESSAGE (OUTPATIENT)
Dept: FAMILY MEDICINE | Facility: CLINIC | Age: 31
End: 2021-06-05

## 2021-06-06 ENCOUNTER — PATIENT MESSAGE (OUTPATIENT)
Dept: FAMILY MEDICINE | Facility: CLINIC | Age: 31
End: 2021-06-06

## 2021-06-09 ENCOUNTER — OFFICE VISIT (OUTPATIENT)
Dept: FAMILY MEDICINE | Facility: CLINIC | Age: 31
End: 2021-06-09
Payer: COMMERCIAL

## 2021-06-09 VITALS
WEIGHT: 236.75 LBS | DIASTOLIC BLOOD PRESSURE: 79 MMHG | HEART RATE: 80 BPM | TEMPERATURE: 99 F | BODY MASS INDEX: 40.42 KG/M2 | HEIGHT: 64 IN | SYSTOLIC BLOOD PRESSURE: 114 MMHG | OXYGEN SATURATION: 99 %

## 2021-06-09 DIAGNOSIS — M25.562 ACUTE PAIN OF LEFT KNEE: Primary | ICD-10-CM

## 2021-06-09 DIAGNOSIS — M25.569 KNEE PAIN, UNSPECIFIED CHRONICITY, UNSPECIFIED LATERALITY: ICD-10-CM

## 2021-06-09 PROCEDURE — 99999 PR PBB SHADOW E&M-EST. PATIENT-LVL III: CPT | Mod: PBBFAC,,, | Performed by: INTERNAL MEDICINE

## 2021-06-09 PROCEDURE — 99999 PR PBB SHADOW E&M-EST. PATIENT-LVL III: ICD-10-PCS | Mod: PBBFAC,,, | Performed by: INTERNAL MEDICINE

## 2021-06-09 PROCEDURE — 3008F PR BODY MASS INDEX (BMI) DOCUMENTED: ICD-10-PCS | Mod: CPTII,S$GLB,, | Performed by: INTERNAL MEDICINE

## 2021-06-09 PROCEDURE — 1126F PR PAIN SEVERITY QUANTIFIED, NO PAIN PRESENT: ICD-10-PCS | Mod: S$GLB,,, | Performed by: INTERNAL MEDICINE

## 2021-06-09 PROCEDURE — 99214 OFFICE O/P EST MOD 30 MIN: CPT | Mod: S$GLB,,, | Performed by: INTERNAL MEDICINE

## 2021-06-09 PROCEDURE — 99214 PR OFFICE/OUTPT VISIT, EST, LEVL IV, 30-39 MIN: ICD-10-PCS | Mod: S$GLB,,, | Performed by: INTERNAL MEDICINE

## 2021-06-09 PROCEDURE — 3008F BODY MASS INDEX DOCD: CPT | Mod: CPTII,S$GLB,, | Performed by: INTERNAL MEDICINE

## 2021-06-09 PROCEDURE — 1126F AMNT PAIN NOTED NONE PRSNT: CPT | Mod: S$GLB,,, | Performed by: INTERNAL MEDICINE

## 2021-06-09 RX ORDER — MELOXICAM 15 MG/1
15 TABLET ORAL DAILY
Qty: 90 TABLET | Refills: 3 | Status: SHIPPED | OUTPATIENT
Start: 2021-06-09 | End: 2023-04-17

## 2021-07-09 ENCOUNTER — PATIENT MESSAGE (OUTPATIENT)
Dept: PSYCHIATRY | Facility: CLINIC | Age: 31
End: 2021-07-09

## 2021-07-22 ENCOUNTER — PATIENT MESSAGE (OUTPATIENT)
Dept: PSYCHIATRY | Facility: CLINIC | Age: 31
End: 2021-07-22

## 2021-07-27 ENCOUNTER — PATIENT MESSAGE (OUTPATIENT)
Dept: FAMILY MEDICINE | Facility: CLINIC | Age: 31
End: 2021-07-27

## 2021-07-27 RX ORDER — TRAZODONE HYDROCHLORIDE 50 MG/1
50 TABLET ORAL NIGHTLY
Qty: 90 TABLET | Refills: 0 | Status: SHIPPED | OUTPATIENT
Start: 2021-07-27 | End: 2021-11-24

## 2021-07-28 RX ORDER — ALPRAZOLAM 0.25 MG/1
0.25 TABLET ORAL 2 TIMES DAILY PRN
Qty: 60 TABLET | Refills: 0 | Status: SHIPPED | OUTPATIENT
Start: 2021-07-28 | End: 2021-11-24

## 2021-08-24 ENCOUNTER — IMMUNIZATION (OUTPATIENT)
Dept: FAMILY MEDICINE | Facility: CLINIC | Age: 31
End: 2021-08-24
Payer: COMMERCIAL

## 2021-08-24 DIAGNOSIS — Z23 NEED FOR VACCINATION: Primary | ICD-10-CM

## 2021-08-24 PROCEDURE — 91300 COVID-19, MRNA, LNP-S, PF, 30 MCG/0.3 ML DOSE VACCINE: CPT | Mod: ,,, | Performed by: FAMILY MEDICINE

## 2021-08-24 PROCEDURE — 0001A COVID-19, MRNA, LNP-S, PF, 30 MCG/0.3 ML DOSE VACCINE: CPT | Mod: CV19,,, | Performed by: FAMILY MEDICINE

## 2021-08-24 PROCEDURE — 0001A COVID-19, MRNA, LNP-S, PF, 30 MCG/0.3 ML DOSE VACCINE: ICD-10-PCS | Mod: CV19,,, | Performed by: FAMILY MEDICINE

## 2021-08-24 PROCEDURE — 91300 COVID-19, MRNA, LNP-S, PF, 30 MCG/0.3 ML DOSE VACCINE: ICD-10-PCS | Mod: ,,, | Performed by: FAMILY MEDICINE

## 2021-09-06 ENCOUNTER — OCCUPATIONAL HEALTH (OUTPATIENT)
Dept: URGENT CARE | Facility: CLINIC | Age: 31
End: 2021-09-06

## 2021-09-06 DIAGNOSIS — J02.9 SORE THROAT: Primary | ICD-10-CM

## 2021-09-06 DIAGNOSIS — J02.9 SORE THROAT: ICD-10-CM

## 2021-09-06 LAB
CTP QC/QA: YES
SARS-COV-2 RDRP RESP QL NAA+PROBE: NEGATIVE

## 2021-09-06 PROCEDURE — U0002 COVID-19 LAB TEST NON-CDC: HCPCS | Mod: QW,S$GLB,, | Performed by: PREVENTIVE MEDICINE

## 2021-09-06 PROCEDURE — U0002: ICD-10-PCS | Mod: QW,S$GLB,, | Performed by: PREVENTIVE MEDICINE

## 2021-09-21 ENCOUNTER — IMMUNIZATION (OUTPATIENT)
Dept: FAMILY MEDICINE | Facility: CLINIC | Age: 31
End: 2021-09-21
Payer: COMMERCIAL

## 2021-09-21 DIAGNOSIS — Z23 NEED FOR VACCINATION: Primary | ICD-10-CM

## 2021-09-21 PROCEDURE — 91300 COVID-19, MRNA, LNP-S, PF, 30 MCG/0.3 ML DOSE VACCINE: CPT | Mod: PBBFAC | Performed by: FAMILY MEDICINE

## 2021-09-21 PROCEDURE — 0002A COVID-19, MRNA, LNP-S, PF, 30 MCG/0.3 ML DOSE VACCINE: CPT | Mod: PBBFAC | Performed by: FAMILY MEDICINE

## 2021-09-27 ENCOUNTER — PATIENT MESSAGE (OUTPATIENT)
Dept: FAMILY MEDICINE | Facility: CLINIC | Age: 31
End: 2021-09-27

## 2021-09-27 DIAGNOSIS — G43.009 MIGRAINE WITHOUT AURA AND WITHOUT STATUS MIGRAINOSUS, NOT INTRACTABLE: ICD-10-CM

## 2021-09-27 RX ORDER — DIAZEPAM 5 MG/1
5 TABLET ORAL EVERY 12 HOURS PRN
Qty: 10 TABLET | Refills: 0 | Status: SHIPPED | OUTPATIENT
Start: 2021-09-27 | End: 2021-11-24

## 2021-09-27 RX ORDER — RIZATRIPTAN BENZOATE 10 MG/1
TABLET ORAL
Qty: 10 TABLET | Refills: 5 | Status: SHIPPED | OUTPATIENT
Start: 2021-09-27 | End: 2022-05-17

## 2021-09-27 RX ORDER — METHYLPREDNISOLONE 4 MG/1
TABLET ORAL
Qty: 1 PACKAGE | Refills: 0 | Status: SHIPPED | OUTPATIENT
Start: 2021-09-27 | End: 2021-10-18

## 2021-10-26 ENCOUNTER — PATIENT MESSAGE (OUTPATIENT)
Dept: PSYCHIATRY | Facility: CLINIC | Age: 31
End: 2021-10-26
Payer: COMMERCIAL

## 2021-10-26 ENCOUNTER — OFFICE VISIT (OUTPATIENT)
Dept: PSYCHIATRY | Facility: CLINIC | Age: 31
End: 2021-10-26
Payer: COMMERCIAL

## 2021-10-26 DIAGNOSIS — F41.1 GAD (GENERALIZED ANXIETY DISORDER): ICD-10-CM

## 2021-10-26 DIAGNOSIS — F51.05 INSOMNIA DUE TO OTHER MENTAL DISORDER: ICD-10-CM

## 2021-10-26 DIAGNOSIS — F99 INSOMNIA DUE TO OTHER MENTAL DISORDER: ICD-10-CM

## 2021-10-26 DIAGNOSIS — F90.0 ATTENTION DEFICIT HYPERACTIVITY DISORDER (ADHD), PREDOMINANTLY INATTENTIVE TYPE: ICD-10-CM

## 2021-10-26 DIAGNOSIS — F33.1 MODERATE EPISODE OF RECURRENT MAJOR DEPRESSIVE DISORDER: Primary | ICD-10-CM

## 2021-10-26 PROCEDURE — 99214 PR OFFICE/OUTPT VISIT, EST, LEVL IV, 30-39 MIN: ICD-10-PCS | Mod: 95,,, | Performed by: INTERNAL MEDICINE

## 2021-10-26 PROCEDURE — 99214 OFFICE O/P EST MOD 30 MIN: CPT | Mod: 95,,, | Performed by: INTERNAL MEDICINE

## 2021-10-26 PROCEDURE — 1160F RVW MEDS BY RX/DR IN RCRD: CPT | Mod: CPTII,95,, | Performed by: INTERNAL MEDICINE

## 2021-10-26 PROCEDURE — 1159F PR MEDICATION LIST DOCUMENTED IN MEDICAL RECORD: ICD-10-PCS | Mod: CPTII,95,, | Performed by: INTERNAL MEDICINE

## 2021-10-26 PROCEDURE — 1160F PR REVIEW ALL MEDS BY PRESCRIBER/CLIN PHARMACIST DOCUMENTED: ICD-10-PCS | Mod: CPTII,95,, | Performed by: INTERNAL MEDICINE

## 2021-10-26 PROCEDURE — 1159F MED LIST DOCD IN RCRD: CPT | Mod: CPTII,95,, | Performed by: INTERNAL MEDICINE

## 2021-10-27 ENCOUNTER — OFFICE VISIT (OUTPATIENT)
Dept: PSYCHIATRY | Facility: CLINIC | Age: 31
End: 2021-10-27
Payer: COMMERCIAL

## 2021-10-27 DIAGNOSIS — F90.0 ATTENTION DEFICIT HYPERACTIVITY DISORDER (ADHD), PREDOMINANTLY INATTENTIVE TYPE: Primary | ICD-10-CM

## 2021-10-27 DIAGNOSIS — F33.1 MODERATE EPISODE OF RECURRENT MAJOR DEPRESSIVE DISORDER: ICD-10-CM

## 2021-10-27 DIAGNOSIS — F51.05 INSOMNIA DUE TO OTHER MENTAL DISORDER: ICD-10-CM

## 2021-10-27 DIAGNOSIS — F99 INSOMNIA DUE TO OTHER MENTAL DISORDER: ICD-10-CM

## 2021-10-27 DIAGNOSIS — F41.1 GAD (GENERALIZED ANXIETY DISORDER): ICD-10-CM

## 2021-10-27 PROCEDURE — 1159F MED LIST DOCD IN RCRD: CPT | Mod: CPTII,95,, | Performed by: INTERNAL MEDICINE

## 2021-10-27 PROCEDURE — 99214 OFFICE O/P EST MOD 30 MIN: CPT | Mod: 95,,, | Performed by: INTERNAL MEDICINE

## 2021-10-27 PROCEDURE — 99214 PR OFFICE/OUTPT VISIT, EST, LEVL IV, 30-39 MIN: ICD-10-PCS | Mod: 95,,, | Performed by: INTERNAL MEDICINE

## 2021-10-27 PROCEDURE — 1160F RVW MEDS BY RX/DR IN RCRD: CPT | Mod: CPTII,95,, | Performed by: INTERNAL MEDICINE

## 2021-10-27 PROCEDURE — 90833 PR PSYCHOTHERAPY W/PATIENT W/E&M, 30 MIN (ADD ON): ICD-10-PCS | Mod: 95,,, | Performed by: INTERNAL MEDICINE

## 2021-10-27 PROCEDURE — 1159F PR MEDICATION LIST DOCUMENTED IN MEDICAL RECORD: ICD-10-PCS | Mod: CPTII,95,, | Performed by: INTERNAL MEDICINE

## 2021-10-27 PROCEDURE — 90833 PSYTX W PT W E/M 30 MIN: CPT | Mod: 95,,, | Performed by: INTERNAL MEDICINE

## 2021-10-27 PROCEDURE — 1160F PR REVIEW ALL MEDS BY PRESCRIBER/CLIN PHARMACIST DOCUMENTED: ICD-10-PCS | Mod: CPTII,95,, | Performed by: INTERNAL MEDICINE

## 2021-10-27 RX ORDER — METHYLPHENIDATE HYDROCHLORIDE 10 MG/1
10 TABLET ORAL 2 TIMES DAILY
Qty: 60 TABLET | Refills: 0 | Status: SHIPPED | OUTPATIENT
Start: 2021-10-27 | End: 2023-04-17

## 2021-11-11 ENCOUNTER — PATIENT MESSAGE (OUTPATIENT)
Dept: OBSTETRICS AND GYNECOLOGY | Facility: CLINIC | Age: 31
End: 2021-11-11
Payer: COMMERCIAL

## 2021-11-11 DIAGNOSIS — N91.2 AMENORRHEA: Primary | ICD-10-CM

## 2021-11-18 ENCOUNTER — PATIENT MESSAGE (OUTPATIENT)
Dept: OBSTETRICS AND GYNECOLOGY | Facility: CLINIC | Age: 31
End: 2021-11-18
Payer: COMMERCIAL

## 2021-11-18 ENCOUNTER — PATIENT MESSAGE (OUTPATIENT)
Dept: PSYCHIATRY | Facility: CLINIC | Age: 31
End: 2021-11-18
Payer: COMMERCIAL

## 2021-11-18 DIAGNOSIS — R10.2 PELVIC PAIN IN FEMALE: Primary | ICD-10-CM

## 2021-11-22 ENCOUNTER — OFFICE VISIT (OUTPATIENT)
Dept: PSYCHIATRY | Facility: CLINIC | Age: 31
End: 2021-11-22
Payer: COMMERCIAL

## 2021-11-22 DIAGNOSIS — F51.05 INSOMNIA DUE TO OTHER MENTAL DISORDER: ICD-10-CM

## 2021-11-22 DIAGNOSIS — F41.1 GAD (GENERALIZED ANXIETY DISORDER): ICD-10-CM

## 2021-11-22 DIAGNOSIS — F90.0 ATTENTION DEFICIT HYPERACTIVITY DISORDER (ADHD), PREDOMINANTLY INATTENTIVE TYPE: Primary | ICD-10-CM

## 2021-11-22 DIAGNOSIS — F33.1 MODERATE EPISODE OF RECURRENT MAJOR DEPRESSIVE DISORDER: ICD-10-CM

## 2021-11-22 DIAGNOSIS — F99 INSOMNIA DUE TO OTHER MENTAL DISORDER: ICD-10-CM

## 2021-11-22 PROCEDURE — 99214 OFFICE O/P EST MOD 30 MIN: CPT | Mod: 95,,, | Performed by: INTERNAL MEDICINE

## 2021-11-22 PROCEDURE — 99214 PR OFFICE/OUTPT VISIT, EST, LEVL IV, 30-39 MIN: ICD-10-PCS | Mod: 95,,, | Performed by: INTERNAL MEDICINE

## 2021-12-01 ENCOUNTER — PATIENT MESSAGE (OUTPATIENT)
Dept: FAMILY MEDICINE | Facility: CLINIC | Age: 31
End: 2021-12-01
Payer: COMMERCIAL

## 2021-12-01 RX ORDER — NITROFURANTOIN 25; 75 MG/1; MG/1
100 CAPSULE ORAL 2 TIMES DAILY
Qty: 10 CAPSULE | Refills: 0 | Status: SHIPPED | OUTPATIENT
Start: 2021-12-01 | End: 2022-03-01

## 2021-12-07 ENCOUNTER — OFFICE VISIT (OUTPATIENT)
Dept: BEHAVIORAL HEALTH | Facility: CLINIC | Age: 31
End: 2021-12-07
Payer: COMMERCIAL

## 2021-12-07 DIAGNOSIS — F90.0 ATTENTION DEFICIT HYPERACTIVITY DISORDER (ADHD), PREDOMINANTLY INATTENTIVE TYPE: ICD-10-CM

## 2021-12-07 DIAGNOSIS — F99 INSOMNIA DUE TO OTHER MENTAL DISORDER: ICD-10-CM

## 2021-12-07 DIAGNOSIS — F41.1 GAD (GENERALIZED ANXIETY DISORDER): ICD-10-CM

## 2021-12-07 DIAGNOSIS — F51.05 INSOMNIA DUE TO OTHER MENTAL DISORDER: ICD-10-CM

## 2021-12-07 DIAGNOSIS — F33.1 MODERATE EPISODE OF RECURRENT MAJOR DEPRESSIVE DISORDER: Primary | ICD-10-CM

## 2021-12-07 PROCEDURE — 90791 PR PSYCHIATRIC DIAGNOSTIC EVALUATION: ICD-10-PCS | Mod: S$GLB,,, | Performed by: SOCIAL WORKER

## 2021-12-07 PROCEDURE — 90791 PSYCH DIAGNOSTIC EVALUATION: CPT | Mod: S$GLB,,, | Performed by: SOCIAL WORKER

## 2021-12-08 ENCOUNTER — PATIENT MESSAGE (OUTPATIENT)
Dept: FAMILY MEDICINE | Facility: CLINIC | Age: 31
End: 2021-12-08
Payer: COMMERCIAL

## 2021-12-08 DIAGNOSIS — R30.0 DYSURIA: Primary | ICD-10-CM

## 2021-12-29 ENCOUNTER — OFFICE VISIT (OUTPATIENT)
Dept: BEHAVIORAL HEALTH | Facility: CLINIC | Age: 31
End: 2021-12-29
Payer: COMMERCIAL

## 2021-12-29 DIAGNOSIS — F33.1 MODERATE EPISODE OF RECURRENT MAJOR DEPRESSIVE DISORDER: Primary | ICD-10-CM

## 2021-12-29 DIAGNOSIS — F51.05 INSOMNIA DUE TO OTHER MENTAL DISORDER: ICD-10-CM

## 2021-12-29 DIAGNOSIS — F41.1 GAD (GENERALIZED ANXIETY DISORDER): ICD-10-CM

## 2021-12-29 DIAGNOSIS — F99 INSOMNIA DUE TO OTHER MENTAL DISORDER: ICD-10-CM

## 2021-12-29 DIAGNOSIS — F90.0 ATTENTION DEFICIT HYPERACTIVITY DISORDER (ADHD), PREDOMINANTLY INATTENTIVE TYPE: ICD-10-CM

## 2021-12-29 PROCEDURE — 90834 PR PSYCHOTHERAPY W/PATIENT, 45 MIN: ICD-10-PCS | Mod: 95,,, | Performed by: SOCIAL WORKER

## 2021-12-29 PROCEDURE — 90834 PSYTX W PT 45 MINUTES: CPT | Mod: 95,,, | Performed by: SOCIAL WORKER

## 2021-12-29 PROCEDURE — 1159F PR MEDICATION LIST DOCUMENTED IN MEDICAL RECORD: ICD-10-PCS | Mod: CPTII,95,, | Performed by: SOCIAL WORKER

## 2021-12-29 PROCEDURE — 1159F MED LIST DOCD IN RCRD: CPT | Mod: CPTII,95,, | Performed by: SOCIAL WORKER

## 2022-01-09 ENCOUNTER — PATIENT MESSAGE (OUTPATIENT)
Dept: FAMILY MEDICINE | Facility: CLINIC | Age: 32
End: 2022-01-09
Payer: COMMERCIAL

## 2022-01-10 RX ORDER — TRAZODONE HYDROCHLORIDE 50 MG/1
50 TABLET ORAL NIGHTLY
Qty: 90 TABLET | Refills: 3 | Status: SHIPPED | OUTPATIENT
Start: 2022-01-10 | End: 2022-08-03

## 2022-01-17 ENCOUNTER — PATIENT MESSAGE (OUTPATIENT)
Dept: OBSTETRICS AND GYNECOLOGY | Facility: CLINIC | Age: 32
End: 2022-01-17
Payer: COMMERCIAL

## 2022-01-26 ENCOUNTER — OFFICE VISIT (OUTPATIENT)
Dept: BEHAVIORAL HEALTH | Facility: CLINIC | Age: 32
End: 2022-01-26
Payer: COMMERCIAL

## 2022-01-26 DIAGNOSIS — F33.1 MODERATE EPISODE OF RECURRENT MAJOR DEPRESSIVE DISORDER: Primary | ICD-10-CM

## 2022-01-26 DIAGNOSIS — F90.0 ATTENTION DEFICIT HYPERACTIVITY DISORDER (ADHD), PREDOMINANTLY INATTENTIVE TYPE: ICD-10-CM

## 2022-01-26 DIAGNOSIS — F41.1 GAD (GENERALIZED ANXIETY DISORDER): ICD-10-CM

## 2022-01-26 DIAGNOSIS — F99 INSOMNIA DUE TO OTHER MENTAL DISORDER: ICD-10-CM

## 2022-01-26 DIAGNOSIS — F51.05 INSOMNIA DUE TO OTHER MENTAL DISORDER: ICD-10-CM

## 2022-01-26 PROCEDURE — 90834 PR PSYCHOTHERAPY W/PATIENT, 45 MIN: ICD-10-PCS | Mod: 95,,, | Performed by: SOCIAL WORKER

## 2022-01-26 PROCEDURE — 90834 PSYTX W PT 45 MINUTES: CPT | Mod: 95,,, | Performed by: SOCIAL WORKER

## 2022-01-26 NOTE — PROGRESS NOTES
The patient location is: [place]  The chief complaint leading to consultation is: anxiety and depression    Visit type: audiovisual    Face to Face time with patient: 45  55 minutes of total time spent on the encounter, which includes face to face time and non-face to face time preparing to see the patient (eg, review of tests), Obtaining and/or reviewing separately obtained history, Documenting clinical information in the electronic or other health record, Independently interpreting results (not separately reported) and communicating results to the patient/family/caregiver, or Care coordination (not separately reported).         Each patient to whom he or she provides medical services by telemedicine is:  (1) informed of the relationship between the physician and patient and the respective role of any other health care provider with respect to management of the patient; and (2) notified that he or she may decline to receive medical services by telemedicine and may withdraw from such care at any time.    Notes:   Individual Psychotherapy (PhD/LCSW)    1/26/2022    Site:  Telemed         Therapeutic Intervention: Met with patient.  Outpatient - Insight oriented psychotherapy 45 min - CPT code 36433 and Outpatient - Behavior modifying psychotherapy 45 min - CPT code 30073    Chief complaint/reason for encounter: depression, anxiety and sleep     Interval history and content of current session: She reports that she feels she has made some improvement in her sleep hygiene but needs to stay consistent. She reports that she struggles with self love techniques and reports that she does not believe them and feels frustrated. She reports that she has a hard time thinking anything positive about herself. She reports that she has concerns for her 7 year old daughters negative thoughts and reports that some of her behaviors remind her of herself. Discussed self love techniques and the meaning as well as doing them with daughter.  Discussed thought re-framing and mindfulness.     Treatment plan:  · Target symptoms: lack of focus, recurrent depression, anxiety , adjustment  · Why chosen therapy is appropriate versus another modality: relevant to diagnosis, patient responds to this modality, evidence based practice  · Outcome monitoring methods: self-report, observation  · Therapeutic intervention type: insight oriented psychotherapy, behavior modifying psychotherapy    Risk parameters:  Patient reports no suicidal ideation  Patient reports no homicidal ideation  Patient reports no self-injurious behavior  Patient reports no violent behavior    Verbal deficits: None    Patient's response to intervention:  The patient's response to intervention is accepting.    Progress toward goals and other mental status changes:  The patient's progress toward goals is fair .    Diagnosis:     ICD-10-CM ICD-9-CM   1. Moderate episode of recurrent major depressive disorder  F33.1 296.32   2. Insomnia due to other mental disorder  F51.05 300.9    F99 327.02   3. Attention deficit hyperactivity disorder (ADHD), predominantly inattentive type  F90.0 314.00   4. JAVI (generalized anxiety disorder)  F41.1 300.02       Plan:  individual psychotherapy    Return to clinic: 2 weeks    Length of Service (minutes): 45

## 2022-03-01 ENCOUNTER — OFFICE VISIT (OUTPATIENT)
Dept: URGENT CARE | Facility: CLINIC | Age: 32
End: 2022-03-01
Payer: COMMERCIAL

## 2022-03-01 VITALS
HEART RATE: 85 BPM | BODY MASS INDEX: 42.68 KG/M2 | DIASTOLIC BLOOD PRESSURE: 95 MMHG | SYSTOLIC BLOOD PRESSURE: 145 MMHG | OXYGEN SATURATION: 98 % | HEIGHT: 64 IN | RESPIRATION RATE: 18 BRPM | WEIGHT: 250 LBS | TEMPERATURE: 97 F

## 2022-03-01 DIAGNOSIS — R50.9 FEVER, UNSPECIFIED FEVER CAUSE: ICD-10-CM

## 2022-03-01 DIAGNOSIS — R05.9 COUGH: ICD-10-CM

## 2022-03-01 DIAGNOSIS — B96.89 BACTERIAL SINUSITIS: Primary | ICD-10-CM

## 2022-03-01 DIAGNOSIS — R51.9 NONINTRACTABLE HEADACHE, UNSPECIFIED CHRONICITY PATTERN, UNSPECIFIED HEADACHE TYPE: ICD-10-CM

## 2022-03-01 DIAGNOSIS — J32.9 BACTERIAL SINUSITIS: Primary | ICD-10-CM

## 2022-03-01 LAB
CTP QC/QA: YES
CTP QC/QA: YES
POC MOLECULAR INFLUENZA A AGN: NEGATIVE
POC MOLECULAR INFLUENZA B AGN: NEGATIVE
SARS-COV-2 RDRP RESP QL NAA+PROBE: NEGATIVE

## 2022-03-01 PROCEDURE — 96372 PR INJECTION,THERAP/PROPH/DIAG2ST, IM OR SUBCUT: ICD-10-PCS | Mod: S$GLB,,, | Performed by: PHYSICIAN ASSISTANT

## 2022-03-01 PROCEDURE — 3077F SYST BP >= 140 MM HG: CPT | Mod: CPTII,S$GLB,, | Performed by: PHYSICIAN ASSISTANT

## 2022-03-01 PROCEDURE — 3080F PR MOST RECENT DIASTOLIC BLOOD PRESSURE >= 90 MM HG: ICD-10-PCS | Mod: CPTII,S$GLB,, | Performed by: PHYSICIAN ASSISTANT

## 2022-03-01 PROCEDURE — 87502 INFLUENZA DNA AMP PROBE: CPT | Mod: QW,S$GLB,, | Performed by: PHYSICIAN ASSISTANT

## 2022-03-01 PROCEDURE — 3008F BODY MASS INDEX DOCD: CPT | Mod: CPTII,S$GLB,, | Performed by: PHYSICIAN ASSISTANT

## 2022-03-01 PROCEDURE — 3008F PR BODY MASS INDEX (BMI) DOCUMENTED: ICD-10-PCS | Mod: CPTII,S$GLB,, | Performed by: PHYSICIAN ASSISTANT

## 2022-03-01 PROCEDURE — 3077F PR MOST RECENT SYSTOLIC BLOOD PRESSURE >= 140 MM HG: ICD-10-PCS | Mod: CPTII,S$GLB,, | Performed by: PHYSICIAN ASSISTANT

## 2022-03-01 PROCEDURE — 1159F MED LIST DOCD IN RCRD: CPT | Mod: CPTII,S$GLB,, | Performed by: PHYSICIAN ASSISTANT

## 2022-03-01 PROCEDURE — U0002: ICD-10-PCS | Mod: QW,S$GLB,, | Performed by: PHYSICIAN ASSISTANT

## 2022-03-01 PROCEDURE — 1159F PR MEDICATION LIST DOCUMENTED IN MEDICAL RECORD: ICD-10-PCS | Mod: CPTII,S$GLB,, | Performed by: PHYSICIAN ASSISTANT

## 2022-03-01 PROCEDURE — U0002 COVID-19 LAB TEST NON-CDC: HCPCS | Mod: QW,S$GLB,, | Performed by: PHYSICIAN ASSISTANT

## 2022-03-01 PROCEDURE — 1160F PR REVIEW ALL MEDS BY PRESCRIBER/CLIN PHARMACIST DOCUMENTED: ICD-10-PCS | Mod: CPTII,S$GLB,, | Performed by: PHYSICIAN ASSISTANT

## 2022-03-01 PROCEDURE — 1160F RVW MEDS BY RX/DR IN RCRD: CPT | Mod: CPTII,S$GLB,, | Performed by: PHYSICIAN ASSISTANT

## 2022-03-01 PROCEDURE — 99214 PR OFFICE/OUTPT VISIT, EST, LEVL IV, 30-39 MIN: ICD-10-PCS | Mod: 25,S$GLB,CS, | Performed by: PHYSICIAN ASSISTANT

## 2022-03-01 PROCEDURE — 3080F DIAST BP >= 90 MM HG: CPT | Mod: CPTII,S$GLB,, | Performed by: PHYSICIAN ASSISTANT

## 2022-03-01 PROCEDURE — 99214 OFFICE O/P EST MOD 30 MIN: CPT | Mod: 25,S$GLB,CS, | Performed by: PHYSICIAN ASSISTANT

## 2022-03-01 PROCEDURE — 96372 THER/PROPH/DIAG INJ SC/IM: CPT | Mod: S$GLB,,, | Performed by: PHYSICIAN ASSISTANT

## 2022-03-01 PROCEDURE — 87502 POCT INFLUENZA A/B MOLECULAR: ICD-10-PCS | Mod: QW,S$GLB,, | Performed by: PHYSICIAN ASSISTANT

## 2022-03-01 RX ORDER — AMOXICILLIN AND CLAVULANATE POTASSIUM 875; 125 MG/1; MG/1
1 TABLET, FILM COATED ORAL EVERY 12 HOURS
Qty: 20 TABLET | Refills: 0 | Status: SHIPPED | OUTPATIENT
Start: 2022-03-01 | End: 2022-03-11

## 2022-03-01 RX ORDER — PROMETHAZINE HYDROCHLORIDE AND DEXTROMETHORPHAN HYDROBROMIDE 6.25; 15 MG/5ML; MG/5ML
5 SYRUP ORAL NIGHTLY PRN
Qty: 118 ML | Refills: 0 | Status: SHIPPED | OUTPATIENT
Start: 2022-03-01 | End: 2022-03-11

## 2022-03-01 RX ORDER — IBUPROFEN 800 MG/1
800 TABLET ORAL 3 TIMES DAILY
Qty: 30 TABLET | Refills: 0 | Status: SHIPPED | OUTPATIENT
Start: 2022-03-01 | End: 2022-03-11

## 2022-03-01 RX ORDER — KETOROLAC TROMETHAMINE 30 MG/ML
30 INJECTION, SOLUTION INTRAMUSCULAR; INTRAVENOUS
Status: COMPLETED | OUTPATIENT
Start: 2022-03-01 | End: 2022-03-01

## 2022-03-01 RX ADMIN — KETOROLAC TROMETHAMINE 30 MG: 30 INJECTION, SOLUTION INTRAMUSCULAR; INTRAVENOUS at 06:03

## 2022-03-01 NOTE — LETTER
March 1, 2022      [facility]  [address], [address]  [place] [address]  Phone: [phone]  Fax: [phone]       Patient: Krupa Walker   YOB: 1990  Date of Visit: 03/01/2022    To Whom It May Concern:    Radha Walker  was at [facility] on 03/01/2022. The patient may return to work/school on 3/3/2022 with no restrictions. If you have any questions or concerns, or if I can be of further assistance, please do not hesitate to contact me.    Sincerely,        Noe James Jr., PA

## 2022-03-02 NOTE — PATIENT INSTRUCTIONS
Clinical References    Patient Education        Sinusitis, Adult ED   General Information   You came to the Emergency Department (ED) for sinusitis. Your sinuses are hollow areas in the bones of your face. They have a thin lining that normally makes a small amount of mucus. When you have sinusitis, the lining gets swollen and makes extra mucus. You may have sinusitis with or after a cold. Most of the time sinusitis will get better in 1 to 2 weeks.  Sinusitis is most often caused by a virus, so antibiotics wont help. But some people do need antibiotics. If the doctor ordered antibiotics for you, be sure to follow the instructions. It is important to take all of your antibiotics even if you start to feel better.  What care is needed at home?   Call your regular doctor to let them know you were in the ED. Make a follow-up appointment if you were told to.  Try to thin the mucus.  Drink lots of liquids to stay hydrated.  Use a cool mist humidifier to avoid dry air.  Use saline nose drops or a saline nose rinse to relieve stuffiness.  Wash your hands often. This will help keep others healthy.  Do not smoke or be in smoke-filled places. Avoid things that may cause breathing problems like fumes, pollution, dust, and other common allergens and irritants.  You may want to take medicine like ibuprofen, naproxen, or acetaminophen to help with pain.  When do I need to get emergency help?   Return to the ED if:   You have a stiff neck, especially if you also have fever, chills, vomiting, or severe headache  You have trouble thinking clearly.  You have trouble seeing or have double vision.  You have swelling or redness or pain around one or both eyes.  You have a fever of 102°F (38.9°C) or higher, or have shaking chills or sweats.  When do I need to call the doctor?   You have an upset stomach and throwing up.  You have more pain in your face and head.  You are not getting better within 1 to 2 weeks.  You have new or  "worsening symptoms.  Last Reviewed Date   2020-08-03  Consumer Information Use and Disclaimer   This information is not specific medical advice and does not replace information you receive from your health care provider. This is only a brief summary of general information. It does NOT include all information about conditions, illnesses, injuries, tests, procedures, treatments, therapies, discharge instructions or life-style choices that may apply to you. You must talk with your health care provider for complete information about your health and treatment options. This information should not be used to decide whether or not to accept your health care providers advice, instructions or recommendations. Only your health care provider has the knowledge and training to provide advice that is right for you.  Copyright   Copyright © 2021 [employer] and its affiliates and/or licensors. All rights reserved.     Patient Education        Headache, Adult ED   General Information   You came to the Emergency Department (ED) today for a headache. The doctors feel it is unlikely that something serious is causing your headache and it is safe for you to recover at home.  You may be waiting on some test results. If so, the staff will contact you if there are concerning results.  What care is needed at home?   Call your regular doctor to let them know you were in the ED. Make a follow-up appointment if you are told to.  You can take drugs like acetaminophen, ibuprofen, or naproxen for pain as instructed, but use of these pain medicines should be limited. If you need to take pain medicines every day for headaches, call your doctor.  If possible, lie down in a quiet, dark room.  Make sure you eat at regular times. Do not skip meals. Drink plenty of fluids. Be sure you are getting enough sleep.  If you have frequent headaches that interfere with your activities, you can keep a "headache diary." This might help to see if there is a " pattern to your headaches. Make notes about:  Where your pain is on your head or neck.  When you have the pain and how long it lasts.  How your pain feels. Is it dull, sharp, burning, stabbing, or cramping?  What causes your pain?  What makes your pain better or worse?  When do I need to get emergency help?   Call for an ambulance right away if:   You have a seizure.  You have signs of stroke like sudden:  Numbness or weakness of the face, arm. or leg, especially on one side of the body.  Confusion, trouble speaking, or understanding.  Trouble seeing in one or both eyes.  Trouble walking, dizziness, loss of balance, or coordination.  Severe headache with no known cause.  You feel extremely weak, confused, or lethargic, or you pass out.  You have a headache along with neck pain, neck stiffness, fever, or chills.  You have a headache along with a new skin rash.  You have significant nausea or vomiting with your headache.  When do I need to call the doctor?   The headache lasts more than a few days or the pain gets worse or comes more often.  You have new or worsening symptoms.  Last Reviewed Date   2020-06-16  Consumer Information Use and Disclaimer   This information is not specific medical advice and does not replace information you receive from your health care provider. This is only a brief summary of general information. It does NOT include all information about conditions, illnesses, injuries, tests, procedures, treatments, therapies, discharge instructions or life-style choices that may apply to you. You must talk with your health care provider for complete information about your health and treatment options. This information should not be used to decide whether or not to accept your health care providers advice, instructions or recommendations. Only your health care provider has the knowledge and training to provide advice that is right for you.  Copyright   Copyright © 2021 [employer] and its affiliates  and/or licensors. All rights reserved.     Patient Education        Cough, Adult ED   General Information   You came to the Emergency Department (ED) for a cough. The doctors feel it is safe for you to recover at home. Many things can cause your cough. A cold or allergies can cause a cough. Other times, asthma or smoking can cause your cough. You can have a cough from mucus that drips down the back of your throat or from stomach acid that backs up into your throat. Sometimes a cough is a side effect from a drug. You may be waiting on some test results. If so, the staff will contact you if there are concerning results.  What care is needed at home?   Call your regular doctor to let them know you were in the ED. Make a follow-up appointment if you were told to.  To help you feel better:  Use a cool mist humidifier to avoid breathing dry air.  Use hard candy or cough drops to soothe sore throat and cough.  Gargle with salt water (mix 1/2 teaspoon salt with 1 cup warm water) a few times a day.  Spray saltwater mist in each nostril. Any normal saline spray works.  Sip warm liquids to keep your throat moist.  Take warm, steamy showers to help soothe the cough.  Do not smoke or be in smoke-filled places. Avoid things that may cause breathing problems like vaping, fumes, pollution, dust, and other common allergens.  You may want to use over-the-counter medicines for allergies or acid reflux if your cough is due to one of these problems.  You can also use an over-the-counter cough medicine.  When do I need to get emergency help?   Return to the ED if:   You have chest pain when you cough or trouble breathing.  You start to cough up blood or yellow or green mucus.  When do I need to call the doctor?   You have a fever of 100.4°F (38°C) or higher or chills.  You cough so hard you throw up.  You are still coughing in 10 days.  You have new or worsening symptoms.  Last Reviewed Date   2020-07-15  Consumer Information Use and  Disclaimer   This information is not specific medical advice and does not replace information you receive from your health care provider. This is only a brief summary of general information. It does NOT include all information about conditions, illnesses, injuries, tests, procedures, treatments, therapies, discharge instructions or life-style choices that may apply to you. You must talk with your health care provider for complete information about your health and treatment options. This information should not be used to decide whether or not to accept your health care providers advice, instructions or recommendations. Only your health care provider has the knowledge and training to provide advice that is right for you.  Copyright   Copyright © 2021 [employer] and its affiliates and/or licensors. All rights reserved.

## 2022-03-02 NOTE — PROGRESS NOTES
"Subjective:       Patient ID: Krupa Walker is a 31 y.o. female.    Vitals:  height is 5' 4" (1.626 m) and weight is 113.4 kg (250 lb). Her temperature is 97 °F (36.1 °C). Her blood pressure is 145/95 (abnormal) and her pulse is 85. Her respiration is 18 and oxygen saturation is 98%.     Chief Complaint: URI    Symptoms started 2 days ago, unchanged, pt states she had a fever 2 days ago of 100.7, cough comes and goes, thick yellow phlegm, headache located in frontal lobe for the past 2 weeks, taken dyquil, mucinex, and zyrtec mild relief, covid vaccinated     URI   This is a new problem. Episode onset: 2 days ago  The problem has been unchanged. There has been no fever. Associated symptoms include congestion, coughing, headaches, sinus pain and sneezing. Pertinent negatives include no abdominal pain, chest pain, diarrhea, dysuria, ear pain, joint pain, joint swelling, nausea, neck pain, plugged ear sensation, rash, rhinorrhea, sore throat, swollen glands, vomiting or wheezing. She has tried acetaminophen and decongestant for the symptoms. The treatment provided no relief.       Constitution: Positive for fever. Negative for chills, sweating and fatigue.        Fever 2 days ago was 100.7, no fever today    HENT: Positive for congestion, sinus pain and sinus pressure. Negative for ear pain, postnasal drip, sore throat and trouble swallowing.    Neck: Negative for neck pain.   Cardiovascular: Negative for chest pain.   Respiratory: Positive for cough, sputum production and stridor. Negative for chest tightness, bloody sputum, COPD, shortness of breath, wheezing and asthma.    Gastrointestinal: Negative for abdominal pain, nausea, vomiting and diarrhea.   Genitourinary: Negative for dysuria.   Skin: Negative for rash.   Allergic/Immunologic: Positive for sneezing. Negative for asthma.   Neurological: Positive for headaches.       Objective:      Physical Exam   Constitutional: She is oriented to person, place, and " time. She appears well-developed. She is cooperative.  Non-toxic appearance. She does not appear ill. No distress. normalawake  HENT:   Head: Normocephalic and atraumatic.   Ears:   Right Ear: Hearing, tympanic membrane, external ear and ear canal normal.   Left Ear: Hearing, tympanic membrane, external ear and ear canal normal.   Nose: Congestion present. No mucosal edema, rhinorrhea or nasal deformity. No epistaxis. Right sinus exhibits maxillary sinus tenderness. Right sinus exhibits no frontal sinus tenderness. Left sinus exhibits maxillary sinus tenderness. Left sinus exhibits no frontal sinus tenderness.   Mouth/Throat: Uvula is midline and mucous membranes are normal. Mucous membranes are moist. No trismus in the jaw. Normal dentition. No uvula swelling. Posterior oropharyngeal edema, posterior oropharyngeal erythema and cobblestoning present. No oropharyngeal exudate. Tonsils are 2+ on the right. Tonsils are 2+ on the left. No tonsillar exudate.   Eyes: Conjunctivae and lids are normal. Pupils are equal, round, and reactive to light. No scleral icterus.      extraocular movement intact   Neck: Trachea normal and phonation normal. Neck supple. No edema present. No erythema present. No neck rigidity present.   Cardiovascular: Normal rate, regular rhythm, normal heart sounds and normal pulses.   Pulmonary/Chest: Effort normal and breath sounds normal. No stridor. No respiratory distress. She has no decreased breath sounds. She has no wheezes. She has no rhonchi. She has no rales.   Abdominal: Normal appearance.   Musculoskeletal: Normal range of motion.         General: No deformity. Normal range of motion.   Lymphadenopathy:        Head (right side): Tonsillar adenopathy present. No submandibular, no preauricular, no posterior auricular and no occipital adenopathy present.        Head (left side): Tonsillar adenopathy present. No submandibular, no preauricular, no posterior auricular and no occipital  adenopathy present.   Neurological: She is alert and oriented to person, place, and time. She exhibits normal muscle tone. Coordination normal.   Skin: Skin is warm, dry, intact, not diaphoretic and not pale.   Psychiatric: Her speech is normal and behavior is normal. Judgment and thought content normal.   Nursing note and vitals reviewed.     Results for orders placed or performed in visit on 03/01/22   POCT COVID-19 Rapid Screening   Result Value Ref Range    POC Rapid COVID Negative Negative     Acceptable Yes    POCT Influenza A/B MOLECULAR   Result Value Ref Range    POC Molecular Influenza A Ag Negative Negative, Not Reported    POC Molecular Influenza B Ag Negative Negative, Not Reported     Acceptable Yes     No results found.       Assessment:       1. Bacterial sinusitis    2. Cough    3. Fever, unspecified fever cause    4. Nonintractable headache, unspecified chronicity pattern, unspecified headache type          Plan:         Bacterial sinusitis  -     amoxicillin-clavulanate 875-125mg (AUGMENTIN) 875-125 mg per tablet; Take 1 tablet by mouth every 12 (twelve) hours. for 10 days  Dispense: 20 tablet; Refill: 0  -     ibuprofen (ADVIL,MOTRIN) 800 MG tablet; Take 1 tablet (800 mg total) by mouth 3 (three) times daily. for 10 days  Dispense: 30 tablet; Refill: 0    Cough  -     POCT COVID-19 Rapid Screening  -     promethazine-dextromethorphan (PROMETHAZINE-DM) 6.25-15 mg/5 mL Syrp; Take 5 mLs by mouth nightly as needed.  Dispense: 118 mL; Refill: 0    Fever, unspecified fever cause  -     POCT Influenza A/B MOLECULAR  -     ibuprofen (ADVIL,MOTRIN) 800 MG tablet; Take 1 tablet (800 mg total) by mouth 3 (three) times daily. for 10 days  Dispense: 30 tablet; Refill: 0    Nonintractable headache, unspecified chronicity pattern, unspecified headache type  -     ketorolac injection 30 mg           You must understand that you've received an Urgent Care treatment only and that you  may be released before all your medical problems are known or treated. You, the patient, will arrange for follow up care as instructed.  Follow up with your PCP or specialty clinic as directed in the next 1-2 weeks if not improved or as needed.  You can call [phone] to schedule an appointment with the appropriate provider.  If your condition worsens we recommend that you receive another evaluation at the emergency room immediately or contact your primary medical clinics after hours call service to discuss your concerns.  Please return here or go to the Emergency Department for any concerns or worsening of condition.    If you were prescribed a narcotic or controlled medication, do not drive or operate heavy equipment or machinery while taking these medications.      Patient Instructions       Clinical References    Patient Education        Sinusitis, Adult ED   General Information   You came to the Emergency Department (ED) for sinusitis. Your sinuses are hollow areas in the bones of your face. They have a thin lining that normally makes a small amount of mucus. When you have sinusitis, the lining gets swollen and makes extra mucus. You may have sinusitis with or after a cold. Most of the time sinusitis will get better in 1 to 2 weeks.  Sinusitis is most often caused by a virus, so antibiotics wont help. But some people do need antibiotics. If the doctor ordered antibiotics for you, be sure to follow the instructions. It is important to take all of your antibiotics even if you start to feel better.  What care is needed at home?   1. Call your regular doctor to let them know you were in the ED. Make a follow-up appointment if you were told to.  2. Try to thin the mucus.  ? Drink lots of liquids to stay hydrated.  ? Use a cool mist humidifier to avoid dry air.  ? Use saline nose drops or a saline nose rinse to relieve stuffiness.  3. Wash your hands often. This will help keep others healthy.  4. Do not smoke  or be in smoke-filled places. Avoid things that may cause breathing problems like fumes, pollution, dust, and other common allergens and irritants.  5. You may want to take medicine like ibuprofen, naproxen, or acetaminophen to help with pain.  When do I need to get emergency help?   1. Return to the ED if:   ? You have a stiff neck, especially if you also have fever, chills, vomiting, or severe headache  ? You have trouble thinking clearly.  ? You have trouble seeing or have double vision.  ? You have swelling or redness or pain around one or both eyes.  ? You have a fever of 102°F (38.9°C) or higher, or have shaking chills or sweats.  When do I need to call the doctor?   · You have an upset stomach and throwing up.  · You have more pain in your face and head.  · You are not getting better within 1 to 2 weeks.  · You have new or worsening symptoms.  Last Reviewed Date   2020-08-03  Consumer Information Use and Disclaimer   This information is not specific medical advice and does not replace information you receive from your health care provider. This is only a brief summary of general information. It does NOT include all information about conditions, illnesses, injuries, tests, procedures, treatments, therapies, discharge instructions or life-style choices that may apply to you. You must talk with your health care provider for complete information about your health and treatment options. This information should not be used to decide whether or not to accept your health care providers advice, instructions or recommendations. Only your health care provider has the knowledge and training to provide advice that is right for you.  Copyright   Copyright © 2021 [employer] and its affiliates and/or licensors. All rights reserved.     Patient Education        Headache, Adult ED   General Information   You came to the Emergency Department (ED) today for a headache. The doctors feel it is unlikely that something serious  "is causing your headache and it is safe for you to recover at home.  You may be waiting on some test results. If so, the staff will contact you if there are concerning results.  What care is needed at home?   1. Call your regular doctor to let them know you were in the ED. Make a follow-up appointment if you are told to.  2. You can take drugs like acetaminophen, ibuprofen, or naproxen for pain as instructed, but use of these pain medicines should be limited. If you need to take pain medicines every day for headaches, call your doctor.  3. If possible, lie down in a quiet, dark room.  4. Make sure you eat at regular times. Do not skip meals. Drink plenty of fluids. Be sure you are getting enough sleep.  5. If you have frequent headaches that interfere with your activities, you can keep a "headache diary." This might help to see if there is a pattern to your headaches. Make notes about:  ? Where your pain is on your head or neck.  ? When you have the pain and how long it lasts.  ? How your pain feels. Is it dull, sharp, burning, stabbing, or cramping?  ? What causes your pain?  ? What makes your pain better or worse?  When do I need to get emergency help?   1. Call for an ambulance right away if:   1. You have a seizure.  2. You have signs of stroke like sudden:  § Numbness or weakness of the face, arm. or leg, especially on one side of the body.  § Confusion, trouble speaking, or understanding.  § Trouble seeing in one or both eyes.  § Trouble walking, dizziness, loss of balance, or coordination.  § Severe headache with no known cause.  3. You feel extremely weak, confused, or lethargic, or you pass out.  4. You have a headache along with neck pain, neck stiffness, fever, or chills.  5. You have a headache along with a new skin rash.  6. You have significant nausea or vomiting with your headache.  When do I need to call the doctor?   · The headache lasts more than a few days or the pain gets worse or comes more " often.  · You have new or worsening symptoms.  Last Reviewed Date   2020-06-16  Consumer Information Use and Disclaimer   This information is not specific medical advice and does not replace information you receive from your health care provider. This is only a brief summary of general information. It does NOT include all information about conditions, illnesses, injuries, tests, procedures, treatments, therapies, discharge instructions or life-style choices that may apply to you. You must talk with your health care provider for complete information about your health and treatment options. This information should not be used to decide whether or not to accept your health care providers advice, instructions or recommendations. Only your health care provider has the knowledge and training to provide advice that is right for you.  Copyright   Copyright © 2021 [employer] and its affiliates and/or licensors. All rights reserved.     Patient Education        Cough, Adult ED   General Information   You came to the Emergency Department (ED) for a cough. The doctors feel it is safe for you to recover at home. Many things can cause your cough. A cold or allergies can cause a cough. Other times, asthma or smoking can cause your cough. You can have a cough from mucus that drips down the back of your throat or from stomach acid that backs up into your throat. Sometimes a cough is a side effect from a drug. You may be waiting on some test results. If so, the staff will contact you if there are concerning results.  What care is needed at home?   1. Call your regular doctor to let them know you were in the ED. Make a follow-up appointment if you were told to.  2. To help you feel better:  ? Use a cool mist humidifier to avoid breathing dry air.  ? Use hard candy or cough drops to soothe sore throat and cough.  ? Gargle with salt water (mix 1/2 teaspoon salt with 1 cup warm water) a few times a day.  ? Spray saltwater mist in  each nostril. Any normal saline spray works.  ? Sip warm liquids to keep your throat moist.  ? Take warm, steamy showers to help soothe the cough.  3. Do not smoke or be in smoke-filled places. Avoid things that may cause breathing problems like vaping, fumes, pollution, dust, and other common allergens.  4. You may want to use over-the-counter medicines for allergies or acid reflux if your cough is due to one of these problems.  5. You can also use an over-the-counter cough medicine.  When do I need to get emergency help?   1. Return to the ED if:   ? You have chest pain when you cough or trouble breathing.  ? You start to cough up blood or yellow or green mucus.  When do I need to call the doctor?   · You have a fever of 100.4°F (38°C) or higher or chills.  · You cough so hard you throw up.  · You are still coughing in 10 days.  · You have new or worsening symptoms.  Last Reviewed Date   2020-07-15  Consumer Information Use and Disclaimer   This information is not specific medical advice and does not replace information you receive from your health care provider. This is only a brief summary of general information. It does NOT include all information about conditions, illnesses, injuries, tests, procedures, treatments, therapies, discharge instructions or life-style choices that may apply to you. You must talk with your health care provider for complete information about your health and treatment options. This information should not be used to decide whether or not to accept your health care providers advice, instructions or recommendations. Only your health care provider has the knowledge and training to provide advice that is right for you.  Copyright   Copyright © 2021 [employer] and its affiliates and/or licensors. All rights reserved.           ANNMARIE Quick      Additional MDM:     Heart Failure Score:   COPD = No

## 2022-03-07 ENCOUNTER — OFFICE VISIT (OUTPATIENT)
Dept: PSYCHIATRY | Facility: CLINIC | Age: 32
End: 2022-03-07
Payer: COMMERCIAL

## 2022-03-07 DIAGNOSIS — F33.1 MODERATE EPISODE OF RECURRENT MAJOR DEPRESSIVE DISORDER: ICD-10-CM

## 2022-03-07 DIAGNOSIS — F90.0 ATTENTION DEFICIT HYPERACTIVITY DISORDER (ADHD), PREDOMINANTLY INATTENTIVE TYPE: ICD-10-CM

## 2022-03-07 DIAGNOSIS — F99 INSOMNIA DUE TO OTHER MENTAL DISORDER: ICD-10-CM

## 2022-03-07 DIAGNOSIS — F41.1 GAD (GENERALIZED ANXIETY DISORDER): Primary | ICD-10-CM

## 2022-03-07 DIAGNOSIS — F51.05 INSOMNIA DUE TO OTHER MENTAL DISORDER: ICD-10-CM

## 2022-03-07 PROCEDURE — 1160F RVW MEDS BY RX/DR IN RCRD: CPT | Mod: CPTII,95,, | Performed by: INTERNAL MEDICINE

## 2022-03-07 PROCEDURE — 1159F PR MEDICATION LIST DOCUMENTED IN MEDICAL RECORD: ICD-10-PCS | Mod: CPTII,95,, | Performed by: INTERNAL MEDICINE

## 2022-03-07 PROCEDURE — 1159F MED LIST DOCD IN RCRD: CPT | Mod: CPTII,95,, | Performed by: INTERNAL MEDICINE

## 2022-03-07 PROCEDURE — 99214 OFFICE O/P EST MOD 30 MIN: CPT | Mod: 95,,, | Performed by: INTERNAL MEDICINE

## 2022-03-07 PROCEDURE — 99214 PR OFFICE/OUTPT VISIT, EST, LEVL IV, 30-39 MIN: ICD-10-PCS | Mod: 95,,, | Performed by: INTERNAL MEDICINE

## 2022-03-07 PROCEDURE — 1160F PR REVIEW ALL MEDS BY PRESCRIBER/CLIN PHARMACIST DOCUMENTED: ICD-10-PCS | Mod: CPTII,95,, | Performed by: INTERNAL MEDICINE

## 2022-03-07 RX ORDER — VILAZODONE HYDROCHLORIDE 10 MG/1
10 TABLET ORAL DAILY
Qty: 30 TABLET | Refills: 2 | Status: SHIPPED | OUTPATIENT
Start: 2022-03-07 | End: 2022-04-21

## 2022-03-07 NOTE — PROGRESS NOTES
OUTPATIENT PSYCHIATRY RETURN VISIT    ENCOUNTER DATE:  3/13/2022  SITE:  [facility]  LENGTH OF SESSION:  11 minutes    The patient location is:  [place]   The chief complaint leading to consultation is:  ADHD, mood    Visit type:  audiovisual    Face to Face time with patient:  11 minutes  16 minutes of total time spent on the encounter, which includes face to face time and non-face to face time preparing to see the patient (eg, review of tests), Obtaining and/or reviewing separately obtained history, Documenting clinical information in the electronic or other health record, Independently interpreting results (not separately reported) and communicating results to the patient/family/caregiver, or Care coordination (not separately reported).     Each patient to whom he or she provides medical services by telemedicine is:  (1) informed of the relationship between the physician and patient and the respective role of any other health care provider with respect to management of the patient; and (2) notified that he or she may decline to receive medical services by telemedicine and may withdraw from such care at any time.    CHIEF COMPLAINT:  Mood      HISTORY OF PRESENTING ILLNESS:  Krupa Walker is a 31 y.o. female with history of MDD, JAVI, ADHD, and Insomnia who presents for follow up appointment.  Patient arrived 18 minutes late for appointment today.    Plan at last appointment on 11/22/2021:  · Increase morning dose of Ritalin to 15mg and continue 5mg in the afternoon.  She will let me know if he experiences side effects.    · Discussed with patient informed consent, risks versus benefits, alternative treatments, side effect profile and the inherent unpredictability of individual responses to these treatments.  The patient expresses understanding of the above and displays the capacity to agree with this current plan.  · She is starting therapy with Megha Ward.    History as told  by patient:  Says she has been about the same.  Ritalin was making her have more headaches.  Has been seeing Megha for therapy about once every 3 weeks.  She would like to try another medication.  Depression and anxiety the same.      Medication side effects:  Denies  Medication compliance:  Yes    PSYCHIATRIC REVIEW OF SYSTEMS:  Trouble with sleep:  At times  Appetite changes:  Denies  Weight changes:  Denies  Lack of energy:  Yes  Anhedonia:  At times  Somatic symptoms:  Denies  Libido:  Not discussed  Anxiety/panic:  At times  Guilty/hopeless:  At times  Self-injurious behavior/risky behavior:  Denies  Any drugs:  Denies  Alcohol:  Very rarely  Breastfeeding:  No    MEDICAL REVIEW OF SYSTEMS:  Complete review of systems performed covering Constitutional, Musculoskeletal, Neurologic.  All systems negative except for that covered in HPI.    PAST PSYCHIATRIC, MEDICAL, AND SOCIAL HISTORY REVIEWED  The patient's past medical, family and social history have been reviewed and updated as appropriate within the electronic medical record - see encounter notes.    MEDICATIONS:    Current Outpatient Medications:     meloxicam (MOBIC) 15 MG tablet, Take 1 tablet (15 mg total) by mouth once daily., Disp: 90 tablet, Rfl: 3    methylphenidate HCl (RITALIN) 10 MG tablet, Take 1 tablet (10 mg total) by mouth 2 (two) times daily., Disp: 60 tablet, Rfl: 0    multivit with calcium,iron,min (WOMEN'S DAILY MULTIVITAMIN ORAL), Women's Daily Multivitamin, Disp: , Rfl:     rizatriptan (MAXALT) 10 MG tablet, Take one tablet as need for migraine, can take second tablet 2 hours later if needed., Disp: 10 tablet, Rfl: 5    traZODone (DESYREL) 50 MG tablet, Take 1 tablet (50 mg total) by mouth every evening., Disp: 90 tablet, Rfl: 3    vilazodone (VIIBRYD) 10 mg Tab tablet, Take 1 tablet (10 mg total) by mouth once daily., Disp: 30 tablet, Rfl: 2    ALLERGIES:  Review of patient's allergies indicates:  No Known Allergies    PSYCHIATRIC  "EXAM:  There were no vitals filed for this visit.  Appearance:  Well groomed, appearing healthy and of stated age  Behavior:  Cooperative, pleasant, no psychomotor agitation or retardation  Speech:  Normal rate, rhythm, prosody, and volume  Mood:  "The same"  Affect:  Euthymic  Thought Process:  Linear, logical, goal directed  Thought Content:  Negative for suicidal ideation, homicidal ideation, delusions or hallucinations.  Associations:  Intact  Memory:  Grossly Intact  Level of Consciousness/Orientation:  Grossly intact  Fund of Knowledge:  Good  Attention:  Good  Language:  Fluent, able to name abstract and concrete objects  Insight:  Fair  Judgment:  Intact  Psychomotor signs:  No abnormal movements of face  Gait:  Unable to assess via virtual visit      RELEVANT LABS/STUDIES:    Lab Results   Component Value Date    WBC 8.31 06/04/2021    HGB 14.5 06/04/2021    HCT 41.8 06/04/2021    MCV 92 06/04/2021     06/04/2021     BMP  Lab Results   Component Value Date     (L) 06/04/2021    K 4.6 06/04/2021     06/04/2021    CO2 24 06/04/2021    BUN 14 06/04/2021    CREATININE 0.8 06/04/2021    CALCIUM 9.7 06/04/2021    ANIONGAP 6 (L) 06/04/2021    ESTGFRAFRICA >60 06/04/2021    EGFRNONAA >60 06/04/2021     Lab Results   Component Value Date    ALT 8 (L) 06/04/2021    AST 19 06/04/2021    ALKPHOS 84 06/04/2021    BILITOT 0.5 06/04/2021     Lab Results   Component Value Date    TSH 1.780 11/05/2020     Lab Results   Component Value Date    HGBA1C 4.9 12/04/2019       IMPRESSION:    Krupa Walker is a 31 y.o. female with history of MDD, JAVI, ADHD, and Insomnia who presents for follow up appointment.    Status/Progress:  Based on the examination today, the patient's problem(s) is/are inadequately controlled.  New problems have not been presented today.    Risk Parameters:  Patient reports no suicidal ideation  Patient reports no homicidal ideation  Patient reports no self-injurious behavior  Patient " reports no violent behavior    DIAGNOSES:    ICD-10-CM ICD-9-CM   1. JAVI (generalized anxiety disorder)  F41.1 300.02   2. Moderate episode of recurrent major depressive disorder  F33.1 296.32   3. Insomnia due to other mental disorder  F51.05 300.9    F99 327.02   4. Attention deficit hyperactivity disorder (ADHD), predominantly inattentive type  F90.0 314.00       PLAN:  · Discussed medication recommendations based on [facility] results, 1. Retrying Pristiq 25mg daily since she seemed to tolerate this dose, 2. Starting Viibryd which she has never tried before.  She would like to try Viibryd.  Will start 5mg daily and then once tolerating will increase to 10mg daily.  · Discussed with patient informed consent, risks versus benefits, alternative treatments, side effect profile and the inherent unpredictability of individual responses to these treatments.  The patient expresses understanding of the above and displays the capacity to agree with this current plan.  · Continue therapy with Megha Ward LCSW.    RETURN TO CLINIC:  Follow up in about 2 weeks (around 3/21/2022).

## 2022-03-08 ENCOUNTER — TELEPHONE (OUTPATIENT)
Dept: PHARMACY | Facility: CLINIC | Age: 32
End: 2022-03-08
Payer: COMMERCIAL

## 2022-03-18 ENCOUNTER — OFFICE VISIT (OUTPATIENT)
Dept: OBSTETRICS AND GYNECOLOGY | Facility: CLINIC | Age: 32
End: 2022-03-18
Attending: OBSTETRICS & GYNECOLOGY
Payer: COMMERCIAL

## 2022-03-18 VITALS
BODY MASS INDEX: 41.1 KG/M2 | DIASTOLIC BLOOD PRESSURE: 88 MMHG | HEIGHT: 64 IN | SYSTOLIC BLOOD PRESSURE: 134 MMHG | WEIGHT: 240.75 LBS

## 2022-03-18 DIAGNOSIS — Z01.419 WELL WOMAN EXAM WITH ROUTINE GYNECOLOGICAL EXAM: Primary | ICD-10-CM

## 2022-03-18 DIAGNOSIS — N89.8 VAGINAL ITCHING: ICD-10-CM

## 2022-03-18 PROCEDURE — 87481 CANDIDA DNA AMP PROBE: CPT | Mod: 59 | Performed by: OBSTETRICS & GYNECOLOGY

## 2022-03-18 PROCEDURE — 3008F PR BODY MASS INDEX (BMI) DOCUMENTED: ICD-10-PCS | Mod: CPTII,S$GLB,, | Performed by: OBSTETRICS & GYNECOLOGY

## 2022-03-18 PROCEDURE — 3079F PR MOST RECENT DIASTOLIC BLOOD PRESSURE 80-89 MM HG: ICD-10-PCS | Mod: CPTII,S$GLB,, | Performed by: OBSTETRICS & GYNECOLOGY

## 2022-03-18 PROCEDURE — 1160F RVW MEDS BY RX/DR IN RCRD: CPT | Mod: CPTII,S$GLB,, | Performed by: OBSTETRICS & GYNECOLOGY

## 2022-03-18 PROCEDURE — 3079F DIAST BP 80-89 MM HG: CPT | Mod: CPTII,S$GLB,, | Performed by: OBSTETRICS & GYNECOLOGY

## 2022-03-18 PROCEDURE — 1159F PR MEDICATION LIST DOCUMENTED IN MEDICAL RECORD: ICD-10-PCS | Mod: CPTII,S$GLB,, | Performed by: OBSTETRICS & GYNECOLOGY

## 2022-03-18 PROCEDURE — 3075F SYST BP GE 130 - 139MM HG: CPT | Mod: CPTII,S$GLB,, | Performed by: OBSTETRICS & GYNECOLOGY

## 2022-03-18 PROCEDURE — 99395 PR PREVENTIVE VISIT,EST,18-39: ICD-10-PCS | Mod: S$GLB,,, | Performed by: OBSTETRICS & GYNECOLOGY

## 2022-03-18 PROCEDURE — 1160F PR REVIEW ALL MEDS BY PRESCRIBER/CLIN PHARMACIST DOCUMENTED: ICD-10-PCS | Mod: CPTII,S$GLB,, | Performed by: OBSTETRICS & GYNECOLOGY

## 2022-03-18 PROCEDURE — 99395 PREV VISIT EST AGE 18-39: CPT | Mod: S$GLB,,, | Performed by: OBSTETRICS & GYNECOLOGY

## 2022-03-18 PROCEDURE — 3075F PR MOST RECENT SYSTOLIC BLOOD PRESS GE 130-139MM HG: ICD-10-PCS | Mod: CPTII,S$GLB,, | Performed by: OBSTETRICS & GYNECOLOGY

## 2022-03-18 PROCEDURE — 99999 PR PBB SHADOW E&M-EST. PATIENT-LVL III: CPT | Mod: PBBFAC,,, | Performed by: OBSTETRICS & GYNECOLOGY

## 2022-03-18 PROCEDURE — 3008F BODY MASS INDEX DOCD: CPT | Mod: CPTII,S$GLB,, | Performed by: OBSTETRICS & GYNECOLOGY

## 2022-03-18 PROCEDURE — 1159F MED LIST DOCD IN RCRD: CPT | Mod: CPTII,S$GLB,, | Performed by: OBSTETRICS & GYNECOLOGY

## 2022-03-18 PROCEDURE — 99999 PR PBB SHADOW E&M-EST. PATIENT-LVL III: ICD-10-PCS | Mod: PBBFAC,,, | Performed by: OBSTETRICS & GYNECOLOGY

## 2022-03-18 RX ORDER — FLUCONAZOLE 200 MG/1
200 TABLET ORAL ONCE
Qty: 2 TABLET | Refills: 0 | Status: SHIPPED | OUTPATIENT
Start: 2022-03-18 | End: 2022-03-18

## 2022-03-18 RX ORDER — CLOTRIMAZOLE AND BETAMETHASONE DIPROPIONATE 10; .64 MG/G; MG/G
CREAM TOPICAL
Qty: 15 G | Refills: 1 | Status: SHIPPED | OUTPATIENT
Start: 2022-03-18 | End: 2022-04-07

## 2022-03-18 NOTE — PROGRESS NOTES
CC: Well woman exam    Krupa Walker is a 31 y.o. female  presents for well woman exam.  LMP: Patient's last menstrual period was 2022..  No issues, problems, or complaints.  Recent bronchitis, took abx, vaginal itching, has tried OTC with no relief.      Past Medical History:   Diagnosis Date    Anxiety     Depression     GERD (gastroesophageal reflux disease)     Gestational hypertension, third trimester 2019    Hypertension     gestational     Hypoglycemia     Kidney calculi         Migraines     PONV (postoperative nausea and vomiting)     STD (sexually transmitted disease)     Urinary tract infection      Past Surgical History:   Procedure Laterality Date    ANKLE SURGERY Right 2008     SECTION  10/2014    CYSTOSCOPY W/ URETERAL STENT PLACEMENT Left 10/2/2019    Procedure: CYSTOSCOPY, WITH URETERAL STENT INSERTION - Under ultrasound guidance;  Surgeon: Marsha Guevara MD;  Location: [facility];  Service: Urology;  Laterality: Left;    KIDNEY STONE SURGERY      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)    LASER LITHOTRIPSY Left 10/16/2019    Procedure: LITHOTRIPSY, USING LASER;  Surgeon: Marsha Guevara MD;  Location: [facility];  Service: Urology;  Laterality: Left;    TONSILLECTOMY      ULTRASOUND GUIDANCE Left 10/16/2019    Procedure: ULTRASOUND GUIDANCE, for laser litho;  Surgeon: Marsha Guevara MD;  Location: [facility];  Service: Urology;  Laterality: Left;    URETEROSCOPIC REMOVAL OF URETERIC CALCULUS Left 10/16/2019    Procedure: REMOVAL, CALCULUS, URETER, URETEROSCOPIC possible retro pyelo, possible lasere litho, possible stent;  Surgeon: Marsha Guevara MD;  Location: [facility];  Service: Urology;  Laterality: Left;  25 WEEKS PREGNANT / FETAL MONITORING BEFORE AND AFTER PROCEDURE/   CALL PELON ELDER [phone]      WISDOM TOOTH EXTRACTION       Social History     Socioeconomic History    Marital  "status:    Tobacco Use    Smoking status: Never Smoker    Smokeless tobacco: Never Used   Substance and Sexual Activity    Alcohol use: Yes     Alcohol/week: 0.0 standard drinks     Comment: 1 drink per month    Drug use: Never    Sexual activity: Yes     Partners: Male     Birth control/protection: Condom     Comment: IUD removed due to complications in 2018     Family History   Problem Relation Age of Onset    Hypertension Mother     Hypertension Father     Heart disease Father     Depression Father     Hyperlipidemia Father     No Known Problems Sister     Nephrolithiasis Daughter     No Known Problems Sister     No Known Problems Sister     Breast cancer Paternal Grandmother         50s at diagnosis    Cancer Paternal Grandmother         breast    Hypertension Paternal Grandmother     Heart disease Paternal Grandfather     Hyperlipidemia Paternal Grandfather     Hypertension Paternal Grandfather     Hyperlipidemia Maternal Grandfather     Hypertension Maternal Grandmother     Colon cancer Neg Hx     Ovarian cancer Neg Hx      OB History        2    Para   2    Term   2            AB        Living   2       SAB        IAB        Ectopic        Multiple   0    Live Births   2                 /88   Ht 5' 4" (1.626 m)   Wt 109.2 kg (240 lb 11.9 oz)   LMP 2022   BMI 41.32 kg/m²       ROS:  GENERAL: Denies weight gain or weight loss. Feeling well overall.   SKIN: Denies rash or lesions.   HEAD: Denies head injury or headache.   NODES: Denies enlarged lymph nodes.   CHEST: Denies chest pain or shortness of breath.   CARDIOVASCULAR: Denies palpitations or left sided chest pain.   ABDOMEN: No abdominal pain, constipation, diarrhea, nausea, vomiting or rectal bleeding.   URINARY: No frequency, dysuria, hematuria, or burning on urination.  REPRODUCTIVE: See HPI.   BREASTS: The patient performs breast self-examination and denies pain, lumps, or nipple " discharge.   HEMATOLOGIC: No easy bruisability or excessive bleeding.   MUSCULOSKELETAL: Denies joint pain or swelling.   NEUROLOGIC: Denies syncope or weakness.   PSYCHIATRIC: Denies depression, anxiety or mood swings.    PHYSICAL EXAM:  APPEARANCE: Well nourished, well developed, in no acute distress.  AFFECT: WNL, alert and oriented x 3  SKIN: No acne or hirsutism  NECK: Neck symmetric without masses or thyromegaly  NODES: No inguinal, cervical, axillary, or femoral lymph node enlargement  CHEST: Good respiratory effect  ABDOMEN: Soft.  No tenderness or masses.  No hepatosplenomegaly.  No hernias.  BREASTS: Symmetrical, no skin changes or visible lesions.  No palpable masses, nipple discharge bilaterally.  PELVIC: Normal external genitalia without lesions.  Normal hair distribution.  Adequate perineal body, normal urethral meatus.  Vagina moist and well rugated without lesions or discharge.  Cervix pink, without lesions, discharge or tenderness.  No significant cystocele or rectocele.  Bimanual exam shows uterus to be normal size, regular, mobile and nontender.  Adnexa without masses or tenderness.    EXTREMITIES: No edema.    Well woman exam with routine gynecological exam    Vaginal itching  -     fluconazole (DIFLUCAN) 200 MG Tab; Take 1 tablet (200 mg total) by mouth once. for 1 dose  Dispense: 2 tablet; Refill: 0  -     clotrimazole-betamethasone 1-0.05% (LOTRISONE) cream; Apply to affected area 2 times daily  Dispense: 15 g; Refill: 1  -     Vaginosis Screen by DNA Probe            Patient was counseled today on A.C.S. Pap guidelines and recommendations for yearly pelvic exams, mammograms and monthly self breast exams; to see her PCP for other health maintenance.     No follow-ups on file.

## 2022-03-22 LAB
BACTERIAL VAGINOSIS DNA: NEGATIVE
CANDIDA GLABRATA DNA: NEGATIVE
CANDIDA KRUSEI DNA: NEGATIVE
CANDIDA RRNA VAG QL PROBE: POSITIVE
T VAGINALIS RRNA GENITAL QL PROBE: NEGATIVE

## 2022-03-23 ENCOUNTER — PATIENT MESSAGE (OUTPATIENT)
Dept: OBSTETRICS AND GYNECOLOGY | Facility: CLINIC | Age: 32
End: 2022-03-23
Payer: COMMERCIAL

## 2022-04-07 ENCOUNTER — OFFICE VISIT (OUTPATIENT)
Dept: INTERNAL MEDICINE | Facility: CLINIC | Age: 32
End: 2022-04-07
Payer: COMMERCIAL

## 2022-04-07 VITALS
HEART RATE: 90 BPM | TEMPERATURE: 99 F | DIASTOLIC BLOOD PRESSURE: 82 MMHG | OXYGEN SATURATION: 98 % | SYSTOLIC BLOOD PRESSURE: 122 MMHG | BODY MASS INDEX: 41.57 KG/M2 | WEIGHT: 242.19 LBS

## 2022-04-07 DIAGNOSIS — E66.01 CLASS 3 SEVERE OBESITY DUE TO EXCESS CALORIES WITHOUT SERIOUS COMORBIDITY WITH BODY MASS INDEX (BMI) OF 40.0 TO 44.9 IN ADULT: ICD-10-CM

## 2022-04-07 PROCEDURE — 3079F PR MOST RECENT DIASTOLIC BLOOD PRESSURE 80-89 MM HG: ICD-10-PCS | Mod: CPTII,S$GLB,, | Performed by: INTERNAL MEDICINE

## 2022-04-07 PROCEDURE — 99999 PR PBB SHADOW E&M-EST. PATIENT-LVL III: ICD-10-PCS | Mod: PBBFAC,,, | Performed by: INTERNAL MEDICINE

## 2022-04-07 PROCEDURE — 3074F PR MOST RECENT SYSTOLIC BLOOD PRESSURE < 130 MM HG: ICD-10-PCS | Mod: CPTII,S$GLB,, | Performed by: INTERNAL MEDICINE

## 2022-04-07 PROCEDURE — 99214 OFFICE O/P EST MOD 30 MIN: CPT | Mod: S$GLB,,, | Performed by: INTERNAL MEDICINE

## 2022-04-07 PROCEDURE — 3008F PR BODY MASS INDEX (BMI) DOCUMENTED: ICD-10-PCS | Mod: CPTII,S$GLB,, | Performed by: INTERNAL MEDICINE

## 2022-04-07 PROCEDURE — 99214 PR OFFICE/OUTPT VISIT, EST, LEVL IV, 30-39 MIN: ICD-10-PCS | Mod: S$GLB,,, | Performed by: INTERNAL MEDICINE

## 2022-04-07 PROCEDURE — 99999 PR PBB SHADOW E&M-EST. PATIENT-LVL III: CPT | Mod: PBBFAC,,, | Performed by: INTERNAL MEDICINE

## 2022-04-07 PROCEDURE — 1159F PR MEDICATION LIST DOCUMENTED IN MEDICAL RECORD: ICD-10-PCS | Mod: CPTII,S$GLB,, | Performed by: INTERNAL MEDICINE

## 2022-04-07 PROCEDURE — 1159F MED LIST DOCD IN RCRD: CPT | Mod: CPTII,S$GLB,, | Performed by: INTERNAL MEDICINE

## 2022-04-07 PROCEDURE — 3074F SYST BP LT 130 MM HG: CPT | Mod: CPTII,S$GLB,, | Performed by: INTERNAL MEDICINE

## 2022-04-07 PROCEDURE — 3008F BODY MASS INDEX DOCD: CPT | Mod: CPTII,S$GLB,, | Performed by: INTERNAL MEDICINE

## 2022-04-07 PROCEDURE — 3079F DIAST BP 80-89 MM HG: CPT | Mod: CPTII,S$GLB,, | Performed by: INTERNAL MEDICINE

## 2022-04-07 RX ORDER — SEMAGLUTIDE 0.25 MG/.5ML
0.25 INJECTION, SOLUTION SUBCUTANEOUS
Qty: 2 ML | Refills: 0 | Status: SHIPPED | OUTPATIENT
Start: 2022-04-07 | End: 2022-04-13

## 2022-04-07 NOTE — PROGRESS NOTES
[facility] Note    Chief Complaint      Chief Complaint   Patient presents with    Weight Loss     Wants to discuss weight loss options.      History of Present Illness      Krupa Walker is a 31 y.o. female who presents today for weight gain.  Patient comes to appointment alone.  Uro: Dr. Guevara, Neuro: Dr. Turner, GYN: Dr. Crocker, Endo: Dr. Sharp    Doing MyFitStratasan Pal, watching net carbs.  Has lost 11.2 pounds.  Used to take Adipex in past, did ok with it but felt like she forced herself to eat.  Had palpitations.  Interested in Wegovy.  Has been exercising 3-4 times per week, just got exercise bike but likes biking outdoors more.    Problem List Items Addressed This Visit     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  Mother in law cooks most meals, not always healthy.  Got bike.  Still breastfeeding.           Relevant Medications    semaglutide, weight loss, (WEGOVY) 0.25 mg/0.5 mL PnIj          Health Maintenance   Topic Date Due    TETANUS VACCINE  10/30/2029    Hepatitis C Screening  Completed    Lipid Panel  Completed       Past Medical History:   Diagnosis Date    Anxiety     Depression     GERD (gastroesophageal reflux disease)     Gestational hypertension, third trimester 2019    Hypertension     gestational     Hypoglycemia     Kidney calculi     2016    Migraines     PONV (postoperative nausea and vomiting)     STD (sexually transmitted disease)     Urinary tract infection        Past Surgical History:   Procedure Laterality Date    ANKLE SURGERY Right      SECTION  10/2014    CYSTOSCOPY W/ URETERAL STENT PLACEMENT Left 10/2/2019    Procedure: CYSTOSCOPY, WITH URETERAL STENT INSERTION - Under ultrasound guidance;   Surgeon: Marsha Guevara MD;  Location: [facility];  Service: Urology;  Laterality: Left;    KIDNEY STONE SURGERY      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)    LASER LITHOTRIPSY Left 10/16/2019    Procedure: LITHOTRIPSY, USING LASER;  Surgeon: Marsha Guevara MD;  Location: [facility];  Service: Urology;  Laterality: Left;    TONSILLECTOMY      ULTRASOUND GUIDANCE Left 10/16/2019    Procedure: ULTRASOUND GUIDANCE, for laser litho;  Surgeon: Marsha Guevara MD;  Location: [facility];  Service: Urology;  Laterality: Left;    URETEROSCOPIC REMOVAL OF URETERIC CALCULUS Left 10/16/2019    Procedure: REMOVAL, CALCULUS, URETER, URETEROSCOPIC possible retro pyelo, possible lasere litho, possible stent;  Surgeon: Marsha Guevara MD;  Location: [facility];  Service: Urology;  Laterality: Left;  25 WEEKS PREGNANT / FETAL MONITORING BEFORE AND AFTER PROCEDURE/   CALL PELON ELDER [phone]      WISDOM TOOTH EXTRACTION         family history includes Breast cancer in her paternal grandmother; Cancer in her paternal grandmother; Depression in her father; Heart disease in her father and paternal grandfather; Hyperlipidemia in her father, maternal grandfather, and paternal grandfather; Hypertension in her father, maternal grandmother, mother, paternal grandfather, and paternal grandmother; Nephrolithiasis in her daughter; No Known Problems in her sister, sister, and sister.    Social History     Tobacco Use    Smoking status: Never Smoker    Smokeless tobacco: Never Used   Substance Use Topics    Alcohol use: Yes     Alcohol/week: 0.0 standard drinks     Comment: 1 drink per month    Drug use: Never       Review of Systems   Constitutional: Negative for chills and fever.   HENT: Negative for sore throat.    Respiratory: Negative for cough and shortness of breath.    Cardiovascular: Negative for chest pain and palpitations.   Gastrointestinal: Negative for constipation,  diarrhea, nausea and vomiting.   Genitourinary: Negative for dysuria and hematuria.   Musculoskeletal: Negative for falls.   Neurological: Negative for headaches.        Outpatient Encounter Medications as of 4/7/2022   Medication Sig Dispense Refill    meloxicam (MOBIC) 15 MG tablet Take 1 tablet (15 mg total) by mouth once daily. 90 tablet 3    methylphenidate HCl (RITALIN) 10 MG tablet Take 1 tablet (10 mg total) by mouth 2 (two) times daily. 60 tablet 0    multivit with calcium,iron,min (WOMEN'S DAILY MULTIVITAMIN ORAL) Women's Daily Multivitamin      rizatriptan (MAXALT) 10 MG tablet Take one tablet as need for migraine, can take second tablet 2 hours later if needed. 10 tablet 5    traZODone (DESYREL) 50 MG tablet Take 1 tablet (50 mg total) by mouth every evening. 90 tablet 3    vilazodone (VIIBRYD) 10 mg Tab tablet Take 1 tablet (10 mg total) by mouth once daily. 30 tablet 2    semaglutide, weight loss, (WEGOVY) 0.25 mg/0.5 mL PnIj Inject 0.25 mg into the skin every 7 days. 2 mL 0    [DISCONTINUED] clotrimazole-betamethasone 1-0.05% (LOTRISONE) cream Apply to affected area 2 times daily (Patient not taking: Reported on 4/7/2022) 15 g 1     No facility-administered encounter medications on file as of 4/7/2022.        Review of patient's allergies indicates:  No Known Allergies    Physical Exam      Vital Signs  Temp: 98.7 °F (37.1 °C)  Pulse: 90  SpO2: 98 %  BP: 122/82  Height and Weight  Weight: 109.8 kg (242 lb 2.8 oz)]    Physical Exam  Constitutional:       Appearance: She is well-developed.   HENT:      Head: Normocephalic and atraumatic.      Right Ear: External ear normal.      Left Ear: External ear normal.   Eyes:      General:         Right eye: No discharge.         Left eye: No discharge.   Cardiovascular:      Rate and Rhythm: Normal rate and regular rhythm.      Heart sounds: Normal heart sounds. No murmur heard.  Pulmonary:      Effort: Pulmonary effort is normal. No respiratory  distress.      Breath sounds: Normal breath sounds.   Abdominal:      General: There is no distension.      Palpations: Abdomen is soft.      Tenderness: There is no abdominal tenderness. There is no guarding.   Musculoskeletal:         General: Normal range of motion.      Cervical back: Normal range of motion.   Skin:     General: Skin is warm and dry.   Neurological:      Mental Status: She is alert and oriented to person, place, and time.   Psychiatric:         Behavior: Behavior normal.          Laboratory:  CBC:  No results for input(s): WBC, RBC, HGB, HCT, PLT, MCV, MCH, MCHC in the last 2160 hours.  CMP:  No results for input(s): GLU, CALCIUM, ALBUMIN, PROT, NA, K, CO2, CL, BUN, ALKPHOS, ALT, AST, BILITOT in the last 2160 hours.    Invalid input(s): CREATININ  URINALYSIS:  No results for input(s): COLORU, CLARITYU, SPECGRAV, PHUR, PROTEINUA, GLUCOSEU, BILIRUBINCON, BLOODU, WBCU, RBCU, BACTERIA, MUCUS, NITRITE, LEUKOCYTESUR, UROBILINOGEN, HYALINECASTS in the last 2160 hours.   LIPIDS:  No results for input(s): TSH, HDL, CHOL, TRIG, LDLCALC, CHOLHDL, NONHDLCHOL, TOTALCHOLEST in the last 2160 hours.  TSH:  No results for input(s): TSH in the last 2160 hours.  A1C:  No results for input(s): HGBA1C in the last 2160 hours.    Radiology:  No results found in the last 30 days.     Assessment/Plan     Krupa Walker is a 31 y.o.female with:    1. Class 3 severe obesity due to excess calories without serious comorbidity with body mass index (BMI) of 40.0 to 44.9 in adult  - semaglutide, weight loss, (WEGOVY) 0.25 mg/0.5 mL PnIj; Inject 0.25 mg into the skin every 7 days.  Dispense: 2 mL; Refill: 0    -Rx sent for wegovy, if not covered, interested in weight management clinic  -Continue current medications and maintain follow up with specialists.    -Follow up if symptoms worsen or fail to improve.       Marian Julio MD  [facility]

## 2022-04-08 ENCOUNTER — TELEPHONE (OUTPATIENT)
Dept: INTERNAL MEDICINE | Facility: CLINIC | Age: 32
End: 2022-04-08
Payer: COMMERCIAL

## 2022-04-08 ENCOUNTER — PATIENT MESSAGE (OUTPATIENT)
Dept: INTERNAL MEDICINE | Facility: CLINIC | Age: 32
End: 2022-04-08
Payer: COMMERCIAL

## 2022-04-08 NOTE — TELEPHONE ENCOUNTER
Wegovy   Drug not covered by plan.    Patient aware she will contact insurance to see what medication they do cover for weight loss.

## 2022-04-13 ENCOUNTER — PATIENT MESSAGE (OUTPATIENT)
Dept: INTERNAL MEDICINE | Facility: CLINIC | Age: 32
End: 2022-04-13
Payer: COMMERCIAL

## 2022-04-13 RX ORDER — DIETHYLPROPION HYDROCHLORIDE 75 MG/1
75 TABLET, EXTENDED RELEASE ORAL EVERY MORNING
Qty: 30 TABLET | Refills: 5 | Status: SHIPPED | OUTPATIENT
Start: 2022-04-13 | End: 2022-11-11

## 2022-04-17 ENCOUNTER — PATIENT MESSAGE (OUTPATIENT)
Dept: PSYCHIATRY | Facility: CLINIC | Age: 32
End: 2022-04-17
Payer: COMMERCIAL

## 2022-04-20 ENCOUNTER — PATIENT MESSAGE (OUTPATIENT)
Dept: PSYCHIATRY | Facility: CLINIC | Age: 32
End: 2022-04-20
Payer: COMMERCIAL

## 2022-04-20 DIAGNOSIS — F33.1 MODERATE EPISODE OF RECURRENT MAJOR DEPRESSIVE DISORDER: ICD-10-CM

## 2022-04-20 DIAGNOSIS — F41.1 GAD (GENERALIZED ANXIETY DISORDER): ICD-10-CM

## 2022-04-21 RX ORDER — VILAZODONE HYDROCHLORIDE 20 MG/1
20 TABLET ORAL DAILY
Qty: 30 TABLET | Refills: 2 | Status: SHIPPED | OUTPATIENT
Start: 2022-04-21 | End: 2022-09-16

## 2022-04-29 ENCOUNTER — TELEPHONE (OUTPATIENT)
Dept: NEUROLOGY | Facility: CLINIC | Age: 32
End: 2022-04-29
Payer: COMMERCIAL

## 2022-05-04 ENCOUNTER — OFFICE VISIT (OUTPATIENT)
Dept: PSYCHIATRY | Facility: CLINIC | Age: 32
End: 2022-05-04
Payer: COMMERCIAL

## 2022-05-04 DIAGNOSIS — F41.1 GAD (GENERALIZED ANXIETY DISORDER): Primary | ICD-10-CM

## 2022-05-04 DIAGNOSIS — F51.05 INSOMNIA DUE TO OTHER MENTAL DISORDER: ICD-10-CM

## 2022-05-04 DIAGNOSIS — F90.0 ATTENTION DEFICIT HYPERACTIVITY DISORDER (ADHD), PREDOMINANTLY INATTENTIVE TYPE: ICD-10-CM

## 2022-05-04 DIAGNOSIS — F33.1 MODERATE EPISODE OF RECURRENT MAJOR DEPRESSIVE DISORDER: ICD-10-CM

## 2022-05-04 DIAGNOSIS — F99 INSOMNIA DUE TO OTHER MENTAL DISORDER: ICD-10-CM

## 2022-05-04 PROCEDURE — 1159F MED LIST DOCD IN RCRD: CPT | Mod: CPTII,95,, | Performed by: INTERNAL MEDICINE

## 2022-05-04 PROCEDURE — 1160F RVW MEDS BY RX/DR IN RCRD: CPT | Mod: CPTII,95,, | Performed by: INTERNAL MEDICINE

## 2022-05-04 PROCEDURE — 1160F PR REVIEW ALL MEDS BY PRESCRIBER/CLIN PHARMACIST DOCUMENTED: ICD-10-PCS | Mod: CPTII,95,, | Performed by: INTERNAL MEDICINE

## 2022-05-04 PROCEDURE — 99214 OFFICE O/P EST MOD 30 MIN: CPT | Mod: 95,,, | Performed by: INTERNAL MEDICINE

## 2022-05-04 PROCEDURE — 1159F PR MEDICATION LIST DOCUMENTED IN MEDICAL RECORD: ICD-10-PCS | Mod: CPTII,95,, | Performed by: INTERNAL MEDICINE

## 2022-05-04 PROCEDURE — 99214 PR OFFICE/OUTPT VISIT, EST, LEVL IV, 30-39 MIN: ICD-10-PCS | Mod: 95,,, | Performed by: INTERNAL MEDICINE

## 2022-05-04 NOTE — PROGRESS NOTES
OUTPATIENT PSYCHIATRY RETURN VISIT    ENCOUNTER DATE:  5/12/2022  SITE:  [facility]  LENGTH OF SESSION:  20 minutes    The patient location is:  [place], not in healthcare facility  The chief complaint leading to consultation is:  ADHD, mood    Visit type:  audiovisual    Face to Face time with patient:  20 minutes  25 minutes of total time spent on the encounter, which includes face to face time and non-face to face time preparing to see the patient (eg, review of tests), Obtaining and/or reviewing separately obtained history, Documenting clinical information in the electronic or other health record, Independently interpreting results (not separately reported) and communicating results to the patient/family/caregiver, or Care coordination (not separately reported).     Each patient to whom he or she provides medical services by telemedicine is:  (1) informed of the relationship between the physician and patient and the respective role of any other health care provider with respect to management of the patient; and (2) notified that he or she may decline to receive medical services by telemedicine and may withdraw from such care at any time.    CHIEF COMPLAINT:  Depression and Anxiety      HISTORY OF PRESENTING ILLNESS:  Krupa Walker is a 31 y.o. female with history of MDD, JAVI, ADHD, and Insomnia who presents for follow up appointment.     Plan at last appointment on 3/7/2022:  · Discussed medication recommendations based on [facility] results, 1. Retrying Pristiq 25mg daily since she seemed to tolerate this dose, 2. Starting Viibryd which she has never tried before.  She would like to try Viibryd.  Will start 5mg daily and then once tolerating will increase to 10mg daily.  · Discussed with patient informed consent, risks versus benefits, alternative treatments, side effect profile and the inherent unpredictability of individual responses to these treatments.  The patient expresses  understanding of the above and displays the capacity to agree with this current plan.  · Continue therapy with Megha Ward LCSW.    History as told by patient:  First few days of Viibryd, stomach didn't feel right.  But started taking before bed and this helped.  After a few days went away.  Maybe some diarrhea at the beginning but no other side effects.  Increased Viibryd to 20mg daily around 4/23/22.  Still has a good bit of anxiety - has not had panic attack in awhile but had one about a week ago.  Had a rough couple of hours after that - had trouble calming herself down.  It was 11:30pm and had been at work all day, came home to house as a mess, had been trying to find a bigger house, this hadn't worked out, interest rates keep going up.  Sometimes it feels like the walls are closing in.  Feels like she clears things out and then they get more stuff.   starts a project and then doesn't finish it.  Not enough space to put things.  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  Having neck and shoulder pain from staring at the computer so long or playing on phone.  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  Definitely less stress.  Occasionally takes Ritalin (not much at all).  Started on diethylpropion for weight loss - never takes with Ritalin.  Waited too late last night to take Trazodone so took Melatonin and this caused nightmares.  Trazodone doesn't help her fall asleep but helps her stay asleep (100mg makes her too groggy the next day).  Sometimes hip hurts in the night.  Was on low carb diet and had lost 15 pounds, went to PCP about weight loss medication    Medication side effects:  Denies  Medication compliance:  Yes    PSYCHIATRIC REVIEW OF SYSTEMS:  Trouble with sleep:  Yes  Appetite changes:  Denies  Weight changes:  Denies  Lack of energy:  Yes  Anhedonia:  At times  Somatic symptoms:  Denies  Libido:  Not discussed  Anxiety/panic:  At times  Guilty/hopeless:  At times  Self-injurious  "behavior/risky behavior:  Denies  Any drugs:  Denies  Alcohol:  Very rarely  Breastfeeding:  No    MEDICAL REVIEW OF SYSTEMS:  Complete review of systems performed covering Constitutional, Musculoskeletal, Neurologic.  All systems negative except for that covered in HPI.    PAST PSYCHIATRIC, MEDICAL, AND SOCIAL HISTORY REVIEWED  The patient's past medical, family and social history have been reviewed and updated as appropriate within the electronic medical record - see encounter notes.    MEDICATIONS:    Current Outpatient Medications:     diethylpropion (TENUATE) 75 mg SR tablet, Take 1 tablet (75 mg total) by mouth every morning., Disp: 30 tablet, Rfl: 5    meloxicam (MOBIC) 15 MG tablet, Take 1 tablet (15 mg total) by mouth once daily., Disp: 90 tablet, Rfl: 3    methylphenidate HCl (RITALIN) 10 MG tablet, Take 1 tablet (10 mg total) by mouth 2 (two) times daily., Disp: 60 tablet, Rfl: 0    multivit with calcium,iron,min (WOMEN'S DAILY MULTIVITAMIN ORAL), Women's Daily Multivitamin, Disp: , Rfl:     rizatriptan (MAXALT) 10 MG tablet, Take one tablet as need for migraine, can take second tablet 2 hours later if needed., Disp: 10 tablet, Rfl: 5    traZODone (DESYREL) 50 MG tablet, Take 1 tablet (50 mg total) by mouth every evening., Disp: 90 tablet, Rfl: 3    vilazodone (VIIBRYD) 20 mg Tab, Take 1 tablet (20 mg total) by mouth once daily., Disp: 30 tablet, Rfl: 2    ALLERGIES:  Review of patient's allergies indicates:  No Known Allergies    PSYCHIATRIC EXAM:  There were no vitals filed for this visit.  Appearance:  Well groomed, appearing healthy and of stated age  Behavior:  Cooperative, pleasant, no psychomotor agitation or retardation  Speech:  Normal rate, rhythm, prosody, and volume  Mood:  "Ok"  Affect:  Euthymic  Thought Process:  Linear, logical, goal directed  Thought Content:  Negative for suicidal ideation, homicidal ideation, delusions or hallucinations.  Associations:  Intact  Memory:  Grossly " Intact  Level of Consciousness/Orientation:  Grossly intact  Fund of Knowledge:  Good  Attention:  Good  Language:  Fluent, able to name abstract and concrete objects  Insight:  Fair  Judgment:  Intact  Psychomotor signs:  No abnormal movements of face  Gait:  Unable to assess via virtual visit      RELEVANT LABS/STUDIES:    Lab Results   Component Value Date    WBC 8.31 06/04/2021    HGB 14.5 06/04/2021    HCT 41.8 06/04/2021    MCV 92 06/04/2021     06/04/2021     BMP  Lab Results   Component Value Date     (L) 06/04/2021    K 4.6 06/04/2021     06/04/2021    CO2 24 06/04/2021    BUN 14 06/04/2021    CREATININE 0.8 06/04/2021    CALCIUM 9.7 06/04/2021    ANIONGAP 6 (L) 06/04/2021    ESTGFRAFRICA >60 06/04/2021    EGFRNONAA >60 06/04/2021     Lab Results   Component Value Date    ALT 8 (L) 06/04/2021    AST 19 06/04/2021    ALKPHOS 84 06/04/2021    BILITOT 0.5 06/04/2021     Lab Results   Component Value Date    TSH 1.780 11/05/2020     Lab Results   Component Value Date    HGBA1C 4.9 12/04/2019       IMPRESSION:    Krupa Walker is a 31 y.o. female with history of MDD, JAVI, ADHD, and Insomnia who presents for follow up appointment.    Status/Progress:  Based on the examination today, the patient's problem(s) is/are inadequately controlled.  New problems have not been presented today.    Risk Parameters:  Patient reports no suicidal ideation  Patient reports no homicidal ideation  Patient reports no self-injurious behavior  Patient reports no violent behavior    DIAGNOSES:    ICD-10-CM ICD-9-CM   1. JAVI (generalized anxiety disorder)  F41.1 300.02   2. Moderate episode of recurrent major depressive disorder  F33.1 296.32   3. Insomnia due to other mental disorder  F51.05 300.9    F99 327.02   4. Attention deficit hyperactivity disorder (ADHD), predominantly inattentive type  F90.0 314.00       PLAN:  · She has been on Viibryd 20mg daily for about 2 weeks.  Will give 2 more weeks to take  effect.  If symptoms still not controlled will then increase to 40mg daily.   · Continue Trazodone 50mg qHS PRN insomnia.   · Discussed with patient informed consent, risks versus benefits, alternative treatments, side effect profile and the inherent unpredictability of individual responses to these treatments.  The patient expresses understanding of the above and displays the capacity to agree with this current plan.  · Continue therapy with Megha Ward LCSW.    RETURN TO CLINIC:  Follow up in about 4 weeks (around 6/1/2022).

## 2022-05-16 ENCOUNTER — PATIENT OUTREACH (OUTPATIENT)
Dept: ADMINISTRATIVE | Facility: OTHER | Age: 32
End: 2022-05-16
Payer: COMMERCIAL

## 2022-05-16 NOTE — PROGRESS NOTES
Health Maintenance Due   Topic Date Due    COVID-19 Vaccine (3 - Booster for Pfizer series) 02/21/2022     Updates were requested from care everywhere.  Chart was reviewed for overdue Proactive [facility] Encounters ([facility]) topics (CRS, Breast Cancer Screening, Eye exam)  Health Maintenance has been updated.  LIN[place] immunization registry triggered.  Immunizations were reconciled.

## 2022-05-17 ENCOUNTER — TELEPHONE (OUTPATIENT)
Dept: NEUROLOGY | Facility: CLINIC | Age: 32
End: 2022-05-17

## 2022-05-17 ENCOUNTER — OFFICE VISIT (OUTPATIENT)
Dept: NEUROLOGY | Facility: CLINIC | Age: 32
End: 2022-05-17
Payer: COMMERCIAL

## 2022-05-17 VITALS
HEIGHT: 64 IN | HEART RATE: 80 BPM | WEIGHT: 235.88 LBS | BODY MASS INDEX: 40.27 KG/M2 | DIASTOLIC BLOOD PRESSURE: 78 MMHG | SYSTOLIC BLOOD PRESSURE: 136 MMHG

## 2022-05-17 DIAGNOSIS — Z78.9 CAFFEINE USE: ICD-10-CM

## 2022-05-17 DIAGNOSIS — F33.1 MODERATE EPISODE OF RECURRENT MAJOR DEPRESSIVE DISORDER: ICD-10-CM

## 2022-05-17 DIAGNOSIS — F41.1 GAD (GENERALIZED ANXIETY DISORDER): ICD-10-CM

## 2022-05-17 DIAGNOSIS — N20.0 NEPHROLITHIASIS: ICD-10-CM

## 2022-05-17 DIAGNOSIS — G47.9 TROUBLE IN SLEEPING: ICD-10-CM

## 2022-05-17 DIAGNOSIS — E66.01 CLASS 3 SEVERE OBESITY DUE TO EXCESS CALORIES WITHOUT SERIOUS COMORBIDITY WITH BODY MASS INDEX (BMI) OF 40.0 TO 44.9 IN ADULT: ICD-10-CM

## 2022-05-17 DIAGNOSIS — M26.629 TMJ SYNDROME: ICD-10-CM

## 2022-05-17 DIAGNOSIS — G43.109 MIGRAINE WITH AURA AND WITHOUT STATUS MIGRAINOSUS, NOT INTRACTABLE: Primary | ICD-10-CM

## 2022-05-17 PROCEDURE — 1159F MED LIST DOCD IN RCRD: CPT | Mod: CPTII,S$GLB,, | Performed by: PHYSICIAN ASSISTANT

## 2022-05-17 PROCEDURE — 1159F PR MEDICATION LIST DOCUMENTED IN MEDICAL RECORD: ICD-10-PCS | Mod: CPTII,S$GLB,, | Performed by: PHYSICIAN ASSISTANT

## 2022-05-17 PROCEDURE — 3078F DIAST BP <80 MM HG: CPT | Mod: CPTII,S$GLB,, | Performed by: PHYSICIAN ASSISTANT

## 2022-05-17 PROCEDURE — 99999 PR PBB SHADOW E&M-EST. PATIENT-LVL III: ICD-10-PCS | Mod: PBBFAC,,, | Performed by: PHYSICIAN ASSISTANT

## 2022-05-17 PROCEDURE — 99999 PR PBB SHADOW E&M-EST. PATIENT-LVL III: CPT | Mod: PBBFAC,,, | Performed by: PHYSICIAN ASSISTANT

## 2022-05-17 PROCEDURE — 3008F BODY MASS INDEX DOCD: CPT | Mod: CPTII,S$GLB,, | Performed by: PHYSICIAN ASSISTANT

## 2022-05-17 PROCEDURE — 3075F PR MOST RECENT SYSTOLIC BLOOD PRESS GE 130-139MM HG: ICD-10-PCS | Mod: CPTII,S$GLB,, | Performed by: PHYSICIAN ASSISTANT

## 2022-05-17 PROCEDURE — 3008F PR BODY MASS INDEX (BMI) DOCUMENTED: ICD-10-PCS | Mod: CPTII,S$GLB,, | Performed by: PHYSICIAN ASSISTANT

## 2022-05-17 PROCEDURE — 3075F SYST BP GE 130 - 139MM HG: CPT | Mod: CPTII,S$GLB,, | Performed by: PHYSICIAN ASSISTANT

## 2022-05-17 PROCEDURE — 99204 PR OFFICE/OUTPT VISIT, NEW, LEVL IV, 45-59 MIN: ICD-10-PCS | Mod: S$GLB,,, | Performed by: PHYSICIAN ASSISTANT

## 2022-05-17 PROCEDURE — 3078F PR MOST RECENT DIASTOLIC BLOOD PRESSURE < 80 MM HG: ICD-10-PCS | Mod: CPTII,S$GLB,, | Performed by: PHYSICIAN ASSISTANT

## 2022-05-17 PROCEDURE — 99204 OFFICE O/P NEW MOD 45 MIN: CPT | Mod: S$GLB,,, | Performed by: PHYSICIAN ASSISTANT

## 2022-05-17 RX ORDER — KETOROLAC TROMETHAMINE 10 MG/1
TABLET, FILM COATED ORAL
Qty: 20 TABLET | Refills: 3 | Status: SHIPPED | OUTPATIENT
Start: 2022-05-17

## 2022-05-17 RX ORDER — RIZATRIPTAN BENZOATE 5 MG/1
TABLET, ORALLY DISINTEGRATING ORAL
Qty: 12 TABLET | Refills: 3 | Status: SHIPPED | OUTPATIENT
Start: 2022-05-17 | End: 2022-05-17

## 2022-05-17 RX ORDER — RIZATRIPTAN BENZOATE 10 MG/1
TABLET, ORALLY DISINTEGRATING ORAL
Qty: 12 TABLET | Refills: 2 | Status: SHIPPED | OUTPATIENT
Start: 2022-05-17 | End: 2023-02-20

## 2022-05-17 RX ORDER — PROCHLORPERAZINE MALEATE 5 MG
5 TABLET ORAL EVERY 6 HOURS PRN
Qty: 12 TABLET | Refills: 2 | Status: SHIPPED | OUTPATIENT
Start: 2022-05-17 | End: 2023-03-22

## 2022-05-17 NOTE — PATIENT INSTRUCTIONS
Supplements for Migraine:  1. Magnesium Oxide - 400mg by mouth daily  2. Riboflavin (Vitamin B2) - 400mg by mouth daily  3. Coenzyme Q10 - 200mg tablet by mouth daily

## 2022-05-17 NOTE — TELEPHONE ENCOUNTER
----- Message from Rinku Kraft sent at 5/17/2022 12:01 PM CDT -----  Contact: Zoraida ([facility])[phone]  Caller requesting a call back regarding medication (rizatriptan (MAXALT-MLT) 10 MG disintegrating tablet) caller states two prescriptions were called in 10mg  and 5mg with the same directions.  Caller states which one is good.

## 2022-05-17 NOTE — PROGRESS NOTES
New Patient     SUBJECTIVE:  Patient ID: Krupa Walker   MRN: [id number]  Referred By: Aaareferral Self  Chief Complaint: Headache and Consult      History of Present Illness:   31 y.o. female with migraine, depression, insomnia, hyperparathyroidism, h/o kidney stones, obesity, ADHD, anxiety, GERD, HTN, TMJ, who presents to clinic with daughter Jenifer for evaluation of headaches.   PMHx negative for TBI, Meningitis, Aneurysms, asthma, GI bleed, osteoporosis, CAD/MI, CVA/TIA, DM, cancer  Family Hx + for Migraines in sister    Pt has been suffering from HA's since her pre-teens. She was previously seen by Dr. Turner, new to me. MRI completed in 2016 listed below. She is currently using maxalt 10mg prn which helps 7 out of 10 times but causes drowsiness/hungover feeling. Due to these SE's, she typically does not take it at the onset. Pt has also tried valium for debilitating Ha's which somewhat helps. Up to 6 times a year, pt needs to escalate her care requiring toradol inj's from  or recently a migraine IV infusion from the [facility] in February during a time of increased stress with subsequently worsened Ha's. these help help her greatly. She also takes ibuprofen vs naproxen infrequently but these are not effective.  Location/Radiation - unilateral frontotemporalis, unilateral hemispheric  Quality - stabbing, pressure  Duration - hours - days  Intensity (range) - mild to severe  Frequency - 2-10/30 ha days per month on avg, recently experiencing 4-5/30 ha days per month over the last 3 months,1-2 debilitatiing Ha's every 3 months  Triggers - alcohol, sinuses, menstrual cycles, poor sleep, lights, TMJ  Prodrome/Aura - arc of light, blurry vision, spots  Caffeine - 2 cups a day  Associated symptoms with the headache: photophobia, phonophobia, fatigue, blurry vision, nausea    Treatments Tried   zoloft - low blood sugar  vilazodone  tpx and zonisamide - avoid 2/2 h/o kidney stones  tpx -  "drowsiness  Bb - avoid 2/2 depression  trazadone - helps somewhat w/ sleep  maxalt 10mg - typically resolves, causes drowsiness/hungover effects  almotriptan - didn't help  Ibuprofen   Tylenol  Naproxen  excedrin  mobic - helps w/ knee pain    Social History  Alcohol - socially   Smoke - denies  Recreational Drug Use- denies  Occupation - [employer] nurse    Current Medications:    Current Outpatient Medications:     diethylpropion (TENUATE) 75 mg SR tablet, Take 1 tablet (75 mg total) by mouth every morning., Disp: 30 tablet, Rfl: 5    meloxicam (MOBIC) 15 MG tablet, Take 1 tablet (15 mg total) by mouth once daily., Disp: 90 tablet, Rfl: 3    methylphenidate HCl (RITALIN) 10 MG tablet, Take 1 tablet (10 mg total) by mouth 2 (two) times daily., Disp: 60 tablet, Rfl: 0    multivit with calcium,iron,min (WOMEN'S DAILY MULTIVITAMIN ORAL), Women's Daily Multivitamin, Disp: , Rfl:     traZODone (DESYREL) 50 MG tablet, Take 1 tablet (50 mg total) by mouth every evening., Disp: 90 tablet, Rfl: 3    vilazodone (VIIBRYD) 20 mg Tab, Take 1 tablet (20 mg total) by mouth once daily., Disp: 30 tablet, Rfl: 2    ketorolac (TORADOL) 10 mg tablet, Take 2 tabs (20mg) at the onset of a headache, if headache persists you may take 1 tab every 4 to 6 hours as needed. Do not exceed 40 mg per day., Disp: 20 tablet, Rfl: 3    prochlorperazine (COMPAZINE) 5 MG tablet, Take 1 tablet (5 mg total) by mouth every 6 (six) hours as needed for Nausea (or headache pain)., Disp: 12 tablet, Rfl: 2    rizatriptan (MAXALT-MLT) 10 MG disintegrating tablet, Take at onset of migraine, can repeat in 2 hrs if needed.  No more than 2 tabs per day or 3 days/wk., Disp: 12 tablet, Rfl: 2    Review of Systems - as per HPI, otherwise a balanced 10 systems review is negative.    OBJECTIVE:  Vitals:  /78   Pulse 80   Ht 5' 4" (1.626 m)   Wt 107 kg (235 lb 14.3 oz)   BMI 40.49 kg/m²     Physical Exam   Constitutional: she appears well-developed and " well-nourished. she is well groomed. NAD  HENT:    Head: Normocephalic and atraumatic, Frontalis was NTTP, temporalis was NTTP   Eyes: Conjunctivae and EOM are normal. Pupils are equal, round, and reactive to light   Neck: Neck supple. Occiput and trapezius NTTP   Musculoskeletal: Normal range of motion. No joint stiffness. No vertebral point tenderness.  Skin: Skin is warm and dry.  Psychiatric: Normal mood and affect.     Neuro exam:    Mental status:  The patient is alert and oriented to person, place and time.  Language is intact and fluent  Remote and recent memory are intact  Normal attention and concentration  Mood is stable    Cranial Nerves:  Pupils are equal and reactive to light.    Extraocular movements are intact and without nystagmus.    Facial movement is symmetric.  Facial sensation is intact.    Hearing is intact   FROM of neck in all (6) directions without pain  Shoulder shrug symmetrical.     Coordination:     Finger to nose - normal and symmetric bilaterally     Motor:  Normal muscle bulk and symmetry. No fasciculations were noted.   Tremor not apparent   Pronator drift not apparent.    strength was strong and symmetric  RUE:appropriate against gravity and medium force as tested 5/5  LUE: appropriate against gravity and medium force as tested 5/5  RLE:appropriate against gravity and medium force as tested 5/5              LLE: appropriate against gravity and medium force as tested 5/5    Sensory:  RUE  intact light touch  LUE intact light touch  RLE intact light touch  LLE intact light touch    Gait:   Normal gait    Review of Data:   Notes from neuro reviewed   Labs:  Office Visit on 03/18/2022   Component Date Value Ref Range Status    Trichomonas vaginalis 03/18/2022 Negative  Negative Final    Candida sp 03/18/2022 Positive (A) Negative Final    Michelle glabrata DNA 03/18/2022 Negative  Negative Final    Michelle krusei DNA 03/18/2022 Negative  Negative Final    Bacterial vaginosis  DNA 03/18/2022 Negative  Negative Final   Office Visit on 03/01/2022   Component Date Value Ref Range Status    POC Rapid COVID 03/01/2022 Negative  Negative Final     Acceptable 03/01/2022 Yes   Final    POC Molecular Influenza A Ag 03/01/2022 Negative  Negative, Not Reported Final    POC Molecular Influenza B Ag 03/01/2022 Negative  Negative, Not Reported Final     Acceptable 03/01/2022 Yes   Final     Imaging:  Results for orders placed or performed during the hospital encounter of 01/21/16   MRI Brain W WO Contrast    Narrative    Exam: MRI brain with and without contrast    History:     Findings:     MRI is performed with routine sequences prior to and following IV administration 10 mL Gadavist.      Brain has no structural abnormality.  No mass, hemorrhage, infarction, subdural collection, hydrocephalus or other acute finding is seen on this cranial MR examination.  No diffusion abnormality is seen.      Following contrast administration, there is no evidence of abnormal enhancement.    Impression    Normal MRI of the brain       Electronically signed by: KIKO ROBLEDO MD  Date:     01/22/16  Time:    03:27      Note: I have independently reviewed any/all imaging/labs/tests and agree with the report (s) as documented.  Any discrepancies will be as noted/demarcated by free text.  ALVA BRUMFIELD 5/17/2022    ASSESSMENT:  1. Migraine with aura and without status migrainosus, not intractable    2. Nephrolithiasis    3. Moderate episode of recurrent major depressive disorder    4. JAVI (generalized anxiety disorder)    5. Caffeine use    6. Trouble in sleeping    7. Class 3 severe obesity due to excess calories without serious comorbidity with body mass index (BMI) of 40.0 to 44.9 in adult    8. TMJ syndrome          PLAN:  - Discussed symptoms appear to be consistent with migraines, discussed treatment options and patient agreed with the following plan:  - ppx - start supplements  -  abortive - change maxalt to odt, trial toradol tabs as 2nd line (chagne to 1st line if effective, if fails trial sprix)  - nausea - trial compazine for dual purpose of treating HA's and nausea  - anxiety/depression - takes vibryd, avoid bb, mgmt per pcp  - h/o kidney stones - avoid tpx and zonisamide, mgmt per pcp  - d/c caffeine  - knee pain - takes mobic infrequently, advised not to take mobic and toradol concurrently or in high frequencies, mgmt per pcp  - trouble w/ sleep - pt taking trazodone currently which somewhat helps, mgmt per pcp  - obesity - taking diethylproprione, mgmt per pcp  - TMJ - continue mgmt per dentist  - risks, benefits, and potential side effects of maxalt, toradol, sprix, nurtec/ubrelvy, devices discussed   - alternative treatment options offered   - importance of healthy diet, regular exercise and sleep hygiene in the treatment of headaches    - Start tracking headaches via Migraine Buddy sheridan on phone   - RTC in 3 mo     Orders Placed This Encounter    ketorolac (TORADOL) 10 mg tablet    rizatriptan (MAXALT-MLT) 10 MG disintegrating tablet    prochlorperazine (COMPAZINE) 5 MG tablet       I have discussed realistic goals of care with patient at length as well as medication options, and need for lifestyle adjustment. I have explained that treatment will take time. We have agreed that the goal will be to reduce frequency/intensity/quantity of HA, not to be completely HA free. I have explained my non narcotic policy regarding headache treatment.    Patient agreeable to work on lifestyle adjustments.    Discussed potential for teratogenicity with treatment, patient understands if her family planning status should change she will contact office immediately and we will safely adjust medications as needed.     Questions and concerns were sought and answered to the patient's stated verbal satisfaction.  The patient verbalizes understanding and agreement with the above stated treatment plan.      CC: MD Loraine Hicks PA-C  [facility]  [phone]    Dr. Olvera was available during today's encounter.

## 2022-05-22 ENCOUNTER — PATIENT MESSAGE (OUTPATIENT)
Dept: OBSTETRICS AND GYNECOLOGY | Facility: CLINIC | Age: 32
End: 2022-05-22
Payer: COMMERCIAL

## 2022-05-23 RX ORDER — MEDROXYPROGESTERONE ACETATE 10 MG/1
10 TABLET ORAL 2 TIMES DAILY
Qty: 10 TABLET | Refills: 0 | Status: SHIPPED | OUTPATIENT
Start: 2022-05-23 | End: 2022-09-16

## 2022-05-25 ENCOUNTER — OFFICE VISIT (OUTPATIENT)
Dept: PSYCHIATRY | Facility: CLINIC | Age: 32
End: 2022-05-25
Payer: COMMERCIAL

## 2022-05-25 DIAGNOSIS — F33.1 MODERATE EPISODE OF RECURRENT MAJOR DEPRESSIVE DISORDER: ICD-10-CM

## 2022-05-25 DIAGNOSIS — F90.0 ATTENTION DEFICIT HYPERACTIVITY DISORDER (ADHD), PREDOMINANTLY INATTENTIVE TYPE: ICD-10-CM

## 2022-05-25 DIAGNOSIS — F41.1 GAD (GENERALIZED ANXIETY DISORDER): ICD-10-CM

## 2022-05-25 DIAGNOSIS — F99 INSOMNIA DUE TO OTHER MENTAL DISORDER: Primary | ICD-10-CM

## 2022-05-25 DIAGNOSIS — F51.05 INSOMNIA DUE TO OTHER MENTAL DISORDER: Primary | ICD-10-CM

## 2022-05-25 PROCEDURE — 1160F PR REVIEW ALL MEDS BY PRESCRIBER/CLIN PHARMACIST DOCUMENTED: ICD-10-PCS | Mod: CPTII,95,, | Performed by: INTERNAL MEDICINE

## 2022-05-25 PROCEDURE — 1160F RVW MEDS BY RX/DR IN RCRD: CPT | Mod: CPTII,95,, | Performed by: INTERNAL MEDICINE

## 2022-05-25 PROCEDURE — 99214 OFFICE O/P EST MOD 30 MIN: CPT | Mod: 95,,, | Performed by: INTERNAL MEDICINE

## 2022-05-25 PROCEDURE — 99214 PR OFFICE/OUTPT VISIT, EST, LEVL IV, 30-39 MIN: ICD-10-PCS | Mod: 95,,, | Performed by: INTERNAL MEDICINE

## 2022-05-25 PROCEDURE — 1159F PR MEDICATION LIST DOCUMENTED IN MEDICAL RECORD: ICD-10-PCS | Mod: CPTII,95,, | Performed by: INTERNAL MEDICINE

## 2022-05-25 PROCEDURE — 1159F MED LIST DOCD IN RCRD: CPT | Mod: CPTII,95,, | Performed by: INTERNAL MEDICINE

## 2022-05-25 RX ORDER — DIAZEPAM 10 MG/1
TABLET ORAL
Qty: 30 TABLET | Refills: 0 | Status: SHIPPED | OUTPATIENT
Start: 2022-05-25 | End: 2022-09-16

## 2022-05-25 NOTE — PROGRESS NOTES
OUTPATIENT PSYCHIATRY RETURN VISIT    ENCOUNTER DATE:  5/25/2022  SITE:  [facility]  LENGTH OF SESSION:  20 minutes    The patient location is:  [place], not in healthcare facility  The chief complaint leading to consultation is:  Insomnia    Visit type:  audiovisual    Face to Face time with patient:  20 minutes  25 minutes of total time spent on the encounter, which includes face to face time and non-face to face time preparing to see the patient (eg, review of tests), Obtaining and/or reviewing separately obtained history, Documenting clinical information in the electronic or other health record, Independently interpreting results (not separately reported) and communicating results to the patient/family/caregiver, or Care coordination (not separately reported).     Each patient to whom he or she provides medical services by telemedicine is:  (1) informed of the relationship between the physician and patient and the respective role of any other health care provider with respect to management of the patient; and (2) notified that he or she may decline to receive medical services by telemedicine and may withdraw from such care at any time.    CHIEF COMPLAINT:  Insomnia      HISTORY OF PRESENTING ILLNESS:  Krupa Walker is a 31 y.o. female with history of MDD, JAVI, ADHD, and Insomnia who presents for follow up appointment.     Plan at last appointment on 5/4/2022:  · She has been on Viibryd 20mg daily for about 2 weeks.  Will give 2 more weeks to take effect.  If symptoms still not controlled will then increase to 40mg daily.   · Continue Trazodone 50mg qHS PRN insomnia.   · Discussed with patient informed consent, risks versus benefits, alternative treatments, side effect profile and the inherent unpredictability of individual responses to these treatments.  The patient expresses understanding of the above and displays the capacity to agree with this current plan.  · Continue  therapy with Megha Ward LCSW.    History as told by patient:  Has only slept 8 hours in 3 days.  Has tried Trazodone, Benadryl.  Had a route canal.  Monday had headache at work so didn't sleep well.  So then had to work the next day and had headache, took migraine medication which usually puts her to sleep but didn't (triptan).  Then if she does fall asleep, has awful nightmares and wakes up disoriented wondering what is real.  Nightmares worse over the the last few weeks.  Trazodone and Melatonin cause horrible nightmares - feels like a thriller movie all night.  Started taking Viibyrd at night because it was messing her stomach up.  But was taking at night with 20mg and sleeping fine.  Stomach issues recently but also on antibiotics.  Denies worsening depression or anxiety.  Denies symptoms of vishnu.  Denies SI.      Medication side effects:  As above  Medication compliance:  Yes    PSYCHIATRIC REVIEW OF SYSTEMS:  Trouble with sleep:  Yes as above  Appetite changes:  Denies  Weight changes:  Denies  Lack of energy:  Sometimes  Anhedonia:  Sometimes  Somatic symptoms:  Denies  Libido:  Not discussed  Anxiety/panic:  At times  Guilty/hopeless:  At times  Self-injurious behavior/risky behavior:  Denies  Any drugs:  Denies  Alcohol:  Very rarely  Breastfeeding:  No    MEDICAL REVIEW OF SYSTEMS:  Complete review of systems performed covering Constitutional, Musculoskeletal, Neurologic.  All systems negative except for that covered in HPI.    PAST PSYCHIATRIC, MEDICAL, AND SOCIAL HISTORY REVIEWED  The patient's past medical, family and social history have been reviewed and updated as appropriate within the electronic medical record - see encounter notes.    MEDICATIONS:    Current Outpatient Medications:     diazePAM (VALIUM) 10 MG Tab, Take 5-10mg by mouth qHS PRN insomnia, Disp: 30 tablet, Rfl: 0    diethylpropion (TENUATE) 75 mg SR tablet, Take 1 tablet (75 mg total) by mouth every morning., Disp: 30 tablet,  "Rfl: 5    ketorolac (TORADOL) 10 mg tablet, Take 2 tabs (20mg) at the onset of a headache, if headache persists you may take 1 tab every 4 to 6 hours as needed. Do not exceed 40 mg per day., Disp: 20 tablet, Rfl: 3    medroxyPROGESTERone (PROVERA) 10 MG tablet, Take 1 tablet (10 mg total) by mouth 2 (two) times a day. for 5 days, Disp: 10 tablet, Rfl: 0    meloxicam (MOBIC) 15 MG tablet, Take 1 tablet (15 mg total) by mouth once daily., Disp: 90 tablet, Rfl: 3    methylphenidate HCl (RITALIN) 10 MG tablet, Take 1 tablet (10 mg total) by mouth 2 (two) times daily., Disp: 60 tablet, Rfl: 0    multivit with calcium,iron,min (WOMEN'S DAILY MULTIVITAMIN ORAL), Women's Daily Multivitamin, Disp: , Rfl:     prochlorperazine (COMPAZINE) 5 MG tablet, Take 1 tablet (5 mg total) by mouth every 6 (six) hours as needed for Nausea (or headache pain)., Disp: 12 tablet, Rfl: 2    rizatriptan (MAXALT-MLT) 10 MG disintegrating tablet, Take at onset of migraine, can repeat in 2 hrs if needed.  No more than 2 tabs per day or 3 days/wk., Disp: 12 tablet, Rfl: 2    traZODone (DESYREL) 50 MG tablet, Take 1 tablet (50 mg total) by mouth every evening., Disp: 90 tablet, Rfl: 3    vilazodone (VIIBRYD) 20 mg Tab, Take 1 tablet (20 mg total) by mouth once daily., Disp: 30 tablet, Rfl: 2    ALLERGIES:  Review of patient's allergies indicates:  No Known Allergies    PSYCHIATRIC EXAM:  There were no vitals filed for this visit.  Appearance:  Well groomed, appearing healthy and of stated age  Behavior:  Cooperative, pleasant, no psychomotor agitation or retardation  Speech:  Normal rate, rhythm, prosody, and volume  Mood:  "I can't sleep"  Affect:  Euthymic  Thought Process:  Linear, logical, goal directed  Thought Content:  Negative for suicidal ideation, homicidal ideation, delusions or hallucinations.  Associations:  Intact  Memory:  Grossly Intact  Level of Consciousness/Orientation:  Grossly intact  Fund of Knowledge:  " Good  Attention:  Good  Language:  Fluent, able to name abstract and concrete objects  Insight:  Fair  Judgment:  Intact  Psychomotor signs:  No abnormal movements of face  Gait:  Unable to assess via virtual visit      RELEVANT LABS/STUDIES:    Lab Results   Component Value Date    WBC 8.31 06/04/2021    HGB 14.5 06/04/2021    HCT 41.8 06/04/2021    MCV 92 06/04/2021     06/04/2021     BMP  Lab Results   Component Value Date     (L) 06/04/2021    K 4.6 06/04/2021     06/04/2021    CO2 24 06/04/2021    BUN 14 06/04/2021    CREATININE 0.8 06/04/2021    CALCIUM 9.7 06/04/2021    ANIONGAP 6 (L) 06/04/2021    ESTGFRAFRICA >60 06/04/2021    EGFRNONAA >60 06/04/2021     Lab Results   Component Value Date    ALT 8 (L) 06/04/2021    AST 19 06/04/2021    ALKPHOS 84 06/04/2021    BILITOT 0.5 06/04/2021     Lab Results   Component Value Date    TSH 1.780 11/05/2020     Lab Results   Component Value Date    HGBA1C 4.9 12/04/2019       IMPRESSION:    Krupa Walker is a 31 y.o. female with history of MDD, JAVI, ADHD, and Insomnia who presents for follow up appointment.    Status/Progress:  Based on the examination today, the patient's problem(s) is/are inadequately controlled.  New problems have not been presented today.    Risk Parameters:  Patient reports no suicidal ideation  Patient reports no homicidal ideation  Patient reports no self-injurious behavior  Patient reports no violent behavior    DIAGNOSES:    ICD-10-CM ICD-9-CM   1. Insomnia due to other mental disorder  F51.05 300.9    F99 327.02   2. Moderate episode of recurrent major depressive disorder  F33.1 296.32   3. JAVI (generalized anxiety disorder)  F41.1 300.02   4. Attention deficit hyperactivity disorder (ADHD), predominantly inattentive type  F90.0 314.00       PLAN:  · She would like to continue Viibryd 40mg daily and try changing to the morning to see if this helps her sleep.  Will also start Valium 5-10mg qHS PRN insomnia temporarily.   Will avoid Trazodone in the future since it causes nightmares.   · Discussed possibility of bipolar disorder, however we both agree this is unlikely and that her insomnia for the last 3 nights was multifactorial (headache, medications, nightmares).  Nevertheless, will continue to monitor closely for symptoms of vishnu.  She also agrees to keep me updated.    · Discussed with patient informed consent, risks versus benefits, alternative treatments, side effect profile and the inherent unpredictability of individual responses to these treatments.  The patient expresses understanding of the above and displays the capacity to agree with this current plan.  · Continue therapy with Megha Ward LCSW.    RETURN TO CLINIC:  Follow up in about 2 weeks (around 6/8/2022).

## 2022-05-27 ENCOUNTER — PATIENT MESSAGE (OUTPATIENT)
Dept: PSYCHIATRY | Facility: CLINIC | Age: 32
End: 2022-05-27
Payer: COMMERCIAL

## 2022-07-13 ENCOUNTER — PATIENT MESSAGE (OUTPATIENT)
Dept: ENDOCRINOLOGY | Facility: CLINIC | Age: 32
End: 2022-07-13
Payer: COMMERCIAL

## 2022-07-13 DIAGNOSIS — E21.3 HYPERPARATHYROIDISM: Primary | ICD-10-CM

## 2022-07-13 DIAGNOSIS — E66.01 CLASS 3 SEVERE OBESITY DUE TO EXCESS CALORIES WITHOUT SERIOUS COMORBIDITY WITH BODY MASS INDEX (BMI) OF 40.0 TO 44.9 IN ADULT: ICD-10-CM

## 2022-07-25 ENCOUNTER — PATIENT MESSAGE (OUTPATIENT)
Dept: NEUROLOGY | Facility: CLINIC | Age: 32
End: 2022-07-25
Payer: COMMERCIAL

## 2022-07-25 DIAGNOSIS — M26.629 TMJ SYNDROME: ICD-10-CM

## 2022-07-25 DIAGNOSIS — G43.109 MIGRAINE WITH AURA AND WITHOUT STATUS MIGRAINOSUS, NOT INTRACTABLE: Primary | ICD-10-CM

## 2022-07-26 RX ORDER — TIZANIDINE 4 MG/1
4 TABLET ORAL EVERY 8 HOURS
Qty: 21 TABLET | Refills: 0 | Status: SHIPPED | OUTPATIENT
Start: 2022-07-26 | End: 2022-08-03 | Stop reason: SDUPTHER

## 2022-07-26 NOTE — TELEPHONE ENCOUNTER
Called pt. Discussed expectations and to allow clinic 24-48 business hrs to respond to messages.   Discussed multiple options. Will try course of muscle relaxant and message clinic if HA continues. Consider steroid taper at that time.   Discussed risks and benefits, Se's, alternative otpions.   Also has f/up scheduled soon.   Pt in agreement w/ plan.

## 2022-08-03 ENCOUNTER — OFFICE VISIT (OUTPATIENT)
Dept: FAMILY MEDICINE | Facility: CLINIC | Age: 32
End: 2022-08-03
Payer: COMMERCIAL

## 2022-08-03 ENCOUNTER — HOSPITAL ENCOUNTER (OUTPATIENT)
Dept: RADIOLOGY | Facility: HOSPITAL | Age: 32
Discharge: HOME | End: 2022-08-03
Attending: NURSE PRACTITIONER
Payer: COMMERCIAL

## 2022-08-03 VITALS
TEMPERATURE: 99 F | DIASTOLIC BLOOD PRESSURE: 88 MMHG | OXYGEN SATURATION: 96 % | HEART RATE: 91 BPM | WEIGHT: 243.25 LBS | SYSTOLIC BLOOD PRESSURE: 130 MMHG | HEIGHT: 64 IN | BODY MASS INDEX: 41.53 KG/M2

## 2022-08-03 DIAGNOSIS — G43.109 MIGRAINE WITH AURA AND WITHOUT STATUS MIGRAINOSUS, NOT INTRACTABLE: ICD-10-CM

## 2022-08-03 DIAGNOSIS — M54.2 NECK PAIN: ICD-10-CM

## 2022-08-03 DIAGNOSIS — M26.629 TMJ SYNDROME: ICD-10-CM

## 2022-08-03 DIAGNOSIS — M54.12 CERVICAL RADICULOPATHY: Primary | ICD-10-CM

## 2022-08-03 PROCEDURE — 99999 PR PBB SHADOW E&M-EST. PATIENT-LVL V: ICD-10-PCS | Mod: PBBFAC,,, | Performed by: NURSE PRACTITIONER

## 2022-08-03 PROCEDURE — 72040 X-RAY EXAM NECK SPINE 2-3 VW: CPT | Mod: TC,FY,PO

## 2022-08-03 PROCEDURE — 99215 OFFICE O/P EST HI 40 MIN: CPT | Mod: S$GLB,,, | Performed by: NURSE PRACTITIONER

## 2022-08-03 PROCEDURE — 72040 X-RAY EXAM NECK SPINE 2-3 VW: CPT | Mod: 26,,, | Performed by: RADIOLOGY

## 2022-08-03 PROCEDURE — 1159F MED LIST DOCD IN RCRD: CPT | Mod: CPTII,S$GLB,, | Performed by: NURSE PRACTITIONER

## 2022-08-03 PROCEDURE — 72040 XR CERVICAL SPINE AP LATERAL: ICD-10-PCS | Mod: 26,,, | Performed by: RADIOLOGY

## 2022-08-03 PROCEDURE — 3008F PR BODY MASS INDEX (BMI) DOCUMENTED: ICD-10-PCS | Mod: CPTII,S$GLB,, | Performed by: NURSE PRACTITIONER

## 2022-08-03 PROCEDURE — 3008F BODY MASS INDEX DOCD: CPT | Mod: CPTII,S$GLB,, | Performed by: NURSE PRACTITIONER

## 2022-08-03 PROCEDURE — 99215 PR OFFICE/OUTPT VISIT, EST, LEVL V, 40-54 MIN: ICD-10-PCS | Mod: S$GLB,,, | Performed by: NURSE PRACTITIONER

## 2022-08-03 PROCEDURE — 3079F DIAST BP 80-89 MM HG: CPT | Mod: CPTII,S$GLB,, | Performed by: NURSE PRACTITIONER

## 2022-08-03 PROCEDURE — 3075F PR MOST RECENT SYSTOLIC BLOOD PRESS GE 130-139MM HG: ICD-10-PCS | Mod: CPTII,S$GLB,, | Performed by: NURSE PRACTITIONER

## 2022-08-03 PROCEDURE — 1159F PR MEDICATION LIST DOCUMENTED IN MEDICAL RECORD: ICD-10-PCS | Mod: CPTII,S$GLB,, | Performed by: NURSE PRACTITIONER

## 2022-08-03 PROCEDURE — 3075F SYST BP GE 130 - 139MM HG: CPT | Mod: CPTII,S$GLB,, | Performed by: NURSE PRACTITIONER

## 2022-08-03 PROCEDURE — 3079F PR MOST RECENT DIASTOLIC BLOOD PRESSURE 80-89 MM HG: ICD-10-PCS | Mod: CPTII,S$GLB,, | Performed by: NURSE PRACTITIONER

## 2022-08-03 PROCEDURE — 99999 PR PBB SHADOW E&M-EST. PATIENT-LVL V: CPT | Mod: PBBFAC,,, | Performed by: NURSE PRACTITIONER

## 2022-08-03 RX ORDER — TIZANIDINE 4 MG/1
4 TABLET ORAL EVERY 8 HOURS
Qty: 21 TABLET | Refills: 0 | Status: SHIPPED | OUTPATIENT
Start: 2022-08-03 | End: 2022-08-10

## 2022-08-03 RX ORDER — IBUPROFEN 800 MG/1
800 TABLET ORAL EVERY 6 HOURS PRN
COMMUNITY
Start: 2022-05-19 | End: 2023-04-17

## 2022-08-03 RX ORDER — GABAPENTIN 100 MG/1
100 CAPSULE ORAL 3 TIMES DAILY
Qty: 90 CAPSULE | Refills: 1 | Status: SHIPPED | OUTPATIENT
Start: 2022-08-03 | End: 2023-04-17

## 2022-08-03 NOTE — PROGRESS NOTES
Chief Complaint  Chief Complaint   Patient presents with    Shoulder Pain     Right x 2 weeks    Neck Pain     Right side x 2 weeks       HPI  Krupa Walker is a 31 y.o. female with medical diagnoses as listed in the medical history and problem list that presents for Neck and Right Shoulder pain x 2 weeks. This patient is new to me.      1. Neck and Right Shoulder pain x 2 weeks: Recently worked on an home improvement project of changing a shelf to a wall system that called for a lot heavy lifting and over reaching, Also reports repetitive reachig into the back seat to hand her kids objects. She also picks up on her toddler often who she reports is in the 99th percentile. She reports pain that radiates from neck and shoulder down to right hand. Associated symptoms are arm and hand weakness and finger tingling. Has been using Motrin, Naproxen, Mobic, Zanaflex, Flexeril, Lidocaine patches and Tylenol. She uses aforementioned medication for different mechanical issues and does not use all of them concurrently.     PAST MEDICAL HISTORY:  Past Medical History:   Diagnosis Date    Anxiety     Depression     GERD (gastroesophageal reflux disease)     Gestational hypertension, third trimester 2019    Hypertension     gestational     Hypoglycemia     Kidney calculi     2016    Migraines     PONV (postoperative nausea and vomiting)     STD (sexually transmitted disease)     Urinary tract infection        PAST SURGICAL HISTORY:  Past Surgical History:   Procedure Laterality Date    ANKLE SURGERY Right      SECTION  10/2014    CYSTOSCOPY W/ URETERAL STENT PLACEMENT Left 10/2/2019    Procedure: CYSTOSCOPY, WITH URETERAL STENT INSERTION - Under ultrasound guidance;  Surgeon: Marsha Guevara MD;  Location: [facility];  Service: Urology;  Laterality: Left;    KIDNEY STONE SURGERY      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)    LASER LITHOTRIPSY  Left 10/16/2019    Procedure: LITHOTRIPSY, USING LASER;  Surgeon: Marsha Guevara MD;  Location: [facility] OR;  Service: Urology;  Laterality: Left;    TONSILLECTOMY      ULTRASOUND GUIDANCE Left 10/16/2019    Procedure: ULTRASOUND GUIDANCE, for laser litho;  Surgeon: Marsha Guevara MD;  Location: [facility] OR;  Service: Urology;  Laterality: Left;    URETEROSCOPIC REMOVAL OF URETERIC CALCULUS Left 10/16/2019    Procedure: REMOVAL, CALCULUS, URETER, URETEROSCOPIC possible retro pyelo, possible lasere litho, possible stent;  Surgeon: Marsha Guevara MD;  Location: [facility] OR;  Service: Urology;  Laterality: Left;  25 WEEKS PREGNANT / FETAL MONITORING BEFORE AND AFTER PROCEDURE/   CALL PELON ELDER [phone]      WISDOM TOOTH EXTRACTION         SOCIAL HISTORY:  Social History     Socioeconomic History    Marital status:    Tobacco Use    Smoking status: Never Smoker    Smokeless tobacco: Never Used   Substance and Sexual Activity    Alcohol use: Yes     Alcohol/week: 0.0 standard drinks     Comment: 1 drink per month    Drug use: Never    Sexual activity: Yes     Partners: Male     Birth control/protection: Condom     Comment: IUD removed due to complications in July 2018       FAMILY HISTORY:  Family History   Problem Relation Age of Onset    Hypertension Mother     Hypertension Father     Heart disease Father     Depression Father     Hyperlipidemia Father     No Known Problems Sister     Nephrolithiasis Daughter     No Known Problems Sister     No Known Problems Sister     Breast cancer Paternal Grandmother         50s at diagnosis    Cancer Paternal Grandmother         breast    Hypertension Paternal Grandmother     Heart disease Paternal Grandfather     Hyperlipidemia Paternal Grandfather     Hypertension Paternal Grandfather     Hyperlipidemia Maternal Grandfather     Hypertension Maternal Grandmother     Colon cancer Neg Hx     Ovarian cancer Neg Hx        ALLERGIES AND  MEDICATIONS: updated and reviewed.  Review of patient's allergies indicates:  No Known Allergies  Current Outpatient Medications   Medication Sig Dispense Refill    diazePAM (VALIUM) 10 MG Tab Take 5-10mg by mouth qHS PRN insomnia 30 tablet 0    diethylpropion (TENUATE) 75 mg SR tablet Take 1 tablet (75 mg total) by mouth every morning. 30 tablet 5    ibuprofen (ADVIL,MOTRIN) 800 MG tablet Take 800 mg by mouth every 6 (six) hours as needed.      ketorolac (TORADOL) 10 mg tablet Take 2 tabs (20mg) at the onset of a headache, if headache persists you may take 1 tab every 4 to 6 hours as needed. Do not exceed 40 mg per day. 20 tablet 3    meloxicam (MOBIC) 15 MG tablet Take 1 tablet (15 mg total) by mouth once daily. 90 tablet 3    methylphenidate HCl (RITALIN) 10 MG tablet Take 1 tablet (10 mg total) by mouth 2 (two) times daily. 60 tablet 0    multivit with calcium,iron,min (WOMEN'S DAILY MULTIVITAMIN ORAL) Women's Daily Multivitamin      prochlorperazine (COMPAZINE) 5 MG tablet Take 1 tablet (5 mg total) by mouth every 6 (six) hours as needed for Nausea (or headache pain). 12 tablet 2    rizatriptan (MAXALT-MLT) 10 MG disintegrating tablet Take at onset of migraine, can repeat in 2 hrs if needed.  No more than 2 tabs per day or 3 days/wk. 12 tablet 2    vilazodone (VIIBRYD) 20 mg Tab Take 1 tablet (20 mg total) by mouth once daily. 30 tablet 2    gabapentin (NEURONTIN) 100 MG capsule Take 1 capsule (100 mg total) by mouth 3 (three) times daily. 90 capsule 1    medroxyPROGESTERone (PROVERA) 10 MG tablet Take 1 tablet (10 mg total) by mouth 2 (two) times a day. for 5 days 10 tablet 0    tiZANidine (ZANAFLEX) 4 MG tablet Take 1 tablet (4 mg total) by mouth every 8 (eight) hours. for 7 days 21 tablet 0     No current facility-administered medications for this visit.         ROS  Review of Systems   Constitutional: Negative for appetite change, chills, fatigue and fever.   HENT: Negative for congestion, ear  "pain, postnasal drip, rhinorrhea, sinus pressure, sneezing and sore throat.    Respiratory: Negative for shortness of breath.    Cardiovascular: Negative for chest pain and palpitations.   Gastrointestinal: Negative for abdominal pain, constipation, diarrhea, nausea and vomiting.   Genitourinary: Negative for dysuria.   Musculoskeletal: Positive for arthralgias, myalgias and neck pain.   Neurological: Negative for headaches.           Physical Exam  Vitals:    08/03/22 1455   BP: 130/88   Pulse: 91   Temp: 99.2 °F (37.3 °C)   TempSrc: Oral   SpO2: 96%   Weight: 110.3 kg (243 lb 4.4 oz)   Height: 5' 4" (1.626 m)    Body mass index is 41.76 kg/m².  Weight: 110.3 kg (243 lb 4.4 oz)   Height: 5' 4" (162.6 cm)   Physical Exam  Constitutional:       General: She is not in acute distress.  HENT:      Head: Normocephalic and atraumatic.      Right Ear: External ear normal.      Left Ear: External ear normal.      Nose: Nose normal.      Mouth/Throat:      Mouth: Mucous membranes are moist.   Eyes:      Pupils: Pupils are equal, round, and reactive to light.   Cardiovascular:      Rate and Rhythm: Normal rate and regular rhythm.   Pulmonary:      Effort: Pulmonary effort is normal. No respiratory distress.      Breath sounds: Normal breath sounds. No wheezing.   Abdominal:      General: Bowel sounds are normal.      Palpations: Abdomen is soft.   Musculoskeletal:      Cervical back: Neck supple. No rigidity. Muscular tenderness present.   Lymphadenopathy:      Cervical: No cervical adenopathy.   Skin:     General: Skin is warm and dry.   Neurological:      General: No focal deficit present.      Mental Status: She is alert and oriented to person, place, and time. Mental status is at baseline.   Psychiatric:         Mood and Affect: Mood normal.             Health Maintenance       Date Due Completion Date    COVID-19 Vaccine (3 - Booster for Pfizer series) 02/21/2022 9/21/2021    Influenza Vaccine (1) 09/01/2022 1/28/2022    " Cervical Cancer Screening 12/01/2023 12/1/2020    TETANUS VACCINE 10/30/2029 10/30/2019            Assessment & Plan  Problem List Items Addressed This Visit        Neuro    Migraine with aura and without status migrainosus, not intractable    Relevant Medications    tiZANidine (ZANAFLEX) 4 MG tablet       ENT    TMJ syndrome    Relevant Medications    tiZANidine (ZANAFLEX) 4 MG tablet      Other Visit Diagnoses     Cervical radiculopathy    -  Primary  -We discussed effective administration of gabapentin, adverse effects and side effects with education given for reinforcement  -Ok to increase Gabapentin to 200 mg TID if 100 mg proves in effective after 1 week  - Patient to use Zanaflex for muscle spasm   - Patient advised not to use more than one NASAID concurrently   - PT/OT for possible dry needling  - Patient may be a candidate for EMG if above therapy fails    Relevant Medications    gabapentin (NEURONTIN) 100 MG capsule, TID     Other Relevant Orders    Ambulatory referral/consult to Physical/Occupational Therapy    Neck pain      -As above    Relevant Medications    gabapentin (NEURONTIN) 100 MG capsule    Other Relevant Orders    X-Ray Cervical Spine AP And Lateral    Ambulatory referral/consult to Physical/Occupational Therapy            Health Maintenance reviewed: Deferred per patient     Follow-up: If symptoms worsen or fail to improve

## 2022-09-16 ENCOUNTER — OFFICE VISIT (OUTPATIENT)
Dept: PSYCHIATRY | Facility: CLINIC | Age: 32
End: 2022-09-16
Payer: COMMERCIAL

## 2022-09-16 DIAGNOSIS — F51.05 INSOMNIA DUE TO OTHER MENTAL DISORDER: ICD-10-CM

## 2022-09-16 DIAGNOSIS — F99 INSOMNIA DUE TO OTHER MENTAL DISORDER: ICD-10-CM

## 2022-09-16 DIAGNOSIS — F41.1 GAD (GENERALIZED ANXIETY DISORDER): Primary | ICD-10-CM

## 2022-09-16 DIAGNOSIS — F33.41 RECURRENT MAJOR DEPRESSIVE DISORDER, IN PARTIAL REMISSION: ICD-10-CM

## 2022-09-16 PROCEDURE — 1159F PR MEDICATION LIST DOCUMENTED IN MEDICAL RECORD: ICD-10-PCS | Mod: CPTII,95,, | Performed by: INTERNAL MEDICINE

## 2022-09-16 PROCEDURE — 1160F PR REVIEW ALL MEDS BY PRESCRIBER/CLIN PHARMACIST DOCUMENTED: ICD-10-PCS | Mod: CPTII,95,, | Performed by: INTERNAL MEDICINE

## 2022-09-16 PROCEDURE — 1160F RVW MEDS BY RX/DR IN RCRD: CPT | Mod: CPTII,95,, | Performed by: INTERNAL MEDICINE

## 2022-09-16 PROCEDURE — 99214 OFFICE O/P EST MOD 30 MIN: CPT | Mod: 95,,, | Performed by: INTERNAL MEDICINE

## 2022-09-16 PROCEDURE — 1159F MED LIST DOCD IN RCRD: CPT | Mod: CPTII,95,, | Performed by: INTERNAL MEDICINE

## 2022-09-16 PROCEDURE — 99214 PR OFFICE/OUTPT VISIT, EST, LEVL IV, 30-39 MIN: ICD-10-PCS | Mod: 95,,, | Performed by: INTERNAL MEDICINE

## 2022-09-16 RX ORDER — ALPRAZOLAM 0.5 MG/1
0.5 TABLET ORAL DAILY PRN
Qty: 30 TABLET | Refills: 0 | Status: SHIPPED | OUTPATIENT
Start: 2022-09-16 | End: 2023-05-11 | Stop reason: SDUPTHER

## 2022-09-16 RX ORDER — PROPRANOLOL HYDROCHLORIDE 40 MG/1
20-40 TABLET ORAL 2 TIMES DAILY PRN
Qty: 60 TABLET | Refills: 2 | Status: SHIPPED | OUTPATIENT
Start: 2022-09-16 | End: 2023-03-22

## 2022-09-16 NOTE — PROGRESS NOTES
OUTPATIENT PSYCHIATRY RETURN VISIT    ENCOUNTER DATE:  9/16/22  SITE:  [facility]  LENGTH OF SESSION:  25 minutes    The patient location is:  [place], not in healthcare facility  Visit type:  audiovisual    Face to Face time with patient:  25 minutes  30 minutes of total time spent on the encounter, which includes face to face time and non-face to face time preparing to see the patient (eg, review of tests), Obtaining and/or reviewing separately obtained history, Documenting clinical information in the electronic or other health record, Independently interpreting results (not separately reported) and communicating results to the patient/family/caregiver, or Care coordination (not separately reported).     Each patient to whom he or she provides medical services by telemedicine is:  (1) informed of the relationship between the physician and patient and the respective role of any other health care provider with respect to management of the patient; and (2) notified that he or she may decline to receive medical services by telemedicine and may withdraw from such care at any time.    CHIEF COMPLAINT:  Anxiety and Insomnia      HISTORY OF PRESENTING ILLNESS:  Krupa Walker is a 32 y.o. female with history of MDD, JAVI, ADHD, and Insomnia who presents for follow up appointment.     Plan at last appointment on 5/25/2022:  She would like to continue Viibryd 40mg daily and try changing to the morning to see if this helps her sleep.  Will also start Valium 5-10mg qHS PRN insomnia temporarily.  Will avoid Trazodone in the future since it causes nightmares.   Discussed possibility of bipolar disorder, however we both agree this is unlikely and that her insomnia for the last 3 nights was multifactorial (headache, medications, nightmares).  Nevertheless, will continue to monitor closely for symptoms of vishnu.  She also agrees to keep me updated.    Discussed with patient informed consent, risks  versus benefits, alternative treatments, side effect profile and the inherent unpredictability of individual responses to these treatments.  The patient expresses understanding of the above and displays the capacity to agree with this current plan.  Continue therapy with Megha Ward LCSW.    History as told by patient:  They are building a house.  Going to be a big house which she is excited about.  But financially stressful.  Will probably move March or April.  New house is in [place].  Work is fine currently - some busy days, some boring days.   switched to day shift about 6 weeks ago.  He works 12 hour shifts - currently [employer], moved from [employer], studying for school (finishing bachelor's degree).  Sleep is still poor.  Mood has actually been better - more chill than previously.  Not as depressed either.  Sometimes takes Bendaryl 50mg, sometimes takes Trazodone 100mg (helped, no nightmares), another night took Valium 10mg but this did nothing so stopped it, Nyquil sometimes.  Was on Zanaflex for neck - takes some nights which helps some.  Nights before work can't sleep even though its not even a stressful job.  Thinks about what work will be like.  Not taking Viibryd currently - stopped at least a month ago.  Zoloft and Pristiq had worst sexual side effects.  Viibryd a little better but still had sexual side effects.      Medication side effects:  As above  Medication compliance:  As above    PSYCHIATRIC REVIEW OF SYSTEMS:  Trouble with sleep:  Yes as above  Appetite changes:  Denies  Weight changes:  Denies  Lack of energy:  Sometimes  Anhedonia:  Denies  Somatic symptoms:  Denies  Libido:  Not discussed  Anxiety/panic:  At times  Guilty/hopeless:  Denies  Self-injurious behavior/risky behavior:  Denies  Any drugs:  Denies  Alcohol:  Very rarely  Breastfeeding:  No    MEDICAL REVIEW OF SYSTEMS:  Complete review of systems performed covering Constitutional, Musculoskeletal, Neurologic.  All systems  negative except for that covered in HPI.    PAST PSYCHIATRIC, MEDICAL, AND SOCIAL HISTORY REVIEWED  The patient's past medical, family and social history have been reviewed and updated as appropriate within the electronic medical record - see encounter notes.    MEDICATIONS:    Current Outpatient Medications:     ALPRAZolam (XANAX) 0.5 MG tablet, Take 1 tablet (0.5 mg total) by mouth daily as needed (Severe anxiety/Insomnia)., Disp: 30 tablet, Rfl: 0    diethylpropion (TENUATE) 75 mg SR tablet, Take 1 tablet (75 mg total) by mouth every morning., Disp: 30 tablet, Rfl: 5    gabapentin (NEURONTIN) 100 MG capsule, Take 1 capsule (100 mg total) by mouth 3 (three) times daily., Disp: 90 capsule, Rfl: 1    ibuprofen (ADVIL,MOTRIN) 800 MG tablet, Take 800 mg by mouth every 6 (six) hours as needed., Disp: , Rfl:     ketorolac (TORADOL) 10 mg tablet, Take 2 tabs (20mg) at the onset of a headache, if headache persists you may take 1 tab every 4 to 6 hours as needed. Do not exceed 40 mg per day., Disp: 20 tablet, Rfl: 3    medroxyPROGESTERone (PROVERA) 10 MG tablet, Take 1 tablet (10 mg total) by mouth 2 (two) times a day. for 5 days, Disp: 10 tablet, Rfl: 0    meloxicam (MOBIC) 15 MG tablet, Take 1 tablet (15 mg total) by mouth once daily., Disp: 90 tablet, Rfl: 3    methylphenidate HCl (RITALIN) 10 MG tablet, Take 1 tablet (10 mg total) by mouth 2 (two) times daily., Disp: 60 tablet, Rfl: 0    multivit with calcium,iron,min (WOMEN'S DAILY MULTIVITAMIN ORAL), Women's Daily Multivitamin, Disp: , Rfl:     prochlorperazine (COMPAZINE) 5 MG tablet, Take 1 tablet (5 mg total) by mouth every 6 (six) hours as needed for Nausea (or headache pain)., Disp: 12 tablet, Rfl: 2    propranoloL (INDERAL) 40 MG tablet, Take 0.5-1 tablets (20-40 mg total) by mouth 2 (two) times daily as needed (anxiety)., Disp: 60 tablet, Rfl: 2    rizatriptan (MAXALT-MLT) 10 MG disintegrating tablet, Take at onset of migraine, can repeat in 2 hrs if needed.   "No more than 2 tabs per day or 3 days/wk., Disp: 12 tablet, Rfl: 2    vilazodone (VIIBRYD) 20 mg Tab, Take 1 tablet (20 mg total) by mouth once daily., Disp: 30 tablet, Rfl: 2    ALLERGIES:  Review of patient's allergies indicates:  No Known Allergies    PSYCHIATRIC EXAM:  There were no vitals filed for this visit.  Appearance:  Well groomed, appearing healthy and of stated age  Behavior:  Cooperative, pleasant, no psychomotor agitation or retardation  Speech:  Normal rate, rhythm, prosody, and volume  Mood:  "Better, but I still can't sleep"  Affect:  Bright  Thought Process:  Linear, logical, goal directed  Thought Content:  Negative for suicidal ideation, homicidal ideation, delusions or hallucinations.  Associations:  Intact  Memory:  Grossly Intact  Level of Consciousness/Orientation:  Grossly intact  Fund of Knowledge:  Good  Attention:  Good  Language:  Fluent, able to name abstract and concrete objects  Insight:  Fair  Judgment:  Intact  Psychomotor signs:  No abnormal movements of face  Gait:  Unable to assess via virtual visit      RELEVANT LABS/STUDIES:    Lab Results   Component Value Date    WBC 8.31 06/04/2021    HGB 14.5 06/04/2021    HCT 41.8 06/04/2021    MCV 92 06/04/2021     06/04/2021     BMP  Lab Results   Component Value Date     (L) 06/04/2021    K 4.6 06/04/2021     06/04/2021    CO2 24 06/04/2021    BUN 14 06/04/2021    CREATININE 0.8 06/04/2021    CALCIUM 9.7 06/04/2021    ANIONGAP 6 (L) 06/04/2021    ESTGFRAFRICA >60 06/04/2021    EGFRNONAA >60 06/04/2021     Lab Results   Component Value Date    ALT 8 (L) 06/04/2021    AST 19 06/04/2021    ALKPHOS 84 06/04/2021    BILITOT 0.5 06/04/2021     Lab Results   Component Value Date    TSH 1.780 11/05/2020     Lab Results   Component Value Date    HGBA1C 4.9 12/04/2019       IMPRESSION:    Krupa Walker is a 32 y.o. female with history of MDD, JAVI, ADHD, and Insomnia who presents for follow up " appointment.    Status/Progress:  Based on the examination today, the patient's problem(s) is/are improved but still inadequately controlled.  New problems have not been presented today.    Risk Parameters:  Patient reports no suicidal ideation  Patient reports no homicidal ideation  Patient reports no self-injurious behavior  Patient reports no violent behavior    DIAGNOSES:    ICD-10-CM ICD-9-CM   1. JAVI (generalized anxiety disorder)  F41.1 300.02   2. Insomnia due to other mental disorder  F51.05 300.9    F99 327.02   3. Recurrent major depressive disorder, in partial remission  F33.41 296.35         PLAN:  Will start Propranolol 20-40mg BID PRN insomnia.    Will stop Valium since this is not helpful and restart Xanax 0.5mg daily PRN severe anxiety/insomnia.  Consider addition of Wellbutrin or Buspar in the future since she has had sexual side effects to all SSRI/SNRIs.     Discussed with patient informed consent, risks versus benefits, alternative treatments, side effect profile and the inherent unpredictability of individual responses to these treatments.  The patient expresses understanding of the above and displays the capacity to agree with this current plan.  Continue therapy with Megha Ward LCSW.    RETURN TO CLINIC:  Follow up in about 6 weeks (around 10/28/2022).

## 2022-09-19 ENCOUNTER — OFFICE VISIT (OUTPATIENT)
Dept: PODIATRY | Facility: CLINIC | Age: 32
End: 2022-09-19
Payer: COMMERCIAL

## 2022-09-19 VITALS — WEIGHT: 250.88 LBS | HEIGHT: 64 IN | BODY MASS INDEX: 42.83 KG/M2

## 2022-09-19 DIAGNOSIS — B35.1 TINEA UNGUIUM: ICD-10-CM

## 2022-09-19 DIAGNOSIS — M79.676 PAIN DUE TO ONYCHOMYCOSIS OF TOENAIL: ICD-10-CM

## 2022-09-19 DIAGNOSIS — L60.8 DEFORMITY OF TOENAIL: Primary | ICD-10-CM

## 2022-09-19 DIAGNOSIS — B35.1 PAIN DUE TO ONYCHOMYCOSIS OF TOENAIL: ICD-10-CM

## 2022-09-19 PROCEDURE — 3008F BODY MASS INDEX DOCD: CPT | Mod: CPTII,S$GLB,, | Performed by: PODIATRIST

## 2022-09-19 PROCEDURE — 1160F RVW MEDS BY RX/DR IN RCRD: CPT | Mod: CPTII,S$GLB,, | Performed by: PODIATRIST

## 2022-09-19 PROCEDURE — 1159F MED LIST DOCD IN RCRD: CPT | Mod: CPTII,S$GLB,, | Performed by: PODIATRIST

## 2022-09-19 PROCEDURE — 1159F PR MEDICATION LIST DOCUMENTED IN MEDICAL RECORD: ICD-10-PCS | Mod: CPTII,S$GLB,, | Performed by: PODIATRIST

## 2022-09-19 PROCEDURE — 3008F PR BODY MASS INDEX (BMI) DOCUMENTED: ICD-10-PCS | Mod: CPTII,S$GLB,, | Performed by: PODIATRIST

## 2022-09-19 PROCEDURE — 99999 PR PBB SHADOW E&M-EST. PATIENT-LVL III: CPT | Mod: PBBFAC,,, | Performed by: PODIATRIST

## 2022-09-19 PROCEDURE — 1160F PR REVIEW ALL MEDS BY PRESCRIBER/CLIN PHARMACIST DOCUMENTED: ICD-10-PCS | Mod: CPTII,S$GLB,, | Performed by: PODIATRIST

## 2022-09-19 PROCEDURE — 99213 OFFICE O/P EST LOW 20 MIN: CPT | Mod: 25,S$GLB,, | Performed by: PODIATRIST

## 2022-09-19 PROCEDURE — 99999 PR PBB SHADOW E&M-EST. PATIENT-LVL III: ICD-10-PCS | Mod: PBBFAC,,, | Performed by: PODIATRIST

## 2022-09-19 PROCEDURE — 11730 AVULSION NAIL PLATE SIMPLE 1: CPT | Mod: TA,S$GLB,, | Performed by: PODIATRIST

## 2022-09-19 PROCEDURE — 99213 PR OFFICE/OUTPT VISIT, EST, LEVL III, 20-29 MIN: ICD-10-PCS | Mod: 25,S$GLB,, | Performed by: PODIATRIST

## 2022-09-19 PROCEDURE — 11730 NAIL REMOVAL: ICD-10-PCS | Mod: TA,S$GLB,, | Performed by: PODIATRIST

## 2022-09-19 NOTE — PROCEDURES
Nail Removal    Date/Time: 9/19/2022 9:45 AM  Performed by: Faby Murry DPM  Authorized by: Faby Murry DPM     Consent Done?:  Yes (Written)  Time out: Immediately prior to the procedure a time out was called    Prep: patient was prepped and draped in usual sterile fashion    Location:     Location:  Left foot    Location detail:  Left big toe  Anesthesia:     Anesthesia:  Local infiltration    Local anesthetic:  Bupivacaine 0.5% without epinephrine    Anesthetic total (ml):  6  Procedure Details:     Preparation:  Skin prepped with alcohol and skin prepped with Betadine    Amount removed:  Complete    Wedge excision of skin of nail fold: No      Nail bed sutured?: No      Nail matrix removed:  None    Removed nail replaced and anchored: No      Dressing applied:  4x4, antibiotic ointment and gauze roll    Patient tolerance:  Patient tolerated the procedure well with no immediate complications

## 2022-09-19 NOTE — PROGRESS NOTES
Subjective:      Patient ID: Krupa Walker is a 32 y.o. female.    Chief Complaint: Nail Problem (Left great toe)    32 y.o. female presenting with left hallux pain.  Recently had a birth.  Patient tells me she dropped dresser door on left hallux a year ago.  It got inflamed and noticed some redness associated with pain.  Ambulating in Crocs. Redness has been there for 8 weeks. Has been soaking. Not taking any antibiotics. Aching and throbbing pain.     23 presenting with left hallux thickened and mycotic with chronic deformity.  Denies pain but patient would like to have her left hallux toenail removed because it bothers her when she wears closed toe shoes.     Review of Systems   Constitutional: Negative for chills, decreased appetite, fever and malaise/fatigue.   HENT:  Negative for congestion, ear discharge and sore throat.    Eyes:  Negative for discharge and pain.   Cardiovascular:  Negative for chest pain, claudication and leg swelling.   Respiratory:  Negative for cough and shortness of breath.    Skin:  Positive for color change. Negative for nail changes and rash.   Musculoskeletal:  Negative for arthritis, joint pain, joint swelling and muscle weakness.   Gastrointestinal:  Negative for bloating, abdominal pain, diarrhea, nausea and vomiting.   Genitourinary:  Negative for flank pain and hematuria.   Neurological:  Negative for headaches, numbness and weakness.   Psychiatric/Behavioral:  Negative for altered mental status.            Past Medical History:   Diagnosis Date    Anxiety     Depression     GERD (gastroesophageal reflux disease)     Gestational hypertension, third trimester 2019    Hypertension     gestational     Hypoglycemia     Kidney calculi     2016    Migraines     PONV (postoperative nausea and vomiting)     STD (sexually transmitted disease)     Urinary tract infection        Past Surgical History:   Procedure Laterality Date    ANKLE SURGERY Right 2008      SECTION  10/2014    CYSTOSCOPY W/ URETERAL STENT PLACEMENT Left 10/2/2019    Procedure: CYSTOSCOPY, WITH URETERAL STENT INSERTION - Under ultrasound guidance;  Surgeon: Marsha Guevara MD;  Location: [facility];  Service: Urology;  Laterality: Left;    KIDNEY STONE SURGERY      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)    LASER LITHOTRIPSY Left 10/16/2019    Procedure: LITHOTRIPSY, USING LASER;  Surgeon: Marsha Guevara MD;  Location: [facility];  Service: Urology;  Laterality: Left;    TONSILLECTOMY      ULTRASOUND GUIDANCE Left 10/16/2019    Procedure: ULTRASOUND GUIDANCE, for laser litho;  Surgeon: Marsha Guevara MD;  Location: [facility];  Service: Urology;  Laterality: Left;    URETEROSCOPIC REMOVAL OF URETERIC CALCULUS Left 10/16/2019    Procedure: REMOVAL, CALCULUS, URETER, URETEROSCOPIC possible retro pyelo, possible lasere litho, possible stent;  Surgeon: Marsha Guevara MD;  Location: [facility];  Service: Urology;  Laterality: Left;  25 WEEKS PREGNANT / FETAL MONITORING BEFORE AND AFTER PROCEDURE/   CALL PELON ELDER [phone]      WISDOM TOOTH EXTRACTION         Family History   Problem Relation Age of Onset    Hypertension Mother     Hypertension Father     Heart disease Father     Depression Father     Hyperlipidemia Father     No Known Problems Sister     Nephrolithiasis Daughter     No Known Problems Sister     No Known Problems Sister     Breast cancer Paternal Grandmother         50s at diagnosis    Cancer Paternal Grandmother         breast    Hypertension Paternal Grandmother     Heart disease Paternal Grandfather     Hyperlipidemia Paternal Grandfather     Hypertension Paternal Grandfather     Hyperlipidemia Maternal Grandfather     Hypertension Maternal Grandmother     Colon cancer Neg Hx     Ovarian cancer Neg Hx        Social History     Socioeconomic History    Marital status:    Tobacco Use    Smoking status: Never    Smokeless tobacco:  Never   Substance and Sexual Activity    Alcohol use: Yes     Alcohol/week: 0.0 standard drinks     Comment: 1 drink per month    Drug use: Never    Sexual activity: Yes     Partners: Male     Birth control/protection: Condom     Comment: IUD removed due to complications in July 2018       Current Outpatient Medications   Medication Sig Dispense Refill    ALPRAZolam (XANAX) 0.5 MG tablet Take 1 tablet (0.5 mg total) by mouth daily as needed (Severe anxiety/Insomnia). 30 tablet 0    diethylpropion (TENUATE) 75 mg SR tablet Take 1 tablet (75 mg total) by mouth every morning. 30 tablet 5    gabapentin (NEURONTIN) 100 MG capsule Take 1 capsule (100 mg total) by mouth 3 (three) times daily. 90 capsule 1    ibuprofen (ADVIL,MOTRIN) 800 MG tablet Take 800 mg by mouth every 6 (six) hours as needed.      ketorolac (TORADOL) 10 mg tablet Take 2 tabs (20mg) at the onset of a headache, if headache persists you may take 1 tab every 4 to 6 hours as needed. Do not exceed 40 mg per day. 20 tablet 3    meloxicam (MOBIC) 15 MG tablet Take 1 tablet (15 mg total) by mouth once daily. 90 tablet 3    methylphenidate HCl (RITALIN) 10 MG tablet Take 1 tablet (10 mg total) by mouth 2 (two) times daily. 60 tablet 0    multivit with calcium,iron,min (WOMEN'S DAILY MULTIVITAMIN ORAL) Women's Daily Multivitamin      prochlorperazine (COMPAZINE) 5 MG tablet Take 1 tablet (5 mg total) by mouth every 6 (six) hours as needed for Nausea (or headache pain). 12 tablet 2    propranoloL (INDERAL) 40 MG tablet Take 0.5-1 tablets (20-40 mg total) by mouth 2 (two) times daily as needed (anxiety). 60 tablet 2    rizatriptan (MAXALT-MLT) 10 MG disintegrating tablet Take at onset of migraine, can repeat in 2 hrs if needed.  No more than 2 tabs per day or 3 days/wk. 12 tablet 2     No current facility-administered medications for this visit.       Review of patient's allergies indicates:  No Known Allergies    Vitals:    09/19/22 0944   Weight: 113.8 kg (250  "lb 14.4 oz)   Height: 5' 4" (1.626 m)   PainSc: 0-No pain       Objective:      Physical Exam  Constitutional:       General: She is not in acute distress.     Appearance: She is well-developed.   HENT:      Nose: Nose normal.   Eyes:      Conjunctiva/sclera: Conjunctivae normal.   Pulmonary:      Effort: Pulmonary effort is normal.   Chest:      Chest wall: No tenderness.   Abdominal:      Tenderness: There is no abdominal tenderness.   Musculoskeletal:      Cervical back: Normal range of motion.   Neurological:      Mental Status: She is alert and oriented to person, place, and time.   Psychiatric:         Behavior: Behavior normal.       Vascular: Distal DP/PT pulses palpable 2/4. CRT < 3 sec to tips of toes. No vericosities noted to LEs. Hair growth present LE, warm to touch LE    Dermatologic:   L hallux: toenail elongated by 2-3 mm, thickened by 2-3 mm, discolored/yellowed, dystrophic, brittle with subungual debris. No incurvation.     Musculoskeletal: MMT 5/5 in DF/PF/Inv/Ev resistance with no reproduction of pain in any direction.  L hallux: ttp around toenail of hallux.     Neurological: Light touch, proprioception, and sharp/dull sensation are all intact. Protective threshold with the Bard-Wienstein monofilament is intact. Vibratory sensation intact.         Assessment:       Encounter Diagnoses   Name Primary?    Deformity of toenail Yes    Pain due to onychomycosis of toenail     Tinea unguium            Plan:       Krupa was seen today for nail problem.    Diagnoses and all orders for this visit:    Deformity of toenail    Pain due to onychomycosis of toenail    Tinea unguium      I counseled the patient on her conditions, their implications and medical management.    32 y.o. female with L hallux toenail deformity.  History of total nail avulsion of left hallux.     -left hallux chronic toenail deformity with pain.  Discussed different treatment options.  Recommend total nail avulsion of left hallux.  " No guarantees were made.   -s/p TNA w/o phenol.  Tolerated well.  See procedure note.   -recommend soaking instruction.  Instruction was given to patient.  Recommend weight-bearing as tolerated in Crocs.    -The nature of the condition, options for management, as well as potential risks and complications were discussed in detail with patient. Patient was amenable to my recommendations and left my office fully informed and will follow up as instructed or sooner if necessary.    -Patient was advised of signs and symptoms of infection including redness, drainage, purulence, odor, streaking, fever, chills and I advised patient to seek medical attention (ER or urgent care) if these symptoms arise.   -f/u prn    Note dictated with voice recognition software, please excuse any grammatical errors.

## 2022-10-05 ENCOUNTER — PATIENT MESSAGE (OUTPATIENT)
Dept: INTERNAL MEDICINE | Facility: CLINIC | Age: 32
End: 2022-10-05
Payer: COMMERCIAL

## 2022-10-05 RX ORDER — NYSTATIN 100000 U/G
CREAM TOPICAL 2 TIMES DAILY
Qty: 30 G | Refills: 1 | Status: SHIPPED | OUTPATIENT
Start: 2022-10-05 | End: 2023-04-17

## 2022-10-10 ENCOUNTER — OFFICE VISIT (OUTPATIENT)
Dept: PSYCHIATRY | Facility: CLINIC | Age: 32
End: 2022-10-10
Payer: COMMERCIAL

## 2022-10-10 DIAGNOSIS — F99 INSOMNIA DUE TO OTHER MENTAL DISORDER: ICD-10-CM

## 2022-10-10 DIAGNOSIS — F90.0 ATTENTION DEFICIT HYPERACTIVITY DISORDER (ADHD), PREDOMINANTLY INATTENTIVE TYPE: ICD-10-CM

## 2022-10-10 DIAGNOSIS — F41.1 GAD (GENERALIZED ANXIETY DISORDER): Primary | ICD-10-CM

## 2022-10-10 DIAGNOSIS — F51.05 INSOMNIA DUE TO OTHER MENTAL DISORDER: ICD-10-CM

## 2022-10-10 DIAGNOSIS — F33.41 RECURRENT MAJOR DEPRESSIVE DISORDER, IN PARTIAL REMISSION: ICD-10-CM

## 2022-10-10 PROCEDURE — 99999 PR PBB SHADOW E&M-EST. PATIENT-LVL II: CPT | Mod: PBBFAC,,, | Performed by: INTERNAL MEDICINE

## 2022-10-10 PROCEDURE — 99214 OFFICE O/P EST MOD 30 MIN: CPT | Mod: 95,,, | Performed by: INTERNAL MEDICINE

## 2022-10-10 PROCEDURE — 1160F PR REVIEW ALL MEDS BY PRESCRIBER/CLIN PHARMACIST DOCUMENTED: ICD-10-PCS | Mod: CPTII,95,, | Performed by: INTERNAL MEDICINE

## 2022-10-10 PROCEDURE — 99214 PR OFFICE/OUTPT VISIT, EST, LEVL IV, 30-39 MIN: ICD-10-PCS | Mod: 95,,, | Performed by: INTERNAL MEDICINE

## 2022-10-10 PROCEDURE — 1159F MED LIST DOCD IN RCRD: CPT | Mod: CPTII,95,, | Performed by: INTERNAL MEDICINE

## 2022-10-10 PROCEDURE — 1159F PR MEDICATION LIST DOCUMENTED IN MEDICAL RECORD: ICD-10-PCS | Mod: CPTII,95,, | Performed by: INTERNAL MEDICINE

## 2022-10-10 PROCEDURE — 99999 PR PBB SHADOW E&M-EST. PATIENT-LVL II: ICD-10-PCS | Mod: PBBFAC,,, | Performed by: INTERNAL MEDICINE

## 2022-10-10 PROCEDURE — 1160F RVW MEDS BY RX/DR IN RCRD: CPT | Mod: CPTII,95,, | Performed by: INTERNAL MEDICINE

## 2022-10-10 RX ORDER — BUSPIRONE HYDROCHLORIDE 10 MG/1
10 TABLET ORAL 3 TIMES DAILY
Qty: 90 TABLET | Refills: 3 | Status: SHIPPED | OUTPATIENT
Start: 2022-10-10 | End: 2023-12-05

## 2022-10-10 NOTE — PROGRESS NOTES
OUTPATIENT PSYCHIATRY RETURN VISIT    ENCOUNTER DATE:  10/10/22  SITE:  [facility]  LENGTH OF SESSION:  10 minutes    The patient location is:  [place], not in healthcare facility  Visit type:  audiovisual    Face to Face time with patient:  10 minutes  15 minutes of total time spent on the encounter, which includes face to face time and non-face to face time preparing to see the patient (eg, review of tests), Obtaining and/or reviewing separately obtained history, Documenting clinical information in the electronic or other health record, Independently interpreting results (not separately reported) and communicating results to the patient/family/caregiver, or Care coordination (not separately reported).     Each patient to whom he or she provides medical services by telemedicine is:  (1) informed of the relationship between the physician and patient and the respective role of any other health care provider with respect to management of the patient; and (2) notified that he or she may decline to receive medical services by telemedicine and may withdraw from such care at any time.    CHIEF COMPLAINT:  Anxiety      HISTORY OF PRESENTING ILLNESS:  Krupa Walker is a 32 y.o. female with history of MDD, JAVI, ADHD, and Insomnia who presents for follow up appointment.     Plan at last appointment on 9/16/2022:  Will start Propranolol 20-40mg BID PRN insomnia.    Will stop Valium since this is not helpful and restart Xanax 0.5mg daily PRN severe anxiety/insomnia.  Consider addition of Wellbutrin or Buspar in the future since she has had sexual side effects to all SSRI/SNRIs.     Discussed with patient informed consent, risks versus benefits, alternative treatments, side effect profile and the inherent unpredictability of individual responses to these treatments.  The patient expresses understanding of the above and displays the capacity to agree with this current plan.  Continue therapy with  Megha Ward, [facility].    History as told by patient:  Doesn't feel propranolol helped that much.  Took 2 at first but BP was 97 systolic.  After 1 at work her BP was still 160, so then took a second one - chest pain was better.  Had a few pulse readings below 60 but all over 50 - felt fine.  Lowest BP was 50, BP at that time was 120/something.  Depression is still better but does have some occasional bad days.  Went to house to see it being built - every little thing bugs her.  Can't make herself move past these things.  Gets obsessed over details at work too.  Feels so anxious in their current house (too much stuff in there), she ends up taking the kids to her mother's house or to an activity.  Obsesses about house being clean but it isn't - its dirty and she doesn't have time to deal with it.  Then has an anxiety attack when she sees it.      Medication side effects:  As above  Medication compliance:  As above    PSYCHIATRIC REVIEW OF SYSTEMS:  Trouble with sleep:  Sometimes  Appetite changes:  Denies  Weight changes:  Denies  Lack of energy:  Sometimes  Anhedonia:  Denies  Somatic symptoms:  Denies  Libido:  Not discussed  Anxiety/panic:  At times  Guilty/hopeless:  Denies  Self-injurious behavior/risky behavior:  Denies  Any drugs:  Denies  Alcohol:  Very rarely  Breastfeeding:  No    MEDICAL REVIEW OF SYSTEMS:  Complete review of systems performed covering Constitutional, Musculoskeletal, Neurologic.  All systems negative except for that covered in HPI.    PAST PSYCHIATRIC, MEDICAL, AND SOCIAL HISTORY REVIEWED  The patient's past medical, family and social history have been reviewed and updated as appropriate within the electronic medical record - see encounter notes.    MEDICATIONS:    Current Outpatient Medications:     ALPRAZolam (XANAX) 0.5 MG tablet, Take 1 tablet (0.5 mg total) by mouth daily as needed (Severe anxiety/Insomnia)., Disp: 30 tablet, Rfl: 0    busPIRone (BUSPAR) 10 MG tablet, Take 1 tablet  "(10 mg total) by mouth 3 (three) times daily., Disp: 90 tablet, Rfl: 3    diethylpropion (TENUATE) 75 mg SR tablet, Take 1 tablet (75 mg total) by mouth every morning., Disp: 30 tablet, Rfl: 5    gabapentin (NEURONTIN) 100 MG capsule, Take 1 capsule (100 mg total) by mouth 3 (three) times daily., Disp: 90 capsule, Rfl: 1    ibuprofen (ADVIL,MOTRIN) 800 MG tablet, Take 800 mg by mouth every 6 (six) hours as needed., Disp: , Rfl:     ketorolac (TORADOL) 10 mg tablet, Take 2 tabs (20mg) at the onset of a headache, if headache persists you may take 1 tab every 4 to 6 hours as needed. Do not exceed 40 mg per day., Disp: 20 tablet, Rfl: 3    meloxicam (MOBIC) 15 MG tablet, Take 1 tablet (15 mg total) by mouth once daily., Disp: 90 tablet, Rfl: 3    methylphenidate HCl (RITALIN) 10 MG tablet, Take 1 tablet (10 mg total) by mouth 2 (two) times daily., Disp: 60 tablet, Rfl: 0    multivit with calcium,iron,min (WOMEN'S DAILY MULTIVITAMIN ORAL), Women's Daily Multivitamin, Disp: , Rfl:     nystatin (MYCOSTATIN) cream, Apply topically 2 (two) times daily., Disp: 30 g, Rfl: 1    prochlorperazine (COMPAZINE) 5 MG tablet, Take 1 tablet (5 mg total) by mouth every 6 (six) hours as needed for Nausea (or headache pain)., Disp: 12 tablet, Rfl: 2    propranoloL (INDERAL) 40 MG tablet, Take 0.5-1 tablets (20-40 mg total) by mouth 2 (two) times daily as needed (anxiety)., Disp: 60 tablet, Rfl: 2    rizatriptan (MAXALT-MLT) 10 MG disintegrating tablet, Take at onset of migraine, can repeat in 2 hrs if needed.  No more than 2 tabs per day or 3 days/wk., Disp: 12 tablet, Rfl: 2    ALLERGIES:  Review of patient's allergies indicates:  No Known Allergies    PSYCHIATRIC EXAM:  There were no vitals filed for this visit.  Appearance:  Well groomed, appearing healthy and of stated age  Behavior:  Cooperative, pleasant, no psychomotor agitation or retardation  Speech:  Normal rate, rhythm, prosody, and volume  Mood:  "Anxious"  Affect:  " Euthymic  Thought Process:  Linear, logical, goal directed  Thought Content:  Negative for suicidal ideation, homicidal ideation, delusions or hallucinations.  Associations:  Intact  Memory:  Grossly Intact  Level of Consciousness/Orientation:  Grossly intact  Fund of Knowledge:  Good  Attention:  Good  Language:  Fluent, able to name abstract and concrete objects  Insight:  Fair  Judgment:  Intact  Psychomotor signs:  No abnormal movements of face  Gait:  Unable to assess via virtual visit      RELEVANT LABS/STUDIES:    Lab Results   Component Value Date    WBC 8.31 06/04/2021    HGB 14.5 06/04/2021    HCT 41.8 06/04/2021    MCV 92 06/04/2021     06/04/2021     BMP  Lab Results   Component Value Date     09/19/2022    K 4.5 09/19/2022     09/19/2022    CO2 28 09/19/2022    BUN 16 09/19/2022    CREATININE 0.78 09/19/2022    CALCIUM 8.6 (L) 09/19/2022    ANIONGAP 8 09/19/2022    ESTGFRAFRICA >60 06/04/2021    EGFRNONAA >60 06/04/2021     Lab Results   Component Value Date    ALT 12 09/19/2022    AST 24 09/19/2022    ALKPHOS 73 09/19/2022    BILITOT 0.7 09/19/2022     Lab Results   Component Value Date    TSH 1.970 09/19/2022     Lab Results   Component Value Date    HGBA1C 4.9 12/04/2019       IMPRESSION:    Krupa Walker is a 32 y.o. female with history of MDD, JAVI, ADHD, and Insomnia who presents for follow up appointment.    Status/Progress:  Based on the examination today, the patient's problem(s) is/are improved but still inadequately controlled.  New problems have not been presented today.    Risk Parameters:  Patient reports no suicidal ideation  Patient reports no homicidal ideation  Patient reports no self-injurious behavior  Patient reports no violent behavior    DIAGNOSES:    ICD-10-CM ICD-9-CM   1. JAVI (generalized anxiety disorder)  F41.1 300.02   2. Insomnia due to other mental disorder  F51.05 300.9    F99 327.02   3. Recurrent major depressive disorder, in partial remission   F33.41 296.35   4. Attention deficit hyperactivity disorder (ADHD), predominantly inattentive type  F90.0 314.00       PLAN:  Will start Buspar 10mg BID-TID for anxiety since she has had sexual side effects with SSRI's/SNRI's.  Will start Propranolol 20-40mg BID PRN insomnia.    Continue Propranolol 20-40mg BID PRN anxiety and Xanax 0.5mg daily PRN severe anxiety/insomnia.   Discussed with patient informed consent, risks versus benefits, alternative treatments, side effect profile and the inherent unpredictability of individual responses to these treatments.  The patient expresses understanding of the above and displays the capacity to agree with this current plan.  Continue therapy with Megha Ward LCSW.    RETURN TO CLINIC:  Follow up in about 4 weeks (around 11/7/2022).

## 2022-10-19 ENCOUNTER — LAB VISIT (OUTPATIENT)
Dept: LAB | Facility: HOSPITAL | Age: 32
End: 2022-10-19
Attending: DERMATOLOGY
Payer: COMMERCIAL

## 2022-10-19 ENCOUNTER — OFFICE VISIT (OUTPATIENT)
Dept: DERMATOLOGY | Facility: CLINIC | Age: 32
End: 2022-10-19
Payer: COMMERCIAL

## 2022-10-19 DIAGNOSIS — L65.9 ALOPECIA: ICD-10-CM

## 2022-10-19 DIAGNOSIS — L53.9 ERYTHEMA: ICD-10-CM

## 2022-10-19 DIAGNOSIS — L71.9 ROSACEA: ICD-10-CM

## 2022-10-19 DIAGNOSIS — R79.89 HIGH SERUM PARATHYROID HORMONE (PTH): ICD-10-CM

## 2022-10-19 DIAGNOSIS — L73.9 FOLLICULITIS: Primary | ICD-10-CM

## 2022-10-19 DIAGNOSIS — R79.89 LOW VITAMIN D LEVEL: ICD-10-CM

## 2022-10-19 LAB
FERRITIN SERPL-MCNC: 51 NG/ML (ref 20–300)
IRON SERPL-MCNC: 137 UG/DL (ref 30–160)
SATURATED IRON: 38 % (ref 20–50)
TOTAL IRON BINDING CAPACITY: 358 UG/DL (ref 250–450)
TRANSFERRIN SERPL-MCNC: 242 MG/DL (ref 200–375)
VIT B12 SERPL-MCNC: 835 PG/ML (ref 210–950)

## 2022-10-19 PROCEDURE — 99205 PR OFFICE/OUTPT VISIT, NEW, LEVL V, 60-74 MIN: ICD-10-PCS | Mod: S$GLB,,, | Performed by: DERMATOLOGY

## 2022-10-19 PROCEDURE — 1160F RVW MEDS BY RX/DR IN RCRD: CPT | Mod: CPTII,S$GLB,, | Performed by: DERMATOLOGY

## 2022-10-19 PROCEDURE — 86038 ANTINUCLEAR ANTIBODIES: CPT | Performed by: DERMATOLOGY

## 2022-10-19 PROCEDURE — 36415 COLL VENOUS BLD VENIPUNCTURE: CPT | Mod: PN | Performed by: DERMATOLOGY

## 2022-10-19 PROCEDURE — 1159F MED LIST DOCD IN RCRD: CPT | Mod: CPTII,S$GLB,, | Performed by: DERMATOLOGY

## 2022-10-19 PROCEDURE — 1159F PR MEDICATION LIST DOCUMENTED IN MEDICAL RECORD: ICD-10-PCS | Mod: CPTII,S$GLB,, | Performed by: DERMATOLOGY

## 2022-10-19 PROCEDURE — 84466 ASSAY OF TRANSFERRIN: CPT | Performed by: DERMATOLOGY

## 2022-10-19 PROCEDURE — 99999 PR PBB SHADOW E&M-EST. PATIENT-LVL IV: CPT | Mod: PBBFAC,,, | Performed by: DERMATOLOGY

## 2022-10-19 PROCEDURE — 1160F PR REVIEW ALL MEDS BY PRESCRIBER/CLIN PHARMACIST DOCUMENTED: ICD-10-PCS | Mod: CPTII,S$GLB,, | Performed by: DERMATOLOGY

## 2022-10-19 PROCEDURE — 99205 OFFICE O/P NEW HI 60 MIN: CPT | Mod: S$GLB,,, | Performed by: DERMATOLOGY

## 2022-10-19 PROCEDURE — 99999 PR PBB SHADOW E&M-EST. PATIENT-LVL IV: ICD-10-PCS | Mod: PBBFAC,,, | Performed by: DERMATOLOGY

## 2022-10-19 PROCEDURE — 84630 ASSAY OF ZINC: CPT | Performed by: DERMATOLOGY

## 2022-10-19 PROCEDURE — 82728 ASSAY OF FERRITIN: CPT | Performed by: DERMATOLOGY

## 2022-10-19 PROCEDURE — 86235 NUCLEAR ANTIGEN ANTIBODY: CPT | Mod: 59 | Performed by: DERMATOLOGY

## 2022-10-19 PROCEDURE — 82607 VITAMIN B-12: CPT | Performed by: DERMATOLOGY

## 2022-10-19 PROCEDURE — 86039 ANTINUCLEAR ANTIBODIES (ANA): CPT | Performed by: DERMATOLOGY

## 2022-10-19 NOTE — PATIENT INSTRUCTIONS
Per Dr. Lott   Take 5000iu vitamin D daily. Prefer organic brand   Can try saw palmetto 500 mg to 1000 mg per day.  Prefer organic version.   Patient instructed in importance in daily sun protection. Sun avoidance and topical protection discussed.     Patient encouraged to wear hat for all outdoor exposure.     Also discussed sun protective clothing.     Reviewed with patient to eat 40 grams protein daily, get labs done, wash with recommended shampoo or soap for the scalp, avoid hair coloring and chemicals and hot treatments, Avoid hot water.     Rosacea triggers reviewed with patient.  Avoid alcohol, hot beverage, hot soups, spicy foods, hot water cleansing.  Apply cool water or ice packs or cold soda cans to face when flushed.  Take breaks during exertion to do this.    Discussed all the following:  Rosacea is a long-lasting (chronic) skin condition affecting the face. In the early stages it causes easy flushing or blushing. Redness may become long-term (permanent) as the small blood vessels of the face widen (dilate). There may be small, red, pus-filled bumps (pustules). It looks like a bad case of acne, and has been called adult acne. But it is not caused by the same things that cause acne.     Patient to start using sulfur bar soap for the face or affected area 1-2 x day.  For scalp usage lather from the soap to be applied to the roots of the scalp and allow to sit for 3-5 minutes regularly.  Same instructions for other affected areas.

## 2022-10-19 NOTE — PROGRESS NOTES
Subjective:       Patient ID:  Krupa Walker is a 32 y.o. female who presents for   Chief Complaint   Patient presents with    Hair Loss     Scalp, x 1yr, dry and itches, no tx      Hair Loss - Initial  Affected locations: scalp  Signs / symptoms: burning, dryness and itching  Severity: mild  Timing: constant  Aggravated by: nothing  Relieving factors/Treatments tried: nothing    Review of Systems   Constitutional:  Positive for fatigue. Negative for fever and chills.   HENT:  Negative for sore throat.    Respiratory:  Negative for cough.    Musculoskeletal:  Positive for joint swelling.      Objective:    Physical Exam   Constitutional: She appears well-developed and well-nourished.   Eyes: No conjunctival no injection.   Neurological: She is alert and oriented to person, place, and time.   Psychiatric: She has a normal mood and affect.   Skin:   Areas Examined (abnormalities noted in diagram):   Scalp / Hair Palpated and Inspected  Head / Face Inspection Performed       Diagram Legend     Erythematous scaling macule/papule c/w actinic keratosis       Vascular papule c/w angioma      Pigmented verrucoid papule/plaque c/w seborrheic keratosis      Yellow umbilicated papule c/w sebaceous hyperplasia      Irregularly shaped tan macule c/w lentigo     1-2 mm smooth white papules consistent with Milia      Movable subcutaneous cyst with punctum c/w epidermal inclusion cyst      Subcutaneous movable cyst c/w pilar cyst      Firm pink to brown papule c/w dermatofibroma      Pedunculated fleshy papule(s) c/w skin tag(s)      Evenly pigmented macule c/w junctional nevus     Mildly variegated pigmented, slightly irregular-bordered macule c/w mildly atypical nevus      Flesh colored to evenly pigmented papule c/w intradermal nevus       Pink pearly papule/plaque c/w basal cell carcinoma      Erythematous hyperkeratotic cursted plaque c/w SCC      Surgical scar with no sign of skin cancer recurrence      Open and closed  comedones      Inflammatory papules and pustules      Verrucoid papule consistent consistent with wart     Erythematous eczematous patches and plaques     Dystrophic onycholytic nail with subungual debris c/w onychomycosis     Umbilicated papule    Erythematous-base heme-crusted tan verrucoid plaque consistent with inflamed seborrheic keratosis     Erythematous Silvery Scaling Plaque c/w Psoriasis     See annotation                Assessment / Plan:        Folliculitis  Patient to start using sulfur bar soap for the face or affected area 1-2 x day.  For scalp usage lather from the soap to be applied to the roots of the scalp and allow to sit for 3-5 minutes regularly.  Same instructions for other affected areas.  Post scalp.    Erythema  -     SUDHA; Future; Expected date: 10/19/2022  Patient instructed in importance in daily sun protection. Sun avoidance and topical protection discussed.     Patient encouraged to wear hat for all outdoor exposure.     Also discussed sun protective clothing.  Discussed with patient the need for laboratory work up for further evaluation.  Discussed that such investigation may not be helpful.  Warned of poss ctd.  Discussed today with the patient the option of patch testing to alleviate the risk of allergens.  Warned of no bathing for three days.  Only sponge bathing allowed.    Alopecia  -     Zinc; Future; Expected date: 10/19/2022  -     Vitamin B12; Future; Expected date: 10/19/2022  -     Ferritin; Future; Expected date: 10/19/2022  -     Iron and TIBC; Future; Expected date: 10/19/2022  -     SUDHA; Future; Expected date: 10/19/2022  Discussed with patient the need for laboratory work up for further evaluation.  Discussed that such investigation may not be helpful.  Reviewed with patient to eat protein daily, get labs done, wash with recommended shampoo or soap for the scalp, avoid hair coloring and chemicals and hot treatments, and potentially consider topical 5% minoxidil.  Avoid  hot water.  Brochure given for patient education.  Stop all applicable skin care and hair products, chemicals, perfumes, and treatments to affected areas.  No deodorants and antiperspirants, if applicable.  No store bought lotions or potions.     Can try saw palmetto 500 mg to 1000 mg per day.  Prefer organic version.    Rosacea  Rosacea triggers reviewed with patient.  Avoid alcohol, hot beverage, hot soups, spicy foods, hot water cleansing.  Apply cool water or ice packs or cold soda cans to face when flushed.  Take breaks during exertion to do this.    Discussed all the following:  Rosacea is a long-lasting (chronic) skin condition affecting the face. In the early stages it causes easy flushing or blushing. Redness may become long-term (permanent) as the small blood vessels of the face widen (dilate). There may be small, red, pus-filled bumps (pustules). It looks like a bad case of acne, and has been called adult acne. But it is not caused by the same things that cause acne.  Patient to start using sulfur bar soap for the face or affected area 1-2 x day.  For scalp usage lather from the soap to be applied to the roots of the scalp and allow to sit for 3-5 minutes regularly.  Same instructions for other affected areas.    Low vitamin D level  Take vitamin D 5000 iu per day.    High serum parathyroid hormone (PTH)  Pt seeing endo, hopefully, soon.         Follow up in about 6 weeks (around 11/30/2022), or if symptoms worsen or fail to improve.

## 2022-10-20 LAB
ANA PATTERN 1: NORMAL
ANA SER QL IF: POSITIVE
ANA TITR SER IF: NORMAL {TITER}

## 2022-10-21 NOTE — PROGRESS NOTES
Here for TDAP injection. Patient without complaint of pain at this time ,Injection given. Tolerated well. No pain noted after injection.  Advised to wait in lobby 15 minutes and report any adverse reactions.     Site: [facility]    Baby Friendly Handout Given to Patient        Here for rhogam injection , no complaints at this time. Verified patient is RH negative.   No complaint of pain before or after injection. Patient advised to wait 15 minutes before leaving and report any adverse reactions.      Site: [facility]  
- - -

## 2022-10-24 LAB
ANTI SM ANTIBODY: 0.07 RATIO (ref 0–0.99)
ANTI SM/RNP ANTIBODY: 0.06 RATIO (ref 0–0.99)
ANTI-SM INTERPRETATION: NEGATIVE
ANTI-SM/RNP INTERPRETATION: NEGATIVE
ANTI-SSA ANTIBODY: 0.06 RATIO (ref 0–0.99)
ANTI-SSA INTERPRETATION: NEGATIVE
ANTI-SSB ANTIBODY: 0.05 RATIO (ref 0–0.99)
ANTI-SSB INTERPRETATION: NEGATIVE
DSDNA AB SER-ACNC: NORMAL [IU]/ML

## 2022-10-25 DIAGNOSIS — R76.8 ANA POSITIVE: Primary | ICD-10-CM

## 2022-10-25 LAB — ZINC SERPL-MCNC: 70 UG/DL (ref 60–130)

## 2022-11-07 ENCOUNTER — PATIENT MESSAGE (OUTPATIENT)
Dept: OBSTETRICS AND GYNECOLOGY | Facility: CLINIC | Age: 32
End: 2022-11-07
Payer: COMMERCIAL

## 2022-11-10 ENCOUNTER — PATIENT MESSAGE (OUTPATIENT)
Dept: INTERNAL MEDICINE | Facility: CLINIC | Age: 32
End: 2022-11-10
Payer: COMMERCIAL

## 2022-11-11 RX ORDER — SEMAGLUTIDE 0.25 MG/.5ML
0.25 INJECTION, SOLUTION SUBCUTANEOUS
Qty: 2 ML | Refills: 0 | Status: SHIPPED | OUTPATIENT
Start: 2022-11-11 | End: 2023-12-05

## 2022-11-17 ENCOUNTER — OFFICE VISIT (OUTPATIENT)
Dept: ENDOCRINOLOGY | Facility: CLINIC | Age: 32
End: 2022-11-17
Payer: COMMERCIAL

## 2022-11-17 VITALS
HEIGHT: 64 IN | BODY MASS INDEX: 42.95 KG/M2 | OXYGEN SATURATION: 97 % | HEART RATE: 89 BPM | SYSTOLIC BLOOD PRESSURE: 132 MMHG | WEIGHT: 251.56 LBS | DIASTOLIC BLOOD PRESSURE: 92 MMHG

## 2022-11-17 DIAGNOSIS — R06.83 SNORING: ICD-10-CM

## 2022-11-17 DIAGNOSIS — E66.01 CLASS 3 SEVERE OBESITY WITH BODY MASS INDEX (BMI) OF 40.0 TO 44.9 IN ADULT, UNSPECIFIED OBESITY TYPE, UNSPECIFIED WHETHER SERIOUS COMORBIDITY PRESENT: ICD-10-CM

## 2022-11-17 DIAGNOSIS — E66.01 CLASS 3 SEVERE OBESITY DUE TO EXCESS CALORIES WITHOUT SERIOUS COMORBIDITY WITH BODY MASS INDEX (BMI) OF 40.0 TO 44.9 IN ADULT: ICD-10-CM

## 2022-11-17 DIAGNOSIS — N20.0 NEPHROLITHIASIS: ICD-10-CM

## 2022-11-17 DIAGNOSIS — E28.2 PCOS (POLYCYSTIC OVARIAN SYNDROME): ICD-10-CM

## 2022-11-17 DIAGNOSIS — E21.3 HYPERPARATHYROIDISM: Primary | ICD-10-CM

## 2022-11-17 PROCEDURE — 3008F PR BODY MASS INDEX (BMI) DOCUMENTED: ICD-10-PCS | Mod: CPTII,S$GLB,, | Performed by: INTERNAL MEDICINE

## 2022-11-17 PROCEDURE — 3080F PR MOST RECENT DIASTOLIC BLOOD PRESSURE >= 90 MM HG: ICD-10-PCS | Mod: CPTII,S$GLB,, | Performed by: INTERNAL MEDICINE

## 2022-11-17 PROCEDURE — 99999 PR PBB SHADOW E&M-EST. PATIENT-LVL V: ICD-10-PCS | Mod: PBBFAC,,, | Performed by: INTERNAL MEDICINE

## 2022-11-17 PROCEDURE — 99999 PR PBB SHADOW E&M-EST. PATIENT-LVL V: CPT | Mod: PBBFAC,,, | Performed by: INTERNAL MEDICINE

## 2022-11-17 PROCEDURE — 1160F PR REVIEW ALL MEDS BY PRESCRIBER/CLIN PHARMACIST DOCUMENTED: ICD-10-PCS | Mod: CPTII,S$GLB,, | Performed by: INTERNAL MEDICINE

## 2022-11-17 PROCEDURE — 1160F RVW MEDS BY RX/DR IN RCRD: CPT | Mod: CPTII,S$GLB,, | Performed by: INTERNAL MEDICINE

## 2022-11-17 PROCEDURE — 3008F BODY MASS INDEX DOCD: CPT | Mod: CPTII,S$GLB,, | Performed by: INTERNAL MEDICINE

## 2022-11-17 PROCEDURE — 3075F SYST BP GE 130 - 139MM HG: CPT | Mod: CPTII,S$GLB,, | Performed by: INTERNAL MEDICINE

## 2022-11-17 PROCEDURE — 99214 OFFICE O/P EST MOD 30 MIN: CPT | Mod: S$GLB,,, | Performed by: INTERNAL MEDICINE

## 2022-11-17 PROCEDURE — 3080F DIAST BP >= 90 MM HG: CPT | Mod: CPTII,S$GLB,, | Performed by: INTERNAL MEDICINE

## 2022-11-17 PROCEDURE — 3075F PR MOST RECENT SYSTOLIC BLOOD PRESS GE 130-139MM HG: ICD-10-PCS | Mod: CPTII,S$GLB,, | Performed by: INTERNAL MEDICINE

## 2022-11-17 PROCEDURE — 1159F MED LIST DOCD IN RCRD: CPT | Mod: CPTII,S$GLB,, | Performed by: INTERNAL MEDICINE

## 2022-11-17 PROCEDURE — 99214 PR OFFICE/OUTPT VISIT, EST, LEVL IV, 30-39 MIN: ICD-10-PCS | Mod: S$GLB,,, | Performed by: INTERNAL MEDICINE

## 2022-11-17 PROCEDURE — 1159F PR MEDICATION LIST DOCUMENTED IN MEDICAL RECORD: ICD-10-PCS | Mod: CPTII,S$GLB,, | Performed by: INTERNAL MEDICINE

## 2022-11-17 RX ORDER — METFORMIN HYDROCHLORIDE 500 MG/1
500 TABLET, EXTENDED RELEASE ORAL
Qty: 90 TABLET | Refills: 3 | Status: SHIPPED | OUTPATIENT
Start: 2022-11-17 | End: 2023-04-17

## 2022-11-17 NOTE — ASSESSMENT & PLAN NOTE
Currently no evidence of primary.  Replete calcium and vitamin-D.  Will check a 24 hour urine calcium in a few months.

## 2022-11-17 NOTE — PROGRESS NOTES
Subjective:      Patient ID: Krupa Walker is a 32 y.o. female.    Chief Complaint:  Hyperparathyroidism      History of Present Illness    She was last seen by me in 2020 for  hyperparathyroidism  She made this appointment today due to concerns of elevated PTH on recent labs --  Labs on 9/19 showed , calcium on CMP was 8.6 (albumin 4.6), so appropriately elevated.   Also vitamin D insufficient, at 28.   She has had 3x cholelithiasis, last episode 2 years ago when she was pregnant.   Wasn't able to complete 24 hour urine collection at the time 2/2 pregnancy.   Denies constipation, abdominal pain, polyuria  Does have anxiety, depression    She was noted to have an elevated  on March 11, 2020 - this was checked to evaluate her recurrent kidney stones  In reviewing her labs since 2008 she has never had hypercalcemia  Her vitamin-D was checked on several occasions and has always been normal until now      Also has concerns regards PCOS, was diagnosed by OBGYN at 16 y.o.   Her menses have been irregular since she was 11 y.o -- Has not had a period in 2 months (last in September), prior to then she had a period in July.   +alopecia that started 4 years ago, worse in the past year  With regards to obesity, Body mass index is 43.18 kg/m².    Reports she has gained 25 lbs in the past year.   Denies symptoms of hyperglycemia such as polyuria, polydipsia, nocturia, unexplained weight loss or blurred vision  Last A1C 4.9 in 12/4/19  No acne, hirsutism  Not amenable to trying birth control, she's tried many in the past -- OCPs, IUDs, implants, she had adverse effects with all, bleeding for 6 months, severe weight gain, etc    She has two daughters, an 8 year old and 3 year old   Her first pregnancy happened spontaneously. 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 Negative for appetite change.   HENT:  Negative for voice change.    Eyes:  Negative for visual disturbance.   Respiratory:  Negative for shortness of breath.    Cardiovascular:  Negative for chest pain and leg swelling.   Gastrointestinal:  Negative for abdominal pain, constipation, nausea and vomiting.   Endocrine: Negative for polydipsia and polyuria.   Genitourinary:  Negative for dysuria.   Musculoskeletal:  Negative for arthralgias and neck pain.   Skin:  Positive for wound.   Neurological:  Negative for weakness.   Psychiatric/Behavioral:  Negative for decreased concentration.    As above   Objective:     There were no vitals taken for this visit.  BP Readings from Last 3 Encounters:   08/03/22 130/88   05/17/22 136/78   04/07/22 122/82     Wt Readings from Last 1 Encounters:   09/19/22 0944 113.8 kg (250 lb 14.4 oz)     There is no height or weight on file to calculate BMI.      Physical Exam  Vitals reviewed.   Constitutional:       Appearance: Normal appearance. She is obese. She is not ill-appearing.   HENT:      Head: Normocephalic and atraumatic.      Comments: +alopecia     Nose: Nose normal.      Mouth/Throat:      Mouth: Mucous membranes are moist.      Pharynx: Oropharynx is clear.   Eyes:      Extraocular Movements: Extraocular movements intact.      Conjunctiva/sclera: Conjunctivae normal.   Neck:      Comments: No acanthosis nigricans  No goiter   Cardiovascular:      Rate and Rhythm: Normal rate and regular rhythm.   Pulmonary:      Effort: Pulmonary effort is normal.   Abdominal:      General: Abdomen is flat. Bowel sounds are normal.      Palpations: Abdomen is soft.      Tenderness: There is no abdominal tenderness.   Musculoskeletal:      Cervical back: Normal range of motion and neck supple.      Right lower leg: No edema.      Left lower leg: No edema.   Skin:     General: Skin is  warm and dry.      Capillary Refill: Capillary refill takes less than 2 seconds.      Comments: +skin tags    Neurological:      General: No focal deficit present.      Mental Status: She is alert. Mental status is at baseline.      Motor: No weakness.      Gait: Gait normal.   Psychiatric:         Mood and Affect: Mood normal.         Behavior: Behavior normal.         Thought Content: Thought content normal.         Judgment: Judgment normal.              Lab Review:   Lab Results   Component Value Date    HGBA1C 4.9 12/04/2019     Lab Results   Component Value Date    CHOL 218 (H) 09/19/2022    HDL 59 09/19/2022    LDLCALC 138.2 09/19/2022    TRIG 104 09/19/2022    CHOLHDL 27.1 09/19/2022     Lab Results   Component Value Date     09/19/2022    K 4.5 09/19/2022     09/19/2022    CO2 28 09/19/2022    GLU 93 09/19/2022    BUN 16 09/19/2022    CREATININE 0.78 09/19/2022    CALCIUM 8.6 (L) 09/19/2022    PROT 7.9 09/19/2022    ALBUMIN 4.6 09/19/2022    BILITOT 0.7 09/19/2022    ALKPHOS 73 09/19/2022    AST 24 09/19/2022    ALT 12 09/19/2022    ANIONGAP 8 09/19/2022    ESTGFRAFRICA >60 06/04/2021    EGFRNONAA >60 06/04/2021    TSH 1.970 09/19/2022     Vit D, 25-Hydroxy   Date Value Ref Range Status   09/19/2022 28 (L) 30 - 96 ng/mL Final     Comment:     Vitamin D deficiency.........<10 ng/mL                              Vitamin D insufficiency......10-29 ng/mL       Vitamin D sufficiency........> or equal to 30 ng/mL  Vitamin D toxicity............>100 ng/mL         Assessment and Plan   Hyperparathyroidism  -     Suspect this is most likely secondary hyper parathyroidism in setting of hypocalcemia and hypovitaminosis D.   -     Advised patient consume ideally 1200mg Ca via diet and supplement with Vitamin D 5000IU daily  -     Will give patient 24 hr urine collection kit today. Instructed her to complete it one month after supplementation initiation  -     Will repeat labs prior to visit with Dr. Hawk in  6 months  Orders placed:   -     Calcium, Timed Urine [facility]; 24 Hours; Future  -     Creatinine, urine, timed 24 Hours; Future  -     COMPREHENSIVE METABOLIC PANEL; Future; Expected date: 05/17/2023  -     PTH, Intact; Future; Expected date: 05/17/2023  -     Vitamin D; Future; Expected date: 05/17/2023      Class 3 severe obesity with body mass index (BMI) of 40.0 to 44.9 in adult, unspecified obesity type, unspecified whether serious comorbidity present  -     Patient evaluated by bariatric medicine in the past however was unable to follow through with surgery 2/2 becoming pregnant.   -     Was given Rx for wegovy and Mounjaro by PCP but unfortunately not authorized by insurance and it's cost prohibitive to the patient  -     She is amenable to re-evaluation by bariatric medicine, referral placed  Orders placed:  -      Ambulatory referral/consult to Bariatric Medicine; Future; Expected date: 11/24/2022      Snoring  -      Endorses snoring and daytime somnolence. Evaluated for TEGAN > 5 years ago and was negative at the time. Will send for re-evaluation  -       Ambulatory referral/consult to Sleep Disorders; Future; Expected date: 11/24/2022    PCOS (polycystic ovarian syndrome)  -     Patient presents today with concerns for PCOS -- irregular menses, alopecia, fatigue, weight gain  -     She is not amenable to trial of birth control as she's had a host of adverse effects in the past. Unfortunately this precludes her from Spironolactone trial at this time.   -     She is however amenable to trying Metformin.    -     Plan to re-check labs in 6 months and have her follow up with Dr. Hawk, our PCOS specialist  Orders placed:   -     DHEA-Sulfate; Future; Expected date: 05/17/2023  -     Testosterone; Future; Expected date: 05/17/2023  -     metFORMIN (GLUCOPHAGE-XR) 500 MG ER 24hr tablet; Take 1 tablet (500 mg total) by mouth daily with breakfast.  Dispense: 90 tablet; Refill: 3  -     Hemoglobin A1C; Future;  Expected date: 11/17/2022       As above, patient to follow up with Dr. Hawk in 6 months with labs prior.  Case discussed with Dr. Aron GALINDO, Pascale Sharp MD,  have personally taken the history and examined the patient and agree with the resident's note as stated above.    Hyperparathyroidism  Currently no evidence of primary.  Replete calcium and vitamin-D.  Will check a 24 hour urine calcium in a few months.    Nephrolithiasis  24 hour urine calcium as above.  If elevated would start hydrochlorothiazide    Class 3 severe obesity due to excess calories without serious comorbidity with body mass index (BMI) of 40.0 to 44.9 in adult  Refer to bariatric medicine.    Snoring  Question sleep apnea.  Referred to sleep clinic    PCOS (polycystic ovarian syndrome)  Check androgens based on ongoing hair loss.  Reviewed that birth control would be a good option but she gained a lot of weight on the past.  Review that will hold off on adding spironolactone at this time.  Follow-up with Dr. Hawk

## 2022-11-17 NOTE — ASSESSMENT & PLAN NOTE
Check androgens based on ongoing hair loss.  Reviewed that birth control would be a good option but she gained a lot of weight on the past.  Review that will hold off on adding spironolactone at this time.  Follow-up with Dr. Hawk

## 2022-11-22 ENCOUNTER — TELEPHONE (OUTPATIENT)
Dept: PSYCHIATRY | Facility: CLINIC | Age: 32
End: 2022-11-22

## 2022-11-22 ENCOUNTER — OFFICE VISIT (OUTPATIENT)
Dept: PSYCHIATRY | Facility: CLINIC | Age: 32
End: 2022-11-22
Payer: COMMERCIAL

## 2022-11-22 DIAGNOSIS — F33.41 RECURRENT MAJOR DEPRESSIVE DISORDER, IN PARTIAL REMISSION: ICD-10-CM

## 2022-11-22 DIAGNOSIS — F90.0 ATTENTION DEFICIT HYPERACTIVITY DISORDER (ADHD), PREDOMINANTLY INATTENTIVE TYPE: ICD-10-CM

## 2022-11-22 DIAGNOSIS — F51.05 INSOMNIA DUE TO OTHER MENTAL DISORDER: ICD-10-CM

## 2022-11-22 DIAGNOSIS — F41.1 GAD (GENERALIZED ANXIETY DISORDER): Primary | ICD-10-CM

## 2022-11-22 DIAGNOSIS — F99 INSOMNIA DUE TO OTHER MENTAL DISORDER: ICD-10-CM

## 2022-11-22 PROCEDURE — 1159F PR MEDICATION LIST DOCUMENTED IN MEDICAL RECORD: ICD-10-PCS | Mod: CPTII,95,, | Performed by: INTERNAL MEDICINE

## 2022-11-22 PROCEDURE — 1159F MED LIST DOCD IN RCRD: CPT | Mod: CPTII,95,, | Performed by: INTERNAL MEDICINE

## 2022-11-22 PROCEDURE — 99214 OFFICE O/P EST MOD 30 MIN: CPT | Mod: 95,,, | Performed by: INTERNAL MEDICINE

## 2022-11-22 PROCEDURE — 1160F RVW MEDS BY RX/DR IN RCRD: CPT | Mod: CPTII,95,, | Performed by: INTERNAL MEDICINE

## 2022-11-22 PROCEDURE — 1160F PR REVIEW ALL MEDS BY PRESCRIBER/CLIN PHARMACIST DOCUMENTED: ICD-10-PCS | Mod: CPTII,95,, | Performed by: INTERNAL MEDICINE

## 2022-11-22 PROCEDURE — 99214 PR OFFICE/OUTPT VISIT, EST, LEVL IV, 30-39 MIN: ICD-10-PCS | Mod: 95,,, | Performed by: INTERNAL MEDICINE

## 2022-11-22 NOTE — PROGRESS NOTES
OUTPATIENT PSYCHIATRY RETURN VISIT    ENCOUNTER DATE:  11/22/22  SITE:  [facility]  LENGTH OF SESSION:  20 minutes    The patient location is:  [place], not in healthcare facility  Visit type:  audiovisual    Face to Face time with patient:  20 minutes  25 minutes of total time spent on the encounter, which includes face to face time and non-face to face time preparing to see the patient (eg, review of tests), Obtaining and/or reviewing separately obtained history, Documenting clinical information in the electronic or other health record, Independently interpreting results (not separately reported) and communicating results to the patient/family/caregiver, or Care coordination (not separately reported).     Each patient to whom he or she provides medical services by telemedicine is:  (1) informed of the relationship between the physician and patient and the respective role of any other health care provider with respect to management of the patient; and (2) notified that he or she may decline to receive medical services by telemedicine and may withdraw from such care at any time.    CHIEF COMPLAINT:  No chief complaint on file.      HISTORY OF PRESENTING ILLNESS:  Krupa Walker is a 32 y.o. female with history of MDD, JAVI, ADHD, and Insomnia who presents for follow up appointment.     Plan at last appointment on 10/10/2022:  Will start Buspar 10mg BID-TID for anxiety since she has had sexual side effects with SSRI's/SNRI's.  Will start Propranolol 20-40mg BID PRN insomnia.    Continue Propranolol 20-40mg BID PRN anxiety and Xanax 0.5mg daily PRN severe anxiety/insomnia.   Discussed with patient informed consent, risks versus benefits, alternative treatments, side effect profile and the inherent unpredictability of individual responses to these treatments.  The patient expresses understanding of the above and displays the capacity to agree with this current plan.  Continue therapy  with Megha Ward LCSW.    History as told by patient:  Has been taking Buspar 20mg BID - no side effects, notices more when she doesn't take it than when she does.  Between the move and the holidays, has a lot of stress.  Gets really angry at .  Expects him to read her mind.  He is closed off emotionally.  She doesn't always say what she is thinking.  He has TBI that makes him quicker to anger.  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  Hasn't even met her best friend's 4.5 month old.  Feels like she is the only one that ever initiates anything.  Feels lonely -  works a lot, always tired.  Close to her sister who also has  but she just went back to work.  Previously was seeing her 2-3 times per week.  Mother is part time here and part time in [place].  But now moving 1 hour away from mother and 45 minutes from sister.  They don't know when they are moving - this is difficult for her because she is such a planner.  Not taking Propranolol.  Started on Metformin for PCOS.  Gets temperature/flushing in the evenings.  Had +SUDHA so will be seeing Rheumatology.  Has been losing hair too and hair is turning grey.  She wants to leave Buspar the same for now until she finishes seeing specialists.      Medication side effects:  As above  Medication compliance:  As above    PSYCHIATRIC REVIEW OF SYSTEMS:  Trouble with sleep:  Sometimes  Appetite changes:  Denies  Weight changes:  Denies  Lack of energy:  Sometimes  Anhedonia:  Denies  Somatic symptoms:  Denies  Libido:  Not discussed  Anxiety/panic:  At times  Guilty/hopeless:  Denies  Self-injurious behavior/risky behavior:  Denies  Any drugs:  Denies  Alcohol:  Very rarely  Breastfeeding:  No    MEDICAL REVIEW OF SYSTEMS:  Complete review of systems performed covering Constitutional, Musculoskeletal, Neurologic.  All systems negative except for that covered in HPI.    PAST PSYCHIATRIC, MEDICAL, AND SOCIAL HISTORY  REVIEWED  The patient's past medical, family and social history have been reviewed and updated as appropriate within the electronic medical record - see encounter notes.    MEDICATIONS:    Current Outpatient Medications:     ALPRAZolam (XANAX) 0.5 MG tablet, Take 1 tablet (0.5 mg total) by mouth daily as needed (Severe anxiety/Insomnia)., Disp: 30 tablet, Rfl: 0    busPIRone (BUSPAR) 10 MG tablet, Take 1 tablet (10 mg total) by mouth 3 (three) times daily., Disp: 90 tablet, Rfl: 3    gabapentin (NEURONTIN) 100 MG capsule, Take 1 capsule (100 mg total) by mouth 3 (three) times daily. (Patient taking differently: Take 100 mg by mouth 3 (three) times daily. PRN), Disp: 90 capsule, Rfl: 1    ibuprofen (ADVIL,MOTRIN) 800 MG tablet, Take 800 mg by mouth every 6 (six) hours as needed. PRN, Disp: , Rfl:     ketorolac (TORADOL) 10 mg tablet, Take 2 tabs (20mg) at the onset of a headache, if headache persists you may take 1 tab every 4 to 6 hours as needed. Do not exceed 40 mg per day., Disp: 20 tablet, Rfl: 3    meloxicam (MOBIC) 15 MG tablet, Take 1 tablet (15 mg total) by mouth once daily., Disp: 90 tablet, Rfl: 3    metFORMIN (GLUCOPHAGE-XR) 500 MG ER 24hr tablet, Take 1 tablet (500 mg total) by mouth daily with breakfast., Disp: 90 tablet, Rfl: 3    methylphenidate HCl (RITALIN) 10 MG tablet, Take 1 tablet (10 mg total) by mouth 2 (two) times daily., Disp: 60 tablet, Rfl: 0    multivit with calcium,iron,min (WOMEN'S DAILY MULTIVITAMIN ORAL), Women's Daily Multivitamin, Disp: , Rfl:     nystatin (MYCOSTATIN) cream, Apply topically 2 (two) times daily., Disp: 30 g, Rfl: 1    prochlorperazine (COMPAZINE) 5 MG tablet, Take 1 tablet (5 mg total) by mouth every 6 (six) hours as needed for Nausea (or headache pain)., Disp: 12 tablet, Rfl: 2    propranoloL (INDERAL) 40 MG tablet, Take 0.5-1 tablets (20-40 mg total) by mouth 2 (two) times daily as needed (anxiety)., Disp: 60 tablet, Rfl: 2    rizatriptan (MAXALT-MLT) 10 MG  "disintegrating tablet, Take at onset of migraine, can repeat in 2 hrs if needed.  No more than 2 tabs per day or 3 days/wk., Disp: 12 tablet, Rfl: 2    semaglutide, weight loss, (WEGOVY) 0.25 mg/0.5 mL PnIj, Inject 0.25 mg into the skin every 7 days., Disp: 2 mL, Rfl: 0    spironolactone (ALDACTONE) 100 MG tablet, Take 1 tablet (100 mg total) by mouth every evening., Disp: 30 tablet, Rfl: 2    ALLERGIES:  Review of patient's allergies indicates:  No Known Allergies    PSYCHIATRIC EXAM:  There were no vitals filed for this visit.  Appearance:  Well groomed, appearing healthy and of stated age  Behavior:  Cooperative, pleasant, no psychomotor agitation or retardation  Speech:  Normal rate, rhythm, prosody, and volume  Mood:  "Ok"  Affect:  Euthymic  Thought Process:  Linear, logical, goal directed  Thought Content:  Negative for suicidal ideation, homicidal ideation, delusions or hallucinations.  Associations:  Intact  Memory:  Grossly Intact  Level of Consciousness/Orientation:  Grossly intact  Fund of Knowledge:  Good  Attention:  Good  Language:  Fluent, able to name abstract and concrete objects  Insight:  Fair  Judgment:  Intact  Psychomotor signs:  No abnormal movements of face  Gait:  Unable to assess via virtual visit      RELEVANT LABS/STUDIES:    Lab Results   Component Value Date    WBC 8.31 06/04/2021    HGB 14.5 06/04/2021    HCT 41.8 06/04/2021    MCV 92 06/04/2021     06/04/2021     BMP  Lab Results   Component Value Date     09/19/2022    K 4.5 09/19/2022     09/19/2022    CO2 28 09/19/2022    BUN 16 09/19/2022    CREATININE 0.78 09/19/2022    CALCIUM 8.6 (L) 09/19/2022    ANIONGAP 8 09/19/2022    ESTGFRAFRICA >60 06/04/2021    EGFRNONAA >60 06/04/2021     Lab Results   Component Value Date    ALT 12 09/19/2022    AST 24 09/19/2022    ALKPHOS 73 09/19/2022    BILITOT 0.7 09/19/2022     Lab Results   Component Value Date    TSH 1.970 09/19/2022     Lab Results   Component Value Date "    HGBA1C 4.9 12/04/2019       IMPRESSION:    Krupa Walker is a 32 y.o. female with history of MDD, JAVI, ADHD, and Insomnia who presents for follow up appointment.        DIAGNOSES:    ICD-10-CM ICD-9-CM   1. JAVI (generalized anxiety disorder)  F41.1 300.02   2. Recurrent major depressive disorder, in partial remission  F33.41 296.35   3. Insomnia due to other mental disorder  F51.05 300.9    F99 327.02   4. Attention deficit hyperactivity disorder (ADHD), predominantly inattentive type  F90.0 314.00         PLAN:  Continue Buspar 10mg BID-TID for now.  Consider increasing in the future.    Continue Xanax 0.5mg daily PRN severe anxiety/insomnia.   She is not currently taking Propranolol.  Discussed with patient informed consent, risks versus benefits, alternative treatments, side effect profile and the inherent unpredictability of individual responses to these treatments.  The patient expresses understanding of the above and displays the capacity to agree with this current plan.  Continue therapy with Megha Ward LCSW.    RETURN TO CLINIC:  Follow up in about 2 months (around 1/22/2023).

## 2022-11-29 ENCOUNTER — OFFICE VISIT (OUTPATIENT)
Dept: DERMATOLOGY | Facility: CLINIC | Age: 32
End: 2022-11-29
Payer: COMMERCIAL

## 2022-11-29 DIAGNOSIS — L65.9 ALOPECIA: Primary | ICD-10-CM

## 2022-11-29 DIAGNOSIS — R79.89 LOW VITAMIN D LEVEL: ICD-10-CM

## 2022-11-29 DIAGNOSIS — E28.2 PCOS (POLYCYSTIC OVARIAN SYNDROME): ICD-10-CM

## 2022-11-29 DIAGNOSIS — R79.89 HIGH SERUM PARATHYROID HORMONE (PTH): ICD-10-CM

## 2022-11-29 DIAGNOSIS — R76.8 ANA POSITIVE: ICD-10-CM

## 2022-11-29 DIAGNOSIS — L53.9 ERYTHEMA: ICD-10-CM

## 2022-11-29 PROCEDURE — 1160F PR REVIEW ALL MEDS BY PRESCRIBER/CLIN PHARMACIST DOCUMENTED: ICD-10-PCS | Mod: CPTII,S$GLB,, | Performed by: DERMATOLOGY

## 2022-11-29 PROCEDURE — 99999 PR PBB SHADOW E&M-EST. PATIENT-LVL III: CPT | Mod: PBBFAC,,, | Performed by: DERMATOLOGY

## 2022-11-29 PROCEDURE — 1159F MED LIST DOCD IN RCRD: CPT | Mod: CPTII,S$GLB,, | Performed by: DERMATOLOGY

## 2022-11-29 PROCEDURE — 99999 PR PBB SHADOW E&M-EST. PATIENT-LVL III: ICD-10-PCS | Mod: PBBFAC,,, | Performed by: DERMATOLOGY

## 2022-11-29 PROCEDURE — 99215 OFFICE O/P EST HI 40 MIN: CPT | Mod: S$GLB,,, | Performed by: DERMATOLOGY

## 2022-11-29 PROCEDURE — 1160F RVW MEDS BY RX/DR IN RCRD: CPT | Mod: CPTII,S$GLB,, | Performed by: DERMATOLOGY

## 2022-11-29 PROCEDURE — 1159F PR MEDICATION LIST DOCUMENTED IN MEDICAL RECORD: ICD-10-PCS | Mod: CPTII,S$GLB,, | Performed by: DERMATOLOGY

## 2022-11-29 PROCEDURE — 99215 PR OFFICE/OUTPT VISIT, EST, LEVL V, 40-54 MIN: ICD-10-PCS | Mod: S$GLB,,, | Performed by: DERMATOLOGY

## 2022-11-29 RX ORDER — SPIRONOLACTONE 100 MG/1
100 TABLET, FILM COATED ORAL NIGHTLY
Qty: 30 TABLET | Refills: 2 | Status: SHIPPED | OUTPATIENT
Start: 2022-11-29 | End: 2023-11-29

## 2022-11-29 NOTE — PROGRESS NOTES
Subjective:       Patient ID:  Krupa Walker is a 32 y.o. female who presents for   Chief Complaint   Patient presents with    Hair Loss     Follow up      Hair Loss - Follow-up  Symptom course: unchanged  Affected locations: scalp  Signs / symptoms: asymptomatic    Review of Systems   Constitutional: Negative.    HENT: Negative.     Respiratory: Negative.     Skin:  Negative for itching.      Objective:    Physical Exam   Constitutional: She appears well-developed and well-nourished.   Eyes: No conjunctival no injection.   Neurological: She is alert and oriented to person, place, and time.   Psychiatric: She has a normal mood and affect.        Diagram Legend     Erythematous scaling macule/papule c/w actinic keratosis       Vascular papule c/w angioma      Pigmented verrucoid papule/plaque c/w seborrheic keratosis      Yellow umbilicated papule c/w sebaceous hyperplasia      Irregularly shaped tan macule c/w lentigo     1-2 mm smooth white papules consistent with Milia      Movable subcutaneous cyst with punctum c/w epidermal inclusion cyst      Subcutaneous movable cyst c/w pilar cyst      Firm pink to brown papule c/w dermatofibroma      Pedunculated fleshy papule(s) c/w skin tag(s)      Evenly pigmented macule c/w junctional nevus     Mildly variegated pigmented, slightly irregular-bordered macule c/w mildly atypical nevus      Flesh colored to evenly pigmented papule c/w intradermal nevus       Pink pearly papule/plaque c/w basal cell carcinoma      Erythematous hyperkeratotic cursted plaque c/w SCC      Surgical scar with no sign of skin cancer recurrence      Open and closed comedones      Inflammatory papules and pustules      Verrucoid papule consistent consistent with wart     Erythematous eczematous patches and plaques     Dystrophic onycholytic nail with subungual debris c/w onychomycosis     Umbilicated papule    Erythematous-base heme-crusted tan verrucoid plaque consistent with inflamed  seborrheic keratosis     Erythematous Silvery Scaling Plaque c/w Psoriasis     See annotation        Assessment / Plan:        Alopecia  -     spironolactone (ALDACTONE) 100 MG tablet; Take 1 tablet (100 mg total) by mouth every evening.  Dispense: 30 tablet; Refill: 2  Reviewed with patient to eat protein daily, get labs done, wash with recommended shampoo or soap for the scalp, avoid hair coloring and chemicals and hot treatments, and potentially consider topical 5% minoxidil.  Avoid hot water.  Add aldactone.  Discussed benefits and risks of therapy including but not limited to breakthrough bleeding, breast tenderness, and elevated potassium levels which may give symptoms of fatigue, palpitations, and nausea. Patient should limit potassium intake - avoid potassium supplements or salt substitutes, limit bananas and citrus fruits. Pregnancy must be avoided while taking spironolactone.  Pt also to start metformin soon.  Cont saw.  Can try saw palmetto 500 mg to 1000 mg per day.  Prefer organic version.  No pregnancy with medications reviewed.      Consider rogaine later?  Pt wishes to wait on this for now.    PCOS (polycystic ovarian syndrome)  -     spironolactone (ALDACTONE) 100 MG tablet; Take 1 tablet (100 mg total) by mouth every evening.  Dispense: 30 tablet; Refill: 2  Metformin soon.  Cont saw palmetto.  PCOS diet of high protein and avoidance of bad carbs reviewed.    SUDHA positive  1:160.  Routine check with rheum.    Erythema  Better.  Cont sulfur soap.  Patient to start using sulfur bar soap for the face or affected area 1-2 x day.  For scalp usage lather from the soap to be applied to the roots of the scalp and allow to sit for 3-5 minutes regularly.  Same instructions for other affected areas.    Low vitamin D level  Cont taking.    High serum parathyroid hormone (PTH)  Saw endo.  Suspected secondary to low vit d and low calcium?         Follow up in about 3 months (around 2/28/2023).

## 2022-12-09 ENCOUNTER — OFFICE VISIT (OUTPATIENT)
Dept: BEHAVIORAL HEALTH | Facility: CLINIC | Age: 32
End: 2022-12-09
Payer: COMMERCIAL

## 2022-12-09 DIAGNOSIS — F99 INSOMNIA DUE TO OTHER MENTAL DISORDER: ICD-10-CM

## 2022-12-09 DIAGNOSIS — F90.0 ATTENTION DEFICIT HYPERACTIVITY DISORDER (ADHD), PREDOMINANTLY INATTENTIVE TYPE: ICD-10-CM

## 2022-12-09 DIAGNOSIS — F51.05 INSOMNIA DUE TO OTHER MENTAL DISORDER: ICD-10-CM

## 2022-12-09 DIAGNOSIS — F41.1 GAD (GENERALIZED ANXIETY DISORDER): ICD-10-CM

## 2022-12-09 DIAGNOSIS — F33.1 MODERATE EPISODE OF RECURRENT MAJOR DEPRESSIVE DISORDER: Primary | ICD-10-CM

## 2022-12-09 PROCEDURE — 90837 PSYTX W PT 60 MINUTES: CPT | Mod: 95,,, | Performed by: SOCIAL WORKER

## 2022-12-09 PROCEDURE — 90837 PR PSYCHOTHERAPY W/PATIENT, 60 MIN: ICD-10-PCS | Mod: 95,,, | Performed by: SOCIAL WORKER

## 2022-12-09 PROCEDURE — 1159F MED LIST DOCD IN RCRD: CPT | Mod: CPTII,95,, | Performed by: SOCIAL WORKER

## 2022-12-09 PROCEDURE — 1159F PR MEDICATION LIST DOCUMENTED IN MEDICAL RECORD: ICD-10-PCS | Mod: CPTII,95,, | Performed by: SOCIAL WORKER

## 2022-12-09 NOTE — PROGRESS NOTES
The patient location is: [place]  The chief complaint leading to consultation is: anxiety and depression    Visit type: audiovisual    Face to Face time with patient: 53  65 minutes of total time spent on the encounter, which includes face to face time and non-face to face time preparing to see the patient (eg, review of tests), Obtaining and/or reviewing separately obtained history, Documenting clinical information in the electronic or other health record, Independently interpreting results (not separately reported) and communicating results to the patient/family/caregiver, or Care coordination (not separately reported).         Each patient to whom he or she provides medical services by telemedicine is:  (1) informed of the relationship between the physician and patient and the respective role of any other health care provider with respect to management of the patient; and (2) notified that he or she may decline to receive medical services by telemedicine and may withdraw from such care at any time.    Notes:   Individual Psychotherapy (PhD/LCSW)    12/9/2022    Site:  Telemed         Therapeutic Intervention: Met with patient.  Outpatient - Insight oriented psychotherapy 60 min - CPT code 24949 and Outpatient - Behavior modifying psychotherapy 60 min - CPT code 06102    Chief complaint/reason for encounter: depression and anxiety and sleep    Interval history and content of current session: She reports that she bought a new house and feels stressed with having to sell her current home and pack up to move. She reports that she feels that it all falls on her to do and she does not have the support from her . She reports that they have been arguing more lately and attributes it to the stress of moving. She reports that she is struggling with her relationships with her daughters. She reports that her youngest daughter is physically draining and her older daughter is emotionally draining. She reports that she is not  sleeping well. She reports that she feels that she does not get much support from her  in any way she needs. She discusses past childhood trauma and her fears of her children feeling the same way about her. Discussed thought re-framing, communication skills, self care strategies, stress management skills, mindfulness, and focusing on whats within her control.     Treatment plan:  Target symptoms: distractability, lack of focus, recurrent depression, anxiety , adjustment, work stress  Why chosen therapy is appropriate versus another modality: relevant to diagnosis, patient responds to this modality, evidence based practice  Outcome monitoring methods: self-report, observation  Therapeutic intervention type: insight oriented psychotherapy, behavior modifying psychotherapy    Risk parameters:  Patient reports no suicidal ideation  Patient reports no homicidal ideation  Patient reports no self-injurious behavior  Patient reports no violent behavior    Verbal deficits: None    Patient's response to intervention:  The patient's response to intervention is accepting.    Progress toward goals and other mental status changes:  The patient's progress toward goals is fair .    Diagnosis:     ICD-10-CM ICD-9-CM   1. Moderate episode of recurrent major depressive disorder  F33.1 296.32   2. JAVI (generalized anxiety disorder)  F41.1 300.02   3. Insomnia due to other mental disorder  F51.05 300.9    F99 327.02   4. Attention deficit hyperactivity disorder (ADHD), predominantly inattentive type  F90.0 314.00       Plan:  individual psychotherapy    Return to clinic: 2 weeks, 3 weeks    Length of Service (minutes): 60

## 2022-12-12 NOTE — PROGRESS NOTES
RHEUMATOLOGY OUTPATIENT CLINIC NOTE    Attending Rheumatologist: Dayanna Hensley PA-C  Primary Care Provider: Marian Julio MD   Chief Complaint/Reason For Consultation:  + SUDHA    Subjective:        Krupa Walker is a 32 y.o. female here today for + SUDHA. Checked because of hair loss, rashes on scalp and scalp being sensitive to sun. Started having trouble w/ hair loss 3-5 years ago but worse in the past 18 mos. Her mom does have a small amount of hair loss. C/o sore knees and feeling stiff when she wakes up for about 10 min in the morning. Every PM has a fever 99.6-100.4 and feels like she is on fire. Her face is always red- not worse w/ sun exposure. Doesn't use SPF. No hx blood clots, has had healthy pregnancies. No hx miscarriage. No sicca sx. No hx serositis. Maternal grandmother w/ RA. AM stiffness worse w/ shoulders, elbows, wrists. + hx plantar fasciitis. She has chronic low back pain x 8 years. Sometimes worse w/ rest. Denies hx skin psoriasis, dactylitis, IBD. Rheumatologic systems otherwise negative. Physical exam shows alopecia, no synovitis to bilateral hands, wrists or elbows. Erythema to face but does not appear to spare the nasolabial folds, erythema to chin as well. She does work as a nurse.    Serologies  + SUDHA 1:160 w/ neg REUBEN      Chronic comorbid conditions affecting medical decision making today:  Past Medical History:   Diagnosis Date    Anxiety     Depression     GERD (gastroesophageal reflux disease)     Gestational hypertension, third trimester 07/11/2019    Hypertension     gestational     Hypoglycemia     Kidney calculi     2016    Migraines     Polycystic ovaries     PONV (postoperative nausea and vomiting)     STD (sexually transmitted disease)     Urinary tract infection      Review of patient's allergies indicates:  No Known Allergies  Social History     Tobacco Use    Smoking status: Never    Smokeless tobacco: Never   Substance Use Topics    Alcohol use: Yes      "Alcohol/week: 0.0 standard drinks     Comment: 1 drink per month        Objective:   /85   Pulse 89   Ht 5' 4" (1.626 m)   Wt 114.6 kg (252 lb 10.4 oz)   BMI 43.37 kg/m²     Immunization History   Administered Date(s) Administered    COVID-19, MRNA, LN-S, PF (Pfizer) (Purple Cap) 08/24/2021, 09/21/2021    DTP 1991, 1991, 02/04/1992, 02/18/1993, 08/30/1995    HIB 1991, 1991, 02/04/1992, 11/25/1992    HPV Quadrivalent 06/15/2007, 08/18/2007, 12/26/2007    Hepatitis B, Pediatric/Adolescent 05/12/1992, 05/29/1992, 04/07/1995, 05/08/1995, 12/23/2010    Influenza 12/26/2007, 09/01/2011, 09/29/2020    Influenza - Quadrivalent 09/14/2015    Influenza - Quadrivalent - PF *Preferred* (6 months and older) 10/27/2016, 10/04/2017, 10/29/2018, 09/16/2019, 09/29/2020, 01/28/2022    MMR 11/15/1992, 08/30/1995    Meningococcal Conjugate (MCV4P) 06/15/2007, 09/01/2011    OPV 1991, 1991, 02/04/1992, 02/18/1993, 08/30/1995    PPD Test 05/23/1992    Rho (D) Immune Globulin 10/30/2019    Rho (D) Immune Globulin - IM 01/24/2020    Tdap 09/01/2011, 10/30/2019     Current Outpatient Medications   Medication Instructions    ALPRAZolam (XANAX) 0.5 mg, Oral, Daily PRN    busPIRone (BUSPAR) 10 mg, Oral, 3 times daily    gabapentin (NEURONTIN) 100 mg, Oral, 3 times daily    ibuprofen (ADVIL,MOTRIN) 800 mg, Oral, Every 6 hours PRN, PRN    ketorolac (TORADOL) 10 mg tablet Take 2 tabs (20mg) at the onset of a headache, if headache persists you may take 1 tab every 4 to 6 hours as needed. Do not exceed 40 mg per day.    meloxicam (MOBIC) 15 mg, Oral, Daily    metFORMIN (GLUCOPHAGE-XR) 500 mg, Oral, With breakfast    methylphenidate HCl (RITALIN) 10 mg, Oral, 2 times daily    multivit with calcium,iron,min (WOMEN'S DAILY MULTIVITAMIN ORAL) Women's Daily Multivitamin    nystatin (MYCOSTATIN) cream Topical (Top), 2 times daily    prochlorperazine (COMPAZINE) 5 mg, Oral, Every 6 hours PRN    propranoloL " (INDERAL) 20-40 mg, Oral, 2 times daily PRN    rizatriptan (MAXALT-MLT) 10 MG disintegrating tablet Take at onset of migraine, can repeat in 2 hrs if needed.  No more than 2 tabs per day or 3 days/wk.    spironolactone (ALDACTONE) 100 mg, Oral, Nightly    WEGOVY 0.25 mg, Subcutaneous, Every 7 days       Imaging:  All imaging reviewed and independently interpreted by me.      I have reviewed the following:     Details / Date    [x]   Labs     []   Micro     []   Pathology     [x]   Imaging     []   Cardiology Procedures     [x]   Other Prior derm notes        Assessment:     1. LEROY positive    2. Chronic bilateral low back pain without sciatica    3. Fever, unspecified fever cause    4. Alopecia          Plan:       + leroy w/ neg rupert. Check ref, ccp, u/a w/ pc ratio, esr/crp  Check TB gold and cxr for fevers  Xr SI joints today  Patient understands and contact clinic at any time regarding questions about today's office visit and any medication changes that were made  Return to clinic: 4 weeks  Next Labs: today    The patient understands, chooses and consents to this plan and accepts all the risks which include but are not limited to the risks mentioned above.     Method of contact with patient concerns: Yuri attn Rheumatology    60 minutes of total time spent on the encounter, which includes face to face time and non-face to face time preparing to see the patient (eg, review of tests), Obtaining and/or reviewing separately obtained history, Documenting clinical information in the electronic or other health record, Independently interpreting results (not separately reported) and communicating results to the patient/family/caregiver, or Care coordination (not separately reported).          Disclaimer: This note was prepared using a voice recognition system and is likely to have sound alike errors within the text.       Dayanna Hensley PA-C  Rheumatology Department   [facility]

## 2022-12-13 ENCOUNTER — HOSPITAL ENCOUNTER (OUTPATIENT)
Dept: RADIOLOGY | Facility: HOSPITAL | Age: 32
Discharge: HOME | End: 2022-12-13
Attending: PHYSICIAN ASSISTANT
Payer: COMMERCIAL

## 2022-12-13 ENCOUNTER — OFFICE VISIT (OUTPATIENT)
Dept: RHEUMATOLOGY | Facility: CLINIC | Age: 32
End: 2022-12-13
Payer: COMMERCIAL

## 2022-12-13 VITALS
BODY MASS INDEX: 43.13 KG/M2 | SYSTOLIC BLOOD PRESSURE: 133 MMHG | HEART RATE: 89 BPM | WEIGHT: 252.63 LBS | DIASTOLIC BLOOD PRESSURE: 85 MMHG | HEIGHT: 64 IN

## 2022-12-13 DIAGNOSIS — G89.29 CHRONIC BILATERAL LOW BACK PAIN WITHOUT SCIATICA: ICD-10-CM

## 2022-12-13 DIAGNOSIS — M54.50 CHRONIC BILATERAL LOW BACK PAIN WITHOUT SCIATICA: ICD-10-CM

## 2022-12-13 DIAGNOSIS — L65.9 ALOPECIA: ICD-10-CM

## 2022-12-13 DIAGNOSIS — R50.9 FEVER, UNSPECIFIED FEVER CAUSE: ICD-10-CM

## 2022-12-13 DIAGNOSIS — R76.8 ANA POSITIVE: Primary | ICD-10-CM

## 2022-12-13 PROCEDURE — 72200 XR SACROILIAC JOINTS 3 VIEWS: ICD-10-PCS | Mod: 26,,, | Performed by: RADIOLOGY

## 2022-12-13 PROCEDURE — 99999 PR PBB SHADOW E&M-EST. PATIENT-LVL V: ICD-10-PCS | Mod: PBBFAC,,, | Performed by: PHYSICIAN ASSISTANT

## 2022-12-13 PROCEDURE — 1159F PR MEDICATION LIST DOCUMENTED IN MEDICAL RECORD: ICD-10-PCS | Mod: CPTII,S$GLB,, | Performed by: PHYSICIAN ASSISTANT

## 2022-12-13 PROCEDURE — 72200 X-RAY EXAM SI JOINTS: CPT | Mod: 26,,, | Performed by: RADIOLOGY

## 2022-12-13 PROCEDURE — 71046 X-RAY EXAM CHEST 2 VIEWS: CPT | Mod: 26,,, | Performed by: RADIOLOGY

## 2022-12-13 PROCEDURE — 3079F PR MOST RECENT DIASTOLIC BLOOD PRESSURE 80-89 MM HG: ICD-10-PCS | Mod: CPTII,S$GLB,, | Performed by: PHYSICIAN ASSISTANT

## 2022-12-13 PROCEDURE — 3075F PR MOST RECENT SYSTOLIC BLOOD PRESS GE 130-139MM HG: ICD-10-PCS | Mod: CPTII,S$GLB,, | Performed by: PHYSICIAN ASSISTANT

## 2022-12-13 PROCEDURE — 3008F PR BODY MASS INDEX (BMI) DOCUMENTED: ICD-10-PCS | Mod: CPTII,S$GLB,, | Performed by: PHYSICIAN ASSISTANT

## 2022-12-13 PROCEDURE — 71046 X-RAY EXAM CHEST 2 VIEWS: CPT | Mod: TC

## 2022-12-13 PROCEDURE — 3079F DIAST BP 80-89 MM HG: CPT | Mod: CPTII,S$GLB,, | Performed by: PHYSICIAN ASSISTANT

## 2022-12-13 PROCEDURE — 72200 X-RAY EXAM SI JOINTS: CPT | Mod: TC

## 2022-12-13 PROCEDURE — 3075F SYST BP GE 130 - 139MM HG: CPT | Mod: CPTII,S$GLB,, | Performed by: PHYSICIAN ASSISTANT

## 2022-12-13 PROCEDURE — 99205 OFFICE O/P NEW HI 60 MIN: CPT | Mod: S$GLB,,, | Performed by: PHYSICIAN ASSISTANT

## 2022-12-13 PROCEDURE — 99205 PR OFFICE/OUTPT VISIT, NEW, LEVL V, 60-74 MIN: ICD-10-PCS | Mod: S$GLB,,, | Performed by: PHYSICIAN ASSISTANT

## 2022-12-13 PROCEDURE — 99999 PR PBB SHADOW E&M-EST. PATIENT-LVL V: CPT | Mod: PBBFAC,,, | Performed by: PHYSICIAN ASSISTANT

## 2022-12-13 PROCEDURE — 3008F BODY MASS INDEX DOCD: CPT | Mod: CPTII,S$GLB,, | Performed by: PHYSICIAN ASSISTANT

## 2022-12-13 PROCEDURE — 71046 XR CHEST PA AND LATERAL: ICD-10-PCS | Mod: 26,,, | Performed by: RADIOLOGY

## 2022-12-13 PROCEDURE — 1159F MED LIST DOCD IN RCRD: CPT | Mod: CPTII,S$GLB,, | Performed by: PHYSICIAN ASSISTANT

## 2022-12-15 ENCOUNTER — PATIENT MESSAGE (OUTPATIENT)
Dept: RHEUMATOLOGY | Facility: CLINIC | Age: 32
End: 2022-12-15
Payer: COMMERCIAL

## 2022-12-15 DIAGNOSIS — M06.4 UNDIFFERENTIATED INFLAMMATORY ARTHRITIS: Primary | ICD-10-CM

## 2022-12-16 ENCOUNTER — PATIENT MESSAGE (OUTPATIENT)
Dept: RHEUMATOLOGY | Facility: CLINIC | Age: 32
End: 2022-12-16
Payer: COMMERCIAL

## 2022-12-27 ENCOUNTER — HOSPITAL ENCOUNTER (OUTPATIENT)
Dept: RADIOLOGY | Facility: HOSPITAL | Age: 32
Discharge: HOME | End: 2022-12-27
Attending: PHYSICIAN ASSISTANT
Payer: COMMERCIAL

## 2022-12-27 DIAGNOSIS — M06.4 UNDIFFERENTIATED INFLAMMATORY ARTHRITIS: ICD-10-CM

## 2022-12-27 PROCEDURE — 25500020 PHARM REV CODE 255: Mod: PN | Performed by: PHYSICIAN ASSISTANT

## 2022-12-27 PROCEDURE — 73223 MRI JOINT UPR EXTR W/O&W/DYE: CPT | Mod: TC,50,PN

## 2022-12-27 PROCEDURE — A9585 GADOBUTROL INJECTION: HCPCS | Mod: PN | Performed by: PHYSICIAN ASSISTANT

## 2022-12-27 RX ORDER — GADOBUTROL 604.72 MG/ML
10 INJECTION INTRAVENOUS
Status: COMPLETED | OUTPATIENT
Start: 2022-12-27 | End: 2022-12-27

## 2022-12-27 RX ADMIN — GADOBUTROL 10 ML: 604.72 INJECTION INTRAVENOUS at 02:12

## 2022-12-28 NOTE — PROGRESS NOTES
RHEUMATOLOGY OUTPATIENT CLINIC NOTE    Attending Rheumatologist: Dayanna Hensley PA-C  Primary Care Provider: Marian Julio MD   Chief Complaint/Reason For Consultation:  + SUDHA    Subjective:      The patient location is: [place]  The chief complaint leading to consultation is: results review    Visit type: audiovisual  Face to Face time with patient: 10  30 minutes of total time spent on the encounter, which includes face to face time and non-face to face time preparing to see the patient (eg, review of tests), Obtaining and/or reviewing separately obtained history, Documenting clinical information in the electronic or other health record, Independently interpreting results (not separately reported) and communicating results to the patient/family/caregiver, or Care coordination (not separately reported).     Each patient to whom he or she provides medical services by telemedicine is:  (1) informed of the relationship between the physician and patient and the respective role of any other health care provider with respect to management of the patient; and (2) notified that he or she may decline to receive medical services by telemedicine and may withdraw from such care at any time.    Notes:        Krupa Walker is a 32 y.o. female seen for f/u labs and MRI results. + RF w/ neg CCP on labs. MRI w/ mild tenosynovitis.     today for + SUDHA. Checked because of hair loss, rashes on scalp and scalp being sensitive to sun. Started having trouble w/ hair loss 3-5 years ago but worse in the past 18 mos. Her mom does have a small amount of hair loss. C/o sore knees and feeling stiff when she wakes up for about 10 min in the morning. Every PM has a fever 99.6-100.4 and feels like she is on fire. Her face is always red- not worse w/ sun exposure. Doesn't use SPF. No hx blood clots, has had healthy pregnancies. No hx miscarriage. No sicca sx. No hx serositis. Maternal grandmother w/ RA. AM stiffness worse w/ shoulders,  elbows, wrists. + hx plantar fasciitis. She has chronic low back pain x 8 years. Sometimes worse w/ rest. Denies hx skin psoriasis, dactylitis, IBD. Rheumatologic systems otherwise negative. Physical exam shows alopecia, no synovitis to bilateral hands, wrists or elbows. Erythema to face but does not appear to spare the nasolabial folds, erythema to chin as well. She does work as a nurse.    Right handed    Serologies  + SUDHA 1:160 w/ neg REUBEN              Chronic comorbid conditions affecting medical decision making today:  Past Medical History:   Diagnosis Date    Anxiety     Depression     GERD (gastroesophageal reflux disease)     Gestational hypertension, third trimester 07/11/2019    Hypertension     gestational     Hypoglycemia     Kidney calculi     2016    Migraines     Polycystic ovaries     PONV (postoperative nausea and vomiting)     STD (sexually transmitted disease)     Urinary tract infection      Review of patient's allergies indicates:  No Known Allergies  Social History     Tobacco Use    Smoking status: Never    Smokeless tobacco: Never   Substance Use Topics    Alcohol use: Yes     Alcohol/week: 0.0 standard drinks     Comment: 1 drink per month        Objective:   There were no vitals taken for this visit.    Immunization History   Administered Date(s) Administered    COVID-19, MRNA, LN-S, PF (Pfizer) (Purple Cap) 08/24/2021, 09/21/2021    DTP 1991, 1991, 02/04/1992, 02/18/1993, 08/30/1995    HIB 1991, 1991, 02/04/1992, 11/25/1992    HPV Quadrivalent 06/15/2007, 08/18/2007, 12/26/2007    Hepatitis B, Pediatric/Adolescent 05/12/1992, 05/29/1992, 04/07/1995, 05/08/1995, 12/23/2010    Influenza 12/26/2007, 09/01/2011, 09/29/2020    Influenza - Quadrivalent 09/14/2015    Influenza - Quadrivalent - PF *Preferred* (6 months and older) 10/27/2016, 10/04/2017, 10/29/2018, 09/16/2019, 09/29/2020, 01/28/2022    MMR 11/15/1992, 08/30/1995    Meningococcal Conjugate (MCV4P) 06/15/2007,  09/01/2011    OPV 1991, 1991, 02/04/1992, 02/18/1993, 08/30/1995    PPD Test 05/23/1992    Rho (D) Immune Globulin 10/30/2019    Rho (D) Immune Globulin - IM 01/24/2020    Tdap 09/01/2011, 10/30/2019     Current Outpatient Medications   Medication Instructions    ALPRAZolam (XANAX) 0.5 mg, Oral, Daily PRN    busPIRone (BUSPAR) 10 mg, Oral, 3 times daily    gabapentin (NEURONTIN) 100 mg, Oral, 3 times daily    ibuprofen (ADVIL,MOTRIN) 800 mg, Oral, Every 6 hours PRN, PRN    ketorolac (TORADOL) 10 mg tablet Take 2 tabs (20mg) at the onset of a headache, if headache persists you may take 1 tab every 4 to 6 hours as needed. Do not exceed 40 mg per day.    meloxicam (MOBIC) 15 mg, Oral, Daily    metFORMIN (GLUCOPHAGE-XR) 500 mg, Oral, With breakfast    methylphenidate HCl (RITALIN) 10 mg, Oral, 2 times daily    multivit with calcium,iron,min (WOMEN'S DAILY MULTIVITAMIN ORAL) Women's Daily Multivitamin    nystatin (MYCOSTATIN) cream Topical (Top), 2 times daily    prochlorperazine (COMPAZINE) 5 mg, Oral, Every 6 hours PRN    propranoloL (INDERAL) 20-40 mg, Oral, 2 times daily PRN    rizatriptan (MAXALT-MLT) 10 MG disintegrating tablet Take at onset of migraine, can repeat in 2 hrs if needed.  No more than 2 tabs per day or 3 days/wk.    spironolactone (ALDACTONE) 100 mg, Oral, Nightly    WEGOVY 0.25 mg, Subcutaneous, Every 7 days     BIOLOGIC LABS  Lab Results   Component Value Date    HEPBSAG Negative 05/13/2019       Imaging:  All imaging reviewed and independently interpreted by me.    MRI Hand Wrist W WO Bilat_Rheumatoid  Narrative: EXAM: MRI HAND_WRIST W WO BILATERAL_RHEUMATOID ARTHRITIS    CLINICAL HISTORY: Bilateral wrist pain, concern for inflammatory arthropathy.    TECHNIQUE: Multiplanar, multisequence MR imaging of the bilateral wrists before and after the administration of intravenous gadolinium contrast.    FINDINGS:    Osseous: No acute fracture or suspicious osseous lesion is visualized in  either wrist/hand.    Articular: Bilateral wrists/hands joints demonstrate anatomical alignment.  No significant effusion.  No advanced arthritic change.  No synovitis or erosive arthropathy.    Musculotendinous: Questionable fourth extensor compartment enhancement in the region of interest on the right, possible mild tenosynovitis.  Left-sided and remaining right-sided musculotendinous structures are otherwise intact.    Ligamentous: Intact.    Miscellaneous: No suspicious postcontrast enhancement is visualized in either wrist/hand superficial soft tissues.  4 mm ganglion cyst in the left-sided deep volar soft tissues adjacent to the radioscaphoid articulation.  7 mm ganglion cyst in a similar location on the right.  Bilateral TFCCs appear grossly intact.  Impression: 1.  No acute abnormality visualized in either wrist/hand.  No convincing evidence of erosive or inflammatory arthropathy.  No significant degenerative changes.  2.  Questionable mild tenosynovitis at the right-sided fourth extensor compartment.  3.  Tiny bilateral ganglion cysts, as above.    Finalized on: 12/27/2022 3:06 PM By:  Rajendra Khalil III, MD  [facility]# [id number]      2022-12-27 15:08:21.018    [facility]       I have reviewed the following:     Details / Date    [x]   Labs     []   Micro     []   Pathology     [x]   Imaging     []   Cardiology Procedures     [x]   Other Prior derm notes        Assessment:     1. Weakness of both upper extremities    2. SUDHA positive    3. Alopecia    4. Undifferentiated inflammatory arthritis    5. Fever, unspecified fever cause          Plan:       + SUDHA with several lupus features- check complements  Emg for weakness  UE  Patient understands and contact clinic at any time regarding questions about today's office visit and any medication changes that were made  Return to clinic: after EMG    The patient understands, chooses and consents to this plan and accepts all the risks which include but are not limited to the  risks mentioned above.     Method of contact with patient concerns: [facility]           Disclaimer: This note was prepared using a voice recognition system and is likely to have sound alike errors within the text.       Dayanna Hensley PA-C  Rheumatology Department   [facility]

## 2022-12-29 ENCOUNTER — OFFICE VISIT (OUTPATIENT)
Dept: RHEUMATOLOGY | Facility: CLINIC | Age: 32
End: 2022-12-29
Payer: COMMERCIAL

## 2022-12-29 VITALS — HEIGHT: 64 IN | BODY MASS INDEX: 43.37 KG/M2

## 2022-12-29 DIAGNOSIS — M06.4 UNDIFFERENTIATED INFLAMMATORY ARTHRITIS: ICD-10-CM

## 2022-12-29 DIAGNOSIS — R76.8 ANA POSITIVE: ICD-10-CM

## 2022-12-29 DIAGNOSIS — R29.898 WEAKNESS OF BOTH UPPER EXTREMITIES: Primary | ICD-10-CM

## 2022-12-29 DIAGNOSIS — R50.9 FEVER, UNSPECIFIED FEVER CAUSE: ICD-10-CM

## 2022-12-29 DIAGNOSIS — L65.9 ALOPECIA: ICD-10-CM

## 2022-12-29 PROCEDURE — 1159F MED LIST DOCD IN RCRD: CPT | Mod: CPTII,95,, | Performed by: PHYSICIAN ASSISTANT

## 2022-12-29 PROCEDURE — 1159F PR MEDICATION LIST DOCUMENTED IN MEDICAL RECORD: ICD-10-PCS | Mod: CPTII,95,, | Performed by: PHYSICIAN ASSISTANT

## 2022-12-29 PROCEDURE — 99214 PR OFFICE/OUTPT VISIT, EST, LEVL IV, 30-39 MIN: ICD-10-PCS | Mod: 95,,, | Performed by: PHYSICIAN ASSISTANT

## 2022-12-29 PROCEDURE — 3008F PR BODY MASS INDEX (BMI) DOCUMENTED: ICD-10-PCS | Mod: CPTII,95,, | Performed by: PHYSICIAN ASSISTANT

## 2022-12-29 PROCEDURE — 99214 OFFICE O/P EST MOD 30 MIN: CPT | Mod: 95,,, | Performed by: PHYSICIAN ASSISTANT

## 2022-12-29 PROCEDURE — 3008F BODY MASS INDEX DOCD: CPT | Mod: CPTII,95,, | Performed by: PHYSICIAN ASSISTANT

## 2022-12-29 NOTE — Clinical Note
Please schedule emg w/ dr evangelista--pt would like to be contacted on or after 1/3/23 to schedule Please schedule c3/c4 levels to be checked at her convenience

## 2023-01-03 ENCOUNTER — TELEPHONE (OUTPATIENT)
Dept: PHYSICAL MEDICINE AND REHAB | Facility: CLINIC | Age: 33
End: 2023-01-03
Payer: COMMERCIAL

## 2023-01-03 ENCOUNTER — PATIENT MESSAGE (OUTPATIENT)
Dept: RHEUMATOLOGY | Facility: CLINIC | Age: 33
End: 2023-01-03
Payer: COMMERCIAL

## 2023-01-05 ENCOUNTER — TELEPHONE (OUTPATIENT)
Dept: PHYSICAL MEDICINE AND REHAB | Facility: CLINIC | Age: 33
End: 2023-01-05
Payer: COMMERCIAL

## 2023-01-05 ENCOUNTER — PATIENT MESSAGE (OUTPATIENT)
Dept: PHYSICAL MEDICINE AND REHAB | Facility: CLINIC | Age: 33
End: 2023-01-05
Payer: COMMERCIAL

## 2023-01-10 ENCOUNTER — PATIENT MESSAGE (OUTPATIENT)
Dept: INTERNAL MEDICINE | Facility: CLINIC | Age: 33
End: 2023-01-10
Payer: COMMERCIAL

## 2023-01-10 DIAGNOSIS — M54.2 NECK PAIN: Primary | ICD-10-CM

## 2023-01-20 ENCOUNTER — OFFICE VISIT (OUTPATIENT)
Dept: PHYSICAL MEDICINE AND REHAB | Facility: CLINIC | Age: 33
End: 2023-01-20
Payer: COMMERCIAL

## 2023-01-20 ENCOUNTER — OFFICE VISIT (OUTPATIENT)
Dept: RHEUMATOLOGY | Facility: CLINIC | Age: 33
End: 2023-01-20
Payer: COMMERCIAL

## 2023-01-20 VITALS
WEIGHT: 238.13 LBS | HEIGHT: 64 IN | SYSTOLIC BLOOD PRESSURE: 134 MMHG | BODY MASS INDEX: 40.65 KG/M2 | HEART RATE: 77 BPM | DIASTOLIC BLOOD PRESSURE: 95 MMHG

## 2023-01-20 VITALS
WEIGHT: 252 LBS | SYSTOLIC BLOOD PRESSURE: 138 MMHG | HEIGHT: 64 IN | DIASTOLIC BLOOD PRESSURE: 98 MMHG | HEART RATE: 67 BPM | BODY MASS INDEX: 43.02 KG/M2 | RESPIRATION RATE: 13 BRPM

## 2023-01-20 DIAGNOSIS — M79.18 CERVICAL MYOFASCIAL PAIN SYNDROME: ICD-10-CM

## 2023-01-20 DIAGNOSIS — M32.9 LUPUS ARTHRITIS: Primary | ICD-10-CM

## 2023-01-20 DIAGNOSIS — R76.8 ANA POSITIVE: ICD-10-CM

## 2023-01-20 DIAGNOSIS — G56.03 BILATERAL CARPAL TUNNEL SYNDROME: Primary | ICD-10-CM

## 2023-01-20 DIAGNOSIS — M35.9 UNDIFFERENTIATED CONNECTIVE TISSUE DISEASE: ICD-10-CM

## 2023-01-20 PROCEDURE — 95911 NRV CNDJ TEST 9-10 STUDIES: CPT | Mod: S$GLB,,, | Performed by: PHYSICAL MEDICINE & REHABILITATION

## 2023-01-20 PROCEDURE — 3044F HG A1C LEVEL LT 7.0%: CPT | Mod: CPTII,S$GLB,, | Performed by: PHYSICAL MEDICINE & REHABILITATION

## 2023-01-20 PROCEDURE — 3080F DIAST BP >= 90 MM HG: CPT | Mod: CPTII,S$GLB,, | Performed by: PHYSICIAN ASSISTANT

## 2023-01-20 PROCEDURE — 3044F PR MOST RECENT HEMOGLOBIN A1C LEVEL <7.0%: ICD-10-PCS | Mod: CPTII,S$GLB,, | Performed by: PHYSICIAN ASSISTANT

## 2023-01-20 PROCEDURE — 3008F PR BODY MASS INDEX (BMI) DOCUMENTED: ICD-10-PCS | Mod: CPTII,S$GLB,, | Performed by: PHYSICAL MEDICINE & REHABILITATION

## 2023-01-20 PROCEDURE — 3075F SYST BP GE 130 - 139MM HG: CPT | Mod: CPTII,S$GLB,, | Performed by: PHYSICIAN ASSISTANT

## 2023-01-20 PROCEDURE — 3075F SYST BP GE 130 - 139MM HG: CPT | Mod: CPTII,S$GLB,, | Performed by: PHYSICAL MEDICINE & REHABILITATION

## 2023-01-20 PROCEDURE — 99214 PR OFFICE/OUTPT VISIT, EST, LEVL IV, 30-39 MIN: ICD-10-PCS | Mod: S$GLB,,, | Performed by: PHYSICIAN ASSISTANT

## 2023-01-20 PROCEDURE — 1160F RVW MEDS BY RX/DR IN RCRD: CPT | Mod: CPTII,S$GLB,, | Performed by: PHYSICAL MEDICINE & REHABILITATION

## 2023-01-20 PROCEDURE — 1159F PR MEDICATION LIST DOCUMENTED IN MEDICAL RECORD: ICD-10-PCS | Mod: CPTII,S$GLB,, | Performed by: PHYSICAL MEDICINE & REHABILITATION

## 2023-01-20 PROCEDURE — 3075F PR MOST RECENT SYSTOLIC BLOOD PRESS GE 130-139MM HG: ICD-10-PCS | Mod: CPTII,S$GLB,, | Performed by: PHYSICIAN ASSISTANT

## 2023-01-20 PROCEDURE — 99999 PR PBB SHADOW E&M-EST. PATIENT-LVL III: CPT | Mod: PBBFAC,,, | Performed by: PHYSICAL MEDICINE & REHABILITATION

## 2023-01-20 PROCEDURE — 99999 PR PBB SHADOW E&M-EST. PATIENT-LVL IV: ICD-10-PCS | Mod: PBBFAC,,, | Performed by: PHYSICIAN ASSISTANT

## 2023-01-20 PROCEDURE — 1159F MED LIST DOCD IN RCRD: CPT | Mod: CPTII,S$GLB,, | Performed by: PHYSICAL MEDICINE & REHABILITATION

## 2023-01-20 PROCEDURE — 3080F DIAST BP >= 90 MM HG: CPT | Mod: CPTII,S$GLB,, | Performed by: PHYSICAL MEDICINE & REHABILITATION

## 2023-01-20 PROCEDURE — 3044F PR MOST RECENT HEMOGLOBIN A1C LEVEL <7.0%: ICD-10-PCS | Mod: CPTII,S$GLB,, | Performed by: PHYSICAL MEDICINE & REHABILITATION

## 2023-01-20 PROCEDURE — 99999 PR PBB SHADOW E&M-EST. PATIENT-LVL III: ICD-10-PCS | Mod: PBBFAC,,, | Performed by: PHYSICAL MEDICINE & REHABILITATION

## 2023-01-20 PROCEDURE — 1159F MED LIST DOCD IN RCRD: CPT | Mod: CPTII,S$GLB,, | Performed by: PHYSICIAN ASSISTANT

## 2023-01-20 PROCEDURE — 3080F PR MOST RECENT DIASTOLIC BLOOD PRESSURE >= 90 MM HG: ICD-10-PCS | Mod: CPTII,S$GLB,, | Performed by: PHYSICAL MEDICINE & REHABILITATION

## 2023-01-20 PROCEDURE — 1159F PR MEDICATION LIST DOCUMENTED IN MEDICAL RECORD: ICD-10-PCS | Mod: CPTII,S$GLB,, | Performed by: PHYSICIAN ASSISTANT

## 2023-01-20 PROCEDURE — 3075F PR MOST RECENT SYSTOLIC BLOOD PRESS GE 130-139MM HG: ICD-10-PCS | Mod: CPTII,S$GLB,, | Performed by: PHYSICAL MEDICINE & REHABILITATION

## 2023-01-20 PROCEDURE — 3080F PR MOST RECENT DIASTOLIC BLOOD PRESSURE >= 90 MM HG: ICD-10-PCS | Mod: CPTII,S$GLB,, | Performed by: PHYSICIAN ASSISTANT

## 2023-01-20 PROCEDURE — 95911 PR NERVE CONDUCTION STUDY; 9-10 STUDIES: ICD-10-PCS | Mod: S$GLB,,, | Performed by: PHYSICAL MEDICINE & REHABILITATION

## 2023-01-20 PROCEDURE — 1160F PR REVIEW ALL MEDS BY PRESCRIBER/CLIN PHARMACIST DOCUMENTED: ICD-10-PCS | Mod: CPTII,S$GLB,, | Performed by: PHYSICAL MEDICINE & REHABILITATION

## 2023-01-20 PROCEDURE — 3008F PR BODY MASS INDEX (BMI) DOCUMENTED: ICD-10-PCS | Mod: CPTII,S$GLB,, | Performed by: PHYSICIAN ASSISTANT

## 2023-01-20 PROCEDURE — 99214 OFFICE O/P EST MOD 30 MIN: CPT | Mod: S$GLB,,, | Performed by: PHYSICIAN ASSISTANT

## 2023-01-20 PROCEDURE — 3008F BODY MASS INDEX DOCD: CPT | Mod: CPTII,S$GLB,, | Performed by: PHYSICAL MEDICINE & REHABILITATION

## 2023-01-20 PROCEDURE — 99202 OFFICE O/P NEW SF 15 MIN: CPT | Mod: 25,S$GLB,, | Performed by: PHYSICAL MEDICINE & REHABILITATION

## 2023-01-20 PROCEDURE — 99202 PR OFFICE/OUTPT VISIT, NEW, LEVL II, 15-29 MIN: ICD-10-PCS | Mod: 25,S$GLB,, | Performed by: PHYSICAL MEDICINE & REHABILITATION

## 2023-01-20 PROCEDURE — 3044F HG A1C LEVEL LT 7.0%: CPT | Mod: CPTII,S$GLB,, | Performed by: PHYSICIAN ASSISTANT

## 2023-01-20 PROCEDURE — 3008F BODY MASS INDEX DOCD: CPT | Mod: CPTII,S$GLB,, | Performed by: PHYSICIAN ASSISTANT

## 2023-01-20 PROCEDURE — 99999 PR PBB SHADOW E&M-EST. PATIENT-LVL IV: CPT | Mod: PBBFAC,,, | Performed by: PHYSICIAN ASSISTANT

## 2023-01-20 RX ORDER — HYDROXYCHLOROQUINE SULFATE 200 MG/1
200 TABLET, FILM COATED ORAL 2 TIMES DAILY
Qty: 60 TABLET | Refills: 5 | Status: SHIPPED | OUTPATIENT
Start: 2023-01-20 | End: 2023-05-16

## 2023-01-20 NOTE — PROGRESS NOTES
[facility]   [address]  [place] [address]  Phone: [phone]      Full Name: Krupa Walker YOB: 1990  Patient ID: [id number]      Visit Date: 1/20/2023 11:24  Age: 32 Years  Examining Physician:   Referring Physician: ANNMARIE Murray  Conclusion: ue pain, weakness      Chief Complaint   Patient presents with    Hand Pain     weakness       HPI: This is a 32 y.o.  female being seen in clinic today for evaluation of chronic hand weakness and achy pain in her arm.  Her symptoms are worse with increased arm/hand usage.  At times she has numbness into her first four digits.  Rest and position change provides some relief.     History obtained from patient    Past family, medical, social, and surgical history reviewed in chart    Review of Systems:     General- denies lethargy, weight change, fever, chills  Head/neck- denies swallowing difficulties  ENT- denies hearing changes  Cardiovascular-denies chest pain  Pulmonary- denies shortness of breath  GI- denies constipation or bowel incontinence  - denies bladder incontinence  Skin- denies wounds or rashes  Musculoskeletal- + weakness, + pain  Neurologic- + numbness and tingling  Psychiatric- +depression and anxiety  Lymphatic-denies swelling  Endocrine- denies hypoglycemic symptoms/DM history  All other pertinent systems negative     Physical Examination:  General: Well developed, well nourished female, NAD  HEENT:NCAT EOMI bilaterally   Pulmonary:Normal respirations    Spinal Examination: CERVICAL  Active ROM is within normal limits.  Inspection: No deformity of spinal alignment.  Palpation: No vertebral tenderness to percussion.  Tight and ttp at right trapezius, rhomboid, levator scapula    Musculoskeletal Tests:  Phalen:   Elbow compression (ulnar): neg  Tinels at wrist: + on right    Bilateral Upper and Lower Extremities:  Pulses are 2+ at radial bilaterally.  Shoulder/Elbow/Wrist/Hand ROM wnl except rotator cuff  weakness mildly at right shoulder   Hip/Knee/Ankle ROM   Bilateral Extremities show normal capillary refill.  No signs of cyanosis, rubor, edema, skin changes, or dysvascular changes of appendages.  Nails appear intact.    Neurological Exam:  Cranial Nerves:  II-XII grossly intact    Manual Muscle Testing: (Motor 5=normal)  5/5 strength bilateral upper extremities    No focal atrophy is noted of either upper extremity.    Bilateral Reflexes:  Ha's response is absent bilaterally.    Sensation: tested to light touch  - intact in arms except dec at right 1st digit    Gait: Narrow base and good arm swing.      Entire procedure explained to patient prior to proceeding.  Verbal consent obtained        Sensory NCS      Nerve / Sites Rec. Site Onset Lat Peak Lat NP Amp PP Amp Segments Distance Velocity     ms ms µV µV  mm m/s   R Median - Digit II (Antidromic)      Wrist Dig II 3.79 4.56 21.7 34.9 Wrist - Dig  37   L Median - Digit II (Antidromic)      Wrist Dig II 3.77 4.67 20.3 21.2 Wrist - Dig  37   R Ulnar - Digit V (Antidromic)      Wrist Dig V 2.60 3.40 39.2 34.9 Wrist - Dig V 140 54   L Ulnar - Digit V (Antidromic)      Wrist Dig V 2.83 3.79 18.2 30.4 Wrist - Dig V 140 49   R Radial - Anatomical snuff box (Forearm)      Forearm Wrist 1.81 2.56 19.8 17.5 Forearm - Wrist 100 55   L Radial - Anatomical snuff box (Forearm)      Forearm Wrist 1.79 2.58 21.1 19.2 Forearm - Wrist 100 56                   Motor NCS      Nerve / Sites Muscle Latency Amplitude Amp % Duration Segments Distance Lat Diff Velocity     ms mV % ms  mm ms m/s   R Median - APB      Wrist APB 4.06 11.5 100 7.06 Wrist - APB 80        Elbow APB 8.04 11.4 98.7 7.12 Elbow - Wrist 200 3.98 50   L Median - APB      Wrist APB 4.00 8.1 100 7.96 Wrist - APB 80        Elbow APB 7.92 5.5 67.8 7.83 Elbow - Wrist 200 3.92 51   R Ulnar - ADM      Wrist ADM 2.96 9.6 100 6.29 Wrist - ADM 80        B.Elbow ADM 6.48 8.3 86.2 6.06 B.Elbow - Wrist 190  3.52 54      A.Elbow ADM 8.48 8.3 86.5 6.44 A.Elbow - B.Elbow 100 2.00 50   L Ulnar - ADM      Wrist ADM 3.29 8.0 100 6.69 Wrist - ADM 80        B.Elbow ADM 6.88 8.0 101 6.90 B.Elbow - Wrist 190 3.58 53      A.Elbow ADM 9.06 7.0 88.1 7.04 A.Elbow - B.Elbow 110 2.19 50                 INTERPRETATION  -Bilateral median motor nerve conduction study showed normal latency, amplitude, and conduction velocity  -Bilateral median sensory nerve conduction study showed normal peak latency and amplitude  -Bilateral ulnar motor nerve conduction study showed normal latency, amplitude, and conduction velocity  -Bilateral ulnar sensory nerve conduction study showed normal peak latency and amplitude  -Bilateral radial sensory nerve conduction study showed normal peak latency and amplitude      IMPRESSION  ABNORMAL study  2. There is electrodiagnostic evidence of a mild demyelinating median neuropathy (Carpal tunnel syndrome) across bilateral wrists  3. There is clinical evidence of cervical myofascial pain and mild rotator cuff weakness    PLAN  Discussed in detail for greater than 30 minutes about diagnosis and treatment plan    1. Follow up with referring provider: Dayanna Hensley  2. Handouts on CTS and myofascial pain provided  3. This study is good for one year. If symptoms worsen or do not improve, please re-consult.    Zully Khan M.D.  Physical Medicine and Rehab

## 2023-01-20 NOTE — PROGRESS NOTES
RHEUMATOLOGY OUTPATIENT CLINIC NOTE    Attending Rheumatologist: Dayanna Hensley PA-C  Primary Care Provider: Marian Julio MD   Chief Complaint/Reason For Consultation:  + SUDHA    Subjective:        Krupa Walker is a 32 y.o. female seen for f/u labs and MRI results. + RF w/ neg CCP on labs. MRI w/ mild tenosynovitis. Normal complements.     SUDHA Checked because of hair loss, rashes on scalp and scalp being sensitive to sun. Started having trouble w/ hair loss 3-5 years ago but worse in the past 18 mos. Her mom does have a small amount of hair loss. C/o sore knees and feeling stiff when she wakes up for about 10 min in the morning. Every PM has a fever 99.6-100.4 and feels like she is on fire. Her face is always red- not worse w/ sun exposure. Doesn't use SPF. No hx blood clots, has had healthy pregnancies. No hx miscarriage. No sicca sx. No hx serositis. Maternal grandmother w/ RA. AM stiffness worse w/ shoulders, elbows, wrists. + hx plantar fasciitis. She has chronic low back pain x 8 years. Sometimes worse w/ rest. Denies hx skin psoriasis, dactylitis, IBD. Rheumatologic systems otherwise negative. Physical exam shows alopecia. Right wrist and bilateral knees are tender. Left foot is tender to palpation. No effusion. Erythema to face but does not appear to spare the nasolabial folds, erythema to chin as well.     Right handed    Meets SLICC criteria: nonscarring alopecia, arthritis, + SUDHA, cutaneous lupus    Serologies  + SUDHA 1:160 w/ neg REUBEN              Chronic comorbid conditions affecting medical decision making today:  Past Medical History:   Diagnosis Date    Anxiety     Depression     GERD (gastroesophageal reflux disease)     Gestational hypertension, third trimester 07/11/2019    Hypertension     gestational     Hypoglycemia     Kidney calculi     2016    Migraines     Polycystic ovaries     PONV (postoperative nausea and vomiting)     STD (sexually transmitted disease)     Urinary tract  "infection      Review of patient's allergies indicates:  No Known Allergies  Social History     Tobacco Use    Smoking status: Never    Smokeless tobacco: Never   Substance Use Topics    Alcohol use: Yes     Alcohol/week: 0.0 standard drinks     Comment: 1 drink per month        Objective:   BP (!) 134/95   Pulse 77   Ht 5' 4" (1.626 m)   Wt 108 kg (238 lb 1.6 oz)   BMI 40.87 kg/m²     Immunization History   Administered Date(s) Administered    COVID-19, MRNA, LN-S, PF (Pfizer) (Purple Cap) 08/24/2021, 09/21/2021    DTP 1991, 1991, 02/04/1992, 02/18/1993, 08/30/1995    HIB 1991, 1991, 02/04/1992, 11/25/1992    HPV Quadrivalent 06/15/2007, 08/18/2007, 12/26/2007    Hepatitis B, Pediatric/Adolescent 05/12/1992, 05/29/1992, 04/07/1995, 05/08/1995, 12/23/2010    Influenza 12/26/2007, 09/01/2011, 09/29/2020    Influenza - Quadrivalent 09/14/2015    Influenza - Quadrivalent - PF *Preferred* (6 months and older) 10/27/2016, 10/04/2017, 10/29/2018, 09/16/2019, 09/29/2020, 01/28/2022    MMR 11/15/1992, 08/30/1995    Meningococcal Conjugate (MCV4P) 06/15/2007, 09/01/2011    OPV 1991, 1991, 02/04/1992, 02/18/1993, 08/30/1995    PPD Test 05/23/1992    Rho (D) Immune Globulin 10/30/2019    Rho (D) Immune Globulin - IM 01/24/2020    Tdap 09/01/2011, 10/30/2019     Current Outpatient Medications   Medication Instructions    ALPRAZolam (XANAX) 0.5 mg, Oral, Daily PRN    busPIRone (BUSPAR) 10 mg, Oral, 3 times daily    gabapentin (NEURONTIN) 100 mg, Oral, 3 times daily    ibuprofen (ADVIL,MOTRIN) 800 mg, Oral, Every 6 hours PRN, PRN    ketorolac (TORADOL) 10 mg tablet Take 2 tabs (20mg) at the onset of a headache, if headache persists you may take 1 tab every 4 to 6 hours as needed. Do not exceed 40 mg per day.    meloxicam (MOBIC) 15 mg, Oral, Daily    metFORMIN (GLUCOPHAGE-XR) 500 mg, Oral, With breakfast    methylphenidate HCl (RITALIN) 10 mg, Oral, 2 times daily    multivit with " calcium,iron,min (WOMEN'S DAILY MULTIVITAMIN ORAL) Women's Daily Multivitamin    nystatin (MYCOSTATIN) cream Topical (Top), 2 times daily    prochlorperazine (COMPAZINE) 5 mg, Oral, Every 6 hours PRN    propranoloL (INDERAL) 20-40 mg, Oral, 2 times daily PRN    rizatriptan (MAXALT-MLT) 10 MG disintegrating tablet Take at onset of migraine, can repeat in 2 hrs if needed.  No more than 2 tabs per day or 3 days/wk.    spironolactone (ALDACTONE) 100 mg, Oral, Nightly    WEGOVY 0.25 mg, Subcutaneous, Every 7 days     BIOLOGIC LABS  Lab Results   Component Value Date    HEPBSAG Negative 05/13/2019       Imaging:  All imaging reviewed and independently interpreted by me.    MRI Hand Wrist W WO Bilat_Rheumatoid  Narrative: EXAM: MRI HAND_WRIST W WO BILATERAL_RHEUMATOID ARTHRITIS    CLINICAL HISTORY: Bilateral wrist pain, concern for inflammatory arthropathy.    TECHNIQUE: Multiplanar, multisequence MR imaging of the bilateral wrists before and after the administration of intravenous gadolinium contrast.    FINDINGS:    Osseous: No acute fracture or suspicious osseous lesion is visualized in either wrist/hand.    Articular: Bilateral wrists/hands joints demonstrate anatomical alignment.  No significant effusion.  No advanced arthritic change.  No synovitis or erosive arthropathy.    Musculotendinous: Questionable fourth extensor compartment enhancement in the region of interest on the right, possible mild tenosynovitis.  Left-sided and remaining right-sided musculotendinous structures are otherwise intact.    Ligamentous: Intact.    Miscellaneous: No suspicious postcontrast enhancement is visualized in either wrist/hand superficial soft tissues.  4 mm ganglion cyst in the left-sided deep volar soft tissues adjacent to the radioscaphoid articulation.  7 mm ganglion cyst in a similar location on the right.  Bilateral TFCCs appear grossly intact.  Impression: 1.  No acute abnormality visualized in either wrist/hand.  No  convincing evidence of erosive or inflammatory arthropathy.  No significant degenerative changes.  2.  Questionable mild tenosynovitis at the right-sided fourth extensor compartment.  3.  Tiny bilateral ganglion cysts, as above.    Finalized on: 12/27/2022 3:06 PM By:  Rajendra Khalil III, MD  [facility]# [id number]      2022-12-27 15:08:21.018    [facility]       I have reviewed the following:     Details / Date    [x]   Labs     []   Micro     []   Pathology     [x]   Imaging     []   Cardiology Procedures     [x]   Other Prior derm notes        Assessment:     1. Lupus arthritis    2. Undifferentiated connective tissue disease    3. SUDHA positive            Plan:       Meets SLICC cirteria for lupus- start plaquenil BID  CTS and myofascial pain on EMG. She is starting PT next week  Patient understands and contact clinic at any time regarding questions about today's office visit and any medication changes that were made  Return to clinic: 3 mos and prn    Compromised immune system secondary to autoimmune disease and use of immunosuppressive drugs. Monitor carefully for infections and toxicities- Currently denies issues with recurrent infections. Advised to get immediate medical care if any infection.  Recommend Yearly Skin Cancer Screening by Dermatology for all patients on biologic or Trisha. advised strict adherence to age appropriate vaccinations (shingles, pneumonia, flu and covid) and cancer screenings with PCP   Patient advised to hold DMARD and/or biologic therapy for signs of infection or for surgery. If you are unsure what to do please call our office for instruction.[facility] [phone]    ADDENDUM 1/23/23  Case reviewed with Dr. Garcia. agrees with your diagnosis of lupus arthritis. Elevated total protein and non specific RF increases chances of cutaneous lupus secondary to SSA antibody. Treat with plaquenil for now. Check myomarker panel. Also check SPEP and DENNISE to look for any underlying  POLYCLONAL HYPERGAMMAGLOBULINEMIA to contribute to the elevated total protein and RF positivity. Also check Hepatitis serologies.     30 minutes of total time spent on the encounter, which includes face to face time and non-face to face time preparing to see the patient (eg, review of tests), Obtaining and/or reviewing separately obtained history, Documenting clinical information in the electronic or other health record, Independently interpreting results (not separately reported) and communicating results to the patient/family/caregiver, or Care coordination (not separately reported).      The patient understands, chooses and consents to this plan and accepts all the risks which include but are not limited to the risks mentioned above.     Method of contact with patient concerns: [facility]         Disclaimer: This note was prepared using a voice recognition system and is likely to have sound alike errors within the text.       Dayanna Hensley PA-C  Rheumatology Department   [facility]

## 2023-01-23 ENCOUNTER — PATIENT MESSAGE (OUTPATIENT)
Dept: RHEUMATOLOGY | Facility: CLINIC | Age: 33
End: 2023-01-23
Payer: COMMERCIAL

## 2023-01-23 DIAGNOSIS — R77.9 ELEVATED SERUM PROTEIN LEVEL: ICD-10-CM

## 2023-01-23 DIAGNOSIS — M32.9 LUPUS ARTHRITIS: Primary | ICD-10-CM

## 2023-01-23 DIAGNOSIS — R76.8 RHEUMATOID FACTOR POSITIVE: ICD-10-CM

## 2023-01-23 PROBLEM — M25.511 CHRONIC RIGHT SHOULDER PAIN: Status: ACTIVE | Noted: 2023-01-23

## 2023-01-23 PROBLEM — R29.898 DECREASED RANGE OF MOTION OF NECK: Status: ACTIVE | Noted: 2023-01-23

## 2023-01-23 PROBLEM — G89.29 CHRONIC RIGHT SHOULDER PAIN: Status: ACTIVE | Noted: 2023-01-23

## 2023-01-23 PROBLEM — M54.2 CERVICAL PAIN (NECK): Status: ACTIVE | Noted: 2023-01-23

## 2023-01-24 ENCOUNTER — OFFICE VISIT (OUTPATIENT)
Dept: BEHAVIORAL HEALTH | Facility: CLINIC | Age: 33
End: 2023-01-24
Payer: COMMERCIAL

## 2023-01-24 ENCOUNTER — TELEPHONE (OUTPATIENT)
Dept: RHEUMATOLOGY | Facility: CLINIC | Age: 33
End: 2023-01-24
Payer: COMMERCIAL

## 2023-01-24 DIAGNOSIS — F90.0 ATTENTION DEFICIT HYPERACTIVITY DISORDER (ADHD), PREDOMINANTLY INATTENTIVE TYPE: ICD-10-CM

## 2023-01-24 DIAGNOSIS — F41.1 GAD (GENERALIZED ANXIETY DISORDER): ICD-10-CM

## 2023-01-24 DIAGNOSIS — F99 INSOMNIA DUE TO OTHER MENTAL DISORDER: ICD-10-CM

## 2023-01-24 DIAGNOSIS — F33.1 MODERATE EPISODE OF RECURRENT MAJOR DEPRESSIVE DISORDER: Primary | ICD-10-CM

## 2023-01-24 DIAGNOSIS — F51.05 INSOMNIA DUE TO OTHER MENTAL DISORDER: ICD-10-CM

## 2023-01-24 PROCEDURE — 1159F MED LIST DOCD IN RCRD: CPT | Mod: CPTII,95,, | Performed by: SOCIAL WORKER

## 2023-01-24 PROCEDURE — 90837 PR PSYCHOTHERAPY W/PATIENT, 60 MIN: ICD-10-PCS | Mod: 95,,, | Performed by: SOCIAL WORKER

## 2023-01-24 PROCEDURE — 1159F PR MEDICATION LIST DOCUMENTED IN MEDICAL RECORD: ICD-10-PCS | Mod: CPTII,95,, | Performed by: SOCIAL WORKER

## 2023-01-24 PROCEDURE — 3044F HG A1C LEVEL LT 7.0%: CPT | Mod: CPTII,95,, | Performed by: SOCIAL WORKER

## 2023-01-24 PROCEDURE — 90837 PSYTX W PT 60 MINUTES: CPT | Mod: 95,,, | Performed by: SOCIAL WORKER

## 2023-01-24 PROCEDURE — 3044F PR MOST RECENT HEMOGLOBIN A1C LEVEL <7.0%: ICD-10-PCS | Mod: CPTII,95,, | Performed by: SOCIAL WORKER

## 2023-01-24 NOTE — PROGRESS NOTES
The patient location is: [place]  The chief complaint leading to consultation is: anxiety and depression    Visit type: audiovisual    Face to Face time with patient: 53  65 minutes of total time spent on the encounter, which includes face to face time and non-face to face time preparing to see the patient (eg, review of tests), Obtaining and/or reviewing separately obtained history, Documenting clinical information in the electronic or other health record, Independently interpreting results (not separately reported) and communicating results to the patient/family/caregiver, or Care coordination (not separately reported).         Each patient to whom he or she provides medical services by telemedicine is:  (1) informed of the relationship between the physician and patient and the respective role of any other health care provider with respect to management of the patient; and (2) notified that he or she may decline to receive medical services by telemedicine and may withdraw from such care at any time.    Notes:   Individual Psychotherapy (PhD/LCSW)    1/24/2023    Site:  Telemed         Therapeutic Intervention: Met with patient.  Outpatient - Insight oriented psychotherapy 60 min - CPT code 52598 and Outpatient - Behavior modifying psychotherapy 60 min - CPT code 89233    Chief complaint/reason for encounter: depression and anxiety     Interval history and content of current session: She reports that she feels the gratitude has been helpful. She reports that she has been having a hard time with her daughter and her behavior. She reports that her daughter does not have these issues at her fathers house. She reports that she is struggling with finding motivation. She reports that some days she will not leave the couch and wonders if she has bipolar disorder. She reports that she was recently dx with Lupus and is expected to start a daily medication. Discussed her concerns with having issues staying consistent. Discussed  thought re-framing, mindfulness, ways to increase motivation, structure and routine.     Treatment plan:  Target symptoms: recurrent depression, anxiety , adjustment  Why chosen therapy is appropriate versus another modality: relevant to diagnosis, patient responds to this modality, evidence based practice  Outcome monitoring methods: self-report, observation  Therapeutic intervention type: insight oriented psychotherapy, behavior modifying psychotherapy    Risk parameters:  Patient reports no suicidal ideation  Patient reports no homicidal ideation  Patient reports no self-injurious behavior  Patient reports no violent behavior    Verbal deficits: None    Patient's response to intervention:  The patient's response to intervention is accepting.    Progress toward goals and other mental status changes:  The patient's progress toward goals is fair .    Diagnosis:     ICD-10-CM ICD-9-CM   1. Moderate episode of recurrent major depressive disorder  F33.1 296.32   2. JAVI (generalized anxiety disorder)  F41.1 300.02   3. Insomnia due to other mental disorder  F51.05 300.9    F99 327.02   4. Attention deficit hyperactivity disorder (ADHD), predominantly inattentive type  F90.0 314.00       Plan:  individual psychotherapy    Return to clinic: 3 weeks    Length of Service (minutes): 60

## 2023-01-24 NOTE — TELEPHONE ENCOUNTER
----- Message from Dayanna Hensley PA-C sent at 1/23/2023  6:49 PM CST -----  Regarding: schedule labs  Please schedule hep labs, myomarker panel, SPEP/DENNISE to be done at her earliest convenience

## 2023-02-15 ENCOUNTER — PATIENT MESSAGE (OUTPATIENT)
Dept: PRIMARY CARE CLINIC | Facility: CLINIC | Age: 33
End: 2023-02-15
Payer: COMMERCIAL

## 2023-02-16 RX ORDER — PANTOPRAZOLE SODIUM 40 MG/1
40 TABLET, DELAYED RELEASE ORAL DAILY
Qty: 90 TABLET | Refills: 3 | Status: SHIPPED | OUTPATIENT
Start: 2023-02-16 | End: 2023-10-04

## 2023-03-15 ENCOUNTER — PATIENT MESSAGE (OUTPATIENT)
Dept: RHEUMATOLOGY | Facility: CLINIC | Age: 33
End: 2023-03-15
Payer: COMMERCIAL

## 2023-03-16 NOTE — TELEPHONE ENCOUNTER
Pt developed rash from plaquenil in various spots of the body. She stopped taking plaquenil a week ago and the rash cleared up.     Please advise

## 2023-03-22 ENCOUNTER — OFFICE VISIT (OUTPATIENT)
Dept: NEUROLOGY | Facility: CLINIC | Age: 33
End: 2023-03-22
Payer: COMMERCIAL

## 2023-03-22 VITALS
SYSTOLIC BLOOD PRESSURE: 114 MMHG | BODY MASS INDEX: 38.02 KG/M2 | HEIGHT: 64 IN | DIASTOLIC BLOOD PRESSURE: 78 MMHG | WEIGHT: 222.69 LBS | HEART RATE: 64 BPM

## 2023-03-22 DIAGNOSIS — E66.01 CLASS 3 SEVERE OBESITY DUE TO EXCESS CALORIES WITHOUT SERIOUS COMORBIDITY WITH BODY MASS INDEX (BMI) OF 40.0 TO 44.9 IN ADULT: ICD-10-CM

## 2023-03-22 DIAGNOSIS — M26.629 TMJ SYNDROME: ICD-10-CM

## 2023-03-22 DIAGNOSIS — F33.1 MODERATE EPISODE OF RECURRENT MAJOR DEPRESSIVE DISORDER: ICD-10-CM

## 2023-03-22 DIAGNOSIS — G47.9 TROUBLE IN SLEEPING: ICD-10-CM

## 2023-03-22 DIAGNOSIS — G43.109 MIGRAINE WITH AURA AND WITHOUT STATUS MIGRAINOSUS, NOT INTRACTABLE: Primary | ICD-10-CM

## 2023-03-22 DIAGNOSIS — G44.86 CERVICOGENIC HEADACHE: ICD-10-CM

## 2023-03-22 DIAGNOSIS — F41.1 GAD (GENERALIZED ANXIETY DISORDER): ICD-10-CM

## 2023-03-22 DIAGNOSIS — N20.0 NEPHROLITHIASIS: ICD-10-CM

## 2023-03-22 DIAGNOSIS — Z78.9 CAFFEINE USE: ICD-10-CM

## 2023-03-22 PROCEDURE — 1159F PR MEDICATION LIST DOCUMENTED IN MEDICAL RECORD: ICD-10-PCS | Mod: CPTII,S$GLB,, | Performed by: PHYSICIAN ASSISTANT

## 2023-03-22 PROCEDURE — 3078F PR MOST RECENT DIASTOLIC BLOOD PRESSURE < 80 MM HG: ICD-10-PCS | Mod: CPTII,S$GLB,, | Performed by: PHYSICIAN ASSISTANT

## 2023-03-22 PROCEDURE — 99214 PR OFFICE/OUTPT VISIT, EST, LEVL IV, 30-39 MIN: ICD-10-PCS | Mod: S$GLB,,, | Performed by: PHYSICIAN ASSISTANT

## 2023-03-22 PROCEDURE — 3044F PR MOST RECENT HEMOGLOBIN A1C LEVEL <7.0%: ICD-10-PCS | Mod: CPTII,S$GLB,, | Performed by: PHYSICIAN ASSISTANT

## 2023-03-22 PROCEDURE — 3008F PR BODY MASS INDEX (BMI) DOCUMENTED: ICD-10-PCS | Mod: CPTII,S$GLB,, | Performed by: PHYSICIAN ASSISTANT

## 2023-03-22 PROCEDURE — 99999 PR PBB SHADOW E&M-EST. PATIENT-LVL IV: ICD-10-PCS | Mod: PBBFAC,,, | Performed by: PHYSICIAN ASSISTANT

## 2023-03-22 PROCEDURE — 3074F PR MOST RECENT SYSTOLIC BLOOD PRESSURE < 130 MM HG: ICD-10-PCS | Mod: CPTII,S$GLB,, | Performed by: PHYSICIAN ASSISTANT

## 2023-03-22 PROCEDURE — 3074F SYST BP LT 130 MM HG: CPT | Mod: CPTII,S$GLB,, | Performed by: PHYSICIAN ASSISTANT

## 2023-03-22 PROCEDURE — 3044F HG A1C LEVEL LT 7.0%: CPT | Mod: CPTII,S$GLB,, | Performed by: PHYSICIAN ASSISTANT

## 2023-03-22 PROCEDURE — 3008F BODY MASS INDEX DOCD: CPT | Mod: CPTII,S$GLB,, | Performed by: PHYSICIAN ASSISTANT

## 2023-03-22 PROCEDURE — 99214 OFFICE O/P EST MOD 30 MIN: CPT | Mod: S$GLB,,, | Performed by: PHYSICIAN ASSISTANT

## 2023-03-22 PROCEDURE — 1160F RVW MEDS BY RX/DR IN RCRD: CPT | Mod: CPTII,S$GLB,, | Performed by: PHYSICIAN ASSISTANT

## 2023-03-22 PROCEDURE — 1160F PR REVIEW ALL MEDS BY PRESCRIBER/CLIN PHARMACIST DOCUMENTED: ICD-10-PCS | Mod: CPTII,S$GLB,, | Performed by: PHYSICIAN ASSISTANT

## 2023-03-22 PROCEDURE — 3078F DIAST BP <80 MM HG: CPT | Mod: CPTII,S$GLB,, | Performed by: PHYSICIAN ASSISTANT

## 2023-03-22 PROCEDURE — 99999 PR PBB SHADOW E&M-EST. PATIENT-LVL IV: CPT | Mod: PBBFAC,,, | Performed by: PHYSICIAN ASSISTANT

## 2023-03-22 PROCEDURE — 1159F MED LIST DOCD IN RCRD: CPT | Mod: CPTII,S$GLB,, | Performed by: PHYSICIAN ASSISTANT

## 2023-03-22 RX ORDER — PROPRANOLOL HYDROCHLORIDE 20 MG/1
20 TABLET ORAL DAILY
Qty: 30 TABLET | Refills: 2 | Status: SHIPPED | OUTPATIENT
Start: 2023-03-22 | End: 2023-12-05

## 2023-03-22 RX ORDER — TIZANIDINE 4 MG/1
4 TABLET ORAL EVERY 6 HOURS PRN
Qty: 20 TABLET | Refills: 0 | Status: SHIPPED | OUTPATIENT
Start: 2023-03-22 | End: 2024-03-15 | Stop reason: SDUPTHER

## 2023-03-22 RX ORDER — PROCHLORPERAZINE MALEATE 5 MG
5 TABLET ORAL EVERY 6 HOURS PRN
Qty: 12 TABLET | Refills: 2 | Status: SHIPPED | OUTPATIENT
Start: 2023-03-22

## 2023-03-22 RX ORDER — SUMATRIPTAN 50 MG/1
TABLET, FILM COATED ORAL
Qty: 12 TABLET | Refills: 2 | Status: SHIPPED | OUTPATIENT
Start: 2023-03-22

## 2023-03-22 NOTE — PROGRESS NOTES
Established Patient   SUBJECTIVE:  Patient ID: Krupa Walker   Chief Complaint: Headache    History of Present Illness:  Krupa Walker is a 32 y.o. female with migraine, depression, insomnia, hyperparathyroidism, h/o kidney stones, obesity, ADHD, anxiety, GERD, HTN, TMJ, ?lupus, who presents to clinic alone for follow-up of headaches.       03/22/2023 - Interval History:  Ha's worsened x 2 months occurring 0-6/30 days per month lasting 2hrs - 4 days. Possible triggers include increased work, poor sleep and continuation of her neck muscle tension w/ neck pain occurring almost daily. She is undergoing PT currently. Psych is currently working w/ pt on her trouble w/ sleeping.   Maxalt 10mg - doesn't help  Toradol tabs - initially helped, no longer helping  Compazine - tried it twice, helped nausea, unsure if it helped gavin as she typically laid down after  Tizanidine - helps greatly, used it prn for muscle tension  Discussed multiple otpions w/ pt. She defers adjusting gabapentin 2/2 drowsy. Recalls trying a higher dose o fpropranolol that caused lower hr's, would like to take 20mg consistently nightly.   Plan: take propranolol 20mg qhs (caution w/ increased doses), start imitrex 50mg prn, continue tizanidine infrequently, continue compazine prn, continue PT, continue psych recs regarding poor sleep, rtc in 2-3 mo    Recommendations made at last Office Visit on 5/17/22:  - Discussed symptoms appear to be consistent with migraines, discussed treatment options and patient agreed with the following plan:  - ppx - start supplements  - abortive - change maxalt to odt, trial toradol tabs as 2nd line (chagne to 1st line if effective, if fails trial sprix)  - nausea - trial compazine for dual purpose of treating HA's and nausea  - anxiety/depression - takes vibryd, avoid bb, mgmt per pcp  - h/o kidney stones - avoid tpx and zonisamide, mgmt per pcp  - d/c caffeine  - knee pain - takes mobic infrequently, advised not to  take mobic and toradol concurrently or in high frequencies, mgmt per pcp  - trouble w/ sleep - pt taking trazodone currently which somewhat helps, mgmt per pcp  - obesity - taking diethylproprione, mgmt per pcp  - TMJ - continue mgmt per dentist  - risks, benefits, and potential side effects of maxalt, toradol, sprix, nurtec/ubrelvy, devices discussed   - alternative treatment options offered   - importance of healthy diet, regular exercise and sleep hygiene in the treatment of headaches    - Start tracking headaches via Migraine Buddy sheridan on phone   - RTC in 3 mo     Treatments Tried:  zoloft - low blood sugar  zonisamide - avoid 2/2 h/o kidney stones  tpx - drowsiness  Bb - avoid 2/2 depression  trazadone - helps somewhat w/ sleep  maxalt 10mg - typically resolves, causes drowsiness/hungover effects, no longer helping  almotriptan - didn't help  Ibuprofen   Tylenol  Naproxen  excedrin  mobic - helps w/ knee pain  Toradol tabs - initially helped, no longer helping  Compazine - tried it twice, helped nausea, unsure if it helped gavin as she typically laid down after  Tizanidine - helps greatly, used it prn for muscle tension    Current Medications:    Current Outpatient Medications:     busPIRone (BUSPAR) 10 MG tablet, Take 1 tablet (10 mg total) by mouth 3 (three) times daily., Disp: 90 tablet, Rfl: 3    gabapentin (NEURONTIN) 100 MG capsule, Take 1 capsule (100 mg total) by mouth 3 (three) times daily. (Patient taking differently: Take 100 mg by mouth 3 (three) times daily. PRN), Disp: 90 capsule, Rfl: 1    hydrOXYchloroQUINE (PLAQUENIL) 200 mg tablet, Take 1 tablet (200 mg total) by mouth 2 (two) times daily., Disp: 60 tablet, Rfl: 5    ibuprofen (ADVIL,MOTRIN) 800 MG tablet, Take 800 mg by mouth every 6 (six) hours as needed. PRN, Disp: , Rfl:     ketorolac (TORADOL) 10 mg tablet, Take 2 tabs (20mg) at the onset of a headache, if headache persists you may take 1 tab every 4 to 6 hours as needed. Do not exceed 40 mg  "per day., Disp: 20 tablet, Rfl: 3    meloxicam (MOBIC) 15 MG tablet, Take 1 tablet (15 mg total) by mouth once daily., Disp: 90 tablet, Rfl: 3    metFORMIN (GLUCOPHAGE-XR) 500 MG ER 24hr tablet, Take 1 tablet (500 mg total) by mouth daily with breakfast., Disp: 90 tablet, Rfl: 3    methylphenidate HCl (RITALIN) 10 MG tablet, Take 1 tablet (10 mg total) by mouth 2 (two) times daily., Disp: 60 tablet, Rfl: 0    multivit with calcium,iron,min (WOMEN'S DAILY MULTIVITAMIN ORAL), Women's Daily Multivitamin, Disp: , Rfl:     nystatin (MYCOSTATIN) cream, Apply topically 2 (two) times daily., Disp: 30 g, Rfl: 1    pantoprazole (PROTONIX) 40 MG tablet, Take 1 tablet (40 mg total) by mouth once daily., Disp: 90 tablet, Rfl: 3    semaglutide, weight loss, (WEGOVY) 0.25 mg/0.5 mL PnIj, Inject 0.25 mg into the skin every 7 days., Disp: 2 mL, Rfl: 0    spironolactone (ALDACTONE) 100 MG tablet, Take 1 tablet (100 mg total) by mouth every evening., Disp: 30 tablet, Rfl: 2    ALPRAZolam (XANAX) 0.5 MG tablet, Take 1 tablet (0.5 mg total) by mouth daily as needed (Severe anxiety/Insomnia)., Disp: 30 tablet, Rfl: 0    prochlorperazine (COMPAZINE) 5 MG tablet, Take 1 tablet (5 mg total) by mouth every 6 (six) hours as needed for Nausea (or headache pain)., Disp: 12 tablet, Rfl: 2    propranoloL (INDERAL) 20 MG tablet, Take 1 tablet (20 mg total) by mouth once daily., Disp: 30 tablet, Rfl: 2    sumatriptan (IMITREX) 50 MG tablet, Take at onset of migraine, can repeat in 2 hrs if needed.  No more than twice per day or 3 days/wk., Disp: 12 tablet, Rfl: 2    tiZANidine (ZANAFLEX) 4 MG tablet, Take 1 tablet (4 mg total) by mouth every 6 (six) hours as needed (for muscle spasm and ha's)., Disp: 20 tablet, Rfl: 0    Review of Systems - as per HPI, otherwise a balanced 10 systems review is negative.    OBJECTIVE:  Vitals:  /78   Pulse 64   Ht 5' 4" (1.626 m)   Wt 101 kg (222 lb 10.6 oz)   BMI 38.22 kg/m²      Physical " Exam:  Constitutional: she appears well-developed and well-nourished. she is well groomed. NAD   HENT:    Head: Normocephalic and atraumatic  Eyes: Conjunctivae and EOM are normal  Musculoskeletal: Normal range of motion. No joint stiffness.   Skin: Skin is warm and dry.  Psychiatric: Mood and affect are normal    Neuro: Patient is alert and oriented to person, place, and time. Language is intact and fluent. Speech is clear and fluent. Recent and remote memory are intact.  Normal attention and concentration.  Facial movement is symmetric. Moves all 4 extremities against gravity. Gait and station normal.  Cranial Nerves II through XII without focal deficit.     Review of Data:   Notes from neuro reviewed   Labs:  Lab Visit on 02/02/2023   Component Date Value Ref Range Status    Protein, Serum 02/02/2023 7.3  6.0 - 8.4 g/dL Final    Albumin 02/02/2023 4.52  3.35 - 5.55 g/dL Final    Alpha-1 02/02/2023 0.26  0.17 - 0.41 g/dL Final    Alpha-2 02/02/2023 0.61  0.43 - 0.99 g/dL Final    Beta 02/02/2023 0.85  0.50 - 1.10 g/dL Final    Gamma 02/02/2023 1.07  0.67 - 1.58 g/dL Final    Immunofix Interp. 02/02/2023 SEE COMMENT   Final    Anti-Beryl-1 Antibody 02/02/2023 <20  <20 Units Final    PL-7 02/02/2023 Negative  Negative Final    PL-12 02/02/2023 Negative  Negative Final    EJ 02/02/2023 Negative  Negative Final    OJ 02/02/2023 Negative  Negative Final    SRP 02/02/2023 Negative  Negative Final    MI-2 02/02/2023 Negative  Negative Final    TIF1 GAMMA (P155/140) 02/02/2023 <20  <20 Units Final    MDA-5 (P140) (CADM-140) 02/02/2023 <20  <20 Units Final    NXP-2 (P140) 02/02/2023 <20  <20 Units Final    Anti-PM/Scl Ab 02/02/2023 <20  <20 Units Final    Fibrillarin (U3 RNP) 02/02/2023 Negative  Negative Final    U2 snRNP 02/02/2023 Negative  Negative Final    Anti-U1-RNP  Ab 02/02/2023 <20  <20 Units Final    Ku 02/02/2023 Negative  Negative Final    Anti-SS-A 52 kD Ab, IgG 02/02/2023 <20  <20 Units Final    Hepatitis C Ab  02/02/2023 Non-reactive  Non-reactive Final    Hep B Core Total Ab 02/02/2023 Non-reactive  Non-reactive Final    Hepatitis B Surface Ag 02/02/2023 Non-reactive  Non-reactive Final    Hep B S Ab 02/02/2023 314.77  mIU/mL Final    Hep B S Ab 02/02/2023 Reactive   Final    Pathologist Interpretation DENNISE 02/02/2023 REVIEWED   Final    Pathologist Interpretation SPE 02/02/2023 REVIEWED   Final   Lab Visit on 01/10/2023   Component Date Value Ref Range Status    Complement (C-3) 01/10/2023 155  50 - 180 mg/dL Final    Complement (C-4) 01/10/2023 55 (H)  11 - 44 mg/dL Final   Lab Visit on 01/10/2023   Component Date Value Ref Range Status    DHEA-SO4 01/10/2023 309.3  95.8 - 511.7 ug/dL Final    Testosterone, Total 01/10/2023 52  5 - 73 ng/dL Final    Sodium 01/10/2023 142  136 - 145 mmol/L Final    Potassium 01/10/2023 4.0  3.5 - 5.1 mmol/L Final    Chloride 01/10/2023 103  95 - 110 mmol/L Final    CO2 01/10/2023 29  23 - 29 mmol/L Final    Glucose 01/10/2023 91  70 - 110 mg/dL Final    BUN 01/10/2023 19 (H)  7 - 17 mg/dL Final    Creatinine 01/10/2023 0.73  0.50 - 1.40 mg/dL Final    Calcium 01/10/2023 9.4  8.7 - 10.5 mg/dL Final    Total Protein 01/10/2023 8.6 (H)  6.0 - 8.4 g/dL Final    Albumin 01/10/2023 5.0  3.5 - 5.2 g/dL Final    Total Bilirubin 01/10/2023 0.5  0.1 - 1.0 mg/dL Final    Alkaline Phosphatase 01/10/2023 81  38 - 126 U/L Final    AST 01/10/2023 27  15 - 46 U/L Final    ALT 01/10/2023 15  10 - 44 U/L Final    Anion Gap 01/10/2023 10  8 - 16 mmol/L Final    eGFR 01/10/2023 >60.0  >60 mL/min/1.73 m^2 Final    PTH, Intact 01/10/2023 95.8 (H)  9.0 - 77.0 pg/mL Final    Vit D, 25-Hydroxy 01/10/2023 40  30 - 96 ng/mL Final    Hemoglobin A1C 01/10/2023 4.9  4.0 - 5.6 % Final    Estimated Avg Glucose 01/10/2023 94  68 - 131 mg/dL Final     Imaging:  Results for orders placed or performed during the hospital encounter of 01/21/16   MRI Brain W WO Contrast    Narrative    Exam: MRI brain with and without  contrast    History:     Findings:     MRI is performed with routine sequences prior to and following IV administration 10 mL Gadavist.      Brain has no structural abnormality.  No mass, hemorrhage, infarction, subdural collection, hydrocephalus or other acute finding is seen on this cranial MR examination.  No diffusion abnormality is seen.      Following contrast administration, there is no evidence of abnormal enhancement.    Impression    Normal MRI of the brain       Electronically signed by: KIKO ROBLEDO MD  Date:     01/22/16  Time:    03:27      Note: I have independently reviewed any/all imaging/labs/tests and agree with the report (s) as documented.  Any discrepancies will be as noted/demarcated by free text.  ALVA BRUMFIELD 3/22/2023    ASSESSMENT:  1. Migraine with aura and without status migrainosus, not intractable    2. Cervicogenic headache    3. JAVI (generalized anxiety disorder)    4. Nephrolithiasis    5. Trouble in sleeping    6. Class 3 severe obesity due to excess calories without serious comorbidity with body mass index (BMI) of 40.0 to 44.9 in adult    7. TMJ syndrome    8. Moderate episode of recurrent major depressive disorder    9. Caffeine use        PLAN:  - Discussed symptoms appear to be consistent with migraines complicated by cervicogenic component and poor sleep, discussed treatment options and patient agreed with the following plan:  - ppx - start takign propranolol nightly at 20mg  - abortive - try imitrex 50mg prn, consider sprix as she does not take mobic often and toradol tabs initially helped  - nausea - continue compazine for dual purpose of treating HA's and nausea  - cervicogenic component - c ontinue PT, continue tizanidine prn  - anxiety/depression - avoid bb, mgmt per pcp  - h/o kidney stones - avoid tpx and zonisamide, mgmt per pcp  - d/c caffeine  - knee pain - takes mobic infrequently, mgmt per pcp  - trouble w/ sleep - pt taking trazodone currently which somewhat  helps, mgmt per pcp and psych  - obesity - taking diethylproprione, mgmt per pcp  - TMJ - continue mgmt per dentist  - track ha's   - Discussed goals of therapy are to decrease the frequency, intensity, and duration of headaches  - RTC in 2-3 mo     Orders Placed This Encounter    sumatriptan (IMITREX) 50 MG tablet    propranoloL (INDERAL) 20 MG tablet    prochlorperazine (COMPAZINE) 5 MG tablet    tiZANidine (ZANAFLEX) 4 MG tablet       Discussed potential for teratogenicity with treatment, patient understands if her family planning status should change she will contact office immediately and we will safely adjust medications as needed.     Questions and concerns were sought and answered to the patient's stated verbal satisfaction.  The patient verbalizes understanding and agreement with the above stated treatment plan.     CC: MD Loraine Hicks PA-C  [facility]   [phone]    Dr. Olvera was available during today's encounter.

## 2023-03-29 ENCOUNTER — OFFICE VISIT (OUTPATIENT)
Dept: PSYCHIATRY | Facility: CLINIC | Age: 33
End: 2023-03-29
Payer: COMMERCIAL

## 2023-03-29 ENCOUNTER — TELEPHONE (OUTPATIENT)
Dept: NEUROLOGY | Facility: CLINIC | Age: 33
End: 2023-03-29
Payer: COMMERCIAL

## 2023-03-29 DIAGNOSIS — F51.05 INSOMNIA DUE TO OTHER MENTAL DISORDER: ICD-10-CM

## 2023-03-29 DIAGNOSIS — F33.41 RECURRENT MAJOR DEPRESSIVE DISORDER, IN PARTIAL REMISSION: ICD-10-CM

## 2023-03-29 DIAGNOSIS — F41.1 GAD (GENERALIZED ANXIETY DISORDER): Primary | ICD-10-CM

## 2023-03-29 DIAGNOSIS — F99 INSOMNIA DUE TO OTHER MENTAL DISORDER: ICD-10-CM

## 2023-03-29 DIAGNOSIS — G43.109 MIGRAINE WITH AURA AND WITHOUT STATUS MIGRAINOSUS, NOT INTRACTABLE: ICD-10-CM

## 2023-03-29 PROCEDURE — 3044F PR MOST RECENT HEMOGLOBIN A1C LEVEL <7.0%: ICD-10-PCS | Mod: CPTII,95,, | Performed by: INTERNAL MEDICINE

## 2023-03-29 PROCEDURE — 1160F RVW MEDS BY RX/DR IN RCRD: CPT | Mod: CPTII,95,, | Performed by: INTERNAL MEDICINE

## 2023-03-29 PROCEDURE — 99214 PR OFFICE/OUTPT VISIT, EST, LEVL IV, 30-39 MIN: ICD-10-PCS | Mod: 95,,, | Performed by: INTERNAL MEDICINE

## 2023-03-29 PROCEDURE — 1159F MED LIST DOCD IN RCRD: CPT | Mod: CPTII,95,, | Performed by: INTERNAL MEDICINE

## 2023-03-29 PROCEDURE — 99214 OFFICE O/P EST MOD 30 MIN: CPT | Mod: 95,,, | Performed by: INTERNAL MEDICINE

## 2023-03-29 PROCEDURE — 1159F PR MEDICATION LIST DOCUMENTED IN MEDICAL RECORD: ICD-10-PCS | Mod: CPTII,95,, | Performed by: INTERNAL MEDICINE

## 2023-03-29 PROCEDURE — 1160F PR REVIEW ALL MEDS BY PRESCRIBER/CLIN PHARMACIST DOCUMENTED: ICD-10-PCS | Mod: CPTII,95,, | Performed by: INTERNAL MEDICINE

## 2023-03-29 PROCEDURE — 3044F HG A1C LEVEL LT 7.0%: CPT | Mod: CPTII,95,, | Performed by: INTERNAL MEDICINE

## 2023-03-29 NOTE — TELEPHONE ENCOUNTER
"----- Message from Loraine Pa PA-C sent at 3/29/2023 10:40 AM CDT -----  Regarding: FW: [place] patient  [facility] Moon,  Can you pls call the pt and review her treatment plan w/ her. Sounds like she's confused.     Plan: take propranolol 20mg qhs (caution w/ increased doses), start imitrex 50mg prn, continue tizanidine infrequently, continue compazine prn, continue PT,  rtc in 2-3 mo  ----- Message -----  From: Leslye Ohara MD  Sent: 3/29/2023  10:25 AM CDT  To: Loraine Pa PA-C  Subject: [place] patient                                   Everett Baron,    I just wanted to reach out on this mutual patient.  She said you mentioned starting a medication with a "C" but I couldn't find it in your note.  Is it Cymbalta?      I also noticed in your note to start propranolol at night but she thoughts she was supposed to start it twice a day, so I told her I would help clarify.    Thanks so much for seeing her!  Leslye Ohara      "

## 2023-03-29 NOTE — PROGRESS NOTES
OUTPATIENT PSYCHIATRY RETURN VISIT    ENCOUNTER DATE:  3/29/23  SITE:  [facility]  LENGTH OF SESSION:  15 minutes    The patient location is:  [place], not in healthcare facility  Visit type:  audiovisual    Face to Face time with patient:  15 minutes  20 minutes of total time spent on the encounter, which includes face to face time and non-face to face time preparing to see the patient (eg, review of tests), Obtaining and/or reviewing separately obtained history, Documenting clinical information in the electronic or other health record, Independently interpreting results (not separately reported) and communicating results to the patient/family/caregiver, or Care coordination (not separately reported).     Each patient to whom he or she provides medical services by telemedicine is:  (1) informed of the relationship between the physician and patient and the respective role of any other health care provider with respect to management of the patient; and (2) notified that he or she may decline to receive medical services by telemedicine and may withdraw from such care at any time.    CHIEF COMPLAINT:  Mood      HISTORY OF PRESENTING ILLNESS:  Krupa Walker is a 32 y.o. female with history of MDD, JAVI, ADHD, and Insomnia who presents for follow up appointment.     Plan at last appointment on 11/22/2022:  Continue Buspar 10mg BID-TID for now.  Consider increasing in the future.    Continue Xanax 0.5mg daily PRN severe anxiety/insomnia.   She is not currently taking Propranolol.  Discussed with patient informed consent, risks versus benefits, alternative treatments, side effect profile and the inherent unpredictability of individual responses to these treatments.  The patient expresses understanding of the above and displays the capacity to agree with this current plan.  Continue therapy with Megha Ward LCSW.    History as told by patient:  Diagnosed with lupus arthritis but says  "she didn't seem sure.  Started on Plaquenil.  Messaged her 2 weeks ago - she got a really bad yeast rash with blisters so now not taking.  Sees her again in a few weeks.  Does have some joint pain but everything feels nonspecific.  Mood has been fine.  Had 3 weeks where she and  weren't doing well and she was isolating.  She would sleep all day instead of doing things.  Moves end of May - new house is in [place] [facility] [place].  Daughter goes to school [employer] now.  So will move after school.  She and  both working a lot of overtime.  Currently only taking Buspar PRN.  When she takes, takes 10mg.  Depression is stable.  Anxiety comes and goes.  Still has trouble sleeping - taking Neurontin 100mg qHS.  Can't take during the day because it makes her too sleepy.  Plan with neurologist was to start Propranolol - patient thinks BID, note says qHS.  Neurologist brought up medication that starts with "C" as a possibility for mood and migraines.    Medication side effects:  Denies  Medication compliance:  Denies    PSYCHIATRIC REVIEW OF SYSTEMS:  Trouble with sleep:  As above  Appetite changes:  Denies  Weight changes:  Denies  Lack of energy:  Sometimes  Anhedonia:  Denies  Somatic symptoms:  Denies  Libido:  Not discussed  Anxiety/panic:  At times  Guilty/hopeless:  Denies  Self-injurious behavior/risky behavior:  Denies  Any drugs:  Denies  Alcohol:  Very rarely  Breastfeeding:  No    MEDICAL REVIEW OF SYSTEMS:  Complete review of systems performed covering Constitutional, Musculoskeletal, Neurologic.  All systems negative except for that covered in HPI.    PAST PSYCHIATRIC, MEDICAL, AND SOCIAL HISTORY REVIEWED  The patient's past medical, family and social history have been reviewed and updated as appropriate within the electronic medical record - see encounter notes.    MEDICATIONS:    Current Outpatient Medications:     ALPRAZolam (XANAX) 0.5 MG tablet, Take 1 tablet (0.5 mg total) by mouth daily as needed (Severe " anxiety/Insomnia)., Disp: 30 tablet, Rfl: 0    busPIRone (BUSPAR) 10 MG tablet, Take 1 tablet (10 mg total) by mouth 3 (three) times daily., Disp: 90 tablet, Rfl: 3    gabapentin (NEURONTIN) 100 MG capsule, Take 1 capsule (100 mg total) by mouth 3 (three) times daily. (Patient taking differently: Take 100 mg by mouth 3 (three) times daily. PRN), Disp: 90 capsule, Rfl: 1    hydrOXYchloroQUINE (PLAQUENIL) 200 mg tablet, Take 1 tablet (200 mg total) by mouth 2 (two) times daily., Disp: 60 tablet, Rfl: 5    ibuprofen (ADVIL,MOTRIN) 800 MG tablet, Take 800 mg by mouth every 6 (six) hours as needed. PRN, Disp: , Rfl:     ketorolac (TORADOL) 10 mg tablet, Take 2 tabs (20mg) at the onset of a headache, if headache persists you may take 1 tab every 4 to 6 hours as needed. Do not exceed 40 mg per day., Disp: 20 tablet, Rfl: 3    meloxicam (MOBIC) 15 MG tablet, Take 1 tablet (15 mg total) by mouth once daily., Disp: 90 tablet, Rfl: 3    metFORMIN (GLUCOPHAGE-XR) 500 MG ER 24hr tablet, Take 1 tablet (500 mg total) by mouth daily with breakfast., Disp: 90 tablet, Rfl: 3    methylphenidate HCl (RITALIN) 10 MG tablet, Take 1 tablet (10 mg total) by mouth 2 (two) times daily., Disp: 60 tablet, Rfl: 0    multivit with calcium,iron,min (WOMEN'S DAILY MULTIVITAMIN ORAL), Women's Daily Multivitamin, Disp: , Rfl:     nystatin (MYCOSTATIN) cream, Apply topically 2 (two) times daily., Disp: 30 g, Rfl: 1    pantoprazole (PROTONIX) 40 MG tablet, Take 1 tablet (40 mg total) by mouth once daily., Disp: 90 tablet, Rfl: 3    prochlorperazine (COMPAZINE) 5 MG tablet, Take 1 tablet (5 mg total) by mouth every 6 (six) hours as needed for Nausea (or headache pain)., Disp: 12 tablet, Rfl: 2    propranoloL (INDERAL) 20 MG tablet, Take 1 tablet (20 mg total) by mouth once daily., Disp: 30 tablet, Rfl: 2    semaglutide, weight loss, (WEGOVY) 0.25 mg/0.5 mL PnIj, Inject 0.25 mg into the skin every 7 days., Disp: 2 mL, Rfl: 0    spironolactone  "(ALDACTONE) 100 MG tablet, Take 1 tablet (100 mg total) by mouth every evening., Disp: 30 tablet, Rfl: 2    sumatriptan (IMITREX) 50 MG tablet, Take at onset of migraine, can repeat in 2 hrs if needed.  No more than twice per day or 3 days/wk., Disp: 12 tablet, Rfl: 2    tiZANidine (ZANAFLEX) 4 MG tablet, Take 1 tablet (4 mg total) by mouth every 6 (six) hours as needed (for muscle spasm and ha's)., Disp: 20 tablet, Rfl: 0    ALLERGIES:  Review of patient's allergies indicates:  No Known Allergies    PSYCHIATRIC EXAM:  There were no vitals filed for this visit.  Appearance:  Well groomed, appearing healthy and of stated age  Behavior:  Cooperative, pleasant, no psychomotor agitation or retardation  Speech:  Normal rate, rhythm, prosody, and volume  Mood:  "Fine"  Affect:  Euthymic  Thought Process:  Linear, logical, goal directed  Thought Content:  Negative for suicidal ideation, homicidal ideation, delusions or hallucinations.  Associations:  Intact  Memory:  Grossly Intact  Level of Consciousness/Orientation:  Grossly intact  Fund of Knowledge:  Good  Attention:  Good  Language:  Fluent, able to name abstract and concrete objects  Insight:  Fair  Judgment:  Intact  Psychomotor signs:  No abnormal movements of face  Gait:  Unable to assess via virtual visit      RELEVANT LABS/STUDIES:    Lab Results   Component Value Date    WBC 8.90 12/13/2022    HGB 13.3 12/13/2022    HCT 37.4 12/13/2022    MCV 91 12/13/2022     12/13/2022     BMP  Lab Results   Component Value Date     01/10/2023    K 4.0 01/10/2023     01/10/2023    CO2 29 01/10/2023    BUN 19 (H) 01/10/2023    CREATININE 0.73 01/10/2023    CALCIUM 9.4 01/10/2023    ANIONGAP 10 01/10/2023    ESTGFRAFRICA >60 06/04/2021    EGFRNONAA >60 06/04/2021     Lab Results   Component Value Date    ALT 15 01/10/2023    AST 27 01/10/2023    ALKPHOS 81 01/10/2023    BILITOT 0.5 01/10/2023     Lab Results   Component Value Date    TSH 1.970 09/19/2022 " "    Lab Results   Component Value Date    HGBA1C 4.9 01/10/2023       IMPRESSION:    Krupa Walker is a 32 y.o. female with history of MDD, JAVI, ADHD, and Insomnia who presents for follow up appointment.      DIAGNOSES:    ICD-10-CM ICD-9-CM   1. JAVI (generalized anxiety disorder)  F41.1 300.02   2. Recurrent major depressive disorder, in partial remission  F33.41 296.35   3. Insomnia due to other mental disorder  F51.05 300.9    F99 327.02   4. Migraine with aura and without status migrainosus, not intractable  G43.109 346.00       PLAN:  I will reach out to her Neurologist about medications they discussed.  Medication that starts with "C" may be Cymbalta - could certainly try this although genetic testing said it could have higher risk of side effects so need to use lower doses.  Patient would be willing to try this.  Will also clarify Propranolol dosing.  Continue Buspar 10mg BID-TID PRN anxiety.  Discussed with patient informed consent, risks versus benefits, alternative treatments, side effect profile and the inherent unpredictability of individual responses to these treatments.  The patient expresses understanding of the above and displays the capacity to agree with this current plan.  Continue therapy with Megha Ward LCSW.    RETURN TO CLINIC:  Follow up in about 4 weeks (around 4/26/2023).            "

## 2023-04-03 ENCOUNTER — PATIENT MESSAGE (OUTPATIENT)
Dept: PSYCHIATRY | Facility: CLINIC | Age: 33
End: 2023-04-03
Payer: COMMERCIAL

## 2023-04-03 DIAGNOSIS — F33.1 MODERATE EPISODE OF RECURRENT MAJOR DEPRESSIVE DISORDER: ICD-10-CM

## 2023-04-03 DIAGNOSIS — F41.1 GAD (GENERALIZED ANXIETY DISORDER): Primary | ICD-10-CM

## 2023-04-03 RX ORDER — DULOXETIN HYDROCHLORIDE 20 MG/1
20 CAPSULE, DELAYED RELEASE ORAL DAILY
Qty: 30 CAPSULE | Refills: 2 | Status: SHIPPED | OUTPATIENT
Start: 2023-04-03 | End: 2023-05-16 | Stop reason: SDUPTHER

## 2023-04-04 ENCOUNTER — OFFICE VISIT (OUTPATIENT)
Dept: OBSTETRICS AND GYNECOLOGY | Facility: CLINIC | Age: 33
End: 2023-04-04
Attending: OBSTETRICS & GYNECOLOGY
Payer: COMMERCIAL

## 2023-04-04 VITALS
SYSTOLIC BLOOD PRESSURE: 142 MMHG | HEIGHT: 64 IN | WEIGHT: 224.44 LBS | DIASTOLIC BLOOD PRESSURE: 86 MMHG | BODY MASS INDEX: 38.32 KG/M2

## 2023-04-04 DIAGNOSIS — R10.2 PELVIC PAIN IN FEMALE: ICD-10-CM

## 2023-04-04 DIAGNOSIS — N64.52 NIPPLE DISCHARGE: ICD-10-CM

## 2023-04-04 DIAGNOSIS — B37.2 YEAST DERMATITIS: ICD-10-CM

## 2023-04-04 DIAGNOSIS — R51.9 NONINTRACTABLE HEADACHE, UNSPECIFIED CHRONICITY PATTERN, UNSPECIFIED HEADACHE TYPE: ICD-10-CM

## 2023-04-04 DIAGNOSIS — Z01.419 WELL WOMAN EXAM WITH ROUTINE GYNECOLOGICAL EXAM: Primary | ICD-10-CM

## 2023-04-04 PROCEDURE — 99395 PR PREVENTIVE VISIT,EST,18-39: ICD-10-PCS | Mod: 25,S$GLB,, | Performed by: OBSTETRICS & GYNECOLOGY

## 2023-04-04 PROCEDURE — 3008F PR BODY MASS INDEX (BMI) DOCUMENTED: ICD-10-PCS | Mod: CPTII,S$GLB,, | Performed by: OBSTETRICS & GYNECOLOGY

## 2023-04-04 PROCEDURE — 3044F HG A1C LEVEL LT 7.0%: CPT | Mod: CPTII,S$GLB,, | Performed by: OBSTETRICS & GYNECOLOGY

## 2023-04-04 PROCEDURE — 99999 PR PBB SHADOW E&M-EST. PATIENT-LVL V: ICD-10-PCS | Mod: PBBFAC,,, | Performed by: OBSTETRICS & GYNECOLOGY

## 2023-04-04 PROCEDURE — 3044F PR MOST RECENT HEMOGLOBIN A1C LEVEL <7.0%: ICD-10-PCS | Mod: CPTII,S$GLB,, | Performed by: OBSTETRICS & GYNECOLOGY

## 2023-04-04 PROCEDURE — 87624 HPV HI-RISK TYP POOLED RSLT: CPT | Performed by: OBSTETRICS & GYNECOLOGY

## 2023-04-04 PROCEDURE — 99999 PR PBB SHADOW E&M-EST. PATIENT-LVL V: CPT | Mod: PBBFAC,,, | Performed by: OBSTETRICS & GYNECOLOGY

## 2023-04-04 PROCEDURE — 1159F MED LIST DOCD IN RCRD: CPT | Mod: CPTII,S$GLB,, | Performed by: OBSTETRICS & GYNECOLOGY

## 2023-04-04 PROCEDURE — 3008F BODY MASS INDEX DOCD: CPT | Mod: CPTII,S$GLB,, | Performed by: OBSTETRICS & GYNECOLOGY

## 2023-04-04 PROCEDURE — 1159F PR MEDICATION LIST DOCUMENTED IN MEDICAL RECORD: ICD-10-PCS | Mod: CPTII,S$GLB,, | Performed by: OBSTETRICS & GYNECOLOGY

## 2023-04-04 PROCEDURE — 3079F PR MOST RECENT DIASTOLIC BLOOD PRESSURE 80-89 MM HG: ICD-10-PCS | Mod: CPTII,S$GLB,, | Performed by: OBSTETRICS & GYNECOLOGY

## 2023-04-04 PROCEDURE — 3077F PR MOST RECENT SYSTOLIC BLOOD PRESSURE >= 140 MM HG: ICD-10-PCS | Mod: CPTII,S$GLB,, | Performed by: OBSTETRICS & GYNECOLOGY

## 2023-04-04 PROCEDURE — 3077F SYST BP >= 140 MM HG: CPT | Mod: CPTII,S$GLB,, | Performed by: OBSTETRICS & GYNECOLOGY

## 2023-04-04 PROCEDURE — 99395 PREV VISIT EST AGE 18-39: CPT | Mod: 25,S$GLB,, | Performed by: OBSTETRICS & GYNECOLOGY

## 2023-04-04 PROCEDURE — 3079F DIAST BP 80-89 MM HG: CPT | Mod: CPTII,S$GLB,, | Performed by: OBSTETRICS & GYNECOLOGY

## 2023-04-04 PROCEDURE — 88175 CYTOPATH C/V AUTO FLUID REDO: CPT | Performed by: OBSTETRICS & GYNECOLOGY

## 2023-04-04 RX ORDER — CLOTRIMAZOLE AND BETAMETHASONE DIPROPIONATE 10; .64 MG/G; MG/G
CREAM TOPICAL 2 TIMES DAILY
Qty: 45 G | Refills: 2 | Status: SHIPPED | OUTPATIENT
Start: 2023-04-04 | End: 2023-10-04

## 2023-04-04 RX ORDER — NYSTATIN 100000 [USP'U]/G
POWDER TOPICAL 2 TIMES DAILY
Qty: 60 G | Refills: 2 | Status: SHIPPED | OUTPATIENT
Start: 2023-04-04 | End: 2023-04-17

## 2023-04-10 ENCOUNTER — HOSPITAL ENCOUNTER (OUTPATIENT)
Dept: RADIOLOGY | Facility: HOSPITAL | Age: 33
Discharge: HOME | End: 2023-04-10
Attending: OBSTETRICS & GYNECOLOGY
Payer: COMMERCIAL

## 2023-04-10 DIAGNOSIS — R10.2 PELVIC PAIN IN FEMALE: ICD-10-CM

## 2023-04-10 PROCEDURE — 76856 US PELVIS COMP WITH TRANSVAG NON-OB (XPD): ICD-10-PCS | Mod: 26,,, | Performed by: RADIOLOGY

## 2023-04-10 PROCEDURE — 76830 TRANSVAGINAL US NON-OB: CPT | Mod: 26,,, | Performed by: RADIOLOGY

## 2023-04-10 PROCEDURE — 76830 US PELVIS COMP WITH TRANSVAG NON-OB (XPD): ICD-10-PCS | Mod: 26,,, | Performed by: RADIOLOGY

## 2023-04-10 PROCEDURE — 76856 US EXAM PELVIC COMPLETE: CPT | Mod: TC

## 2023-04-10 PROCEDURE — 76856 US EXAM PELVIC COMPLETE: CPT | Mod: 26,,, | Performed by: RADIOLOGY

## 2023-04-11 ENCOUNTER — PATIENT MESSAGE (OUTPATIENT)
Dept: RHEUMATOLOGY | Facility: CLINIC | Age: 33
End: 2023-04-11
Payer: COMMERCIAL

## 2023-04-11 LAB
HPV HR 12 DNA SPEC QL NAA+PROBE: NEGATIVE
HPV16 AG SPEC QL: NEGATIVE
HPV18 DNA SPEC QL NAA+PROBE: NEGATIVE

## 2023-04-15 ENCOUNTER — PATIENT MESSAGE (OUTPATIENT)
Dept: OBSTETRICS AND GYNECOLOGY | Facility: CLINIC | Age: 33
End: 2023-04-15
Payer: COMMERCIAL

## 2023-04-15 LAB
FINAL PATHOLOGIC DIAGNOSIS: NORMAL
Lab: NORMAL

## 2023-04-17 ENCOUNTER — OFFICE VISIT (OUTPATIENT)
Dept: RHEUMATOLOGY | Facility: CLINIC | Age: 33
End: 2023-04-17
Payer: COMMERCIAL

## 2023-04-17 DIAGNOSIS — R29.898 WEAKNESS OF BOTH UPPER EXTREMITIES: ICD-10-CM

## 2023-04-17 DIAGNOSIS — M32.9 LUPUS ARTHRITIS: Primary | ICD-10-CM

## 2023-04-17 DIAGNOSIS — R76.8 ANA POSITIVE: ICD-10-CM

## 2023-04-17 DIAGNOSIS — L65.9 ALOPECIA: ICD-10-CM

## 2023-04-17 DIAGNOSIS — R76.8 RHEUMATOID FACTOR POSITIVE: ICD-10-CM

## 2023-04-17 PROCEDURE — 3044F HG A1C LEVEL LT 7.0%: CPT | Mod: CPTII,95,, | Performed by: PHYSICIAN ASSISTANT

## 2023-04-17 PROCEDURE — 3044F PR MOST RECENT HEMOGLOBIN A1C LEVEL <7.0%: ICD-10-PCS | Mod: CPTII,95,, | Performed by: PHYSICIAN ASSISTANT

## 2023-04-17 PROCEDURE — 99213 PR OFFICE/OUTPT VISIT, EST, LEVL III, 20-29 MIN: ICD-10-PCS | Mod: 95,,, | Performed by: PHYSICIAN ASSISTANT

## 2023-04-17 PROCEDURE — 99213 OFFICE O/P EST LOW 20 MIN: CPT | Mod: 95,,, | Performed by: PHYSICIAN ASSISTANT

## 2023-04-17 RX ORDER — AZATHIOPRINE 50 MG/1
50 TABLET ORAL DAILY
Qty: 90 TABLET | Refills: 1 | Status: SHIPPED | OUTPATIENT
Start: 2023-04-17 | End: 2024-04-16

## 2023-04-17 NOTE — PROGRESS NOTES
RHEUMATOLOGY OUTPATIENT CLINIC NOTE    Attending Rheumatologist: Dayanna Hensley PA-C  Primary Care Provider: Marian Julio MD   Chief Complaint/Reason For Consultation:  + SUDHA    Subjective:      The patient location is: [place]  The chief complaint leading to consultation is: f/u lupus arthritis  Visit type: audiovisual    Face to Face time with patient: 10 min  20 minutes of total time spent on the encounter, which includes face to face time and non-face to face time preparing to see the patient (eg, review of tests), Obtaining and/or reviewing separately obtained history, Documenting clinical information in the electronic or other health record, Independently interpreting results (not separately reported) and communicating results to the patient/family/caregiver, or Care coordination (not separately reported).     Each patient to whom he or she provides medical services by telemedicine is:  (1) informed of the relationship between the physician and patient and the respective role of any other health care provider with respect to management of the patient; and (2) notified that he or she may decline to receive medical services by telemedicine and may withdraw from such care at any time.    Notes:        Krupa Walker is a 32 y.o. female seen for f/u lupus arthritis- was unable to tolerate HCQ even 200 mg/day as gave her a rash. Rash resolved once she held hcq. Has been haing wrist and shoulder pain. At this time she may decide to have another baby possibly in the fall. We will carefully monitor medications since she is of child bearing potential.      labs and MRI results. + RF w/ neg CCP on labs. MRI w/ mild tenosynovitis. Normal complements.     SUDHA Checked because of hair loss, rashes on scalp and scalp being sensitive to sun. Started having trouble w/ hair loss 3-5 years ago but worse in the past 18 mos. Her mom does have a small amount of hair loss. C/o sore knees and feeling stiff when she wakes up  for about 10 min in the morning. Every PM has a fever 99.6-100.4 and feels like she is on fire. Her face is always red- not worse w/ sun exposure. Doesn't use SPF. No hx blood clots, has had healthy pregnancies. No hx miscarriage. No sicca sx. No hx serositis. Maternal grandmother w/ RA. AM stiffness worse w/ shoulders, elbows, wrists. + hx plantar fasciitis. She has chronic low back pain x 8 years. Sometimes worse w/ rest. Denies hx skin psoriasis, dactylitis, IBD. Rheumatologic systems otherwise negative. Physical exam shows alopecia. Right wrist and bilateral knees are tender. Left foot is tender to palpation. No effusion. Erythema to face but does not appear to spare the nasolabial folds, erythema to chin as well.     Right handed    Meets SLICC criteria: nonscarring alopecia, arthritis, + SUDHA, cutaneous lupus    Serologies  + SUDHA 1:160 w/ neg REUBEN            Chronic comorbid conditions affecting medical decision making today:  Past Medical History:   Diagnosis Date    Anxiety     Depression     GERD (gastroesophageal reflux disease)     Gestational hypertension, third trimester 07/11/2019    Hypertension     gestational     Hypoglycemia     Kidney calculi     2016    Migraines     Polycystic ovaries     PONV (postoperative nausea and vomiting)     STD (sexually transmitted disease)     Urinary tract infection      Review of patient's allergies indicates:  No Known Allergies  Social History     Tobacco Use    Smoking status: Never    Smokeless tobacco: Never   Substance Use Topics    Alcohol use: Yes     Alcohol/week: 0.0 standard drinks     Comment: 1 drink per month        Objective:   There were no vitals taken for this visit.    Immunization History   Administered Date(s) Administered    COVID-19, MRNA, LN-S, PF (Pfizer) (Purple Cap) 08/24/2021, 09/21/2021    DTP 1991, 1991, 02/04/1992, 02/18/1993, 08/30/1995    HIB 1991, 1991, 02/04/1992, 11/25/1992    HPV Quadrivalent 06/15/2007,  08/18/2007, 12/26/2007    Hepatitis B, Pediatric/Adolescent 05/12/1992, 05/29/1992, 04/07/1995, 05/08/1995, 12/23/2010    Influenza 12/26/2007, 09/01/2011, 09/29/2020    Influenza - Quadrivalent 09/14/2015    Influenza - Quadrivalent - PF *Preferred* (6 months and older) 10/27/2016, 10/04/2017, 10/29/2018, 09/16/2019, 09/29/2020, 01/28/2022    MMR 11/15/1992, 08/30/1995    Meningococcal Conjugate (MCV4P) 06/15/2007, 09/01/2011    OPV 1991, 1991, 02/04/1992, 02/18/1993, 08/30/1995    PPD Test 05/23/1992    Rho (D) Immune Globulin 10/30/2019    Rho (D) Immune Globulin - IM 01/24/2020    Tdap 09/01/2011, 10/30/2019     Current Outpatient Medications   Medication Instructions    ALPRAZolam (XANAX) 0.5 mg, Oral, Daily PRN    azaTHIOprine (IMURAN) 50 mg, Oral, Daily    busPIRone (BUSPAR) 10 mg, Oral, 3 times daily    clotrimazole-betamethasone 1-0.05% (LOTRISONE) cream Topical (Top), 2 times daily    DULoxetine (CYMBALTA) 20 mg, Oral, Daily    hydrOXYchloroQUINE (PLAQUENIL) 200 mg, Oral, 2 times daily    ketorolac (TORADOL) 10 mg tablet Take 2 tabs (20mg) at the onset of a headache, if headache persists you may take 1 tab every 4 to 6 hours as needed. Do not exceed 40 mg per day.    multivit with calcium,iron,min (WOMEN'S DAILY MULTIVITAMIN ORAL) Women's Daily Multivitamin    pantoprazole (PROTONIX) 40 mg, Oral, Daily    prochlorperazine (COMPAZINE) 5 mg, Oral, Every 6 hours PRN    propranoloL (INDERAL) 20 mg, Oral, Daily    spironolactone (ALDACTONE) 100 mg, Oral, Nightly    sumatriptan (IMITREX) 50 MG tablet Take at onset of migraine, can repeat in 2 hrs if needed.  No more than twice per day or 3 days/wk.    tiZANidine (ZANAFLEX) 4 mg, Oral, Every 6 hours PRN    WEGOVY 0.25 mg, Subcutaneous, Every 7 days     BIOLOGIC LABS  Lab Results   Component Value Date    HEPBSAG Non-reactive 02/02/2023       Imaging:  All imaging reviewed and independently interpreted by me.       I have reviewed the following:      Details / Date    [x]   Labs     []   Micro     []   Pathology     [x]   Imaging     []   Cardiology Procedures     [x]   Other Prior derm notes        Assessment:     1. Lupus arthritis    2. Rheumatoid factor positive    3. SUDHA positive    4. Weakness of both upper extremities    5. Alopecia          Plan:       Meets SLICC cirteria for lupus- d/c HCQ and start imuran  Notify OB of imuran use at this time. Check TPMT level  Patient understands and contact clinic at any time regarding questions about today's office visit and any medication changes that were made  Return to clinic: 3 mos and prn    Compromised immune system secondary to autoimmune disease and use of immunosuppressive drugs. Monitor carefully for infections and toxicities- Currently denies issues with recurrent infections. Advised to get immediate medical care if any infection.  Recommend Yearly Skin Cancer Screening by Dermatology for all patients on biologic or Trisha. advised strict adherence to age appropriate vaccinations (shingles, pneumonia, flu and covid) and cancer screenings with PCP   Patient advised to hold DMARD and/or biologic therapy for signs of infection or for surgery. If you are unsure what to do please call our office for instruction.[facility] [phone]    The patient understands, chooses and consents to this plan and accepts all the risks which include but are not limited to the risks mentioned above.     Method of contact with patient concerns: [facility]         Disclaimer: This note was prepared using a voice recognition system and is likely to have sound alike errors within the text.       Dayanna Hensley PA-C  Rheumatology Department   [facility]

## 2023-04-20 ENCOUNTER — PATIENT MESSAGE (OUTPATIENT)
Dept: RHEUMATOLOGY | Facility: CLINIC | Age: 33
End: 2023-04-20
Payer: COMMERCIAL

## 2023-04-20 DIAGNOSIS — Z51.81 MEDICATION MONITORING ENCOUNTER: Primary | ICD-10-CM

## 2023-04-25 ENCOUNTER — PATIENT MESSAGE (OUTPATIENT)
Dept: PRIMARY CARE CLINIC | Facility: CLINIC | Age: 33
End: 2023-04-25
Payer: COMMERCIAL

## 2023-04-25 RX ORDER — GABAPENTIN 100 MG/1
200 CAPSULE ORAL NIGHTLY
Qty: 60 CAPSULE | Refills: 1 | Status: SHIPPED | OUTPATIENT
Start: 2023-04-25

## 2023-04-25 RX ORDER — GABAPENTIN 100 MG/1
200 CAPSULE ORAL NIGHTLY
COMMUNITY
End: 2023-04-25 | Stop reason: SDUPTHER

## 2023-05-02 ENCOUNTER — PATIENT MESSAGE (OUTPATIENT)
Dept: OBSTETRICS AND GYNECOLOGY | Facility: CLINIC | Age: 33
End: 2023-05-02
Payer: COMMERCIAL

## 2023-05-11 DIAGNOSIS — F41.1 GAD (GENERALIZED ANXIETY DISORDER): ICD-10-CM

## 2023-05-11 DIAGNOSIS — F99 INSOMNIA DUE TO OTHER MENTAL DISORDER: ICD-10-CM

## 2023-05-11 DIAGNOSIS — F51.05 INSOMNIA DUE TO OTHER MENTAL DISORDER: ICD-10-CM

## 2023-05-11 RX ORDER — ALPRAZOLAM 0.5 MG/1
0.5 TABLET ORAL DAILY PRN
Qty: 30 TABLET | Refills: 0 | Status: SHIPPED | OUTPATIENT
Start: 2023-05-11 | End: 2023-10-31 | Stop reason: SDUPTHER

## 2023-05-16 ENCOUNTER — OFFICE VISIT (OUTPATIENT)
Dept: PSYCHIATRY | Facility: CLINIC | Age: 33
End: 2023-05-16
Payer: COMMERCIAL

## 2023-05-16 DIAGNOSIS — G43.109 MIGRAINE WITH AURA AND WITHOUT STATUS MIGRAINOSUS, NOT INTRACTABLE: ICD-10-CM

## 2023-05-16 DIAGNOSIS — F90.0 ATTENTION DEFICIT HYPERACTIVITY DISORDER (ADHD), PREDOMINANTLY INATTENTIVE TYPE: ICD-10-CM

## 2023-05-16 DIAGNOSIS — F33.0 MILD EPISODE OF RECURRENT MAJOR DEPRESSIVE DISORDER: ICD-10-CM

## 2023-05-16 DIAGNOSIS — F51.05 INSOMNIA DUE TO OTHER MENTAL DISORDER: ICD-10-CM

## 2023-05-16 DIAGNOSIS — F41.1 GAD (GENERALIZED ANXIETY DISORDER): Primary | ICD-10-CM

## 2023-05-16 DIAGNOSIS — F99 INSOMNIA DUE TO OTHER MENTAL DISORDER: ICD-10-CM

## 2023-05-16 PROCEDURE — 1159F PR MEDICATION LIST DOCUMENTED IN MEDICAL RECORD: ICD-10-PCS | Mod: CPTII,95,, | Performed by: INTERNAL MEDICINE

## 2023-05-16 PROCEDURE — 1160F PR REVIEW ALL MEDS BY PRESCRIBER/CLIN PHARMACIST DOCUMENTED: ICD-10-PCS | Mod: CPTII,95,, | Performed by: INTERNAL MEDICINE

## 2023-05-16 PROCEDURE — 1160F RVW MEDS BY RX/DR IN RCRD: CPT | Mod: CPTII,95,, | Performed by: INTERNAL MEDICINE

## 2023-05-16 PROCEDURE — 3044F HG A1C LEVEL LT 7.0%: CPT | Mod: CPTII,95,, | Performed by: INTERNAL MEDICINE

## 2023-05-16 PROCEDURE — 99214 OFFICE O/P EST MOD 30 MIN: CPT | Mod: 95,,, | Performed by: INTERNAL MEDICINE

## 2023-05-16 PROCEDURE — 1159F MED LIST DOCD IN RCRD: CPT | Mod: CPTII,95,, | Performed by: INTERNAL MEDICINE

## 2023-05-16 PROCEDURE — 99214 PR OFFICE/OUTPT VISIT, EST, LEVL IV, 30-39 MIN: ICD-10-PCS | Mod: 95,,, | Performed by: INTERNAL MEDICINE

## 2023-05-16 PROCEDURE — 3044F PR MOST RECENT HEMOGLOBIN A1C LEVEL <7.0%: ICD-10-PCS | Mod: CPTII,95,, | Performed by: INTERNAL MEDICINE

## 2023-05-16 RX ORDER — DULOXETINE 40 MG/1
40 CAPSULE, DELAYED RELEASE ORAL DAILY
Qty: 30 CAPSULE | Refills: 2 | Status: SHIPPED | OUTPATIENT
Start: 2023-05-16 | End: 2023-10-04

## 2023-05-16 NOTE — PROGRESS NOTES
"OUTPATIENT PSYCHIATRY RETURN VISIT    ENCOUNTER DATE:  5/16/23  SITE:  [facility], [facility]  LENGTH OF SESSION:  15 minutes    The patient location is:  [place], not in healthcare facility  Visit type:  audiovisual    Face to Face time with patient:  15 minutes  20 minutes of total time spent on the encounter, which includes face to face time and non-face to face time preparing to see the patient (eg, review of tests), Obtaining and/or reviewing separately obtained history, Documenting clinical information in the electronic or other health record, Independently interpreting results (not separately reported) and communicating results to the patient/family/caregiver, or Care coordination (not separately reported).     Each patient to whom he or she provides medical services by telemedicine is:  (1) informed of the relationship between the physician and patient and the respective role of any other health care provider with respect to management of the patient; and (2) notified that he or she may decline to receive medical services by telemedicine and may withdraw from such care at any time.    CHIEF COMPLAINT:  Mood      HISTORY OF PRESENTING ILLNESS:  Krupa Walker is a 32 y.o. female with history of MDD, JAVI, ADHD, and Insomnia who presents for follow up appointment.     Plan at last appointment on 3/29/2023:  I will reach out to her Neurologist about medications they discussed.  Medication that starts with "C" may be Cymbalta - could certainly try this although genetic testing said it could have higher risk of side effects so need to use lower doses.  Patient would be willing to try this.  Will also clarify Propranolol dosing.  Continue Buspar 10mg BID-TID PRN anxiety.  Discussed with patient informed consent, risks versus benefits, alternative treatments, side effect profile and the inherent unpredictability of individual responses to these treatments.  The patient expresses understanding of " the above and displays the capacity to agree with this current plan.  Continue therapy with Megha Ward LCSW.    History as told by patient:  Saw Rheumatology and started on Imuran - never started it because read side effects and was worried.  Patient really second guessing whether she actually has lupus arthritis - wonders if pain is psychosomatic.  Mood is about the same.  Tolerating the 20mg well.  No side effects.  Not sure when they will move now.  Probably first weekend of June.  She is moving stretch of nights beginning of June.  Takes Xanax maybe 2 nights per week.  Very rarely takes Buspar.      Medication side effects:  Denies  Medication compliance:  Denies    PSYCHIATRIC REVIEW OF SYSTEMS:  Trouble with sleep:  Yes, sometimes  Appetite changes:  Denies  Weight changes:  Denies  Lack of energy:  Sometimes  Anhedonia:  Denies  Somatic symptoms:  Denies  Libido:  Not discussed  Anxiety/panic:  Sometimes  Guilty/hopeless:  Denies  Self-injurious behavior/risky behavior:  Denies  Any drugs:  Denies  Alcohol:  Very rarely  Breastfeeding:  No    MEDICAL REVIEW OF SYSTEMS:  Complete review of systems performed covering Constitutional, Musculoskeletal, Neurologic.  All systems negative except for that covered in HPI.    PAST PSYCHIATRIC, MEDICAL, AND SOCIAL HISTORY REVIEWED  The patient's past medical, family and social history have been reviewed and updated as appropriate within the electronic medical record - see encounter notes.    MEDICATIONS:    Current Outpatient Medications:     ALPRAZolam (XANAX) 0.5 MG tablet, Take 1 tablet (0.5 mg total) by mouth daily as needed (Severe anxiety/Insomnia)., Disp: 30 tablet, Rfl: 0    azaTHIOprine (IMURAN) 50 mg Tab, Take 1 tablet (50 mg total) by mouth once daily., Disp: 90 tablet, Rfl: 1    busPIRone (BUSPAR) 10 MG tablet, Take 1 tablet (10 mg total) by mouth 3 (three) times daily., Disp: 90 tablet, Rfl: 3    clotrimazole-betamethasone 1-0.05% (LOTRISONE) cream,  "Apply topically 2 (two) times daily., Disp: 45 g, Rfl: 2    DULoxetine 40 mg CpDR, Take 40 mg by mouth once daily., Disp: 30 capsule, Rfl: 2    gabapentin (NEURONTIN) 100 MG capsule, Take 2 capsules (200 mg total) by mouth every evening., Disp: 60 capsule, Rfl: 1    ketorolac (TORADOL) 10 mg tablet, Take 2 tabs (20mg) at the onset of a headache, if headache persists you may take 1 tab every 4 to 6 hours as needed. Do not exceed 40 mg per day., Disp: 20 tablet, Rfl: 3    multivit with calcium,iron,min (WOMEN'S DAILY MULTIVITAMIN ORAL), Women's Daily Multivitamin, Disp: , Rfl:     pantoprazole (PROTONIX) 40 MG tablet, Take 1 tablet (40 mg total) by mouth once daily., Disp: 90 tablet, Rfl: 3    prochlorperazine (COMPAZINE) 5 MG tablet, Take 1 tablet (5 mg total) by mouth every 6 (six) hours as needed for Nausea (or headache pain)., Disp: 12 tablet, Rfl: 2    propranoloL (INDERAL) 20 MG tablet, Take 1 tablet (20 mg total) by mouth once daily., Disp: 30 tablet, Rfl: 2    semaglutide, weight loss, (WEGOVY) 0.25 mg/0.5 mL PnIj, Inject 0.25 mg into the skin every 7 days., Disp: 2 mL, Rfl: 0    spironolactone (ALDACTONE) 100 MG tablet, Take 1 tablet (100 mg total) by mouth every evening., Disp: 30 tablet, Rfl: 2    sumatriptan (IMITREX) 50 MG tablet, Take at onset of migraine, can repeat in 2 hrs if needed.  No more than twice per day or 3 days/wk., Disp: 12 tablet, Rfl: 2    tiZANidine (ZANAFLEX) 4 MG tablet, Take 1 tablet (4 mg total) by mouth every 6 (six) hours as needed (for muscle spasm and ha's)., Disp: 20 tablet, Rfl: 0    ALLERGIES:  Review of patient's allergies indicates:  No Known Allergies    PSYCHIATRIC EXAM:  There were no vitals filed for this visit.  Appearance:  Well groomed, appearing healthy and of stated age  Behavior:  Cooperative, pleasant, no psychomotor agitation or retardation  Speech:  Normal rate, rhythm, prosody, and volume  Mood:  "Ok"  Affect:  Euthymic  Thought Process:  Linear, logical, goal " directed  Thought Content:  Negative for suicidal ideation, homicidal ideation, delusions or hallucinations.  Associations:  Intact  Memory:  Grossly Intact  Level of Consciousness/Orientation:  Grossly intact  Fund of Knowledge:  Good  Attention:  Good  Language:  Fluent, able to name abstract and concrete objects  Insight:  Fair  Judgment:  Intact  Psychomotor signs:  No abnormal movements of face  Gait:  Unable to assess via virtual visit      RELEVANT LABS/STUDIES:    Lab Results   Component Value Date    WBC 8.90 12/13/2022    HGB 13.3 12/13/2022    HCT 37.4 12/13/2022    MCV 91 12/13/2022     12/13/2022     BMP  Lab Results   Component Value Date     01/10/2023    K 4.0 01/10/2023     01/10/2023    CO2 29 01/10/2023    BUN 19 (H) 01/10/2023    CREATININE 0.73 01/10/2023    CALCIUM 9.4 01/10/2023    ANIONGAP 10 01/10/2023    ESTGFRAFRICA >60 06/04/2021    EGFRNONAA >60 06/04/2021     Lab Results   Component Value Date    ALT 15 01/10/2023    AST 27 01/10/2023    ALKPHOS 81 01/10/2023    BILITOT 0.5 01/10/2023     Lab Results   Component Value Date    TSH 1.970 09/19/2022     Lab Results   Component Value Date    HGBA1C 4.9 01/10/2023       IMPRESSION:    Krupa Walker is a 32 y.o. female with history of MDD, JAVI, ADHD, and Insomnia who presents for follow up appointment.      DIAGNOSES:    ICD-10-CM ICD-9-CM   1. JAVI (generalized anxiety disorder)  F41.1 300.02   2. Insomnia due to other mental disorder  F51.05 300.9    F99 327.02   3. Mild episode of recurrent major depressive disorder  F33.0 296.31   4. Migraine with aura and without status migrainosus, not intractable  G43.109 346.00   5. Attention deficit hyperactivity disorder (ADHD), predominantly inattentive type  F90.0 314.00       PLAN:  Increase Cymbalta to 40mg daily to better target mood.    Continue Xanax 0.5mg daily PRN anxiety/insomnia.  She is very rarely taking Buspar PRN.    Discussed with patient informed consent, risks  versus benefits, alternative treatments, side effect profile and the inherent unpredictability of individual responses to these treatments.  The patient expresses understanding of the above and displays the capacity to agree with this current plan.  She will need to look for a new therapist since Megha has now left [facility].      RETURN TO CLINIC:  Follow up in about 8 weeks (around 7/12/2023). In person

## 2023-05-24 ENCOUNTER — PATIENT MESSAGE (OUTPATIENT)
Dept: OBSTETRICS AND GYNECOLOGY | Facility: CLINIC | Age: 33
End: 2023-05-24
Payer: COMMERCIAL

## 2023-05-26 ENCOUNTER — PATIENT MESSAGE (OUTPATIENT)
Dept: OBSTETRICS AND GYNECOLOGY | Facility: CLINIC | Age: 33
End: 2023-05-26
Payer: COMMERCIAL

## 2023-05-26 RX ORDER — METRONIDAZOLE 500 MG/1
500 TABLET ORAL EVERY 12 HOURS
Qty: 14 TABLET | Refills: 0 | Status: SHIPPED | OUTPATIENT
Start: 2023-05-26 | End: 2023-06-05

## 2023-05-26 RX ORDER — DOXYCYCLINE HYCLATE 100 MG
100 TABLET ORAL 2 TIMES DAILY
Qty: 20 TABLET | Refills: 0 | Status: SHIPPED | OUTPATIENT
Start: 2023-05-26 | End: 2023-06-05

## 2023-07-12 ENCOUNTER — OFFICE VISIT (OUTPATIENT)
Dept: PSYCHIATRY | Facility: CLINIC | Age: 33
End: 2023-07-12
Payer: COMMERCIAL

## 2023-07-12 VITALS
HEART RATE: 91 BPM | SYSTOLIC BLOOD PRESSURE: 146 MMHG | DIASTOLIC BLOOD PRESSURE: 76 MMHG | WEIGHT: 210.13 LBS | BODY MASS INDEX: 36.06 KG/M2

## 2023-07-12 DIAGNOSIS — F90.0 ATTENTION DEFICIT HYPERACTIVITY DISORDER (ADHD), PREDOMINANTLY INATTENTIVE TYPE: ICD-10-CM

## 2023-07-12 DIAGNOSIS — F33.41 RECURRENT MAJOR DEPRESSIVE DISORDER, IN PARTIAL REMISSION: Primary | ICD-10-CM

## 2023-07-12 DIAGNOSIS — F41.1 GAD (GENERALIZED ANXIETY DISORDER): ICD-10-CM

## 2023-07-12 DIAGNOSIS — F51.05 INSOMNIA DUE TO OTHER MENTAL DISORDER: ICD-10-CM

## 2023-07-12 DIAGNOSIS — F99 INSOMNIA DUE TO OTHER MENTAL DISORDER: ICD-10-CM

## 2023-07-12 PROCEDURE — 99999 PR PBB SHADOW E&M-EST. PATIENT-LVL III: ICD-10-PCS | Mod: PBBFAC,,, | Performed by: INTERNAL MEDICINE

## 2023-07-12 PROCEDURE — 99999 PR PBB SHADOW E&M-EST. PATIENT-LVL III: CPT | Mod: PBBFAC,,, | Performed by: INTERNAL MEDICINE

## 2023-07-12 PROCEDURE — 3044F HG A1C LEVEL LT 7.0%: CPT | Mod: CPTII,S$GLB,, | Performed by: INTERNAL MEDICINE

## 2023-07-12 PROCEDURE — 3008F PR BODY MASS INDEX (BMI) DOCUMENTED: ICD-10-PCS | Mod: CPTII,S$GLB,, | Performed by: INTERNAL MEDICINE

## 2023-07-12 PROCEDURE — 3077F PR MOST RECENT SYSTOLIC BLOOD PRESSURE >= 140 MM HG: ICD-10-PCS | Mod: CPTII,S$GLB,, | Performed by: INTERNAL MEDICINE

## 2023-07-12 PROCEDURE — 1160F PR REVIEW ALL MEDS BY PRESCRIBER/CLIN PHARMACIST DOCUMENTED: ICD-10-PCS | Mod: CPTII,S$GLB,, | Performed by: INTERNAL MEDICINE

## 2023-07-12 PROCEDURE — 1159F MED LIST DOCD IN RCRD: CPT | Mod: CPTII,S$GLB,, | Performed by: INTERNAL MEDICINE

## 2023-07-12 PROCEDURE — 99214 OFFICE O/P EST MOD 30 MIN: CPT | Mod: S$GLB,,, | Performed by: INTERNAL MEDICINE

## 2023-07-12 PROCEDURE — 3078F DIAST BP <80 MM HG: CPT | Mod: CPTII,S$GLB,, | Performed by: INTERNAL MEDICINE

## 2023-07-12 PROCEDURE — 99214 PR OFFICE/OUTPT VISIT, EST, LEVL IV, 30-39 MIN: ICD-10-PCS | Mod: S$GLB,,, | Performed by: INTERNAL MEDICINE

## 2023-07-12 PROCEDURE — 3044F PR MOST RECENT HEMOGLOBIN A1C LEVEL <7.0%: ICD-10-PCS | Mod: CPTII,S$GLB,, | Performed by: INTERNAL MEDICINE

## 2023-07-12 PROCEDURE — 3078F PR MOST RECENT DIASTOLIC BLOOD PRESSURE < 80 MM HG: ICD-10-PCS | Mod: CPTII,S$GLB,, | Performed by: INTERNAL MEDICINE

## 2023-07-12 PROCEDURE — 3077F SYST BP >= 140 MM HG: CPT | Mod: CPTII,S$GLB,, | Performed by: INTERNAL MEDICINE

## 2023-07-12 PROCEDURE — 3008F BODY MASS INDEX DOCD: CPT | Mod: CPTII,S$GLB,, | Performed by: INTERNAL MEDICINE

## 2023-07-12 PROCEDURE — 1160F RVW MEDS BY RX/DR IN RCRD: CPT | Mod: CPTII,S$GLB,, | Performed by: INTERNAL MEDICINE

## 2023-07-12 PROCEDURE — 1159F PR MEDICATION LIST DOCUMENTED IN MEDICAL RECORD: ICD-10-PCS | Mod: CPTII,S$GLB,, | Performed by: INTERNAL MEDICINE

## 2023-07-12 NOTE — PROGRESS NOTES
OUTPATIENT PSYCHIATRY RETURN VISIT    ENCOUNTER DATE:  7/12/23  SITE:  [facility], [facility]  LENGTH OF SESSION:  25 minutes    CHIEF COMPLAINT:  Mood      HISTORY OF PRESENTING ILLNESS:  Krupa Walker is a 32 y.o. female with history of MDD, JAVI, ADHD, and Insomnia who presents for follow up appointment.     Plan at last appointment on 5/16/2023:  Increase Cymbalta to 40mg daily to better target mood.    Continue Xanax 0.5mg daily PRN anxiety/insomnia.  She is very rarely taking Buspar PRN.    Discussed with patient informed consent, risks versus benefits, alternative treatments, side effect profile and the inherent unpredictability of individual responses to these treatments.  The patient expresses understanding of the above and displays the capacity to agree with this current plan.  She will need to look for a new therapist since Megha has now left [facility].      History as told by patient:  Stopped taking Cymbalta - sexual side effects.  Actually moved but can't find  there so living in old house and medication is in the new house.  Medications don't seem to do anything so unsure what she wants to do.  Was on the 40mg about 4 weeks she believes.  New house is in [place] so would like a therapist there.  Hopes medication would help with irritability, racing thoughts (worry thoughts, ADHD on overdrive).  She does feel like she is in a better place.  New job is better.  Not freaking out about COVID.  Things busy with 2 houses and finances but doing ok.  Got botox for TMJ and got mouthguard - very helpful.  Migraines haven't been bad.  Has lost 56 pounds on Ozempic - has wondered if Ozempic helps her mood.  Occasionally takes Buspar or Xanax as needed.  Sleeping pretty well recently.  Not currently taking Neurontin at night.      Per 8/1/18 note by Peewee Guerra MD:  Recent med trials  Prozac 40mg, stopped before last visit due to AEs  Abilify 2.5mg only tried for 2 days  Melatonin  "3mg qhs   Neurontin 100mg PRN anxiety (tried a few times, not much effect)  Zoloft (felt like a zombie, not depressed but not happy either)  Ritalin  Effexor (does not remember)  Past meds:  wellbutrin (inadequate trial; started at same time as metformin and was having diarrhea & wt gain so stopped), lexapro (made her feel numb, lack of enjoyment), trintillex (made throw up a lot, took 2 days vomited the whole time), lamictal (unsure, per her records trouble w/compliance so stopped w/o adequate trial), trazodone (lowest dose thinks only 25mg, cutting in half, making very sleepy the next day), benadryl 25mg (will sleep for 12 hrs straight and feel very tired), vistaril 10mg (made too tired), Lunesta (still wakes in the night and gets terrible nightmares), ambien (horrible nightmares), enlyte (took for 1 month, unsure if it helped), Fetzima up to 40mg (seemed was helping but was $500/mo with insurance), vyvanse (helped mood but felt "wired," exacerbation of anxiety, chest tightness, too wired, irritability, insomnia, crashing with more depression even at low doses (eg 20mg). Could not sit still on this but very productive at work, overly focused, would stay up too late at night), topamax (does not know dose, did not lose weight), Rexulti (nothing bad, couldn't afford), abilify (trouble w/sleep, up 38 hrs straight despite feeling tired, couldn't turn brain off), prozac (less crying spells but very sleepy, heavy feeling even w/night dose trial), neurontin 100mg (didn't feel much)     Has anything ever made you feel better?  Thinks she did with either fetzima 40mg (green on genetic testing, notes from appt said wasn't working) or rexulti 1mg (rexulti too new to be on genetic testing below)     Gene testing:  RED:  Wellbutrin (may need lower doses, increase risk AEs), remeron, elavil, clomipramine, desipramine, doxepin, cymbalta, fluvoxamine, imipramine, nortriptyline, vortioxetine, paroxetine  Propranolol  Thorazine, " abilify, rexulti, fanapt, perphenazine, risperdal, mellaril  Tegretol  Clonidine, straterra     YELLOW:  Trazodone (lower doses may be required), selegiline , citalopram (difficult to predict dose adjustment due to conflicting variations in metabolism), lexapro (difficult to predict dose adjustment due to conflicting variations in metabolism), prozac (difficult to predict dose adjustment due to conflicting variations in metabolism), effexor (difficult to predict dose adjustment due to conflicting variations in metabolism)  Librium, clorazepate, diazepam, lorazepam, oxazepam  Prolixin, zyprexa, seroquel, clozaril, haldol  Vyvanse, adderall, dextroamphetamine     GREEN:  Pristiq, fetzima (old insurance paid only part and new none), Zoloft (never tried), Viibryd  Alprazolam, buspirone, clonazepam, eszopiclone, temazepam, zolpidem  Asenapine, lurasidone, paliperidone, thiothixene, ziprasidone  Lamictal, trileptal, depakote  Focalin, intuniv, ritalin, concerta, metadate, daytrana     No Proven Genetic Markers:  Neurontin, topamax, lithium    Medication side effects:  Denies  Medication compliance:  Denies    PSYCHIATRIC REVIEW OF SYSTEMS:  Trouble with sleep:  Sometimes  Appetite changes:  Denies  Weight changes:  Denies  Lack of energy:  Denies  Anhedonia:  Denies  Somatic symptoms:  Denies  Libido:  Not discussed  Anxiety/panic:  Sometimes  Guilty/hopeless:  Denies  Self-injurious behavior/risky behavior:  Denies  Any drugs:  Denies  Alcohol:  Very rarely  Breastfeeding:  No    MEDICAL REVIEW OF SYSTEMS:  Complete review of systems performed covering Constitutional, Musculoskeletal, Neurologic.  All systems negative except for that covered in HPI.    PAST PSYCHIATRIC, MEDICAL, AND SOCIAL HISTORY REVIEWED  The patient's past medical, family and social history have been reviewed and updated as appropriate within the electronic medical record - see encounter notes.    MEDICATIONS:    Current Outpatient Medications:      ALPRAZolam (XANAX) 0.5 MG tablet, Take 1 tablet (0.5 mg total) by mouth daily as needed (Severe anxiety/Insomnia)., Disp: 30 tablet, Rfl: 0    azaTHIOprine (IMURAN) 50 mg Tab, Take 1 tablet (50 mg total) by mouth once daily., Disp: 90 tablet, Rfl: 1    busPIRone (BUSPAR) 10 MG tablet, Take 1 tablet (10 mg total) by mouth 3 (three) times daily., Disp: 90 tablet, Rfl: 3    clotrimazole-betamethasone 1-0.05% (LOTRISONE) cream, Apply topically 2 (two) times daily., Disp: 45 g, Rfl: 2    DULoxetine 40 mg CpDR, Take 40 mg by mouth once daily., Disp: 30 capsule, Rfl: 2    gabapentin (NEURONTIN) 100 MG capsule, Take 2 capsules (200 mg total) by mouth every evening., Disp: 60 capsule, Rfl: 1    ketorolac (TORADOL) 10 mg tablet, Take 2 tabs (20mg) at the onset of a headache, if headache persists you may take 1 tab every 4 to 6 hours as needed. Do not exceed 40 mg per day., Disp: 20 tablet, Rfl: 3    multivit with calcium,iron,min (WOMEN'S DAILY MULTIVITAMIN ORAL), Women's Daily Multivitamin, Disp: , Rfl:     pantoprazole (PROTONIX) 40 MG tablet, Take 1 tablet (40 mg total) by mouth once daily., Disp: 90 tablet, Rfl: 3    prochlorperazine (COMPAZINE) 5 MG tablet, Take 1 tablet (5 mg total) by mouth every 6 (six) hours as needed for Nausea (or headache pain)., Disp: 12 tablet, Rfl: 2    propranoloL (INDERAL) 20 MG tablet, Take 1 tablet (20 mg total) by mouth once daily., Disp: 30 tablet, Rfl: 2    semaglutide, weight loss, (WEGOVY) 0.25 mg/0.5 mL PnIj, Inject 0.25 mg into the skin every 7 days., Disp: 2 mL, Rfl: 0    spironolactone (ALDACTONE) 100 MG tablet, Take 1 tablet (100 mg total) by mouth every evening., Disp: 30 tablet, Rfl: 2    sumatriptan (IMITREX) 50 MG tablet, Take at onset of migraine, can repeat in 2 hrs if needed.  No more than twice per day or 3 days/wk., Disp: 12 tablet, Rfl: 2    tiZANidine (ZANAFLEX) 4 MG tablet, Take 1 tablet (4 mg total) by mouth every 6 (six) hours as needed (for muscle spasm and ha's).,  "Disp: 20 tablet, Rfl: 0    ALLERGIES:  Review of patient's allergies indicates:  No Known Allergies    PSYCHIATRIC EXAM:  Vitals:    07/12/23 0941   BP: (!) 146/76   Pulse: 91   Weight: 95.3 kg (210 lb 1.6 oz)     Appearance:  Well groomed, appearing healthy and of stated age  Behavior:  Cooperative, pleasant, no psychomotor agitation or retardation  Speech:  Normal rate, rhythm, prosody, and volume  Mood:  "Ok"  Affect:  Euthymic  Thought Process:  Linear, logical, goal directed  Thought Content:  Negative for suicidal ideation, homicidal ideation, delusions or hallucinations.  Associations:  Intact  Memory:  Grossly Intact  Level of Consciousness/Orientation:  Grossly intact  Fund of Knowledge:  Good  Attention:  Good  Language:  Fluent, able to name abstract and concrete objects  Insight:  Fair  Judgment:  Intact  Psychomotor signs:  No involuntary movements or tremor  Gait:  Normal      RELEVANT LABS/STUDIES:    Lab Results   Component Value Date    WBC 8.90 12/13/2022    HGB 13.3 12/13/2022    HCT 37.4 12/13/2022    MCV 91 12/13/2022     12/13/2022     BMP  Lab Results   Component Value Date     01/10/2023    K 4.0 01/10/2023     01/10/2023    CO2 29 01/10/2023    BUN 19 (H) 01/10/2023    CREATININE 0.73 01/10/2023    CALCIUM 9.4 01/10/2023    ANIONGAP 10 01/10/2023    ESTGFRAFRICA >60 06/04/2021    EGFRNONAA >60 06/04/2021     Lab Results   Component Value Date    ALT 15 01/10/2023    AST 27 01/10/2023    ALKPHOS 81 01/10/2023    BILITOT 0.5 01/10/2023     Lab Results   Component Value Date    TSH 1.970 09/19/2022     Lab Results   Component Value Date    HGBA1C 4.9 01/10/2023       IMPRESSION:    Krupa Walker is a 32 y.o. female with history of MDD, JAVI, ADHD, and Insomnia who presents for follow up appointment.      DIAGNOSES:    ICD-10-CM ICD-9-CM   1. Recurrent major depressive disorder, in partial remission  F33.41 296.35   2. JAVI (generalized anxiety disorder)  F41.1 300.02   3. " Insomnia due to other mental disorder  F51.05 300.9    F99 327.02   4. Attention deficit hyperactivity disorder (ADHD), predominantly inattentive type  F90.0 314.00         PLAN:  Patient is not sure if she wants to take daily medication or not.  She has tried many medications, most of which had side effects and were minimally helpful.  Mood has improved on its own recently.  She will let me know if she decides she wants to restart Cymbalta.    Continue Xanax 0.5mg daily PRN anxiety/insomnia.  She is very rarely taking Buspar PRN.    Discussed with patient informed consent, risks versus benefits, alternative treatments, side effect profile and the inherent unpredictability of individual responses to these treatments.  The patient expresses understanding of the above and displays the capacity to agree with this current plan.  She plans to look for a new therapist near her new home.    RETURN TO CLINIC:  Follow up in about 3 months (around 10/12/2023).

## 2023-07-21 ENCOUNTER — OFFICE VISIT (OUTPATIENT)
Dept: RHEUMATOLOGY | Facility: CLINIC | Age: 33
End: 2023-07-21
Payer: COMMERCIAL

## 2023-07-21 DIAGNOSIS — L65.9 ALOPECIA: ICD-10-CM

## 2023-07-21 DIAGNOSIS — M35.9 UNDIFFERENTIATED CONNECTIVE TISSUE DISEASE: ICD-10-CM

## 2023-07-21 DIAGNOSIS — M32.9 LUPUS ARTHRITIS: Primary | ICD-10-CM

## 2023-07-21 DIAGNOSIS — R76.8 ANA POSITIVE: ICD-10-CM

## 2023-07-21 DIAGNOSIS — R76.8 RHEUMATOID FACTOR POSITIVE: ICD-10-CM

## 2023-07-21 PROCEDURE — 3044F PR MOST RECENT HEMOGLOBIN A1C LEVEL <7.0%: ICD-10-PCS | Mod: CPTII,95,, | Performed by: PHYSICIAN ASSISTANT

## 2023-07-21 PROCEDURE — 1160F RVW MEDS BY RX/DR IN RCRD: CPT | Mod: CPTII,95,, | Performed by: PHYSICIAN ASSISTANT

## 2023-07-21 PROCEDURE — 3044F HG A1C LEVEL LT 7.0%: CPT | Mod: CPTII,95,, | Performed by: PHYSICIAN ASSISTANT

## 2023-07-21 PROCEDURE — 1159F PR MEDICATION LIST DOCUMENTED IN MEDICAL RECORD: ICD-10-PCS | Mod: CPTII,95,, | Performed by: PHYSICIAN ASSISTANT

## 2023-07-21 PROCEDURE — 99215 PR OFFICE/OUTPT VISIT, EST, LEVL V, 40-54 MIN: ICD-10-PCS | Mod: 95,,, | Performed by: PHYSICIAN ASSISTANT

## 2023-07-21 PROCEDURE — 1160F PR REVIEW ALL MEDS BY PRESCRIBER/CLIN PHARMACIST DOCUMENTED: ICD-10-PCS | Mod: CPTII,95,, | Performed by: PHYSICIAN ASSISTANT

## 2023-07-21 PROCEDURE — 1159F MED LIST DOCD IN RCRD: CPT | Mod: CPTII,95,, | Performed by: PHYSICIAN ASSISTANT

## 2023-07-21 PROCEDURE — 99215 OFFICE O/P EST HI 40 MIN: CPT | Mod: 95,,, | Performed by: PHYSICIAN ASSISTANT

## 2023-07-21 NOTE — PROGRESS NOTES
The patient location is: home  The chief complaint leading to consultation is: f/u    Visit type: audiovisual    Face to Face time with patient: 25 min  40 minutes of total time spent on the encounter, which includes face to face time and non-face to face time preparing to see the patient (eg, review of tests), Obtaining and/or reviewing separately obtained history, Documenting clinical information in the electronic or other health record, Independently interpreting results (not separately reported) and communicating results to the patient/family/caregiver, or Care coordination (not separately reported).         Each patient to whom he or she provides medical services by telemedicine is:  (1) informed of the relationship between the physician and patient and the respective role of any other health care provider with respect to management of the patient; and (2) notified that he or she may decline to receive medical services by telemedicine and may withdraw from such care at any time.    Notes:     Subjective:      Patient ID: Krupa Walker is a 32 y.o. female.    Chief Complaint: No chief complaint on file.      HPI   Krupa Walker  is a 32 y.o. female seen today for follow-up on positive SUDHA.  Previously seen by 1 of my colleagues who felt she met lupus criteria with lupus arthritis, +SUDHA, cutaneous lupus, non scarring alopecia.  Failed HC Q due to extensive yeast infections.  Last visit was started on Imuran 50 mg.  Patient failed to get TPMT testing or further labs since February.  After reading literature on Imuran, patient decided not to take it.  Wants to know what other possibilities are.    Has seen Dermatology.  They tested her for SUDHA which was positive and referred her to Derm.  She has not been back since then.  Has not had any biopsies done of her rashes.  She is still struggling with diffuse alopcia (thinning hair).  Also complaining of significant photosensitivity.  Gets low-grade fevers but  they typically run 99.2-100.1.  Do not get above that.    As far as pain is concerned, she has some mild pain.  Her biggest complaint in reg to joint sxs is morning stiffness that lasts 30-60 minutes.    She is .  She and her  are in talks about whether or not to extend their family further.  She is interested in having more children.  He is not.  They are not actively trying to prevent pregnancy at this time but are using family planning that has been effective for 3 years per her report.    Patient denies fevers, chills, photosensitivity, eye pain, shortness of breath, chest pain, hematuria, blood in the stool, rash, sicca symptoms, raynauds, finger ulcerations.  Rheumatologic systems otherwise negative.    Serologies/Labs:  +SUDHA 1:160, nucliolar, neg profile  +RF (41), o/w Myomarker panel neg   Current Treatment:  Imuran - prescribed not taking  Previous Treatment:   HCQ - yeast infections      Current Outpatient Medications:     ALPRAZolam (XANAX) 0.5 MG tablet, Take 1 tablet (0.5 mg total) by mouth daily as needed (Severe anxiety/Insomnia)., Disp: 30 tablet, Rfl: 0    azaTHIOprine (IMURAN) 50 mg Tab, Take 1 tablet (50 mg total) by mouth once daily., Disp: 90 tablet, Rfl: 1    busPIRone (BUSPAR) 10 MG tablet, Take 1 tablet (10 mg total) by mouth 3 (three) times daily., Disp: 90 tablet, Rfl: 3    clotrimazole-betamethasone 1-0.05% (LOTRISONE) cream, Apply topically 2 (two) times daily., Disp: 45 g, Rfl: 2    DULoxetine 40 mg CpDR, Take 40 mg by mouth once daily., Disp: 30 capsule, Rfl: 2    gabapentin (NEURONTIN) 100 MG capsule, Take 2 capsules (200 mg total) by mouth every evening., Disp: 60 capsule, Rfl: 1    ketorolac (TORADOL) 10 mg tablet, Take 2 tabs (20mg) at the onset of a headache, if headache persists you may take 1 tab every 4 to 6 hours as needed. Do not exceed 40 mg per day., Disp: 20 tablet, Rfl: 3    multivit with calcium,iron,min (WOMEN'S DAILY MULTIVITAMIN ORAL), Women's Daily  Multivitamin, Disp: , Rfl:     pantoprazole (PROTONIX) 40 MG tablet, Take 1 tablet (40 mg total) by mouth once daily., Disp: 90 tablet, Rfl: 3    prochlorperazine (COMPAZINE) 5 MG tablet, Take 1 tablet (5 mg total) by mouth every 6 (six) hours as needed for Nausea (or headache pain)., Disp: 12 tablet, Rfl: 2    propranoloL (INDERAL) 20 MG tablet, Take 1 tablet (20 mg total) by mouth once daily., Disp: 30 tablet, Rfl: 2    semaglutide, weight loss, (WEGOVY) 0.25 mg/0.5 mL PnIj, Inject 0.25 mg into the skin every 7 days., Disp: 2 mL, Rfl: 0    spironolactone (ALDACTONE) 100 MG tablet, Take 1 tablet (100 mg total) by mouth every evening., Disp: 30 tablet, Rfl: 2    sumatriptan (IMITREX) 50 MG tablet, Take at onset of migraine, can repeat in 2 hrs if needed.  No more than twice per day or 3 days/wk., Disp: 12 tablet, Rfl: 2    tiZANidine (ZANAFLEX) 4 MG tablet, Take 1 tablet (4 mg total) by mouth every 6 (six) hours as needed (for muscle spasm and ha's)., Disp: 20 tablet, Rfl: 0    Past Medical History:   Diagnosis Date    Anxiety     Depression     GERD (gastroesophageal reflux disease)     Gestational hypertension, third trimester 07/11/2019    Hypertension     gestational     Hypoglycemia     Kidney calculi     2016    Migraines     Polycystic ovaries     PONV (postoperative nausea and vomiting)     STD (sexually transmitted disease)     Urinary tract infection      Family History   Problem Relation Age of Onset    Hypertension Mother     Hypertension Father     Heart disease Father     Depression Father     Hyperlipidemia Father     No Known Problems Sister     Nephrolithiasis Daughter     No Known Problems Sister     No Known Problems Sister     Breast cancer Paternal Grandmother         50s at diagnosis    Cancer Paternal Grandmother         breast    Hypertension Paternal Grandmother     Heart disease Paternal Grandfather     Hyperlipidemia Paternal Grandfather     Hypertension Paternal Grandfather      Hyperlipidemia Maternal Grandfather     Hypertension Maternal Grandmother     Colon cancer Neg Hx     Ovarian cancer Neg Hx      Social History     Socioeconomic History    Marital status:    Occupational History    Occupation: nurse     Comment: [employer]   Tobacco Use    Smoking status: Never    Smokeless tobacco: Never   Substance and Sexual Activity    Alcohol use: Yes     Alcohol/week: 0.0 standard drinks     Comment: 1 drink per month    Drug use: Never    Sexual activity: Yes     Partners: Male     Birth control/protection: Condom     Comment: IUD removed due to complications in July 2018     Review of patient's allergies indicates:  No Known Allergies    Objective:   There were no vitals taken for this visit.  Immunization History   Administered Date(s) Administered    COVID-19, MRNA, LN-S, PF (Pfizer) (Purple Cap) 08/24/2021, 09/21/2021    DTP 1991, 1991, 02/04/1992, 02/18/1993, 08/30/1995    HIB 1991, 1991, 02/04/1992, 11/25/1992    HPV Quadrivalent 06/15/2007, 08/18/2007, 12/26/2007    Hepatitis B, Pediatric/Adolescent 05/12/1992, 05/29/1992, 04/07/1995, 05/08/1995, 12/23/2010    Influenza 12/26/2007, 09/01/2011, 09/29/2020    Influenza - Quadrivalent 09/14/2015    Influenza - Quadrivalent - PF *Preferred* (6 months and older) 10/27/2016, 10/04/2017, 10/29/2018, 09/16/2019, 09/29/2020, 01/28/2022    MMR 11/15/1992, 08/30/1995    Meningococcal Conjugate (MCV4P) 06/15/2007, 09/01/2011    OPV 1991, 1991, 02/04/1992, 02/18/1993, 08/30/1995    PPD Test 05/23/1992    Rho (D) Immune Globulin 10/30/2019    Rho (D) Immune Globulin - IM 01/24/2020    Tdap 09/01/2011, 10/30/2019       Physical Exam   Constitutional: She is oriented to person, place, and time. No distress.   HENT:   Head: Normocephalic and atraumatic.   Pulmonary/Chest: Effort normal.   Musculoskeletal:      Cervical back: Normal range of motion.   Neurological: She is alert and  oriented to person, place, and time.   Psychiatric: Mood normal.         No results found for this or any previous visit (from the past 672 hour(s)).    Lab Results   Component Value Date    TBGOLDPLUS Negative 12/13/2022      Lab Results   Component Value Date    HEPBCAB Non-reactive 02/02/2023    HEPCAB Non-reactive 02/02/2023        Imaging  I have personally reviewed images and reports as below.  I agree with the interpretation.  MRI Hand Wrist W WO Bilat_Rheumatoid  Order: [id number]  Status: Final result     Visible to patient: Yes (seen)     Next appt: None     Dx: Undifferentiated inflammatory arthritis     0 Result Notes  Details    Reading Physician Reading Date Result Priority   Rajendra Khalil MD  [phone] 12/27/2022      Narrative & Impression  EXAM: MRI HAND_WRIST W WO BILATERAL_RHEUMATOID ARTHRITIS     CLINICAL HISTORY: Bilateral wrist pain, concern for inflammatory arthropathy.     TECHNIQUE: Multiplanar, multisequence MR imaging of the bilateral wrists before and after the administration of intravenous gadolinium contrast.     FINDINGS:     Osseous: No acute fracture or suspicious osseous lesion is visualized in either wrist/hand.     Articular: Bilateral wrists/hands joints demonstrate anatomical alignment.  No significant effusion.  No advanced arthritic change.  No synovitis or erosive arthropathy.     Musculotendinous: Questionable fourth extensor compartment enhancement in the region of interest on the right, possible mild tenosynovitis.  Left-sided and remaining right-sided musculotendinous structures are otherwise intact.     Ligamentous: Intact.     Miscellaneous: No suspicious postcontrast enhancement is visualized in either wrist/hand superficial soft tissues.  4 mm ganglion cyst in the left-sided deep volar soft tissues adjacent to the radioscaphoid articulation.  7 mm ganglion cyst in a similar location on the right.  Bilateral TFCCs appear grossly intact.        Impression:         1.  No acute abnormality visualized in either wrist/hand.  No convincing evidence of erosive or inflammatory arthropathy.  No significant degenerative changes.  2.  Questionable mild tenosynovitis at the right-sided fourth extensor compartment.  3.  Tiny bilateral ganglion cysts, as above.     Finalized on: 12/27/2022 3:06 PM By:  Rajendra Khalil III, MD  [facility]# [id number]      2022-12-27 15:08:[id number]    [facility]       Assessment:     1. Lupus arthritis    2. Rheumatoid factor positive    3. SUDHA positive    4. Alopecia    5. Undifferentiated connective tissue disease            Plan:     Diagnoses and all orders for this visit:    Lupus arthritis    Rheumatoid factor positive    SUDHA positive    Alopecia    Undifferentiated connective tissue disease        +SUDHA w alopecia, arthritis, photosensitivity, rash consistent w lupus - Consistent w SLE  Discussed SLICC criteria at length (the basis for diagnosis used by my colleague)  Failed hcq  Concerned about potential risks of Imuran  Discussed risks/benefits at great length  Would avoid other DMARDs unless she and  actively prevent pregnancy  Update SLE labs today  Check TPMT today as well  Pt would like to avoid imuran if possible  F/u w derm for rash biopsy to help confirm dx  Naproxen OTC for joints prn  Compromised immune system secondary to autoimmune disease and/or use of immunosuppressive drugs.  Monitor carefully for infections.  Advised patient to get immediate medical care if any infection arises.  Also advised strict adherence age-appropriate vaccinations and cancer screenings with PCP.  Return to clinic: pending above labs    40 minutes of total time spent on the encounter, which includes face to face time and non-face to face time preparing to see the patient (eg, review of tests), Obtaining and/or reviewing separately obtained history, Documenting clinical information in the electronic or other health record, Independently interpreting results (not  separately reported) and communicating results to the patient/family/caregiver, or Care coordination (not separately reported).     No follow-ups on file.    The patient understands, chooses and consents to this plan and accepts all the risks which include but are not limited to the risks mentioned above.     Disclaimer: This note was prepared using a voice recognition system and is likely to have sound alike errors within the text.

## 2023-08-01 NOTE — HPI
30yo F 24wk GA with L flank pain starting 3 days ago. She has h/o nephrolithiasis, passed 7mm stone several years ago. She has known complete L collecting system duplication. CT shows 6mm L mid ureteral stone with proximal hydronephrosis of upper pole moeity. She had LG fever 100.6 at home prior to ER presentation today. She has been afebrile since then. Denies dysuria.   
   Krupa Melton is a 29 y.o. [id number] at 24w0d presents complaining of fever at home 100.9 orally x1. Patient denies sick contacts. Patient reports flank pain since this weekend and saw primary OB who referred her to urology. 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 Left renal US showed hydronephrosis, read pasted below. Urology noticed 3mm stone from 2017 CT and blood on urine dipstick. Patient counseled to return if had fever at home. On presentation patient afebrile but tachycardic to 110s. Denies dysuria, reports ongoing flank pain. Urine culture from yesterday pending.     In [facility] patient had continued leukocytosis with left shift and negative flu swab. Clinical symptoms improved with rocephin and fluids. Patient reported had history of stents placed in 2016 for stone.      Retroperitoneal US 10/1:     Duplicated collecting system on the left with mild hydronephrosis of the upper pole moiety.  A discrete renal calculus is not identified.  Findings could be due to gravid state, although a recently passed small calculus may also be considered.  
no ST elevations/depressions

## 2023-08-10 ENCOUNTER — OFFICE VISIT (OUTPATIENT)
Dept: PODIATRY | Facility: CLINIC | Age: 33
End: 2023-08-10
Payer: COMMERCIAL

## 2023-08-10 ENCOUNTER — HOSPITAL ENCOUNTER (OUTPATIENT)
Dept: RADIOLOGY | Facility: HOSPITAL | Age: 33
Discharge: HOME | End: 2023-08-10
Attending: PODIATRIST
Payer: COMMERCIAL

## 2023-08-10 VITALS — BODY MASS INDEX: 35.85 KG/M2 | WEIGHT: 210 LBS | HEIGHT: 64 IN

## 2023-08-10 DIAGNOSIS — M62.462 GASTROCNEMIUS EQUINUS OF LEFT LOWER EXTREMITY: ICD-10-CM

## 2023-08-10 DIAGNOSIS — M72.2 PLANTAR FASCIITIS: Primary | ICD-10-CM

## 2023-08-10 DIAGNOSIS — M72.2 PLANTAR FASCIITIS: ICD-10-CM

## 2023-08-10 PROCEDURE — 3008F BODY MASS INDEX DOCD: CPT | Mod: CPTII,S$GLB,, | Performed by: PODIATRIST

## 2023-08-10 PROCEDURE — 99214 PR OFFICE/OUTPT VISIT, EST, LEVL IV, 30-39 MIN: ICD-10-PCS | Mod: S$GLB,,, | Performed by: PODIATRIST

## 2023-08-10 PROCEDURE — 99999 PR PBB SHADOW E&M-EST. PATIENT-LVL IV: CPT | Mod: PBBFAC,,, | Performed by: PODIATRIST

## 2023-08-10 PROCEDURE — 73630 X-RAY EXAM OF FOOT: CPT | Mod: 26,LT,, | Performed by: RADIOLOGY

## 2023-08-10 PROCEDURE — 3044F HG A1C LEVEL LT 7.0%: CPT | Mod: CPTII,S$GLB,, | Performed by: PODIATRIST

## 2023-08-10 PROCEDURE — 3044F PR MOST RECENT HEMOGLOBIN A1C LEVEL <7.0%: ICD-10-PCS | Mod: CPTII,S$GLB,, | Performed by: PODIATRIST

## 2023-08-10 PROCEDURE — 3008F PR BODY MASS INDEX (BMI) DOCUMENTED: ICD-10-PCS | Mod: CPTII,S$GLB,, | Performed by: PODIATRIST

## 2023-08-10 PROCEDURE — 1159F MED LIST DOCD IN RCRD: CPT | Mod: CPTII,S$GLB,, | Performed by: PODIATRIST

## 2023-08-10 PROCEDURE — 99214 OFFICE O/P EST MOD 30 MIN: CPT | Mod: S$GLB,,, | Performed by: PODIATRIST

## 2023-08-10 PROCEDURE — 99999 PR PBB SHADOW E&M-EST. PATIENT-LVL IV: ICD-10-PCS | Mod: PBBFAC,,, | Performed by: PODIATRIST

## 2023-08-10 PROCEDURE — 73630 XR FOOT COMPLETE 3 VIEW LEFT: ICD-10-PCS | Mod: 26,LT,, | Performed by: RADIOLOGY

## 2023-08-10 PROCEDURE — 1159F PR MEDICATION LIST DOCUMENTED IN MEDICAL RECORD: ICD-10-PCS | Mod: CPTII,S$GLB,, | Performed by: PODIATRIST

## 2023-08-10 PROCEDURE — 73630 X-RAY EXAM OF FOOT: CPT | Mod: TC,LT

## 2023-08-10 RX ORDER — METHYLPREDNISOLONE 4 MG/1
TABLET ORAL
Qty: 1 EACH | Refills: 0 | Status: SHIPPED | OUTPATIENT
Start: 2023-08-10 | End: 2023-10-04

## 2023-08-10 NOTE — PATIENT INSTRUCTIONS
"Thank you for choosing [facility] and Dr. Katherine Martines and her team to take care of you.      I have provided further information for you about your diagnoses and/or aftercare for treatment.     I recommend HOKA tennis shoes for foot pain.    You may receive a survey asking you about your visit today. We hope that we have provided you with a 5-star experience! If you felt that you received exemplary care today, please consider leaving us this feedback on the survey that you will receive in the next few days.     The survey might arrive via email, [employer] D[employer] by [employer] messages, text messages, or paper mail.    We sincerely appreciate you!      Sincerely,  Dr. Katherine Martines           I recommend searching for "Night Splint for Plantar Fasciitis" on www.amazon.com. You can read reviews to pick the best option, but the splint should look similar to a boot (as pictured below).  If you are able to wear the night splint for at least a few hours while sleeping, then this will help greatly with improving your pain especially in the morning.     A night splint is to be worn at night while you are sleeping.     A more flexible option, if you are not able to tolerate the boot-- STRASSBURG SOCK. You can search this online as well.         "

## 2023-08-10 NOTE — PROGRESS NOTES
PODIATRIC MEDICINE AND SURGERY      CHIEF COMPLAINT  Chief Complaint   Patient presents with    Heel Pain     C/o left heel pain, plantar aspect, x several months, rates pain 4/10, can get up to a 6/10 in the morning, non-diabetic, wears tennis and socks         HPI:    Krupa Walker is a 32 y.o. female who complains of pain to the left  heel.  Pain is described as achy.  Pain is worse in the morning and after periods of sitting down and then getting up and walking again.   Pain is rated to be 6/10 by the patient on a 1-10 scale.   Pain has been present for months but has occurred int he past.  Pain is worsening.  Pain is aggravated by weight bearing .  Pain is improved by rest.  Previous treatments include: NSAIDs , rehab. On cloud shoes    Work: RN    [employer]  Past Medical History:   Diagnosis Date    Anxiety     Depression     GERD (gastroesophageal reflux disease)     Gestational hypertension, third trimester 2019    Hypertension     gestational     Hypoglycemia     Kidney calculi         Migraines     Polycystic ovaries     PONV (postoperative nausea and vomiting)     STD (sexually transmitted disease)     Urinary tract infection         PSH  Past Surgical History:   Procedure Laterality Date    ANKLE SURGERY Right 2008     SECTION  10/2014    CYSTOSCOPY W/ URETERAL STENT PLACEMENT Left 10/2/2019    Procedure: CYSTOSCOPY, WITH URETERAL STENT INSERTION - Under ultrasound guidance;  Surgeon: Marsha Guevara MD;  Location: [facility];  Service: Urology;  Laterality: Left;    KIDNEY STONE SURGERY      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)    LASER LITHOTRIPSY Left 10/16/2019    Procedure: LITHOTRIPSY, USING LASER;  Surgeon: Marsha Guevara MD;  Location: [facility];  Service: Urology;  Laterality: Left;    TONSILLECTOMY      ULTRASOUND GUIDANCE Left 10/16/2019    Procedure: ULTRASOUND GUIDANCE, for laser litho;  Surgeon: Marsha Guevara,  MD;  Location: [facility] OR;  Service: Urology;  Laterality: Left;    URETEROSCOPIC REMOVAL OF URETERIC CALCULUS Left 10/16/2019    Procedure: REMOVAL, CALCULUS, URETER, URETEROSCOPIC possible retro pyelo, possible lasere litho, possible stent;  Surgeon: Marsha Guevara MD;  Location: [facility] OR;  Service: Urology;  Laterality: Left;  25 WEEKS PREGNANT / FETAL MONITORING BEFORE AND AFTER PROCEDURE/   CALL PELON ELDER [phone]      WISDOM TOOTH EXTRACTION          MEDS  Current Outpatient Medications on File Prior to Visit   Medication Sig Dispense Refill    azaTHIOprine (IMURAN) 50 mg Tab Take 1 tablet (50 mg total) by mouth once daily. 90 tablet 1    busPIRone (BUSPAR) 10 MG tablet Take 1 tablet (10 mg total) by mouth 3 (three) times daily. 90 tablet 3    clotrimazole-betamethasone 1-0.05% (LOTRISONE) cream Apply topically 2 (two) times daily. 45 g 2    DULoxetine 40 mg CpDR Take 40 mg by mouth once daily. 30 capsule 2    gabapentin (NEURONTIN) 100 MG capsule Take 2 capsules (200 mg total) by mouth every evening. 60 capsule 1    ketorolac (TORADOL) 10 mg tablet Take 2 tabs (20mg) at the onset of a headache, if headache persists you may take 1 tab every 4 to 6 hours as needed. Do not exceed 40 mg per day. 20 tablet 3    multivit with calcium,iron,min (WOMEN'S DAILY MULTIVITAMIN ORAL) Women's Daily Multivitamin      pantoprazole (PROTONIX) 40 MG tablet Take 1 tablet (40 mg total) by mouth once daily. 90 tablet 3    prochlorperazine (COMPAZINE) 5 MG tablet Take 1 tablet (5 mg total) by mouth every 6 (six) hours as needed for Nausea (or headache pain). 12 tablet 2    semaglutide, weight loss, (WEGOVY) 0.25 mg/0.5 mL PnIj Inject 0.25 mg into the skin every 7 days. 2 mL 0    spironolactone (ALDACTONE) 100 MG tablet Take 1 tablet (100 mg total) by mouth every evening. 30 tablet 2    sumatriptan (IMITREX) 50 MG tablet Take at onset of migraine, can repeat in 2 hrs if needed.  No more than twice per day or 3 days/wk. 12 tablet 2     tiZANidine (ZANAFLEX) 4 MG tablet Take 1 tablet (4 mg total) by mouth every 6 (six) hours as needed (for muscle spasm and ha's). 20 tablet 0    ALPRAZolam (XANAX) 0.5 MG tablet Take 1 tablet (0.5 mg total) by mouth daily as needed (Severe anxiety/Insomnia). 30 tablet 0    propranoloL (INDERAL) 20 MG tablet Take 1 tablet (20 mg total) by mouth once daily. 30 tablet 2     No current facility-administered medications on file prior to visit.      Medication List with Changes/Refills   New Medications    METHYLPREDNISOLONE (MEDROL DOSEPACK) 4 MG TABLET    use as directed   Current Medications    ALPRAZOLAM (XANAX) 0.5 MG TABLET    Take 1 tablet (0.5 mg total) by mouth daily as needed (Severe anxiety/Insomnia).    AZATHIOPRINE (IMURAN) 50 MG TAB    Take 1 tablet (50 mg total) by mouth once daily.    BUSPIRONE (BUSPAR) 10 MG TABLET    Take 1 tablet (10 mg total) by mouth 3 (three) times daily.    CLOTRIMAZOLE-BETAMETHASONE 1-0.05% (LOTRISONE) CREAM    Apply topically 2 (two) times daily.    DULOXETINE 40 MG CPDR    Take 40 mg by mouth once daily.    GABAPENTIN (NEURONTIN) 100 MG CAPSULE    Take 2 capsules (200 mg total) by mouth every evening.    KETOROLAC (TORADOL) 10 MG TABLET    Take 2 tabs (20mg) at the onset of a headache, if headache persists you may take 1 tab every 4 to 6 hours as needed. Do not exceed 40 mg per day.    MULTIVIT WITH CALCIUM,IRON,MIN (WOMEN'S DAILY MULTIVITAMIN ORAL)    Women's Daily Multivitamin    PANTOPRAZOLE (PROTONIX) 40 MG TABLET    Take 1 tablet (40 mg total) by mouth once daily.    PROCHLORPERAZINE (COMPAZINE) 5 MG TABLET    Take 1 tablet (5 mg total) by mouth every 6 (six) hours as needed for Nausea (or headache pain).    PROPRANOLOL (INDERAL) 20 MG TABLET    Take 1 tablet (20 mg total) by mouth once daily.    SEMAGLUTIDE, WEIGHT LOSS, (WEGOVY) 0.25 MG/0.5 ML PNIJ    Inject 0.25 mg into the skin every 7 days.    SPIRONOLACTONE (ALDACTONE) 100 MG TABLET    Take 1 tablet (100 mg total) by  mouth every evening.    SUMATRIPTAN (IMITREX) 50 MG TABLET    Take at onset of migraine, can repeat in 2 hrs if needed.  No more than twice per day or 3 days/wk.    TIZANIDINE (ZANAFLEX) 4 MG TABLET    Take 1 tablet (4 mg total) by mouth every 6 (six) hours as needed (for muscle spasm and ha's).       ALLG  Review of patient's allergies indicates:  No Known Allergies    SOCIAL Hx  Social History     Socioeconomic History    Marital status:    Occupational History    Occupation: nurse     Comment: [employer]   Tobacco Use    Smoking status: Never    Smokeless tobacco: Never   Substance and Sexual Activity    Alcohol use: Yes     Alcohol/week: 0.0 standard drinks of alcohol     Comment: 1 drink per month    Drug use: Never    Sexual activity: Yes     Partners: Male     Birth control/protection: Condom     Comment: IUD removed due to complications in July 2018       FAM Hx  Family History   Problem Relation Age of Onset    Hypertension Mother     Hypertension Father     Heart disease Father     Depression Father     Hyperlipidemia Father     No Known Problems Sister     Nephrolithiasis Daughter     No Known Problems Sister     No Known Problems Sister     Breast cancer Paternal Grandmother         50s at diagnosis    Cancer Paternal Grandmother         breast    Hypertension Paternal Grandmother     Heart disease Paternal Grandfather     Hyperlipidemia Paternal Grandfather     Hypertension Paternal Grandfather     Hyperlipidemia Maternal Grandfather     Hypertension Maternal Grandmother     Colon cancer Neg Hx     Ovarian cancer Neg Hx        REVIEW OF SYSTEMS   General: This patient is well-developed, well-nourished and appears stated age, well-oriented to person, place and time, and cooperative and pleasant on today's visit  Constitutional: Negative for chills and fever.   Respiratory: Negative for shortness of breath.    Cardiovascular: Negative for chest pain, palpitations,  "orthopnea  Gastrointestinal: Negative for diarrhea, nausea and vomiting.   Musculoskeletal: Positive for above noted in HPI  Skin: Negative for rash, skin, or nail changes  Neurological: Negative for tingling and sensory changes  Peripheral Vascular: no claudication or cyanosis  Psychiatric/Behavioral: Negative for altered mental status       OBJECTIVE:  Vitals:    08/10/23 1515   Weight: 95.3 kg (210 lb)   Height: 5' 4" (1.626 m)   PainSc:   4       LOWER EXTREMITY  VASCULAR  Dorsalis pedis and posterior tibial pulses palpable 2/4 bilaterally.   Capillary refill time immediate to the toes.   Feet are warm to the touch. Skin temperature warm to warm from proximally to distally   There are no varicosities, telangiectasias noted to bilateral foot and ankle regions.   There are no ecchymoses noted to bilateral foot and ankle regions.   There is no gross lower extremity edema.    DERMATOLOGIC  Skin moist with healthy texture and turgor.  There are no open ulcerations, lacerations, or fissures to bilateral foot and ankle regions. There are no signs of infection as there is no erythema, no proximal-extending lymphangiitis, no fluctuance, or crepitus noted on palpation to bilateral foot and ankle regions.   There is no interdigital maceration.   There are no hyperkeratotic lesions noted to feet. Nails are well-trimmed.    NEUROLOGIC  Epicritic sensation is intact as the patient is able to sense light touch to bilateral foot and ankle regions. There is a negative Tinel's sign on percussion of the tibial nerve, dorsal cutaneous nerves, sural nerves of the LEFT foot.  Achilles and patellar deep tendon reflexes intact  Babinski reflex absent    ORTHOPEDIC/BIOMECHANICAL  Pain on palpation plantar medial tubercle of calcaneus, LEFT  foot. No pain along plantar fascia bilaterally.   No pain with medial-lateral compression of calcaneus.  5/5 muscle strength in all 4 quadrants.   Ankle joint range of motion decreased with knee " extended and flexed b/l.    IMAGING   Reviewed by me and I agree with radiologist findings, 3 views of foot/ankle, reveal:  No results found for this or any previous visit.          No results found for this or any previous visit.      No results found for this or any previous visit.       No results found for this or any previous visit.        ASSESSMENT     Encounter Diagnoses   Name Primary?    Plantar fasciitis Yes    Gastrocnemius equinus of left lower extremity          PLAN:   1. The patient was examined and evaluated   2. Treatment options were discussed in detail; the patient elected to undergo conservative treatment.  Discussed different treatment options for heel pain.     Plantar fasciitis  -     X-Ray Foot Complete Left; Future; Expected date: 08/10/2023    Gastrocnemius equinus of left lower extremity  -     X-Ray Foot Complete Left; Future; Expected date: 08/10/2023    Other orders  -     methylPREDNISolone (MEDROL DOSEPACK) 4 mg tablet; use as directed  Dispense: 1 each; Refill: 0        3. I gave written and verbal instructions on heel cord stretching and this was demonstrated for the patient. A theraband was also provided to the patient for stretching exercises. Recommendations were given for plantar fasciitis treatment including icing, stretching, arch supports, avoidance of barefoot walking, appropriate shoe wear, and strict compliance. Discussed importance of patient to perform stretching exercises.   4. Prescription was written for OTC arch supports.  I Discussed that plantar fasciitis typically resolves on its own in a variable amount of time. If no improvement, will proceed with cortisone injection. I also  discussed possible tx with SLC, PT, Dynasplint and custom orthotics.Surgical intervention is the last resort for plantar fasciitis.     An Rx was written for medrol dosepak.   5. Information was given regarding the patient's condition and prognosis. All the patient's questions were  answered.   6. Return to clinic in 4-6 weeks for follow up evaluation, or sooner if symptoms worsen.   7. The patient is welcome to call or email with any questions or concerns at any time.     Future Appointments   Date Time Provider Department Center   8/10/2023  4:00 PM [facility] XR2 [facility] XRAY [facility]   9/5/2023  2:30 PM Katherine Martines DPM [facility] POD [place]       Report Electronically Signed By:    Katherine Martines DPM   Podiatric Medicine & Surgery  [facility]  8/10/2023    Disclaimer: This note was partially prepared using a voice recognition system and is likely to have sound alike errors within the text.

## 2023-09-29 ENCOUNTER — PATIENT MESSAGE (OUTPATIENT)
Dept: RHEUMATOLOGY | Facility: CLINIC | Age: 33
End: 2023-09-29
Payer: COMMERCIAL

## 2023-09-29 ENCOUNTER — PATIENT MESSAGE (OUTPATIENT)
Dept: PRIMARY CARE CLINIC | Facility: CLINIC | Age: 33
End: 2023-09-29
Payer: COMMERCIAL

## 2023-10-04 ENCOUNTER — OFFICE VISIT (OUTPATIENT)
Dept: INTERNAL MEDICINE | Facility: CLINIC | Age: 33
End: 2023-10-04
Payer: COMMERCIAL

## 2023-10-04 VITALS
BODY MASS INDEX: 34.36 KG/M2 | DIASTOLIC BLOOD PRESSURE: 84 MMHG | SYSTOLIC BLOOD PRESSURE: 130 MMHG | OXYGEN SATURATION: 96 % | HEART RATE: 97 BPM | HEIGHT: 64 IN | TEMPERATURE: 98 F | WEIGHT: 201.25 LBS

## 2023-10-04 DIAGNOSIS — Z87.442 HISTORY OF KIDNEY STONES: ICD-10-CM

## 2023-10-04 DIAGNOSIS — R31.9 HEMATURIA, UNSPECIFIED TYPE: ICD-10-CM

## 2023-10-04 DIAGNOSIS — J06.9 VIRAL URI WITH COUGH: ICD-10-CM

## 2023-10-04 DIAGNOSIS — Z76.89 ENCOUNTER TO ESTABLISH CARE: Primary | ICD-10-CM

## 2023-10-04 PROCEDURE — 3079F PR MOST RECENT DIASTOLIC BLOOD PRESSURE 80-89 MM HG: ICD-10-PCS | Mod: CPTII,S$GLB,, | Performed by: NURSE PRACTITIONER

## 2023-10-04 PROCEDURE — 1159F MED LIST DOCD IN RCRD: CPT | Mod: CPTII,S$GLB,, | Performed by: NURSE PRACTITIONER

## 2023-10-04 PROCEDURE — 99999 PR PBB SHADOW E&M-EST. PATIENT-LVL V: CPT | Mod: PBBFAC,,, | Performed by: NURSE PRACTITIONER

## 2023-10-04 PROCEDURE — 3075F SYST BP GE 130 - 139MM HG: CPT | Mod: CPTII,S$GLB,, | Performed by: NURSE PRACTITIONER

## 2023-10-04 PROCEDURE — 3075F PR MOST RECENT SYSTOLIC BLOOD PRESS GE 130-139MM HG: ICD-10-PCS | Mod: CPTII,S$GLB,, | Performed by: NURSE PRACTITIONER

## 2023-10-04 PROCEDURE — 1160F RVW MEDS BY RX/DR IN RCRD: CPT | Mod: CPTII,S$GLB,, | Performed by: NURSE PRACTITIONER

## 2023-10-04 PROCEDURE — 3008F PR BODY MASS INDEX (BMI) DOCUMENTED: ICD-10-PCS | Mod: CPTII,S$GLB,, | Performed by: NURSE PRACTITIONER

## 2023-10-04 PROCEDURE — 3008F BODY MASS INDEX DOCD: CPT | Mod: CPTII,S$GLB,, | Performed by: NURSE PRACTITIONER

## 2023-10-04 PROCEDURE — 3079F DIAST BP 80-89 MM HG: CPT | Mod: CPTII,S$GLB,, | Performed by: NURSE PRACTITIONER

## 2023-10-04 PROCEDURE — 3044F PR MOST RECENT HEMOGLOBIN A1C LEVEL <7.0%: ICD-10-PCS | Mod: CPTII,S$GLB,, | Performed by: NURSE PRACTITIONER

## 2023-10-04 PROCEDURE — 3044F HG A1C LEVEL LT 7.0%: CPT | Mod: CPTII,S$GLB,, | Performed by: NURSE PRACTITIONER

## 2023-10-04 PROCEDURE — 99999 PR PBB SHADOW E&M-EST. PATIENT-LVL V: ICD-10-PCS | Mod: PBBFAC,,, | Performed by: NURSE PRACTITIONER

## 2023-10-04 PROCEDURE — 99213 PR OFFICE/OUTPT VISIT, EST, LEVL III, 20-29 MIN: ICD-10-PCS | Mod: S$GLB,,, | Performed by: NURSE PRACTITIONER

## 2023-10-04 PROCEDURE — 1160F PR REVIEW ALL MEDS BY PRESCRIBER/CLIN PHARMACIST DOCUMENTED: ICD-10-PCS | Mod: CPTII,S$GLB,, | Performed by: NURSE PRACTITIONER

## 2023-10-04 PROCEDURE — 99213 OFFICE O/P EST LOW 20 MIN: CPT | Mod: S$GLB,,, | Performed by: NURSE PRACTITIONER

## 2023-10-04 PROCEDURE — 1159F PR MEDICATION LIST DOCUMENTED IN MEDICAL RECORD: ICD-10-PCS | Mod: CPTII,S$GLB,, | Performed by: NURSE PRACTITIONER

## 2023-10-04 RX ORDER — PROMETHAZINE HYDROCHLORIDE AND DEXTROMETHORPHAN HYDROBROMIDE 6.25; 15 MG/5ML; MG/5ML
5 SYRUP ORAL NIGHTLY PRN
Qty: 120 ML | Refills: 0 | Status: SHIPPED | OUTPATIENT
Start: 2023-10-04 | End: 2023-12-05

## 2023-10-11 ENCOUNTER — HOSPITAL ENCOUNTER (OUTPATIENT)
Dept: RADIOLOGY | Facility: HOSPITAL | Age: 33
Discharge: HOME | End: 2023-10-11
Attending: NURSE PRACTITIONER
Payer: COMMERCIAL

## 2023-10-11 DIAGNOSIS — Z87.442 HISTORY OF KIDNEY STONES: ICD-10-CM

## 2023-10-11 DIAGNOSIS — R31.9 HEMATURIA, UNSPECIFIED TYPE: ICD-10-CM

## 2023-10-11 PROCEDURE — 76770 US EXAM ABDO BACK WALL COMP: CPT | Mod: TC,PN

## 2023-10-11 PROCEDURE — 76770 US EXAM ABDO BACK WALL COMP: CPT | Mod: 26,,, | Performed by: RADIOLOGY

## 2023-10-11 PROCEDURE — 76770 US RETROPERITONEAL COMPLETE: ICD-10-PCS | Mod: 26,,, | Performed by: RADIOLOGY

## 2023-10-19 ENCOUNTER — PATIENT MESSAGE (OUTPATIENT)
Dept: INTERNAL MEDICINE | Facility: CLINIC | Age: 33
End: 2023-10-19
Payer: COMMERCIAL

## 2023-10-31 DIAGNOSIS — F41.1 GAD (GENERALIZED ANXIETY DISORDER): ICD-10-CM

## 2023-10-31 DIAGNOSIS — F51.05 INSOMNIA DUE TO OTHER MENTAL DISORDER: ICD-10-CM

## 2023-10-31 DIAGNOSIS — F99 INSOMNIA DUE TO OTHER MENTAL DISORDER: ICD-10-CM

## 2023-10-31 RX ORDER — ALPRAZOLAM 0.5 MG/1
0.5 TABLET ORAL DAILY PRN
Qty: 30 TABLET | Refills: 0 | Status: SHIPPED | OUTPATIENT
Start: 2023-10-31 | End: 2024-02-26

## 2023-11-14 ENCOUNTER — TELEPHONE (OUTPATIENT)
Dept: ORTHOPEDICS | Facility: CLINIC | Age: 33
End: 2023-11-14
Payer: COMMERCIAL

## 2023-11-14 DIAGNOSIS — M79.641 BILATERAL HAND PAIN: Primary | ICD-10-CM

## 2023-11-14 DIAGNOSIS — M79.642 BILATERAL HAND PAIN: Primary | ICD-10-CM

## 2023-11-15 ENCOUNTER — OFFICE VISIT (OUTPATIENT)
Dept: ORTHOPEDICS | Facility: CLINIC | Age: 33
End: 2023-11-15
Payer: COMMERCIAL

## 2023-11-15 ENCOUNTER — HOSPITAL ENCOUNTER (OUTPATIENT)
Dept: RADIOLOGY | Facility: HOSPITAL | Age: 33
Discharge: HOME | End: 2023-11-15
Attending: STUDENT IN AN ORGANIZED HEALTH CARE EDUCATION/TRAINING PROGRAM
Payer: COMMERCIAL

## 2023-11-15 VITALS — BODY MASS INDEX: 34.36 KG/M2 | WEIGHT: 201.25 LBS | HEIGHT: 64 IN

## 2023-11-15 DIAGNOSIS — M79.641 BILATERAL HAND PAIN: ICD-10-CM

## 2023-11-15 DIAGNOSIS — M79.642 BILATERAL HAND PAIN: ICD-10-CM

## 2023-11-15 DIAGNOSIS — G56.31 RADIAL TUNNEL SYNDROME, RIGHT: Primary | ICD-10-CM

## 2023-11-15 PROCEDURE — 99204 OFFICE O/P NEW MOD 45 MIN: CPT | Mod: 25,S$GLB,, | Performed by: STUDENT IN AN ORGANIZED HEALTH CARE EDUCATION/TRAINING PROGRAM

## 2023-11-15 PROCEDURE — 1159F PR MEDICATION LIST DOCUMENTED IN MEDICAL RECORD: ICD-10-PCS | Mod: CPTII,S$GLB,, | Performed by: STUDENT IN AN ORGANIZED HEALTH CARE EDUCATION/TRAINING PROGRAM

## 2023-11-15 PROCEDURE — 1160F PR REVIEW ALL MEDS BY PRESCRIBER/CLIN PHARMACIST DOCUMENTED: ICD-10-PCS | Mod: CPTII,S$GLB,, | Performed by: STUDENT IN AN ORGANIZED HEALTH CARE EDUCATION/TRAINING PROGRAM

## 2023-11-15 PROCEDURE — 3044F HG A1C LEVEL LT 7.0%: CPT | Mod: CPTII,S$GLB,, | Performed by: STUDENT IN AN ORGANIZED HEALTH CARE EDUCATION/TRAINING PROGRAM

## 2023-11-15 PROCEDURE — 73130 PR  X-RAY HAND 3+ VW: ICD-10-PCS | Mod: 26,LT,, | Performed by: RADIOLOGY

## 2023-11-15 PROCEDURE — 99204 PR OFFICE/OUTPT VISIT, NEW, LEVL IV, 45-59 MIN: ICD-10-PCS | Mod: 25,S$GLB,, | Performed by: STUDENT IN AN ORGANIZED HEALTH CARE EDUCATION/TRAINING PROGRAM

## 2023-11-15 PROCEDURE — 73130 X-RAY EXAM OF HAND: CPT | Mod: TC,50

## 2023-11-15 PROCEDURE — 99999 PR PBB SHADOW E&M-EST. PATIENT-LVL III: ICD-10-PCS | Mod: PBBFAC,,, | Performed by: STUDENT IN AN ORGANIZED HEALTH CARE EDUCATION/TRAINING PROGRAM

## 2023-11-15 PROCEDURE — 73130 X-RAY EXAM OF HAND: CPT | Mod: 26,LT,, | Performed by: RADIOLOGY

## 2023-11-15 PROCEDURE — 3008F BODY MASS INDEX DOCD: CPT | Mod: CPTII,S$GLB,, | Performed by: STUDENT IN AN ORGANIZED HEALTH CARE EDUCATION/TRAINING PROGRAM

## 2023-11-15 PROCEDURE — 1160F RVW MEDS BY RX/DR IN RCRD: CPT | Mod: CPTII,S$GLB,, | Performed by: STUDENT IN AN ORGANIZED HEALTH CARE EDUCATION/TRAINING PROGRAM

## 2023-11-15 PROCEDURE — 99999 PR PBB SHADOW E&M-EST. PATIENT-LVL III: CPT | Mod: PBBFAC,,, | Performed by: STUDENT IN AN ORGANIZED HEALTH CARE EDUCATION/TRAINING PROGRAM

## 2023-11-15 PROCEDURE — 20526 THER INJECTION CARP TUNNEL: CPT | Mod: RT,S$GLB,, | Performed by: STUDENT IN AN ORGANIZED HEALTH CARE EDUCATION/TRAINING PROGRAM

## 2023-11-15 PROCEDURE — 20526 PR INJECT CARPAL TUNNEL: ICD-10-PCS | Mod: RT,S$GLB,, | Performed by: STUDENT IN AN ORGANIZED HEALTH CARE EDUCATION/TRAINING PROGRAM

## 2023-11-15 PROCEDURE — 3044F PR MOST RECENT HEMOGLOBIN A1C LEVEL <7.0%: ICD-10-PCS | Mod: CPTII,S$GLB,, | Performed by: STUDENT IN AN ORGANIZED HEALTH CARE EDUCATION/TRAINING PROGRAM

## 2023-11-15 PROCEDURE — 3008F PR BODY MASS INDEX (BMI) DOCUMENTED: ICD-10-PCS | Mod: CPTII,S$GLB,, | Performed by: STUDENT IN AN ORGANIZED HEALTH CARE EDUCATION/TRAINING PROGRAM

## 2023-11-15 PROCEDURE — 73130 X-RAY EXAM OF HAND: CPT | Mod: 26,RT,, | Performed by: RADIOLOGY

## 2023-11-15 PROCEDURE — 1159F MED LIST DOCD IN RCRD: CPT | Mod: CPTII,S$GLB,, | Performed by: STUDENT IN AN ORGANIZED HEALTH CARE EDUCATION/TRAINING PROGRAM

## 2023-11-15 RX ORDER — TRIAMCINOLONE ACETONIDE 40 MG/ML
40 INJECTION, SUSPENSION INTRA-ARTICULAR; INTRAMUSCULAR
Status: DISCONTINUED | OUTPATIENT
Start: 2023-11-15 | End: 2023-11-15 | Stop reason: HOSPADM

## 2023-11-15 RX ADMIN — TRIAMCINOLONE ACETONIDE 40 MG: 40 INJECTION, SUSPENSION INTRA-ARTICULAR; INTRAMUSCULAR at 01:11

## 2023-11-15 NOTE — PROCEDURES
Right Radial Tunnel Injection    Date/Time: 11/15/2023 1:20 PM    Performed by: Analy Landeros MD  Authorized by: Analy Landeros MD    Consent Done?:  Yes (Verbal)  Indications:  Pain and diagnostic evaluation  Timeout: prior to procedure the correct patient, procedure, and site was verified    Local anesthesia used?: Yes    Anesthesia:  Local infiltration  Local anesthetic:  Lidocaine 1% without epinephrine  Anesthetic total (ml):  1    Location: Right radial tunnel.  Ultrasonic Guidance for Needle Placement?: No    Needle size:  25 G  Approach:  Dorsal  Medications:  40 mg triamcinolone acetonide 40 mg/mL  Patient tolerance:  Patient tolerated the procedure well with no immediate complications

## 2023-11-15 NOTE — PROGRESS NOTES
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  She is had symptoms of wrist pain and numbness for 12 years.  In the past, she is had issues with numbness that wakes her up at night.  For the past year and 3 months she began to develop some different symptoms.  She started to develop neck pain as well as issues with dropping things and having trouble twisting her hand to place an IV..  She had dorsal forearm pain which radiates to her neck.  She also has some generalized burning throughout her wrist.  There is no specific area that she can palpate that recreates the symptoms.  Her current pain level is a 5/10.  She obtain electrodiagnostic studies in 2023 to further evaluate this.    Review of Systems  Review of systems negative for chest pain, shortness of breath, fevers, chills, nausea/vomiting.    Past Medical History  Past Medical History:   Diagnosis Date    Anxiety     Depression     GERD (gastroesophageal reflux disease)     Gestational hypertension, third trimester 2019    Hypertension     gestational     Hypoglycemia     Kidney calculi     2016    Migraines     Polycystic ovaries     PONV (postoperative nausea and vomiting)     STD (sexually transmitted disease)     Urinary tract infection        Past Surgical History  Past Surgical History:   Procedure Laterality Date    ANKLE SURGERY Right      SECTION  10/2014    CYSTOSCOPY W/ URETERAL STENT PLACEMENT Left 10/2/2019    Procedure: CYSTOSCOPY, WITH URETERAL STENT INSERTION - Under ultrasound guidance;  Surgeon: Marsha Guevara MD;  Location: [facility];  Service: Urology;  Laterality: Left;    KIDNEY STONE SURGERY      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)     LASER LITHOTRIPSY Left 10/16/2019    Procedure: LITHOTRIPSY, USING LASER;  Surgeon: Marsha Guevara MD;  Location: [facility] OR;  Service: Urology;  Laterality: Left;    TONSILLECTOMY      ULTRASOUND GUIDANCE Left 10/16/2019    Procedure: ULTRASOUND GUIDANCE, for laser litho;  Surgeon: Marsha Guevara MD;  Location: [facility] OR;  Service: Urology;  Laterality: Left;    URETEROSCOPIC REMOVAL OF URETERIC CALCULUS Left 10/16/2019    Procedure: REMOVAL, CALCULUS, URETER, URETEROSCOPIC possible retro pyelo, possible lasere litho, possible stent;  Surgeon: Marsha Guevara MD;  Location: [facility] OR;  Service: Urology;  Laterality: Left;  25 WEEKS PREGNANT / FETAL MONITORING BEFORE AND AFTER PROCEDURE/   CALL PELON ELDER [phone]      WISDOM TOOTH EXTRACTION         Allergies  Review of patient's allergies indicates:  No Known Allergies    Family History  Family History   Problem Relation Age of Onset    Hypertension Mother     Hypertension Father     Heart disease Father     Depression Father     Hyperlipidemia Father     No Known Problems Sister     Nephrolithiasis Daughter     No Known Problems Sister     No Known Problems Sister     Breast cancer Paternal Grandmother         50s at diagnosis    Cancer Paternal Grandmother         breast    Hypertension Paternal Grandmother     Heart disease Paternal Grandfather     Hyperlipidemia Paternal Grandfather     Hypertension Paternal Grandfather     Hyperlipidemia Maternal Grandfather     Hypertension Maternal Grandmother     Colon cancer Neg Hx     Ovarian cancer Neg Hx        Social History  Social History     Socioeconomic History    Marital status:    Occupational History    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   Tobacco Use    Smoking status: Never    Smokeless tobacco: Never   Substance and Sexual Activity    Alcohol use: Yes     Alcohol/week: 0.0 standard drinks of alcohol     Comment: 1 drink per month    Drug use: Never    Sexual  activity: Yes     Partners: Male     Birth control/protection: Condom     Comment: IUD removed due to complications in July 2018       Vital Signs  There were no vitals filed for this visit.    Physical Exam  Constitutional: Appears well-developed and well-nourished. No distress.   HENT:   Head: Normocephalic.   Eyes: EOM are normal.   Pulmonary/Chest: Effort normal.   Neurological: Oriented to person, place, and time.   Psychiatric: Normal mood and affect.     Right Upper Extremity:  No abrasions, lacerations, wounds.  No swelling.  No erythema.  Negative Tinel's in the median nerve distribution at the wrist.  Negative Phalen's.  Negative Tinel's in the ulnar nerve distribution at the elbow.  No ulnar nerve subluxation with elbow flexion.  No thenar or FDI atrophy.  5/5 APB and FDI strength.  Two-point discrimination is 5 mm in all 5 fingers.  No tenderness over the medial or lateral epicondyles.  Mild pain with resisted wrist extension.  No pain with resisted wrist flexion.  Patient was tender over the radial tunnel.  She has moderate pain with resisted middle finger extension.  5/5 strength C5-T1.  Sensation is intact in the C5-T1 nerve distributions.  Negative Chio's.  Normoreflexic brachioradialis, biceps, triceps.  Negative Spurling's.  Palpable radial pulse.    Imaging  Bilateral hand x-rays three views were obtained today and independently reviewed by myself.  No arthritic changes visualized.  No fracture.  No dislocation.  No foreign body.    Electrodiagnostic studies were reviewed by me today that were performed on 1/20/23. The Left median DSL was 4.67 and the right was 4.56. The Left median DML was 4.0 and the right was 4.06. Left Ulnar velocity across elbow was 53 and Right was 54.  EMG was not assessed.  Interpretation: mild bilateral carpal tunnel syndrome      Assessment and Plan  33-year-old right-hand dominant female presents with mild carpal tunnel syndrome and what I believe to be right radial  tunnel syndrome.  She has some vague aching pain in her forearm and wrist, burning pain, and pain that radiates up to her neck.  I think that she has had carpal tunnel symptoms for awhile but that her new symptoms in the past year maybe due to radial tunnel.  The alternative is that there is symptoms are coming from her neck.  I suggested that we start with a steroid injection in the radial tunnel as this would be both diagnostic and therapeutic.  Patient agreed to proceed.  See procedure note.  Discussed that if her symptoms return, she should return to be seen then we can discuss next steps.    Analy Landeros MD  Orthopaedic Hand Surgery

## 2023-12-05 ENCOUNTER — LAB VISIT (OUTPATIENT)
Dept: LAB | Facility: HOSPITAL | Age: 33
End: 2023-12-05
Attending: FAMILY MEDICINE
Payer: COMMERCIAL

## 2023-12-05 ENCOUNTER — OFFICE VISIT (OUTPATIENT)
Dept: INTERNAL MEDICINE | Facility: CLINIC | Age: 33
End: 2023-12-05
Payer: COMMERCIAL

## 2023-12-05 VITALS
BODY MASS INDEX: 35.31 KG/M2 | TEMPERATURE: 98 F | HEIGHT: 64 IN | DIASTOLIC BLOOD PRESSURE: 78 MMHG | WEIGHT: 206.81 LBS | HEART RATE: 88 BPM | SYSTOLIC BLOOD PRESSURE: 126 MMHG | OXYGEN SATURATION: 97 %

## 2023-12-05 DIAGNOSIS — Z00.00 ROUTINE GENERAL MEDICAL EXAMINATION AT A HEALTH CARE FACILITY: ICD-10-CM

## 2023-12-05 DIAGNOSIS — Z00.00 ROUTINE GENERAL MEDICAL EXAMINATION AT A HEALTH CARE FACILITY: Primary | ICD-10-CM

## 2023-12-05 LAB
CHOLEST SERPL-MCNC: 190 MG/DL (ref 120–199)
CHOLEST/HDLC SERPL: 3.5 {RATIO} (ref 2–5)
HDLC SERPL-MCNC: 55 MG/DL (ref 40–75)
HDLC SERPL: 28.9 % (ref 20–50)
LDLC SERPL CALC-MCNC: 119.2 MG/DL (ref 63–159)
NONHDLC SERPL-MCNC: 135 MG/DL
T4 FREE SERPL-MCNC: 0.9 NG/DL (ref 0.71–1.51)
TRIGL SERPL-MCNC: 79 MG/DL (ref 30–150)
TSH SERPL DL<=0.005 MIU/L-ACNC: 2.19 UIU/ML (ref 0.4–4)

## 2023-12-05 PROCEDURE — 3074F PR MOST RECENT SYSTOLIC BLOOD PRESSURE < 130 MM HG: ICD-10-PCS | Mod: CPTII,S$GLB,, | Performed by: FAMILY MEDICINE

## 2023-12-05 PROCEDURE — 99999 PR PBB SHADOW E&M-EST. PATIENT-LVL IV: ICD-10-PCS | Mod: PBBFAC,,, | Performed by: FAMILY MEDICINE

## 2023-12-05 PROCEDURE — 99395 PREV VISIT EST AGE 18-39: CPT | Mod: S$GLB,,, | Performed by: FAMILY MEDICINE

## 2023-12-05 PROCEDURE — 99999 PR PBB SHADOW E&M-EST. PATIENT-LVL IV: CPT | Mod: PBBFAC,,, | Performed by: FAMILY MEDICINE

## 2023-12-05 PROCEDURE — 3008F PR BODY MASS INDEX (BMI) DOCUMENTED: ICD-10-PCS | Mod: CPTII,S$GLB,, | Performed by: FAMILY MEDICINE

## 2023-12-05 PROCEDURE — 1160F PR REVIEW ALL MEDS BY PRESCRIBER/CLIN PHARMACIST DOCUMENTED: ICD-10-PCS | Mod: CPTII,S$GLB,, | Performed by: FAMILY MEDICINE

## 2023-12-05 PROCEDURE — 84439 ASSAY OF FREE THYROXINE: CPT | Performed by: FAMILY MEDICINE

## 2023-12-05 PROCEDURE — 3044F HG A1C LEVEL LT 7.0%: CPT | Mod: CPTII,S$GLB,, | Performed by: FAMILY MEDICINE

## 2023-12-05 PROCEDURE — 1159F PR MEDICATION LIST DOCUMENTED IN MEDICAL RECORD: ICD-10-PCS | Mod: CPTII,S$GLB,, | Performed by: FAMILY MEDICINE

## 2023-12-05 PROCEDURE — 80061 LIPID PANEL: CPT | Performed by: FAMILY MEDICINE

## 2023-12-05 PROCEDURE — 99395 PR PREVENTIVE VISIT,EST,18-39: ICD-10-PCS | Mod: S$GLB,,, | Performed by: FAMILY MEDICINE

## 2023-12-05 PROCEDURE — 3044F PR MOST RECENT HEMOGLOBIN A1C LEVEL <7.0%: ICD-10-PCS | Mod: CPTII,S$GLB,, | Performed by: FAMILY MEDICINE

## 2023-12-05 PROCEDURE — 3008F BODY MASS INDEX DOCD: CPT | Mod: CPTII,S$GLB,, | Performed by: FAMILY MEDICINE

## 2023-12-05 PROCEDURE — 84443 ASSAY THYROID STIM HORMONE: CPT | Performed by: FAMILY MEDICINE

## 2023-12-05 PROCEDURE — 3074F SYST BP LT 130 MM HG: CPT | Mod: CPTII,S$GLB,, | Performed by: FAMILY MEDICINE

## 2023-12-05 PROCEDURE — 1159F MED LIST DOCD IN RCRD: CPT | Mod: CPTII,S$GLB,, | Performed by: FAMILY MEDICINE

## 2023-12-05 PROCEDURE — 36415 COLL VENOUS BLD VENIPUNCTURE: CPT | Mod: PO | Performed by: FAMILY MEDICINE

## 2023-12-05 PROCEDURE — 3078F DIAST BP <80 MM HG: CPT | Mod: CPTII,S$GLB,, | Performed by: FAMILY MEDICINE

## 2023-12-05 PROCEDURE — 1160F RVW MEDS BY RX/DR IN RCRD: CPT | Mod: CPTII,S$GLB,, | Performed by: FAMILY MEDICINE

## 2023-12-05 PROCEDURE — 3078F PR MOST RECENT DIASTOLIC BLOOD PRESSURE < 80 MM HG: ICD-10-PCS | Mod: CPTII,S$GLB,, | Performed by: FAMILY MEDICINE

## 2023-12-05 NOTE — PROGRESS NOTES
Subjective     Patient ID: Krupa Walker is a 33 y.o. female.    Chief Complaint: Establish Care and Annual Exam    Patient presents to establish care and for annual exam      Review of Systems   Constitutional: Negative.    HENT: Negative.     Eyes:  Negative for discharge and redness.   Respiratory: Negative.     Cardiovascular: Negative.  Negative for chest pain, palpitations and leg swelling.   Gastrointestinal: Negative.  Negative for constipation and diarrhea.   Musculoskeletal: Negative.  Negative for arthralgias and gait problem.   Neurological: Negative.  Negative for coordination difficulties.   Psychiatric/Behavioral: Negative.            Objective     Physical Exam  Vitals and nursing note reviewed.   Constitutional:       Appearance: Normal appearance. She is normal weight.   HENT:      Head: Normocephalic and atraumatic.      Right Ear: Tympanic membrane, ear canal and external ear normal.      Left Ear: Tympanic membrane, ear canal and external ear normal.      Nose: Nose normal.      Mouth/Throat:      Mouth: Mucous membranes are moist.      Pharynx: Oropharynx is clear.   Eyes:      Extraocular Movements: Extraocular movements intact.      Conjunctiva/sclera: Conjunctivae normal.   Cardiovascular:      Rate and Rhythm: Normal rate and regular rhythm.      Pulses: Normal pulses.      Heart sounds: Normal heart sounds.   Pulmonary:      Effort: Pulmonary effort is normal.      Breath sounds: Normal breath sounds.   Abdominal:      General: Abdomen is flat. Bowel sounds are normal.      Palpations: Abdomen is soft.   Musculoskeletal:      Cervical back: Normal range of motion and neck supple.      Right lower leg: No edema.      Left lower leg: No edema.   Lymphadenopathy:      Cervical: No cervical adenopathy.   Skin:     General: Skin is warm and dry.   Neurological:      General: No focal deficit present.      Mental Status: She is alert and oriented to person, place, and time.   Psychiatric:          Mood and Affect: Mood normal.         Behavior: Behavior normal.            Assessment and Plan     1. Routine general medical examination at a health care facility  Comments:  reviewed age appropriate screenings and immunizations  Orders:  -     LIPID PANEL; Future; Expected date: 12/05/2023  -     TSH; Future; Expected date: 12/05/2023  -     T4, free; Future; Expected date: 12/05/2023                 Follow up in about 1 year (around 12/5/2024).

## 2024-01-24 ENCOUNTER — OFFICE VISIT (OUTPATIENT)
Dept: PSYCHIATRY | Facility: CLINIC | Age: 34
End: 2024-01-24
Payer: COMMERCIAL

## 2024-01-24 DIAGNOSIS — F51.05 INSOMNIA DUE TO OTHER MENTAL DISORDER: ICD-10-CM

## 2024-01-24 DIAGNOSIS — F41.1 GAD (GENERALIZED ANXIETY DISORDER): ICD-10-CM

## 2024-01-24 DIAGNOSIS — F33.41 RECURRENT MAJOR DEPRESSIVE DISORDER, IN PARTIAL REMISSION: ICD-10-CM

## 2024-01-24 DIAGNOSIS — F99 INSOMNIA DUE TO OTHER MENTAL DISORDER: ICD-10-CM

## 2024-01-24 DIAGNOSIS — F90.0 ATTENTION DEFICIT HYPERACTIVITY DISORDER (ADHD), PREDOMINANTLY INATTENTIVE TYPE: Primary | ICD-10-CM

## 2024-01-24 PROCEDURE — 99214 OFFICE O/P EST MOD 30 MIN: CPT | Mod: 95,,, | Performed by: INTERNAL MEDICINE

## 2024-01-24 PROCEDURE — 1160F RVW MEDS BY RX/DR IN RCRD: CPT | Mod: CPTII,95,, | Performed by: INTERNAL MEDICINE

## 2024-01-24 PROCEDURE — 1159F MED LIST DOCD IN RCRD: CPT | Mod: CPTII,95,, | Performed by: INTERNAL MEDICINE

## 2024-01-24 RX ORDER — DEXTROAMPHETAMINE SACCHARATE, AMPHETAMINE ASPARTATE, DEXTROAMPHETAMINE SULFATE AND AMPHETAMINE SULFATE 2.5; 2.5; 2.5; 2.5 MG/1; MG/1; MG/1; MG/1
10 TABLET ORAL 2 TIMES DAILY
Qty: 60 TABLET | Refills: 0 | Status: SHIPPED | OUTPATIENT
Start: 2024-01-24 | End: 2024-03-13 | Stop reason: SDUPTHER

## 2024-01-24 NOTE — PROGRESS NOTES
OUTPATIENT PSYCHIATRY RETURN VISIT    ENCOUNTER DATE:  1/24/24  SITE:  [facility], [facility]  LENGTH OF SESSION:  23 minutes    The patient location is:  [place], not in a healthcare facility  Visit type:  audiovisual    Face to Face time with patient:  23 minutes  30 minutes of total time spent on the encounter, which includes face to face time and non-face to face time preparing to see the patient (eg, review of tests), Obtaining and/or reviewing separately obtained history, Documenting clinical information in the electronic or other health record, Independently interpreting results (not separately reported) and communicating results to the patient/family/caregiver, or Care coordination (not separately reported).     Each patient to whom he or she provides medical services by telemedicine is:  (1) informed of the relationship between the physician and patient and the respective role of any other health care provider with respect to management of the patient; and (2) notified that he or she may decline to receive medical services by telemedicine and may withdraw from such care at any time.    CHIEF COMPLAINT:  ADHD and Mood      HISTORY OF PRESENTING ILLNESS:  Krupa Walker is a 33 y.o. female with history of MDD, JAVI, ADHD, and Insomnia who presents for follow up appointment.     Plan at last appointment on 7/12/2023:  Patient is not sure if she wants to take daily medication or not.  She has tried many medications, most of which had side effects and were minimally helpful.  Mood has improved on its own recently.  She will let me know if she decides she wants to restart Cymbalta.    Continue Xanax 0.5mg daily PRN anxiety/insomnia.  She is very rarely taking Buspar PRN.    Discussed with patient informed consent, risks versus benefits, alternative treatments, side effect profile and the inherent unpredictability of individual responses to these treatments.  The patient expresses understanding  "of the above and displays the capacity to agree with this current plan.  She plans to look for a new therapist near her new home.    History as told by patient:  In her new house and has sold the other house.  Trying to decorate house, get finances together.  Work has been busy but nothing like her old job so "fine."  She is at [employer].  Sleep has been terrible.  For awhile she was taking Neurontin at night and that helped but may have built up a tolerance.  Anxiety has been really bad.  Was feeling down when she made the appointment but feeling better now.  Will feel down for a few weeks here and there.   notices she is off.  Gets down, doesn't feel like doing anything, does the bare minimum, had lost a lot of weight but when depressed will go back to binge eating, low energy, naps during the day, lack of focus.  Gained 15 pounds back.  She had lost 50 pounds prior to that.  Stopped exercising too.  Some of this is the weather too.  Has 2 Mounjaro shots left and that is it.  Wants to get back to exercising.  For sleep, mostly trouble falling asleep and then wakes up about 2 times per night - usually her hip that wakes her up hurting.  X-rays didn't show anything.  Stays at mom's house if she works several shifts in a row - able to do her own thing and ends up staying up late and enjoying adult time until about midnight.  If home with , goes to bed 10pm.  Best sleep from 2am to 8-9am but can't sleep this late due to having kids.  Overall doing a lot better recently.  But     Per 8/1/18 note by Peewee Guerra MD:  Recent med trials  Prozac 40mg, stopped before last visit due to AEs  Abilify 2.5mg only tried for 2 days  Melatonin 3mg qhs   Neurontin 100mg PRN anxiety (tried a few times, not much effect)  Zoloft (felt like a zombie, not depressed but not happy either)  Ritalin  Effexor (does not remember)  Past meds:  wellbutrin (inadequate trial; started at same time as metformin and was " "having diarrhea & wt gain so stopped), lexapro (made her feel numb, lack of enjoyment), trintillex (made throw up a lot, took 2 days vomited the whole time), lamictal (unsure, per her records trouble w/compliance so stopped w/o adequate trial), trazodone (lowest dose thinks only 25mg, cutting in half, making very sleepy the next day), benadryl 25mg (will sleep for 12 hrs straight and feel very tired), vistaril 10mg (made too tired), Lunesta (still wakes in the night and gets terrible nightmares), ambien (horrible nightmares), enlyte (took for 1 month, unsure if it helped), Fetzima up to 40mg (seemed was helping but was $500/mo with insurance), vyvanse (helped mood but felt "wired," exacerbation of anxiety, chest tightness, too wired, irritability, insomnia, crashing with more depression even at low doses (eg 20mg). Could not sit still on this but very productive at work, overly focused, would stay up too late at night), topamax (does not know dose, did not lose weight), Rexulti (nothing bad, couldn't afford), abilify (trouble w/sleep, up 38 hrs straight despite feeling tired, couldn't turn brain off), prozac (less crying spells but very sleepy, heavy feeling even w/night dose trial), neurontin 100mg (didn't feel much)     Has anything ever made you feel better?  Thinks she did with either fetzima 40mg (green on genetic testing, notes from appt said wasn't working) or rexulti 1mg (rexulti too new to be on genetic testing below)     Gene testing:  RED:  Wellbutrin (may need lower doses, increase risk AEs), remeron, elavil, clomipramine, desipramine, doxepin, cymbalta, fluvoxamine, imipramine, nortriptyline, vortioxetine, paroxetine  Propranolol  Thorazine, abilify, rexulti, fanapt, perphenazine, risperdal, mellaril  Tegretol  Clonidine, straterra     YELLOW:  Trazodone (lower doses may be required), selegiline , citalopram (difficult to predict dose adjustment due to conflicting variations in metabolism), lexapro " (difficult to predict dose adjustment due to conflicting variations in metabolism), prozac (difficult to predict dose adjustment due to conflicting variations in metabolism), effexor (difficult to predict dose adjustment due to conflicting variations in metabolism)  Librium, clorazepate, diazepam, lorazepam, oxazepam  Prolixin, zyprexa, seroquel, clozaril, haldol  Vyvanse, adderall, dextroamphetamine     GREEN:  Pristiq, fetzima (old insurance paid only part and new none), Zoloft (never tried), Viibryd  Alprazolam, buspirone, clonazepam, eszopiclone, temazepam, zolpidem  Asenapine, lurasidone, paliperidone, thiothixene, ziprasidone  Lamictal, trileptal, depakote  Focalin, intuniv, ritalin, concerta, metadate, daytrana     No Proven Genetic Markers:  Neurontin, topamax, lithium    Medication side effects:  N/A  Medication compliance:  N/A    PSYCHIATRIC REVIEW OF SYSTEMS:  Trouble with sleep:  Yes, as above  Appetite changes:  Binges when depressed  Weight changes:  Loss then gain as above  Lack of energy:  Sometimes  Anhedonia:  Sometimes  Somatic symptoms:  Denies  Libido:  No current issues  Anxiety/panic:  Sometimes  Guilty/hopeless:  Denies  Self-injurious behavior/risky behavior:  Denies  Any drugs:  Denies  Alcohol:  Very rarely  Breastfeeding:  No    MEDICAL REVIEW OF SYSTEMS:  Complete review of systems performed covering Constitutional, Musculoskeletal, Neurologic.  All systems negative except for that covered in HPI.    PAST PSYCHIATRIC, MEDICAL, AND SOCIAL HISTORY REVIEWED  The patient's past medical, family and social history have been reviewed and updated as appropriate within the electronic medical record - see encounter notes.    MEDICATIONS:    Current Outpatient Medications:     ALPRAZolam (XANAX) 0.5 MG tablet, Take 1 tablet (0.5 mg total) by mouth daily as needed (Severe anxiety/Insomnia)., Disp: 30 tablet, Rfl: 0    azaTHIOprine (IMURAN) 50 mg Tab, Take 1 tablet (50 mg total) by mouth once  "daily., Disp: 90 tablet, Rfl: 1    busPIRone (BUSPAR) 10 MG tablet, Take 1 tablet (10 mg total) by mouth 3 (three) times daily. (Patient taking differently: Take 10 mg by mouth as needed.), Disp: 90 tablet, Rfl: 3    dextroamphetamine-amphetamine (ADDERALL) 10 mg Tab, Take 1 tablet (10 mg total) by mouth 2 (two) times a day., Disp: 60 tablet, Rfl: 0    gabapentin (NEURONTIN) 100 MG capsule, Take 2 capsules (200 mg total) by mouth every evening., Disp: 60 capsule, Rfl: 1    ketorolac (TORADOL) 10 mg tablet, Take 2 tabs (20mg) at the onset of a headache, if headache persists you may take 1 tab every 4 to 6 hours as needed. Do not exceed 40 mg per day., Disp: 20 tablet, Rfl: 3    multivit with calcium,iron,min (WOMEN'S DAILY MULTIVITAMIN ORAL), Women's Daily Multivitamin, Disp: , Rfl:     prochlorperazine (COMPAZINE) 5 MG tablet, Take 1 tablet (5 mg total) by mouth every 6 (six) hours as needed for Nausea (or headache pain)., Disp: 12 tablet, Rfl: 2    spironolactone (ALDACTONE) 100 MG tablet, Take 1 tablet (100 mg total) by mouth every evening., Disp: 30 tablet, Rfl: 2    sumatriptan (IMITREX) 50 MG tablet, Take at onset of migraine, can repeat in 2 hrs if needed.  No more than twice per day or 3 days/wk., Disp: 12 tablet, Rfl: 2    tiZANidine (ZANAFLEX) 4 MG tablet, Take 1 tablet (4 mg total) by mouth every 6 (six) hours as needed (for muscle spasm and ha's)., Disp: 20 tablet, Rfl: 0    ALLERGIES:  Review of patient's allergies indicates:  No Known Allergies    PSYCHIATRIC EXAM:  There were no vitals filed for this visit.    Appearance:  Well groomed, appearing healthy and of stated age  Behavior:  Cooperative, pleasant, no psychomotor agitation or retardation  Speech:  Normal rate, rhythm, prosody, and volume  Mood:  "Ok"  Affect:  Euthymic, bright  Thought Process:  Linear, logical, goal directed  Thought Content:  Negative for suicidal ideation, homicidal ideation, delusions or hallucinations.  Associations:  " Intact  Memory:  Grossly Intact  Level of Consciousness/Orientation:  Grossly intact  Fund of Knowledge:  Good  Attention:  Good  Language:  Fluent, able to name abstract and concrete objects  Insight:  Fair  Judgment:  Intact  Psychomotor signs:  No involuntary movements of face  Gait:  Unable to assess via virtual visit      RELEVANT LABS/STUDIES:    Lab Results   Component Value Date    WBC 7.24 07/31/2023    HGB 12.8 07/31/2023    HCT 36.4 (L) 07/31/2023    MCV 91 07/31/2023     07/31/2023     BMP  Lab Results   Component Value Date     07/31/2023    K 4.0 07/31/2023     07/31/2023    CO2 25 07/31/2023    BUN 16 07/31/2023    CREATININE 0.67 07/31/2023    CALCIUM 8.9 07/31/2023    ANIONGAP 10 07/31/2023    ESTGFRAFRICA >60 06/04/2021    EGFRNONAA >60 06/04/2021     Lab Results   Component Value Date    ALT 13 07/31/2023    AST 31 07/31/2023    ALKPHOS 63 07/31/2023    BILITOT 0.4 07/31/2023     Lab Results   Component Value Date    TSH 2.187 12/05/2023     Lab Results   Component Value Date    HGBA1C 4.9 01/10/2023       IMPRESSION:    Krupa Walker is a 33 y.o. female with history of MDD, JAVI, ADHD, and Insomnia who presents for follow up appointment.    Status/Progress:  Based on the examination today, the patient's problem(s) is/are inadequately controlled.  New problems have not been presented today.    Risk Parameters:  Patient reports no suicidal ideation  Patient reports no homicidal ideation  Patient reports no self-injurious behavior  Patient reports no violent behavior      DIAGNOSES:    ICD-10-CM ICD-9-CM   1. Attention deficit hyperactivity disorder (ADHD), predominantly inattentive type  F90.0 314.00   2. Recurrent major depressive disorder, in partial remission  F33.41 296.35   3. JAVI (generalized anxiety disorder)  F41.1 300.02   4. Insomnia due to other mental disorder  F51.05 300.9    F99 327.02       PLAN:  Discussed various options for mood and ADHD, including retrying  Wellbutrin or Prozac for mood versus starting low dose Adderall for ADHD.  She would first like to try Adderall 10mg BID but plans to start by only taking in the morning.    Continue Xanax 0.5mg daily PRN anxiety/insomnia.  She is very rarely taking Buspar PRN.    Discussed with patient informed consent, risks versus benefits, alternative treatments, side effect profile and the inherent unpredictability of individual responses to these treatments.  The patient expresses understanding of the above and displays the capacity to agree with this current plan.  She would like to establish with a new therapist however would need to be at [facility] due to insurance.      RETURN TO CLINIC:  Follow up in about 3 months (around 4/24/2024).

## 2024-01-26 ENCOUNTER — PATIENT MESSAGE (OUTPATIENT)
Dept: INTERNAL MEDICINE | Facility: CLINIC | Age: 34
End: 2024-01-26
Payer: COMMERCIAL

## 2024-01-26 DIAGNOSIS — H10.9 BACTERIAL CONJUNCTIVITIS OF LEFT EYE: Primary | ICD-10-CM

## 2024-01-26 RX ORDER — TOBRAMYCIN 3 MG/ML
1 SOLUTION/ DROPS OPHTHALMIC EVERY 4 HOURS
Qty: 5 ML | Refills: 0 | Status: SHIPPED | OUTPATIENT
Start: 2024-01-26 | End: 2024-02-02

## 2024-02-06 ENCOUNTER — PATIENT MESSAGE (OUTPATIENT)
Dept: PSYCHIATRY | Facility: CLINIC | Age: 34
End: 2024-02-06
Payer: COMMERCIAL

## 2024-02-21 ENCOUNTER — OFFICE VISIT (OUTPATIENT)
Dept: DERMATOLOGY | Facility: CLINIC | Age: 34
End: 2024-02-21
Payer: COMMERCIAL

## 2024-02-21 DIAGNOSIS — R76.8 ANA POSITIVE: ICD-10-CM

## 2024-02-21 DIAGNOSIS — L65.9 ALOPECIA: Primary | ICD-10-CM

## 2024-02-21 DIAGNOSIS — L53.9 ERYTHEMA: ICD-10-CM

## 2024-02-21 PROCEDURE — 1159F MED LIST DOCD IN RCRD: CPT | Mod: CPTII,S$GLB,, | Performed by: PHYSICIAN ASSISTANT

## 2024-02-21 PROCEDURE — 99999 PR PBB SHADOW E&M-EST. PATIENT-LVL III: CPT | Mod: PBBFAC,,, | Performed by: PHYSICIAN ASSISTANT

## 2024-02-21 PROCEDURE — 99214 OFFICE O/P EST MOD 30 MIN: CPT | Mod: S$GLB,,, | Performed by: PHYSICIAN ASSISTANT

## 2024-02-21 PROCEDURE — 1160F RVW MEDS BY RX/DR IN RCRD: CPT | Mod: CPTII,S$GLB,, | Performed by: PHYSICIAN ASSISTANT

## 2024-02-21 RX ORDER — CLOBETASOL PROPIONATE 0.46 MG/ML
SOLUTION TOPICAL 2 TIMES DAILY
Qty: 50 ML | Refills: 1 | Status: SHIPPED | OUTPATIENT
Start: 2024-02-21 | End: 2024-04-29

## 2024-02-21 RX ORDER — KETOCONAZOLE 20 MG/ML
SHAMPOO, SUSPENSION TOPICAL
Qty: 120 ML | Refills: 5 | Status: SHIPPED | OUTPATIENT
Start: 2024-02-21

## 2024-02-21 NOTE — PATIENT INSTRUCTIONS
Sunscreens for the Face  Neutrogena sheer mineral     Cetaphil Sheer Mineral sunscreen    Elta MD UV Physical or Elta MD UV Clear ([email or web address]; [email or web address])

## 2024-02-21 NOTE — PROGRESS NOTES
"  Subjective:      Patient ID:  Krupa Walker is a 33 y.o. female who presents for   Chief Complaint   Patient presents with    Hair Loss     X may 2023 Tx shampoo/ non effective s/s hairloss.      Hx of +SUDHA w alopecia, arthritis, photosensitivity, rash consistent w lupus - Consistent w SLE per rheum notes. Last seen by Dr. Lott on 11/29/22 for diffuse alopecia of scalp and erythema of face. Previous tx: spironolactone 100 mg, sulfur soap.  No longer taking spironolactone (didn't find helpful, trying for pregnancy).  She describes long time h/o hair loss over past 5-6 years, worsening since 2020 (Covid).  Endorses h/o facial rash intermittently with heat, hot food, or sun triggers. Denies rash of trunk or extremities. Describes a shedding hair loss (worse after daughter was born), +general thinning of vertex, itching, h/o redness, intermittent "firm knots", and pimple like bumps. Endorses occasional dandruff, but denies adherent scaling or flaking. Has not had biopsy to date. Current tx: daily multivitamin.    Endorses FHX of hair loss in maternal gm (but onset in her late 60's); thinning of hair in mother    PMHX: + PCOS     Serologies/Labs:              CBC wnl (7/31/23)               TSH and FT4 wnl on 12/5/23   +SUDHA 1:160, nucliolar, neg profile   +RF (41), o/w Myomarker panel neg   Current Treatment:   Imuran - prescribed not taking  Previous Treatment:    HCQ - yeast infections          Review of Systems   Constitutional:  Negative for fever and chills.   Gastrointestinal:  Negative for nausea and vomiting.   Skin:  Positive for rash, sun sensitivity, daily sunscreen use and activity-related sunscreen use. Negative for itching, dry skin and recent sunburn.   Hematologic/Lymphatic: Does not bruise/bleed easily.       Objective:   Physical Exam   Constitutional: She appears well-developed and well-nourished. No distress.   Neurological: She is alert and oriented to person, place, and time. She is not " disoriented.   Psychiatric: She has a normal mood and affect.   Skin:   Areas Examined (abnormalities noted in diagram):   Scalp / Hair Palpated and Inspected  Head / Face Inspection Performed  Neck Inspection Performed  Chest / Axilla Inspection Performed  Back Inspection Performed  RUE Inspected  LUE Inspection Performed            Diagram Legend     Erythematous scaling macule/papule c/w actinic keratosis       Vascular papule c/w angioma      Pigmented verrucoid papule/plaque c/w seborrheic keratosis      Yellow umbilicated papule c/w sebaceous hyperplasia      Irregularly shaped tan macule c/w lentigo     1-2 mm smooth white papules consistent with Milia      Movable subcutaneous cyst with punctum c/w epidermal inclusion cyst      Subcutaneous movable cyst c/w pilar cyst      Firm pink to brown papule c/w dermatofibroma      Pedunculated fleshy papule(s) c/w skin tag(s)      Evenly pigmented macule c/w junctional nevus     Mildly variegated pigmented, slightly irregular-bordered macule c/w mildly atypical nevus      Flesh colored to evenly pigmented papule c/w intradermal nevus       Pink pearly papule/plaque c/w basal cell carcinoma      Erythematous hyperkeratotic cursted plaque c/w SCC      Surgical scar with no sign of skin cancer recurrence      Open and closed comedones      Inflammatory papules and pustules      Verrucoid papule consistent consistent with wart     Erythematous eczematous patches and plaques     Dystrophic onycholytic nail with subungual debris c/w onychomycosis     Umbilicated papule    Erythematous-base heme-crusted tan verrucoid plaque consistent with inflamed seborrheic keratosis     Erythematous Silvery Scaling Plaque c/w Psoriasis     See annotation      Assessment / Plan:        Alopecia  SUDHA positive  -     clobetasoL (TEMOVATE) 0.05 % external solution; Apply topically 2 (two) times daily. For alopecia. Strong steroid- do not use on face.  Dispense: 50 mL; Refill: 1  -      ketoconazole (NIZORAL) 2 % shampoo; Shampoo scalp 1-2 times weekly. Lather x 5 minutes.  Dispense: 120 mL; Refill: 5  Ddx: could be multifactorial but suspect potential autoimmune vs. pattern vs. Other Inflammatory.  Discussed dx and tx options. Agree to trial of above rx meds. Consider otc 5% rogaine as desired to help stimulate hair growth. Would reconsider scalp biopsy in future. Discussed allowing for additional time at visit if biopsy planned (to allow for time for vasoconstriction of area following local numbing). Would reconsider trial of ILK in future if more c/w autoimmune/ inflammatory hair loss. Advised patient of risk for increased shedding of hair in the first 6 weeks of using Rogaine.  Rogaine may have to be continued indefinitely in cases of pattern alopecia. It will take about 3-6 months to notice any improvement.     Erythema  Ddx: rosacea vs. Photosensitivity vs. Other  Encouraged gentle skin care, daily spf 30+ broad spectrum.           Follow up in about 2 months (around 4/21/2024).

## 2024-02-21 NOTE — Clinical Note
Everett Bell,  I wanted to check with your regarding recommendations for potential scalp biopsy?  We can discuss this case further tomorrow when I am at [facility].    Thanks! Sharon

## 2024-02-24 DIAGNOSIS — F51.05 INSOMNIA DUE TO OTHER MENTAL DISORDER: ICD-10-CM

## 2024-02-24 DIAGNOSIS — F99 INSOMNIA DUE TO OTHER MENTAL DISORDER: ICD-10-CM

## 2024-02-24 DIAGNOSIS — F41.1 GAD (GENERALIZED ANXIETY DISORDER): ICD-10-CM

## 2024-02-26 RX ORDER — ALPRAZOLAM 0.5 MG/1
TABLET ORAL
Qty: 30 TABLET | Refills: 0 | Status: SHIPPED | OUTPATIENT
Start: 2024-02-26

## 2024-03-13 ENCOUNTER — PATIENT MESSAGE (OUTPATIENT)
Dept: PSYCHIATRY | Facility: CLINIC | Age: 34
End: 2024-03-13
Payer: COMMERCIAL

## 2024-03-13 ENCOUNTER — PATIENT MESSAGE (OUTPATIENT)
Dept: INTERNAL MEDICINE | Facility: CLINIC | Age: 34
End: 2024-03-13
Payer: COMMERCIAL

## 2024-03-13 DIAGNOSIS — F90.0 ATTENTION DEFICIT HYPERACTIVITY DISORDER (ADHD), PREDOMINANTLY INATTENTIVE TYPE: ICD-10-CM

## 2024-03-13 RX ORDER — DEXTROAMPHETAMINE SACCHARATE, AMPHETAMINE ASPARTATE, DEXTROAMPHETAMINE SULFATE AND AMPHETAMINE SULFATE 2.5; 2.5; 2.5; 2.5 MG/1; MG/1; MG/1; MG/1
10 TABLET ORAL 2 TIMES DAILY
Qty: 60 TABLET | Refills: 0 | Status: SHIPPED | OUTPATIENT
Start: 2024-03-13

## 2024-03-15 ENCOUNTER — HOSPITAL ENCOUNTER (OUTPATIENT)
Dept: RADIOLOGY | Facility: HOSPITAL | Age: 34
Discharge: HOME | End: 2024-03-15
Attending: NURSE PRACTITIONER
Payer: COMMERCIAL

## 2024-03-15 ENCOUNTER — OFFICE VISIT (OUTPATIENT)
Dept: INTERNAL MEDICINE | Facility: CLINIC | Age: 34
End: 2024-03-15
Payer: COMMERCIAL

## 2024-03-15 VITALS
SYSTOLIC BLOOD PRESSURE: 120 MMHG | DIASTOLIC BLOOD PRESSURE: 72 MMHG | OXYGEN SATURATION: 97 % | WEIGHT: 216.06 LBS | HEART RATE: 101 BPM | BODY MASS INDEX: 37.09 KG/M2 | TEMPERATURE: 99 F

## 2024-03-15 DIAGNOSIS — M54.2 CERVICAL PAIN (NECK): Primary | ICD-10-CM

## 2024-03-15 DIAGNOSIS — M54.2 CERVICAL PAIN (NECK): ICD-10-CM

## 2024-03-15 DIAGNOSIS — R29.898 WEAKNESS OF RIGHT ARM: ICD-10-CM

## 2024-03-15 PROCEDURE — 3074F SYST BP LT 130 MM HG: CPT | Mod: CPTII,S$GLB,, | Performed by: NURSE PRACTITIONER

## 2024-03-15 PROCEDURE — 99999 PR PBB SHADOW E&M-EST. PATIENT-LVL IV: CPT | Mod: PBBFAC,,, | Performed by: NURSE PRACTITIONER

## 2024-03-15 PROCEDURE — 3008F BODY MASS INDEX DOCD: CPT | Mod: CPTII,S$GLB,, | Performed by: NURSE PRACTITIONER

## 2024-03-15 PROCEDURE — 99214 OFFICE O/P EST MOD 30 MIN: CPT | Mod: S$GLB,,, | Performed by: NURSE PRACTITIONER

## 2024-03-15 PROCEDURE — 1160F RVW MEDS BY RX/DR IN RCRD: CPT | Mod: CPTII,S$GLB,, | Performed by: NURSE PRACTITIONER

## 2024-03-15 PROCEDURE — 1159F MED LIST DOCD IN RCRD: CPT | Mod: CPTII,S$GLB,, | Performed by: NURSE PRACTITIONER

## 2024-03-15 PROCEDURE — 72040 X-RAY EXAM NECK SPINE 2-3 VW: CPT | Mod: TC,PN

## 2024-03-15 PROCEDURE — 3078F DIAST BP <80 MM HG: CPT | Mod: CPTII,S$GLB,, | Performed by: NURSE PRACTITIONER

## 2024-03-15 PROCEDURE — 72040 X-RAY EXAM NECK SPINE 2-3 VW: CPT | Mod: 26,,, | Performed by: RADIOLOGY

## 2024-03-15 RX ORDER — TRAMADOL HYDROCHLORIDE 50 MG/1
50 TABLET ORAL EVERY 8 HOURS PRN
Qty: 10 TABLET | Refills: 0 | Status: SHIPPED | OUTPATIENT
Start: 2024-03-15 | End: 2024-03-20

## 2024-03-15 RX ORDER — TIZANIDINE 4 MG/1
4 TABLET ORAL EVERY 6 HOURS PRN
Qty: 20 TABLET | Refills: 0 | Status: SHIPPED | OUTPATIENT
Start: 2024-03-15

## 2024-03-15 RX ORDER — CLOTRIMAZOLE AND BETAMETHASONE DIPROPIONATE 10; .64 MG/G; MG/G
CREAM TOPICAL 2 TIMES DAILY
COMMUNITY
Start: 2024-02-24

## 2024-03-15 RX ORDER — MELOXICAM 15 MG/1
15 TABLET ORAL DAILY
Qty: 30 TABLET | Refills: 0 | Status: SHIPPED | OUTPATIENT
Start: 2024-03-15

## 2024-03-15 NOTE — LETTER
March 15, 2024      [facility]  [address] [place] [address]  Phone: [phone]  Fax: [phone]       Patient: Krupa Walker   YOB: 1990  Date of Visit: 03/15/2024    To Whom It May Concern:    Radha Walker  was at [facility] on 03/15/2024. The patient may return to work/school on 3/16/24 with no restrictions. If you have any questions or concerns, or if I can be of further assistance, please do not hesitate to contact me.    Sincerely,    Avis Monsalve LPN

## 2024-03-18 ENCOUNTER — PATIENT MESSAGE (OUTPATIENT)
Dept: INTERNAL MEDICINE | Facility: CLINIC | Age: 34
End: 2024-03-18
Payer: COMMERCIAL

## 2024-03-18 DIAGNOSIS — R29.898 WEAKNESS OF RIGHT ARM: ICD-10-CM

## 2024-03-18 DIAGNOSIS — M54.2 CERVICAL PAIN (NECK): Primary | ICD-10-CM

## 2024-04-03 ENCOUNTER — PATIENT MESSAGE (OUTPATIENT)
Dept: INTERNAL MEDICINE | Facility: CLINIC | Age: 34
End: 2024-04-03
Payer: COMMERCIAL

## 2024-04-05 ENCOUNTER — OFFICE VISIT (OUTPATIENT)
Dept: NEUROLOGY | Facility: CLINIC | Age: 34
End: 2024-04-05
Payer: COMMERCIAL

## 2024-04-05 DIAGNOSIS — G43.109 MIGRAINE WITH AURA AND WITHOUT STATUS MIGRAINOSUS, NOT INTRACTABLE: ICD-10-CM

## 2024-04-05 PROCEDURE — 1159F MED LIST DOCD IN RCRD: CPT | Mod: CPTII,95,, | Performed by: PHYSICIAN ASSISTANT

## 2024-04-05 PROCEDURE — 1160F RVW MEDS BY RX/DR IN RCRD: CPT | Mod: CPTII,95,, | Performed by: PHYSICIAN ASSISTANT

## 2024-04-05 PROCEDURE — 99214 OFFICE O/P EST MOD 30 MIN: CPT | Mod: 95,,, | Performed by: PHYSICIAN ASSISTANT

## 2024-04-05 RX ORDER — SUMATRIPTAN SUCCINATE 100 MG/1
TABLET ORAL
Qty: 18 TABLET | Refills: 5 | Status: SHIPPED | OUTPATIENT
Start: 2024-04-05

## 2024-04-05 NOTE — PROGRESS NOTES
Established Patient     The patient location is: [place]  The chief complaint leading to consultation is: ha f/up    Visit type: audiovisual    Face to Face time with patient: 10 min  24 minutes of total time spent on the encounter, which includes face to face time and non-face to face time preparing to see the patient (eg, review of tests), Obtaining and/or reviewing separately obtained history, Documenting clinical information in the electronic or other health record, Independently interpreting results (not separately reported) and communicating results to the patient/family/caregiver, or Care coordination (not separately reported).         Each patient to whom he or she provides medical services by telemedicine is:  (1) informed of the relationship between the physician and patient and the respective role of any other health care provider with respect to management of the patient; and (2) notified that he or she may decline to receive medical services by telemedicine and may withdraw from such care at any time.    Notes:     SUBJECTIVE:  Patient ID: Krupa Walker   Chief Complaint: Headache    History of Present Illness:  Krupa Walker is a 33 y.o. female with migraine, depression, insomnia, hyperparathyroidism, h/o kidney stones, obesity, ADHD, anxiety, GERD, HTN, TMJ, ?lupus, who presents to  alone for follow-up of headaches.       04/05/2024 - Interval History:  Ha's are occurring twice a week. Has been following w/ pcp due to worsened neck pain now radiating both up head and down arm causing n/t. Has upcoming  cervical MRI planned and continues w/ chiropractic care, gabapentin, muscle relaxant, and mobic. Pt would like to remain on current regimen while awaiting neck w/up. W/ Possible referral to gen neuro in future for neck. She'd like to increase imitrex in the meantime as it helps.  Plan: continue propranolol 20mg qhs (caution w/ increased doses), incrase  imitrex 100mg prn, continue compazine prn,  continue psych recs regarding poor sleep, regarding neck pt is undergoing w/up w/ pcp and will keep clinic updated, rtc in 3 mo or prn    03/22/2023 - Interval History:  Ha's worsened x 2 months occurring 0-6/30 days per month lasting 2hrs - 4 days. Possible triggers include increased work, poor sleep and continuation of her neck muscle tension w/ neck pain occurring almost daily. She is undergoing PT currently. Psych is currently working w/ pt on her trouble w/ sleeping.   Maxalt 10mg - doesn't help  Toradol tabs - initially helped, no longer helping  Compazine - tried it twice, helped nausea, unsure if it helped gavin as she typically laid down after  Tizanidine - helps greatly, used it prn for muscle tension  Discussed multiple otpions w/ pt. She defers adjusting gabapentin 2/2 drowsy. Recalls trying a higher dose o fpropranolol that caused lower hr's, would like to take 20mg consistently nightly.   Plan: take propranolol 20mg qhs (caution w/ increased doses), start imitrex 50mg prn, continue tizanidine infrequently, continue compazine prn, continue PT, continue psych recs regarding poor sleep, rtc in 2-3 mo    Recommendations made at last Office Visit on 5/17/22:  - Discussed symptoms appear to be consistent with migraines, discussed treatment options and patient agreed with the following plan:  - ppx - start supplements  - abortive - change maxalt to odt, trial toradol tabs as 2nd line (chagne to 1st line if effective, if fails trial sprix)  - nausea - trial compazine for dual purpose of treating HA's and nausea  - anxiety/depression - takes vibryd, avoid bb, mgmt per pcp  - h/o kidney stones - avoid tpx and zonisamide, mgmt per pcp  - d/c caffeine  - knee pain - takes mobic infrequently, advised not to take mobic and toradol concurrently or in high frequencies, mgmt per pcp  - trouble w/ sleep - pt taking trazodone currently which somewhat helps, mgmt per pcp  - obesity - taking diethylproprione, mgmt per pcp  -  TMJ - continue mgmt per dentist  - risks, benefits, and potential side effects of maxalt, toradol, sprix, nurtec/ubrelvy, devices discussed   - alternative treatment options offered   - importance of healthy diet, regular exercise and sleep hygiene in the treatment of headaches    - Start tracking headaches via Migraine Buddy sheridan on phone   - RTC in 3 mo     Treatments Tried:  zoloft - low blood sugar  zonisamide - avoid 2/2 h/o kidney stones  tpx - drowsiness  Bb - avoid 2/2 depression  trazadone - helps somewhat w/ sleep  maxalt 10mg - typically resolves, causes drowsiness/hungover effects, no longer helping  almotriptan - didn't help  Ibuprofen   Tylenol  Naproxen  excedrin  mobic - helps w/ knee pain  Toradol tabs - initially helped, no longer helping  Compazine - tried it twice, helped nausea, unsure if it helped gavin as she typically laid down after  Tizanidine - helps greatly, used it prn for muscle tension    Current Medications:    Current Outpatient Medications:     ALPRAZolam (XANAX) 0.5 MG tablet, TAKE 1 TABLET BY MOUTH ONCE DAILY AS NEEDED FOR SEVERE ANXIETY AND INSOMNIA, Disp: 30 tablet, Rfl: 0    azaTHIOprine (IMURAN) 50 mg Tab, Take 1 tablet (50 mg total) by mouth once daily., Disp: 90 tablet, Rfl: 1    busPIRone (BUSPAR) 10 MG tablet, Take 1 tablet (10 mg total) by mouth 3 (three) times daily. (Patient taking differently: Take 10 mg by mouth as needed.), Disp: 90 tablet, Rfl: 3    clobetasoL (TEMOVATE) 0.05 % external solution, Apply topically 2 (two) times daily. For alopecia. Strong steroid- do not use on face., Disp: 50 mL, Rfl: 1    clotrimazole-betamethasone 1-0.05% (LOTRISONE) cream, Apply topically 2 (two) times daily., Disp: , Rfl:     dextroamphetamine-amphetamine (ADDERALL) 10 mg Tab, Take 1 tablet (10 mg total) by mouth 2 (two) times a day. (Patient not taking: Reported on 3/15/2024), Disp: 60 tablet, Rfl: 0    gabapentin (NEURONTIN) 100 MG capsule, Take 2 capsules (200 mg total) by mouth  every evening., Disp: 60 capsule, Rfl: 1    ketoconazole (NIZORAL) 2 % shampoo, Shampoo scalp 1-2 times weekly. Lather x 5 minutes., Disp: 120 mL, Rfl: 5    ketorolac (TORADOL) 10 mg tablet, Take 2 tabs (20mg) at the onset of a headache, if headache persists you may take 1 tab every 4 to 6 hours as needed. Do not exceed 40 mg per day., Disp: 20 tablet, Rfl: 3    meloxicam (MOBIC) 15 MG tablet, Take 1 tablet (15 mg total) by mouth once daily., Disp: 30 tablet, Rfl: 0    multivit with calcium,iron,min (WOMEN'S DAILY MULTIVITAMIN ORAL), Women's Daily Multivitamin, Disp: , Rfl:     prochlorperazine (COMPAZINE) 5 MG tablet, Take 1 tablet (5 mg total) by mouth every 6 (six) hours as needed for Nausea (or headache pain). (Patient not taking: Reported on 3/15/2024), Disp: 12 tablet, Rfl: 2    spironolactone (ALDACTONE) 100 MG tablet, Take 1 tablet (100 mg total) by mouth every evening., Disp: 30 tablet, Rfl: 2    sumatriptan (IMITREX) 100 MG tablet, Take at onset of migraine, can repeat in 2 hrs if needed.  No more than twice per day or 3 days/wk., Disp: 18 tablet, Rfl: 5    tiZANidine (ZANAFLEX) 4 MG tablet, Take 1 tablet (4 mg total) by mouth every 6 (six) hours as needed (for muscle spasm and ha's)., Disp: 20 tablet, Rfl: 0    Review of Systems - as per HPI, otherwise a balanced 10 systems review is negative.    OBJECTIVE:  Vitals:  LMP 03/04/2024 (Exact Date)      Physical Exam:  Constitutional: she appears well-developed and well-nourished. she is well groomed. NAD   HENT:    Head: Normocephalic and atraumatic  Musculoskeletal: Normal range of motion. No joint stiffness.   Psychiatric: Mood and affect are normal    Neuro: Patient is alert and oriented to person, place, and time. Language is intact and fluent. Speech is clear and fluent. Recent and remote memory are intact.  Normal attention and concentration.  Facial movement is symmetric. Moves all 4 extremities against gravity. .     Review of Data:   Notes from  neuro reviewed   Labs:  No visits with results within 3 Month(s) from this visit.   Latest known visit with results is:   Lab Visit on 12/05/2023   Component Date Value Ref Range Status    Cholesterol 12/05/2023 190  120 - 199 mg/dL Final    Triglycerides 12/05/2023 79  30 - 150 mg/dL Final    HDL 12/05/2023 55  40 - 75 mg/dL Final    LDL Cholesterol 12/05/2023 119.2  63.0 - 159.0 mg/dL Final    HDL/Cholesterol Ratio 12/05/2023 28.9  20.0 - 50.0 % Final    Total Cholesterol/HDL Ratio 12/05/2023 3.5  2.0 - 5.0 Final    Non-HDL Cholesterol 12/05/2023 135  mg/dL Final    TSH 12/05/2023 2.187  0.400 - 4.000 uIU/mL Final    Free T4 12/05/2023 0.90  0.71 - 1.51 ng/dL Final     Imaging:  Results for orders placed or performed during the hospital encounter of 01/21/16   MRI Brain W WO Contrast    Narrative    Exam: MRI brain with and without contrast    History:     Findings:     MRI is performed with routine sequences prior to and following IV administration 10 mL Gadavist.      Brain has no structural abnormality.  No mass, hemorrhage, infarction, subdural collection, hydrocephalus or other acute finding is seen on this cranial MR examination.  No diffusion abnormality is seen.      Following contrast administration, there is no evidence of abnormal enhancement.    Impression    Normal MRI of the brain       Electronically signed by: KIKO ROBLEDO MD  Date:     01/22/16  Time:    03:27      Note: I have independently reviewed any/all imaging/labs/tests and agree with the report (s) as documented.  Any discrepancies will be as noted/demarcated by free text.  ALVA BRUMFIELD 4/5/2024    ASSESSMENT:  1. Migraine with aura and without status migrainosus, not intractable          PLAN:  - Discussed symptoms appear to be consistent with migraines complicated by cervicogenic component and poor sleep, discussed treatment options and patient agreed with the following plan:  - ppx - continue propranolol nightly at 20mg  - abortive -  incrase imitrex  - nausea - continue compazine for dual purpose of treating HA's and nausea  - cervicogenic component - undergoing w/up w/ pcp  - anxiety/depression - avoid bb, mgmt per pcp  - h/o kidney stones - avoid tpx and zonisamide, mgmt per pcp  - d/c caffeine  - knee pain - takes mobic infrequently, mgmt per pcp  - trouble w/ sleep - pt taking trazodone currently which somewhat helps, mgmt per pcp and psych  - obesity - taking diethylproprione, mgmt per pcp  - TMJ - continue mgmt per dentist  - track ha's   - Discussed goals of therapy are to decrease the frequency, intensity, and duration of headaches  - RTC in 3 mo or prn    Orders Placed This Encounter    sumatriptan (IMITREX) 100 MG tablet         Discussed potential for teratogenicity with treatment, patient understands if her family planning status should change she will contact office immediately and we will safely adjust medications as needed.     Questions and concerns were sought and answered to the patient's stated verbal satisfaction.  The patient verbalizes understanding and agreement with the above stated treatment plan.     CC: Madhuri Juarez MD Sarena Patel, PA-C  [facility] [place]   [phone]    Dr. Olvera was available during today's encounter.

## 2024-04-08 ENCOUNTER — HOSPITAL ENCOUNTER (OUTPATIENT)
Dept: RADIOLOGY | Facility: HOSPITAL | Age: 34
Discharge: HOME | End: 2024-04-08
Attending: NURSE PRACTITIONER
Payer: COMMERCIAL

## 2024-04-08 DIAGNOSIS — R29.898 WEAKNESS OF RIGHT ARM: ICD-10-CM

## 2024-04-08 DIAGNOSIS — M54.2 CERVICAL PAIN (NECK): ICD-10-CM

## 2024-04-08 PROCEDURE — 72141 MRI NECK SPINE W/O DYE: CPT | Mod: TC,PN

## 2024-04-08 PROCEDURE — 72141 MRI NECK SPINE W/O DYE: CPT | Mod: 26,,, | Performed by: STUDENT IN AN ORGANIZED HEALTH CARE EDUCATION/TRAINING PROGRAM

## 2024-04-29 ENCOUNTER — OFFICE VISIT (OUTPATIENT)
Dept: DERMATOLOGY | Facility: CLINIC | Age: 34
End: 2024-04-29
Payer: COMMERCIAL

## 2024-04-29 DIAGNOSIS — R76.8 ANA POSITIVE: ICD-10-CM

## 2024-04-29 DIAGNOSIS — L65.8 FEMALE PATTERN HAIR LOSS: ICD-10-CM

## 2024-04-29 DIAGNOSIS — L65.9 ALOPECIA: Primary | ICD-10-CM

## 2024-04-29 PROCEDURE — 99213 OFFICE O/P EST LOW 20 MIN: CPT | Mod: S$GLB,,, | Performed by: DERMATOLOGY

## 2024-04-29 PROCEDURE — 99999 PR PBB SHADOW E&M-EST. PATIENT-LVL III: CPT | Mod: PBBFAC,,, | Performed by: DERMATOLOGY

## 2024-04-29 PROCEDURE — 1160F RVW MEDS BY RX/DR IN RCRD: CPT | Mod: CPTII,S$GLB,, | Performed by: DERMATOLOGY

## 2024-04-29 PROCEDURE — 1159F MED LIST DOCD IN RCRD: CPT | Mod: CPTII,S$GLB,, | Performed by: DERMATOLOGY

## 2024-04-29 RX ORDER — FLUOCINONIDE TOPICAL SOLUTION USP, 0.05% 0.5 MG/ML
SOLUTION TOPICAL DAILY
Qty: 60 ML | Refills: 5 | Status: SHIPPED | OUTPATIENT
Start: 2024-04-29

## 2024-04-29 NOTE — PROGRESS NOTES
Subjective:      Patient ID:  Krupa Walker is a 33 y.o. female who presents for   Chief Complaint   Patient presents with    Hair Loss     Previously seen by Sharon.  Using clobetasol and ketoconazole shampoo.  Pt does not report any noticeable change.  The scalp is itchy but not painful.       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 She was initially seen by Dr. Lott and started on spironoalctone, dc'd 6 months ago. Did not start OTC minoxidil due to desire for pregnancy. On Clobetasol solution 2 times/week, and keto shampoo every other day.    + fatigue, joint pain, photosensitivity    Hx of being on Mounjaro, dc'd 4 months ago, which made cycles more regular.         Review of Systems   Constitutional:  Negative for fever and chills.   Gastrointestinal:  Negative for nausea and vomiting.   Skin:  Positive for activity-related sunscreen use. Negative for daily sunscreen use and recent sunburn.   Hematologic/Lymphatic: Does not bruise/bleed easily.       Objective:   Physical Exam   Constitutional: She appears well-developed and well-nourished. No distress.   Neurological: She is alert and oriented to person, place, and time. She is not disoriented.   Psychiatric: She has a normal mood and affect.   Skin:   Areas Examined (abnormalities noted in diagram):   Head / Face Inspection Performed  Neck Inspection Performed  RUE Inspected  LUE Inspection Performed  Nails and Digits Inspection Performed              Assessment / Plan:        Alopecia  SUDHA positive  Female pattern hair loss  -     fluocinonide (LIDEX) 0.05 % external solution; Apply topically once daily. Apply to scalp once daily. Stop if become pregnant.  Dispense: 60 mL; Refill: 5  -     in setting of PCOS.  Hair loss c/w female pattern, discussed could be sign of hormone imbalance.  Pt also with difficulty becoming pregnant, awaiting GYN appt. Will start above med. Consider OTC  biotin if GYN approves.  The patient acknowledged understanding. Recommend pt stop lidex if becomes pregnant. The patient acknowledged understanding.                Follow up in about 3 months (around 7/29/2024).

## 2024-05-08 ENCOUNTER — OFFICE VISIT (OUTPATIENT)
Dept: URGENT CARE | Facility: CLINIC | Age: 34
End: 2024-05-08
Payer: COMMERCIAL

## 2024-05-08 VITALS
RESPIRATION RATE: 18 BRPM | HEART RATE: 75 BPM | DIASTOLIC BLOOD PRESSURE: 81 MMHG | HEIGHT: 63 IN | BODY MASS INDEX: 36.5 KG/M2 | WEIGHT: 206 LBS | SYSTOLIC BLOOD PRESSURE: 127 MMHG | OXYGEN SATURATION: 98 % | TEMPERATURE: 98 F

## 2024-05-08 DIAGNOSIS — R30.0 DYSURIA: ICD-10-CM

## 2024-05-08 DIAGNOSIS — R31.9 URINARY TRACT INFECTION WITH HEMATURIA, SITE UNSPECIFIED: Primary | ICD-10-CM

## 2024-05-08 DIAGNOSIS — N39.0 URINARY TRACT INFECTION WITH HEMATURIA, SITE UNSPECIFIED: Primary | ICD-10-CM

## 2024-05-08 LAB
B-HCG UR QL: NEGATIVE
BILIRUB UR QL STRIP: NEGATIVE
CTP QC/QA: YES
GLUCOSE UR QL STRIP: NEGATIVE
KETONES UR QL STRIP: POSITIVE
LEUKOCYTE ESTERASE UR QL STRIP: NEGATIVE
PH, POC UA: 6.5
POC BLOOD, URINE: POSITIVE
POC NITRATES, URINE: NEGATIVE
PROT UR QL STRIP: NEGATIVE
SP GR UR STRIP: 1.01 (ref 1–1.03)
UROBILINOGEN UR STRIP-ACNC: NORMAL (ref 0.1–1.1)

## 2024-05-08 PROCEDURE — 81025 URINE PREGNANCY TEST: CPT | Mod: S$GLB,,, | Performed by: PHYSICIAN ASSISTANT

## 2024-05-08 PROCEDURE — 81003 URINALYSIS AUTO W/O SCOPE: CPT | Mod: QW,S$GLB,, | Performed by: PHYSICIAN ASSISTANT

## 2024-05-08 PROCEDURE — 99214 OFFICE O/P EST MOD 30 MIN: CPT | Mod: S$GLB,,, | Performed by: PHYSICIAN ASSISTANT

## 2024-05-08 RX ORDER — PHENAZOPYRIDINE HYDROCHLORIDE 200 MG/1
200 TABLET, FILM COATED ORAL 3 TIMES DAILY PRN
Qty: 21 TABLET | Refills: 0 | Status: SHIPPED | OUTPATIENT
Start: 2024-05-08 | End: 2024-05-15

## 2024-05-08 RX ORDER — NITROFURANTOIN 25; 75 MG/1; MG/1
100 CAPSULE ORAL 2 TIMES DAILY
Qty: 14 CAPSULE | Refills: 0 | Status: SHIPPED | OUTPATIENT
Start: 2024-05-08 | End: 2024-05-15

## 2024-05-08 NOTE — PROGRESS NOTES
"Subjective:      Patient ID: Krupa Walker is a 33 y.o. female.    Vitals:  height is 5' 3" (1.6 m) and weight is 93.4 kg (206 lb). Her oral temperature is 98.3 °F (36.8 °C). Her blood pressure is 127/81 and her pulse is 75. Her respiration is 18 and oxygen saturation is 98%.     Chief Complaint: Dysuria    33 year old patient presents here today with c/o of  burning and painful urination x 3 days. Pt stated she took AZO for symptoms.  Pt denies any fever, flank pain, abdominal pain, and/or any other symptoms associated with this complaint.  She's had UTIs in the past and this feels similar.      Dysuria   This is a new problem. The current episode started in the past 7 days. The problem occurs every urination. The problem has been unchanged. The quality of the pain is described as burning and stabbing. The pain is at a severity of 3/10. The pain is mild. There has been no fever. She is Sexually active. There is No history of pyelonephritis. Associated symptoms include frequency and urgency. Pertinent negatives include no behavior changes, chills, discharge, flank pain, hematuria, hesitancy, nausea, possible pregnancy, sweats, vomiting, weight loss, bubble bath use, constipation, rash or withholding. She has tried home medications for the symptoms. The treatment provided no relief. There is no history of catheterization, diabetes insipidus, diabetes mellitus, genitourinary reflux, hypertension, kidney stones, recurrent UTIs, a single kidney, STD, urinary stasis or a urological procedure.       Constitution: Negative for chills.   Gastrointestinal:  Negative for nausea, vomiting and constipation.   Genitourinary:  Positive for dysuria, frequency and urgency. Negative for flank pain and hematuria.   Skin:  Negative for rash.      Objective:     Physical Exam   Constitutional: She is oriented to person, place, and time. She appears well-developed.   HENT:   Head: Normocephalic and atraumatic.   Ears:   Right Ear: " External ear normal.   Left Ear: External ear normal.   Nose: Nose normal. No nasal deformity. No epistaxis.   Mouth/Throat: Oropharynx is clear and moist and mucous membranes are normal.   Eyes: Lids are normal.   Neck: Trachea normal and phonation normal. Neck supple.   Cardiovascular: Normal pulses.   Pulmonary/Chest: Effort normal.   Abdominal: Normal appearance and bowel sounds are normal. She exhibits no distension. Soft. There is no abdominal tenderness.   Neurological: She is alert and oriented to person, place, and time.   Skin: Skin is warm, dry and intact.   Psychiatric: Her speech is normal and behavior is normal.   Nursing note and vitals reviewed.      Assessment:     1. Urinary tract infection with hematuria, site unspecified    2. Dysuria      Results for orders placed or performed in visit on 05/08/24   POCT Urinalysis, Dipstick, Automated, W/O Scope   Result Value Ref Range    POC Blood, Urine Positive (A) Negative    POC Bilirubin, Urine Negative Negative    POC Urobilinogen, Urine Normal 0.1 - 1.1    POC Ketones, Urine Positive (A) Negative    POC Protein, Urine Negative Negative    POC Nitrates, Urine Negative Negative    POC Glucose, Urine Negative Negative    pH, UA 6.5     POC Specific Gravity, Urine 1.015 1.003 - 1.029    POC Leukocytes, Urine Negative Negative   POCT urine pregnancy   Result Value Ref Range    POC Preg Test, Ur Negative Negative     Acceptable Yes        Plan:   VSS. Patient non-toxic appearing. Discussed medication being prescribed.  Advised patient to follow up with PCP as needed.  Patient verbalized understanding, agrees with the plan, and is comfortable with discharge.      Urinary tract infection with hematuria, site unspecified  -     nitrofurantoin, macrocrystal-monohydrate, (MACROBID) 100 MG capsule; Take 1 capsule (100 mg total) by mouth 2 (two) times daily. for 7 days  Dispense: 14 capsule; Refill: 0  -     phenazopyridine (PYRIDIUM) 200 MG tablet;  Take 1 tablet (200 mg total) by mouth 3 (three) times daily as needed for Pain.  Dispense: 21 tablet; Refill: 0    Dysuria  -     POCT Urinalysis, Dipstick, Automated, W/O Scope  -     POCT urine pregnancy      Medical Decision Making:   Clinical Tests:   Lab Tests: Ordered and Reviewed  The following lab test(s) were unremarkable: UPT and Urinalysis

## 2024-05-09 ENCOUNTER — TELEPHONE (OUTPATIENT)
Dept: URGENT CARE | Facility: CLINIC | Age: 34
End: 2024-05-09
Payer: COMMERCIAL

## 2024-05-09 NOTE — TELEPHONE ENCOUNTER
Re Kina Call         Spoke to patient in regards to her visit on 5//2024. Pt stated the  PAR stated her name to the patient. Pt stated the  PAR also walked her to the back. Pt stated her visit was in a timely manner. Pt stated she is doing better.

## 2024-05-31 ENCOUNTER — PATIENT MESSAGE (OUTPATIENT)
Dept: INTERNAL MEDICINE | Facility: CLINIC | Age: 34
End: 2024-05-31
Payer: COMMERCIAL

## 2024-06-03 ENCOUNTER — PATIENT MESSAGE (OUTPATIENT)
Dept: OBSTETRICS AND GYNECOLOGY | Facility: CLINIC | Age: 34
End: 2024-06-03
Payer: COMMERCIAL

## 2024-06-05 ENCOUNTER — CLINICAL SUPPORT (OUTPATIENT)
Dept: OBSTETRICS AND GYNECOLOGY | Facility: CLINIC | Age: 34
End: 2024-06-05
Payer: COMMERCIAL

## 2024-06-05 DIAGNOSIS — N91.2 AMENORRHEA: Primary | ICD-10-CM

## 2024-06-11 ENCOUNTER — PATIENT MESSAGE (OUTPATIENT)
Dept: OBSTETRICS AND GYNECOLOGY | Facility: CLINIC | Age: 34
End: 2024-06-11
Payer: COMMERCIAL

## 2024-06-11 DIAGNOSIS — Z32.00 POSSIBLE PREGNANCY, NOT YET CONFIRMED: Primary | ICD-10-CM

## 2024-06-13 ENCOUNTER — LAB VISIT (OUTPATIENT)
Dept: LAB | Facility: HOSPITAL | Age: 34
End: 2024-06-13
Attending: OBSTETRICS & GYNECOLOGY
Payer: COMMERCIAL

## 2024-06-13 DIAGNOSIS — Z32.00 POSSIBLE PREGNANCY, NOT YET CONFIRMED: ICD-10-CM

## 2024-06-13 LAB — HCG INTACT+B SERPL-ACNC: NORMAL MIU/ML

## 2024-06-13 PROCEDURE — 36415 COLL VENOUS BLD VENIPUNCTURE: CPT | Mod: PN | Performed by: OBSTETRICS & GYNECOLOGY

## 2024-06-13 PROCEDURE — 84702 CHORIONIC GONADOTROPIN TEST: CPT | Performed by: OBSTETRICS & GYNECOLOGY

## 2024-06-24 ENCOUNTER — OFFICE VISIT (OUTPATIENT)
Dept: OBSTETRICS AND GYNECOLOGY | Facility: CLINIC | Age: 34
End: 2024-06-24
Payer: COMMERCIAL

## 2024-06-24 ENCOUNTER — HOSPITAL ENCOUNTER (OUTPATIENT)
Dept: PERINATAL CARE | Facility: OTHER | Age: 34
Discharge: HOME | End: 2024-06-24
Attending: OBSTETRICS & GYNECOLOGY
Payer: COMMERCIAL

## 2024-06-24 VITALS
DIASTOLIC BLOOD PRESSURE: 94 MMHG | HEIGHT: 64 IN | BODY MASS INDEX: 37.16 KG/M2 | WEIGHT: 217.63 LBS | SYSTOLIC BLOOD PRESSURE: 132 MMHG

## 2024-06-24 DIAGNOSIS — N91.2 AMENORRHEA: ICD-10-CM

## 2024-06-24 DIAGNOSIS — N91.2 AMENORRHEA: Primary | ICD-10-CM

## 2024-06-24 DIAGNOSIS — Z72.89 OTHER PROBLEMS RELATED TO LIFESTYLE: ICD-10-CM

## 2024-06-24 DIAGNOSIS — Z32.01 POSITIVE PREGNANCY TEST: ICD-10-CM

## 2024-06-24 DIAGNOSIS — Z36.82 ENCOUNTER FOR ANTENATAL SCREENING FOR NUCHAL TRANSLUCENCY: ICD-10-CM

## 2024-06-24 DIAGNOSIS — Z11.3 SCREEN FOR STD (SEXUALLY TRANSMITTED DISEASE): ICD-10-CM

## 2024-06-24 PROBLEM — Z67.11 TYPE A BLOOD, RH NEGATIVE: Status: ACTIVE | Noted: 2024-06-24

## 2024-06-24 LAB
C TRACH DNA SPEC QL NAA+PROBE: NOT DETECTED
N GONORRHOEA DNA SPEC QL NAA+PROBE: NOT DETECTED

## 2024-06-24 PROCEDURE — 3008F BODY MASS INDEX DOCD: CPT | Mod: CPTII,S$GLB,,

## 2024-06-24 PROCEDURE — 3080F DIAST BP >= 90 MM HG: CPT | Mod: CPTII,S$GLB,,

## 2024-06-24 PROCEDURE — 3075F SYST BP GE 130 - 139MM HG: CPT | Mod: CPTII,S$GLB,,

## 2024-06-24 PROCEDURE — 87491 CHLMYD TRACH DNA AMP PROBE: CPT

## 2024-06-24 PROCEDURE — 76801 OB US < 14 WKS SINGLE FETUS: CPT

## 2024-06-24 PROCEDURE — 76801 OB US < 14 WKS SINGLE FETUS: CPT | Mod: 26,,, | Performed by: OBSTETRICS & GYNECOLOGY

## 2024-06-24 PROCEDURE — 1160F RVW MEDS BY RX/DR IN RCRD: CPT | Mod: CPTII,S$GLB,,

## 2024-06-24 PROCEDURE — 1159F MED LIST DOCD IN RCRD: CPT | Mod: CPTII,S$GLB,,

## 2024-06-24 PROCEDURE — 87086 URINE CULTURE/COLONY COUNT: CPT

## 2024-06-24 PROCEDURE — 99999 PR PBB SHADOW E&M-EST. PATIENT-LVL IV: CPT | Mod: PBBFAC,,,

## 2024-06-24 PROCEDURE — 99214 OFFICE O/P EST MOD 30 MIN: CPT | Mod: S$GLB,,,

## 2024-06-24 RX ORDER — METOCLOPRAMIDE 10 MG/1
10 TABLET ORAL EVERY 6 HOURS PRN
Qty: 30 TABLET | Refills: 1 | Status: SHIPPED | OUTPATIENT
Start: 2024-06-24 | End: 2024-07-24

## 2024-06-24 NOTE — PATIENT INSTRUCTIONS
1) Eat small frequent meals through the day versus three large meals  2) Try ginger ale or sprite to help settle the stomach   3) Eat crackers or dry toast before getting out of bed in the morning   4) Stay hydrated by drinking plenty of water-do not immediately eat or drink something after vomiting. Give your stomach a rest for 20-30 minutes. Slowly reintroduce ice chips, small sips water, crackers, etc.    5) Try OTC Unisom (use the tablets that have doxylamine not diphenhydramine in them, can use generic brands like Equate) and vitamin B6  (25 mg, can find on [employer]) together at night before bed. This can help with the nausea in the morning and is safe to use during pregnancy. You can also take the vitamin B6 alone, every 8 hours to help with the nausea.     If you are unable to keep anything down and constantly vomiting for more that 24 hours, let the office know so that dehydration can be avoided. We would recommend being seen in the emergency department if this is the case.      Call [phone] to see about coverage and any out of pocket costs regarding the [id number] (MT21) testing. This is done any day after 11 weeks and is blood work only. It checks for any chromosomal abnormalities like Down Syndrome. You can also find out the sex of the baby if you choose to know. Once you find out coverage and decide to proceed, send either myself or your doctor a message and we can see what date you can do it. It is done at our second floor lab at [facility].      The sequential screen is a test done around 12-14 weeks that consists of an ultrasound that measures the nuchal fold (neck thickness) of baby and blood work on the same day. You get a second set of labs done a few weeks later after 15 weeks. Based on all three of these results, they are able to tell you if you are considered to be low risk or high risk regarding neural tube defects and chromosomal abnormalities like Down Syndrome. If you are at all interested,  I usually place the order today so we do not miss the window. Someone will give you a phone call in a week or two to schedule this. If you do not want it, just tell them no thank you. This test is typically covered by your insurance and is performed by the Maternal Fetal Medicine (MFM) department here at [facility]. It will not tell you the sex of the baby.     Call clinic [phone] or L & D after hours at [phone]

## 2024-06-24 NOTE — PROGRESS NOTES
CC: Positive Pregnancy Test    HISTORY OF PRESENT ILLNESS:    Krupa Walker is a 33 y.o. female, ,  Presents today for a routine exam complaining of amenorrhea and positive home urine pregnancy test.  Patient's last menstrual period was 2024 (exact date).  Pt reports menses were normal prior to this. She is not currently on any contraception. Denies nausea. Reports breast tenderness. Denies pelvic pain. Does report vaginal spotting. Reports migraines. This is her 3rd pregnancy. First c.section- failure to progress- gHTN. Second  @ 40w1d.     LMP: 24  EGA: 7w2d (per ultrasound)  EDC: 25 (per ultrasound)    Indication   ========   Indication: Estimation of Gestational Age     History   ======   General History   Height 163 cm   Height (ft) 5 ft   Height (in) 4 in   Other: ALIX BAND   Previous Outcomes    3   Para 2   Pregnancies delivered at term (T) 2   Living children (L) 2   Risk Factors   Details: BMI   History risk factors: Hx    Details: then      Maternal Assessment   =================   Height 163 cm   Height (ft) 5 ft   Height (in) 4 in     Method   ======   Transabdominal and transvaginal ultrasound examination. 2D Color Doppler, Voluson S8. View: Suboptimal view: limited by maternal body habitus     Pregnancy   =========   Mcconnell pregnancy. Number of embryos: 1     Dating   ======   LMP on: 2024   Cycle length 35 d   GA by LMP 8 w + 1 d   JULIUS by LMP: 2025   GA by LMP, tk. by cycle length 7 w + 1 d   JULIUS by LMP, tk. by cycle length: 2025   Ultrasound examination on: 2024   GA by U/S based upon: CRL   GA by U/S 7 w + 2 d   JULIUS by U/S: 2025   Assigned: based on ultrasound (CRL), selected on 2024   Assigned GA 7 w + 2 d   Assigned JULIUS: 2025     Assessment   ==========   Gestational sac: visualized   Location: intrauterine   Yolk sac: visualized   Amniotic sac: visualized   Embryo: visualized   CRL 11.4 mm 7w 2d Hadlock    Cardiac activity: present    bpm     Maternal Structures   ===============   Uterus / Cervix   Uterus: Normal   Ovaries / Tubes / Adnexa   Rt ovary: Normal   Rt ovary D1 30 mm   Rt ovary D2 20 mm   Rt ovary D3 18 mm   Rt ovary Vol 5.7 cmÂ³   Lt ovary: Normal   Lt ovary D1 32 mm   Lt ovary D2 29 mm   Lt ovary D3 22 mm   Lt ovary Vol 10.8 cmÂ³   Lt ovarian corpus luteum: visualized   Lt ovarian corpus luteum D1 20.0 mm   Lt ovarian corpus luteum D2 21.0 mm   Lt ovarian cyst(s): Cysts identified   Cul de Sac / Bladder / Kidneys / Other   Free fluid: No free fluid visualized     Impression   =========   A contreras living IUP is identified. JULIUS is assigned based on the CRL from today?s study. If other clinical data, such as IVF conception dating or prior ultrasound   assignment of JULIUS differs from the JULIUS assigned today, please contact the [facility] department so that this report can be revised to reflect the correct JULIUS.   The maternal adnexae are normal in appearance. The amount of free fluid seen in the pelvis is within normal physiologic range.     ROS:  GENERAL: No weight changes. No swelling. No fatigue. No fever.  CARDIOVASCULAR: No chest pain. No shortness of breath. No leg cramps.   NEUROLOGICAL: No headaches. No vision changes.  BREASTS: No pain. No lumps. No discharge.  ABDOMEN: No pain. No diarrhea. No constipation.  REPRODUCTIVE: No abnormal bleeding.   VULVA: No pain. No lesions. No itching.  VAGINA: No relaxation. No itching. No odor. No discharge. No lesions.  URINARY: No incontinence. No nocturia. No frequency. No dysuria.    MEDICATIONS AND ALLERGIES:  Reviewed    COMPREHENSIVE GYN HISTORY:  PAP History: Denies abnormal Paps. Last pap 4/4/23- NILM, HPV neg   Infection History: Denies STDs. Denies PID.  Benign History: Denies uterine fibroids. Denies ovarian cysts. Denies endometriosis. Denies other conditions.  Cancer History: Denies cervical cancer. Denies uterine cancer or hyperplasia. Denies  "ovarian cancer. Denies vulvar cancer or pre-cancer. Denies vaginal cancer or pre-cancer. Denies breast cancer. Denies colon cancer.  Sexual Activity History: Reports currently being sexually active  Menstrual History: None.  Contraception: None    BP (!) 132/94   Ht 5' 4" (1.626 m)   Wt 98.7 kg (217 lb 9.5 oz)   LMP 2024 (Exact Date)   BMI 37.35 kg/m²     PE:  AFFECT: Calm, alert and oriented X 3. Interactive during exam  GENERAL: Appears well-nourished, well-developed, in no acute distress.  HEAD: Normocephalic, atruamatic  TEETH: Good dentition.  SKIN: Normal for race, warm, & dry. No lesions or rashes.  LUNGS: Easy and unlabored  HEART: Regular rate   EXTREMITIES: No cyanosis, clubbing or edema. No calf tenderness.    PROCEDURES:  UPT Positive  Genprobe  Pap up to date     ASSESSMENT/PLAN:  Amenorrhea  Positive urine pregnancy test (JULIUS: 25, EGA: 7w2d based on ultrasound)    -  Routine prenatal care    Nausea and vomiting in pregnancy    -  Education regarding lifestyle and dietary modifications    -  Advised use of B6/Unisom. Pt will notify us if no relief/worsening symptoms    1st TRIMESTER COUNSELING: Discussed all, booklet provided:  Common complaints of pregnancy  HIV and other routine prenatal tests including  genetic screening  Risk factors identified by prenatal history  Oriented to practice - discussed anticipated course of prenatal care & indications for Ultrasound  Childbirth classes/Hospital facilities   Nutrition and weight gain counseling  Toxoplasmosis precautions (Cats/Raw Meat)  Sexual activity and exercise  Environmental/Work hazards  Travel  Tobacco (Ask, Advise, Assess, Assist, and Arrange), as well as alcohol and drug use  Use of any medications (Including supplements, Vitamins, Herbs, or OTC Drugs)  Domestic violence  Seat belt use    TERATOLOGY COUNSELING:   Discussed indications and options for aneuploidy screening - pamphlets given    -  Pt desires NT and orders " placed    PLAN:  - Dating ultrasound today - reviewed   - Urine culture sent  - GC/CT collected  - 1T labs ordered   - Pap up to date    - Moved to [place]- going to do get OBGYN there  - Reglan sent    FOLLOW-UP in 4 weeks with MD in [place]    Ro Akhtar, NP    OB/GYN

## 2024-06-25 LAB
BACTERIA UR CULT: NORMAL
BACTERIA UR CULT: NORMAL

## 2024-07-25 ENCOUNTER — PATIENT MESSAGE (OUTPATIENT)
Dept: DERMATOLOGY | Facility: CLINIC | Age: 34
End: 2024-07-25
Payer: COMMERCIAL

## 2024-07-30 ENCOUNTER — PROCEDURE VISIT (OUTPATIENT)
Dept: OBSTETRICS AND GYNECOLOGY | Facility: CLINIC | Age: 34
End: 2024-07-30
Payer: COMMERCIAL

## 2024-07-30 ENCOUNTER — LAB VISIT (OUTPATIENT)
Dept: LAB | Facility: HOSPITAL | Age: 34
End: 2024-07-30
Attending: OBSTETRICS & GYNECOLOGY
Payer: COMMERCIAL

## 2024-07-30 DIAGNOSIS — Z32.01 POSITIVE PREGNANCY TEST: ICD-10-CM

## 2024-07-30 DIAGNOSIS — Z36.9 ENCOUNTER FOR ANTENATAL SCREENING: ICD-10-CM

## 2024-07-30 DIAGNOSIS — Z36.9 ENCOUNTER FOR ANTENATAL SCREENING: Primary | ICD-10-CM

## 2024-07-30 DIAGNOSIS — Z36.82 ENCOUNTER FOR ANTENATAL SCREENING FOR NUCHAL TRANSLUCENCY: ICD-10-CM

## 2024-07-30 PROCEDURE — 76813 OB US NUCHAL MEAS 1 GEST: CPT | Mod: S$GLB,,, | Performed by: OBSTETRICS & GYNECOLOGY

## 2024-07-30 PROCEDURE — 84163 PAPPA SERUM: CPT | Performed by: OBSTETRICS & GYNECOLOGY

## 2024-07-30 PROCEDURE — 36415 COLL VENOUS BLD VENIPUNCTURE: CPT | Mod: PO | Performed by: OBSTETRICS & GYNECOLOGY

## 2024-07-31 ENCOUNTER — ROUTINE PRENATAL (OUTPATIENT)
Dept: OBSTETRICS AND GYNECOLOGY | Facility: CLINIC | Age: 34
End: 2024-07-31
Payer: COMMERCIAL

## 2024-07-31 VITALS
WEIGHT: 222.25 LBS | BODY MASS INDEX: 38.14 KG/M2 | SYSTOLIC BLOOD PRESSURE: 140 MMHG | DIASTOLIC BLOOD PRESSURE: 80 MMHG

## 2024-07-31 DIAGNOSIS — Z87.448 HISTORY OF PYELONEPHRITIS: ICD-10-CM

## 2024-07-31 DIAGNOSIS — Z98.891 PREVIOUS CESAREAN SECTION: ICD-10-CM

## 2024-07-31 DIAGNOSIS — O09.92 HIGH-RISK PREGNANCY IN SECOND TRIMESTER: Primary | ICD-10-CM

## 2024-07-31 DIAGNOSIS — L65.9 ALOPECIA: ICD-10-CM

## 2024-07-31 DIAGNOSIS — O99.212 OBESITY DURING PREGNANCY IN SECOND TRIMESTER: ICD-10-CM

## 2024-07-31 DIAGNOSIS — Z98.891 HISTORY OF VBAC: ICD-10-CM

## 2024-07-31 DIAGNOSIS — Z87.59 HISTORY OF GESTATIONAL HYPERTENSION: ICD-10-CM

## 2024-07-31 DIAGNOSIS — R76.8 RHEUMATOID FACTOR POSITIVE: ICD-10-CM

## 2024-07-31 DIAGNOSIS — M32.9 LUPUS ARTHRITIS: ICD-10-CM

## 2024-07-31 DIAGNOSIS — R76.8 POSITIVE ANA (ANTINUCLEAR ANTIBODY): ICD-10-CM

## 2024-07-31 PROBLEM — Z67.11 TYPE A BLOOD, RH NEGATIVE: Status: RESOLVED | Noted: 2024-06-24 | Resolved: 2024-07-31

## 2024-07-31 PROBLEM — N91.2 AMENORRHEA: Status: RESOLVED | Noted: 2024-06-24 | Resolved: 2024-07-31

## 2024-07-31 PROCEDURE — 0500F INITIAL PRENATAL CARE VISIT: CPT | Mod: CPTII,S$GLB,, | Performed by: MIDWIFE

## 2024-07-31 PROCEDURE — 99999 PR PBB SHADOW E&M-EST. PATIENT-LVL II: CPT | Mod: PBBFAC,,, | Performed by: MIDWIFE

## 2024-07-31 RX ORDER — NAPROXEN SODIUM 220 MG/1
81 TABLET, FILM COATED ORAL DAILY
COMMUNITY

## 2024-07-31 NOTE — PROGRESS NOTES
33 y.o. female  at 12w4d   denies VB or cramping  Doing well without concerns   First trimester s/s improving  Pt delivered first baby by  section, second delivery was a  at [facility], she started seeing Dr Akhtar but wants to deliver in  since she is living in [place]. She had a bad experience at [facility] w/ the nurses, she stated they told her that she had to stay in the bed because her baby could die. Reassured pt of our model, reviewed at length pt history and problem list. MFM referral placed. Pt needs Sequential screen Part 2 at [facility]. Unable to get NT. Pt takes compazine for headaches and occasionally takes Imitrex.   TW lbs   Reviewed prenatal labs 4  Genetic testing-Pt 1 of sequential screen done, needs part 2 at [place]    Enrolled in connected moms    High-risk pregnancy in second trimester  -     Connected MOM Enrollment  -     Assign Connected MOM Program Consent Questionnaire  -     Ambulatory referral/consult to Perinatology; Future; Expected date: 2024    Lupus arthritis  -     Ambulatory referral/consult to Perinatology; Future; Expected date: 2024    Positive SUDHA (antinuclear antibody)  -     Ambulatory referral/consult to Perinatology; Future; Expected date: 2024    Rheumatoid factor positive  -     Ambulatory referral/consult to Perinatology; Future; Expected date: 2024    Alopecia    History of gestational hypertension  -     Connected MOM Enrollment  -     Assign Connected MOM Program Consent Questionnaire  -     Ambulatory referral/consult to Perinatology; Future; Expected date: 2024    Obesity during pregnancy in second trimester  -     Ambulatory referral/consult to Perinatology; Future; Expected date: 2024    History of pyelonephritis  -     Cancel: Urine culture    Previous  section    History of     Reviewed warning signs, pregnancy precautions and how/when to call.  RTC x 4 wks, call or present sooner prn.

## 2024-08-02 ENCOUNTER — PATIENT MESSAGE (OUTPATIENT)
Dept: ADMINISTRATIVE | Facility: OTHER | Age: 34
End: 2024-08-02
Payer: COMMERCIAL

## 2024-08-02 LAB — INTEGRATED SCN PATIENT-IMP NAR: NORMAL

## 2024-08-12 ENCOUNTER — PATIENT MESSAGE (OUTPATIENT)
Dept: OBSTETRICS AND GYNECOLOGY | Facility: CLINIC | Age: 34
End: 2024-08-12
Payer: COMMERCIAL

## 2024-08-16 ENCOUNTER — TELEPHONE (OUTPATIENT)
Dept: OBSTETRICS AND GYNECOLOGY | Facility: CLINIC | Age: 34
End: 2024-08-16
Payer: COMMERCIAL

## 2024-08-16 NOTE — TELEPHONE ENCOUNTER
----- Message from Alysia Villalobos RN sent at 8/16/2024  2:42 PM CDT -----  Regarding: [facility] APPT  Good Afternoon,    Krupa Aaron called and was looking to schedule her appt. I told her someone from our [place] office would call her to schedule.    Thank you,  Ebony Villalobos

## 2024-08-19 ENCOUNTER — PATIENT MESSAGE (OUTPATIENT)
Dept: OBSTETRICS AND GYNECOLOGY | Facility: CLINIC | Age: 34
End: 2024-08-19
Payer: COMMERCIAL

## 2024-08-19 DIAGNOSIS — O09.92 HIGH-RISK PREGNANCY IN SECOND TRIMESTER: Primary | ICD-10-CM

## 2024-08-23 ENCOUNTER — OFFICE VISIT (OUTPATIENT)
Dept: RHEUMATOLOGY | Facility: CLINIC | Age: 34
End: 2024-08-23
Payer: COMMERCIAL

## 2024-08-23 DIAGNOSIS — M54.50 CHRONIC LOW BACK PAIN, UNSPECIFIED BACK PAIN LATERALITY, UNSPECIFIED WHETHER SCIATICA PRESENT: ICD-10-CM

## 2024-08-23 DIAGNOSIS — L65.9 HAIR LOSS DISORDER: ICD-10-CM

## 2024-08-23 DIAGNOSIS — R76.8 ANA POSITIVE: ICD-10-CM

## 2024-08-23 DIAGNOSIS — Z34.90 PREGNANCY, UNSPECIFIED GESTATIONAL AGE: ICD-10-CM

## 2024-08-23 DIAGNOSIS — R21 MALAR RASH: ICD-10-CM

## 2024-08-23 DIAGNOSIS — R76.8 RHEUMATOID FACTOR POSITIVE: ICD-10-CM

## 2024-08-23 DIAGNOSIS — M35.9 UNDIFFERENTIATED CONNECTIVE TISSUE DISEASE: Primary | ICD-10-CM

## 2024-08-23 DIAGNOSIS — M65.9 TENOSYNOVITIS OF HAND: ICD-10-CM

## 2024-08-23 DIAGNOSIS — G89.29 CHRONIC LOW BACK PAIN, UNSPECIFIED BACK PAIN LATERALITY, UNSPECIFIED WHETHER SCIATICA PRESENT: ICD-10-CM

## 2024-08-23 DIAGNOSIS — M75.100 ROTATOR CUFF SYNDROME, UNSPECIFIED LATERALITY: ICD-10-CM

## 2024-08-23 PROBLEM — M32.9 LUPUS ARTHRITIS: Status: RESOLVED | Noted: 2024-07-31 | Resolved: 2024-08-23

## 2024-08-23 NOTE — PROGRESS NOTES
The patient location is: [place]  The chief complaint leading to consultation is: Lupus    Visit type: audiovisual    Face to Face time with patient: 30  40 minutes of total time spent on the encounter, which includes face to face time and non-face to face time preparing to see the patient (eg, review of tests), Obtaining and/or reviewing separately obtained history, Documenting clinical information in the electronic or other health record, Independently interpreting results (not separately reported) and communicating results to the patient/family/caregiver, or Care coordination (not separately reported).         Each patient to whom he or she provides medical services by telemedicine is:  (1) informed of the relationship between the physician and patient and the respective role of any other health care provider with respect to management of the patient; and (2) notified that he or she may decline to receive medical services by telemedicine and may withdraw from such care at any time.       RHEUMATOLOGY OUTPATIENT CLINIC NOTE    8/23/2024    Attending Rheumatologist: Mike Ventura  Primary Care Provider/Physician Requesting Consultation: Madhuri Juarez MD   Chief Complaint/Reason For Consultation:  No chief complaint on file.      Subjective:     Krupa Walker is a 34 y.o. White female with historical diagnosis of lupus arthritis    No acute complaints.  Chronic malar rash that does not cross nasolabial folds.  No other active rashes or photosensitivity.  Denies active hand arthralgias.  Self limited Rt shoulder and lower back pain.  Not currently on immunomodulator therapy.    Review of Systems   Constitutional:  Negative for fever.   HENT:  Negative for nosebleeds.         Denies sicca sx   Eyes:  Negative for photophobia and pain.   Respiratory:  Negative for cough, hemoptysis and shortness of breath (OLIVAS).    Cardiovascular:  Negative for chest pain.   Gastrointestinal:  Negative for blood in stool  and melena.   Genitourinary:  Negative for dysuria, hematuria and urgency.   Musculoskeletal:  Positive for joint pain. Negative for back pain.   Skin:  Positive for rash.   Neurological:  Negative for tingling, focal weakness and weakness.   Endo/Heme/Allergies:         No hx of DVT/PE.  No hx of preeclampsia.  No hx of  lupus.     Chronic comorbid conditions affecting medical decision making today:  Past Medical History:   Diagnosis Date    Anxiety     Depression     GERD (gastroesophageal reflux disease)     Gestational hypertension, third trimester 2019    Hypertension     gestational     Hypoglycemia     Kidney calculi         Migraines     Polycystic ovaries     PONV (postoperative nausea and vomiting)     STD (sexually transmitted disease)     Urinary tract infection      Past Surgical History:   Procedure Laterality Date    ANKLE SURGERY Right 2008     SECTION  10/2014    CYSTOSCOPY W/ URETERAL STENT PLACEMENT Left 10/2/2019    Procedure: CYSTOSCOPY, WITH URETERAL STENT INSERTION - Under ultrasound guidance;  Surgeon: Marsha Guevara MD;  Location: [facility];  Service: Urology;  Laterality: Left;    KIDNEY STONE SURGERY      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)    LASER LITHOTRIPSY Left 10/16/2019    Procedure: LITHOTRIPSY, USING LASER;  Surgeon: Marsha Guevara MD;  Location: [facility];  Service: Urology;  Laterality: Left;    TONSILLECTOMY      ULTRASOUND GUIDANCE Left 10/16/2019    Procedure: ULTRASOUND GUIDANCE, for laser litho;  Surgeon: Marsha Guevara MD;  Location: [facility];  Service: Urology;  Laterality: Left;    URETEROSCOPIC REMOVAL OF URETERIC CALCULUS Left 10/16/2019    Procedure: REMOVAL, CALCULUS, URETER, URETEROSCOPIC possible retro pyelo, possible lasere litho, possible stent;  Surgeon: Marsha Guevara MD;  Location: [facility];  Service: Urology;  Laterality: Left;  25 WEEKS PREGNANT / FETAL MONITORING  BEFORE AND AFTER PROCEDURE/   CALL PELON ELDER [phone]      WISDOM TOOTH EXTRACTION       Family History   Problem Relation Name Age of Onset    Hypertension Mother Luann     Hypertension Father Theodore     Heart disease Father Theodore     Depression Father Theodore     Hyperlipidemia Father Theodore     No Known Problems Sister      Nephrolithiasis Daughter      No Known Problems Sister      No Known Problems Sister      Breast cancer Paternal Grandmother Haley         50s at diagnosis    Cancer Paternal Grandmother Haley         breast    Hypertension Paternal Grandmother Haley     Heart disease Paternal Grandfather Dileep     Hyperlipidemia Paternal Grandfather Dileep     Hypertension Paternal Grandfather Dileep     Hyperlipidemia Maternal Grandfather Tara     Hypertension Maternal Grandmother Tara     Colon cancer Neg Hx      Ovarian cancer Neg Hx       Social History     Tobacco Use   Smoking Status Never    Passive exposure: Current   Smokeless Tobacco Never       Current Outpatient Medications:     aspirin 81 MG Chew, Take 81 mg by mouth once daily., Disp: , Rfl:     ketoconazole (NIZORAL) 2 % shampoo, Shampoo scalp 1-2 times weekly. Lather x 5 minutes., Disp: 120 mL, Rfl: 5    multivit with calcium,iron,min (WOMEN'S DAILY MULTIVITAMIN ORAL), Women's Daily Multivitamin, Disp: , Rfl:     prochlorperazine (COMPAZINE) 5 MG tablet, Take 1 tablet (5 mg total) by mouth every 6 (six) hours as needed for Nausea (or headache pain)., Disp: 12 tablet, Rfl: 2    sumatriptan (IMITREX) 100 MG tablet, Take at onset of migraine, can repeat in 2 hrs if needed.  No more than twice per day or 3 days/wk., Disp: 18 tablet, Rfl: 5     Objective:     There were no vitals filed for this visit.  Physical Exam   Pulmonary/Chest: Effort normal. No respiratory distress.   Musculoskeletal:         General: No swelling or tenderness. Normal range of motion.   Neurological: She displays no weakness.   Skin: No rash noted. There is  erythema.       Reviewed available old and all outside pertinent medical records available.    All lab results personally reviewed and interpreted by me.       ASSESSMENT / PLAN     1. Undifferentiated connective tissue disease  Palindromic pattern constitutional and MSK sx.  Rosacea sheridan rash. No efficacy to HCQ (rash as a reaction vs intertrigo?): consider Plaquenil re-challenge in event of worsening rash, or refractory hand arthralgias w/ inflammatory features.  SUDHA Screen w/Reflex    CBC Auto Differential    Comprehensive Metabolic Panel    Protein/Creatinine Ratio, Urine      2. SUDHA positive  Negative REUBEN.  Negative myomarker panel.  No concerning cytopenias or nephropathy.   C3 Complement    C4 Complement    Anti-DNA Ab, Double-Stranded    DRVVT    Cardiolipin antibody    Beta-2 Glycoprotein Abs (IgA, IgG, IgM)      3. Rheumatoid factor positive  No active squeeze tenderness, joint swelling, or evident synovitis.  Past MRI w/o synovitis or marginal erosive changes.  Cyclic Citrullinated Peptide Antibody, IgG    Rheumatoid Factor      4. Tenosynovitis of hand  Resolved.  Voltaren gel PRN.  C-Reactive Protein      5. Malar rash  I am unable to differentiate from acne rosacea.   Follow with Dermatology.      6. Rotator cuff syndrome, unspecified laterality  PT PRN.  Orthopedics if refractory to consider local CSI.      7. Chronic low back pain, unspecified back pain laterality, unspecified whether sciatica present  PT and flexeril PRN      8. Hair loss disorder  Consider telogen effluvium.  Follow with Dermatology.      9. Pregnancy, unspecified gestational age  Uric Acid                Mike Ventura M.D.

## 2024-08-24 ENCOUNTER — PATIENT MESSAGE (OUTPATIENT)
Dept: OTHER | Facility: OTHER | Age: 34
End: 2024-08-24
Payer: COMMERCIAL

## 2024-08-26 ENCOUNTER — LAB VISIT (OUTPATIENT)
Dept: LAB | Facility: HOSPITAL | Age: 34
End: 2024-08-26
Payer: COMMERCIAL

## 2024-08-26 ENCOUNTER — OFFICE VISIT (OUTPATIENT)
Dept: MATERNAL FETAL MEDICINE | Facility: CLINIC | Age: 34
End: 2024-08-26
Payer: COMMERCIAL

## 2024-08-26 DIAGNOSIS — Z34.90 PREGNANCY, UNSPECIFIED GESTATIONAL AGE: ICD-10-CM

## 2024-08-26 DIAGNOSIS — Z87.59 HISTORY OF GESTATIONAL HYPERTENSION: ICD-10-CM

## 2024-08-26 DIAGNOSIS — O99.212 OBESITY DURING PREGNANCY IN SECOND TRIMESTER: ICD-10-CM

## 2024-08-26 DIAGNOSIS — M65.9 TENOSYNOVITIS OF HAND: ICD-10-CM

## 2024-08-26 DIAGNOSIS — R76.8 POSITIVE ANA (ANTINUCLEAR ANTIBODY): ICD-10-CM

## 2024-08-26 DIAGNOSIS — M32.9 LUPUS ARTHRITIS: ICD-10-CM

## 2024-08-26 DIAGNOSIS — R03.0 ELEVATED BP WITHOUT DIAGNOSIS OF HYPERTENSION: ICD-10-CM

## 2024-08-26 DIAGNOSIS — R76.8 RHEUMATOID FACTOR POSITIVE: ICD-10-CM

## 2024-08-26 DIAGNOSIS — O09.92 HIGH-RISK PREGNANCY IN SECOND TRIMESTER: ICD-10-CM

## 2024-08-26 DIAGNOSIS — M35.9 UNDIFFERENTIATED CONNECTIVE TISSUE DISEASE: ICD-10-CM

## 2024-08-26 DIAGNOSIS — R76.8 ANA POSITIVE: ICD-10-CM

## 2024-08-26 DIAGNOSIS — M35.9 AUTOIMMUNE DISEASE: Primary | ICD-10-CM

## 2024-08-26 LAB
ALBUMIN SERPL BCP-MCNC: 3.3 G/DL (ref 3.5–5.2)
ALP SERPL-CCNC: 63 U/L (ref 55–135)
ALT SERPL W/O P-5'-P-CCNC: 10 U/L (ref 10–44)
ANION GAP SERPL CALC-SCNC: 11 MMOL/L (ref 8–16)
AST SERPL-CCNC: 18 U/L (ref 10–40)
BASOPHILS # BLD AUTO: 0.06 K/UL (ref 0–0.2)
BASOPHILS NFR BLD: 0.5 % (ref 0–1.9)
BILIRUB SERPL-MCNC: 0.3 MG/DL (ref 0.1–1)
BUN SERPL-MCNC: 12 MG/DL (ref 6–20)
C3 SERPL-MCNC: 162 MG/DL (ref 50–180)
C4 SERPL-MCNC: 50 MG/DL (ref 11–44)
CALCIUM SERPL-MCNC: 9.2 MG/DL (ref 8.7–10.5)
CCP AB SER IA-ACNC: 0.5 U/ML
CHLORIDE SERPL-SCNC: 104 MMOL/L (ref 95–110)
CO2 SERPL-SCNC: 21 MMOL/L (ref 23–29)
CREAT SERPL-MCNC: 0.6 MG/DL (ref 0.5–1.4)
CREAT UR-MCNC: 164.4 MG/DL (ref 15–325)
CRP SERPL-MCNC: 23.1 MG/L (ref 0–8.2)
DIFFERENTIAL METHOD BLD: ABNORMAL
EOSINOPHIL # BLD AUTO: 0.1 K/UL (ref 0–0.5)
EOSINOPHIL NFR BLD: 1.1 % (ref 0–8)
ERYTHROCYTE [DISTWIDTH] IN BLOOD BY AUTOMATED COUNT: 12 % (ref 11.5–14.5)
EST. GFR  (NO RACE VARIABLE): >60 ML/MIN/1.73 M^2
GLUCOSE SERPL-MCNC: 94 MG/DL (ref 70–110)
HCT VFR BLD AUTO: 38.4 % (ref 37–48.5)
HGB BLD-MCNC: 13.2 G/DL (ref 12–16)
IMM GRANULOCYTES # BLD AUTO: 0.21 K/UL (ref 0–0.04)
IMM GRANULOCYTES NFR BLD AUTO: 1.8 % (ref 0–0.5)
LYMPHOCYTES # BLD AUTO: 2.7 K/UL (ref 1–4.8)
LYMPHOCYTES NFR BLD: 23.3 % (ref 18–48)
MCH RBC QN AUTO: 32.6 PG (ref 27–31)
MCHC RBC AUTO-ENTMCNC: 34.4 G/DL (ref 32–36)
MCV RBC AUTO: 95 FL (ref 82–98)
MONOCYTES # BLD AUTO: 0.6 K/UL (ref 0.3–1)
MONOCYTES NFR BLD: 5.3 % (ref 4–15)
NEUTROPHILS # BLD AUTO: 7.8 K/UL (ref 1.8–7.7)
NEUTROPHILS NFR BLD: 68 % (ref 38–73)
NRBC BLD-RTO: 0 /100 WBC
PLATELET # BLD AUTO: 279 K/UL (ref 150–450)
PMV BLD AUTO: 11.1 FL (ref 9.2–12.9)
POTASSIUM SERPL-SCNC: 3.9 MMOL/L (ref 3.5–5.1)
PROT SERPL-MCNC: 7.2 G/DL (ref 6–8.4)
PROT UR-MCNC: 14 MG/DL (ref 0–15)
PROT/CREAT UR: 0.09 MG/G{CREAT} (ref 0–0.2)
RBC # BLD AUTO: 4.05 M/UL (ref 4–5.4)
RHEUMATOID FACT SERPL-ACNC: 26 IU/ML (ref 0–15)
SODIUM SERPL-SCNC: 136 MMOL/L (ref 136–145)
URATE SERPL-MCNC: 3.4 MG/DL (ref 2.4–5.7)
WBC # BLD AUTO: 11.47 K/UL (ref 3.9–12.7)

## 2024-08-26 PROCEDURE — 85613 RUSSELL VIPER VENOM DILUTED: CPT | Performed by: INTERNAL MEDICINE

## 2024-08-26 PROCEDURE — 3044F HG A1C LEVEL LT 7.0%: CPT | Mod: CPTII,95,, | Performed by: STUDENT IN AN ORGANIZED HEALTH CARE EDUCATION/TRAINING PROGRAM

## 2024-08-26 PROCEDURE — 86225 DNA ANTIBODY NATIVE: CPT | Performed by: INTERNAL MEDICINE

## 2024-08-26 PROCEDURE — 84550 ASSAY OF BLOOD/URIC ACID: CPT | Performed by: INTERNAL MEDICINE

## 2024-08-26 PROCEDURE — 85730 THROMBOPLASTIN TIME PARTIAL: CPT | Performed by: INTERNAL MEDICINE

## 2024-08-26 PROCEDURE — 36415 COLL VENOUS BLD VENIPUNCTURE: CPT | Mod: PN | Performed by: INTERNAL MEDICINE

## 2024-08-26 PROCEDURE — 86147 CARDIOLIPIN ANTIBODY EA IG: CPT | Performed by: INTERNAL MEDICINE

## 2024-08-26 PROCEDURE — 99215 OFFICE O/P EST HI 40 MIN: CPT | Mod: 95,,, | Performed by: STUDENT IN AN ORGANIZED HEALTH CARE EDUCATION/TRAINING PROGRAM

## 2024-08-26 PROCEDURE — 86160 COMPLEMENT ANTIGEN: CPT | Mod: 59 | Performed by: INTERNAL MEDICINE

## 2024-08-26 PROCEDURE — 85025 COMPLETE CBC W/AUTO DIFF WBC: CPT | Performed by: INTERNAL MEDICINE

## 2024-08-26 PROCEDURE — 86235 NUCLEAR ANTIGEN ANTIBODY: CPT | Mod: 59 | Performed by: INTERNAL MEDICINE

## 2024-08-26 PROCEDURE — 80053 COMPREHEN METABOLIC PANEL: CPT | Performed by: INTERNAL MEDICINE

## 2024-08-26 PROCEDURE — 86146 BETA-2 GLYCOPROTEIN ANTIBODY: CPT | Performed by: INTERNAL MEDICINE

## 2024-08-26 PROCEDURE — 86038 ANTINUCLEAR ANTIBODIES: CPT | Performed by: INTERNAL MEDICINE

## 2024-08-26 PROCEDURE — 86140 C-REACTIVE PROTEIN: CPT | Performed by: INTERNAL MEDICINE

## 2024-08-26 PROCEDURE — 86160 COMPLEMENT ANTIGEN: CPT | Performed by: INTERNAL MEDICINE

## 2024-08-26 PROCEDURE — 82570 ASSAY OF URINE CREATININE: CPT | Performed by: INTERNAL MEDICINE

## 2024-08-26 PROCEDURE — 84156 ASSAY OF PROTEIN URINE: CPT | Performed by: INTERNAL MEDICINE

## 2024-08-26 PROCEDURE — 86200 CCP ANTIBODY: CPT | Performed by: INTERNAL MEDICINE

## 2024-08-26 PROCEDURE — 86431 RHEUMATOID FACTOR QUANT: CPT | Performed by: INTERNAL MEDICINE

## 2024-08-26 PROCEDURE — 86039 ANTINUCLEAR ANTIBODIES (ANA): CPT | Performed by: INTERNAL MEDICINE

## 2024-08-26 NOTE — ASSESSMENT & PLAN NOTE
Patient with possible unspecified autoimmune disease (see HPI). Initial presumptive diagnosis of Lupus based on symptoms and +SUDHA, however 2 subsequent rheumatologic evaluations are inconclusive. Patient had reactions to Plaquenil and Azathioprine and is not currently on any medications for SLE or other autoimmune diseases.  On chart review, patient has had +SUDHA and +RF in the past, negative SLE antibodies and workup otherwise negative.  Primary symptoms are joint pain, hair thinning, and facial rash.    I did discuss that, although we do not have a definitive diagnosis, it would be reasonable to approach her pregnancy similarly to that of a patient with lupus/non-specified autoimmune disease.     We briefly discussed the potential  complications of lupus/autoimmune disease in pregnancy, including the potential risk for developing preeclampsia, fetal growth restriction,  birth, fetal demise, and symptom exacerbation. There is an increased risk of lupus flare in the immediate postpartum period.     Recommendations:  Continue close Rheumatology follow up.  Medications per Rheumatology-- we are happy to discuss specific medications if Rheumatology and patient would like to start one   Patient advised to continue low dose aspirin (81 mg) daily for preeclampsia risk reduction  Recommendations for primary OB:  Obtain baseline labs:  24 hour urine for protein and creatinine clearance  EKG ; echocardiogram if abnormal  CBC, CMP, at baseline and q 1-2 months thereafter  Updated TSH  Close monitoring for preeclampsia at each prenatal visit  Start twice weekly antepartum testing at 32 weeks. (Should be ordered and arranged by the patient's primary OB).  Delivery at 39 0/7 - 39 6/7 weeks gestation, unless indicated earlier    With MFM:  Targeted anatomy ultrasound at 18-20 weeks  Serial ultrasound for fetal growth every 4-6 weeks at 26-28 weeks

## 2024-08-26 NOTE — PROGRESS NOTES
MATERNAL-FETAL MEDICINE   CONSULT NOTE    Provider requesting consultation: Jason Patel CNM    SUBJECTIVE:     Ms. Krupa Walker is a 34 y.o.  female with IUP at 16w2d who is seen in consultation by [facility] for evaluation and management of: possible autoimmune disorder. Pt was diagnosed with lupus in  due to clinical symptoms (facial rash, joint pain, and alopecia) as well as a positive SUDHA; however, per most recent rheumatology appt on 24, it is unclear if she meets criteria for lupus diagnosis. She is not currently taking any medication to manage her autoimmune condition. She continues to have joint pain, facial rash (cheeks & nose), as well as alopecia. She denies any OB complaints at this time; no N/V/D, vaginal discharge, bleeding, HA, blurred vision. Per pt's chart, she has a hx of HTN affecting pregnancy, but when asked, she was unsure when this diagnosis was made. She does take her BP at home and reports mostly normotensive readings.     Denies pregnancy-related complaints/concerns today.       Medication List with Changes/Refills   Current Medications    ASPIRIN 81 MG CHEW    Take 81 mg by mouth once daily.    KETOCONAZOLE (NIZORAL) 2 % SHAMPOO    Shampoo scalp 1-2 times weekly. Lather x 5 minutes.    MULTIVIT WITH CALCIUM,IRON,MIN (WOMEN'S DAILY MULTIVITAMIN ORAL)    Women's Daily Multivitamin    PROCHLORPERAZINE (COMPAZINE) 5 MG TABLET    Take 1 tablet (5 mg total) by mouth every 6 (six) hours as needed for Nausea (or headache pain).    SUMATRIPTAN (IMITREX) 100 MG TABLET    Take at onset of migraine, can repeat in 2 hrs if needed.  No more than twice per day or 3 days/wk.     Review of patient's allergies indicates:  No Known Allergies    PMH:  Past Medical History:   Diagnosis Date    Anxiety     Depression     GERD (gastroesophageal reflux disease)     Gestational hypertension, third trimester 2019    Hypertension     gestational     Hypoglycemia     Kidney calculi          Migraines     Polycystic ovaries     PONV (postoperative nausea and vomiting)     STD (sexually transmitted disease)     Urinary tract infection        PObHx:  OB History    Para Term  AB Living   3 2 2     2   SAB IAB Ectopic Multiple Live Births         0 2      # Outcome Date GA Lbr Boni/2nd Weight Sex Type Anes PTL Lv   3 Current            2 Term 20 40w1d  3.8 kg (8 lb 6 oz) F Vag-Spont EPI N KAVITHA   1 Term 10/14/14 41w2d   F CS-LTranv EPI  KAVITHA      Complications: Failure to Progress in Second Stage, Failure to progress in labor, Incisional infection, Gestational HTN       PSH:  Past Surgical History:   Procedure Laterality Date    ANKLE SURGERY Right      SECTION  10/2014    CYSTOSCOPY W/ URETERAL STENT PLACEMENT Left 10/02/2019    Procedure: CYSTOSCOPY, WITH URETERAL STENT INSERTION - Under ultrasound guidance;  Surgeon: Marsha Guevara MD;  [facility]  Service: Urology;  Laterality: Left;    KIDNEY STONE SURGERY      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)    LASER LITHOTRIPSY Left 10/16/2019    Procedure: LITHOTRIPSY, USING LASER;  Surgeon: Marsha Guevara MD;  [facility];  Service: Urology;  Laterality: Left;    TONSILLECTOMY      ULTRASOUND GUIDANCE Left 10/16/2019    Procedure: ULTRASOUND GUIDANCE, for laser litho;  Surgeon: Marsha Guevara MD;  [facility];  Service: Urology;  Laterality: Left;    URETEROSCOPIC REMOVAL OF URETERIC CALCULUS Left 10/16/2019    Procedure: REMOVAL, CALCULUS, URETER, URETEROSCOPIC possible retro pyelo, possible lasere litho, possible stent;  Surgeon: Marsha Guevara MD;  [facility];  Service: Urology;  Laterality: Left;  25 WEEKS PREGNANT / FETAL MONITORING BEFORE AND AFTER PROCEDURE/   CALL PELON ELDER [phone]      WISDOM TOOTH EXTRACTION         Family history:family history includes Breast cancer in her paternal grandmother; Cancer in her paternal  grandmother; Depression in her father; Heart disease in her father and paternal grandfather; Hyperlipidemia in her father, maternal grandfather, and paternal grandfather; Hypertension in her father, maternal grandmother, mother, paternal grandfather, and paternal grandmother; Nephrolithiasis in her daughter; No Known Problems in her sister, sister, and sister.    Social history: reports that she has never smoked. She has been exposed to tobacco smoke. She has never used smokeless tobacco. She reports that she does not currently use alcohol. She reports that she does not use drugs.    Genetic history:  The patient denies any inherited genetic diseases or birth defects in herself or her partner's personal history or family.    Objective:   LMP 2024 (Exact Date)     Physical Exam - limited d/t virtual visit.   Alert, oriented x3, in no acute distress.   Mildly erythematous rash along nasal bridge and b/l cheeks.     Significant labs/imaging:  Pending    ASSESSMENT/PLAN:     34 y.o.  female with IUP at 16w2d     Autoimmune disease  Patient with possible unspecified autoimmune disease (see HPI). Initial presumptive diagnosis of Lupus based on symptoms and +SUDHA, however 2 subsequent rheumatologic evaluations are inconclusive. Patient had reactions to Plaquenil and Azathioprine and is not currently on any medications for SLE or other autoimmune diseases.  On chart review, patient has had +SUDHA and +RF in the past, negative SLE antibodies and workup otherwise negative.  Primary symptoms are joint pain, hair thinning, and facial rash.    I did discuss that, although we do not have a definitive diagnosis, it would be reasonable to approach her pregnancy similarly to that of a patient with lupus/non-specified autoimmune disease.     Extensively discussed management of autoimmune conditions in pregnancy. Stressed the importance of healthy lifestyle: anti-inflammatory diet and exercise as tolerated. Continue tylenol  as needed for joint pain. Encouraged pt to discuss PT referred with her primary OB.     We briefly discussed the potential  complications of lupus/autoimmune disease in pregnancy, including the potential risk for developing preeclampsia, fetal growth restriction,  birth, fetal demise, and symptom exacerbation. There is an increased risk of lupus flare in the immediate postpartum period.     Recommendations:  Continue close Rheumatology follow up.  Medications per Rheumatology-- we are happy to discuss specific medications if Rheumatology and patient would like to start one   Patient advised to continue low dose aspirin (81 mg) daily for preeclampsia risk reduction  Recommendations for primary OB:  Obtain baseline labs:  24 hour urine for protein and creatinine clearance  EKG ; echocardiogram if abnormal  CBC, CMP, at baseline and q 1-2 months thereafter  Updated TSH  Close monitoring for preeclampsia at each prenatal visit  Start twice weekly antepartum testing at 32 weeks. (Should be ordered and arranged by the patient's primary OB).  Delivery at 39 0/7 - 39 6/7 weeks gestation, unless indicated earlier    With MFM:  Targeted anatomy ultrasound at 18-20 weeks  Serial ultrasound for fetal growth every 4-6 weeks at 26-28 weeks    Elevated BP without diagnosis of hypertension  Patient has a history of GHTN in her last pregnancy but denies a diagnosis ot CHTN.  I reviewed Connected M[employer], there are some mild range BP readings. Patient is concerned cuff may not fit well.    We briefly discussed the risks/management of CHTN in pregnancy, to discuss further with confirmation of diagnosis. She was counseled on the recommendations for blood pressure control. I also counseled her on the recommendation for aspirin 81 mg daily which may decrease her risk of developing superimposed preeclampsia.     Recommendations (Please refer to [facility] guidelines):  Continue aspirin 81 mg daily for preeclampsia risk  reduction  Patient to bring CM cuff to next clinic appointment to confirm fit. If it is accurate please initiate BP medication, Procardia 30XL daily.   Baseline evaluation with primary OB:   24-hour urine protein or baseline P/C ratio, CMP, and CBC.  Maternal EKG  Maternal ophthalmic evaluation  Maternal echocardiogram if EKG is abnormal  Further recommendations to follow     FOLLOW UP:   F/u in 4 weeks for MFM visit  F/u in 4 weeks for US (anatomy scan)    40 minutes of total time spent on the encounter, which includes face to face time and non-face to face time preparing to see the patient (eg, review of tests), obtaining and/or reviewing separately obtained history, documenting clinical information in the electronic or other health record, independently interpreting results (not separately reported) and communicating results to the patient/family/caregiver, or care coordination (not separately reported).    Estefany Morelos  Maternal Fetal Medicine

## 2024-08-27 ENCOUNTER — ROUTINE PRENATAL (OUTPATIENT)
Dept: OBSTETRICS AND GYNECOLOGY | Facility: CLINIC | Age: 34
End: 2024-08-27
Payer: COMMERCIAL

## 2024-08-27 VITALS
WEIGHT: 230.38 LBS | SYSTOLIC BLOOD PRESSURE: 136 MMHG | DIASTOLIC BLOOD PRESSURE: 78 MMHG | BODY MASS INDEX: 39.54 KG/M2

## 2024-08-27 DIAGNOSIS — M79.10 MUSCLE PAIN: ICD-10-CM

## 2024-08-27 DIAGNOSIS — Z3A.16 16 WEEKS GESTATION OF PREGNANCY: Primary | ICD-10-CM

## 2024-08-27 DIAGNOSIS — Z36.2 ENCOUNTER FOR FOLLOW-UP ULTRASOUND OF FETAL ANATOMY: ICD-10-CM

## 2024-08-27 LAB
ANA PATTERN 1: NORMAL
ANA SER QL IF: POSITIVE
ANA TITR SER IF: NORMAL {TITER}
DSDNA AB SER-ACNC: NORMAL [IU]/ML

## 2024-08-27 PROCEDURE — 99999 PR PBB SHADOW E&M-EST. PATIENT-LVL III: CPT | Mod: PBBFAC,,,

## 2024-08-27 PROCEDURE — 99499 UNLISTED E&M SERVICE: CPT | Mod: CPTII,S$GLB,,

## 2024-08-27 RX ORDER — CYCLOBENZAPRINE HCL 10 MG
10 TABLET ORAL 3 TIMES DAILY PRN
Qty: 30 TABLET | Refills: 0 | Status: SHIPPED | OUTPATIENT
Start: 2024-08-27 | End: 2024-09-06

## 2024-08-28 LAB
ANTI SM ANTIBODY: 0.06 RATIO (ref 0–0.99)
ANTI SM/RNP ANTIBODY: 0.06 RATIO (ref 0–0.99)
ANTI-SM INTERPRETATION: NEGATIVE
ANTI-SM/RNP INTERPRETATION: NEGATIVE
ANTI-SSA ANTIBODY: 0.05 RATIO (ref 0–0.99)
ANTI-SSA INTERPRETATION: NEGATIVE
ANTI-SSB ANTIBODY: 0.06 RATIO (ref 0–0.99)
ANTI-SSB INTERPRETATION: NEGATIVE
DSDNA AB SER-ACNC: NORMAL [IU]/ML

## 2024-08-29 ENCOUNTER — PATIENT MESSAGE (OUTPATIENT)
Dept: MATERNAL FETAL MEDICINE | Facility: CLINIC | Age: 34
End: 2024-08-29
Payer: COMMERCIAL

## 2024-08-29 LAB
B2 GLYCOPROT1 IGA SER QL: 1.4 U/ML
B2 GLYCOPROT1 IGG SER QL: <0.8 U/ML
B2 GLYCOPROT1 IGM SER QL: 6.2 U/ML
CARDIOLIPIN IGG SER IA-ACNC: <9.4 GPL (ref 0–14.99)
CARDIOLIPIN IGM SER IA-ACNC: <9.4 MPL (ref 0–12.49)
CONFIRM DRVVT STA-STACLOT: NORMAL S
DRVVT SCREEN TO CONFIRM RATIO: NORMAL {RATIO}
HEPARIN NT PPP QL: NORMAL
LA 3 SCREEN W REFLEX-IMP: NORMAL
LMW HEPARIN IND PLT AB SER QL: NORMAL
MIXING DRVVT/NORMAL: NORMAL %
NEUTRALIZED DRVVT SCREEN RATIO: NORMAL
PROTHROMBIN TIME: 12.5 S (ref 12–15.5)
SCREEN APTT/NORMAL: 1.17
SCREEN APTT/NORMAL: NORMAL
SCREEN DRVVT/NORMAL: 0.96 %
THROMBIN TIME: NORMAL S

## 2024-08-31 ENCOUNTER — PATIENT MESSAGE (OUTPATIENT)
Dept: OTHER | Facility: OTHER | Age: 34
End: 2024-08-31
Payer: COMMERCIAL

## 2024-09-03 ENCOUNTER — PATIENT MESSAGE (OUTPATIENT)
Dept: NEUROLOGY | Facility: CLINIC | Age: 34
End: 2024-09-03
Payer: COMMERCIAL

## 2024-09-08 PROBLEM — R03.0 ELEVATED BP WITHOUT DIAGNOSIS OF HYPERTENSION: Status: ACTIVE | Noted: 2024-09-08

## 2024-09-08 NOTE — ASSESSMENT & PLAN NOTE
Patient has a history of GHTN in her last pregnancy but denies a diagnosis ot CHTN.  I reviewed Connected M[employer], there are some mild range BP readings. Patient is concerned cuff may not fit well.    We briefly discussed the risks/management of CHTN in pregnancy, to discuss further with confirmation of diagnosis. She was counseled on the recommendations for blood pressure control. I also counseled her on the recommendation for aspirin 81 mg daily which may decrease her risk of developing superimposed preeclampsia.     Recommendations (Please refer to [facility] guidelines):  Continue aspirin 81 mg daily for preeclampsia risk reduction  Patient to bring CM cuff to next clinic appointment to confirm fit. If it is accurate please initiate BP medication, Procardia 30XL daily.   Baseline evaluation with primary OB:   24-hour urine protein or baseline P/C ratio, CMP, and CBC.  Maternal EKG  Maternal ophthalmic evaluation  Maternal echocardiogram if EKG is abnormal  Further recommendations to follow

## 2024-09-09 DIAGNOSIS — Z36.2 ENCOUNTER FOR FOLLOW-UP ULTRASOUND OF FETAL ANATOMY: Primary | ICD-10-CM

## 2024-09-21 ENCOUNTER — PATIENT MESSAGE (OUTPATIENT)
Dept: OTHER | Facility: OTHER | Age: 34
End: 2024-09-21
Payer: COMMERCIAL

## 2024-09-23 ENCOUNTER — HOSPITAL ENCOUNTER (OUTPATIENT)
Dept: CARDIOLOGY | Facility: HOSPITAL | Age: 34
Discharge: HOME | End: 2024-09-23
Payer: COMMERCIAL

## 2024-09-23 ENCOUNTER — ROUTINE PRENATAL (OUTPATIENT)
Dept: OBSTETRICS AND GYNECOLOGY | Facility: CLINIC | Age: 34
End: 2024-09-23
Payer: COMMERCIAL

## 2024-09-23 ENCOUNTER — PROCEDURE VISIT (OUTPATIENT)
Dept: OBSTETRICS AND GYNECOLOGY | Facility: CLINIC | Age: 34
End: 2024-09-23
Payer: COMMERCIAL

## 2024-09-23 ENCOUNTER — OFFICE VISIT (OUTPATIENT)
Dept: MATERNAL FETAL MEDICINE | Facility: CLINIC | Age: 34
End: 2024-09-23
Payer: COMMERCIAL

## 2024-09-23 VITALS
WEIGHT: 238.31 LBS | BODY MASS INDEX: 40.91 KG/M2 | DIASTOLIC BLOOD PRESSURE: 82 MMHG | SYSTOLIC BLOOD PRESSURE: 132 MMHG

## 2024-09-23 DIAGNOSIS — O09.92 HIGH-RISK PREGNANCY IN SECOND TRIMESTER: Primary | ICD-10-CM

## 2024-09-23 DIAGNOSIS — Z36.2 ENCOUNTER FOR FOLLOW-UP ULTRASOUND OF FETAL ANATOMY: ICD-10-CM

## 2024-09-23 DIAGNOSIS — Q62.5 DUPLICATED LEFT RENAL COLLECTING SYSTEM: ICD-10-CM

## 2024-09-23 DIAGNOSIS — Z86.59 HX OF MAJOR DEPRESSION: ICD-10-CM

## 2024-09-23 DIAGNOSIS — O09.92 HIGH-RISK PREGNANCY IN SECOND TRIMESTER: ICD-10-CM

## 2024-09-23 DIAGNOSIS — M35.9 AUTOIMMUNE DISEASE: ICD-10-CM

## 2024-09-23 DIAGNOSIS — R03.0 ELEVATED BP WITHOUT DIAGNOSIS OF HYPERTENSION: ICD-10-CM

## 2024-09-23 DIAGNOSIS — Z98.891 HISTORY OF VBAC: ICD-10-CM

## 2024-09-23 DIAGNOSIS — Z87.59 HISTORY OF MIGRAINE DURING PREGNANCY: ICD-10-CM

## 2024-09-23 DIAGNOSIS — E21.3 HYPERPARATHYROIDISM: ICD-10-CM

## 2024-09-23 DIAGNOSIS — O99.212 OBESITY DURING PREGNANCY IN SECOND TRIMESTER: ICD-10-CM

## 2024-09-23 DIAGNOSIS — O10.919 CHRONIC HYPERTENSION AFFECTING PREGNANCY: Primary | ICD-10-CM

## 2024-09-23 DIAGNOSIS — Z86.69 HISTORY OF MIGRAINE DURING PREGNANCY: ICD-10-CM

## 2024-09-23 PROCEDURE — 99999 PR PBB SHADOW E&M-EST. PATIENT-LVL III: CPT | Mod: PBBFAC,,, | Performed by: ADVANCED PRACTICE MIDWIFE

## 2024-09-23 PROCEDURE — 87086 URINE CULTURE/COLONY COUNT: CPT | Performed by: ADVANCED PRACTICE MIDWIFE

## 2024-09-23 PROCEDURE — 76811 OB US DETAILED SNGL FETUS: CPT | Mod: S$GLB,,, | Performed by: STUDENT IN AN ORGANIZED HEALTH CARE EDUCATION/TRAINING PROGRAM

## 2024-09-23 PROCEDURE — 93005 ELECTROCARDIOGRAM TRACING: CPT

## 2024-09-23 PROCEDURE — 99213 OFFICE O/P EST LOW 20 MIN: CPT | Mod: 95,,, | Performed by: STUDENT IN AN ORGANIZED HEALTH CARE EDUCATION/TRAINING PROGRAM

## 2024-09-23 PROCEDURE — 93010 ELECTROCARDIOGRAM REPORT: CPT | Mod: ,,, | Performed by: INTERNAL MEDICINE

## 2024-09-23 PROCEDURE — 3044F HG A1C LEVEL LT 7.0%: CPT | Mod: CPTII,95,, | Performed by: STUDENT IN AN ORGANIZED HEALTH CARE EDUCATION/TRAINING PROGRAM

## 2024-09-23 PROCEDURE — 0502F SUBSEQUENT PRENATAL CARE: CPT | Mod: CPTII,S$GLB,, | Performed by: ADVANCED PRACTICE MIDWIFE

## 2024-09-23 RX ORDER — NIFEDIPINE 30 MG/1
30 TABLET, EXTENDED RELEASE ORAL DAILY
Qty: 30 TABLET | Refills: 11 | Status: SHIPPED | OUTPATIENT
Start: 2024-09-23 | End: 2025-09-23

## 2024-09-23 NOTE — PROGRESS NOTES
34 y.o. female  at 20w2d   Feeling flutters/FM, denies VB, LOF or cramping  Doing well without concerns, thought it was a girl this whole time, now found out it's a boy and very excited.  Reports that her cuff at home has been having high readings, both cuffs in clinic not sufficient for measurements, will go off readings from home and recommend starting the procardia 30mg xl daily   /82   Wt 108.1 kg (238 lb 5.1 oz)   LMP 2024 (Exact Date)   BMI 40.91 kg/m²   TW lbs   Reviewed anatomy US today with cephalic presentation, posterior placenta, 3VC, normal insertions, VASQUEZ WNL, EFW 47%, 14oz, multiple suboptimal views, CL 39.3mm, repeat in 4 weeks at M visit, MFM to review, reviewed with pt     High-risk pregnancy in second trimester  -     CBC W/ AUTO DIFFERENTIAL; Future; Expected date: 2024  -     Comprehensive Metabolic Panel; Future; Expected date: 2024  -     TSH; Future; Expected date: 2024  -     EKG 12-lead; Future  -     CULTURE, URINE  -     Creatinine, urine, timed 24 Hours; Future  -     Protein, urine, timed; Future    History of     Obesity during pregnancy in second trimester    Hyperparathyroidism    Autoimmune disease    Duplicated left renal collecting system  -     Creatinine, urine, timed 24 Hours; Future  -     Protein, urine, timed; Future    Hx of major depression    Encounter for follow-up ultrasound of fetal anatomy  -     US OB/GYN Procedure ([facility])-Future; Future    History of migraine during pregnancy    Elevated BP without diagnosis of hypertension    Other orders  -     NIFEdipine (PROCARDIA-XL) 30 MG (OSM) 24 hr tablet; Take 1 tablet (30 mg total) by mouth once daily.  Dispense: 30 tablet; Refill: 11       MFM recommended labs including UC, 24hr baseline urine, CBC, CMP, TSH, EKG ordered and scheduled today, equipment for 24hr urine given and explained  Rx procardia sent to pharmacy and instructed on how and when to take   Reviewed  warning signs, normal FM,  labor precautions and how/when to call.  RTC x 4 wks, call or present sooner prn.

## 2024-09-24 LAB
OHS QRS DURATION: 82 MS
OHS QTC CALCULATION: 435 MS

## 2024-09-24 NOTE — PROGRESS NOTES
The patient location is: home  The chief complaint leading to consultation is: MFM follow up, blood pressures    Visit type: audiovisual    Face to Face time with patient: 15 minutes  20 minutes of total time spent on the encounter, which includes face to face time and non-face to face time preparing to see the patient (eg, review of tests), Obtaining and/or reviewing separately obtained history, Documenting clinical information in the electronic or other health record, Independently interpreting results (not separately reported) and communicating results to the patient/family/caregiver, or Care coordination (not separately reported).     Each patient to whom he or she provides medical services by telemedicine is:  (1) informed of the relationship between the physician and patient and the respective role of any other health care provider with respect to management of the patient; and (2) notified that he or she may decline to receive medical services by telemedicine and may withdraw from such care at any time.    Notes:     Maternal Fetal Medicine follow up consult    SUBJECTIVE:     Krupa Walker is a 34 y.o.  female with IUP at 20w2d who is seen in follow up consultation by [facility].  Pregnancy complications include:   Problem   Chronic Hypertension Affecting Pregnancy   Autoimmune Disease       Previous notes reviewed.   No changes to medical, surgical, family, social, or obstetric history.    Interval history since last MFM visit: Patient states she is feeling well. Denies contractions, vaginal bleeding, or leakage of fluid. BP elevated through [employer].    Medications reviewed.       OBJECTIVE:   LMP 2024 (Exact Date)     Significant labs/imaging:  We reviewed results of today's anatomy ultrasound    ASSESSMENT/PLAN:     34 y.o.  female with IUP at 20w2d    Chronic hypertension affecting pregnancy  Patient now diagnosed with CHTN given multiple mild-range BP measurements noted on  Connected Moms prior to 20wga.  Prescribed Procardia 30XL to start taking today.     Today we reviewed the maternal/fetal risks associated with CHTN during pregnancy. Risks include but not limited to fetal growth restriction, miscarriage, abruption, maternal end organ disease (renal failure, MI, and stroke),  delivery, development of superimposed preeclampsia, and eclampsia. She was counseled on the recommendations for blood pressure control, serial ultrasound for fetal growth assessment and  testing, and timing of delivery.     Recommendations (Please refer to [facility] guidelines):  Continue aspirin 81 mg daily for preeclampsia risk reduction  Continue current medications: Procardia 30XL  Baseline evaluation with primary OB:   24-hour urine protein or baseline P/C ratio, CMP, and CBC.  Maternal EKG  Maternal ophthalmic evaluation  Maternal echocardiogram if HTN has been long-standing or EKG is abnormal  Serial fetal growth ultrasounds every 4-6 weeks, beginning at 26-28 weeks.   Continued close observation of patient's blood pressures. Avoid hypotension as this has been associated with uteroplacental insufficiency.  Recommend treatment to a goal blood pressure < 140/90  Weekly antepartum testing at 32 weeks (NST+AFV); twice weekly testing if control is poor, multiple comorbidities are present, or requires several medications for control   Delivery timing:  Controlled on single agent, comorbid conditions*: 37 0/7 - 38 6/7 weeks gestation  Uncontrolled or requiring >= 2 medications: 36 0/7 - 37 6/7 weeks gestation    *Comorbid conditions include BMI >= 40, diabetes, and complex medical condition associated with placental dysfunction (ie lupus or other vascular disease)    Delivery may be recommended earlier pending results of fetal growth ultrasounds, AFV assessment, or antepartum testing results.    Autoimmune disease  Patient with possible unspecified autoimmune disease (see initial consult HPI  and problem list for full description/counseling/recommendations).  Plan to approach her pregnancy similarly to that of a patient with lupus/non-specified autoimmune disease.     Recommendations:  Continue close Rheumatology follow up.  Medications per Rheumatology (none currently)  Additional recommendations per CHTN header    Not all medical problems were addressed at today's visit. Please see prior notes for additional details.    F/u in 4 weeks for MFM visit and Ultrasound    BKesha Morelos MD   Maternal-Fetal Medicine

## 2024-09-25 LAB
BACTERIA UR CULT: NORMAL
BACTERIA UR CULT: NORMAL

## 2024-09-30 PROBLEM — O10.919 CHRONIC HYPERTENSION AFFECTING PREGNANCY: Status: ACTIVE | Noted: 2024-09-08

## 2024-09-30 NOTE — ASSESSMENT & PLAN NOTE
Patient now diagnosed with CHTN given multiple mild-range BP measurements noted on Connected Moms prior to 20wga.  Prescribed Procardia 30XL to start taking today.     Today we reviewed the maternal/fetal risks associated with CHTN during pregnancy. Risks include but not limited to fetal growth restriction, miscarriage, abruption, maternal end organ disease (renal failure, MI, and stroke),  delivery, development of superimposed preeclampsia, and eclampsia. She was counseled on the recommendations for blood pressure control, serial ultrasound for fetal growth assessment and  testing, and timing of delivery.     Recommendations ([facility] guidelines):  Continue aspirin 81 mg daily for preeclampsia risk reduction  Continue current medications: Procardia 30XL  Baseline evaluation with primary OB:   24-hour urine protein or baseline P/C ratio, CMP, and CBC.  Maternal EKG  Maternal ophthalmic evaluation  Maternal echocardiogram if HTN has been long-standing or EKG is abnormal  Serial fetal growth ultrasounds every 4-6 weeks, beginning at 26-28 weeks.   Continued close observation of patient's blood pressures. Avoid hypotension as this has been associated with uteroplacental insufficiency.  Recommend treatment to a goal blood pressure < 140/90  Weekly antepartum testing at 32 weeks (NST+AFV); twice weekly testing if control is poor, multiple comorbidities are present, or requires several medications for control   Delivery timing:  Controlled on single agent, comorbid conditions*: 37 0/7 - 38 6/7 weeks gestation  Uncontrolled or requiring >= 2 medications: 36 0/7 - 37 6/7 weeks gestation    *Comorbid conditions include BMI >= 40, diabetes, and complex medical condition associated with placental dysfunction (ie lupus or other vascular disease)    Delivery may be recommended earlier pending results of fetal growth ultrasounds, AFV assessment, or antepartum testing results.

## 2024-09-30 NOTE — ASSESSMENT & PLAN NOTE
Patient with possible unspecified autoimmune disease (see initial consult HPI and problem list for full description/counseling/recommendations).  Plan to approach her pregnancy similarly to that of a patient with lupus/non-specified autoimmune disease.     Recommendations:  Continue close Rheumatology follow up.  Medications per Rheumatology (none currently)  Additional recommendations per CHTN header

## 2024-10-05 ENCOUNTER — PATIENT MESSAGE (OUTPATIENT)
Dept: MATERNAL FETAL MEDICINE | Facility: CLINIC | Age: 34
End: 2024-10-05
Payer: COMMERCIAL

## 2024-10-19 ENCOUNTER — PATIENT MESSAGE (OUTPATIENT)
Dept: OTHER | Facility: OTHER | Age: 34
End: 2024-10-19
Payer: COMMERCIAL

## 2024-10-22 ENCOUNTER — PROCEDURE VISIT (OUTPATIENT)
Dept: OBSTETRICS AND GYNECOLOGY | Facility: CLINIC | Age: 34
End: 2024-10-22
Payer: COMMERCIAL

## 2024-10-22 VITALS — SYSTOLIC BLOOD PRESSURE: 130 MMHG | DIASTOLIC BLOOD PRESSURE: 86 MMHG

## 2024-10-22 DIAGNOSIS — M35.9 AUTOIMMUNE DISEASE: Primary | ICD-10-CM

## 2024-10-22 DIAGNOSIS — O99.212 OBESITY DURING PREGNANCY IN SECOND TRIMESTER: ICD-10-CM

## 2024-10-22 DIAGNOSIS — Z36.2 ENCOUNTER FOR FOLLOW-UP ULTRASOUND OF FETAL ANATOMY: ICD-10-CM

## 2024-10-22 DIAGNOSIS — O10.919 CHRONIC HYPERTENSION AFFECTING PREGNANCY: ICD-10-CM

## 2024-10-22 PROCEDURE — 76816 OB US FOLLOW-UP PER FETUS: CPT | Mod: S$GLB,,, | Performed by: OBSTETRICS & GYNECOLOGY

## 2024-10-22 PROCEDURE — 99213 OFFICE O/P EST LOW 20 MIN: CPT | Mod: S$GLB,,, | Performed by: OBSTETRICS & GYNECOLOGY

## 2024-10-22 NOTE — Clinical Note
Here is the note from our most recent visit. Please let me know if you have any questions. Miguelangel

## 2024-10-22 NOTE — ASSESSMENT & PLAN NOTE
Patient with possible unspecified autoimmune disease (see initial consult HPI and problem list for full description/counseling/recommendations).  Plan to approach her pregnancy similarly to that of a patient with lupus/non-specified autoimmune disease.     Recommendations:  Continue close Rheumatology follow up. - Dr. Ventura  Medications per Rheumatology (none currently)  Additional recommendations per CHTN header

## 2024-10-22 NOTE — ASSESSMENT & PLAN NOTE
Patient diagnosed with CHTN given multiple mild-range BP measurements noted on Connected Moms prior to 20wga.  Currently on Procardia 30XL     Today we reviewed the maternal/fetal risks associated with CHTN during pregnancy. Risks include but not limited to fetal growth restriction, miscarriage, abruption, maternal end organ disease (renal failure, MI, and stroke),  delivery, development of superimposed preeclampsia, and eclampsia. She was counseled on the recommendations for blood pressure control, serial ultrasound for fetal growth assessment and  testing, and timing of delivery.     Recommendations ([facility] guidelines):  Continue aspirin 81 mg daily for preeclampsia risk reduction  Continue current medications: Procardia 30XL  Baseline evaluation with primary OB per prior recommendations - ECHO needs to be ordered.  Serial fetal growth ultrasounds every 4 weeks, beginning at 28 weeks.   Continued close observation of patient's blood pressures. Avoid hypotension as this has been associated with uteroplacental insufficiency.  Recommend treatment to a goal blood pressure < 140/90  Weekly antepartum testing at 32 weeks (NST+AFV); twice weekly testing if control is poor, multiple comorbidities are present, or requires several medications for control   Delivery timing:  Controlled on single agent, comorbid conditions*: 37 0/7 - 38 6/7 weeks gestation  Uncontrolled or requiring >= 2 medications: 36 0/7 - 37 6/7 weeks gestation    *Comorbid conditions include BMI >= 40, diabetes, and complex medical condition associated with placental dysfunction (ie lupus or other vascular disease)

## 2024-11-02 ENCOUNTER — PATIENT MESSAGE (OUTPATIENT)
Dept: OTHER | Facility: OTHER | Age: 34
End: 2024-11-02
Payer: COMMERCIAL

## 2024-11-04 ENCOUNTER — ROUTINE PRENATAL (OUTPATIENT)
Dept: OBSTETRICS AND GYNECOLOGY | Facility: CLINIC | Age: 34
End: 2024-11-04
Payer: COMMERCIAL

## 2024-11-04 VITALS — WEIGHT: 244.69 LBS | BODY MASS INDEX: 42 KG/M2 | SYSTOLIC BLOOD PRESSURE: 132 MMHG | DIASTOLIC BLOOD PRESSURE: 74 MMHG

## 2024-11-04 DIAGNOSIS — Z98.891 PREVIOUS CESAREAN SECTION: ICD-10-CM

## 2024-11-04 DIAGNOSIS — Z87.59 HISTORY OF GESTATIONAL HYPERTENSION: ICD-10-CM

## 2024-11-04 DIAGNOSIS — Q62.5 DUPLICATED LEFT RENAL COLLECTING SYSTEM: ICD-10-CM

## 2024-11-04 DIAGNOSIS — R76.8 RHEUMATOID FACTOR POSITIVE: ICD-10-CM

## 2024-11-04 DIAGNOSIS — Z98.891 HISTORY OF VBAC: ICD-10-CM

## 2024-11-04 DIAGNOSIS — Z23 NEEDS FLU SHOT: ICD-10-CM

## 2024-11-04 DIAGNOSIS — O99.212 OBESITY DURING PREGNANCY IN SECOND TRIMESTER: ICD-10-CM

## 2024-11-04 DIAGNOSIS — O10.919 CHRONIC HYPERTENSION AFFECTING PREGNANCY: ICD-10-CM

## 2024-11-04 DIAGNOSIS — O09.92 HIGH-RISK PREGNANCY IN SECOND TRIMESTER: Primary | ICD-10-CM

## 2024-11-04 PROBLEM — Z86.59 HX OF MAJOR DEPRESSION: Status: RESOLVED | Noted: 2021-02-28 | Resolved: 2024-11-04

## 2024-11-04 PROCEDURE — 99999 PR PBB SHADOW E&M-EST. PATIENT-LVL III: CPT | Mod: PBBFAC,,, | Performed by: ADVANCED PRACTICE MIDWIFE

## 2024-11-04 PROCEDURE — 90656 IIV3 VACC NO PRSV 0.5 ML IM: CPT | Mod: S$GLB,,, | Performed by: OBSTETRICS & GYNECOLOGY

## 2024-11-04 PROCEDURE — 0502F SUBSEQUENT PRENATAL CARE: CPT | Mod: CPTII,S$GLB,, | Performed by: ADVANCED PRACTICE MIDWIFE

## 2024-11-04 PROCEDURE — 90471 IMMUNIZATION ADMIN: CPT | Mod: S$GLB,,, | Performed by: OBSTETRICS & GYNECOLOGY

## 2024-11-04 NOTE — PROGRESS NOTES
34 y.o. female  at 26w2d   Reports + FM, denies VB, LOF, or cramping  Doing well without concerns, questions answered  /74   Wt 111 kg (244 lb 11.4 oz)   LMP 2024 (Exact Date)   BMI 42.00 kg/m²   TW lbs, discussed increasing water and protein  Reviewed upcoming 28wk labs, (A NEG) and orders placed  Tdap handout provided and explained  Flu today  Would like RSV at 36 wks    1. High-risk pregnancy in second trimester  -     OB Glucose Screen; Future; Expected date: 2024  -     CBC auto differential; Future; Expected date: 2024  -     Treponema Pallidium Antibodies IgG, IgM; Future; Expected date: 2024  -     HIV 1/2 Ag/Ab (4th Gen); Future; Expected date: 2024  -     Comprehensive Metabolic Panel; Future; Expected date: 2024    2. Chronic hypertension affecting pregnancy    3. Duplicated left renal collecting system  Overview:  Had pyelo with previous pregnancy   Plan for urine cultures q4wks vs qtrimester  UC collected at 20wk visit      4. Rheumatoid factor positive    5. Previous  section    6. Obesity during pregnancy in second trimester  Overview:  Pre-gravid BMI 37.33, growth scans      7. History of   Overview:  Desires   Per MFM deliver 37-38.6      8. History of gestational hypertension    9. Needs flu shot  -     influenza (Flulaval, Fluzone, Fluarix) 45 mcg/0.5 mL IM vaccine (> or = 6 mo) 0.5 mL       Reviewed MFM notes and recommendations, pt would like to deliver closer to 37weeks, explained depends on her cervix at that time and how the pregnancy is going  Reviewed warning signs, normal FM,  labor precautions and how/when to call.  RTC x 2 wks, call or present sooner prn.

## 2024-11-16 ENCOUNTER — PATIENT MESSAGE (OUTPATIENT)
Dept: OTHER | Facility: OTHER | Age: 34
End: 2024-11-16
Payer: COMMERCIAL

## 2024-11-19 ENCOUNTER — PATIENT MESSAGE (OUTPATIENT)
Dept: GASTROENTEROLOGY | Facility: CLINIC | Age: 34
End: 2024-11-19
Payer: COMMERCIAL

## 2024-11-19 ENCOUNTER — PROCEDURE VISIT (OUTPATIENT)
Dept: OBSTETRICS AND GYNECOLOGY | Facility: CLINIC | Age: 34
End: 2024-11-19
Payer: COMMERCIAL

## 2024-11-19 ENCOUNTER — LAB VISIT (OUTPATIENT)
Dept: LAB | Facility: HOSPITAL | Age: 34
End: 2024-11-19
Attending: ADVANCED PRACTICE MIDWIFE
Payer: COMMERCIAL

## 2024-11-19 VITALS — DIASTOLIC BLOOD PRESSURE: 88 MMHG | SYSTOLIC BLOOD PRESSURE: 130 MMHG

## 2024-11-19 DIAGNOSIS — Z36.89 ENCOUNTER FOR ULTRASOUND TO ASSESS FETAL GROWTH: Primary | ICD-10-CM

## 2024-11-19 DIAGNOSIS — O99.212 OBESITY DURING PREGNANCY IN SECOND TRIMESTER: ICD-10-CM

## 2024-11-19 DIAGNOSIS — M35.9 AUTOIMMUNE DISEASE: ICD-10-CM

## 2024-11-19 DIAGNOSIS — O09.92 HIGH-RISK PREGNANCY IN SECOND TRIMESTER: ICD-10-CM

## 2024-11-19 DIAGNOSIS — Z36.2 ENCOUNTER FOR FOLLOW-UP ULTRASOUND OF FETAL ANATOMY: ICD-10-CM

## 2024-11-19 DIAGNOSIS — O10.919 CHRONIC HYPERTENSION AFFECTING PREGNANCY: ICD-10-CM

## 2024-11-19 LAB
ALBUMIN SERPL BCP-MCNC: 2.5 G/DL (ref 3.5–5.2)
ALP SERPL-CCNC: 77 U/L (ref 40–150)
ALT SERPL W/O P-5'-P-CCNC: 7 U/L (ref 10–44)
ANION GAP SERPL CALC-SCNC: 7 MMOL/L (ref 8–16)
AST SERPL-CCNC: 15 U/L (ref 10–40)
BASOPHILS # BLD AUTO: 0.05 K/UL (ref 0–0.2)
BASOPHILS NFR BLD: 0.4 % (ref 0–1.9)
BILIRUB SERPL-MCNC: 0.2 MG/DL (ref 0.1–1)
BUN SERPL-MCNC: 10 MG/DL (ref 6–20)
CALCIUM SERPL-MCNC: 8.6 MG/DL (ref 8.7–10.5)
CHLORIDE SERPL-SCNC: 104 MMOL/L (ref 95–110)
CO2 SERPL-SCNC: 21 MMOL/L (ref 23–29)
CREAT SERPL-MCNC: 0.6 MG/DL (ref 0.5–1.4)
DIFFERENTIAL METHOD BLD: ABNORMAL
EOSINOPHIL # BLD AUTO: 0.1 K/UL (ref 0–0.5)
EOSINOPHIL NFR BLD: 0.7 % (ref 0–8)
ERYTHROCYTE [DISTWIDTH] IN BLOOD BY AUTOMATED COUNT: 13.1 % (ref 11.5–14.5)
EST. GFR  (NO RACE VARIABLE): >60 ML/MIN/1.73 M^2
GLUCOSE SERPL-MCNC: 116 MG/DL (ref 70–140)
GLUCOSE SERPL-MCNC: 117 MG/DL (ref 70–110)
HCT VFR BLD AUTO: 34.2 % (ref 37–48.5)
HGB BLD-MCNC: 11.5 G/DL (ref 12–16)
HIV 1+2 AB+HIV1 P24 AG SERPL QL IA: NORMAL
IMM GRANULOCYTES # BLD AUTO: 0.11 K/UL (ref 0–0.04)
IMM GRANULOCYTES NFR BLD AUTO: 0.9 % (ref 0–0.5)
LYMPHOCYTES # BLD AUTO: 2.5 K/UL (ref 1–4.8)
LYMPHOCYTES NFR BLD: 20.4 % (ref 18–48)
MCH RBC QN AUTO: 32.4 PG (ref 27–31)
MCHC RBC AUTO-ENTMCNC: 33.6 G/DL (ref 32–36)
MCV RBC AUTO: 96 FL (ref 82–98)
MONOCYTES # BLD AUTO: 0.8 K/UL (ref 0.3–1)
MONOCYTES NFR BLD: 6.3 % (ref 4–15)
NEUTROPHILS # BLD AUTO: 8.7 K/UL (ref 1.8–7.7)
NEUTROPHILS NFR BLD: 71.3 % (ref 38–73)
NRBC BLD-RTO: 0 /100 WBC
PLATELET # BLD AUTO: 279 K/UL (ref 150–450)
PMV BLD AUTO: 10.4 FL (ref 9.2–12.9)
POTASSIUM SERPL-SCNC: 3.7 MMOL/L (ref 3.5–5.1)
PROT SERPL-MCNC: 6.3 G/DL (ref 6–8.4)
RBC # BLD AUTO: 3.55 M/UL (ref 4–5.4)
SODIUM SERPL-SCNC: 132 MMOL/L (ref 136–145)
TREPONEMA PALLIDUM IGG+IGM AB [PRESENCE] IN SERUM OR PLASMA BY IMMUNOASSAY: NONREACTIVE
WBC # BLD AUTO: 12.13 K/UL (ref 3.9–12.7)

## 2024-11-19 PROCEDURE — 85025 COMPLETE CBC W/AUTO DIFF WBC: CPT | Performed by: ADVANCED PRACTICE MIDWIFE

## 2024-11-19 PROCEDURE — 80053 COMPREHEN METABOLIC PANEL: CPT | Performed by: ADVANCED PRACTICE MIDWIFE

## 2024-11-19 PROCEDURE — 36415 COLL VENOUS BLD VENIPUNCTURE: CPT | Mod: PO | Performed by: ADVANCED PRACTICE MIDWIFE

## 2024-11-19 PROCEDURE — 86593 SYPHILIS TEST NON-TREP QUANT: CPT | Performed by: ADVANCED PRACTICE MIDWIFE

## 2024-11-19 PROCEDURE — 82950 GLUCOSE TEST: CPT | Performed by: ADVANCED PRACTICE MIDWIFE

## 2024-11-19 PROCEDURE — 87389 HIV-1 AG W/HIV-1&-2 AB AG IA: CPT | Performed by: ADVANCED PRACTICE MIDWIFE

## 2024-11-19 RX ORDER — CYCLOBENZAPRINE HCL 10 MG
TABLET ORAL
COMMUNITY
Start: 2024-11-07

## 2024-11-19 RX ORDER — LABETALOL 100 MG/1
100 TABLET, FILM COATED ORAL 2 TIMES DAILY
Qty: 60 TABLET | Refills: 11 | Status: SHIPPED | OUTPATIENT
Start: 2024-11-19 | End: 2025-11-19

## 2024-11-19 NOTE — ASSESSMENT & PLAN NOTE
See previous M consultation for full recommendations.  The patient's blood pressure has been normal since her last visit.  She reports feeling very tired when she takes Nifedipine. She has not taken it in the last 3 days due to how tired it makes her feel. She'd like to switch to another HTN medication.    Recommendations:  - If needed, complete baseline evaluation as recommended in initial consultation (baseline preE labs, EKG/Echo)  - Continue aspirin 81 mg daily for preeclampsia risk reduction  - Medications: d/c procardia. Initiated Labetalol 100 BID. Primary OB please titrate as needed to maintain BP <140/90.  - Serial fetal growth ultrasounds every 4-6 weeks, beginning at 26-28 weeks.   - Continue close observation of patient's blood pressures. Avoid hypotension as this has been associated with uteroplacental insufficiency.  -Recommend treatment to a goal blood pressure < 140/90  - Initiate antepartum testing at 32 weeks  - Delivery timing:  No medications, no comorbid conditions: 39 0/7 - 39 6/7 weeks gestation  No medications, comorbid conditions: 38 0/7 - 38 6/7 weeks gestation  Controlled on single agent, no comorbid conditions: 38 0/7 - 38 6/7 weeks gestation  Controlled on single agent, comorbid conditions: 37 0/7 - 38 6/7 weeks gestation  Uncontrolled or requiring >= 2 medications: 36 0/7 - 37 6/7 weeks gestation  Comorbid conditions include BMI >= 40, diabetes, and complex medical condition associated with placental dysfunction (ie lupus or other vascular disease)  Delivery may be recommended earlier pending results of fetal growth ultrasounds, AFV assessment, or antepartum testing results.

## 2024-11-19 NOTE — PROGRESS NOTES
Maternal Fetal Medicine follow up consult    SUBJECTIVE:     Krupa Walker is a 34 y.o.  female with IUP at 28w3d  who is seen in follow up consultation by MFM.  Pregnancy complications include:   Problem   Chronic Hypertension Affecting Pregnancy       Previous notes reviewed.   No changes to medical, surgical, family, social, or obstetric history.    Interval history since last M visit: No obstetric complaints. No preE symptoms.    Medications:  Current Outpatient Medications on File Prior to Visit   Medication Sig Dispense Refill    aspirin 81 MG Chew Take 81 mg by mouth once daily.      cyclobenzaprine (FLEXERIL) 10 MG tablet       ketoconazole (NIZORAL) 2 % shampoo Shampoo scalp 1-2 times weekly. Lather x 5 minutes. 120 mL 5    multivit with calcium,iron,min (WOMEN'S DAILY MULTIVITAMIN ORAL) Women's Daily Multivitamin      NIFEdipine (PROCARDIA-XL) 30 MG (OSM) 24 hr tablet Take 1 tablet (30 mg total) by mouth once daily. 30 tablet 11    prochlorperazine (COMPAZINE) 5 MG tablet Take 1 tablet (5 mg total) by mouth every 6 (six) hours as needed for Nausea (or headache pain). 12 tablet 2    sumatriptan (IMITREX) 100 MG tablet Take at onset of migraine, can repeat in 2 hrs if needed.  No more than twice per day or 3 days/wk. 18 tablet 5     No current facility-administered medications on file prior to visit.        OBJECTIVE:     Blood Pressure: 130/88    Ultrasound performed. See viewpoint for full ultrasound report.  The fetal anatomic survey was completed today, and no fetal structural abnormalities were identified of the structures imaged today.   Interval fetal growth has been normal, and the AFV is normal.     ASSESSMENT/PLAN:     34 y.o.  female with IUP at 28w3d     Problems addressed at today's visit:  Chronic hypertension affecting pregnancy  See previous MFM consultation for full recommendations.  The patient's blood pressure has been normal since her last visit.  She reports feeling  very tired when she takes Nifedipine. She has not taken it in the last 3 days due to how tired it makes her feel. She'd like to switch to another HTN medication.    Recommendations:  - If needed, complete baseline evaluation as recommended in initial consultation (baseline preE labs, EKG/Echo)  - Continue aspirin 81 mg daily for preeclampsia risk reduction  - Medications: d/c procardia. Initiated Labetalol 100 BID. Primary OB please titrate as needed to maintain BP <140/90.  - Serial fetal growth ultrasounds every 4-6 weeks, beginning at 26-28 weeks.   - Continue close observation of patient's blood pressures. Avoid hypotension as this has been associated with uteroplacental insufficiency.  -Recommend treatment to a goal blood pressure < 140/90  - Initiate antepartum testing at 32 weeks  - Delivery timing:  No medications, no comorbid conditions: 39 0/7 - 39 6/7 weeks gestation  No medications, comorbid conditions: 38 0/7 - 38 6/7 weeks gestation  Controlled on single agent, no comorbid conditions: 38 0/7 - 38 6/7 weeks gestation  Controlled on single agent, comorbid conditions: 37 0/7 - 38 6/7 weeks gestation  Uncontrolled or requiring >= 2 medications: 36 0/7 - 37 6/7 weeks gestation  Comorbid conditions include BMI >= 40, diabetes, and complex medical condition associated with placental dysfunction (ie lupus or other vascular disease)  Delivery may be recommended earlier pending results of fetal growth ultrasounds, AFV assessment, or antepartum testing results.      Please see original [facility] consultation for full details regarding management recommendations of these and other obstetric co-morbidities    FOLLOW UP:   Growth ultrasound scheduled in 4 weeks    Today I have spent 20 minutes in the care of the patient. This includes face to face time and non-face to face time preparing to see the patient (eg, review of tests), obtaining and/or reviewing separately obtained history, documenting clinical information in  the electronic or other health record, independently interpreting results and communicating results to the patient/family/caregiver, or care coordination.     Angie Segura MD  Maternal Fetal Medicine

## 2024-11-20 ENCOUNTER — PATIENT MESSAGE (OUTPATIENT)
Dept: OBSTETRICS AND GYNECOLOGY | Facility: CLINIC | Age: 34
End: 2024-11-20
Payer: COMMERCIAL

## 2024-11-21 ENCOUNTER — LAB VISIT (OUTPATIENT)
Dept: LAB | Facility: HOSPITAL | Age: 34
End: 2024-11-21
Payer: COMMERCIAL

## 2024-11-21 DIAGNOSIS — O26.893 DYSURIA IN PREGNANCY IN THIRD TRIMESTER: ICD-10-CM

## 2024-11-21 DIAGNOSIS — R30.0 DYSURIA IN PREGNANCY IN THIRD TRIMESTER: Primary | ICD-10-CM

## 2024-11-21 DIAGNOSIS — R30.0 DYSURIA IN PREGNANCY IN THIRD TRIMESTER: ICD-10-CM

## 2024-11-21 DIAGNOSIS — O26.893 DYSURIA IN PREGNANCY IN THIRD TRIMESTER: Primary | ICD-10-CM

## 2024-11-21 PROCEDURE — 36415 COLL VENOUS BLD VENIPUNCTURE: CPT | Mod: PN

## 2024-11-21 PROCEDURE — 85025 COMPLETE CBC W/AUTO DIFF WBC: CPT

## 2024-11-21 PROCEDURE — 80053 COMPREHEN METABOLIC PANEL: CPT

## 2024-11-21 NOTE — TELEPHONE ENCOUNTER
After two patient Identifiers patient stated that she would go to the [place] location today to have her blood lab and Ua testing done.  Labs scheduled

## 2024-11-22 LAB
ALBUMIN SERPL BCP-MCNC: 2.9 G/DL (ref 3.5–5.2)
ALP SERPL-CCNC: 86 U/L (ref 40–150)
ALT SERPL W/O P-5'-P-CCNC: 8 U/L (ref 10–44)
ANION GAP SERPL CALC-SCNC: 11 MMOL/L (ref 8–16)
AST SERPL-CCNC: 16 U/L (ref 10–40)
BASOPHILS # BLD AUTO: 0.07 K/UL (ref 0–0.2)
BASOPHILS NFR BLD: 0.6 % (ref 0–1.9)
BILIRUB SERPL-MCNC: 0.3 MG/DL (ref 0.1–1)
BUN SERPL-MCNC: 12 MG/DL (ref 6–20)
CALCIUM SERPL-MCNC: 9.3 MG/DL (ref 8.7–10.5)
CHLORIDE SERPL-SCNC: 106 MMOL/L (ref 95–110)
CO2 SERPL-SCNC: 22 MMOL/L (ref 23–29)
CREAT SERPL-MCNC: 0.7 MG/DL (ref 0.5–1.4)
DIFFERENTIAL METHOD BLD: ABNORMAL
EOSINOPHIL # BLD AUTO: 0.1 K/UL (ref 0–0.5)
EOSINOPHIL NFR BLD: 0.6 % (ref 0–8)
ERYTHROCYTE [DISTWIDTH] IN BLOOD BY AUTOMATED COUNT: 12.6 % (ref 11.5–14.5)
EST. GFR  (NO RACE VARIABLE): >60 ML/MIN/1.73 M^2
GLUCOSE SERPL-MCNC: 120 MG/DL (ref 70–110)
HCT VFR BLD AUTO: 35 % (ref 37–48.5)
HGB BLD-MCNC: 12 G/DL (ref 12–16)
IMM GRANULOCYTES # BLD AUTO: 0.09 K/UL (ref 0–0.04)
IMM GRANULOCYTES NFR BLD AUTO: 0.7 % (ref 0–0.5)
LYMPHOCYTES # BLD AUTO: 2.3 K/UL (ref 1–4.8)
LYMPHOCYTES NFR BLD: 18.1 % (ref 18–48)
MCH RBC QN AUTO: 32.1 PG (ref 27–31)
MCHC RBC AUTO-ENTMCNC: 34.3 G/DL (ref 32–36)
MCV RBC AUTO: 94 FL (ref 82–98)
MONOCYTES # BLD AUTO: 0.6 K/UL (ref 0.3–1)
MONOCYTES NFR BLD: 4.6 % (ref 4–15)
NEUTROPHILS # BLD AUTO: 9.4 K/UL (ref 1.8–7.7)
NEUTROPHILS NFR BLD: 75.4 % (ref 38–73)
NRBC BLD-RTO: 0 /100 WBC
PLATELET # BLD AUTO: 316 K/UL (ref 150–450)
PMV BLD AUTO: 11 FL (ref 9.2–12.9)
POTASSIUM SERPL-SCNC: 3.6 MMOL/L (ref 3.5–5.1)
PROT SERPL-MCNC: 6.7 G/DL (ref 6–8.4)
RBC # BLD AUTO: 3.74 M/UL (ref 4–5.4)
SODIUM SERPL-SCNC: 139 MMOL/L (ref 136–145)
WBC # BLD AUTO: 12.52 K/UL (ref 3.9–12.7)

## 2024-11-23 ENCOUNTER — PATIENT MESSAGE (OUTPATIENT)
Dept: OBSTETRICS AND GYNECOLOGY | Facility: CLINIC | Age: 34
End: 2024-11-23
Payer: COMMERCIAL

## 2024-11-23 DIAGNOSIS — O23.43 UTI IN PREGNANCY, ANTEPARTUM, THIRD TRIMESTER: Primary | ICD-10-CM

## 2024-11-23 RX ORDER — NITROFURANTOIN 25; 75 MG/1; MG/1
100 CAPSULE ORAL 2 TIMES DAILY
Qty: 20 CAPSULE | Refills: 0 | Status: SHIPPED | OUTPATIENT
Start: 2024-11-23 | End: 2024-12-03

## 2024-11-25 ENCOUNTER — PATIENT MESSAGE (OUTPATIENT)
Dept: OBSTETRICS AND GYNECOLOGY | Facility: CLINIC | Age: 34
End: 2024-11-25
Payer: COMMERCIAL

## 2024-11-29 ENCOUNTER — ROUTINE PRENATAL (OUTPATIENT)
Dept: OBSTETRICS AND GYNECOLOGY | Facility: CLINIC | Age: 34
End: 2024-11-29
Payer: COMMERCIAL

## 2024-11-29 VITALS
SYSTOLIC BLOOD PRESSURE: 122 MMHG | WEIGHT: 251.75 LBS | BODY MASS INDEX: 43.22 KG/M2 | DIASTOLIC BLOOD PRESSURE: 70 MMHG

## 2024-11-29 DIAGNOSIS — Q62.5 DUPLICATED LEFT RENAL COLLECTING SYSTEM: ICD-10-CM

## 2024-11-29 DIAGNOSIS — Z87.59 HISTORY OF MIGRAINE DURING PREGNANCY: ICD-10-CM

## 2024-11-29 DIAGNOSIS — Z67.91 RH NEGATIVE STATE IN ANTEPARTUM PERIOD, THIRD TRIMESTER: ICD-10-CM

## 2024-11-29 DIAGNOSIS — O09.93 HIGH-RISK PREGNANCY IN THIRD TRIMESTER: Primary | ICD-10-CM

## 2024-11-29 DIAGNOSIS — Z87.59 HISTORY OF GESTATIONAL HYPERTENSION: ICD-10-CM

## 2024-11-29 DIAGNOSIS — Z86.69 HISTORY OF MIGRAINE DURING PREGNANCY: ICD-10-CM

## 2024-11-29 DIAGNOSIS — Z23 NEED FOR DIPHTHERIA-TETANUS-PERTUSSIS (TDAP) VACCINE: ICD-10-CM

## 2024-11-29 DIAGNOSIS — O10.919 CHRONIC HYPERTENSION AFFECTING PREGNANCY: ICD-10-CM

## 2024-11-29 DIAGNOSIS — O99.212 OBESITY DURING PREGNANCY IN SECOND TRIMESTER: ICD-10-CM

## 2024-11-29 DIAGNOSIS — O26.893 RH NEGATIVE STATE IN ANTEPARTUM PERIOD, THIRD TRIMESTER: ICD-10-CM

## 2024-11-29 DIAGNOSIS — Z98.891 HISTORY OF VBAC: ICD-10-CM

## 2024-11-29 DIAGNOSIS — O23.43 UTI IN PREGNANCY, ANTEPARTUM, THIRD TRIMESTER: ICD-10-CM

## 2024-11-29 PROBLEM — O34.219 HX OF CESAREAN SECTION COMPLICATING PREGNANCY: Status: ACTIVE | Noted: 2024-07-31

## 2024-11-29 PROCEDURE — 99999 PR PBB SHADOW E&M-EST. PATIENT-LVL III: CPT | Mod: PBBFAC,,, | Performed by: ADVANCED PRACTICE MIDWIFE

## 2024-11-29 NOTE — PROGRESS NOTES
34 y.o. female  at 29w6d   Reports + FM, denies VB, LOF or CTX  Doing well without concerns   /70   Wt 114.2 kg (251 lb 12.3 oz)   LMP 2024 (Exact Date)   BMI 43.22 kg/m²   TW lbs   Reviewed 28wk lab results (A NEG), rhogam today   Passed GTT  Tdap today    1. History of migraine during pregnancy  Overview:  Imitrex sparingly, ok per MFM       2. Chronic hypertension affecting pregnancy  Overview:  Labetalol 100mg BID, stable at this time  2x weekly testing per MFM, NST set up on L&D and weekly BPPs with us      3. UTI in pregnancy, antepartum, third trimester  Overview:  Macrobid sent  culture negative      4. Duplicated left renal collecting system  Overview:  Had pyelo with previous pregnancy   Plan for urine cultures q4wks vs qtrimester  UC collected at 20wk visit      5. History of   Overview:  Desires   Per [facility] deliver 37-38.6, aware of need for favorable cervix and MD approval, message sent to Dr. Zully Jensen for chart review      6. History of gestational hypertension    7. High-risk pregnancy in third trimester         Reviewed warning signs, normal FKCs,  labor precautions and how/when to call.  RTC x 2 wks, call or present sooner prn.

## 2024-11-30 ENCOUNTER — PATIENT MESSAGE (OUTPATIENT)
Dept: OTHER | Facility: OTHER | Age: 34
End: 2024-11-30
Payer: COMMERCIAL

## 2024-12-12 ENCOUNTER — OFFICE VISIT (OUTPATIENT)
Dept: PSYCHIATRY | Facility: CLINIC | Age: 34
End: 2024-12-12
Payer: COMMERCIAL

## 2024-12-12 DIAGNOSIS — Z3A.32 32 WEEKS GESTATION OF PREGNANCY: ICD-10-CM

## 2024-12-12 DIAGNOSIS — F90.0 ATTENTION DEFICIT HYPERACTIVITY DISORDER (ADHD), PREDOMINANTLY INATTENTIVE TYPE: ICD-10-CM

## 2024-12-12 DIAGNOSIS — F99 INSOMNIA DUE TO OTHER MENTAL DISORDER: ICD-10-CM

## 2024-12-12 DIAGNOSIS — F51.05 INSOMNIA DUE TO OTHER MENTAL DISORDER: ICD-10-CM

## 2024-12-12 DIAGNOSIS — F41.1 GAD (GENERALIZED ANXIETY DISORDER): Primary | ICD-10-CM

## 2024-12-12 PROCEDURE — 1160F RVW MEDS BY RX/DR IN RCRD: CPT | Mod: CPTII,95,, | Performed by: INTERNAL MEDICINE

## 2024-12-12 PROCEDURE — 3044F HG A1C LEVEL LT 7.0%: CPT | Mod: CPTII,95,, | Performed by: INTERNAL MEDICINE

## 2024-12-12 PROCEDURE — 99214 OFFICE O/P EST MOD 30 MIN: CPT | Mod: 95,,, | Performed by: INTERNAL MEDICINE

## 2024-12-12 PROCEDURE — 1159F MED LIST DOCD IN RCRD: CPT | Mod: CPTII,95,, | Performed by: INTERNAL MEDICINE

## 2024-12-12 NOTE — PROGRESS NOTES
OUTPATIENT PSYCHIATRY RETURN VISIT    ENCOUNTER DATE:  12/12/24  SITE:  [facility], [facility]  LENGTH OF SESSION:  20 minutes    The patient location is:  [place], not in a healthcare facility  Visit type:  audiovisual    Face to Face time with patient:  20 minutes  25 minutes of total time spent on the encounter, which includes face to face time and non-face to face time preparing to see the patient (eg, review of tests), Obtaining and/or reviewing separately obtained history, Documenting clinical information in the electronic or other health record, Independently interpreting results (not separately reported) and communicating results to the patient/family/caregiver, or Care coordination (not separately reported).     Each patient to whom he or she provides medical services by telemedicine is:  (1) informed of the relationship between the physician and patient and the respective role of any other health care provider with respect to management of the patient; and (2) notified that he or she may decline to receive medical services by telemedicine and may withdraw from such care at any time.    CHIEF COMPLAINT:  Mood and Pregnancy      HISTORY OF PRESENTING ILLNESS:  Krupa Walker is a 34 y.o. female with history of MDD, JAVI, ADHD, and Insomnia who presents for follow up appointment.     Plan at last appointment on 1/24/2024:  Discussed various options for mood and ADHD, including retrying Wellbutrin or Prozac for mood versus starting low dose Adderall for ADHD.  She would first like to try Adderall 10mg BID but plans to start by only taking in the morning.    Continue Xanax 0.5mg daily PRN anxiety/insomnia.  She is very rarely taking Buspar PRN.    Discussed with patient informed consent, risks versus benefits, alternative treatments, side effect profile and the inherent unpredictability of individual responses to these treatments.  The patient expresses understanding of the above and  displays the capacity to agree with this current plan.  She would like to establish with a new therapist however would need to be at [facility] due to insurance.      History as told by patient:  She is 7.5 months pregnant.  Couldn't sleep after taking taking Adderall 10mg so stopped this after last appointment.  Does have anxiety leading up to Rhea, being pregnant, all the life changes.  Ozempic was really helpful for her weight and mood.  It feels like a switch flips when she is on it - helps her mood so much.  Stopped about 3 weeks before pregnancy.  Due date 2/8/24.  News house - now 4 bedrooms.  Sometimes feels she can't keep up with 2 kids so worries about how she will handle 3 kids.  Considered high risk due to her lupus and hypertension.  Now has to have 3 appointments per week - difficult with the holidays.  She is taking 1/2 of Unisom for sleep.  Taking Buspar 10mg PRN very rarely - prescribed during last pregnancy by Dr. Crocker.  When she took every day, it made her fingers a little numb though.      Medication side effects:  N/A  Medication compliance:  N/A    PSYCHIATRIC REVIEW OF SYSTEMS:  Trouble with sleep:  Improved on Unisom  Appetite changes:  Not discussed  Weight changes:  Gain with pregnancy  Lack of energy:  Sometimes  Anhedonia:  Sometimes  Somatic symptoms:  Denies  Libido:  No current issues  Anxiety/panic:  Sometimes  Guilty/hopeless:  Denies  Self-injurious behavior/risky behavior:  Denies  Any drugs:  Denies  Alcohol:  Not currently  Breastfeeding:  Denies    MEDICAL REVIEW OF SYSTEMS:  Complete review of systems performed covering Constitutional, Musculoskeletal, Neurologic.  All systems negative except for that covered in HPI.    PAST PSYCHIATRIC, MEDICAL, AND SOCIAL HISTORY REVIEWED  The patient's past medical, family and social history have been reviewed and updated as appropriate within the electronic medical record - see encounter notes.    MEDICATIONS:    Current Outpatient  "Medications:     aspirin 81 MG Chew, Take 81 mg by mouth once daily., Disp: , Rfl:     labetaloL (NORMODYNE) 100 MG tablet, Take 1 tablet (100 mg total) by mouth 2 (two) times daily., Disp: 60 tablet, Rfl: 11    multivit with calcium,iron,min (WOMEN'S DAILY MULTIVITAMIN ORAL), Women's Daily Multivitamin, Disp: , Rfl:     prochlorperazine (COMPAZINE) 5 MG tablet, Take 1 tablet (5 mg total) by mouth every 6 (six) hours as needed for Nausea (or headache pain)., Disp: 12 tablet, Rfl: 2    sumatriptan (IMITREX) 100 MG tablet, Take at onset of migraine, can repeat in 2 hrs if needed.  No more than twice per day or 3 days/wk., Disp: 18 tablet, Rfl: 5    ALLERGIES:  Review of patient's allergies indicates:  No Known Allergies    PSYCHIATRIC EXAM:  There were no vitals filed for this visit.    Appearance:  Well groomed, appearing healthy and of stated age  Behavior:  Cooperative, pleasant, no psychomotor agitation or retardation  Speech:  Normal rate, rhythm, prosody, and volume  Mood:  "Ok"  Affect:  Euthymic  Thought Process:  Linear, logical, goal directed  Thought Content:  Negative for suicidal ideation, homicidal ideation, delusions or hallucinations.  Associations:  Intact  Memory:  Grossly Intact  Level of Consciousness/Orientation:  Grossly intact  Fund of Knowledge:  Good  Attention:  Good  Language:  Fluent, able to name abstract and concrete objects  Insight:  Fair  Judgment:  Intact  Psychomotor signs:  No involuntary movements of face  Gait:  Unable to assess via virtual visit      RELEVANT LABS/STUDIES:    Lab Results   Component Value Date    WBC 10.88 12/18/2024    HGB 11.0 (L) 12/18/2024    HCT 33.0 (L) 12/18/2024    MCV 95 12/18/2024     12/18/2024     BMP  Lab Results   Component Value Date     (L) 12/18/2024    K 3.7 12/18/2024     12/18/2024    CO2 20 (L) 12/18/2024    BUN 7 12/18/2024    CREATININE 0.7 12/18/2024    CALCIUM 9.0 12/18/2024    ANIONGAP 9 12/18/2024    ESTGFRAFRICA >60 " 06/04/2021    EGFRNONAA >60 06/04/2021     Lab Results   Component Value Date    ALT 7 (L) 12/18/2024    AST 16 12/18/2024    ALKPHOS 99 12/18/2024    BILITOT 0.2 12/18/2024     Lab Results   Component Value Date    TSH 2.222 09/23/2024     Lab Results   Component Value Date    HGBA1C 4.7 06/24/2024       IMPRESSION:    Krupa Walker is a 34 y.o. female with history of MDD, JAVI, ADHD, and Insomnia who presents for follow up appointment.    Status/Progress:  Based on the examination today, the patient's problem(s) is/are adequately but not ideally controlled.  New problems have not been presented today.    Risk Parameters:  Patient reports no suicidal ideation  Patient reports no homicidal ideation  Patient reports no self-injurious behavior  Patient reports no violent behavior        DIAGNOSES:    ICD-10-CM ICD-9-CM   1. JAVI (generalized anxiety disorder)  F41.1 300.02   2. Insomnia due to other mental disorder  F51.05 300.9    F99 327.02   3. Attention deficit hyperactivity disorder (ADHD), predominantly inattentive type  F90.0 314.00   4. 32 weeks gestation of pregnancy  Z3A.32 V22.2         PLAN:  She is not taking daily medication currently.  She is very rarely taking Buspar PRN - prescribed by Dr. Crocker during last pregnancy.  Will plan to follow up about 2 weeks after delivery.    Discussed with patient informed consent, risks versus benefits, alternative treatments, side effect profile and the inherent unpredictability of individual responses to these treatments.  The patient expresses understanding of the above and displays the capacity to agree with this current plan.    RETURN TO CLINIC:  Follow up in about 3 months (around 3/12/2025).

## 2024-12-14 ENCOUNTER — PATIENT MESSAGE (OUTPATIENT)
Dept: OTHER | Facility: OTHER | Age: 34
End: 2024-12-14
Payer: COMMERCIAL

## 2024-12-15 ENCOUNTER — PATIENT MESSAGE (OUTPATIENT)
Dept: OBSTETRICS AND GYNECOLOGY | Facility: CLINIC | Age: 34
End: 2024-12-15
Payer: COMMERCIAL

## 2024-12-16 ENCOUNTER — HOSPITAL ENCOUNTER (OUTPATIENT)
Facility: HOSPITAL | Age: 34
Discharge: HOME | End: 2024-12-16
Attending: STUDENT IN AN ORGANIZED HEALTH CARE EDUCATION/TRAINING PROGRAM | Admitting: STUDENT IN AN ORGANIZED HEALTH CARE EDUCATION/TRAINING PROGRAM
Payer: COMMERCIAL

## 2024-12-16 VITALS — DIASTOLIC BLOOD PRESSURE: 75 MMHG | SYSTOLIC BLOOD PRESSURE: 121 MMHG | TEMPERATURE: 99 F | HEART RATE: 101 BPM

## 2024-12-16 DIAGNOSIS — O10.919 CHRONIC HYPERTENSION AFFECTING PREGNANCY: ICD-10-CM

## 2024-12-16 DIAGNOSIS — O09.93 HIGH-RISK PREGNANCY IN THIRD TRIMESTER: ICD-10-CM

## 2024-12-16 PROCEDURE — 59025 FETAL NON-STRESS TEST: CPT

## 2024-12-16 NOTE — PROCEDURES
Krupa Walker is a 34 y.o. female patient.    Temp: 99 °F (37.2 °C) (12/16/24 0944)  Pulse: 101 (12/16/24 0944)  BP: 121/75 (12/16/24 0944)       Fetal non-stress test    Date/Time: 12/16/2024 10:23 AM    Performed by: Ambreen Lopez CNM  Authorized by: Ambreen Lopez CNM    Nonstress Test:     Variability:  6-25 BPM    Decelerations:  None    Accelerations:  15 bpm    Acoustic Stimulator: No      Baseline:  130    Uterine Irritability: No      Contractions:  Not present  Biophysical Profile:     Nonstress Test Interpretation: reactive      Overall Impression:  Reassuring  Post-procedure:     Patient tolerance:  Patient tolerated the procedure well with no immediate complications      12/16/2024

## 2024-12-18 ENCOUNTER — ROUTINE PRENATAL (OUTPATIENT)
Dept: OBSTETRICS AND GYNECOLOGY | Facility: CLINIC | Age: 34
End: 2024-12-18
Payer: COMMERCIAL

## 2024-12-18 ENCOUNTER — LAB VISIT (OUTPATIENT)
Dept: LAB | Facility: HOSPITAL | Age: 34
End: 2024-12-18
Payer: COMMERCIAL

## 2024-12-18 ENCOUNTER — PROCEDURE VISIT (OUTPATIENT)
Dept: OBSTETRICS AND GYNECOLOGY | Facility: CLINIC | Age: 34
End: 2024-12-18
Payer: COMMERCIAL

## 2024-12-18 VITALS — DIASTOLIC BLOOD PRESSURE: 77 MMHG | SYSTOLIC BLOOD PRESSURE: 141 MMHG

## 2024-12-18 DIAGNOSIS — O34.219 HX OF CESAREAN SECTION COMPLICATING PREGNANCY: ICD-10-CM

## 2024-12-18 DIAGNOSIS — Z36.89 ENCOUNTER FOR ULTRASOUND TO ASSESS FETAL GROWTH: ICD-10-CM

## 2024-12-18 DIAGNOSIS — Z87.59 HISTORY OF MIGRAINE DURING PREGNANCY: ICD-10-CM

## 2024-12-18 DIAGNOSIS — O10.919 CHRONIC HYPERTENSION AFFECTING PREGNANCY: ICD-10-CM

## 2024-12-18 DIAGNOSIS — O99.213 OBESITY DURING PREGNANCY IN THIRD TRIMESTER: ICD-10-CM

## 2024-12-18 DIAGNOSIS — Z87.59 HISTORY OF GESTATIONAL HYPERTENSION: ICD-10-CM

## 2024-12-18 DIAGNOSIS — Z86.69 HISTORY OF MIGRAINE DURING PREGNANCY: ICD-10-CM

## 2024-12-18 DIAGNOSIS — Z98.891 HISTORY OF VBAC: Primary | ICD-10-CM

## 2024-12-18 DIAGNOSIS — F41.1 GAD (GENERALIZED ANXIETY DISORDER): ICD-10-CM

## 2024-12-18 DIAGNOSIS — O09.93 HIGH-RISK PREGNANCY IN THIRD TRIMESTER: ICD-10-CM

## 2024-12-18 LAB
ALBUMIN SERPL BCP-MCNC: 2.5 G/DL (ref 3.5–5.2)
ALP SERPL-CCNC: 99 U/L (ref 40–150)
ALT SERPL W/O P-5'-P-CCNC: 7 U/L (ref 10–44)
ANION GAP SERPL CALC-SCNC: 9 MMOL/L (ref 8–16)
AST SERPL-CCNC: 16 U/L (ref 10–40)
BASOPHILS # BLD AUTO: 0.03 K/UL (ref 0–0.2)
BASOPHILS NFR BLD: 0.3 % (ref 0–1.9)
BILIRUB SERPL-MCNC: 0.2 MG/DL (ref 0.1–1)
BUN SERPL-MCNC: 7 MG/DL (ref 6–20)
CALCIUM SERPL-MCNC: 9 MG/DL (ref 8.7–10.5)
CHLORIDE SERPL-SCNC: 106 MMOL/L (ref 95–110)
CO2 SERPL-SCNC: 20 MMOL/L (ref 23–29)
CREAT SERPL-MCNC: 0.7 MG/DL (ref 0.5–1.4)
CREAT UR-MCNC: 138 MG/DL (ref 15–325)
DIFFERENTIAL METHOD BLD: ABNORMAL
EOSINOPHIL # BLD AUTO: 0.2 K/UL (ref 0–0.5)
EOSINOPHIL NFR BLD: 1.9 % (ref 0–8)
ERYTHROCYTE [DISTWIDTH] IN BLOOD BY AUTOMATED COUNT: 12.9 % (ref 11.5–14.5)
EST. GFR  (NO RACE VARIABLE): >60 ML/MIN/1.73 M^2
GLUCOSE SERPL-MCNC: 88 MG/DL (ref 70–110)
HCT VFR BLD AUTO: 33 % (ref 37–48.5)
HGB BLD-MCNC: 11 G/DL (ref 12–16)
IMM GRANULOCYTES # BLD AUTO: 0.12 K/UL (ref 0–0.04)
IMM GRANULOCYTES NFR BLD AUTO: 1.1 % (ref 0–0.5)
LYMPHOCYTES # BLD AUTO: 2.2 K/UL (ref 1–4.8)
LYMPHOCYTES NFR BLD: 20.1 % (ref 18–48)
MCH RBC QN AUTO: 31.6 PG (ref 27–31)
MCHC RBC AUTO-ENTMCNC: 33.3 G/DL (ref 32–36)
MCV RBC AUTO: 95 FL (ref 82–98)
MONOCYTES # BLD AUTO: 0.5 K/UL (ref 0.3–1)
MONOCYTES NFR BLD: 5 % (ref 4–15)
NEUTROPHILS # BLD AUTO: 7.8 K/UL (ref 1.8–7.7)
NEUTROPHILS NFR BLD: 71.6 % (ref 38–73)
NRBC BLD-RTO: 0 /100 WBC
PLATELET # BLD AUTO: 286 K/UL (ref 150–450)
PMV BLD AUTO: 10.3 FL (ref 9.2–12.9)
POTASSIUM SERPL-SCNC: 3.7 MMOL/L (ref 3.5–5.1)
PROT SERPL-MCNC: 6.6 G/DL (ref 6–8.4)
PROT UR-MCNC: 12 MG/DL (ref 0–15)
PROT/CREAT UR: 0.09 MG/G{CREAT} (ref 0–0.2)
RBC # BLD AUTO: 3.48 M/UL (ref 4–5.4)
SODIUM SERPL-SCNC: 135 MMOL/L (ref 136–145)
WBC # BLD AUTO: 10.88 K/UL (ref 3.9–12.7)

## 2024-12-18 PROCEDURE — 0502F SUBSEQUENT PRENATAL CARE: CPT | Mod: CPTII,S$GLB,,

## 2024-12-18 PROCEDURE — 85025 COMPLETE CBC W/AUTO DIFF WBC: CPT

## 2024-12-18 PROCEDURE — 80053 COMPREHEN METABOLIC PANEL: CPT

## 2024-12-18 PROCEDURE — 82570 ASSAY OF URINE CREATININE: CPT

## 2024-12-18 PROCEDURE — 36415 COLL VENOUS BLD VENIPUNCTURE: CPT | Mod: PO

## 2024-12-18 PROCEDURE — 99999 PR PBB SHADOW E&M-EST. PATIENT-LVL II: CPT | Mod: PBBFAC,,,

## 2024-12-18 NOTE — PROGRESS NOTES
34 y.o. female  at 32w4d   Reports + FM, denies VB, LOF or CTX  Doing well with recent cold and new onset of bilateral pitting edema that will not resolve with rest. Pt lost BP cuff and ahs not been taking at home. Also had RUQ pain that lasted a few days at home recently. Will start monitoring Bps again at home.     BP (!) 141/77  - will do Pre E work up today  TW lbs   Reviewed 28wk lab results (A NEG)  Passed GTT  Tdap sick today offer NV!    BPP today:  MVP 4.0cm [id number] 39% AC 61%  Limited anatomy appears repeat.   1. History of   Overview:  Desires   Per MFM deliver 37-38.6      2. Hx of  section complicating pregnancy    3. Obesity during pregnancy in third trimester  Overview:  Pre-gravid BMI 37.33, growth scans      4. High-risk pregnancy in third trimester    5. History of gestational hypertension    6. Chronic hypertension affecting pregnancy  Overview:  Labetalol 100mg BID    Orders:  -     CBC W/ AUTO DIFFERENTIAL; Future; Expected date: 2024  -     COMPREHENSIVE METABOLIC PANEL; Future; Expected date: 2024  -     Protein / creatinine ratio, urine    7. History of migraine during pregnancy  Overview:  Imitrex sparingly, ok per MFM       8. JAVI (generalized anxiety disorder)  Overview:  Per Psych: Adderall 10mg BID but plans to start by only taking in the morning.    Continue Xanax 0.5mg daily PRN anxiety/insomnia.  She is very rarely taking Buspar PRN.             Reviewed warning signs, normal FKCs,  labor precautions and how/when to call.  RTC x 2 wks, call or present sooner prn.

## 2024-12-22 ENCOUNTER — HOSPITAL ENCOUNTER (OUTPATIENT)
Facility: HOSPITAL | Age: 34
Discharge: HOME | End: 2024-12-22
Attending: OBSTETRICS & GYNECOLOGY | Admitting: OBSTETRICS & GYNECOLOGY
Payer: COMMERCIAL

## 2024-12-22 VITALS
SYSTOLIC BLOOD PRESSURE: 131 MMHG | OXYGEN SATURATION: 97 % | DIASTOLIC BLOOD PRESSURE: 77 MMHG | HEART RATE: 104 BPM | RESPIRATION RATE: 20 BRPM | TEMPERATURE: 99 F

## 2024-12-22 DIAGNOSIS — O09.93 HIGH-RISK PREGNANCY IN THIRD TRIMESTER: ICD-10-CM

## 2024-12-22 DIAGNOSIS — O10.919 CHRONIC HYPERTENSION AFFECTING PREGNANCY: ICD-10-CM

## 2024-12-22 PROCEDURE — 59025 FETAL NON-STRESS TEST: CPT

## 2024-12-22 NOTE — DISCHARGE INSTRUCTIONS

## 2024-12-22 NOTE — PROCEDURES
Krupa Walker is a 34 y.o. female patient.    Temp: 99.1 °F (37.3 °C) (12/22/24 1000)  Pulse: 104 (12/22/24 1000)  Resp: 20 (12/22/24 1000)  BP: 131/77 (12/22/24 1000)  SpO2: 97 % (12/22/24 1000)       Fetal non-stress test    Date/Time: 12/22/2024 10:17 AM    Performed by: Ambreen Lopez CNM  Authorized by: Ambreen Lopez CNM    Nonstress Test:     Variability:  6-25 BPM    Decelerations:  None    Accelerations:  15 bpm    Acoustic Stimulator: No      Uterine Irritability: No      Contractions:  Not present  Biophysical Profile:     Nonstress Test Interpretation: reactive      Overall Impression:  Reassuring  Post-procedure:     Patient tolerance:  Patient tolerated the procedure well with no immediate complications      12/22/2024

## 2024-12-27 ENCOUNTER — HOSPITAL ENCOUNTER (OUTPATIENT)
Dept: RADIOLOGY | Facility: HOSPITAL | Age: 34
Discharge: HOME | End: 2024-12-27
Payer: COMMERCIAL

## 2024-12-27 ENCOUNTER — ROUTINE PRENATAL (OUTPATIENT)
Dept: OBSTETRICS AND GYNECOLOGY | Facility: CLINIC | Age: 34
End: 2024-12-27
Payer: COMMERCIAL

## 2024-12-27 VITALS — DIASTOLIC BLOOD PRESSURE: 80 MMHG | SYSTOLIC BLOOD PRESSURE: 134 MMHG | WEIGHT: 257.5 LBS | BODY MASS INDEX: 44.2 KG/M2

## 2024-12-27 DIAGNOSIS — R10.11 RIGHT UPPER QUADRANT PAIN: ICD-10-CM

## 2024-12-27 DIAGNOSIS — M35.9 AUTOIMMUNE DISEASE: ICD-10-CM

## 2024-12-27 DIAGNOSIS — Z3A.33 33 WEEKS GESTATION OF PREGNANCY: Primary | ICD-10-CM

## 2024-12-27 DIAGNOSIS — O99.213 OBESITY DURING PREGNANCY IN THIRD TRIMESTER: ICD-10-CM

## 2024-12-27 DIAGNOSIS — R76.8 RHEUMATOID FACTOR POSITIVE: ICD-10-CM

## 2024-12-27 DIAGNOSIS — O34.219 HX OF CESAREAN SECTION COMPLICATING PREGNANCY: ICD-10-CM

## 2024-12-27 DIAGNOSIS — Q62.5 DUPLICATED LEFT RENAL COLLECTING SYSTEM: ICD-10-CM

## 2024-12-27 DIAGNOSIS — O10.919 CHRONIC HYPERTENSION AFFECTING PREGNANCY: ICD-10-CM

## 2024-12-27 DIAGNOSIS — Z98.891 HISTORY OF VBAC: ICD-10-CM

## 2024-12-27 DIAGNOSIS — Z86.69 HISTORY OF MIGRAINE DURING PREGNANCY: ICD-10-CM

## 2024-12-27 DIAGNOSIS — Z87.59 HISTORY OF MIGRAINE DURING PREGNANCY: ICD-10-CM

## 2024-12-27 DIAGNOSIS — F41.1 GAD (GENERALIZED ANXIETY DISORDER): ICD-10-CM

## 2024-12-27 PROCEDURE — 76705 ECHO EXAM OF ABDOMEN: CPT | Mod: TC

## 2024-12-27 PROCEDURE — 0502F SUBSEQUENT PRENATAL CARE: CPT | Mod: CPTII,S$GLB,,

## 2024-12-27 PROCEDURE — 87086 URINE CULTURE/COLONY COUNT: CPT

## 2024-12-27 PROCEDURE — 76705 ECHO EXAM OF ABDOMEN: CPT | Mod: 26,,, | Performed by: RADIOLOGY

## 2024-12-27 PROCEDURE — 99999 PR PBB SHADOW E&M-EST. PATIENT-LVL III: CPT | Mod: PBBFAC,,,

## 2024-12-27 NOTE — PROGRESS NOTES
34 y.o. female  at 33w6d   Reports + FM, denies VB, LOF or CTX  Doing ok. C/o pain in the right upper quadrant for that last few days.  Reports the pain feeling like a hot stella going through her and radiating around the right side. Has not had much of an appetite so is unsure of the effects of food.  Still has gallbladder. Will get US of gallbladder today.   TW lbs   Tdap and RSV: desires NV       33 weeks gestation of pregnancy  - routine PNC   Duplicated left renal collecting system  -     CULTURE, URINE  -     had pyelo with previous pregnancy. Will collect UA Q4 weeks    Rheumatoid factor positive    Autoimmune disease  - no meds  - followed by rheumatology  Obesity during pregnancy in third trimester  - weekly BPP  Hx of  section complicating pregnancy  - C/S x1 FTP, GHTN, chorio  History of   -  x1  - desires another  (approved by Dr. Jensen)  - will consider IOL based on approval of on call MD  History of migraine during pregnancy  - immitrex PRN  JAVI (generalized anxiety disorder)  - no meds  Right upper quadrant pain  -     Cancel: US Abdomen Limited_Gallbladder; Future; Expected date: 2024    Chronic hypertension affecting pregnancy  - labetalol 100mg BID  - was started on procardia at one point but d/c and only taking labetalol  - IOL at 37-38.6 weeks per MFM  - weekly BPP and weekly NST      Reviewed warning signs, normal FKCs,  labor precautions and how/when to call. Patient states understanding  RTC x 1 wks, call or present sooner prn

## 2024-12-28 LAB
BACTERIA UR CULT: NORMAL
BACTERIA UR CULT: NORMAL

## 2024-12-30 ENCOUNTER — HOSPITAL ENCOUNTER (OUTPATIENT)
Facility: HOSPITAL | Age: 34
Discharge: HOME | End: 2024-12-30
Attending: OBSTETRICS & GYNECOLOGY | Admitting: OBSTETRICS & GYNECOLOGY
Payer: COMMERCIAL

## 2024-12-30 VITALS
TEMPERATURE: 98 F | DIASTOLIC BLOOD PRESSURE: 62 MMHG | HEART RATE: 99 BPM | OXYGEN SATURATION: 95 % | SYSTOLIC BLOOD PRESSURE: 110 MMHG

## 2024-12-30 DIAGNOSIS — O09.93 HIGH-RISK PREGNANCY IN THIRD TRIMESTER: ICD-10-CM

## 2024-12-30 DIAGNOSIS — O10.919 CHRONIC HYPERTENSION AFFECTING PREGNANCY: ICD-10-CM

## 2024-12-30 PROCEDURE — 59025 FETAL NON-STRESS TEST: CPT

## 2024-12-30 NOTE — PROCEDURES
Krupa Walker is a 34 y.o. female patient.            Obtain Fetal nonstress test (NST)    Date/Time: 12/30/2024 10:23 AM    Performed by: Maine Mccullough CNM  Authorized by: Maine Mccullough CNM    Nonstress Test:     Variability:  6-25 BPM    Decelerations:  None    Accelerations:  15 bpm    Acoustic Stimulator: No      Baseline:  135    Uterine Irritability: No      Contractions:  Not present  Biophysical Profile:     Nonstress Test Interpretation: reactive      Overall Impression:  Reassuring  Post-procedure:     Patient tolerance:  Patient tolerated the procedure well with no immediate complications      12/30/2024

## 2025-01-02 ENCOUNTER — ROUTINE PRENATAL (OUTPATIENT)
Dept: OBSTETRICS AND GYNECOLOGY | Facility: CLINIC | Age: 35
End: 2025-01-02
Payer: COMMERCIAL

## 2025-01-02 ENCOUNTER — PROCEDURE VISIT (OUTPATIENT)
Dept: OBSTETRICS AND GYNECOLOGY | Facility: CLINIC | Age: 35
End: 2025-01-02
Payer: COMMERCIAL

## 2025-01-02 VITALS
BODY MASS INDEX: 44.12 KG/M2 | SYSTOLIC BLOOD PRESSURE: 116 MMHG | DIASTOLIC BLOOD PRESSURE: 68 MMHG | WEIGHT: 257.06 LBS

## 2025-01-02 DIAGNOSIS — O09.93 HIGH-RISK PREGNANCY IN THIRD TRIMESTER: Primary | ICD-10-CM

## 2025-01-02 DIAGNOSIS — O99.213 OBESITY DURING PREGNANCY IN THIRD TRIMESTER: ICD-10-CM

## 2025-01-02 DIAGNOSIS — O09.93 HIGH-RISK PREGNANCY IN THIRD TRIMESTER: ICD-10-CM

## 2025-01-02 DIAGNOSIS — Q62.5 DUPLICATED LEFT RENAL COLLECTING SYSTEM: ICD-10-CM

## 2025-01-02 DIAGNOSIS — O10.919 CHRONIC HYPERTENSION AFFECTING PREGNANCY: ICD-10-CM

## 2025-01-02 DIAGNOSIS — Z87.59 HISTORY OF MIGRAINE DURING PREGNANCY: ICD-10-CM

## 2025-01-02 DIAGNOSIS — Z98.891 HISTORY OF VBAC: ICD-10-CM

## 2025-01-02 DIAGNOSIS — Z86.69 HISTORY OF MIGRAINE DURING PREGNANCY: ICD-10-CM

## 2025-01-02 PROCEDURE — 0502F SUBSEQUENT PRENATAL CARE: CPT | Mod: CPTII,S$GLB,, | Performed by: ADVANCED PRACTICE MIDWIFE

## 2025-01-02 PROCEDURE — 99999 PR PBB SHADOW E&M-EST. PATIENT-LVL III: CPT | Mod: PBBFAC,,, | Performed by: ADVANCED PRACTICE MIDWIFE

## 2025-01-02 PROCEDURE — 76819 FETAL BIOPHYS PROFIL W/O NST: CPT | Mod: S$GLB,,, | Performed by: OBSTETRICS & GYNECOLOGY

## 2025-01-02 NOTE — PROGRESS NOTES
34 y.o. female  at 34w5d  Reports + FM, denies VB, LOF or regular CTX  Doing well without concerns   /68   Wt 116.6 kg (257 lb 0.9 oz)   LMP 2024 (Exact Date)   BMI 44.12 kg/m²   TW lbs   GBS discussed for nv  Growth US scheduled for next visit    1. High-risk pregnancy in third trimester    2. History of   Overview:  Desires   Per MFM deliver 37-38.6      3. Obesity during pregnancy in third trimester  Overview:  Pre-gravid BMI 37.33, growth scans      4. Chronic hypertension affecting pregnancy  Overview:  Labetalol 100mg BID      5. Duplicated left renal collecting system  Overview:  Had pyelo with previous pregnancy   Plan for urine cultures q4wks vs qtrimester  UC collected at 20wk visit  24: collected      6. History of migraine during pregnancy  Overview:  Imitrex sparingly, ok per MFM          Continue 2x weekly testing, called L&D and scheduled  Ultrasound today with cephalic presentation, posterior placenta, VASQUEZ WNL, MVP 7.5cm, BPP 8/8, reviewed with pt   Reviewed warning signs, normal FKCs, labor precautions and how/when to call.  RTC x 1 wk, call or present sooner prn.

## 2025-01-04 ENCOUNTER — PATIENT MESSAGE (OUTPATIENT)
Dept: OTHER | Facility: OTHER | Age: 35
End: 2025-01-04
Payer: COMMERCIAL

## 2025-01-08 ENCOUNTER — PROCEDURE VISIT (OUTPATIENT)
Dept: OBSTETRICS AND GYNECOLOGY | Facility: CLINIC | Age: 35
End: 2025-01-08
Payer: COMMERCIAL

## 2025-01-08 DIAGNOSIS — O10.919 CHRONIC HYPERTENSION AFFECTING PREGNANCY: ICD-10-CM

## 2025-01-08 DIAGNOSIS — O09.93 HIGH-RISK PREGNANCY IN THIRD TRIMESTER: ICD-10-CM

## 2025-01-08 PROCEDURE — 76819 FETAL BIOPHYS PROFIL W/O NST: CPT | Mod: S$GLB,,, | Performed by: OBSTETRICS & GYNECOLOGY

## 2025-01-10 ENCOUNTER — HOSPITAL ENCOUNTER (OUTPATIENT)
Facility: HOSPITAL | Age: 35
Discharge: HOME | End: 2025-01-10
Attending: OBSTETRICS & GYNECOLOGY | Admitting: OBSTETRICS & GYNECOLOGY
Payer: COMMERCIAL

## 2025-01-10 ENCOUNTER — ROUTINE PRENATAL (OUTPATIENT)
Dept: OBSTETRICS AND GYNECOLOGY | Facility: CLINIC | Age: 35
End: 2025-01-10
Payer: COMMERCIAL

## 2025-01-10 VITALS
RESPIRATION RATE: 18 BRPM | OXYGEN SATURATION: 97 % | SYSTOLIC BLOOD PRESSURE: 125 MMHG | DIASTOLIC BLOOD PRESSURE: 71 MMHG | HEART RATE: 107 BPM

## 2025-01-10 VITALS
WEIGHT: 261.44 LBS | SYSTOLIC BLOOD PRESSURE: 144 MMHG | DIASTOLIC BLOOD PRESSURE: 92 MMHG | BODY MASS INDEX: 44.88 KG/M2

## 2025-01-10 DIAGNOSIS — O09.93 HIGH-RISK PREGNANCY IN THIRD TRIMESTER: Primary | ICD-10-CM

## 2025-01-10 DIAGNOSIS — Z98.891 HISTORY OF VBAC: ICD-10-CM

## 2025-01-10 DIAGNOSIS — O09.93 HIGH-RISK PREGNANCY IN THIRD TRIMESTER: ICD-10-CM

## 2025-01-10 DIAGNOSIS — Q62.5 DUPLICATED LEFT RENAL COLLECTING SYSTEM: ICD-10-CM

## 2025-01-10 DIAGNOSIS — Z29.11 ENCOUNTER FOR PROPHYLACTIC IMMUNOTHERAPY FOR RESPIRATORY SYNCYTIAL VIRUS (RSV): ICD-10-CM

## 2025-01-10 DIAGNOSIS — O99.213 OBESITY DURING PREGNANCY IN THIRD TRIMESTER: ICD-10-CM

## 2025-01-10 DIAGNOSIS — O10.919 CHRONIC HYPERTENSION AFFECTING PREGNANCY: ICD-10-CM

## 2025-01-10 LAB
ALBUMIN SERPL BCP-MCNC: 2.4 G/DL (ref 3.5–5.2)
ALP SERPL-CCNC: 110 U/L (ref 40–150)
ALT SERPL W/O P-5'-P-CCNC: 5 U/L (ref 10–44)
ANION GAP SERPL CALC-SCNC: 12 MMOL/L (ref 8–16)
AST SERPL-CCNC: 14 U/L (ref 10–40)
BASOPHILS # BLD AUTO: 0.05 K/UL (ref 0–0.2)
BASOPHILS NFR BLD: 0.5 % (ref 0–1.9)
BILIRUB SERPL-MCNC: 0.2 MG/DL (ref 0.1–1)
BUN SERPL-MCNC: 10 MG/DL (ref 6–20)
CALCIUM SERPL-MCNC: 8.9 MG/DL (ref 8.7–10.5)
CHLORIDE SERPL-SCNC: 108 MMOL/L (ref 95–110)
CO2 SERPL-SCNC: 18 MMOL/L (ref 23–29)
CREAT SERPL-MCNC: 0.6 MG/DL (ref 0.5–1.4)
CREAT UR-MCNC: 114 MG/DL (ref 15–325)
DIFFERENTIAL METHOD BLD: ABNORMAL
EOSINOPHIL # BLD AUTO: 0.1 K/UL (ref 0–0.5)
EOSINOPHIL NFR BLD: 1 % (ref 0–8)
ERYTHROCYTE [DISTWIDTH] IN BLOOD BY AUTOMATED COUNT: 12.4 % (ref 11.5–14.5)
EST. GFR  (NO RACE VARIABLE): >60 ML/MIN/1.73 M^2
GLUCOSE SERPL-MCNC: 115 MG/DL (ref 70–110)
HCT VFR BLD AUTO: 31 % (ref 37–48.5)
HGB BLD-MCNC: 10.6 G/DL (ref 12–16)
IMM GRANULOCYTES # BLD AUTO: 0.07 K/UL (ref 0–0.04)
IMM GRANULOCYTES NFR BLD AUTO: 0.6 % (ref 0–0.5)
LYMPHOCYTES # BLD AUTO: 2.3 K/UL (ref 1–4.8)
LYMPHOCYTES NFR BLD: 20.7 % (ref 18–48)
MCH RBC QN AUTO: 31.5 PG (ref 27–31)
MCHC RBC AUTO-ENTMCNC: 34.2 G/DL (ref 32–36)
MCV RBC AUTO: 92 FL (ref 82–98)
MONOCYTES # BLD AUTO: 0.7 K/UL (ref 0.3–1)
MONOCYTES NFR BLD: 6.5 % (ref 4–15)
NEUTROPHILS # BLD AUTO: 7.8 K/UL (ref 1.8–7.7)
NEUTROPHILS NFR BLD: 70.7 % (ref 38–73)
NRBC BLD-RTO: 0 /100 WBC
PLATELET # BLD AUTO: 275 K/UL (ref 150–450)
PMV BLD AUTO: 9.8 FL (ref 9.2–12.9)
POTASSIUM SERPL-SCNC: 3.6 MMOL/L (ref 3.5–5.1)
PROT SERPL-MCNC: 6.2 G/DL (ref 6–8.4)
PROT UR-MCNC: 12 MG/DL (ref 0–15)
PROT/CREAT UR: 0.11 MG/G{CREAT} (ref 0–0.2)
RBC # BLD AUTO: 3.37 M/UL (ref 4–5.4)
SODIUM SERPL-SCNC: 138 MMOL/L (ref 136–145)
WBC # BLD AUTO: 10.98 K/UL (ref 3.9–12.7)

## 2025-01-10 PROCEDURE — 59025 FETAL NON-STRESS TEST: CPT | Mod: 26,,, | Performed by: ADVANCED PRACTICE MIDWIFE

## 2025-01-10 PROCEDURE — 99999 PR PBB SHADOW E&M-EST. PATIENT-LVL III: CPT | Mod: PBBFAC,,, | Performed by: ADVANCED PRACTICE MIDWIFE

## 2025-01-10 PROCEDURE — 84156 ASSAY OF PROTEIN URINE: CPT | Performed by: ADVANCED PRACTICE MIDWIFE

## 2025-01-10 PROCEDURE — 59025 FETAL NON-STRESS TEST: CPT

## 2025-01-10 PROCEDURE — 99213 OFFICE O/P EST LOW 20 MIN: CPT | Mod: 25,,, | Performed by: ADVANCED PRACTICE MIDWIFE

## 2025-01-10 PROCEDURE — 87653 STREP B DNA AMP PROBE: CPT | Performed by: ADVANCED PRACTICE MIDWIFE

## 2025-01-10 PROCEDURE — 99211 OFF/OP EST MAY X REQ PHY/QHP: CPT

## 2025-01-10 PROCEDURE — 85025 COMPLETE CBC W/AUTO DIFF WBC: CPT | Performed by: ADVANCED PRACTICE MIDWIFE

## 2025-01-10 PROCEDURE — 80053 COMPREHEN METABOLIC PANEL: CPT | Performed by: ADVANCED PRACTICE MIDWIFE

## 2025-01-10 NOTE — HOSPITAL COURSE
EFM / NST reactive reassuring  Pre-e labs, PCR Labs WNL reassuring  Discharged to home with strict pre-e precautions.  Continue with twice weekly testing.  Continue with [facility]

## 2025-01-10 NOTE — ASSESSMENT & PLAN NOTE
Mom returned call.    Call back anytime is fine.       Pre-e labs, PCR  Continue with Labetalol BID  Has [facility] at home  Strict Pre-e precautions

## 2025-01-10 NOTE — DISCHARGE SUMMARY
[facility]  Obstetrics  Discharge Summary      Patient Name: Krupa Walker  MRN: [id number]  Admission Date: 1/10/2025  Hospital Length of Stay: 0 days  Discharge Date and Time:  01/10/2025 12:15 PM  Attending Physician: Princess De Leon,*   Discharging Provider: Ambreen Lopez CNM   Primary Care Provider: Madhuri Juarez MD    HPI: Sent from office due to elevated BP for NST and Pre-e work-up    FHT: Cat 1 (reassuring)  TOCO:none    Hospital Course:   EFM / NST reactive reassuring  Pre-e labs, PCR Labs WNL reassuring  Discharged to home with strict pre-e precautions.  Continue with twice weekly testing.  Continue with Connected [facility]oms         Final Active Diagnoses:    Diagnosis Date Noted POA    PRINCIPAL PROBLEM:  Chronic hypertension affecting pregnancy [O10.919] 09/08/2024 Yes      Problems Resolved During this Admission:        Significant Diagnostic Studies: Labs: All labs within the past 24 hours have been reviewed        Immunizations       Date Immunization Status Dose Route/Site Given by    01/10/25 0946 Rsv, Bivalent, Rsvpref (Abrysvo) Given 120 mcg Intramuscular/Left deltoid Jose Luis Cutler LPN            This patient has no babies on file.  Pending Diagnostic Studies:       None            Discharged Condition: good    Disposition: Home or Self Care    Follow Up:    Patient Instructions:      Diet Adult Regular     Other restrictions (specify):   Order Comments: Pre-e precautions     Notify your health care provider if you experience any of the following:  temperature >100.4     Notify your health care provider if you experience any of the following:  persistent nausea and vomiting or diarrhea     Notify your health care provider if you experience any of the following:  severe uncontrolled pain     Notify your health care provider if you experience any of the following:  severe persistent headache     Notify your health care provider if you experience any of  the following:  persistent dizziness, light-headedness, or visual disturbances     Notify your health care provider if you experience any of the following:  increased confusion or weakness     Activity as tolerated     Medications:  Current Discharge Medication List        CONTINUE these medications which have NOT CHANGED    Details   aspirin 81 MG Chew Take 81 mg by mouth once daily.      labetaloL (NORMODYNE) 100 MG tablet Take 1 tablet (100 mg total) by mouth 2 (two) times daily.  Qty: 60 tablet, Refills: 11    Comments: .      multivit with calcium,iron,min (WOMEN'S DAILY MULTIVITAMIN ORAL) Women's Daily Multivitamin      sumatriptan (IMITREX) 100 MG tablet Take at onset of migraine, can repeat in 2 hrs if needed.  No more than twice per day or 3 days/wk.  Qty: 18 tablet, Refills: 5    Associated Diagnoses: Migraine with aura and without status migrainosus, not intractable           STOP taking these medications       prochlorperazine (COMPAZINE) 5 MG tablet Comments:   Reason for Stopping:               Ambreen Lopez CNM  Obstetrics  [facility]

## 2025-01-10 NOTE — PROGRESS NOTES
Identified pt using two pt identifiers. Allergies and medications verified with pt. Abrysvo injection given to pt in left deltoid. Pt tolerated well and was advised of 15 minute wait time. Pt verbalized understanding.

## 2025-01-10 NOTE — PROGRESS NOTES
34 y.o. female  at 35w6d  Reports + FM, denies VB, LOF or regular CTX  Doing well without concerns, denies PIH S&S  BP (!) 144/92   Wt 118.6 kg (261 lb 7.5 oz)   LMP 2024 (Exact Date)   BMI 44.88 kg/m²   TW lbs   GBS collected today   The skin of the suprapubic region was evaluated and appears clean, dry and intact.    VE per pt request   Scheduled for NST at L&D today, will call and have them do PIH workup today while pt is there.    1. High-risk pregnancy in third trimester  -     Group B Streptococcus, PCR    2. Encounter for prophylactic immunotherapy for respiratory syncytial virus (RSV)  -     Discontinue: RSV, preF A and preF B(PF) (Abrysvo) vaccine 120 mcg    3. Chronic hypertension affecting pregnancy  Overview:  Labetalol 100mg BID      4. Obesity during pregnancy in third trimester  Overview:  Pre-gravid BMI 37.33, growth scans      5. History of   Overview:  Desires   Per MFM deliver 37-38.6      6. Duplicated left renal collecting system  Overview:  Had pyelo with previous pregnancy   Plan for urine cultures q4wks vs qtrimester  UC collected at 20wk visit  24: collected         Reviewed warning signs, normal FKCs, labor precautions and how/when to call.  RTC x 1 wk, call or present sooner prn.

## 2025-01-10 NOTE — SUBJECTIVE & OBJECTIVE
Obstetric HPI:  Patient reports None contractions, active fetal movement, No vaginal bleeding , No loss of fluid     This pregnancy has been complicated by GHTN on labetalol, obesity, Hx , UTI, Rheumatoid factor positive    OB History    Para Term  AB Living   3 2 2 0 0 2   SAB IAB Ectopic Multiple Live Births   0 0 0 0 2      # Outcome Date GA Lbr Boni/2nd Weight Sex Type Anes PTL Lv   3 Current            2 Term 20 40w1d  3.8 kg (8 lb 6 oz) F Vag-Spont EPI N KAVITHA      Name: LETTY,GIRL YOLIE      Apgar1: 6  Apgar5: 9   1 Term 10/14/14 41w2d   F CS-LTranv EPI  KAVITHA      Complications: Failure to Progress in Second Stage, Failure to progress in labor, Incisional infection, Gestational HTN     Past Medical History:   Diagnosis Date    Anxiety     Depression     GERD (gastroesophageal reflux disease)     Gestational hypertension, third trimester 2019    Hypertension     gestational     Hypoglycemia     Kidney calculi         Migraines     Polycystic ovaries     PONV (postoperative nausea and vomiting)     STD (sexually transmitted disease)     Urinary tract infection      Past Surgical History:   Procedure Laterality Date    ANKLE SURGERY Right 2008     SECTION  10/2014    CYSTOSCOPY W/ URETERAL STENT PLACEMENT Left 10/02/2019    Procedure: CYSTOSCOPY, WITH URETERAL STENT INSERTION - Under ultrasound guidance;  Surgeon: Marsha Guevara MD;  Location: [facility];  Service: Urology;  Laterality: Left;    KIDNEY STONE SURGERY      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)    LASER LITHOTRIPSY Left 10/16/2019    Procedure: LITHOTRIPSY, USING LASER;  Surgeon: Marsha Guevara MD;  Location: [facility];  Service: Urology;  Laterality: Left;    TONSILLECTOMY      ULTRASOUND GUIDANCE Left 10/16/2019    Procedure: ULTRASOUND GUIDANCE, for laser litho;  Surgeon: Marsha Guevara MD;  Location: [facility];  Service: Urology;   Laterality: Left;    URETEROSCOPIC REMOVAL OF URETERIC CALCULUS Left 10/16/2019    Procedure: REMOVAL, CALCULUS, URETER, URETEROSCOPIC possible retro pyelo, possible lasere litho, possible stent;  Surgeon: Marsha Guevara MD;  Location: [facility];  Service: Urology;  Laterality: Left;  25 WEEKS PREGNANT / FETAL MONITORING BEFORE AND AFTER PROCEDURE/   CALL PELON ELDER [phone]      WISDOM TOOTH EXTRACTION         Facility-Administered Medications Prior to Admission   Medication    [COMPLETED] RSV, preF A and preF B(PF) (Abrysvo) vaccine 120 mcg     PTA Medications   Medication Sig    aspirin 81 MG Chew Take 81 mg by mouth once daily.    labetaloL (NORMODYNE) 100 MG tablet Take 1 tablet (100 mg total) by mouth 2 (two) times daily.    multivit with calcium,iron,min (WOMEN'S DAILY MULTIVITAMIN ORAL) Women's Daily Multivitamin    prochlorperazine (COMPAZINE) 5 MG tablet Take 1 tablet (5 mg total) by mouth every 6 (six) hours as needed for Nausea (or headache pain).    sumatriptan (IMITREX) 100 MG tablet Take at onset of migraine, can repeat in 2 hrs if needed.  No more than twice per day or 3 days/wk.       Review of patient's allergies indicates:  No Known Allergies     Family History       Problem Relation (Age of Onset)    Breast cancer Paternal Grandmother    Cancer Paternal Grandmother    Depression Father    Heart disease Father, Paternal Grandfather    Hyperlipidemia Father, Paternal Grandfather, Maternal Grandfather    Hypertension Mother, Father, Paternal Grandmother, Paternal Grandfather, Maternal Grandmother    Nephrolithiasis Daughter    No Known Problems Sister, Sister, Sister          Tobacco Use    Smoking status: Never     Passive exposure: Current    Smokeless tobacco: Never   Substance and Sexual Activity    Alcohol use: Not Currently     Comment: 1 drink per month    Drug use: Never    Sexual activity: Yes     Partners: Male     Birth control/protection: Condom     Comment: IUD removed due to  complications in July 2018     Review of Systems   Constitutional:         Voices no complaints    All other systems reviewed and are negative.     Objective:     Vital Signs (Most Recent):  Pulse: 101 (01/10/25 1130)  Resp: 18 (01/10/25 1100)  BP: 115/69 (01/10/25 1130)  SpO2: 98 % (01/10/25 1130) Vital Signs (24h Range):  Pulse:  [] 101  Resp:  [18] 18  SpO2:  [98 %-99 %] 98 %  BP: (115-144)/(66-92) 115/69        There is no height or weight on file to calculate BMI.    FHT: Cat 1 (reassuring), moderate variability  TOCO:  none     Physical Exam:   Constitutional: She is oriented to person, place, and time. She appears well-developed and well-nourished.       Cardiovascular:  Normal rate.             Pulmonary/Chest: Effort normal.          Genitourinary:    Uterus normal.             Musculoskeletal: Normal range of motion and moves all extremeties.       Neurological: She is alert and oriented to person, place, and time.    Skin: Skin is warm and dry.    Psychiatric: She has a normal mood and affect. Her behavior is normal. Judgment and thought content normal.        Cervix:  Deferred      Significant Labs:  Lab Results   Component Value Date    GROUPTRH A NEG 06/24/2024    HEPBSAG Non-reactive 06/24/2024    STREPBCULT (A) 12/27/2019     STREPTOCOCCUS AGALACTIAE (GROUP B)  Beta-hemolytic streptococci are routinely susceptible to   penicillins,cephalosporins and carbapenems.         I have personallly reviewed all pertinent lab results from the last 24 hours.

## 2025-01-10 NOTE — H&P
[facility] - [facility]  Obstetrics  History & Physical    Patient Name: Yolie Walker  MRN: [id number]  Admission Date: 1/10/2025  Primary Care Provider: Madhuri Juarez MD    Subjective:     Principal Problem:Chronic hypertension affecting pregnancy    History of Present Illness:  Sent from office due to elevated BP for NST and Pre-e work-up    Obstetric HPI:  Patient reports None contractions, active fetal movement, No vaginal bleeding , No loss of fluid     This pregnancy has been complicated by GHTN on labetalol, obesity, Hx , UTI, Rheumatoid factor positive    OB History    Para Term  AB Living   3 2 2 0 0 2   SAB IAB Ectopic Multiple Live Births   0 0 0 0 2      # Outcome Date GA Lbr Boni/2nd Weight Sex Type Anes PTL Lv   3 Current            2 Term 20 40w1d  3.8 kg (8 lb 6 oz) F Vag-Spont EPI N KAVITHA      Name: LETTY,GIRL YOLIE      Apgar1: 6  Apgar5: 9   1 Term 10/14/14 41w2d   F CS-LTranv EPI  KAVITHA      Complications: Failure to Progress in Second Stage, Failure to progress in labor, Incisional infection, Gestational HTN     Past Medical History:   Diagnosis Date    Anxiety     Depression     GERD (gastroesophageal reflux disease)     Gestational hypertension, third trimester 2019    Hypertension     gestational     Hypoglycemia     Kidney calculi     2016    Migraines     Polycystic ovaries     PONV (postoperative nausea and vomiting)     STD (sexually transmitted disease)     Urinary tract infection      Past Surgical History:   Procedure Laterality Date    ANKLE SURGERY Right      SECTION  10/2014    CYSTOSCOPY W/ URETERAL STENT PLACEMENT Left 10/02/2019    Procedure: CYSTOSCOPY, WITH URETERAL STENT INSERTION - Under ultrasound guidance;  Surgeon: Marsha Guevara MD;  Location: [facility];  Service: Urology;  Laterality: Left;    KIDNEY STONE SURGERY      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)     LASER LITHOTRIPSY Left 10/16/2019    Procedure: LITHOTRIPSY, USING LASER;  Surgeon: Marsha Guevara MD;  Location: [facility] OR;  Service: Urology;  Laterality: Left;    TONSILLECTOMY      ULTRASOUND GUIDANCE Left 10/16/2019    Procedure: ULTRASOUND GUIDANCE, for laser litho;  Surgeon: Marsha Guevara MD;  Location: [facility] OR;  Service: Urology;  Laterality: Left;    URETEROSCOPIC REMOVAL OF URETERIC CALCULUS Left 10/16/2019    Procedure: REMOVAL, CALCULUS, URETER, URETEROSCOPIC possible retro pyelo, possible lasere litho, possible stent;  Surgeon: Marsha Guevara MD;  Location: [facility] OR;  Service: Urology;  Laterality: Left;  25 WEEKS PREGNANT / FETAL MONITORING BEFORE AND AFTER PROCEDURE/   CALL PELON ELDER [phone]      WISDOM TOOTH EXTRACTION         Facility-Administered Medications Prior to Admission   Medication    [COMPLETED] RSV, preF A and preF B(PF) (Abrysvo) vaccine 120 mcg     PTA Medications   Medication Sig    aspirin 81 MG Chew Take 81 mg by mouth once daily.    labetaloL (NORMODYNE) 100 MG tablet Take 1 tablet (100 mg total) by mouth 2 (two) times daily.    multivit with calcium,iron,min (WOMEN'S DAILY MULTIVITAMIN ORAL) Women's Daily Multivitamin    prochlorperazine (COMPAZINE) 5 MG tablet Take 1 tablet (5 mg total) by mouth every 6 (six) hours as needed for Nausea (or headache pain).    sumatriptan (IMITREX) 100 MG tablet Take at onset of migraine, can repeat in 2 hrs if needed.  No more than twice per day or 3 days/wk.       Review of patient's allergies indicates:  No Known Allergies     Family History       Problem Relation (Age of Onset)    Breast cancer Paternal Grandmother    Cancer Paternal Grandmother    Depression Father    Heart disease Father, Paternal Grandfather    Hyperlipidemia Father, Paternal Grandfather, Maternal Grandfather    Hypertension Mother, Father, Paternal Grandmother, Paternal Grandfather, Maternal Grandmother    Nephrolithiasis Daughter    No Known Problems  Sister, Sister, Sister          Tobacco Use    Smoking status: Never     Passive exposure: Current    Smokeless tobacco: Never   Substance and Sexual Activity    Alcohol use: Not Currently     Comment: 1 drink per month    Drug use: Never    Sexual activity: Yes     Partners: Male     Birth control/protection: Condom     Comment: IUD removed due to complications in 2018     Review of Systems   Constitutional:         Voices no complaints    All other systems reviewed and are negative.     Objective:     Vital Signs (Most Recent):  Pulse: 101 (01/10/25 1130)  Resp: 18 (01/10/25 1100)  BP: 115/69 (01/10/25 1130)  SpO2: 98 % (01/10/25 1130) Vital Signs (24h Range):  Pulse:  [] 101  Resp:  [18] 18  SpO2:  [98 %-99 %] 98 %  BP: (115-144)/(66-92) 115/69        There is no height or weight on file to calculate BMI.    FHT: Cat 1 (reassuring), moderate variability  TOCO:  none     Physical Exam:   Constitutional: She is oriented to person, place, and time. She appears well-developed and well-nourished.       Cardiovascular:  Normal rate.             Pulmonary/Chest: Effort normal.          Genitourinary:    Uterus normal.             Musculoskeletal: Normal range of motion and moves all extremeties.       Neurological: She is alert and oriented to person, place, and time.    Skin: Skin is warm and dry.    Psychiatric: She has a normal mood and affect. Her behavior is normal. Judgment and thought content normal.        Cervix:  Deferred      Significant Labs:  Lab Results   Component Value Date    GROUPTRH A NEG 2024    HEPBSAG Non-reactive 2024    STREPBCULT (A) 2019     STREPTOCOCCUS AGALACTIAE (GROUP B)  Beta-hemolytic streptococci are routinely susceptible to   penicillins,cephalosporins and carbapenems.         I have personallly reviewed all pertinent lab results from the last 24 hours.  Assessment/Plan:     34 y.o. female  at 35w6d for:    * Chronic hypertension affecting  pregnancy  Pre-e labs, PCR  Continue with Labetalol BID  Has Connected Mom's at home  Strict Pre-e precautions        Ambreen Lopez CNM  Obstetrics  [facility]

## 2025-01-10 NOTE — PROCEDURES
Krupa Walker is a 34 y.o. female patient.    Pulse: 107 (01/10/25 1145)  Resp: 18 (01/10/25 1100)  BP: 125/71 (01/10/25 1145)  SpO2: 97 % (01/10/25 1145)       Obtain Fetal nonstress test (NST)    Date/Time: 1/10/2025 12:12 PM    Performed by: Ambreen Lopez CNM  Authorized by: Ambreen Lopez CNM    Nonstress Test:     Variability:  6-25 BPM    Decelerations:  None    Accelerations:  15 bpm    Acoustic Stimulator: No      Uterine Irritability: No      Contractions:  Not present  Biophysical Profile:     Nonstress Test Interpretation: reactive      Overall Impression:  Reassuring  Post-procedure:     Patient tolerance:  Patient tolerated the procedure well with no immediate complications      1/10/2025

## 2025-01-11 LAB — GROUP B STREPTOCOCCUS, PCR: NEGATIVE

## 2025-01-12 ENCOUNTER — HOSPITAL ENCOUNTER (OUTPATIENT)
Facility: HOSPITAL | Age: 35
Discharge: HOME | End: 2025-01-12
Attending: OBSTETRICS & GYNECOLOGY | Admitting: OBSTETRICS & GYNECOLOGY
Payer: COMMERCIAL

## 2025-01-12 VITALS
SYSTOLIC BLOOD PRESSURE: 129 MMHG | HEART RATE: 83 BPM | DIASTOLIC BLOOD PRESSURE: 81 MMHG | OXYGEN SATURATION: 99 % | RESPIRATION RATE: 18 BRPM

## 2025-01-12 DIAGNOSIS — O26.893 PREGNANCY HEADACHE IN THIRD TRIMESTER: ICD-10-CM

## 2025-01-12 DIAGNOSIS — R51.9 PREGNANCY HEADACHE IN THIRD TRIMESTER: ICD-10-CM

## 2025-01-12 LAB
CREAT UR-MCNC: 129.8 MG/DL (ref 15–325)
PROT UR-MCNC: 24 MG/DL (ref 0–15)
PROT/CREAT UR: 0.18 MG/G{CREAT} (ref 0–0.2)

## 2025-01-12 PROCEDURE — 59025 FETAL NON-STRESS TEST: CPT | Mod: 26,,, | Performed by: MIDWIFE

## 2025-01-12 PROCEDURE — 25000003 PHARM REV CODE 250: Performed by: MIDWIFE

## 2025-01-12 PROCEDURE — 82570 ASSAY OF URINE CREATININE: CPT | Performed by: MIDWIFE

## 2025-01-12 PROCEDURE — 59025 FETAL NON-STRESS TEST: CPT

## 2025-01-12 PROCEDURE — 99213 OFFICE O/P EST LOW 20 MIN: CPT | Mod: 25,,, | Performed by: MIDWIFE

## 2025-01-12 PROCEDURE — 99211 OFF/OP EST MAY X REQ PHY/QHP: CPT

## 2025-01-12 RX ORDER — OXYCODONE AND ACETAMINOPHEN 10; 325 MG/1; MG/1
1 TABLET ORAL ONCE
Status: COMPLETED | OUTPATIENT
Start: 2025-01-12 | End: 2025-01-12

## 2025-01-12 RX ORDER — LABETALOL 100 MG/1
100 TABLET, FILM COATED ORAL 2 TIMES DAILY
Status: DISCONTINUED | OUTPATIENT
Start: 2025-01-12 | End: 2025-01-12 | Stop reason: HOSPADM

## 2025-01-12 RX ADMIN — OXYCODONE AND ACETAMINOPHEN 1 TABLET: 10; 325 TABLET ORAL at 01:01

## 2025-01-12 NOTE — HOSPITAL COURSE
Observe  NST-reactive  BP series  PCR 0.18  Percocet 10 mg given po x 1, relief from headache  D/C home, has NST Monday and appt Wednesday w/ Venu, instructed pt to return for worsening symptoms

## 2025-01-12 NOTE — HPI
33 yo, , 36w 1d, presents to unit w/ c/o headache, she had her daughter's birthday party today and has been busy the last 2 days, took Imitrex with no relief, she also has noticed some swelling in her BLE, pt has CHTN and is on Labetalol 100 mg bid per MFM recommendations, she has had several elevated readings on connected MOM, Pre E work up done 2 days ago, normal

## 2025-01-12 NOTE — H&P
[facility]  Obstetrics  History & Physical    Patient Name: Krupa Walker  MRN: [id number]  Admission Date: 2025  Primary Care Provider: Madhuri Juarez MD    Subjective:     Principal Problem:Pregnancy headache in third trimester    History of Present Illness:  33 yo, , 36w 1d, presents to unit w/ c/o headache, she had her daughter's birthday party today and has been busy the last 2 days, took Imitrex with no relief, she also has noticed some swelling in her BLE, pt has CHTN and is on Labetalol 100 mg bid per MFM recommendations, she has had several elevated readings on connected MOM, Pre E work up done 2 days ago, normal    Obstetric HPI:  Headache and BLE swelling, took Imitrex w/ no relief    This pregnancy has been complicated by CHTN on labetalol 100 mg bid, previous c/s, hx , + rheumatoid factor    OB History    Para Term  AB Living   3 2 2 0 0 2   SAB IAB Ectopic Multiple Live Births   0 0 0 0 2      # Outcome Date GA Lbr Boni/2nd Weight Sex Type Anes PTL Lv   3 Current            2 Term 20 40w1d  3.8 kg (8 lb 6 oz) F Vag-Spont EPI N KAVITHA      Name: LETTY,RADHA URBANO      Apgar1: 6  Apgar5: 9   1 Term 10/14/14 41w2d   F CS-LTranv EPI  KAVITHA      Complications: Failure to Progress in Second Stage, Failure to progress in labor, Incisional infection, Gestational HTN     Past Medical History:   Diagnosis Date    Anxiety     Depression     GERD (gastroesophageal reflux disease)     Gestational hypertension, third trimester 2019    Hypertension     gestational     Hypoglycemia     Kidney calculi     2016    Migraines     Polycystic ovaries     PONV (postoperative nausea and vomiting)     STD (sexually transmitted disease)     Urinary tract infection      Past Surgical History:   Procedure Laterality Date    ANKLE SURGERY Right 2008     SECTION  10/2014    CYSTOSCOPY W/ URETERAL STENT PLACEMENT Left 10/02/2019    Procedure: CYSTOSCOPY,  WITH URETERAL STENT INSERTION - Under ultrasound guidance;  Surgeon: Marsha Guevara MD;  Location: [facility] OR;  Service: Urology;  Laterality: Left;    KIDNEY STONE SURGERY      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)    LASER LITHOTRIPSY Left 10/16/2019    Procedure: LITHOTRIPSY, USING LASER;  Surgeon: Marsha Guevara MD;  Location: [facility] OR;  Service: Urology;  Laterality: Left;    TONSILLECTOMY      ULTRASOUND GUIDANCE Left 10/16/2019    Procedure: ULTRASOUND GUIDANCE, for laser litho;  Surgeon: Marsha Guevara MD;  Location: [facility] OR;  Service: Urology;  Laterality: Left;    URETEROSCOPIC REMOVAL OF URETERIC CALCULUS Left 10/16/2019    Procedure: REMOVAL, CALCULUS, URETER, URETEROSCOPIC possible retro pyelo, possible lasere litho, possible stent;  Surgeon: Marsha Guevara MD;  Location: [facility];  Service: Urology;  Laterality: Left;  25 WEEKS PREGNANT / FETAL MONITORING BEFORE AND AFTER PROCEDURE/   CALL PELON ELDER [phone]      WISDOM TOOTH EXTRACTION         PTA Medications   Medication Sig    aspirin 81 MG Chew Take 81 mg by mouth once daily.    labetaloL (NORMODYNE) 100 MG tablet Take 1 tablet (100 mg total) by mouth 2 (two) times daily.    multivit with calcium,iron,min (WOMEN'S DAILY MULTIVITAMIN ORAL) Women's Daily Multivitamin    sumatriptan (IMITREX) 100 MG tablet Take at onset of migraine, can repeat in 2 hrs if needed.  No more than twice per day or 3 days/wk.       Review of patient's allergies indicates:  No Known Allergies     Family History       Problem Relation (Age of Onset)    Breast cancer Paternal Grandmother    Cancer Paternal Grandmother    Depression Father    Heart disease Father, Paternal Grandfather    Hyperlipidemia Father, Paternal Grandfather, Maternal Grandfather    Hypertension Mother, Father, Paternal Grandmother, Paternal Grandfather, Maternal Grandmother    Nephrolithiasis Daughter    No Known Problems Sister, Sister,  Sister          Tobacco Use    Smoking status: Never     Passive exposure: Current    Smokeless tobacco: Never   Substance and Sexual Activity    Alcohol use: Not Currently     Comment: 1 drink per month    Drug use: Never    Sexual activity: Yes     Partners: Male     Birth control/protection: Condom     Comment: IUD removed due to complications in July 2018     Review of Systems   Eyes:  Negative for visual disturbance.   Cardiovascular:  Positive for leg swelling.   Gastrointestinal:  Positive for abdominal pain.   Musculoskeletal:  Negative for back pain.   Neurological:  Positive for headaches.   All other systems reviewed and are negative.     Objective:     Vital Signs (Most Recent):  Pulse: 83 (01/12/25 0200)  Resp: 18 (01/12/25 0200)  BP: 129/81 (01/12/25 0200)  SpO2: 99 % (01/12/25 0200) Vital Signs (24h Range):  Pulse:  [83-94] 83  Resp:  [18] 18  SpO2:  [99 %] 99 %  BP: (129-142)/(81-91) 129/81        There is no height or weight on file to calculate BMI.    FHT: Cat 1 (reassuring)  TOCO:  irregular     Physical Exam:   Constitutional: She is oriented to person, place, and time. She appears well-developed and well-nourished.    HENT:   Head: Normocephalic.       Pulmonary/Chest: Effort normal.        Abdominal: Soft.             Musculoskeletal: Normal range of motion and moves all extremeties. Edema present.       Neurological: She is alert and oriented to person, place, and time. She has normal reflexes.    Skin: Skin is warm and dry. Capillary refill takes less than 2 seconds.    Psychiatric: She has a normal mood and affect. Her behavior is normal. Judgment and thought content normal.        Cervix:  Dilation:  deferred  Effacement:    Station:   Presentation:      Significant Labs:  Lab Results   Component Value Date    GROUPTRH A NEG 06/24/2024    HEPBSAG Non-reactive 06/24/2024    STREPBCULT (A) 12/27/2019     STREPTOCOCCUS AGALACTIAE (GROUP B)  Beta-hemolytic streptococci are routinely  susceptible to   penicillins,cephalosporins and carbapenems.         I have personallly reviewed all pertinent lab results from the last 24 hours.  Recent Lab Results         25  0134        Urine Creatinine 129.8       Urine Protein/Creatinine Ratio 0.18       Urine Protein 24             Assessment/Plan:     34 y.o. female  at 36w1d for:    * Pregnancy headache in third trimester  Observe  NST-reactive  BP series  PCR 0.18  Percocet 10 mg given po x 1, relief from headache  D/C home, has NST Monday and appt Wednesday w/ Venu, instructed pt to return for worsening symptoms      Chronic hypertension affecting pregnancy  Labetalol 100 mg bid        Jason Patel, ANDREW  Obstetrics  [facility]

## 2025-01-12 NOTE — PROCEDURES
Krupa Walker is a 34 y.o. female patient.    Pulse: 83 (01/12/25 0200)  Resp: 18 (01/12/25 0200)  BP: 129/81 (01/12/25 0200)  SpO2: 99 % (01/12/25 0200)       Obtain Fetal nonstress test (NST)    Date/Time: 1/12/2025 6:31 AM    Performed by: Jason Patel CNM  Authorized by: Jason Patel CNM    Nonstress Test:     Variability:  6-25 BPM    Decelerations:  None    Accelerations:  15 bpm    Uterine Irritability: No      Contractions:  Irregular  Biophysical Profile:     Nonstress Test Interpretation: reactive      Overall Impression:  Reassuring  Post-procedure:     Patient tolerance:  Patient tolerated the procedure well with no immediate complications      1/12/2025

## 2025-01-12 NOTE — SUBJECTIVE & OBJECTIVE
Obstetric HPI:  Headache and BLE swelling, took Imitrex w/ no relief    This pregnancy has been complicated by CHTN on labetalol 100 mg bid, previous c/s, hx , + rheumatoid factor    OB History    Para Term  AB Living   3 2 2 0 0 2   SAB IAB Ectopic Multiple Live Births   0 0 0 0 2      # Outcome Date GA Lbr Boni/2nd Weight Sex Type Anes PTL Lv   3 Current            2 Term 20 40w1d  3.8 kg (8 lb 6 oz) F Vag-Spont EPI N KAVITHA      Name: LETTY,GIRL YOLIE      Apgar1: 6  Apgar5: 9   1 Term 10/14/14 41w2d   F CS-LTranv EPI  KAVITHA      Complications: Failure to Progress in Second Stage, Failure to progress in labor, Incisional infection, Gestational HTN     Past Medical History:   Diagnosis Date    Anxiety     Depression     GERD (gastroesophageal reflux disease)     Gestational hypertension, third trimester 2019    Hypertension     gestational     Hypoglycemia     Kidney calculi     2016    Migraines     Polycystic ovaries     PONV (postoperative nausea and vomiting)     STD (sexually transmitted disease)     Urinary tract infection      Past Surgical History:   Procedure Laterality Date    ANKLE SURGERY Right 2008     SECTION  10/2014    CYSTOSCOPY W/ URETERAL STENT PLACEMENT Left 10/02/2019    Procedure: CYSTOSCOPY, WITH URETERAL STENT INSERTION - Under ultrasound guidance;  Surgeon: Marsha Guevara MD;  Location: [facility];  Service: Urology;  Laterality: Left;    KIDNEY STONE SURGERY      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)    LASER LITHOTRIPSY Left 10/16/2019    Procedure: LITHOTRIPSY, USING LASER;  Surgeon: Marsha Guevara MD;  Location: [facility];  Service: Urology;  Laterality: Left;    TONSILLECTOMY      ULTRASOUND GUIDANCE Left 10/16/2019    Procedure: ULTRASOUND GUIDANCE, for laser litho;  Surgeon: Marsha Guevara MD;  Location: [facility];  Service: Urology;  Laterality: Left;    URETEROSCOPIC REMOVAL OF URETERIC  CALCULUS Left 10/16/2019    Procedure: REMOVAL, CALCULUS, URETER, URETEROSCOPIC possible retro pyelo, possible lasere litho, possible stent;  Surgeon: Marsha Guevara MD;  Location: [facility];  Service: Urology;  Laterality: Left;  25 WEEKS PREGNANT / FETAL MONITORING BEFORE AND AFTER PROCEDURE/   CALL PELON ELDER [phone]      WISDOM TOOTH EXTRACTION         PTA Medications   Medication Sig    aspirin 81 MG Chew Take 81 mg by mouth once daily.    labetaloL (NORMODYNE) 100 MG tablet Take 1 tablet (100 mg total) by mouth 2 (two) times daily.    multivit with calcium,iron,min (WOMEN'S DAILY MULTIVITAMIN ORAL) Women's Daily Multivitamin    sumatriptan (IMITREX) 100 MG tablet Take at onset of migraine, can repeat in 2 hrs if needed.  No more than twice per day or 3 days/wk.       Review of patient's allergies indicates:  No Known Allergies     Family History       Problem Relation (Age of Onset)    Breast cancer Paternal Grandmother    Cancer Paternal Grandmother    Depression Father    Heart disease Father, Paternal Grandfather    Hyperlipidemia Father, Paternal Grandfather, Maternal Grandfather    Hypertension Mother, Father, Paternal Grandmother, Paternal Grandfather, Maternal Grandmother    Nephrolithiasis Daughter    No Known Problems Sister, Sister, Sister          Tobacco Use    Smoking status: Never     Passive exposure: Current    Smokeless tobacco: Never   Substance and Sexual Activity    Alcohol use: Not Currently     Comment: 1 drink per month    Drug use: Never    Sexual activity: Yes     Partners: Male     Birth control/protection: Condom     Comment: IUD removed due to complications in July 2018     Review of Systems   Eyes:  Negative for visual disturbance.   Cardiovascular:  Positive for leg swelling.   Gastrointestinal:  Positive for abdominal pain.   Musculoskeletal:  Negative for back pain.   Neurological:  Positive for headaches.   All other systems reviewed and are negative.     Objective:      Vital Signs (Most Recent):  Pulse: 83 (01/12/25 0200)  Resp: 18 (01/12/25 0200)  BP: 129/81 (01/12/25 0200)  SpO2: 99 % (01/12/25 0200) Vital Signs (24h Range):  Pulse:  [83-94] 83  Resp:  [18] 18  SpO2:  [99 %] 99 %  BP: (129-142)/(81-91) 129/81        There is no height or weight on file to calculate BMI.    FHT: Cat 1 (reassuring)  TOCO:  irregular     Physical Exam:   Constitutional: She is oriented to person, place, and time. She appears well-developed and well-nourished.    HENT:   Head: Normocephalic.       Pulmonary/Chest: Effort normal.        Abdominal: Soft.             Musculoskeletal: Normal range of motion and moves all extremeties. Edema present.       Neurological: She is alert and oriented to person, place, and time. She has normal reflexes.    Skin: Skin is warm and dry. Capillary refill takes less than 2 seconds.    Psychiatric: She has a normal mood and affect. Her behavior is normal. Judgment and thought content normal.        Cervix:  Dilation:  deferred  Effacement:    Station:   Presentation:      Significant Labs:  Lab Results   Component Value Date    GROUPTRH A NEG 06/24/2024    HEPBSAG Non-reactive 06/24/2024    STREPBCULT (A) 12/27/2019     STREPTOCOCCUS AGALACTIAE (GROUP B)  Beta-hemolytic streptococci are routinely susceptible to   penicillins,cephalosporins and carbapenems.         I have personallly reviewed all pertinent lab results from the last 24 hours.  Recent Lab Results         01/12/25  0134        Urine Creatinine 129.8       Urine Protein/Creatinine Ratio 0.18       Urine Protein 24

## 2025-01-12 NOTE — DISCHARGE SUMMARY
[facility] - Labor & Delivery  Obstetrics  Discharge Summary      Patient Name: Krupa Walker  MRN: [id number]  Admission Date: 2025  Hospital Length of Stay: 0 days  Discharge Date and Time: No discharge date for patient encounter.  Attending Physician: Jen Fuentes MD   Discharging Provider: Jason Patel CNM   Primary Care Provider: Madhuri Juarez MD    HPI: 33 yo, , 36w 1d, presents to unit w/ c/o headache, she had her daughter's birthday party today and has been busy the last 2 days, took Imitrex with no relief, she also has noticed some swelling in her BLE, pt has CHTN and is on Labetalol 100 mg bid per [facility] recommendations, she has had several elevated readings on connected MOM, Pre E work up done 2 days ago, normal      * No surgery found *     Hospital Course:   Observe  NST-reactive  BP series  PCR 0.18  Percocet 10 mg given po x 1, relief from headache  D/C home, has NST Monday and appt Wednesday w/ Venu, instructed pt to return for worsening symptoms           Final Active Diagnoses:    Diagnosis Date Noted POA    PRINCIPAL PROBLEM:  Pregnancy headache in third trimester [O26.893, R51.9] 2025 Yes    Chronic hypertension affecting pregnancy [O10.919] 2024 Yes      Problems Resolved During this Admission:        Significant Diagnostic Studies: Labs: CMP   Recent Labs   Lab 01/10/25  1107      K 3.6      CO2 18*   *   BUN 10   CREATININE 0.6   CALCIUM 8.9   PROT 6.2   ALBUMIN 2.4*   BILITOT 0.2   ALKPHOS 110   AST 14   ALT 5*   ANIONGAP 12   , CBC   Recent Labs   Lab 01/10/25  1107   WBC 10.98   HGB 10.6*   HCT 31.0*      , and All labs within the past 24 hours have been reviewed      Immunizations       Date Immunization Status Dose Route/Site Given by    01/10/25 0946 Rsv, Bivalent, Rsvpref (Abrysvo) Given 120 mcg Intramuscular/Left deltoid Jose Luis Cutler LPN            This patient has no babies on file.  Pending Diagnostic Studies:        None            Discharged Condition: stable    Disposition: Home or Self Care    Follow Up:    Patient Instructions:   No discharge procedures on file.  Medications:  Current Discharge Medication List        CONTINUE these medications which have NOT CHANGED    Details   aspirin 81 MG Chew Take 81 mg by mouth once daily.      labetaloL (NORMODYNE) 100 MG tablet Take 1 tablet (100 mg total) by mouth 2 (two) times daily.  Qty: 60 tablet, Refills: 11    Comments: .      multivit with calcium,iron,min (WOMEN'S DAILY MULTIVITAMIN ORAL) Women's Daily Multivitamin      sumatriptan (IMITREX) 100 MG tablet Take at onset of migraine, can repeat in 2 hrs if needed.  No more than twice per day or 3 days/wk.  Qty: 18 tablet, Refills: 5    Associated Diagnoses: Migraine with aura and without status migrainosus, not intractable             Jason Patel CNM  Obstetrics  [facility] - Labor & Delivery

## 2025-01-13 ENCOUNTER — HOSPITAL ENCOUNTER (OUTPATIENT)
Facility: HOSPITAL | Age: 35
Discharge: HOME | End: 2025-01-13
Attending: STUDENT IN AN ORGANIZED HEALTH CARE EDUCATION/TRAINING PROGRAM | Admitting: STUDENT IN AN ORGANIZED HEALTH CARE EDUCATION/TRAINING PROGRAM
Payer: COMMERCIAL

## 2025-01-13 VITALS — DIASTOLIC BLOOD PRESSURE: 76 MMHG | TEMPERATURE: 99 F | SYSTOLIC BLOOD PRESSURE: 121 MMHG | HEART RATE: 93 BPM

## 2025-01-13 DIAGNOSIS — O10.919 CHRONIC HYPERTENSION AFFECTING PREGNANCY: ICD-10-CM

## 2025-01-13 DIAGNOSIS — O09.93 HIGH-RISK PREGNANCY IN THIRD TRIMESTER: ICD-10-CM

## 2025-01-13 PROCEDURE — 59025 FETAL NON-STRESS TEST: CPT | Mod: 26,,, | Performed by: MIDWIFE

## 2025-01-13 PROCEDURE — 59025 FETAL NON-STRESS TEST: CPT

## 2025-01-13 NOTE — DISCHARGE INSTRUCTIONS
Discharge Instructions    Diet:  Eat from the five basic food groups  Fruits and proteins are good choices  Limit fast foods and added salt/sugar  Moderate carbonated and caffeine drinks    Hydration:  Drink at least 8 large glasses of water a day    Kick Counts:  After a meal, rest on your side and note the baby's movements until you have 8-10 movements in a 2 hour counting period.    If you do not feel your baby move 8-10 times within 2 hours or you sense a change in the type or character of the baby's movement, you should come in to the hospital at once.  Remember; your baby can sleep for 20-40 minutes at a time.      When to notify your provider:   Vaginal bleeding like a period;  You may spot if we examined your cervix.  If your water breaks, come to the birth center.  Note time, color and odor.  Abdominal tenderness or pain that does not go away  Contractions every 3 to 5 minutes for 1 to 2 hours.  True contractions move from front to back, are regular; usually get longer, stronger and closer together and do not stop if you change your position or activity.  Any burning, urgency or frequency in relation to emptying your bladder.  Any temperature greater than 100.4 degrees, chills, flu-like symptoms      Return To the Hospital for further Evaluation:  Headache not relieved by tylenol   Blurry vision, double vision, seeing spots, or flashing lights  Feeling faint or passing out  Right epigastric pain  Difficulty breathing  Swelling in hands, face, or feet  Any of these symptoms accompanied by nausea/vomiting  Gaining more than 5 pounds in one week  Seizures  These symptoms could be an indication of elevated blood pressure.       If you have any questions that need to be answered immediately please call the Labor & Delivery Unit at [phone] and ask to speak to a nurse.    Please remember to complete your [employer] survey regarding your outpatient visit when you receive it in the mail.

## 2025-01-13 NOTE — PROCEDURES
Krupa Walker is a 34 y.o. female patient.    Temp: 98.8 °F (37.1 °C) (01/13/25 1006)  Pulse: 93 (01/13/25 1006)  BP: 121/76 (01/13/25 1006)       Fetal non-stress test    Date/Time: 1/13/2025 10:33 AM    Performed by: Dayanna Echevarria CNM  Authorized by: Dayanna Echevarria CNM    Nonstress Test:     Variability:  6-25 BPM    Decelerations:  None    Accelerations:  15 bpm    Acoustic Stimulator: No      Baseline:  145    Uterine Irritability: No      Contractions:  Not present  Biophysical Profile:     Nonstress Test Interpretation: reactive      Overall Impression:  Reassuring  Post-procedure:     Patient tolerance:  Patient tolerated the procedure well with no immediate complications    1/13/2025

## 2025-01-15 ENCOUNTER — PROCEDURE VISIT (OUTPATIENT)
Dept: OBSTETRICS AND GYNECOLOGY | Facility: CLINIC | Age: 35
End: 2025-01-15
Payer: COMMERCIAL

## 2025-01-15 ENCOUNTER — ROUTINE PRENATAL (OUTPATIENT)
Dept: OBSTETRICS AND GYNECOLOGY | Facility: CLINIC | Age: 35
End: 2025-01-15
Payer: COMMERCIAL

## 2025-01-15 VITALS
DIASTOLIC BLOOD PRESSURE: 88 MMHG | SYSTOLIC BLOOD PRESSURE: 150 MMHG | WEIGHT: 263.44 LBS | BODY MASS INDEX: 45.22 KG/M2

## 2025-01-15 DIAGNOSIS — T21.01XA: ICD-10-CM

## 2025-01-15 DIAGNOSIS — O10.919 CHRONIC HYPERTENSION AFFECTING PREGNANCY: ICD-10-CM

## 2025-01-15 DIAGNOSIS — O99.213 OBESITY DURING PREGNANCY IN THIRD TRIMESTER: ICD-10-CM

## 2025-01-15 DIAGNOSIS — O09.93 HIGH-RISK PREGNANCY IN THIRD TRIMESTER: Primary | ICD-10-CM

## 2025-01-15 DIAGNOSIS — O09.93 HIGH-RISK PREGNANCY IN THIRD TRIMESTER: ICD-10-CM

## 2025-01-15 DIAGNOSIS — Z98.891 HISTORY OF VBAC: ICD-10-CM

## 2025-01-15 DIAGNOSIS — O16.3 ELEVATED BLOOD PRESSURE AFFECTING PREGNANCY IN THIRD TRIMESTER, ANTEPARTUM: ICD-10-CM

## 2025-01-15 LAB
CREAT UR-MCNC: 113 MG/DL (ref 15–325)
PROT UR-MCNC: 11 MG/DL (ref 0–15)
PROT/CREAT UR: 0.1 MG/G{CREAT} (ref 0–0.2)

## 2025-01-15 PROCEDURE — 76816 OB US FOLLOW-UP PER FETUS: CPT | Mod: S$GLB,,, | Performed by: STUDENT IN AN ORGANIZED HEALTH CARE EDUCATION/TRAINING PROGRAM

## 2025-01-15 PROCEDURE — 82570 ASSAY OF URINE CREATININE: CPT | Performed by: ADVANCED PRACTICE MIDWIFE

## 2025-01-15 PROCEDURE — 99999 PR PBB SHADOW E&M-EST. PATIENT-LVL III: CPT | Mod: PBBFAC,,, | Performed by: ADVANCED PRACTICE MIDWIFE

## 2025-01-15 PROCEDURE — 76819 FETAL BIOPHYS PROFIL W/O NST: CPT | Mod: S$GLB,,, | Performed by: STUDENT IN AN ORGANIZED HEALTH CARE EDUCATION/TRAINING PROGRAM

## 2025-01-15 PROCEDURE — 0502F SUBSEQUENT PRENATAL CARE: CPT | Mod: CPTII,S$GLB,, | Performed by: ADVANCED PRACTICE MIDWIFE

## 2025-01-15 RX ORDER — OXYTOCIN/0.9 % SODIUM CHLORIDE 15/250 ML
0-32 PLASTIC BAG, INJECTION (ML) INTRAVENOUS CONTINUOUS
Status: CANCELLED | OUTPATIENT
Start: 2025-01-15

## 2025-01-15 RX ORDER — MISOPROSTOL 200 UG/1
800 TABLET ORAL ONCE AS NEEDED
Status: CANCELLED | OUTPATIENT
Start: 2025-01-15 | End: 2036-06-13

## 2025-01-15 RX ORDER — MISOPROSTOL 200 UG/1
800 TABLET ORAL ONCE AS NEEDED
Status: CANCELLED | OUTPATIENT
Start: 2025-01-15

## 2025-01-15 RX ORDER — SILVER SULFADIAZINE 10 G/1000G
CREAM TOPICAL 2 TIMES DAILY
Qty: 50 G | Refills: 0 | Status: ON HOLD | OUTPATIENT
Start: 2025-01-15 | End: 2025-01-28 | Stop reason: HOSPADM

## 2025-01-15 RX ORDER — ONDANSETRON 8 MG/1
8 TABLET, ORALLY DISINTEGRATING ORAL EVERY 8 HOURS PRN
Status: CANCELLED | OUTPATIENT
Start: 2025-01-15

## 2025-01-15 RX ORDER — CALCIUM CARBONATE 200(500)MG
500 TABLET,CHEWABLE ORAL 3 TIMES DAILY PRN
Status: CANCELLED | OUTPATIENT
Start: 2025-01-15

## 2025-01-15 RX ORDER — OXYTOCIN 10 [USP'U]/ML
10 INJECTION, SOLUTION INTRAMUSCULAR; INTRAVENOUS ONCE AS NEEDED
Status: CANCELLED | OUTPATIENT
Start: 2025-01-15 | End: 2036-06-13

## 2025-01-15 RX ORDER — OXYTOCIN/0.9 % SODIUM CHLORIDE 15/250 ML
95 PLASTIC BAG, INJECTION (ML) INTRAVENOUS CONTINUOUS PRN
Status: CANCELLED | OUTPATIENT
Start: 2025-01-15

## 2025-01-15 RX ORDER — CARBOPROST TROMETHAMINE 250 UG/ML
250 INJECTION, SOLUTION INTRAMUSCULAR
Status: CANCELLED | OUTPATIENT
Start: 2025-01-15

## 2025-01-15 RX ORDER — DIPHENOXYLATE HYDROCHLORIDE AND ATROPINE SULFATE 2.5; .025 MG/1; MG/1
2 TABLET ORAL EVERY 6 HOURS PRN
Status: CANCELLED | OUTPATIENT
Start: 2025-01-15

## 2025-01-15 RX ORDER — METHYLERGONOVINE MALEATE 0.2 MG/ML
200 INJECTION INTRAVENOUS ONCE AS NEEDED
Status: CANCELLED | OUTPATIENT
Start: 2025-01-15 | End: 2036-06-13

## 2025-01-15 RX ORDER — SIMETHICONE 80 MG
1 TABLET,CHEWABLE ORAL 4 TIMES DAILY PRN
Status: CANCELLED | OUTPATIENT
Start: 2025-01-15

## 2025-01-15 RX ORDER — SODIUM CHLORIDE, SODIUM LACTATE, POTASSIUM CHLORIDE, CALCIUM CHLORIDE 600; 310; 30; 20 MG/100ML; MG/100ML; MG/100ML; MG/100ML
INJECTION, SOLUTION INTRAVENOUS CONTINUOUS
Status: CANCELLED | OUTPATIENT
Start: 2025-01-15

## 2025-01-15 RX ORDER — OXYTOCIN-SODIUM CHLORIDE 0.9% IV SOLN 30 UNIT/500ML 30-0.9/5 UT/ML-%
10 SOLUTION INTRAVENOUS ONCE AS NEEDED
Status: CANCELLED | OUTPATIENT
Start: 2025-01-15 | End: 2036-06-13

## 2025-01-15 RX ORDER — OXYTOCIN/0.9 % SODIUM CHLORIDE 15/250 ML
95 PLASTIC BAG, INJECTION (ML) INTRAVENOUS ONCE AS NEEDED
Status: CANCELLED | OUTPATIENT
Start: 2025-01-15 | End: 2036-06-13

## 2025-01-15 RX ORDER — OXYTOCIN-SODIUM CHLORIDE 0.9% IV SOLN 30 UNIT/500ML 30-0.9/5 UT/ML-%
30 SOLUTION INTRAVENOUS ONCE AS NEEDED
Status: CANCELLED | OUTPATIENT
Start: 2025-01-15 | End: 2036-06-13

## 2025-01-15 RX ORDER — LIDOCAINE HYDROCHLORIDE 10 MG/ML
10 INJECTION, SOLUTION INFILTRATION; PERINEURAL ONCE AS NEEDED
Status: CANCELLED | OUTPATIENT
Start: 2025-01-15 | End: 2036-06-13

## 2025-01-15 RX ORDER — MUPIROCIN 20 MG/G
OINTMENT TOPICAL
Status: CANCELLED | OUTPATIENT
Start: 2025-01-15

## 2025-01-15 NOTE — PROGRESS NOTES
34 y.o. female  at 36w4d   Reports + FM, denies VB, LOF or regular CTX  Doing well without concerns, more uncomfortable   Has rash/ezema on hands, ok to use hydrocortisone cream   Has burn on right breast, has tried air drying, bactroban but still healing, rx for sivadene cream sent to pharmacy   BP (!) 150/88 Comment: Manual  Wt 119.5 kg (263 lb 7.2 oz)   LMP 2024 (Exact Date)   BMI 45.22 kg/m²   TW lbs     1. High-risk pregnancy in third trimester    2. Chronic hypertension affecting pregnancy  Overview:  Labetalol 100mg BID      3. Obesity during pregnancy in third trimester  Overview:  Pre-gravid BMI 37.33, growth scans      4. History of   Overview:  Desires   Per M deliver 37-38.6      5. Burn of right breast  Overview:  Silvadene cream sent 1/15/25    Orders:  -     silver sulfADIAZINE 1% (SILVADENE) 1 % cream; Apply topically 2 (two) times daily.  Dispense: 50 g; Refill: 0    VE per pt request  S/w Dr. Fuentes about IOL , ok as long as cervix favorable for IOL, will schedule with Me and Dr. Fuentes 25  PCR sent for elevated blood pressure today in clinic, pt reports she took her labetalol at 0715, denies S&S of pre-eclampsia, warning signs reviewed, when to go to hospital  U/s to be performed after visit  Reviewed warning signs, normal FKCs, labor precautions and how/when to call.  RTC x 1 wk, call or present sooner prn.

## 2025-01-20 ENCOUNTER — HOSPITAL ENCOUNTER (OUTPATIENT)
Facility: HOSPITAL | Age: 35
Discharge: HOME | End: 2025-01-20
Attending: STUDENT IN AN ORGANIZED HEALTH CARE EDUCATION/TRAINING PROGRAM | Admitting: STUDENT IN AN ORGANIZED HEALTH CARE EDUCATION/TRAINING PROGRAM
Payer: COMMERCIAL

## 2025-01-20 VITALS — HEART RATE: 107 BPM | DIASTOLIC BLOOD PRESSURE: 70 MMHG | OXYGEN SATURATION: 97 % | SYSTOLIC BLOOD PRESSURE: 125 MMHG

## 2025-01-20 DIAGNOSIS — O09.93 HIGH-RISK PREGNANCY IN THIRD TRIMESTER: ICD-10-CM

## 2025-01-20 DIAGNOSIS — O10.919 CHRONIC HYPERTENSION AFFECTING PREGNANCY: ICD-10-CM

## 2025-01-20 DIAGNOSIS — R10.2 PELVIC PAIN AFFECTING PREGNANCY IN THIRD TRIMESTER, ANTEPARTUM: Primary | ICD-10-CM

## 2025-01-20 DIAGNOSIS — O26.893 PELVIC PAIN AFFECTING PREGNANCY IN THIRD TRIMESTER, ANTEPARTUM: Primary | ICD-10-CM

## 2025-01-20 PROCEDURE — G0378 HOSPITAL OBSERVATION PER HR: HCPCS

## 2025-01-20 PROCEDURE — 59025 FETAL NON-STRESS TEST: CPT | Mod: 26,,, | Performed by: ADVANCED PRACTICE MIDWIFE

## 2025-01-20 PROCEDURE — 59025 FETAL NON-STRESS TEST: CPT | Mod: 76

## 2025-01-20 RX ORDER — CYCLOBENZAPRINE HCL 5 MG
10 TABLET ORAL 3 TIMES DAILY PRN
Qty: 20 TABLET | Refills: 0 | Status: SHIPPED | OUTPATIENT
Start: 2025-01-20 | End: 2025-01-30

## 2025-01-20 NOTE — PROCEDURES
Krupa Walker is a 34 y.o. female patient.            Obtain Fetal nonstress test (NST)    Date/Time: 1/20/2025 11:57 AM    Performed by: Penny Carroll CNM  Authorized by: Penny Carroll CNM    Nonstress Test:     Variability:  6-25 BPM    Decelerations:  None    Accelerations:  15 bpm    Acoustic Stimulator: No      Baseline:  145    Uterine Irritability: No      Contractions:  Not present  Biophysical Profile:     Nonstress Test Interpretation: reactive      Overall Impression:  Reassuring  Post-procedure:     Patient tolerance:  Patient tolerated the procedure well with no immediate complications      1/20/2025

## 2025-01-20 NOTE — DISCHARGE INSTRUCTIONS

## 2025-01-22 ENCOUNTER — PATIENT MESSAGE (OUTPATIENT)
Dept: OBSTETRICS AND GYNECOLOGY | Facility: CLINIC | Age: 35
End: 2025-01-22
Payer: COMMERCIAL

## 2025-01-25 ENCOUNTER — ANESTHESIA EVENT (OUTPATIENT)
Dept: OBSTETRICS AND GYNECOLOGY | Facility: HOSPITAL | Age: 35
End: 2025-01-25
Payer: COMMERCIAL

## 2025-01-25 ENCOUNTER — HOSPITAL ENCOUNTER (INPATIENT)
Facility: HOSPITAL | Age: 35
LOS: 3 days | Discharge: HOME | End: 2025-01-28
Attending: OBSTETRICS & GYNECOLOGY | Admitting: OBSTETRICS & GYNECOLOGY
Payer: COMMERCIAL

## 2025-01-25 ENCOUNTER — ANESTHESIA (OUTPATIENT)
Dept: OBSTETRICS AND GYNECOLOGY | Facility: HOSPITAL | Age: 35
End: 2025-01-25
Payer: COMMERCIAL

## 2025-01-25 DIAGNOSIS — O34.219 VBAC (VAGINAL BIRTH AFTER CESAREAN): Primary | ICD-10-CM

## 2025-01-25 DIAGNOSIS — O10.919 CHRONIC HYPERTENSION AFFECTING PREGNANCY: ICD-10-CM

## 2025-01-25 DIAGNOSIS — Z98.891 HISTORY OF VBAC: ICD-10-CM

## 2025-01-25 DIAGNOSIS — O09.93 HIGH-RISK PREGNANCY IN THIRD TRIMESTER: ICD-10-CM

## 2025-01-25 DIAGNOSIS — O16.3 ELEVATED BLOOD PRESSURE AFFECTING PREGNANCY IN THIRD TRIMESTER, ANTEPARTUM: ICD-10-CM

## 2025-01-25 DIAGNOSIS — O99.213 OBESITY DURING PREGNANCY IN THIRD TRIMESTER: ICD-10-CM

## 2025-01-25 LAB
ABO + RH BLD: ABNORMAL
ALBUMIN SERPL BCP-MCNC: 2.6 G/DL (ref 3.5–5.2)
ALP SERPL-CCNC: 126 U/L (ref 40–150)
ALT SERPL W/O P-5'-P-CCNC: 6 U/L (ref 10–44)
ANION GAP SERPL CALC-SCNC: 9 MMOL/L (ref 8–16)
AST SERPL-CCNC: 13 U/L (ref 10–40)
BASOPHILS # BLD AUTO: 0.08 K/UL (ref 0–0.2)
BASOPHILS NFR BLD: 0.7 % (ref 0–1.9)
BILIRUB SERPL-MCNC: 0.2 MG/DL (ref 0.1–1)
BLD GP AB SCN CELLS X3 SERPL QL: ABNORMAL
BLOOD GROUP ANTIBODIES SERPL: NORMAL
BUN SERPL-MCNC: 11 MG/DL (ref 6–20)
CALCIUM SERPL-MCNC: 9.2 MG/DL (ref 8.7–10.5)
CHLORIDE SERPL-SCNC: 108 MMOL/L (ref 95–110)
CO2 SERPL-SCNC: 18 MMOL/L (ref 23–29)
CREAT SERPL-MCNC: 0.7 MG/DL (ref 0.5–1.4)
CREAT UR-MCNC: 72.7 MG/DL (ref 15–325)
DIFFERENTIAL METHOD BLD: ABNORMAL
EOSINOPHIL # BLD AUTO: 0.1 K/UL (ref 0–0.5)
EOSINOPHIL NFR BLD: 1.1 % (ref 0–8)
ERYTHROCYTE [DISTWIDTH] IN BLOOD BY AUTOMATED COUNT: 12.5 % (ref 11.5–14.5)
EST. GFR  (NO RACE VARIABLE): >60 ML/MIN/1.73 M^2
GLUCOSE SERPL-MCNC: 90 MG/DL (ref 70–110)
HCT VFR BLD AUTO: 31.2 % (ref 37–48.5)
HGB BLD-MCNC: 10.6 G/DL (ref 12–16)
HIV 1+2 AB+HIV1 P24 AG SERPL QL IA: NEGATIVE
IMM GRANULOCYTES # BLD AUTO: 0.08 K/UL (ref 0–0.04)
IMM GRANULOCYTES NFR BLD AUTO: 0.7 % (ref 0–0.5)
LYMPHOCYTES # BLD AUTO: 3.2 K/UL (ref 1–4.8)
LYMPHOCYTES NFR BLD: 27.7 % (ref 18–48)
MCH RBC QN AUTO: 30.9 PG (ref 27–31)
MCHC RBC AUTO-ENTMCNC: 34 G/DL (ref 32–36)
MCV RBC AUTO: 91 FL (ref 82–98)
MONOCYTES # BLD AUTO: 0.9 K/UL (ref 0.3–1)
MONOCYTES NFR BLD: 7.9 % (ref 4–15)
NEUTROPHILS # BLD AUTO: 7.1 K/UL (ref 1.8–7.7)
NEUTROPHILS NFR BLD: 61.9 % (ref 38–73)
NRBC BLD-RTO: 0 /100 WBC
PLATELET # BLD AUTO: 276 K/UL (ref 150–450)
PMV BLD AUTO: 10 FL (ref 9.2–12.9)
POTASSIUM SERPL-SCNC: 3.7 MMOL/L (ref 3.5–5.1)
PROT SERPL-MCNC: 6.7 G/DL (ref 6–8.4)
PROT UR-MCNC: 8 MG/DL (ref 0–15)
PROT/CREAT UR: 0.11 MG/G{CREAT} (ref 0–0.2)
RBC # BLD AUTO: 3.43 M/UL (ref 4–5.4)
SODIUM SERPL-SCNC: 135 MMOL/L (ref 136–145)
TREPONEMA PALLIDUM IGG+IGM AB [PRESENCE] IN SERUM OR PLASMA BY IMMUNOASSAY: NONREACTIVE
WBC # BLD AUTO: 11.46 K/UL (ref 3.9–12.7)

## 2025-01-25 PROCEDURE — 59200 INSERT CERVICAL DILATOR: CPT

## 2025-01-25 PROCEDURE — 87389 HIV-1 AG W/HIV-1&-2 AB AG IA: CPT | Performed by: ADVANCED PRACTICE MIDWIFE

## 2025-01-25 PROCEDURE — 63600175 PHARM REV CODE 636 W HCPCS: Performed by: ADVANCED PRACTICE MIDWIFE

## 2025-01-25 PROCEDURE — 27800516 HC TRAY, EPIDURAL COMBO: Performed by: ANESTHESIOLOGY

## 2025-01-25 PROCEDURE — 63600175 PHARM REV CODE 636 W HCPCS

## 2025-01-25 PROCEDURE — 25000003 PHARM REV CODE 250

## 2025-01-25 PROCEDURE — 86870 RBC ANTIBODY IDENTIFICATION: CPT | Performed by: ADVANCED PRACTICE MIDWIFE

## 2025-01-25 PROCEDURE — 51702 INSERT TEMP BLADDER CATH: CPT

## 2025-01-25 PROCEDURE — 82570 ASSAY OF URINE CREATININE: CPT | Performed by: ADVANCED PRACTICE MIDWIFE

## 2025-01-25 PROCEDURE — 86593 SYPHILIS TEST NON-TREP QUANT: CPT | Performed by: ADVANCED PRACTICE MIDWIFE

## 2025-01-25 PROCEDURE — 62326 NJX INTERLAMINAR LMBR/SAC: CPT | Performed by: NURSE ANESTHETIST, CERTIFIED REGISTERED

## 2025-01-25 PROCEDURE — 63600175 PHARM REV CODE 636 W HCPCS: Performed by: ANESTHESIOLOGY

## 2025-01-25 PROCEDURE — 25000003 PHARM REV CODE 250: Performed by: NURSE ANESTHETIST, CERTIFIED REGISTERED

## 2025-01-25 PROCEDURE — 63600175 PHARM REV CODE 636 W HCPCS: Performed by: NURSE ANESTHETIST, CERTIFIED REGISTERED

## 2025-01-25 PROCEDURE — 72100003 HC LABOR CARE, EA. ADDL. 8 HRS

## 2025-01-25 PROCEDURE — 27200710 HC EPIDURAL INFUSION PUMP SET: Performed by: ANESTHESIOLOGY

## 2025-01-25 PROCEDURE — 25000003 PHARM REV CODE 250: Performed by: ANESTHESIOLOGY

## 2025-01-25 PROCEDURE — 11000001 HC ACUTE MED/SURG PRIVATE ROOM

## 2025-01-25 PROCEDURE — 86900 BLOOD TYPING SEROLOGIC ABO: CPT | Performed by: ADVANCED PRACTICE MIDWIFE

## 2025-01-25 PROCEDURE — 85025 COMPLETE CBC W/AUTO DIFF WBC: CPT | Performed by: ADVANCED PRACTICE MIDWIFE

## 2025-01-25 PROCEDURE — 80053 COMPREHEN METABOLIC PANEL: CPT | Performed by: ADVANCED PRACTICE MIDWIFE

## 2025-01-25 PROCEDURE — 72100002 HC LABOR CARE, 1ST 8 HOURS

## 2025-01-25 RX ORDER — ROPIVACAINE HYDROCHLORIDE 2 MG/ML
12 INJECTION, SOLUTION EPIDURAL; INFILTRATION CONTINUOUS
Status: DISCONTINUED | OUTPATIENT
Start: 2025-01-25 | End: 2025-01-26

## 2025-01-25 RX ORDER — MISOPROSTOL 200 UG/1
800 TABLET ORAL ONCE AS NEEDED
Status: DISCONTINUED | OUTPATIENT
Start: 2025-01-25 | End: 2025-01-26

## 2025-01-25 RX ORDER — ONDANSETRON 8 MG/1
8 TABLET, ORALLY DISINTEGRATING ORAL EVERY 8 HOURS PRN
Status: DISCONTINUED | OUTPATIENT
Start: 2025-01-25 | End: 2025-01-26

## 2025-01-25 RX ORDER — LIDOCAINE HYDROCHLORIDE AND EPINEPHRINE 15; 5 MG/ML; UG/ML
INJECTION, SOLUTION EPIDURAL
Status: DISCONTINUED | OUTPATIENT
Start: 2025-01-25 | End: 2025-01-26

## 2025-01-25 RX ORDER — LABETALOL 100 MG/1
100 TABLET, FILM COATED ORAL EVERY 12 HOURS
Status: DISCONTINUED | OUTPATIENT
Start: 2025-01-25 | End: 2025-01-26

## 2025-01-25 RX ORDER — LIDOCAINE HYDROCHLORIDE 10 MG/ML
10 INJECTION, SOLUTION EPIDURAL; INFILTRATION; INTRACAUDAL; PERINEURAL ONCE AS NEEDED
Status: DISCONTINUED | OUTPATIENT
Start: 2025-01-25 | End: 2025-01-26

## 2025-01-25 RX ORDER — OXYTOCIN-SODIUM CHLORIDE 0.9% IV SOLN 30 UNIT/500ML 30-0.9/5 UT/ML-%
0-32 SOLUTION INTRAVENOUS CONTINUOUS
Status: DISCONTINUED | OUTPATIENT
Start: 2025-01-25 | End: 2025-01-26

## 2025-01-25 RX ORDER — OXYTOCIN-SODIUM CHLORIDE 0.9% IV SOLN 30 UNIT/500ML 30-0.9/5 UT/ML-%
10 SOLUTION INTRAVENOUS ONCE AS NEEDED
Status: COMPLETED | OUTPATIENT
Start: 2025-01-25 | End: 2025-01-26

## 2025-01-25 RX ORDER — TRANEXAMIC ACID 10 MG/ML
1000 INJECTION, SOLUTION INTRAVENOUS EVERY 30 MIN PRN
Status: DISCONTINUED | OUTPATIENT
Start: 2025-01-25 | End: 2025-01-26

## 2025-01-25 RX ORDER — HETASTARCH 6 G/100ML
500 INJECTION, SOLUTION INTRAVENOUS ONCE
Status: COMPLETED | OUTPATIENT
Start: 2025-01-25 | End: 2025-01-25

## 2025-01-25 RX ORDER — ACETAMINOPHEN 325 MG/1
650 TABLET ORAL EVERY 6 HOURS PRN
Status: DISCONTINUED | OUTPATIENT
Start: 2025-01-25 | End: 2025-01-26

## 2025-01-25 RX ORDER — DIPHENOXYLATE HYDROCHLORIDE AND ATROPINE SULFATE 2.5; .025 MG/1; MG/1
2 TABLET ORAL EVERY 6 HOURS PRN
Status: DISCONTINUED | OUTPATIENT
Start: 2025-01-25 | End: 2025-01-26

## 2025-01-25 RX ORDER — MUPIROCIN 20 MG/G
OINTMENT TOPICAL
Status: DISCONTINUED | OUTPATIENT
Start: 2025-01-25 | End: 2025-01-26

## 2025-01-25 RX ORDER — SODIUM CHLORIDE, SODIUM LACTATE, POTASSIUM CHLORIDE, CALCIUM CHLORIDE 600; 310; 30; 20 MG/100ML; MG/100ML; MG/100ML; MG/100ML
INJECTION, SOLUTION INTRAVENOUS CONTINUOUS
Status: DISCONTINUED | OUTPATIENT
Start: 2025-01-25 | End: 2025-01-26

## 2025-01-25 RX ORDER — SIMETHICONE 80 MG
1 TABLET,CHEWABLE ORAL 4 TIMES DAILY PRN
Status: DISCONTINUED | OUTPATIENT
Start: 2025-01-25 | End: 2025-01-26

## 2025-01-25 RX ORDER — OXYTOCIN 10 [USP'U]/ML
10 INJECTION, SOLUTION INTRAMUSCULAR; INTRAVENOUS ONCE AS NEEDED
Status: COMPLETED | OUTPATIENT
Start: 2025-01-25 | End: 2025-01-26

## 2025-01-25 RX ORDER — OXYTOCIN-SODIUM CHLORIDE 0.9% IV SOLN 30 UNIT/500ML 30-0.9/5 UT/ML-%
95 SOLUTION INTRAVENOUS CONTINUOUS PRN
Status: DISCONTINUED | OUTPATIENT
Start: 2025-01-25 | End: 2025-01-26

## 2025-01-25 RX ORDER — METHYLERGONOVINE MALEATE 0.2 MG/ML
200 INJECTION INTRAVENOUS ONCE AS NEEDED
Status: DISCONTINUED | OUTPATIENT
Start: 2025-01-25 | End: 2025-01-26

## 2025-01-25 RX ORDER — KETOROLAC TROMETHAMINE 30 MG/ML
30 INJECTION, SOLUTION INTRAMUSCULAR; INTRAVENOUS ONCE
Status: COMPLETED | OUTPATIENT
Start: 2025-01-25 | End: 2025-01-25

## 2025-01-25 RX ORDER — OXYTOCIN-SODIUM CHLORIDE 0.9% IV SOLN 30 UNIT/500ML 30-0.9/5 UT/ML-%
95 SOLUTION INTRAVENOUS ONCE AS NEEDED
Status: DISCONTINUED | OUTPATIENT
Start: 2025-01-25 | End: 2025-01-26

## 2025-01-25 RX ORDER — CARBOPROST TROMETHAMINE 250 UG/ML
250 INJECTION, SOLUTION INTRAMUSCULAR
Status: DISCONTINUED | OUTPATIENT
Start: 2025-01-25 | End: 2025-01-26

## 2025-01-25 RX ORDER — OXYTOCIN-SODIUM CHLORIDE 0.9% IV SOLN 30 UNIT/500ML 30-0.9/5 UT/ML-%
30 SOLUTION INTRAVENOUS ONCE AS NEEDED
Status: DISCONTINUED | OUTPATIENT
Start: 2025-01-25 | End: 2025-01-26

## 2025-01-25 RX ORDER — CALCIUM CARBONATE 200(500)MG
500 TABLET,CHEWABLE ORAL 3 TIMES DAILY PRN
Status: DISCONTINUED | OUTPATIENT
Start: 2025-01-25 | End: 2025-01-26

## 2025-01-25 RX ORDER — SUMATRIPTAN SUCCINATE 50 MG/1
100 TABLET ORAL ONCE
Status: COMPLETED | OUTPATIENT
Start: 2025-01-25 | End: 2025-01-25

## 2025-01-25 RX ORDER — EPHEDRINE SULFATE 50 MG/ML
50 INJECTION, SOLUTION INTRAVENOUS ONCE AS NEEDED
Status: DISCONTINUED | OUTPATIENT
Start: 2025-01-25 | End: 2025-01-26

## 2025-01-25 RX ORDER — DEXMEDETOMIDINE HYDROCHLORIDE 100 UG/ML
INJECTION, SOLUTION INTRAVENOUS
Status: DISCONTINUED | OUTPATIENT
Start: 2025-01-25 | End: 2025-01-26

## 2025-01-25 RX ADMIN — DEXMEDETOMIDINE 10 MCG: 200 INJECTION, SOLUTION INTRAVENOUS at 10:01

## 2025-01-25 RX ADMIN — ROPIVACAINE HYDROCHLORIDE 4 ML: 2 INJECTION, SOLUTION EPIDURAL; INFILTRATION at 10:01

## 2025-01-25 RX ADMIN — SODIUM CHLORIDE, POTASSIUM CHLORIDE, SODIUM LACTATE AND CALCIUM CHLORIDE: 600; 310; 30; 20 INJECTION, SOLUTION INTRAVENOUS at 09:01

## 2025-01-25 RX ADMIN — LIDOCAINE HYDROCHLORIDE,EPINEPHRINE BITARTRATE 3 ML: 15; .005 INJECTION, SOLUTION EPIDURAL; INFILTRATION; INTRACAUDAL; PERINEURAL at 10:01

## 2025-01-25 RX ADMIN — KETOROLAC TROMETHAMINE 30 MG: 30 INJECTION, SOLUTION INTRAMUSCULAR at 11:01

## 2025-01-25 RX ADMIN — LABETALOL HYDROCHLORIDE 100 MG: 100 TABLET, FILM COATED ORAL at 09:01

## 2025-01-25 RX ADMIN — SUMATRIPTAN SUCCINATE 100 MG: 50 TABLET ORAL at 10:01

## 2025-01-25 RX ADMIN — SODIUM CHLORIDE, POTASSIUM CHLORIDE, SODIUM LACTATE AND CALCIUM CHLORIDE: 600; 310; 30; 20 INJECTION, SOLUTION INTRAVENOUS at 05:01

## 2025-01-25 RX ADMIN — ACETAMINOPHEN 650 MG: 325 TABLET ORAL at 12:01

## 2025-01-25 RX ADMIN — HETASTARCH 500 ML: 6 INJECTION, SOLUTION INTRAVENOUS at 10:01

## 2025-01-25 RX ADMIN — Medication 2 MILLI-UNITS/MIN: at 09:01

## 2025-01-25 NOTE — H&P
[facility] - Labor & Delivery  Obstetrics  History & Physical    Patient Name: Yolie Walker  MRN: [id number]  Admission Date: 2025  Primary Care Provider: Madhuri Juarez MD    Subjective:     Principal Problem:Chronic hypertension affecting pregnancy    History of Present Illness:  Pt presents to LD for IOL for CHTN. IOL of  approved by Dr. Fuentes.    Obstetric HPI:  Patient reports None contractions, active fetal movement, No vaginal bleeding , No loss of fluid     This pregnancy has been complicated by CHTN, Obesity, Hx of CS, Hx of , JAVI    OB History    Para Term  AB Living   3 2 2 0 0 2   SAB IAB Ectopic Multiple Live Births   0 0 0 0 2      # Outcome Date GA Lbr Boni/2nd Weight Sex Type Anes PTL Lv   3 Current            2 Term 20 40w1d  3.8 kg (8 lb 6 oz) F Vag-Spont EPI N KAVITHA      Name: LETTY,GIRL YOLIE      Apgar1: 6  Apgar5: 9   1 Term 10/14/14 41w2d   F CS-LTranv EPI  KAVITHA      Complications: Failure to Progress in Second Stage, Failure to progress in labor, Incisional infection, Gestational HTN     Past Medical History:   Diagnosis Date    Anxiety     Depression     GERD (gastroesophageal reflux disease)     Gestational hypertension, third trimester 2019    Hypertension     gestational     Hypoglycemia     Kidney calculi     2016    Migraines     Polycystic ovaries     PONV (postoperative nausea and vomiting)     STD (sexually transmitted disease)     Urinary tract infection      Past Surgical History:   Procedure Laterality Date    ANKLE SURGERY Right      SECTION  10/2014    CYSTOSCOPY W/ URETERAL STENT PLACEMENT Left 10/02/2019    Procedure: CYSTOSCOPY, WITH URETERAL STENT INSERTION - Under ultrasound guidance;  Surgeon: Marsha Guevara MD;  Location: [facility];  Service: Urology;  Laterality: Left;    KIDNEY STONE SURGERY      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)    LASER LITHOTRIPSY  Left 10/16/2019    Procedure: LITHOTRIPSY, USING LASER;  Surgeon: Marsha Guevara MD;  Location: [facility];  Service: Urology;  Laterality: Left;    TONSILLECTOMY      ULTRASOUND GUIDANCE Left 10/16/2019    Procedure: ULTRASOUND GUIDANCE, for laser litho;  Surgeon: Marsha Guevara MD;  Location: [facility] OR;  Service: Urology;  Laterality: Left;    URETEROSCOPIC REMOVAL OF URETERIC CALCULUS Left 10/16/2019    Procedure: REMOVAL, CALCULUS, URETER, URETEROSCOPIC possible retro pyelo, possible lasere litho, possible stent;  Surgeon: Marsha Guevara MD;  Location: [facility];  Service: Urology;  Laterality: Left;  25 WEEKS PREGNANT / FETAL MONITORING BEFORE AND AFTER PROCEDURE/   CALL PELON JERROD [phone]      WISDOM TOOTH EXTRACTION         PTA Medications   Medication Sig    aspirin 81 MG Chew Take 81 mg by mouth once daily.    cyclobenzaprine (FLEXERIL) 5 MG tablet Take 2 tablets (10 mg total) by mouth 3 (three) times daily as needed for Muscle spasms.    labetaloL (NORMODYNE) 100 MG tablet Take 1 tablet (100 mg total) by mouth 2 (two) times daily.    multivit with calcium,iron,min (WOMEN'S DAILY MULTIVITAMIN ORAL) Women's Daily Multivitamin    silver sulfADIAZINE 1% (SILVADENE) 1 % cream Apply topically 2 (two) times daily.    sumatriptan (IMITREX) 100 MG tablet Take at onset of migraine, can repeat in 2 hrs if needed.  No more than twice per day or 3 days/wk.       Review of patient's allergies indicates:  No Known Allergies     Family History       Problem Relation (Age of Onset)    Breast cancer Paternal Grandmother    Cancer Paternal Grandmother    Depression Father    Heart disease Father, Paternal Grandfather    Hyperlipidemia Father, Paternal Grandfather, Maternal Grandfather    Hypertension Mother, Father, Paternal Grandmother, Paternal Grandfather, Maternal Grandmother    Nephrolithiasis Daughter    No Known Problems Sister, Sister, Sister          Tobacco Use    Smoking status: Never     Passive  exposure: Current    Smokeless tobacco: Never   Substance and Sexual Activity    Alcohol use: Not Currently     Comment: 1 drink per month    Drug use: Never    Sexual activity: Yes     Partners: Male     Birth control/protection: Condom     Comment: IUD removed due to complications in July 2018     Review of Systems   Gastrointestinal:  Negative for abdominal pain.   Genitourinary:  Negative for vaginal bleeding.   Musculoskeletal:  Positive for back pain.   All other systems reviewed and are negative.     Objective:     Vital Signs (Most Recent):    Vital Signs (24h Range):           There is no height or weight on file to calculate BMI.    FHT: 135 Cat 1 (reassuring)  TOCO:  none     Physical Exam:   Constitutional: She is oriented to person, place, and time. She appears well-developed and well-nourished.    HENT:   Head: Normocephalic and atraumatic.    Eyes: EOM are normal.     Cardiovascular:  Normal rate.             Pulmonary/Chest: Effort normal.        Abdominal: Soft.     Genitourinary:    Vagina normal.             Musculoskeletal: Normal range of motion and moves all extremeties.       Neurological: She is alert and oriented to person, place, and time.    Skin: Skin is warm and dry.    Psychiatric: She has a normal mood and affect. Her behavior is normal. Judgment and thought content normal.        Cervix:  Dilation:  2  Effacement:  60  Station: -3  Presentation: Vertex     Significant Labs:      I have personallly reviewed all pertinent lab results from the last 24 hours.  Recent Lab Results         01/25/25  0758        Baso # 0.08       Basophil % 0.7       Differential Method Automated       Eos # 0.1       Eos % 1.1       Gran # (ANC) 7.1       Gran % 61.9       Hematocrit 31.2       Hemoglobin 10.6       Immature Grans (Abs) 0.08  Comment: Mild elevation in immature granulocytes is non specific and   can be seen in a variety of conditions including stress response,   acute inflammation, trauma  and pregnancy. Correlation with other   laboratory and clinical findings is essential.         Immature Granulocytes 0.7       Lymph # 3.2       Lymph % 27.7       MCH 30.9       MCHC 34.0       MCV 91       Mono # 0.9       Mono % 7.9       MPV 10.0       nRBC 0       Platelet Count 276       RBC 3.43       RDW 12.5       WBC 11.46             Assessment/Plan:     34 y.o. female  at 38w0d for:    * Chronic hypertension affecting pregnancy  Labetalol 100mg    History of   Admit to LD for IOL  Dr. Fuentes in [place]  SVE /-3  Discussed POC and pitocin IOL with pt.   Epidural at request  Anticipate progress and     Hx of  section complicating pregnancy  Dr. Fuentes in [place]. Approved IOL by Dr. Fuentes    Obesity during pregnancy in third trimester  Lovenox PP     JAVI (generalized anxiety disorder)  Buspar at request         Maine Mccullough CNM  Obstetrics  [facility] - Labor & Delivery

## 2025-01-25 NOTE — ASSESSMENT & PLAN NOTE
Admit to LD for IOL  Dr. Fuentes in house  SVE /3  Discussed POC and pitocin IOL with pt.   Epidural at request  Anticipate progress and

## 2025-01-25 NOTE — HOSPITAL COURSE
Admit to LD for IOL  Dr. Fuentes in house  SVE /-3  Discussed POC and pitocin IOL with pt.   Epidural at request  Anticipate progress and   25: Doing ok this am,discouraged and ready for delivery. Rested ok after IV ativan. Lengthy discussion about need for cervical change and need for more contractions. Will continue to adjust pitocin accordingly with frequent position changes. Dr. Fuentes aware of  patient status and remains in house.   25 PPD1 doing well, s/p , BP remains stable on labetalol 100mg BID, lovenox for BMI 45, plan d/c tomorrow  25 PPD #2.  Discussed routine PP instructions and pre-e precautions.  Will be discharged on labetalol and have a BP check scheduled for Friday.  Has Connected Mom's at home.  1+ pitting edema lower extremities will monitor for now.  Baby will be out of NICU tonight but not discharged.

## 2025-01-25 NOTE — ANESTHESIA PREPROCEDURE EVALUATION
2025  Krupa Walker is a 34 y.o., female.      Pre-op Assessment    I have reviewed the Patient Summary Reports.     I have reviewed the Nursing Notes. I have reviewed the NPO Status.   I have reviewed the Medications.     Review of Systems  Anesthesia Hx:   History of prior surgery of interest to airway management or planning:          Denies Family Hx of Anesthesia complications.   Personal Hx of Anesthesia complications, Post-Operative Nausea/Vomiting                    Social:  Non-Smoker, No Alcohol Use       Hematology/Oncology:  Hematology Normal   Oncology Normal                                   EENT/Dental:  EENT/Dental Normal           Cardiovascular:     Hypertension                                    Hypertension         Pulmonary:  Pulmonary Normal                       Renal/:  Chronic Renal Disease        Kidney Function/Disease             Hepatic/GI:     GERD         Gerd          Musculoskeletal:  Musculoskeletal Normal                OB/GYN/PEDS:  History of            Neurological:      Headaches     Decreased range of motion in cervical region Dx of Headaches                           Endocrine:  Endocrine Normal            Dermatological:  Skin Normal    Psych:  Psychiatric History anxiety                 Physical Exam  General: Well nourished, Cooperative, Alert and Oriented    Airway:  Mallampati: II   Mouth Opening: Normal  TM Distance: Normal  Tongue: Normal  Neck ROM: Normal ROM    Dental:  Intact        Anesthesia Plan  Type of Anesthesia, risks & benefits discussed:    Anesthesia Type: Epidural, CSE  Informed Consent: Informed consent signed with the Patient and all parties understand the risks and agree with anesthesia plan.  All questions answered. Patient consented to blood products? Yes  ASA Score: 2    Ready For Surgery From Anesthesia Perspective.      .

## 2025-01-25 NOTE — SUBJECTIVE & OBJECTIVE
Obstetric HPI:  Patient reports None contractions, active fetal movement, No vaginal bleeding , No loss of fluid     This pregnancy has been complicated by CHTN, Obesity, Hx of CS, Hx of , JAVI    OB History    Para Term  AB Living   3 2 2 0 0 2   SAB IAB Ectopic Multiple Live Births   0 0 0 0 2      # Outcome Date GA Lbr Boni/2nd Weight Sex Type Anes PTL Lv   3 Current            2 Term 20 40w1d  3.8 kg (8 lb 6 oz) F Vag-Spont EPI N KAVITHA      Name: LETTY,GIRL YOLIE      Apgar1: 6  Apgar5: 9   1 Term 10/14/14 41w2d   F CS-LTranv EPI  KAVITHA      Complications: Failure to Progress in Second Stage, Failure to progress in labor, Incisional infection, Gestational HTN     Past Medical History:   Diagnosis Date    Anxiety     Depression     GERD (gastroesophageal reflux disease)     Gestational hypertension, third trimester 2019    Hypertension     gestational     Hypoglycemia     Kidney calculi         Migraines     Polycystic ovaries     PONV (postoperative nausea and vomiting)     STD (sexually transmitted disease)     Urinary tract infection      Past Surgical History:   Procedure Laterality Date    ANKLE SURGERY Right 2008     SECTION  10/2014    CYSTOSCOPY W/ URETERAL STENT PLACEMENT Left 10/02/2019    Procedure: CYSTOSCOPY, WITH URETERAL STENT INSERTION - Under ultrasound guidance;  Surgeon: Marsha Guevara MD;  Location: [facility];  Service: Urology;  Laterality: Left;    KIDNEY STONE SURGERY      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)    LASER LITHOTRIPSY Left 10/16/2019    Procedure: LITHOTRIPSY, USING LASER;  Surgeon: Marsha Guevara MD;  Location: [facility];  Service: Urology;  Laterality: Left;    TONSILLECTOMY      ULTRASOUND GUIDANCE Left 10/16/2019    Procedure: ULTRASOUND GUIDANCE, for laser litho;  Surgeon: Marsha Guevara MD;  Location: [facility];  Service: Urology;  Laterality: Left;    URETEROSCOPIC REMOVAL  OF URETERIC CALCULUS Left 10/16/2019    Procedure: REMOVAL, CALCULUS, URETER, URETEROSCOPIC possible retro pyelo, possible lasere litho, possible stent;  Surgeon: Marsha Guevara MD;  Location: [facility];  Service: Urology;  Laterality: Left;  25 WEEKS PREGNANT / FETAL MONITORING BEFORE AND AFTER PROCEDURE/   CALL PELON ELDER [phone]      WISDOM TOOTH EXTRACTION         PTA Medications   Medication Sig    aspirin 81 MG Chew Take 81 mg by mouth once daily.    cyclobenzaprine (FLEXERIL) 5 MG tablet Take 2 tablets (10 mg total) by mouth 3 (three) times daily as needed for Muscle spasms.    labetaloL (NORMODYNE) 100 MG tablet Take 1 tablet (100 mg total) by mouth 2 (two) times daily.    multivit with calcium,iron,min (WOMEN'S DAILY MULTIVITAMIN ORAL) Women's Daily Multivitamin    silver sulfADIAZINE 1% (SILVADENE) 1 % cream Apply topically 2 (two) times daily.    sumatriptan (IMITREX) 100 MG tablet Take at onset of migraine, can repeat in 2 hrs if needed.  No more than twice per day or 3 days/wk.       Review of patient's allergies indicates:  No Known Allergies     Family History       Problem Relation (Age of Onset)    Breast cancer Paternal Grandmother    Cancer Paternal Grandmother    Depression Father    Heart disease Father, Paternal Grandfather    Hyperlipidemia Father, Paternal Grandfather, Maternal Grandfather    Hypertension Mother, Father, Paternal Grandmother, Paternal Grandfather, Maternal Grandmother    Nephrolithiasis Daughter    No Known Problems Sister, Sister, Sister          Tobacco Use    Smoking status: Never     Passive exposure: Current    Smokeless tobacco: Never   Substance and Sexual Activity    Alcohol use: Not Currently     Comment: 1 drink per month    Drug use: Never    Sexual activity: Yes     Partners: Male     Birth control/protection: Condom     Comment: IUD removed due to complications in July 2018     Review of Systems   Gastrointestinal:  Negative for abdominal pain.    Genitourinary:  Negative for vaginal bleeding.   Musculoskeletal:  Positive for back pain.   All other systems reviewed and are negative.     Objective:     Vital Signs (Most Recent):    Vital Signs (24h Range):           There is no height or weight on file to calculate BMI.    FHT: 135 Cat 1 (reassuring)  TOCO:  none     Physical Exam:   Constitutional: She is oriented to person, place, and time. She appears well-developed and well-nourished.    HENT:   Head: Normocephalic and atraumatic.    Eyes: EOM are normal.     Cardiovascular:  Normal rate.             Pulmonary/Chest: Effort normal.        Abdominal: Soft.     Genitourinary:    Vagina normal.             Musculoskeletal: Normal range of motion and moves all extremeties.       Neurological: She is alert and oriented to person, place, and time.    Skin: Skin is warm and dry.    Psychiatric: She has a normal mood and affect. Her behavior is normal. Judgment and thought content normal.        Cervix:  Dilation:  2  Effacement:  60  Station: -3  Presentation: Vertex     Significant Labs:      I have personallly reviewed all pertinent lab results from the last 24 hours.  Recent Lab Results         01/25/25  0758        Baso # 0.08       Basophil % 0.7       Differential Method Automated       Eos # 0.1       Eos % 1.1       Gran # (ANC) 7.1       Gran % 61.9       Hematocrit 31.2       Hemoglobin 10.6       Immature Grans (Abs) 0.08  Comment: Mild elevation in immature granulocytes is non specific and   can be seen in a variety of conditions including stress response,   acute inflammation, trauma and pregnancy. Correlation with other   laboratory and clinical findings is essential.         Immature Granulocytes 0.7       Lymph # 3.2       Lymph % 27.7       MCH 30.9       MCHC 34.0       MCV 91       Mono # 0.9       Mono % 7.9       MPV 10.0       nRBC 0       Platelet Count 276       RBC 3.43       RDW 12.5       WBC 11.46

## 2025-01-25 NOTE — NURSING
Patient was a smoker most of his adult life  He quit approximately 3-4 years ago and only recently restarted smoking  We did talk about smoking cessation a smokes 5 cigarettes a day  I did discuss with him nicotine replacement    He is scheduled for a low-dose radiation CT of chest in July of 2022 S: Pt walking halls and resting intermittently with  by her side  O:  VS reviewed, afebrile   Vitals:    25 1245 25 1315 25 1345 25 1500   BP:    130/83   Pulse: 98 96 100 99   Resp:    18   Temp:    98.9 °F (37.2 °C)   TempSrc:    Oral   SpO2: 98% 99% 98% 98%       FHTs 120 Mod BTBV with accels and no decels  UC 2-5 min  SVE 2/60/-2  Cooks balloon placed.     A: IUP @ 38w0d ;     Patient Active Problem List   Diagnosis    TMJ syndrome    Nephrolithiasis    History of migraine during pregnancy    JAVI (generalized anxiety disorder)    Attention deficit hyperactivity disorder (ADHD), predominantly inattentive type    History of gestational hypertension    High-risk pregnancy in third trimester    Duplicated left renal collecting system    Hyperparathyroidism    Obesity during pregnancy in third trimester    Insomnia due to other mental disorder    Snoring    PCOS (polycystic ovarian syndrome)    Cervical pain (neck)    Chronic right shoulder pain    Decreased range of motion in cervical region    Rheumatoid factor positive    Alopecia    Hx of  section complicating pregnancy    History of     Autoimmune disease    Chronic hypertension affecting pregnancy    UTI in pregnancy, antepartum, third trimester    Pregnancy headache in third trimester    Burn of right breast    Elevated blood pressure affecting pregnancy in third trimester, antepartum       P:   Continue close monitoring of maternal/fetal status  Continue pitocin per protocol  Dr. Alfredo vincent in house.   Anticipate progress and

## 2025-01-25 NOTE — PLAN OF CARE
Patient vitals stable. Continuous monitoring of fetal heart tones and wellbeing. Providing emotional support through all stages of labor.        Problem: Adult Inpatient Plan of Care  Goal: Plan of Care Review  Outcome: Progressing  Goal: Patient-Specific Goal (Individualized)  Outcome: Progressing  Goal: Absence of Hospital-Acquired Illness or Injury  Outcome: Progressing  Goal: Optimal Comfort and Wellbeing  Outcome: Progressing  Goal: Readiness for Transition of Care  Outcome: Progressing     Problem: Bariatric Environmental Safety  Goal: Safety Maintained with Care  Outcome: Progressing     Problem: Infection  Goal: Absence of Infection Signs and Symptoms  Outcome: Progressing     Problem:  Fall Injury Risk  Goal: Absence of Fall, Infant Drop and Related Injury  Outcome: Progressing

## 2025-01-26 PROCEDURE — 63600175 PHARM REV CODE 636 W HCPCS: Performed by: ADVANCED PRACTICE MIDWIFE

## 2025-01-26 PROCEDURE — 25000003 PHARM REV CODE 250: Performed by: ADVANCED PRACTICE MIDWIFE

## 2025-01-26 PROCEDURE — 63600175 PHARM REV CODE 636 W HCPCS

## 2025-01-26 PROCEDURE — 72200005 HC VAGINAL DELIVERY LEVEL II

## 2025-01-26 PROCEDURE — 72100003 HC LABOR CARE, EA. ADDL. 8 HRS

## 2025-01-26 PROCEDURE — 59400 OBSTETRICAL CARE: CPT | Mod: AT,,, | Performed by: ADVANCED PRACTICE MIDWIFE

## 2025-01-26 PROCEDURE — 11000001 HC ACUTE MED/SURG PRIVATE ROOM

## 2025-01-26 PROCEDURE — 10907ZC DRAINAGE OF AMNIOTIC FLUID, THERAPEUTIC FROM PRODUCTS OF CONCEPTION, VIA NATURAL OR ARTIFICIAL OPENING: ICD-10-PCS | Performed by: OBSTETRICS & GYNECOLOGY

## 2025-01-26 PROCEDURE — 27000181 HC CABLE, IUPC

## 2025-01-26 PROCEDURE — 25000003 PHARM REV CODE 250

## 2025-01-26 RX ORDER — OXYTOCIN-SODIUM CHLORIDE 0.9% IV SOLN 30 UNIT/500ML 30-0.9/5 UT/ML-%
10 SOLUTION INTRAVENOUS ONCE AS NEEDED
Status: DISCONTINUED | OUTPATIENT
Start: 2025-01-26 | End: 2025-01-28 | Stop reason: HOSPADM

## 2025-01-26 RX ORDER — OXYTOCIN 10 [USP'U]/ML
10 INJECTION, SOLUTION INTRAMUSCULAR; INTRAVENOUS ONCE AS NEEDED
Status: DISCONTINUED | OUTPATIENT
Start: 2025-01-26 | End: 2025-01-28 | Stop reason: HOSPADM

## 2025-01-26 RX ORDER — OXYTOCIN-SODIUM CHLORIDE 0.9% IV SOLN 30 UNIT/500ML 30-0.9/5 UT/ML-%
95 SOLUTION INTRAVENOUS ONCE AS NEEDED
Status: DISCONTINUED | OUTPATIENT
Start: 2025-01-26 | End: 2025-01-28 | Stop reason: HOSPADM

## 2025-01-26 RX ORDER — ACETAMINOPHEN 325 MG/1
650 TABLET ORAL EVERY 6 HOURS SCHEDULED
Status: DISCONTINUED | OUTPATIENT
Start: 2025-01-26 | End: 2025-01-28 | Stop reason: HOSPADM

## 2025-01-26 RX ORDER — IBUPROFEN 600 MG/1
600 TABLET ORAL EVERY 6 HOURS
Status: DISCONTINUED | OUTPATIENT
Start: 2025-01-26 | End: 2025-01-28 | Stop reason: HOSPADM

## 2025-01-26 RX ORDER — DIPHENOXYLATE HYDROCHLORIDE AND ATROPINE SULFATE 2.5; .025 MG/1; MG/1
2 TABLET ORAL EVERY 6 HOURS PRN
Status: DISCONTINUED | OUTPATIENT
Start: 2025-01-26 | End: 2025-01-28 | Stop reason: HOSPADM

## 2025-01-26 RX ORDER — METHYLERGONOVINE MALEATE 0.2 MG/ML
200 INJECTION INTRAVENOUS ONCE AS NEEDED
Status: DISCONTINUED | OUTPATIENT
Start: 2025-01-26 | End: 2025-01-28 | Stop reason: HOSPADM

## 2025-01-26 RX ORDER — DIPHENHYDRAMINE HYDROCHLORIDE 50 MG/ML
25 INJECTION INTRAMUSCULAR; INTRAVENOUS EVERY 4 HOURS PRN
Status: DISCONTINUED | OUTPATIENT
Start: 2025-01-26 | End: 2025-01-28 | Stop reason: HOSPADM

## 2025-01-26 RX ORDER — MISOPROSTOL 200 UG/1
800 TABLET ORAL ONCE AS NEEDED
Status: DISCONTINUED | OUTPATIENT
Start: 2025-01-26 | End: 2025-01-28 | Stop reason: HOSPADM

## 2025-01-26 RX ORDER — DIPHENHYDRAMINE HCL 25 MG
25 CAPSULE ORAL EVERY 4 HOURS PRN
Status: DISCONTINUED | OUTPATIENT
Start: 2025-01-26 | End: 2025-01-28 | Stop reason: HOSPADM

## 2025-01-26 RX ORDER — CARBOPROST TROMETHAMINE 250 UG/ML
250 INJECTION, SOLUTION INTRAMUSCULAR
Status: DISCONTINUED | OUTPATIENT
Start: 2025-01-26 | End: 2025-01-28 | Stop reason: HOSPADM

## 2025-01-26 RX ORDER — DOCUSATE SODIUM 100 MG/1
200 CAPSULE, LIQUID FILLED ORAL 2 TIMES DAILY PRN
Status: DISCONTINUED | OUTPATIENT
Start: 2025-01-26 | End: 2025-01-28 | Stop reason: HOSPADM

## 2025-01-26 RX ORDER — HYDROCORTISONE 25 MG/G
CREAM TOPICAL 3 TIMES DAILY PRN
Status: DISCONTINUED | OUTPATIENT
Start: 2025-01-26 | End: 2025-01-28 | Stop reason: HOSPADM

## 2025-01-26 RX ORDER — SIMETHICONE 80 MG
1 TABLET,CHEWABLE ORAL EVERY 6 HOURS PRN
Status: DISCONTINUED | OUTPATIENT
Start: 2025-01-26 | End: 2025-01-28 | Stop reason: HOSPADM

## 2025-01-26 RX ORDER — HYDROCODONE BITARTRATE AND ACETAMINOPHEN 5; 325 MG/1; MG/1
1 TABLET ORAL EVERY 4 HOURS PRN
Status: DISCONTINUED | OUTPATIENT
Start: 2025-01-26 | End: 2025-01-28 | Stop reason: HOSPADM

## 2025-01-26 RX ORDER — SODIUM CHLORIDE 0.9 % (FLUSH) 0.9 %
10 SYRINGE (ML) INJECTION
Status: DISCONTINUED | OUTPATIENT
Start: 2025-01-26 | End: 2025-01-28 | Stop reason: HOSPADM

## 2025-01-26 RX ORDER — OXYTOCIN-SODIUM CHLORIDE 0.9% IV SOLN 30 UNIT/500ML 30-0.9/5 UT/ML-%
95 SOLUTION INTRAVENOUS CONTINUOUS PRN
Status: DISCONTINUED | OUTPATIENT
Start: 2025-01-26 | End: 2025-01-28 | Stop reason: HOSPADM

## 2025-01-26 RX ORDER — ONDANSETRON 8 MG/1
8 TABLET, ORALLY DISINTEGRATING ORAL EVERY 8 HOURS PRN
Status: DISCONTINUED | OUTPATIENT
Start: 2025-01-26 | End: 2025-01-28 | Stop reason: HOSPADM

## 2025-01-26 RX ORDER — PRENATAL WITH FERROUS FUM AND FOLIC ACID 3080; 920; 120; 400; 22; 1.84; 3; 20; 10; 1; 12; 200; 27; 25; 2 [IU]/1; [IU]/1; MG/1; [IU]/1; MG/1; MG/1; MG/1; MG/1; MG/1; MG/1; UG/1; MG/1; MG/1; MG/1; MG/1
1 TABLET ORAL DAILY
Status: DISCONTINUED | OUTPATIENT
Start: 2025-01-27 | End: 2025-01-28 | Stop reason: HOSPADM

## 2025-01-26 RX ORDER — TRANEXAMIC ACID 10 MG/ML
1000 INJECTION, SOLUTION INTRAVENOUS EVERY 30 MIN PRN
Status: DISCONTINUED | OUTPATIENT
Start: 2025-01-26 | End: 2025-01-28 | Stop reason: HOSPADM

## 2025-01-26 RX ORDER — ENOXAPARIN SODIUM 100 MG/ML
40 INJECTION SUBCUTANEOUS EVERY 12 HOURS
Status: DISCONTINUED | OUTPATIENT
Start: 2025-01-26 | End: 2025-01-28 | Stop reason: HOSPADM

## 2025-01-26 RX ORDER — LABETALOL 100 MG/1
100 TABLET, FILM COATED ORAL 2 TIMES DAILY
Status: DISCONTINUED | OUTPATIENT
Start: 2025-01-26 | End: 2025-01-28 | Stop reason: HOSPADM

## 2025-01-26 RX ADMIN — OXYTOCIN 10 UNITS: 10 INJECTION, SOLUTION INTRAMUSCULAR; INTRAVENOUS at 03:01

## 2025-01-26 RX ADMIN — CALCIUM CARBONATE (ANTACID) CHEW TAB 500 MG 500 MG: 500 CHEW TAB at 03:01

## 2025-01-26 RX ADMIN — CALCIUM CARBONATE (ANTACID) CHEW TAB 500 MG 500 MG: 500 CHEW TAB at 02:01

## 2025-01-26 RX ADMIN — DEXTROSE, SODIUM CHLORIDE, SODIUM LACTATE, POTASSIUM CHLORIDE, AND CALCIUM CHLORIDE 500 ML: 5; .6; .31; .03; .02 INJECTION, SOLUTION INTRAVENOUS at 07:01

## 2025-01-26 RX ADMIN — ACETAMINOPHEN 650 MG: 325 TABLET ORAL at 11:01

## 2025-01-26 RX ADMIN — IBUPROFEN 600 MG: 600 TABLET, FILM COATED ORAL at 07:01

## 2025-01-26 RX ADMIN — TRANEXAMIC ACID 1000 MG: 10 INJECTION, SOLUTION INTRAVENOUS at 03:01

## 2025-01-26 RX ADMIN — IBUPROFEN 600 MG: 600 TABLET, FILM COATED ORAL at 11:01

## 2025-01-26 RX ADMIN — OXYTOCIN-SODIUM CHLORIDE 0.9% IV SOLN 30 UNIT/500ML 30 UNITS: 30-0.9/5 SOLUTION at 03:01

## 2025-01-26 RX ADMIN — LORAZEPAM 2 MG: 2 INJECTION INTRAMUSCULAR; INTRAVENOUS at 07:01

## 2025-01-26 RX ADMIN — ACETAMINOPHEN 650 MG: 325 TABLET ORAL at 03:01

## 2025-01-26 RX ADMIN — LABETALOL HYDROCHLORIDE 100 MG: 100 TABLET, FILM COATED ORAL at 08:01

## 2025-01-26 RX ADMIN — Medication 2 MILLI-UNITS/MIN: at 04:01

## 2025-01-26 NOTE — SUBJECTIVE & OBJECTIVE
Interval History:  Krupa is a 34 y.o.  at 38w1d. She is doing well.     Objective:     Vital Signs (Most Recent):  Temp: 98.2 °F (36.8 °C) (25)  Pulse: 107 (25)  Resp: 20 (25)  BP: (!) 99/53 (25)  SpO2: 98 % (25) Vital Signs (24h Range):  Temp:  [97.8 °F (36.6 °C)-99.1 °F (37.3 °C)] 98.2 °F (36.8 °C)  Pulse:  [] 107  Resp:  [18-20] 20  SpO2:  [95 %-100 %] 98 %  BP: ()/(53-96) 99/53        There is no height or weight on file to calculate BMI.    FHT: 135 Cat 1 (reassuring)  TOCO:  Q 5-8 minutes    Cervical Exam:  Dilation:  7  Effacement:  75%  Station: -3  Presentation: Vertex     Significant Labs:  Lab Results   Component Value Date    GROUPTRH A NEG 2025    HEPBSAG Non-reactive 2024    STREPBCULT (A) 2019     STREPTOCOCCUS AGALACTIAE (GROUP B)  Beta-hemolytic streptococci are routinely susceptible to   penicillins,cephalosporins and carbapenems.         I have personallly reviewed all pertinent lab results from the last 24 hours.    Physical Exam:   Constitutional: She is oriented to person, place, and time. She appears well-developed and well-nourished.    HENT:   Head: Normocephalic.      Cardiovascular:  Normal rate and regular rhythm.             Pulmonary/Chest: Effort normal and breath sounds normal.        Abdominal: Soft.             Musculoskeletal: Normal range of motion.       Neurological: She is alert and oriented to person, place, and time.    Skin: Skin is warm and dry.        Review of Systems

## 2025-01-26 NOTE — PROGRESS NOTES
S: Pt resting comfortable with epidural in place  O:  VS reviewed, afebrile   Vitals:    25 1500 25 1750 25 1926 25 2138   BP: 130/83 (!) 136/93 137/89 (!) 140/93   Pulse: 99 94 104 105   Resp: 18  18 18   Temp: 98.9 °F (37.2 °C)  99 °F (37.2 °C)    TempSrc: Oral      SpO2: 98% 100%         FHTs 130 Mod BTBV with accels and no decels  UC 2-3 min  SVE 6/80/-2  AROM clear fluid noted.     A: IUP @ 38w0d ;     Patient Active Problem List   Diagnosis    TMJ syndrome    Nephrolithiasis    History of migraine during pregnancy    JAVI (generalized anxiety disorder)    Attention deficit hyperactivity disorder (ADHD), predominantly inattentive type    History of gestational hypertension    High-risk pregnancy in third trimester    Duplicated left renal collecting system    Hyperparathyroidism    Obesity during pregnancy in third trimester    Insomnia due to other mental disorder    Snoring    PCOS (polycystic ovarian syndrome)    Cervical pain (neck)    Chronic right shoulder pain    Decreased range of motion in cervical region    Rheumatoid factor positive    Alopecia    Hx of  section complicating pregnancy    History of     Autoimmune disease    Chronic hypertension affecting pregnancy    UTI in pregnancy, antepartum, third trimester    Pregnancy headache in third trimester    Burn of right breast    Elevated blood pressure affecting pregnancy in third trimester, antepartum       P:   Continue close monitoring of maternal/fetal status  Pitocin per protocol   Anticipate progress and

## 2025-01-26 NOTE — PROGRESS NOTES
[facility] - Labor & Delivery  Obstetrics  Labor Progress Note    Patient Name: Krupa Walker  MRN: [id number]  Admission Date: 2025  Hospital Length of Stay: 1 days  Attending Physician: Jen Fuentes MD  Primary Care Provider: Madhuri Juarez MD    Subjective:     Principal Problem:Chronic hypertension affecting pregnancy    Hospital Course:  Admit to LD for IOL  Dr. Fuentes in house  SVE /-3  Discussed POC and pitocin IOL with pt.   Epidural at request  Anticipate progress and   25: Doing ok this am,discouraged and ready for delivery. Rested ok after IV ativan. Lengthy discussion about need for cervical change and need for more contractions. Will continue to adjust pitocin accordingly with frequent position changes. Dr. Fuentes aware of  patient status and remains in house.     Interval History:  Krupa is a 34 y.o.  at 38w1d. She is doing well.     Objective:     Vital Signs (Most Recent):  Temp: 98.2 °F (36.8 °C) (25 0930)  Pulse: 107 (25 0930)  Resp: 20 (25 0930)  BP: (!) 99/53 (25 0930)  SpO2: 98 % (25 0930) Vital Signs (24h Range):  Temp:  [97.8 °F (36.6 °C)-99.1 °F (37.3 °C)] 98.2 °F (36.8 °C)  Pulse:  [] 107  Resp:  [18-20] 20  SpO2:  [95 %-100 %] 98 %  BP: ()/(53-96) 99/53        There is no height or weight on file to calculate BMI.    FHT: 135 Cat 1 (reassuring)  TOCO:  Q 5-8 minutes    Cervical Exam:  Dilation:  7  Effacement:  75%  Station: -3  Presentation: Vertex     Significant Labs:  Lab Results   Component Value Date    GROUPTRH A NEG 2025    HEPBSAG Non-reactive 2024    STREPBCULT (A) 2019     STREPTOCOCCUS AGALACTIAE (GROUP B)  Beta-hemolytic streptococci are routinely susceptible to   penicillins,cephalosporins and carbapenems.         I have personallly reviewed all pertinent lab results from the last 24 hours.    Physical Exam:   Constitutional: She is oriented to person, place, and time. She appears  well-developed and well-nourished.    HENT:   Head: Normocephalic.      Cardiovascular:  Normal rate and regular rhythm.             Pulmonary/Chest: Effort normal and breath sounds normal.        Abdominal: Soft.             Musculoskeletal: Normal range of motion.       Neurological: She is alert and oriented to person, place, and time.    Skin: Skin is warm and dry.        Review of Systems  Assessment/Plan:     34 y.o. female  at 38w1d for:    * Chronic hypertension affecting pregnancy  Labetalol 100mg    History of   Admit to LD for IOL  Dr. Fuentes in [place]  SVE /-3  Discussed POC and pitocin IOL with pt.   Epidural at request  Anticipate progress and     Hx of  section complicating pregnancy  Dr. Fuentes in [place]. Approved IOL by Dr. Fuentes    Obesity during pregnancy in third trimester  Lovenox PP     JAVI (generalized anxiety disorder)  Buspar at request           Penny Carroll CNM  Obstetrics  [facility] - Labor [facility] Delivery

## 2025-01-26 NOTE — ANESTHESIA PROCEDURE NOTES
Epidural    Patient location during procedure: OB   Reason for block: primary anesthetic   Reason for block: labor analgesia requested by patient and obstetrician  Diagnosis: IUP Labor   Start time: 1/25/2025 10:25 PM  Timeout: 1/25/2025 10:25 PM  End time: 1/25/2025 10:30 PM    Staffing  Performing Provider: Toi Wilkerson CRNA  Authorizing Provider: Freedom Perry MD    Staffing  Performed by: Toi Wilkerson CRNA  Authorized by: Freedom Perry MD        Preanesthetic Checklist  Completed: patient identified, IV checked, risks and benefits discussed, monitors and equipment checked, pre-op evaluation, timeout performed, anesthesia consent given, hand hygiene performed and patient being monitored  Preparation  Patient position: sitting  Prep: ChloraPrep  Patient monitoring: Pulse Ox and Blood Pressure  Reason for block: primary anesthetic   Epidural  Skin Anesthetic: lidocaine 1%  Skin Wheal: 2 mL  Administration type: continuous  Approach: midline  Interspace: L4-5    Injection technique: MARY air  Needle and Epidural Catheter  Needle type: Tuohy   Needle gauge: 17  Needle length: 3.5 inches  Needle insertion depth: 7 cm  Catheter type: springwound and multi-orifice  Catheter size: 19 G  Catheter at skin depth: 12 cm  Insertion Attempts: 1  Test dose: 3 mL of lidocaine 1.5% with Epi 1-to-200,000  Additional Documentation: negative aspiration for heme and CSF, incremental injection, no paresthesia on injection, no signs/symptoms of IV or SA injection, no significant complaints from patient and no significant pain on injection  Needle localization: anatomical landmarks  Assessment  Ease of block: easy  Patient's tolerance of the procedure: comfortable throughout block and no complaints No inadvertent dural puncture with Tuohy.  Dural puncture not performed with spinal needle

## 2025-01-26 NOTE — PROGRESS NOTES
Pharmacist Renal Dose Adjustment Note    Krupa Walker is a 34 y.o. female being treated with the medication lovenox    Patient Data:    Vital Signs (Most Recent):  Temp: 100.3 °F (37.9 °C) (01/26/25 1552)  Pulse: 99 (01/26/25 1615)  Resp: 20 (01/26/25 1615)  BP: (!) 177/93 (pt shaking cuff on leg) (01/26/25 1615)  SpO2: 97 % (01/26/25 1600) Vital Signs (72h Range):  Temp:  [97.8 °F (36.6 °C)-100.3 °F (37.9 °C)]   Pulse:  []   Resp:  [18-20]   BP: ()/()   SpO2:  [95 %-100 %]      Recent Labs   Lab 01/25/25  0758   CREATININE 0.7     Creatinine clearance cannot be calculated (Unknown ideal weight.)    Medication:lovenox 40 mg sq q24hrs will be changed to medication:lovenox 40 mg sq q12hrs per renal dosing protocol and BMI > 40 Pharmacist's Name: Yue Ureña [facility]  Pharmacist's Extension: [phone]

## 2025-01-26 NOTE — PROGRESS NOTES
"S: Resting with ADRIAN, feeling pressure with contractions. Called to room to replace IUPC as it came out with last vaginal exam. Patient states that she is "over this" but does not want a  at this time, would like to try for "a little while longer."  O:  VS reviewed, afebrile   Vitals:    25 0915 25 0930 25 0945 25 1000   BP: 127/67 (!) 99/53 (!) 104/59 100/61   Pulse: (!) 122 107 104 99   Resp:  20     Temp:  98.2 °F (36.8 °C)     TempSrc:  Oral     SpO2: 99% 98% 99% 100%       FHTs 135 cat 1, reassuring  UC 4-8  SVE 6-7//-4    A: IUP @ 38w1d ;     Patient Active Problem List   Diagnosis    TMJ syndrome    Nephrolithiasis    History of migraine during pregnancy    JAVI (generalized anxiety disorder)    Attention deficit hyperactivity disorder (ADHD), predominantly inattentive type    History of gestational hypertension    High-risk pregnancy in third trimester    Duplicated left renal collecting system    Hyperparathyroidism    Obesity during pregnancy in third trimester    Insomnia due to other mental disorder    Snoring    PCOS (polycystic ovarian syndrome)    Cervical pain (neck)    Chronic right shoulder pain    Decreased range of motion in cervical region    Rheumatoid factor positive    Alopecia    Hx of  section complicating pregnancy    History of     Autoimmune disease    Chronic hypertension affecting pregnancy    UTI in pregnancy, antepartum, third trimester    Pregnancy headache in third trimester    Burn of right breast    Elevated blood pressure affecting pregnancy in third trimester, antepartum       P:   Continue close monitoring of maternal/fetal status  Discussed if not strong effective contractions on maximum amount of pitocin, will need to move forward with repeat .     "

## 2025-01-26 NOTE — L&D DELIVERY NOTE
[facility] - Labor & Delivery  Vaginal Delivery   Operative Note    SUMMARY     Normal spontaneous vaginal delivery of live infant, was placed on mothers abdomen for skin to skin and bulb suctioning performed.  Infant delivered position OA over intact perineum.  Nuchal cord: Yes, cord reduced following delivery.    Spontaneous delivery of placenta and IV pitocin given noting good uterine tone. Due to lengthy labor, prophylactic IM pitocin (10units) and TXA given by RN.   No lacerations noted.  Patient tolerated delivery well. Sponge needle and lap counted correctly x2.    Indications: Chronic hypertension affecting pregnancy  Pregnancy complicated by:   Patient Active Problem List   Diagnosis    TMJ syndrome    Nephrolithiasis    History of migraine during pregnancy    JVAI (generalized anxiety disorder)    Attention deficit hyperactivity disorder (ADHD), predominantly inattentive type    History of gestational hypertension    High-risk pregnancy in third trimester    Duplicated left renal collecting system    Hyperparathyroidism    Obesity during pregnancy in third trimester    Insomnia due to other mental disorder    Snoring    PCOS (polycystic ovarian syndrome)    Cervical pain (neck)    Chronic right shoulder pain    Decreased range of motion in cervical region    Rheumatoid factor positive    Alopecia    Hx of  section complicating pregnancy    History of     Autoimmune disease    Chronic hypertension affecting pregnancy    UTI in pregnancy, antepartum, third trimester    Pregnancy headache in third trimester    Burn of right breast    Elevated blood pressure affecting pregnancy in third trimester, antepartum     Admitting GA: 38w1d    Delivery Information for Ronald Walker    Birth information:  YOB: 2024   Time of birth: 3:31 PM   Sex: male   Head Delivery Date/Time:     Delivery type:    Gestational Age: 38w1d       Delivery Providers    Delivering clinician:            Measurements     Weight:   Length:          Apgars    Living status: Living  Apgar Component Scores:  1 min.:  5 min.:  10 min.:  15 min.:  20 min.:    Skin color:  1  1       Heart rate:  2  2       Reflex irritability:  2  2       Muscle tone:  2  2       Respiratory effort:  1  2       Total:  8  9       Apgars assigned by: JEN BASHIR                         Interventions/Resuscitation    Method: Bulb Suctioning, Tactile Stimulation       Cord    No data filed       Placenta    Placenta delivery date/time:   Placenta removal:            Labor Events:       labor:       Labor Onset Date/Time:         Dilation Complete Date/Time:         Start Pushing Date/Time:         Start Pushing Date/Time:       Rupture Date/Time: 25        Rupture type: ARM (Artificial Rupture)        Fluid Amount:       Fluid Color: Clear              steroids:       Antibiotics given for GBS:       Induction:       Indications for induction:        Augmentation:       Indications for augmentation:       Labor complications:       Additional complications:          Cervical ripening:                     Delivery:      Episiotomy:       Indication for Episiotomy:       Perineal Lacerations:   Repaired:      Periurethral Laceration:   Repaired:     Labial Laceration:   Repaired:     Sulcus Laceration:   Repaired:     Vaginal Laceration:   Repaired:     Cervical Laceration:   Repaired:     Repair suture:       Repair # of packets:       Last Value - EBL - Nursing (mL):       Sum - EBL - Nursing (mL): 0     Last Value - EBL - Anesthesia (mL):      Calculated QBL (mL):       Running total QBL (mL):       Vaginal Sweep Performed:       Surgicount Correct:       Vaginal Packing:   Quantity:       Other providers:            Details (if applicable):  Trial of Labor      Categorization:      Priority:     Indications for :     Incision Type:       Additional  information:  Forceps:    Vacuum:     Breech:    Observed anomalies    Other (Comments):

## 2025-01-26 NOTE — ANESTHESIA POSTPROCEDURE EVALUATION
Anesthesia Post Evaluation    Patient: Krupa Walker    Procedure(s) Performed: * No procedures listed *    Final Anesthesia Type: epidural      Patient location during evaluation: labor & delivery  Patient participation: Yes- Able to Participate  Level of consciousness: awake and alert and oriented  Post-procedure vital signs: reviewed and stable  Pain management: adequate  Airway patency: patent    PONV status at discharge: No PONV  Anesthetic complications: no      Cardiovascular status: blood pressure returned to baseline, stable and hemodynamically stable  Respiratory status: unassisted, room air and spontaneous ventilation  Hydration status: euvolemic  Follow-up not needed.              Vitals Value Taken Time   /84 01/26/25 1649   Temp 37.9 °C (100.3 °F) 01/26/25 1552   Pulse 113 01/26/25 1649   Resp 20 01/26/25 1615   SpO2 99 % 01/26/25 1632   Vitals shown include unfiled device data.      No case tracking events are documented in the log.      Pain/Scott Score: Pain Rating Prior to Med Admin: 0 (1/26/2025  3:52 PM)

## 2025-01-27 PROBLEM — O23.43 UTI IN PREGNANCY, ANTEPARTUM, THIRD TRIMESTER: Status: RESOLVED | Noted: 2024-11-23 | Resolved: 2025-01-27

## 2025-01-27 PROBLEM — O34.219 VBAC (VAGINAL BIRTH AFTER CESAREAN): Status: ACTIVE | Noted: 2025-01-27

## 2025-01-27 PROBLEM — O16.3 ELEVATED BLOOD PRESSURE AFFECTING PREGNANCY IN THIRD TRIMESTER, ANTEPARTUM: Status: RESOLVED | Noted: 2025-01-15 | Resolved: 2025-01-27

## 2025-01-27 LAB
ABO + RH BLD: ABNORMAL
BLD GP AB SCN CELLS X3 SERPL QL: ABNORMAL
BLD GP AB SCN CELLS X3 SERPL QL: NORMAL
CREAT SERPL-MCNC: 0.6 MG/DL (ref 0.5–1.4)
EST. GFR  (NO RACE VARIABLE): >60 ML/MIN/1.73 M^2
FETAL CELL SCN BLD QL ROSETTE: NORMAL
INJECT RH IG VOL PATIENT: NORMAL ML

## 2025-01-27 PROCEDURE — 63600175 PHARM REV CODE 636 W HCPCS: Performed by: ADVANCED PRACTICE MIDWIFE

## 2025-01-27 PROCEDURE — 99024 POSTOP FOLLOW-UP VISIT: CPT | Mod: ,,, | Performed by: ADVANCED PRACTICE MIDWIFE

## 2025-01-27 PROCEDURE — 11000001 HC ACUTE MED/SURG PRIVATE ROOM

## 2025-01-27 PROCEDURE — 3E0234Z INTRODUCTION OF SERUM, TOXOID AND VACCINE INTO MUSCLE, PERCUTANEOUS APPROACH: ICD-10-PCS | Performed by: OBSTETRICS & GYNECOLOGY

## 2025-01-27 PROCEDURE — 85461 HEMOGLOBIN FETAL: CPT | Performed by: ADVANCED PRACTICE MIDWIFE

## 2025-01-27 PROCEDURE — 25000003 PHARM REV CODE 250: Performed by: ADVANCED PRACTICE MIDWIFE

## 2025-01-27 PROCEDURE — 82565 ASSAY OF CREATININE: CPT | Performed by: OBSTETRICS & GYNECOLOGY

## 2025-01-27 PROCEDURE — 63600519 RHOGAM PHARM REV CODE 636 ALT 250 W HCPCS: Performed by: ADVANCED PRACTICE MIDWIFE

## 2025-01-27 PROCEDURE — 86900 BLOOD TYPING SEROLOGIC ABO: CPT | Performed by: ADVANCED PRACTICE MIDWIFE

## 2025-01-27 PROCEDURE — 86850 RBC ANTIBODY SCREEN: CPT | Performed by: ADVANCED PRACTICE MIDWIFE

## 2025-01-27 RX ADMIN — IBUPROFEN 600 MG: 600 TABLET, FILM COATED ORAL at 11:01

## 2025-01-27 RX ADMIN — LABETALOL HYDROCHLORIDE 100 MG: 100 TABLET, FILM COATED ORAL at 08:01

## 2025-01-27 RX ADMIN — IBUPROFEN 600 MG: 600 TABLET, FILM COATED ORAL at 01:01

## 2025-01-27 RX ADMIN — HYDROCORTISONE: 25 CREAM TOPICAL at 01:01

## 2025-01-27 RX ADMIN — IBUPROFEN 600 MG: 600 TABLET, FILM COATED ORAL at 05:01

## 2025-01-27 RX ADMIN — PRENATAL VITAMINS-IRON FUMARATE 27 MG IRON-FOLIC ACID 0.8 MG TABLET 1 TABLET: at 09:01

## 2025-01-27 RX ADMIN — ACETAMINOPHEN 650 MG: 325 TABLET ORAL at 06:01

## 2025-01-27 RX ADMIN — ENOXAPARIN SODIUM 40 MG: 40 INJECTION SUBCUTANEOUS at 06:01

## 2025-01-27 RX ADMIN — HYDROCODONE BITARTRATE AND ACETAMINOPHEN 1 TABLET: 5; 325 TABLET ORAL at 11:01

## 2025-01-27 RX ADMIN — ACETAMINOPHEN 650 MG: 325 TABLET ORAL at 01:01

## 2025-01-27 RX ADMIN — HUMAN RHO(D) IMMUNE GLOBULIN 300 MCG: 300 INJECTION, SOLUTION INTRAMUSCULAR at 01:01

## 2025-01-27 RX ADMIN — LABETALOL HYDROCHLORIDE 100 MG: 100 TABLET, FILM COATED ORAL at 09:01

## 2025-01-27 RX ADMIN — ACETAMINOPHEN 650 MG: 325 TABLET ORAL at 05:01

## 2025-01-27 RX ADMIN — IBUPROFEN 600 MG: 600 TABLET, FILM COATED ORAL at 06:01

## 2025-01-27 RX ADMIN — ENOXAPARIN SODIUM 40 MG: 40 INJECTION SUBCUTANEOUS at 05:01

## 2025-01-27 NOTE — PROGRESS NOTES
[facility] ([facility])  Obstetrics  Postpartum Progress Note    Patient Name: Krupa Walker  MRN: [id number]  Admission Date: 2025  Hospital Length of Stay: 2 days  Attending Physician: Jen Fuentes MD  Primary Care Provider: Madhuri Juarez MD    Subjective:     Principal Problem: (vaginal birth after )    Hospital Course:  Admit to LD for IOL  Dr. Fuentes in house  SVE 2/60/-3  Discussed POC and pitocin IOL with pt.   Epidural at request  Anticipate progress and   25: Doing ok this am,discouraged and ready for delivery. Rested ok after IV ativan. Lengthy discussion about need for cervical change and need for more contractions. Will continue to adjust pitocin accordingly with frequent position changes. Dr. Fuentes aware of  patient status and remains in house.   25 PPD1 doing well, s/p , BP remains stable on labetalol 100mg BID, lovenox for BMI 45, plan d/c tomorrow    Interval History: PPD1    She is doing well this morning. She is tolerating a regular diet without nausea or vomiting. She is voiding spontaneously. She is ambulating. She has passed flatus, and has not a BM. Vaginal bleeding is mild. She denies fever or chills. Abdominal pain is mild and controlled with oral medications. She Is breastfeeding.     Objective:     Vital Signs (Most Recent):  Temp: 98.2 °F (36.8 °C) (25 0748)  Pulse: 93 (25 0748)  Resp: 18 (25 0748)  BP: 132/72 (25 0917)  SpO2: 97 % (25 0444) Vital Signs (24h Range):  Temp:  [98.2 °F (36.8 °C)-100.3 °F (37.9 °C)] 98.2 °F (36.8 °C)  Pulse:  [] 93  Resp:  [18-20] 18  SpO2:  [96 %-100 %] 97 %  BP: ()/(51-98) 132/72        There is no height or weight on file to calculate BMI.      Intake/Output Summary (Last 24 hours) at 2025 1039  Last data filed at 2025 1852  Gross per 24 hour   Intake 206.6 ml   Output 1300 ml   Net -1093.4 ml         Significant Labs:  Lab Results   Component  "Value Date    GROUPTRH A NEG 2025    HEPBSAG Non-reactive 2024    STREPBCULT (A) 2019     STREPTOCOCCUS AGALACTIAE (GROUP B)  Beta-hemolytic streptococci are routinely susceptible to   penicillins,cephalosporins and carbapenems.       No results for input(s): "HGB", "HCT" in the last 48 hours.    I have personallly reviewed all pertinent lab results from the last 24 hours.  Recent Lab Results         25  0647        Antibody Screen NEG       Creatinine 0.6       eGFR >60       Group & Rh A NEG       INDIRECT JORDON CANCELED  Comment: Ab Screen - ECHO was cancelled on 2025 at 08:39 by ; PREFORMED TEST ON BENCH    Result canceled by the ancillary.         [place] - FMH (Fetal Bleed Screen) NEG               Physical Exam:   Constitutional: She is oriented to person, place, and time. She appears well-developed and well-nourished.     Eyes: Pupils are equal, round, and reactive to light. Conjunctivae are normal.     Cardiovascular:  Normal rate.             Pulmonary/Chest: Effort normal.                  Musculoskeletal: Normal range of motion and moves all extremeties.       Neurological: She is alert and oriented to person, place, and time.    Skin: Skin is warm and dry.    Psychiatric: She has a normal mood and affect. Her behavior is normal. Thought content normal.       Review of Systems  Assessment/Plan:     34 y.o. female  for:    *  (vaginal birth after )  Routine PP care    Single liveborn  Care under peds    Chronic hypertension affecting pregnancy  Continue Labetalol 100mg BID  BP remains stable  Plan 3 day BP check in clinic after discharge    History of   S/p     Hx of  section complicating pregnancy  S/p     Obesity during pregnancy in third trimester  Lovenox PP     JAVI (generalized anxiety disorder)  Continue buspar PRN  Monitor anxiety closely PP        Disposition: As patient meets milestones, will plan to discharge " tomorrow.    Nilsa Christopher CNM  Obstetrics  [facility] ([facility])

## 2025-01-27 NOTE — LACTATION NOTE
LACTATION ROUNDS  SUBJECTIVE  Mother reports:  She has attempted to breastfeed 2 times and has pumped 5-6 times since infant's birth. She is collecting 5-10 mL with each pumping session.  She states she was occasionally pumping the last week and was collecting 1-3 oz per session.      OBJECTIVE    Mother's Relevant History: chronic hypertension, obesity, anxiety, depression, PCOS, migraines  separation of mother and infant: NICU admission of infant  Over supply with previous infant    Pumping session in progress upon entering room.    MATERNAL PUMPING  Type of pump: Medela Symphony   Double pumping  Flange size: 21 mm, appear too large, no smaller size available, mother states she usually uses 17 or 19 mm flanges, instructed to obtain a smaller flange size for home pump.  Pumping frequency: every 2-3 hours  Time per session: 15 minutes  Volume: 5-10 mL  Pain: no pain with pumping     ASSESSMENT FINDINGS    Adequate pumping as evidenced by objective assessment (see objective)  Milk supply adequate as evidenced by adequate volume expressed with pumping/hand expression  Maternal comfort WNL     PLAN    Routine NICU lactation care. Assessment and education ongoing. Individualized interventions to be implemented with any future changes in clinical presentation.      EDUCATION    Medela Symphony pump, tubing, collections containers and labels at bedside.  Discussed proper pump setting of initiation phase.  Instructed on proper usage of pump and to adjust suction according to maximum comfort level. Educated on the frequency and duration of pumping in order to promote and maintain a full milk supply.  Hands on pumping technique reviewed.      Encouraged hand expression after pumping.  Mother was taught hand expression of breastmilk/colostrum. She was instructed to:  Sit upright and lean forward, if possible.  When feasible, apply warm, wet compress over breasts for a few minutes.   Perform gentle breast massage.  Form a  C with her hand and place it about 1 inch back from the areola with the nipple centered between her index finger and her thumb.  Press, compress, relax:  Using her finger and thumb, apply pressure in an inward direction toward the breast without stretching the tissue, compress the breast tissue between her finger and thumb, then relax her finger and thumb. Repeat process for a few minutes.  Rotate placement of finger and thumb on the breasts to facilitate emptying.  If unable to feed immediately, place breastmilk/colostrum directly into a sterile storage container for later use. Place the babys breast milk label (with the date and time of collection and the names of mother's medications) on the container. Reviewed proper handling and storage of expressed breastmilk.   Patient effectively return demonstrated and verbalized understanding.    Instructed on cleaning of breast pump parts.  Written instructions also given.  Pt verbalized understanding and appropriate recall for proper milk handling, collection, labeling, storage and transportation.    Mother has not obtained a new breast pump from her insurance company during this pregnancy. She states she has called 3 different companies and none of them accepts her insurance company. [employer] flyer provided to patient and encouraged her to call today. Instructed patient to call her insurance company to request a list of DME companies if she does not wish to use [facility] or if [facility] does not accept her insurance.    Mother verbalizes understanding of all education and counseling. Mother denies any further lactation needs or concerns at this time. Discussed lactation availability. Encouraged mother to call for assistance when needs arise.

## 2025-01-27 NOTE — ASSESSMENT & PLAN NOTE
Continue buspar PRN  Monitor anxiety closely PP   Pt advised to go to ED per Dr Ferrera recommendation

## 2025-01-27 NOTE — LACTATION NOTE
This note was copied from a baby's chart.  Due to baby's admission to NICU, discussed feeding choice with mother. Reviewed the risks of formula feeding and the benefits of breastfeeding specifically due to baby's special needs at this time. Offered mother the opportunity to provide breastmilk for her baby. Mother states her intention is breastfeeding.  Mother's assigned RN will assist with breastmilk collection.  Harper Toussaint RN 1/26/2025

## 2025-01-27 NOTE — SUBJECTIVE & OBJECTIVE
"Interval History: PPD1    She is doing well this morning. She is tolerating a regular diet without nausea or vomiting. She is voiding spontaneously. She is ambulating. She has passed flatus, and has not a BM. Vaginal bleeding is mild. She denies fever or chills. Abdominal pain is mild and controlled with oral medications. She Is breastfeeding.     Objective:     Vital Signs (Most Recent):  Temp: 98.2 °F (36.8 °C) (01/27/25 0748)  Pulse: 93 (01/27/25 0748)  Resp: 18 (01/27/25 0748)  BP: 132/72 (01/27/25 0917)  SpO2: 97 % (01/27/25 0444) Vital Signs (24h Range):  Temp:  [98.2 °F (36.8 °C)-100.3 °F (37.9 °C)] 98.2 °F (36.8 °C)  Pulse:  [] 93  Resp:  [18-20] 18  SpO2:  [96 %-100 %] 97 %  BP: ()/(51-98) 132/72        There is no height or weight on file to calculate BMI.      Intake/Output Summary (Last 24 hours) at 1/27/2025 1039  Last data filed at 1/26/2025 1852  Gross per 24 hour   Intake 206.6 ml   Output 1300 ml   Net -1093.4 ml         Significant Labs:  Lab Results   Component Value Date    GROUPTRH A NEG 01/27/2025    HEPBSAG Non-reactive 06/24/2024    STREPBCULT (A) 12/27/2019     STREPTOCOCCUS AGALACTIAE (GROUP B)  Beta-hemolytic streptococci are routinely susceptible to   penicillins,cephalosporins and carbapenems.       No results for input(s): "HGB", "HCT" in the last 48 hours.    I have personallly reviewed all pertinent lab results from the last 24 hours.  Recent Lab Results         01/27/25  0647        Antibody Screen NEG       Creatinine 0.6       eGFR >60       Group & Rh A NEG       INDIRECT JORDON CANCELED  Comment: Ab Screen - ECHO was cancelled on 01/27/2025 at 08:39 by ; PREFORMED TEST ON BENCH    Result canceled by the ancillary.         [place] - FMH (Fetal Bleed Screen) NEG               Physical Exam:   Constitutional: She is oriented to person, place, and time. She appears well-developed and well-nourished.     Eyes: Pupils are equal, round, and reactive to light. Conjunctivae " are normal.     Cardiovascular:  Normal rate.             Pulmonary/Chest: Effort normal.                  Musculoskeletal: Normal range of motion and moves all extremeties.       Neurological: She is alert and oriented to person, place, and time.    Skin: Skin is warm and dry.    Psychiatric: She has a normal mood and affect. Her behavior is normal. Thought content normal.       Review of Systems

## 2025-01-27 NOTE — PLAN OF CARE
Patient afebrile and had no falls this shift. Fundus firm without massage and below umbilicus. Bleeding light, no clots passed this shift. Voids spontaneously. Ambulates independently. Pain well controlled with oral pain medication. Vital signs stable at this time. Visits infant in NICU. Safety measures in place. Call light in reach. Encouraged patient for use of call light.

## 2025-01-28 ENCOUNTER — LACTATION ENCOUNTER (OUTPATIENT)
Dept: OBSTETRICS AND GYNECOLOGY | Facility: HOSPITAL | Age: 35
End: 2025-01-28

## 2025-01-28 VITALS
SYSTOLIC BLOOD PRESSURE: 117 MMHG | TEMPERATURE: 99 F | RESPIRATION RATE: 16 BRPM | HEART RATE: 89 BPM | OXYGEN SATURATION: 99 % | DIASTOLIC BLOOD PRESSURE: 67 MMHG

## 2025-01-28 PROCEDURE — 63600175 PHARM REV CODE 636 W HCPCS: Performed by: ADVANCED PRACTICE MIDWIFE

## 2025-01-28 PROCEDURE — 25000003 PHARM REV CODE 250: Performed by: ADVANCED PRACTICE MIDWIFE

## 2025-01-28 RX ORDER — HYDROCODONE BITARTRATE AND ACETAMINOPHEN 5; 325 MG/1; MG/1
1 TABLET ORAL EVERY 4 HOURS PRN
Qty: 10 TABLET | Refills: 0 | Status: SHIPPED | OUTPATIENT
Start: 2025-01-28

## 2025-01-28 RX ORDER — PANTOPRAZOLE SODIUM 40 MG/1
40 TABLET, DELAYED RELEASE ORAL DAILY
Status: DISCONTINUED | OUTPATIENT
Start: 2025-01-28 | End: 2025-01-28 | Stop reason: HOSPADM

## 2025-01-28 RX ORDER — PANTOPRAZOLE SODIUM 40 MG/1
40 TABLET, DELAYED RELEASE ORAL DAILY
Qty: 30 TABLET | Refills: 1 | Status: SHIPPED | OUTPATIENT
Start: 2025-01-28 | End: 2026-01-28

## 2025-01-28 RX ORDER — IBUPROFEN 600 MG/1
600 TABLET ORAL EVERY 6 HOURS
Qty: 20 TABLET | Refills: 0 | Status: SHIPPED | OUTPATIENT
Start: 2025-01-28 | End: 2025-02-02

## 2025-01-28 RX ADMIN — ACETAMINOPHEN 650 MG: 325 TABLET ORAL at 11:01

## 2025-01-28 RX ADMIN — LABETALOL HYDROCHLORIDE 100 MG: 100 TABLET, FILM COATED ORAL at 08:01

## 2025-01-28 RX ADMIN — ENOXAPARIN SODIUM 40 MG: 40 INJECTION SUBCUTANEOUS at 05:01

## 2025-01-28 RX ADMIN — IBUPROFEN 600 MG: 600 TABLET, FILM COATED ORAL at 11:01

## 2025-01-28 RX ADMIN — IBUPROFEN 600 MG: 600 TABLET, FILM COATED ORAL at 05:01

## 2025-01-28 RX ADMIN — PANTOPRAZOLE SODIUM 40 MG: 40 TABLET, DELAYED RELEASE ORAL at 08:01

## 2025-01-28 RX ADMIN — PRENATAL VITAMINS-IRON FUMARATE 27 MG IRON-FOLIC ACID 0.8 MG TABLET 1 TABLET: at 08:01

## 2025-01-28 RX ADMIN — HYDROCODONE BITARTRATE AND ACETAMINOPHEN 1 TABLET: 5; 325 TABLET ORAL at 04:01

## 2025-01-28 NOTE — PLAN OF CARE
Patient afebrile and had no falls this shift. Fundus firm without massage and below umbilicus. Bleeding light, no clots passed this shift. Voids spontaneously. Ambulates independently. Pain well controlled with oral pain medication. Vital signs stable at this time. Visits infant in NICU. Call light placed within reach; encouraged use if needed.

## 2025-01-28 NOTE — PLAN OF CARE
Problem: Labor  Goal: Hemostasis  Outcome: Met  Goal: Stable Fetal Wellbeing  Outcome: Met  Goal: Effective Progression to Delivery  Outcome: Met  Goal: Absence of Infection Signs and Symptoms  Outcome: Met  Goal: Acceptable Pain Control  Outcome: Met  Goal: Normal Uterine Contraction Pattern  Outcome: Met     Problem: Postpartum (Vaginal Delivery)  Goal: Anesthesia/Sedation Recovery  Outcome: Met  Goal: Effective Urinary Elimination  Outcome: Met     Problem: Adult Inpatient Plan of Care  Goal: Plan of Care Review  Outcome: Progressing  Goal: Patient-Specific Goal (Individualized)  Outcome: Progressing  Goal: Absence of Hospital-Acquired Illness or Injury  Outcome: Progressing  Goal: Optimal Comfort and Wellbeing  Outcome: Progressing  Goal: Readiness for Transition of Care  Outcome: Progressing     Problem: Bariatric Environmental Safety  Goal: Safety Maintained with Care  Outcome: Progressing     Problem: Infection  Goal: Absence of Infection Signs and Symptoms  Outcome: Progressing     Problem:  Fall Injury Risk  Goal: Absence of Fall, Infant Drop and Related Injury  Outcome: Progressing     Problem: Postpartum (Vaginal Delivery)  Goal: Successful Parent Role Transition  Outcome: Progressing  Goal: Hemostasis  Outcome: Progressing  Goal: Absence of Infection Signs and Symptoms  Outcome: Progressing  Goal: Optimal Pain Control and Function  Outcome: Progressing

## 2025-01-28 NOTE — DISCHARGE SUMMARY
[facility] ([facility])  Obstetrics  Discharge Summary      Patient Name: Krupa Walker  MRN: [id number]  Admission Date: 2025  Hospital Length of Stay: 3 days  Discharge Date and Time:  2025 1:07 PM  Attending Physician: Jen Fuentes MD   Discharging Provider: Ambreen Lopez CNM   Primary Care Provider: Madhuri Juarez MD    HPI: Pt presents to LD for IOL for CHTN. IOL of  approved by Dr. Fuentes.        * No surgery found *     Hospital Course:   Admit to LD for IOL  Dr. Fuentes in house  SVE /-3  Discussed POC and pitocin IOL with pt.   Epidural at request  Anticipate progress and   25: Doing ok this am,discouraged and ready for delivery. Rested ok after IV ativan. Lengthy discussion about need for cervical change and need for more contractions. Will continue to adjust pitocin accordingly with frequent position changes. Dr. Fuentes aware of  patient status and remains in house.   25 PPD1 doing well, s/p , BP remains stable on labetalol 100mg BID, lovenox for BMI 45, plan d/c tomorrow  25 PPD #2.  Discussed routine PP instructions and pre-e precautions.  Will be discharged on labetalol and have a BP check scheduled for Friday.  Has [employer]'s at home.  1+ pitting edema lower extremities will monitor for now.  Baby will be out of NICU tonight but not discharged.           Final Active Diagnoses:    Diagnosis Date Noted POA    PRINCIPAL PROBLEM:   (vaginal birth after ) [O34.219] 2025 Yes    Single liveborn [Z38.2] 2025 Yes    Chronic hypertension affecting pregnancy [O10.919] 2024 Yes    Hx of  section complicating pregnancy [O34.219] 2024 Yes    History of  [Z98.891] 2024 Not Applicable    Obesity during pregnancy in third trimester [O99.213] 2020 Yes    JAVI (generalized anxiety disorder) [F41.1] 2018 Yes      Problems Resolved During this Admission:        Significant  "Diagnostic Studies: Labs: All labs within the past 24 hours have been reviewed      Feeding Method: breast    Immunizations       Date Immunization Status Dose Route/Site Given by    25 1653 MMR Incomplete 0.5 mL Subcutaneous/     25 1653 Tdap Incomplete 0.5 mL Intramuscular/     25 1311 Rho (D) Immune Globulin - IM Given 300 mcg Intramuscular/Right Ventrogluteal Natalia Bradshaw RN            Delivery:    Episiotomy: None   Lacerations: None   Repair suture: None   Repair # of packets:     Blood loss (ml):       Birth information:  YOB: 2024   Time of birth: 3:31 PM   Sex: male   Delivery type: , Spontaneous   Gestational Age: 38w1d     Measurements    Weight: 4000 g  Weight (lbs): 8 lb 13.1 oz  Length: 139.7 cm  Length (in): 55"  Head circumference: 36.5 cm  Chest circumference: 34 cm  Abdominal girth: 33 cm         Delivery Clinician: Delivery Providers    Delivering clinician: Penny Carroll CNM   Provider Role    Justina Toribio RN Registered Nurse    Roma Cain RN Registered Nurse    Jose Chávez ST Registered Nurse             Additional  information:  Forceps:    Vacuum:    Breech:    Observed anomalies      Living?:     Apgars    Living status: Living  Apgar Component Scores:  1 min.:  5 min.:  10 min.:  15 min.:  20 min.:    Skin color:  1  1       Heart rate:  2  2       Reflex irritability:  2  2       Muscle tone:  2  2       Respiratory effort:  1  2       Total:  8  9       Apgars assigned by: JEN BASHIR         Placenta: Delivered:       appearance  Pending Diagnostic Studies:       None            Discharged Condition: good    Disposition: Home or Self Care    Follow Up: Friday, BP check      Patient Instructions:      BREAST PUMP FOR HOME USE     Order Specific Question Answer Comments   Type of pump: Hospital grade electric    Weight: 119    Does patient have medical equipment at home? none    Length of need (1-99 months): 99      Diet Adult " Regular     Pelvic Rest     Other restrictions (specify):   Order Comments: Pre-e precautions     Notify your health care provider if you experience any of the following:  temperature >100.4     Notify your health care provider if you experience any of the following:  persistent nausea and vomiting or diarrhea     Notify your health care provider if you experience any of the following:  severe uncontrolled pain     Notify your health care provider if you experience any of the following:  difficulty breathing or increased cough     Notify your health care provider if you experience any of the following:  severe persistent headache     Notify your health care provider if you experience any of the following:  persistent dizziness, light-headedness, or visual disturbances     Activity as tolerated     Medications:  Current Discharge Medication List        START taking these medications    Details   HYDROcodone-acetaminophen (NORCO) 5-325 mg per tablet Take 1 tablet by mouth every 4 (four) hours as needed for Pain.  Qty: 10 tablet, Refills: 0    Comments: Quantity prescribed more than 7 day supply? No  Associated Diagnoses: Chronic hypertension affecting pregnancy;  (vaginal birth after )      ibuprofen (ADVIL,MOTRIN) 600 MG tablet Take 1 tablet (600 mg total) by mouth every 6 (six) hours. for 5 days  Qty: 20 tablet, Refills: 0           CONTINUE these medications which have NOT CHANGED    Details   cyclobenzaprine (FLEXERIL) 5 MG tablet Take 2 tablets (10 mg total) by mouth 3 (three) times daily as needed for Muscle spasms.  Qty: 20 tablet, Refills: 0    Associated Diagnoses: Pelvic pain affecting pregnancy in third trimester, antepartum      labetaloL (NORMODYNE) 100 MG tablet Take 1 tablet (100 mg total) by mouth 2 (two) times daily.  Qty: 60 tablet, Refills: 11    Comments: .      multivit with calcium,iron,min (WOMEN'S DAILY MULTIVITAMIN ORAL) Women's Daily Multivitamin           STOP taking these  medications       aspirin 81 MG Chew Comments:   Reason for Stopping:         silver sulfADIAZINE 1% (SILVADENE) 1 % cream Comments:   Reason for Stopping:         sumatriptan (IMITREX) 100 MG tablet Comments:   Reason for Stopping:               Ambreen Lopez CNM  Obstetrics  [facility] ([facility])

## 2025-01-28 NOTE — LACTATION NOTE
LACTATION ROUNDS  SUBJECTIVE  Mother reports:  Feedings are good.  She has bilateral nipple soreness. Pumping is painful.    Mother states that she has pumped 8 times in the last 24 hours and is now collecting 6 mL per pumping session. Mother reports mild pain pain while pumping. She states her nipples are hitting the back of her flanges, which she feels is causing the discomfort. She states she will order longer flanges for home. She reports appropriate nipple care of EBM and lanolin. She states she is in the process of obtaining her new pump from her insurance company. In the meantime she will be using her pump from her last baby. She is obtaining a new pump kit for this pump.    She reports confidence with independently breastfeeding. Denies pain and reports frequent audible swallows. She affirms all signs of correct position, latch and effective milk transfer. She reports infant did take EBM in bottle after breastfeeding on one side.    Mother anticipates discharge home today.     OBJECTIVE    Mothers chest assessment: Nipple description: everted and intact clear blisters    BREASTFEEDING  Breastfeeding    [x] Right breast   [x] Left breast                Position [] cross cradle [] cradle [x] football [] laid-back   Gape [x] adequate [] narrow []  []    Latch [x] successful [] unsuccessful [] required intervention []   Lip flange [] top flanged [x] bottom flanged [x] top tucked [] bottom tucked   Oral seal [x] adequate [] poor []    Cheeks [x] round [] dimpled [] broken cheek line [] flat   Jaw [x] rocker [] piston [] chomping [] fasciculations   Maternal pain [x] none [] mild [] moderate [] severe   Swallow [x] observed [] not observed []  []    Swallow rate [x] appropriate [] minimal [] none []    Difficulties [x] none [] coughing [] choking [] gagging    [] clicking [] wet/hoarse/breathy vocal quality [] breathing difficulty or tachypnea [] fatigue    [] smacking [] weak/disorganized suck [] infant  inactive [] loss of milk    [] pop-offs [] arching []  []    After feeding:     Maternal nipple  [] WDL [x] slightly compressed [] compressed  [] blanched   Baby after feeding [x] content [] feeding cues  [] fussy [] fatigued    [] N/A (feeding ongoing) []    Notes:  Mother fully independent. Infant with exceptional swallow pattern, at time with 1:1 ratio. 20 minutes on left breast, 8 minutes on right breast.        ASSESSMENT FINDINGS    Effective breastfeeding as evidenced by objective assessment (see objective)  Maternal comfort no pain while breastfeeding, mild pain while pumping due to flange fit.     PLAN    -Follow all MD orders, their orders supercede all information below  -Routine full term lactation care  -Offer supplements via bottle after each breastfeeding session until content, prioritize breast milk, use formula in the absence of sufficient volumes of breast milk  -Pump breasts until 'empty' after each supplemental feeding  -Call warmline with update if infant is still requiring supplemental feedings on day of life 5 and/or beyond     EDUCATION    Direct breastfeeding education provided:  -offer both breast at each feeding  -proper position basics  -asymmetrical latch techniques  -signs of effective latch  -signs of ineffective latch  -signs of nutritive suck patterns  -signs of non nutritive suck patterns  -breast massage and compression and indication for use  -evaluation of feeding for effectiveness    Pumping education Provided:  -proper use of pump  -hands on pumping techniques  -hand expression after pumping, denies the need to review   -cleaning of pump kit  -handling, collection, storage and transportation of EBM  -labeling of EBM  -trial lubricating flange and nipple directly prior to initiating pumping session    General lactation education:  -hand expression, denies the need to review   -nipple care  -notify lactation if nipple pain increases or is not fully resolved within 72 hours  e  -normalcy and importance of cluster feeding  -frequent feeds based on early cues, wake as needed, feed skin to skin and frequent skin to skin contact  -expected feeding and output pattern for day of life 3-5+  -mechanism of milk production and maintenance  -risks and implications of inadequate breast stimulation and milk removal  -frequency and duration of pumping for both initiating and maintaining desired milk supply  -pump breast until there is no milk flowing for 2 minutes once she collects 20 mL EBM for 3 consecutive pumping sessions  -nursing diet and nutrition  -signs of engorgement and expectant management  -signs of mastitis and instructed mother to call OB provider and lactation if any signs present  -feeding indications for calling pediatrician   -indications for calling lactation warmline  -proper use of 'First Alert' questionnaire   -outpatient resources such as warmline, outpatient lactation consultations, [facility] and [employer] [facility]    Mother verbalizes understanding of all education and counseling; she denies any further lactation needs or concerns at this time. Encouraged mother to contact lactation with any questions, concerns, or problems, contact number provided.    Update provided to infant's nurse.

## 2025-01-28 NOTE — DISCHARGE INSTRUCTIONS
"Mother Self Care:    Activity: Avoid strenuous exercise and get adequate rest.  No driving until the physician consent given.  Emotional Changes: Most women find birth to be a time of great emotional upheaval.  Sense of loss, mood swings, fatigue, anxiety, and feeling "let down" are common.  If feelings worsen or last more than a week, call your physician.  Breast Care/Breastfeeding: Wear a bra for comfort.  Keep nipples dry and apply your own breast milk or lanolin cream as needed for soreness.  Engorgement can be relieved with warm, moist heat before feedings.  You may also take Ibuprofen.  Breast Care/Bottle Feeding: Wear support bra 24 hours a day for one week.  Avoid stimulation to breasts.  You may use ice packs for discomfort.  Eloisa-Care/Vaginal Bleeding: Remember to use your eloisa-bottle after urinating.  Your flow will change from red, to pink, to yellow/white color over a period of 2 weeks.  Menstruation will return in 3-8 weeks, or longer if breastfeeding.   Vaginal Delivery:   Warm baths, tucks, and dermoplast will promote healing.  Avoid bubble baths or strong soaps.    Sexual Activity/Pelvic Rest: No sexual activity, tampons, or douching until your physician gives you consent.  Diet: Continue to eat from the five basic food groups, including plenty of protein, fruits, vegetables, and whole grains.  Limit empty calories and high fat foods.  Drink enough fluids to satisfy thirst and add an extra 500 calories for breastfeeding.  Constipation/Hemorrhoids: Drink plenty of water.  You may take a stool softener or natural laxative (Metamucil). You may use tucks or hemorrhoid ointment and soak in a warm tub.    "What does help look like to you when you go home?"  "Is there any need that you anticipate that we may be able to assist with?"    CALL YOUR OB DOCTOR IF ANY OF THE FOLLOWING OCCURS:  *Heavy bleeding - saturating a pad an hour or passing any large (2-3 inches in size) blood clots.  *Any pain, redness, " or tenderness in lower leg.  *You cannot care for yourself or your baby.  *Any signs of infection-      - Temperature greater than 100.5 degrees F      - Foul smelling vaginal discharge       - Increased  pain      - Hot, hard, red or sore area on breast      - Flu-like symptoms      - Any urgency, frequency or burning with urination    Return To the Hospital for further Evaluation:  Headache not relieved by tylenol or ibuprofen  Blurry vision, double vision, seeing spots, or flashing lights  Feeling faint or passing out  Right epigastric pain  Difficulty breathing  Swelling in hands, face, or feet  Any of these symptoms accompanied by nausea/vomiting  Gaining more than 5 pounds in one week  Seizures  These symptoms could be an indication of elevated blood pressure.     For patients that were treated for high blood pressure during pregnancy and or your hospital stay you will need a blood pressure check three days after you leave the hospital. Your nursing staff will assist you in an appointment if needed. If you have Connected Mom you may use your blood pressure cuff and report any readings 140/90 to your provider immediately.       If you have any questions that need to be answered immediately please call the Labor & Delivery Unit at [phone] and ask to speak to a nurse.      Please see [facility] BLUE folder for additional information and handouts.

## 2025-01-29 ENCOUNTER — LACTATION ENCOUNTER (OUTPATIENT)
Dept: OBSTETRICS AND GYNECOLOGY | Facility: HOSPITAL | Age: 35
End: 2025-01-29

## 2025-01-29 DIAGNOSIS — M54.2 CERVICAL PAIN (NECK): Primary | ICD-10-CM

## 2025-01-29 NOTE — LACTATION NOTE
This note was copied from a baby's chart.  Lactation Rounds:    Mother reports that breast feeding is going well. Mother reports that her breast milk is in, and hearing swallows, and also reports that infant is satisfied after nursing. Mother reports that she has blisters to her nipples on both breasts from pumping her breasts. Mother states that the breast pump flanges are too large, but has smaller flanges at home. Education provided on how to heal sore nipples by massaging expressed breast milk to nipple after nursing.    Mother anticipates discharge home today. Reviewed signs of good attachment. Reviewed breast massage and compression during feedings and indications for use. Reviewed signs of effective milk transfer and instructed to call pediatrician and lactation if signs not present. Discussed expected feeding and output pattern for days of life 3, 4, & 5+; mother instructed to call pediatrician and lactation if infant is not meeting feeding and output goals.     Reviewed signs of engorgement and expectant management. Reviewed signs of mastitis and instructed mother to call OB provider and lactation if any signs present. Discussed proper use of First Alert Form. Reviewed proper milk handling, collection and storage guidelines. Reviewed nursing diet and nutrition. Discussed resources for medication safety while breastfeeding. Reviewed available outpatient lactation resources.     Mother verbalizes understanding of all education and counseling; she denies any further lactation needs or concerns at this time. Encouraged mother to contact lactation with any questions, concerns, or problems, contact number provided.

## 2025-01-29 NOTE — LACTATION NOTE
This note was copied from a baby's chart.  Lactation Rounds: Mother reports that she has small blister/skin irritation to right nipple due to using breat pump and incorrect flange size. At this time the hospital does not carry her correct flange size so mother was educated on nipple care and comfort measure to help nipple heal. Mother verbalized that infant latching well at the breast and last feeding was two hours ago. Mother states that infant did not have any issues with latching to the breast.     Baby is showing feeding cues. Mother independently settles infant in a football position on the left breast. Reviewed deep asymmetric latch and proper positioning. Mother is able to demonstrate back and deep latch easily obtained. Audible swallows noted, and mother denies pain or discomfort. Baby fed until content, and nipple shape and color is WDL upon unlatching. Reviewed hand expression and nipple care; mother able to return back demonstration.      Reviewed proper usage and to adjust suction according to comfort level. Reviewed with mother frequency and duration of pumping in order to promote and maintain full milk supply. Hands on pumping technique reviewed. Instructed mother on cleaning of breast pump parts. Reviewed proper milk handling, collection, storage, and transportation. Voices understanding.     Mother verbalizes understanding of all education and counseling. Mother denies any further lactation needs or concerns at this time. Discussed lactation availability. Encouraged mother to call for assistance when needs arise.
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
## 2025-01-31 ENCOUNTER — HOSPITAL ENCOUNTER (OUTPATIENT)
Dept: RADIOLOGY | Facility: HOSPITAL | Age: 35
Discharge: HOME | End: 2025-01-31
Attending: ORTHOPAEDIC SURGERY
Payer: COMMERCIAL

## 2025-01-31 DIAGNOSIS — M54.2 CERVICAL PAIN (NECK): ICD-10-CM

## 2025-01-31 PROCEDURE — 72050 X-RAY EXAM NECK SPINE 4/5VWS: CPT | Mod: 26,,, | Performed by: STUDENT IN AN ORGANIZED HEALTH CARE EDUCATION/TRAINING PROGRAM

## 2025-01-31 PROCEDURE — 72050 X-RAY EXAM NECK SPINE 4/5VWS: CPT | Mod: TC

## 2025-02-05 ENCOUNTER — OFFICE VISIT (OUTPATIENT)
Dept: ORTHOPEDICS | Facility: CLINIC | Age: 35
End: 2025-02-05
Payer: COMMERCIAL

## 2025-02-05 ENCOUNTER — PATIENT MESSAGE (OUTPATIENT)
Dept: OBSTETRICS AND GYNECOLOGY | Facility: CLINIC | Age: 35
End: 2025-02-05
Payer: COMMERCIAL

## 2025-02-05 VITALS — HEIGHT: 64 IN | WEIGHT: 260.13 LBS | BODY MASS INDEX: 44.41 KG/M2

## 2025-02-05 DIAGNOSIS — M54.2 NECK PAIN: Primary | ICD-10-CM

## 2025-02-05 PROCEDURE — 99204 OFFICE O/P NEW MOD 45 MIN: CPT | Mod: S$GLB,,, | Performed by: ORTHOPAEDIC SURGERY

## 2025-02-05 PROCEDURE — 1159F MED LIST DOCD IN RCRD: CPT | Mod: CPTII,S$GLB,, | Performed by: ORTHOPAEDIC SURGERY

## 2025-02-05 PROCEDURE — 1160F RVW MEDS BY RX/DR IN RCRD: CPT | Mod: CPTII,S$GLB,, | Performed by: ORTHOPAEDIC SURGERY

## 2025-02-05 PROCEDURE — 3008F BODY MASS INDEX DOCD: CPT | Mod: CPTII,S$GLB,, | Performed by: ORTHOPAEDIC SURGERY

## 2025-02-05 PROCEDURE — 99999 PR PBB SHADOW E&M-EST. PATIENT-LVL III: CPT | Mod: PBBFAC,,, | Performed by: ORTHOPAEDIC SURGERY

## 2025-02-05 PROCEDURE — 1111F DSCHRG MED/CURRENT MED MERGE: CPT | Mod: CPTII,S$GLB,, | Performed by: ORTHOPAEDIC SURGERY

## 2025-02-05 NOTE — PROGRESS NOTES
DATE: 2025  PATIENT: Krupa Walker    Attending Physician: Amando Madden M.D.    CHIEF COMPLAINT: neck and RUE pain    HISTORY:  Krupa Walker is a 34 y.o. female presents for initial evaluation of neck and right arm pain (Neck - 4, Arm - 3). The pain has been present for a few years at this point. The patient describes the pain as sharp in the right shoulder that radiates down to the finger tips but worsens at the wrist. The pain is worse with breastfeeding and holding her baby up and improved by resting. There is associated numbness and tingling. There is subjective weakness. Prior treatments have included medications, but no CATALINA, surgery, or PT.     She had EMG which showed b/l CTR (R>L).    The Patient denies myelopathic symptoms such as handwriting changes or difficulty with buttons/coins/keys. Denies perineal paresthesias, bowel/bladder dysfunction.    The patient does not smoke, have DM or endorse IVDU. The patient is not on any blood thinners and does not take chronic narcotics. She works as an ICU nurse.    PAST MEDICAL/SURGICAL HISTORY:  Past Medical History:   Diagnosis Date    Anxiety     Depression     GERD (gastroesophageal reflux disease)     Gestational hypertension, third trimester 2019    Hypertension     gestational     Hypoglycemia     Kidney calculi     2016    Migraines     Polycystic ovaries     PONV (postoperative nausea and vomiting)     STD (sexually transmitted disease)     Urinary tract infection      Past Surgical History:   Procedure Laterality Date    ANKLE SURGERY Right      SECTION  10/2014    CYSTOSCOPY W/ URETERAL STENT PLACEMENT Left 10/02/2019    Procedure: CYSTOSCOPY, WITH URETERAL STENT INSERTION - Under ultrasound guidance;  Surgeon: Marsha Guevara MD;  Location: [facility];  Service: Urology;  Laterality: Left;    KIDNEY STONE SURGERY      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)    LASER LITHOTRIPSY  Left 10/16/2019    Procedure: LITHOTRIPSY, USING LASER;  Surgeon: Marsha Guevara MD;  Location: [facility] OR;  Service: Urology;  Laterality: Left;    TONSILLECTOMY      ULTRASOUND GUIDANCE Left 10/16/2019    Procedure: ULTRASOUND GUIDANCE, for laser litho;  Surgeon: Marsha Guevara MD;  Location: [facility] OR;  Service: Urology;  Laterality: Left;    URETEROSCOPIC REMOVAL OF URETERIC CALCULUS Left 10/16/2019    Procedure: REMOVAL, CALCULUS, URETER, URETEROSCOPIC possible retro pyelo, possible lasere litho, possible stent;  Surgeon: Marsha Guevara MD;  Location: [facility] OR;  Service: Urology;  Laterality: Left;  25 WEEKS PREGNANT / FETAL MONITORING BEFORE AND AFTER PROCEDURE/   CALL PELON ELDER [phone]      WISDOM TOOTH EXTRACTION         Current Medications:   Current Outpatient Medications:     HYDROcodone-acetaminophen (NORCO) 5-325 mg per tablet, Take 1 tablet by mouth every 4 (four) hours as needed for Pain., Disp: 10 tablet, Rfl: 0    labetaloL (NORMODYNE) 100 MG tablet, Take 1 tablet (100 mg total) by mouth 2 (two) times daily., Disp: 60 tablet, Rfl: 11    multivit with calcium,iron,min (WOMEN'S DAILY MULTIVITAMIN ORAL), Women's Daily Multivitamin, Disp: , Rfl:     pantoprazole (PROTONIX) 40 MG tablet, Take 1 tablet (40 mg total) by mouth once daily., Disp: 30 tablet, Rfl: 1    Social History:   Social History     Socioeconomic History    Marital status:    Occupational History    Occupation: nurse     Comment: [employer]   Tobacco Use    Smoking status: Never     Passive exposure: Current    Smokeless tobacco: Never   Substance and Sexual Activity    Alcohol use: Not Currently     Comment: 1 drink per month    Drug use: Never    Sexual activity: Yes     Partners: Male     Birth control/protection: Condom     Comment: IUD removed due to complications in July 2018     Social Drivers of Health     Financial Resource Strain: Low Risk  (1/27/2025)    Overall Financial Resource  "Strain (CARDIA)     Difficulty of Paying Living Expenses: Not hard at all   Food Insecurity: No Food Insecurity (1/27/2025)    Hunger Vital Sign     Worried About Running Out of Food in the Last Year: Never true     Ran Out of Food in the Last Year: Never true   Transportation Needs: No Transportation Needs (1/24/2024)    PRAPARE - Transportation     Lack of Transportation (Medical): No     Lack of Transportation (Non-Medical): No   Physical Activity: Insufficiently Active (1/27/2025)    Exercise Vital Sign     Days of Exercise per Week: 2 days     Minutes of Exercise per Session: 30 min   Stress: Stress Concern Present (1/27/2025)    [employer] [place] [employer] - Occupational Stress Questionnaire     Feeling of Stress : Rather much   Housing Stability: Low Risk  (1/24/2024)    Housing Stability Vital Sign     Unable to Pay for Housing in the Last Year: No     Number of Places Lived in the Last Year: 1     Unstable Housing in the Last Year: No       REVIEW OF SYSTEMS:  Constitution: Negative. Negative for chills, fever and night sweats.   Cardiovascular: Negative for chest pain and syncope.   Respiratory: Negative for cough and shortness of breath.   Gastrointestinal: See HPI. Negative for nausea/vomiting. Negative for abdominal pain.  Genitourinary: See HPI. Negative for discoloration or dysuria.  Skin: Negative for dry skin, itching and rash.   Hematologic/Lymphatic: Negative for bleeding/clotting disorders.   Musculoskeletal: Negative for falls and muscle weakness.   Neurological: See HPI. No history of seizures. No history of cranial surgery or shunts.  Endocrine: Negative for polydipsia, polyphagia and polyuria.   Allergic/Immunologic: Negative for hives and persistent infections.  Psychiatric/Behavioral: Negative for depression and insomnia.         EXAM:  Ht 5' 4" (1.626 m)   Wt 118 kg (260 lb 2.3 oz)   LMP 04/28/2024 (Exact Date)   BMI 44.65 kg/m²     General: The patient is a 34 y.o. female " in no apparent distress, the patient is orientatied to person, place and time.  Psych: Normal mood and affect  HEENT: Vision grossly intact, hearing intact to the spoken word.  Lungs: Respirations unlabored.  Gait: Normal station and gait, no difficulty with toe or heel walk.   Skin: Cervical skin negative for rashes, lesions, hairy patches and surgical scars.  Range of motion: Cervical range of motion is acceptable. There is cervical and paraspinal tenderness to palpation.  Spinal Balance: Global saggital and coronal spinal balance acceptable, no significant for scoliosis and kyphosis.  Musculoskeletal: No pain with the range of motion of the bilateral shoulders and elbows. Normal bulk and contour of the bilateral hands.  Vascular: Bilateral hands warm and well perfused, Radial pulses 2+ bilaterally.  Neurological: Normal strength and tone in all major motor groups in the bilateral upper and lower extremities. Normal sensation to light touch in the C5-T1 and L2-S1 dermatomes bilaterally.  Deep tendon reflexes symmetric in the bilateral upper and lower extremities.  Negative Inverted Radial Reflex and Ha's bilaterally. Negative Babinski bilaterally.     IMAGING:   Today I independently reviewed the following images and my interpretations are as follows:    AP, Lat and Flex/Ex  upright C-spine films demonstrate slight loss of cervical lordosis.    MRI cervical showed no significant stenosis.    Body mass index is 44.65 kg/m².  Hemoglobin A1C   Date Value Ref Range Status   06/24/2024 4.7 4.0 - 5.6 % Final     Comment:     ADA Screening Guidelines:  5.7-6.4%  Consistent with prediabetes  >or=6.5%  Consistent with diabetes    High levels of fetal hemoglobin interfere with the HbA1C  assay. Heterozygous hemoglobin variants (HbS, HgC, etc)do  not significantly interfere with this assay.   However, presence of multiple variants may affect accuracy.     01/10/2023 4.9 4.0 - 5.6 % Final     Comment:     ADA Screening  Guidelines:  5.7-6.4%  Consistent with prediabetes  >or=6.5%  Consistent with diabetes    High levels of fetal hemoglobin interfere with the HbA1C  assay. Heterozygous hemoglobin variants (HbS, HgC, etc)do  not significantly interfere with this assay.   However, presence of multiple variants may affect accuracy.     12/04/2019 4.9 4.0 - 5.6 % Final     Comment:     ADA Screening Guidelines:  5.7-6.4%  Consistent with prediabetes  >or=6.5%  Consistent with diabetes  High levels of fetal hemoglobin interfere with the HbA1C  assay. Heterozygous hemoglobin variants (HbS, HgC, etc)do  not significantly interfere with this assay.   However, presence of multiple variants may affect accuracy.         ASSESSMENT/PLAN:  Neck pain  -     Cancel: Ambulatory referral/consult to Pain Clinic; Future; Expected date: 02/12/2025  -     Ambulatory referral/consult to Pain Clinic; Future; Expected date: 02/12/2025      Follow up if symptoms worsen or fail to improve.    Patient has neck pain. MRI is not impressive; no ortho spine surgical intervention warranted. I discussed the natural history of their diagnoses as well as surgical and nonsurgical treatment options. I educated the patient on the importance of core/back strengthening, correct posture, bending/lifting ergonomics, and low-impact aerobic exercises (walking, elliptical, and aquatherapy). Continue medications. Patient will FU with Hand Surgery. Patient will follow up in PRN.    I have provided the patient with a home exercise program. It is the [employer] cervical exercise program. Exercises include walking erectly with neutral head position, supine neutral head position, supine retraction, sitting or standing neck retraction, posture training, forward/backward/sideward isometric strengthening, prone head lifts, supine head lifts, scapular retraction, and neck rotation. Pt will complete each exercise twice daily for 6-8 weeks.    I have personally examined  the patient and agree with the above plan.    Amando Madden MD  Orthopaedic Spine Surgeon  Department of Orthopaedic Surgery  [phone]

## 2025-02-06 NOTE — PROGRESS NOTES
"New Patient Interventional Pain Note (Initial Visit)    Referring Physician: Amando Madden MD    PCP: Madhuri Juarez MD    Chief Complaint:   Chief Complaint   Patient presents with    Neck Pain    Arm Pain     Right         SUBJECTIVE:    Krupa Walker is a 34 y.o. female with past medical history significant for ADHD, generalized anxiety disorder, TMJ hypertension during pregnancy, nephrolithiasis, history of PCOS, recent  2025 who presents to the clinic for the evaluation of neck, shoulder and arm pain.  Patient believes pain began approximately 2 years prior when they were redoing the supply closet at work with frequent overhead activities lasting several days.  Today she reports pain which is intermittent which is rated a 0/10 but at its worse is a 6/10.  Pain is described as a tight ball at the base of the neck, feeling like a contacted muscle" which radiates down the right upper extremity to the hand in C7-8 dermatomal distribution with numbness in the right hand.  Patient also reports prior diagnosis of carpal tunnel syndrome in the right hand.  Pain can be exacerbated with overhead activities.  Of note patient is currently breast-feeding and reports, certain motions with feeding and taking care of her baby can exacerbate her neck, right upper extremity and wrist pain.  Pain is improved with alternating heat and ice, stretching and dry needling and TENS manipulation with a chiropractor.  She does endorse associated weakness in the right upper extremity associated with her pain as well as compromise in hand  strength and dexterity.  Patient has not received prior interventional treatment for neck, shoulder or arm pain.  Patient is abstaining from antispasmodics and membrane stabilizing agents while she is breastfeeding.    Of note patient saw Dr. Madden 2025 with the following evaluation:          Patient reports significant motor weakness and loss of " sensations.  Patient denies night fever/night sweats, urinary incontinence, bowel incontinence, and significant weight loss.      Pain Disability Index Review:         2/10/2025     8:21 AM   Last 3 PDI Scores   Pain Disability Index (PDI) 6       Non-Pharmacologic Treatments:  Physical Therapy/Home Exercise: yes  Ice/Heat:yes  TENS: yes  Acupuncture: no  Massage: yes  Chiropractic: yes    Other: no      Pain Medications:  - Opioids: Ultram (Tramadol HCL) and Norco    Pain Procedures:   None    Past Medical History:   Diagnosis Date    Anxiety     Depression     GERD (gastroesophageal reflux disease)     Gestational hypertension, third trimester 2019    Hypertension     gestational     Hypoglycemia     Kidney calculi     2016    Migraines     Polycystic ovaries     PONV (postoperative nausea and vomiting)     STD (sexually transmitted disease)     Urinary tract infection      Past Surgical History:   Procedure Laterality Date    ANKLE SURGERY Right 2008     SECTION  10/2014    CYSTOSCOPY W/ URETERAL STENT PLACEMENT Left 10/02/2019    Procedure: CYSTOSCOPY, WITH URETERAL STENT INSERTION - Under ultrasound guidance;  Surgeon: Marsha Guevara MD;  Location: [facility];  Service: Urology;  Laterality: Left;    KIDNEY STONE SURGERY      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)    LASER LITHOTRIPSY Left 10/16/2019    Procedure: LITHOTRIPSY, USING LASER;  Surgeon: Marsha Guevara MD;  Location: [facility];  Service: Urology;  Laterality: Left;    TONSILLECTOMY      ULTRASOUND GUIDANCE Left 10/16/2019    Procedure: ULTRASOUND GUIDANCE, for laser litho;  Surgeon: Marsha Guevara MD;  Location: [facility];  Service: Urology;  Laterality: Left;    URETEROSCOPIC REMOVAL OF URETERIC CALCULUS Left 10/16/2019    Procedure: REMOVAL, CALCULUS, URETER, URETEROSCOPIC possible retro pyelo, possible lasere litho, possible stent;  Surgeon: Marsha Guevara MD;  Location:  "[facility] OR;  Service: Urology;  Laterality: Left;  25 WEEKS PREGNANT / FETAL MONITORING BEFORE AND AFTER PROCEDURE/   CALL PELON ELDER [phone]      WISDOM TOOTH EXTRACTION       Review of patient's allergies indicates:  No Known Allergies    Current Outpatient Medications   Medication Sig    HYDROcodone-acetaminophen (NORCO) 5-325 mg per tablet Take 1 tablet by mouth every 4 (four) hours as needed for Pain.    labetaloL (NORMODYNE) 100 MG tablet Take 1 tablet (100 mg total) by mouth 2 (two) times daily.    multivit with calcium,iron,min (WOMEN'S DAILY MULTIVITAMIN ORAL) Women's Daily Multivitamin    pantoprazole (PROTONIX) 40 MG tablet Take 1 tablet (40 mg total) by mouth once daily.     Current Facility-Administered Medications   Medication    methylPREDNISolone acetate injection 40 mg         ROS:  GENERAL:  No weight loss, malaise or fevers.  HEENT:   No recent changes in vision or hearing  NECK:  Negative for lumps, no difficulty with swallowing.  RESPIRATORY:  Negative for cough, wheezing or shortness of breath, patient denies any recent URI.  CARDIOVASCULAR:  Negative for chest pain or palpitations.  GI:  Negative for abdominal discomfort, blood in stools or black stools or change in bowel habits.  MUSCULOSKELETAL:  See HPI.  SKIN:  Negative for lesions, rash, and itching.  PSYCH:  No mood disorder or recent psychosocial stressors.   HEMATOLOGY/LYMPHOLOGY:  Negative for prolonged bleeding, bruising easily or swollen nodes.    NEURO:   No history of syncope, paralysis, seizures or tremors.  All other reviewed and negative other than HPI.    OBJECTIVE:    /78   Pulse 76   Resp 17   Ht 5' 4" (1.626 m)   Wt 106 kg (233 lb 11 oz)   LMP 04/28/2024 (Exact Date)   BMI 40.11 kg/m²       Physical Exam:    GENERAL: Well appearing, in no acute distress, alert and oriented x3.  PSYCH:  Mood and affect appropriate.  SKIN: Skin color, texture, turgor normal, no rashes or lesions.  HEAD/FACE:  Normocephalic, atraumatic. " Cranial nerves grossly intact.    NECK:  pain to palpation over the cervical paraspinous muscles, R >>L. Spurling Negative. No pain with neck flexion, extension, or lateral flexion.   Normaltesting biceps, triceps and brachioradialis bilaterally.    NegativeHoffmann's bilaterally.    5/5 strength testing deltoid, biceps, triceps, wrist extensor, wrist flexor and ulnar intrinsics bilaterally.    Normal  strength bilaterally    CV: RRR with palpation of the radial artery.  PULM: No evidence of respiratory difficulty, symmetric chest rise.  GI:  Soft and non-tender.      NEURO: Bilateral upper and lower extremity coordination and muscle stretch reflexes are physiologic and symmetric. No loss of sensation is noted.  GAIT: normal.    Imagin/08/24    MRI Cervical Spine Without Contrast    Narrative  EXAMINATION:  MRI CERVICAL SPINE WITHOUT CONTRAST    CLINICAL HISTORY:  Neck pain, chronic, degenerative changes on xray;.  Cervicalgia    TECHNIQUE:  Multiplanar, multisequence MR images of the cervical spine were acquired without the administration of contrast.    COMPARISON:  X-ray dated 03/15/2024    FINDINGS:  There are 7 non-rib-bearing cervical vertebrae.  There straightening of the normal cervical lordosis.  Alignment is otherwise unremarkable with no significant listhesis.  No acute fracture or compression deformity.  No aggressive focal signal abnormality.    Visualized posterior fossa is unremarkable.  Craniocervical junction is well maintained.    C2-C3: No significant disc pathology.  No significant spinal canal or neural foraminal stenosis.    C3-C4: No significant disc pathology.  No significant spinal canal or neural foraminal stenosis.    C4-C5: Trace asymmetric to the right disc osteophyte complex.  No significant spinal canal or neural foraminal stenosis.    C5-C6: No significant disc pathology.  No significant spinal canal or neural foraminal stenosis.    C6-C7: No significant disc pathology.   No significant spinal canal or neural foraminal stenosis.    C7-T1: No significant disc pathology.  No significant spinal canal or neural foraminal stenosis.    Visualized soft tissues are unremarkable.    Impression  Straightening of the normal cervical lordosis and trace degenerative changes at C4-C5.  No significant spinal canal or neural foraminal stenosis.        01/31/25    X-Ray Cervical Spine AP Lat with Flex Ex    Narrative  EXAMINATION:  XR CERVICAL SPINE AP LAT WITH FLEX EXTEN    CLINICAL HISTORY:  Cervicalgia    TECHNIQUE:  XR CERVICAL SPINE AP LAT WITH FLEX EXTEN    COMPARISON:  MRI cervical spine from 04/08/2024.  X-ray from 03/15/2024    FINDINGS:  Alignment is unchanged.    No vertebral fractures or collapse noted.    The intervertebral disc spaces are normal in height.    The soft tissues are normal.  Dental amalgam.    EMG 01/20/2023  IMPRESSION  ABNORMAL study  2. There is electrodiagnostic evidence of a mild demyelinating median neuropathy (Carpal tunnel syndrome) across bilateral wrists  3. There is clinical evidence of cervical myofascial pain and mild rotator cuff weakness       ASSESSMENT: 34 y.o. year old female with     1. Cervical radiculitis  EMG W/ ULTRASOUND AND NERVE CONDUCTION TEST 2 Extremities      2. Cervical myofascial pain syndrome  Ambulatory referral/consult to Pain Clinic    EMG W/ ULTRASOUND AND NERVE CONDUCTION TEST 2 Extremities    HME - OTHER      3. Carpal tunnel syndrome on right  EMG W/ ULTRASOUND AND NERVE CONDUCTION TEST 2 Extremities          PLAN:   - Interventions:     Procedure note:   After obtaining consent, explaining risks, benefits & alternatives,    4 cc of .25% marcaine + 40 mg methylprednisolone was injected equally into 3  trigger point muscles along the right cervical paraspinous/trapezius distribution after sterilization, & negative aspiration using a different 5cc syringe & 27 gauge needle. The patient tolerated the procedure well with no adverse  effects & attested to obtaining pain relief.      - Anticoagulation use: No no anticoagulation     report:  Reviewed and consistent with medication use as prescribed.    - Medications:    We have discussed continuing judicious use of Tylenol, not to exceed 3000 mg daily to avoid acute hepatotoxicity.    -patient would like to abstain from antispasmodics or membrane stabilizing agents while breast-feeding.    - Therapy:   We discussed continuing at home physician directed physical therapy to help manage the patient/s painful condition. The patient was counseled that muscle strengthening will improve the long term prognosis in regards to pain and may also help increase range of motion and mobility.     -A TENS unit was provided to the patient and they were instructed on its use by the [employer] ([employer]) representatives. The patient was counseled that this may help treat their myofascial pain through transcutaneous electrical nerve stimulation and excitation via an electric current.  We have discussed that the unit is usually connected to the skin using two or more electrodes, which are typically conductive gel pads.  A typical battery-operated TENS unit is able to modulate pulse width, frequency and intensity to help reduce pain.    - Imaging: Reviewed available imaging(x-ray cervical spine, MRI cervical spine) with patient and answered any questions they had regarding study.    -Referrals:   -PMNR: UE EMG to help guide diagnosis and treatment  -Consider future referral to Orthopedics for carpal tunnel syndrome injection following updated EMG      - Follow up visit: return to clinic in 4-6 weeks      The above plan and management options were discussed at length with patient. Patient is in agreement with the above and verbalized understanding.    - I discussed the goals of interventional chronic pain management with the patient on today's visit. We discussed a multimodal and systematic approach to pain.   This includes diagnostic and therapeutic injections, adjuvant pharmacologic treatment, physical therapy, and at times psychiatry.  I emphasized the importance of regular exercise, core strengthening and stretching, diet and weight loss as a cornerstone of long-term pain management.    - This condition does not require this patient to take time off of work, and the primary goal of our Pain Management services is to improve the patient's functional capacity.  - Patient Questions: Answered all of the patient's questions regarding diagnoses, therapy, treatment and next steps        Jennifer Lacey MD  Interventional Pain Management  [facility]    Disclaimer:  This note was prepared using voice recognition system and is likely to have sound alike errors that may have been overlooked even after proof reading.  Please call me with any questions

## 2025-02-10 ENCOUNTER — TELEPHONE (OUTPATIENT)
Dept: PHYSICAL MEDICINE AND REHAB | Facility: CLINIC | Age: 35
End: 2025-02-10
Payer: COMMERCIAL

## 2025-02-10 ENCOUNTER — OFFICE VISIT (OUTPATIENT)
Dept: PAIN MEDICINE | Facility: CLINIC | Age: 35
End: 2025-02-10
Payer: COMMERCIAL

## 2025-02-10 VITALS
HEART RATE: 76 BPM | WEIGHT: 233.69 LBS | RESPIRATION RATE: 17 BRPM | SYSTOLIC BLOOD PRESSURE: 126 MMHG | DIASTOLIC BLOOD PRESSURE: 78 MMHG | HEIGHT: 64 IN | BODY MASS INDEX: 39.9 KG/M2

## 2025-02-10 DIAGNOSIS — M54.12 CERVICAL RADICULITIS: Primary | ICD-10-CM

## 2025-02-10 DIAGNOSIS — M54.2 NECK PAIN: ICD-10-CM

## 2025-02-10 DIAGNOSIS — M79.18 CERVICAL MYOFASCIAL PAIN SYNDROME: ICD-10-CM

## 2025-02-10 DIAGNOSIS — G56.01 CARPAL TUNNEL SYNDROME ON RIGHT: ICD-10-CM

## 2025-02-10 PROCEDURE — 99204 OFFICE O/P NEW MOD 45 MIN: CPT | Mod: 25,S$GLB,, | Performed by: ANESTHESIOLOGY

## 2025-02-10 PROCEDURE — 3074F SYST BP LT 130 MM HG: CPT | Mod: CPTII,S$GLB,, | Performed by: ANESTHESIOLOGY

## 2025-02-10 PROCEDURE — 1111F DSCHRG MED/CURRENT MED MERGE: CPT | Mod: CPTII,S$GLB,, | Performed by: ANESTHESIOLOGY

## 2025-02-10 PROCEDURE — 1160F RVW MEDS BY RX/DR IN RCRD: CPT | Mod: CPTII,S$GLB,, | Performed by: ANESTHESIOLOGY

## 2025-02-10 PROCEDURE — 99999 PR PBB SHADOW E&M-EST. PATIENT-LVL IV: CPT | Mod: PBBFAC,,, | Performed by: ANESTHESIOLOGY

## 2025-02-10 PROCEDURE — 20553 NJX 1/MLT TRIGGER POINTS 3/>: CPT | Mod: S$GLB,,, | Performed by: ANESTHESIOLOGY

## 2025-02-10 PROCEDURE — 1159F MED LIST DOCD IN RCRD: CPT | Mod: CPTII,S$GLB,, | Performed by: ANESTHESIOLOGY

## 2025-02-10 PROCEDURE — 3078F DIAST BP <80 MM HG: CPT | Mod: CPTII,S$GLB,, | Performed by: ANESTHESIOLOGY

## 2025-02-10 PROCEDURE — 3008F BODY MASS INDEX DOCD: CPT | Mod: CPTII,S$GLB,, | Performed by: ANESTHESIOLOGY

## 2025-02-10 RX ORDER — METHYLPREDNISOLONE ACETATE 40 MG/ML
40 INJECTION, SUSPENSION INTRA-ARTICULAR; INTRALESIONAL; INTRAMUSCULAR; SOFT TISSUE
Status: COMPLETED | OUTPATIENT
Start: 2025-02-10 | End: 2025-02-10

## 2025-02-10 RX ADMIN — METHYLPREDNISOLONE ACETATE 40 MG: 40 INJECTION, SUSPENSION INTRA-ARTICULAR; INTRALESIONAL; INTRAMUSCULAR; SOFT TISSUE at 09:02

## 2025-02-10 NOTE — TELEPHONE ENCOUNTER
----- Message from Michael Lnad sent at 2/10/2025  8:45 AM CST -----  Regarding: emg  Good Morning,    Can you schedule this patient EMG please.     Thank you.  Emery Arriola   Medical

## 2025-02-12 ENCOUNTER — OFFICE VISIT (OUTPATIENT)
Dept: PHYSICAL MEDICINE AND REHAB | Facility: CLINIC | Age: 35
End: 2025-02-12
Payer: COMMERCIAL

## 2025-02-12 VITALS — RESPIRATION RATE: 13 BRPM | HEIGHT: 64 IN | BODY MASS INDEX: 39.9 KG/M2 | WEIGHT: 233.69 LBS

## 2025-02-12 DIAGNOSIS — M79.18 CERVICAL MYOFASCIAL PAIN SYNDROME: ICD-10-CM

## 2025-02-12 DIAGNOSIS — G56.03 BILATERAL CARPAL TUNNEL SYNDROME: Primary | ICD-10-CM

## 2025-02-12 PROCEDURE — 99999 PR PBB SHADOW E&M-EST. PATIENT-LVL III: CPT | Mod: PBBFAC,,, | Performed by: PHYSICAL MEDICINE & REHABILITATION

## 2025-02-12 PROCEDURE — 95912 NRV CNDJ TEST 11-12 STUDIES: CPT | Mod: S$GLB,,, | Performed by: PHYSICAL MEDICINE & REHABILITATION

## 2025-02-12 PROCEDURE — 99499 UNLISTED E&M SERVICE: CPT | Mod: S$GLB,,, | Performed by: PHYSICAL MEDICINE & REHABILITATION

## 2025-02-12 PROCEDURE — 95885 MUSC TST DONE W/NERV TST LIM: CPT | Mod: S$GLB,,, | Performed by: PHYSICAL MEDICINE & REHABILITATION

## 2025-02-12 NOTE — PROGRESS NOTES
[facility]  Department of Physiatry-EMG  [facility]   [address] [place] [place] [address]       Full Name: Krupa Walker YOB: 1990  Patient ID: [id number]      Visit Date: 2/12/2025 09:35  Age: 34 Years  Examining Physician:   Referring Physician:   Conclusion: upper extr  Chief Complaint   Patient presents with    Neck Pain    Muscle Pain       HPI: This is a 34 y.o.  female being seen in clinic today for evaluation of achy pain and tightness in her neck/upper back/arm. She still has right hand weakness and numbness with recent dropping items.  Rest and position change provides some relief. She has tried dry needling with a chiropractor with only short term relief.     History obtained from patient    Past family, medical, social, and surgical history reviewed in chart    Review of Systems:     General- denies lethargy, weight change, fever, chills  Head/neck- denies swallowing difficulties  ENT- denies hearing changes  Cardiovascular-denies chest pain  Pulmonary- denies shortness of breath  GI- denies constipation or bowel incontinence  - denies bladder incontinence  Skin- denies wounds or rashes  Musculoskeletal- + weakness, + pain  Neurologic- + numbness and tingling  Psychiatric- +depression and anxiety  Lymphatic-denies swelling  Endocrine- denies hypoglycemic symptoms/DM history  All other pertinent systems negative     Physical Examination:  General: Well developed, well nourished female, NAD  HEENT:NCAT EOMI bilaterally   Pulmonary:Normal respirations    Spinal Examination: CERVICAL  Active ROM is within normal limits.  Inspection: No deformity of spinal alignment.forward rounded/flexed shoulders, ant placed head  Palpation: very Tight and ttp at right trapezius, rhomboid, levator scapula, teres minor, subscapularis, mild ttp and tight at left trapezius    Musculoskeletal Tests:  Phalen:   Elbow compression (ulnar): neg  Tinels at  wrist: + on right    Bilateral Upper and Lower Extremities:  Pulses are 2+ at radial bilaterally.  Shoulder/Elbow/Wrist/Hand ROM wnl except rotator cuff weakness  Hip/Knee/Ankle ROM   Bilateral Extremities show normal capillary refill.  No signs of cyanosis, rubor, edema, skin changes, or dysvascular changes of appendages.  Nails appear intact.    Neurological Exam:  Cranial Nerves:  II-XII grossly intact    Manual Muscle Testing: (Motor 5=normal)  5/5 strength bilateral upper extremities    No focal atrophy is noted of either upper extremity.    Bilateral Reflexes:  Ha's response is absent bilaterally.    Sensation: tested to light touch  - intact in arms except dec at right fingertips    Gait: Narrow base and good arm swing.      Entire procedure explained to patient prior to proceeding.  Verbal consent obtained    Sensory NCS      Nerve / Sites Rec. Site Onset Lat Peak Lat NP Amp PP Amp Segments Distance Velocity     ms ms µV µV  mm m/s   R Median - Digit II (Antidromic)      Wrist Dig II 3.63 4.31 22.0 23.7 Wrist - Dig  39   L Median - Digit II (Antidromic)      Wrist Dig II 3.06 3.77 13.1 18.6 Wrist - Dig  46   R Ulnar - Digit V (Antidromic)      Wrist Dig V 2.42 3.08 12.8 14.3 Wrist - Dig V 140 58   L Ulnar - Digit V (Antidromic)      Wrist Dig V 2.79 3.31 18.5 18.7 Wrist - Dig V 140 50   R Radial - Anatomical snuff box (Forearm)      Forearm Wrist 1.58 2.02 11.8 23.9 Forearm - Wrist 100 63   L Radial - Anatomical snuff box (Forearm)      Forearm Wrist 1.44 2.17 21.2 16.7 Forearm - Wrist 100 70                   Combined Sensory Index      Nerve / Sites Rec. Site Peak Lat NP Amp PP Amp Segments Peak Diff     ms µV µV  ms   L Median - CSI      Median Thumb 3.13 10.1 6.1 Median - Radial 0.77      Radial Thumb 2.35 11.9 11.5 Median - Ulnar 0.38      Median Ring 3.83 16.3 19.2 Median palm - Ulnar palm       Ulnar Ring 3.46 5.3 17.6        CSI     CSI 1.15         Motor NCS      Nerve / Sites  Muscle Latency Amplitude Amp % Duration Segments Distance Lat Diff Velocity     ms mV % ms  mm ms m/s   R Median - APB      Wrist APB 4.50 7.9 100 6.77 Wrist - APB 80        Elbow APB 7.90 7.9 101 7.17 Elbow - Wrist 200 3.40 59   L Median - APB      Wrist APB 3.58 11.1 100 6.42 Wrist - APB 80        Elbow APB 7.38 7.2 65.4 6.21 Elbow - Wrist 210 3.79 55   R Ulnar - ADM      Wrist ADM 2.48 8.8 100 5.13 Wrist - ADM 80        B.Elbow ADM 5.63 8.2 93 5.04 B.Elbow - Wrist 195 3.15 62      A.Elbow ADM 7.44 8.3 94.6 5.06 A.Elbow - B.Elbow 120 1.81 66   L Ulnar - ADM      Wrist ADM 2.71 8.9 100 5.29 Wrist - ADM 80        B.Elbow ADM 6.44 8.4 94.6 5.35 B.Elbow - Wrist 210 3.73 56      A.Elbow ADM 8.42 8.6 96.5 5.38 A.Elbow - B.Elbow 110 1.98 56               EMG Summary Table     Spontaneous MUAP Recruitment   Muscle Nerve Roots IA Fib PSW Fasc H.F. Amp Dur. PPP Pattern   R. First dorsal interosseous Ulnar C8-T1 N None None None None N N N N   R. Pronator teres Median C6-C7 N None None None None N N N N   R. Deltoid Axillary C5-C6 N None None None None N N N N         INTERPRETATION  -Bilateral median motor nerve conduction study showed prolonged latency on the right, normal amplitude, and conduction velocity  -Bilateral median sensory nerve conduction study showed prolonged peak latency on the right and normal amplitude  -Bilateral ulnar motor nerve conduction study showed normal latency, amplitude, and conduction velocity  -Bilateral ulnar sensory nerve conduction study showed normal peak latency and amplitude  -Bilateral radial sensory nerve conduction study showed normal peak latency and amplitude  -left combined sensory index was significant  -Needle EMG examination performed to above mentioned muscles       IMPRESSION  ABNORMAL study  2. There is electrodiagnostic evidence of a moderate demyelinating median neuropathy (Carpal tunnel syndrome) across the right wrist (progressed since last study) and a mild demyelinating  CTS across the left wrist (improved since last study)  3. There is clinical evidence of cervical myofascial pain and mild rotator cuff weakness    PLAN  Discussed in detail for greater than 30 minutes about diagnosis and treatment plan    1. Follow up with referring provider: Dr. Jennifer Lacey  2. Handouts on CTS, neck/shoulder exercises provided. Rec fu with ortho hand.  Rec focus on posture, neck/shoulder exercises and scapular stabilization focus, dry needle, massage. Pt to consider scheduling appt as regular pt for TPIs.   3. This study is good for one year. If symptoms worsen or do not improve, please re-consult.    Zully Khan M.D.  Physical Medicine and Rehab

## 2025-02-18 ENCOUNTER — LAB VISIT (OUTPATIENT)
Dept: LAB | Facility: HOSPITAL | Age: 35
End: 2025-02-18
Attending: INTERNAL MEDICINE
Payer: COMMERCIAL

## 2025-02-18 DIAGNOSIS — M65.949 TENOSYNOVITIS OF HAND: ICD-10-CM

## 2025-02-18 DIAGNOSIS — M35.9 UNDIFFERENTIATED CONNECTIVE TISSUE DISEASE: ICD-10-CM

## 2025-02-18 DIAGNOSIS — R76.8 ANA POSITIVE: ICD-10-CM

## 2025-02-18 DIAGNOSIS — R76.8 RHEUMATOID FACTOR POSITIVE: ICD-10-CM

## 2025-02-18 LAB
ALBUMIN SERPL BCP-MCNC: 3.8 G/DL (ref 3.5–5.2)
ALP SERPL-CCNC: 107 U/L (ref 40–150)
ALT SERPL W/O P-5'-P-CCNC: 18 U/L (ref 10–44)
ANION GAP SERPL CALC-SCNC: 9 MMOL/L (ref 8–16)
AST SERPL-CCNC: 24 U/L (ref 10–40)
BASOPHILS # BLD AUTO: 0.05 K/UL (ref 0–0.2)
BASOPHILS NFR BLD: 0.7 % (ref 0–1.9)
BILIRUB SERPL-MCNC: 0.4 MG/DL (ref 0.1–1)
BUN SERPL-MCNC: 15 MG/DL (ref 6–20)
CALCIUM SERPL-MCNC: 9.1 MG/DL (ref 8.7–10.5)
CHLORIDE SERPL-SCNC: 104 MMOL/L (ref 95–110)
CO2 SERPL-SCNC: 25 MMOL/L (ref 23–29)
CREAT SERPL-MCNC: 0.8 MG/DL (ref 0.5–1.4)
CREAT UR-MCNC: 138.6 MG/DL (ref 15–325)
CRP SERPL-MCNC: 12.2 MG/L (ref 0–8.2)
DIFFERENTIAL METHOD BLD: ABNORMAL
EOSINOPHIL # BLD AUTO: 0.2 K/UL (ref 0–0.5)
EOSINOPHIL NFR BLD: 2.7 % (ref 0–8)
ERYTHROCYTE [DISTWIDTH] IN BLOOD BY AUTOMATED COUNT: 13.2 % (ref 11.5–14.5)
EST. GFR  (NO RACE VARIABLE): >60 ML/MIN/1.73 M^2
GLUCOSE SERPL-MCNC: 91 MG/DL (ref 70–110)
HCT VFR BLD AUTO: 41.1 % (ref 37–48.5)
HGB BLD-MCNC: 13.1 G/DL (ref 12–16)
IMM GRANULOCYTES # BLD AUTO: 0.01 K/UL (ref 0–0.04)
IMM GRANULOCYTES NFR BLD AUTO: 0.1 % (ref 0–0.5)
LYMPHOCYTES # BLD AUTO: 2.4 K/UL (ref 1–4.8)
LYMPHOCYTES NFR BLD: 33.4 % (ref 18–48)
MCH RBC QN AUTO: 29.6 PG (ref 27–31)
MCHC RBC AUTO-ENTMCNC: 31.9 G/DL (ref 32–36)
MCV RBC AUTO: 93 FL (ref 82–98)
MONOCYTES # BLD AUTO: 0.4 K/UL (ref 0.3–1)
MONOCYTES NFR BLD: 4.9 % (ref 4–15)
NEUTROPHILS # BLD AUTO: 4.2 K/UL (ref 1.8–7.7)
NEUTROPHILS NFR BLD: 58.2 % (ref 38–73)
NRBC BLD-RTO: 0 /100 WBC
PLATELET # BLD AUTO: 360 K/UL (ref 150–450)
PMV BLD AUTO: 10 FL (ref 9.2–12.9)
POTASSIUM SERPL-SCNC: 4.4 MMOL/L (ref 3.5–5.1)
PROT SERPL-MCNC: 7.3 G/DL (ref 6–8.4)
PROT UR-MCNC: 9 MG/DL (ref 0–15)
PROT/CREAT UR: 0.06 MG/G{CREAT} (ref 0–0.2)
RBC # BLD AUTO: 4.42 M/UL (ref 4–5.4)
SODIUM SERPL-SCNC: 138 MMOL/L (ref 136–145)
WBC # BLD AUTO: 7.28 K/UL (ref 3.9–12.7)

## 2025-02-18 PROCEDURE — 82570 ASSAY OF URINE CREATININE: CPT | Performed by: INTERNAL MEDICINE

## 2025-02-18 PROCEDURE — 85025 COMPLETE CBC W/AUTO DIFF WBC: CPT | Performed by: INTERNAL MEDICINE

## 2025-02-18 PROCEDURE — 36415 COLL VENOUS BLD VENIPUNCTURE: CPT | Mod: PN | Performed by: INTERNAL MEDICINE

## 2025-02-18 PROCEDURE — 80053 COMPREHEN METABOLIC PANEL: CPT | Performed by: INTERNAL MEDICINE

## 2025-02-18 PROCEDURE — 86140 C-REACTIVE PROTEIN: CPT | Performed by: INTERNAL MEDICINE

## 2025-02-18 PROCEDURE — 86225 DNA ANTIBODY NATIVE: CPT | Performed by: INTERNAL MEDICINE

## 2025-02-19 LAB — DSDNA AB SER-ACNC: NORMAL [IU]/ML

## 2025-02-20 DIAGNOSIS — M79.641 BILATERAL HAND PAIN: Primary | ICD-10-CM

## 2025-02-20 DIAGNOSIS — M79.642 BILATERAL HAND PAIN: Primary | ICD-10-CM

## 2025-02-21 ENCOUNTER — OFFICE VISIT (OUTPATIENT)
Dept: ORTHOPEDICS | Facility: CLINIC | Age: 35
End: 2025-02-21
Payer: COMMERCIAL

## 2025-02-21 ENCOUNTER — HOSPITAL ENCOUNTER (OUTPATIENT)
Dept: RADIOLOGY | Facility: HOSPITAL | Age: 35
Discharge: HOME | End: 2025-02-21
Attending: STUDENT IN AN ORGANIZED HEALTH CARE EDUCATION/TRAINING PROGRAM
Payer: COMMERCIAL

## 2025-02-21 DIAGNOSIS — G56.03 BILATERAL CARPAL TUNNEL SYNDROME: Primary | ICD-10-CM

## 2025-02-21 DIAGNOSIS — M79.641 BILATERAL HAND PAIN: ICD-10-CM

## 2025-02-21 DIAGNOSIS — M79.642 BILATERAL HAND PAIN: ICD-10-CM

## 2025-02-21 PROCEDURE — 73130 X-RAY EXAM OF HAND: CPT | Mod: TC,50

## 2025-02-21 PROCEDURE — 73130 X-RAY EXAM OF HAND: CPT | Mod: 26,,, | Performed by: RADIOLOGY

## 2025-02-21 RX ORDER — BETAMETHASONE SODIUM PHOSPHATE AND BETAMETHASONE ACETATE 3; 3 MG/ML; MG/ML
6 INJECTION, SUSPENSION INTRA-ARTICULAR; INTRALESIONAL; INTRAMUSCULAR; SOFT TISSUE
Status: DISCONTINUED | OUTPATIENT
Start: 2025-02-21 | End: 2025-02-21 | Stop reason: HOSPADM

## 2025-02-21 RX ADMIN — BETAMETHASONE SODIUM PHOSPHATE AND BETAMETHASONE ACETATE 6 MG: 3; 3 INJECTION, SUSPENSION INTRA-ARTICULAR; INTRALESIONAL; INTRAMUSCULAR; SOFT TISSUE at 03:02

## 2025-02-21 NOTE — PROCEDURES
Carpal Tunnel: L carpal tunnel    Date/Time: 2/21/2025 3:00 PM    Performed by: Analy Landeros MD  Authorized by: Analy Landeros MD    Consent Done?:  Yes (Verbal)  Indications:  Pain and diagnostic evaluation  Timeout: prior to procedure the correct patient, procedure, and site was verified    Local anesthesia used?: Yes    Anesthesia:  Local infiltration  Local anesthetic:  Lidocaine 1% without epinephrine  Anesthetic total (ml):  1    Location:  Wrist  Site:  L carpal tunnel  Ultrasonic Guidance for Needle Placement?: No    Needle size:  25 G  Approach:  Volar  Medications:  6 mg betamethasone acetate-betamethasone sodium phosphate 6 mg/mL  Patient tolerance:  Patient tolerated the procedure well with no immediate complications

## 2025-02-21 NOTE — PROCEDURES
Carpal Tunnel: R carpal tunnel    Date/Time: 2/21/2025 3:00 PM    Performed by: Analy Landeros MD  Authorized by: Analy Landeros MD    Consent Done?:  Yes (Verbal)  Indications:  Pain and diagnostic evaluation  Timeout: prior to procedure the correct patient, procedure, and site was verified    Local anesthesia used?: Yes    Anesthesia:  Local infiltration  Local anesthetic:  Lidocaine 1% without epinephrine  Anesthetic total (ml):  1    Location:  Wrist  Site:  R carpal tunnel  Ultrasonic Guidance for Needle Placement?: No    Needle size:  25 G  Approach:  Volar  Medications:  6 mg betamethasone acetate-betamethasone sodium phosphate 6 mg/mL  Patient tolerance:  Patient tolerated the procedure well with no immediate complications

## 2025-02-21 NOTE — PROGRESS NOTES
Hand Surgery Clinic Note    Chief Complaint  Chief Complaint   Patient presents with    Left Hand - Pain, Numbness    Right Hand - Pain, Numbness       History of Present Illness  34-year-old female presents for follow up.  She was last seen 2023.  She was diagnosed with right radial tunnel and right carpal tunnel.  She received injections at that time which significantly relieved her symptoms.  She has not had recurrence of the radial tunnel symptoms.  She started to get pain and numbness in both hands within the last few months.  This was particularly the case while she is pregnant but did not resolve after giving birth.  She is currently breastfeeding.  She had a baby on 2025.  Pain level is a 6/10.  She has numbness which is worse in the right-hand.  In particular, the right thumb, index, and middle fingers feel numb.  Pain occurs at night as well as intermittently during the daytime.    Review of Systems  Review of systems negative for chest pain, shortness of breath, fevers, chills, nausea/vomiting.    Past Medical History  Past Medical History:   Diagnosis Date    Anxiety     Depression     GERD (gastroesophageal reflux disease)     Gestational hypertension, third trimester 2019    Hypertension     gestational     Hypoglycemia     Kidney calculi     2016    Migraines     Polycystic ovaries     PONV (postoperative nausea and vomiting)     STD (sexually transmitted disease)     Urinary tract infection        Past Surgical History  Past Surgical History:   Procedure Laterality Date    ANKLE SURGERY Right      SECTION  10/2014    CYSTOSCOPY W/ URETERAL STENT PLACEMENT Left 10/02/2019    Procedure: CYSTOSCOPY, WITH URETERAL STENT INSERTION - Under ultrasound guidance;  Surgeon: Marsha Guevara MD;  Location: [facility];  Service: Urology;  Laterality: Left;    KIDNEY STONE SURGERY      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)    LASER  LITHOTRIPSY Left 10/16/2019    Procedure: LITHOTRIPSY, USING LASER;  Surgeon: Marsha Guevara MD;  Location: [facility];  Service: Urology;  Laterality: Left;    TONSILLECTOMY      ULTRASOUND GUIDANCE Left 10/16/2019    Procedure: ULTRASOUND GUIDANCE, for laser litho;  Surgeon: Marsha Guevara MD;  Location: [facility] OR;  Service: Urology;  Laterality: Left;    URETEROSCOPIC REMOVAL OF URETERIC CALCULUS Left 10/16/2019    Procedure: REMOVAL, CALCULUS, URETER, URETEROSCOPIC possible retro pyelo, possible lasere litho, possible stent;  Surgeon: Marsha Geuvara MD;  Location: [facility] OR;  Service: Urology;  Laterality: Left;  25 WEEKS PREGNANT / FETAL MONITORING BEFORE AND AFTER PROCEDURE/   CALL PELON ELDER [phone]      WISDOM TOOTH EXTRACTION         Allergies  Review of patient's allergies indicates:  No Known Allergies    Family History  Family History   Problem Relation Name Age of Onset    Hypertension Mother Luann     Hypertension Father Theodore     Heart disease Father Theodore     Depression Father Theodore     Hyperlipidemia Father Theodore     No Known Problems Sister      Nephrolithiasis Daughter      No Known Problems Sister      No Known Problems Sister      Breast cancer Paternal Grandmother Haley         50s at diagnosis    Cancer Paternal Grandmother Haley         breast    Hypertension Paternal Grandmother Haley     Heart disease Paternal Grandfather Dileep     Hyperlipidemia Paternal Grandfather Dileep     Hypertension Paternal Grandfather Dileep     Hyperlipidemia Maternal Grandfather Tara     Hypertension Maternal Grandmother Tara     Colon cancer Neg Hx      Ovarian cancer Neg Hx         Social History  Social History[1]    Vital Signs  There were no vitals filed for this visit.    Physical Exam  Constitutional: Appears well-developed and well-nourished. No distress.   HENT:   Head: Normocephalic.   Eyes: EOM are normal.   Pulmonary/Chest: Effort normal.   Neurological: Oriented to person,  place, and time.   Psychiatric: Normal mood and affect.     Right Upper Extremity:  No abrasions, lacerations, wounds.  No swelling.  No erythema.  Full active wrist flexion and extension.  Full pronation and supination. Positive Tinel's in the median nerve distribution at the wrist.  Positive Phalen's.  Negative Tinel's in the ulnar nerve distribution at the elbow.  No ulnar nerve subluxation with elbow flexion.  Negative elbow flexion test. No thenar or FDI atrophy.  5/5 apb strength.  5/5 FDI strength.  Two-point discrimination is 5 mm in all 5 fingers.  Patient is able to make a fist and extend all her fingers.  No tenderness over the A1 pulleys of all 5 fingers.  No triggering with range of motion.  Palpable radial pulse.    Left Upper Extremity:  No abrasions, lacerations, wounds.  No swelling.  No erythema.  Full active wrist flexion and extension.  Full pronation and supination. Positive Tinel's in the median nerve distribution at the wrist.  Positive Phalen's.  Negative Tinel's in the ulnar nerve distribution at the elbow.  No ulnar nerve subluxation with elbow flexion.  Negative elbow flexion test. No thenar or FDI atrophy.  5/5 apb strength.  5/5 FDI strength.  Two-point discrimination is 5 mm in all 5 fingers.  Patient is able to make a fist and extend all her fingers.  No tenderness over the A1 pulleys of all 5 fingers.  No triggering with range of motion.  Palpable radial pulse.      Imaging  Bilateral hand x-rays three views were obtained today and independently reviewed by myself.  No arthritic changes are noted throughout the carpus, MCP, and IP joints.  No fracture.  No dislocation or subluxation.  No foreign body.     Assessment and Plan  34-year-old female presents with bilateral carpal tunnel syndrome.  She previously received a right carpal tunnel steroid injection in November of 2023, 1 year and 3 months ago.    A discussion was had with the patient about carpal tunnel syndrome.  It was noted  that the primary mechanism of etiology is probably a genetic one.  Signs and symptoms of median nerve compression at the carpal tunnel was reviewed and a wide variety of treatment options were discussed.  Treatment options were noted to include use of splint at night, use of non-steroid anti-inflammatory medication, steroid injection, and surgery.  It was noted that electrodiagnostic (nerve conduction velocity and electromyograph) are often done to assess severity of carpal tunnel syndrome, but that the results of these tests are subject to a wide range of variables and must only be considered within the context of the clinical presentation (signs and symptoms).  Electrodiagnostic studies, however, are valuable to help identify any other possible sources of nerve pathology that could account for some or all the clinical presentation.  It was noted that steroid injections have excellent diagnostic value and often are therapeutic as well, although the duration of therapeutic improvement from any given injection is uncertain.  It was also noted that surgery has the best outcomes with respect to degree of improvement and duration of improvement, and surgery also can also prevent the development of irreversible thenar muscle atrophy due to median nerve compression that has been present for a long time.  Surgery can also prevent the worsening of thenar atrophy but generally does not cause it to improve.   It was noted that if non-surgical treatment wears off, the patient should make a follow-up appointment to be re-evaluated.    After discussion of treatment options, patient elected to proceed with steroid injections bilaterally.  She tolerated bilateral carpal tunnel injections well.  See procedure note.  Discussed that she should give the injections 2 weeks to work.  Follow up in clinic as needed if symptoms recur or do not resolve.    Analy Landeros MD  Orthopaedic Hand Surgery         [1]   Social  History  Socioeconomic History    Marital status:    Occupational History    Occupation: nurse     Comment: ICU- [employer]   Tobacco Use    Smoking status: Never     Passive exposure: Current    Smokeless tobacco: Never   Substance and Sexual Activity    Alcohol use: Not Currently     Comment: 1 drink per month    Drug use: Never    Sexual activity: Yes     Partners: Male     Birth control/protection: Condom     Comment: IUD removed due to complications in July 2018     Social Drivers of Health     Financial Resource Strain: Low Risk  (1/27/2025)    Overall Financial Resource Strain (CARDIA)     Difficulty of Paying Living Expenses: Not hard at all   Food Insecurity: No Food Insecurity (1/27/2025)    Hunger Vital Sign     Worried About Running Out of Food in the Last Year: Never true     Ran Out of Food in the Last Year: Never true   Transportation Needs: No Transportation Needs (1/24/2024)    PRAPARE - Transportation     Lack of Transportation (Medical): No     Lack of Transportation (Non-Medical): No   Physical Activity: Insufficiently Active (1/27/2025)    Exercise Vital Sign     Days of Exercise per Week: 2 days     Minutes of Exercise per Session: 30 min   Stress: Stress Concern Present (1/27/2025)    [employer] - Occupational Stress Questionnaire     Feeling of Stress : Rather much   Housing Stability: Low Risk  (1/24/2024)    Housing Stability Vital Sign     Unable to Pay for Housing in the Last Year: No     Number of Places Lived in the Last Year: 1     Unstable Housing in the Last Year: No

## 2025-02-25 ENCOUNTER — OFFICE VISIT (OUTPATIENT)
Dept: RHEUMATOLOGY | Facility: CLINIC | Age: 35
End: 2025-02-25
Payer: COMMERCIAL

## 2025-02-25 ENCOUNTER — PATIENT MESSAGE (OUTPATIENT)
Dept: RHEUMATOLOGY | Facility: CLINIC | Age: 35
End: 2025-02-25
Payer: COMMERCIAL

## 2025-02-25 VITALS
HEART RATE: 80 BPM | DIASTOLIC BLOOD PRESSURE: 88 MMHG | HEIGHT: 64 IN | WEIGHT: 231.94 LBS | BODY MASS INDEX: 39.6 KG/M2 | SYSTOLIC BLOOD PRESSURE: 134 MMHG

## 2025-02-25 DIAGNOSIS — G56.03 BILATERAL CARPAL TUNNEL SYNDROME: ICD-10-CM

## 2025-02-25 DIAGNOSIS — Z71.89 COUNSELING ON HEALTH PROMOTION AND DISEASE PREVENTION: ICD-10-CM

## 2025-02-25 DIAGNOSIS — M26.629 TMJ SYNDROME: ICD-10-CM

## 2025-02-25 DIAGNOSIS — O10.919 CHRONIC HYPERTENSION AFFECTING PREGNANCY: ICD-10-CM

## 2025-02-25 DIAGNOSIS — R76.8 RHEUMATOID FACTOR POSITIVE: ICD-10-CM

## 2025-02-25 DIAGNOSIS — L65.9 ALOPECIA: ICD-10-CM

## 2025-02-25 DIAGNOSIS — R76.8 ANA POSITIVE: ICD-10-CM

## 2025-02-25 DIAGNOSIS — M35.9 UNDIFFERENTIATED CONNECTIVE TISSUE DISEASE: Primary | ICD-10-CM

## 2025-02-25 PROCEDURE — 3079F DIAST BP 80-89 MM HG: CPT | Mod: CPTII,S$GLB,, | Performed by: INTERNAL MEDICINE

## 2025-02-25 PROCEDURE — 99999 PR PBB SHADOW E&M-EST. PATIENT-LVL III: CPT | Mod: PBBFAC,,, | Performed by: INTERNAL MEDICINE

## 2025-02-25 PROCEDURE — 1111F DSCHRG MED/CURRENT MED MERGE: CPT | Mod: CPTII,S$GLB,, | Performed by: INTERNAL MEDICINE

## 2025-02-25 PROCEDURE — 99215 OFFICE O/P EST HI 40 MIN: CPT | Mod: S$GLB,,, | Performed by: INTERNAL MEDICINE

## 2025-02-25 PROCEDURE — G2211 COMPLEX E/M VISIT ADD ON: HCPCS | Mod: S$GLB,,, | Performed by: INTERNAL MEDICINE

## 2025-02-25 PROCEDURE — 3075F SYST BP GE 130 - 139MM HG: CPT | Mod: CPTII,S$GLB,, | Performed by: INTERNAL MEDICINE

## 2025-02-25 PROCEDURE — 3008F BODY MASS INDEX DOCD: CPT | Mod: CPTII,S$GLB,, | Performed by: INTERNAL MEDICINE

## 2025-02-25 PROCEDURE — 1159F MED LIST DOCD IN RCRD: CPT | Mod: CPTII,S$GLB,, | Performed by: INTERNAL MEDICINE

## 2025-02-25 NOTE — PROGRESS NOTES
RHEUMATOLOGY OUTPATIENT CLINIC NOTE    2025    Attending Rheumatologist: Mike Ventura  Primary Care Provider/Physician Requesting Consultation: Madhuri Juarez MD   Chief Complaint/Reason For Consultation:  Undifferentiated connective tissue disease and Positive SUDHA      Subjective:     Krupa Walker is a 34 y.o. White female with abnormal serologies and palindromic pain    Self limited arthralgias.  Neuropathic features of hand pain, s/p recent b/l CSI for CTS.    Review of Systems   Constitutional:  Negative for fever.   Cardiovascular:  Negative for chest pain.   Gastrointestinal:  Negative for blood in stool and melena.   Genitourinary:  Negative for dysuria, hematuria and urgency.   Musculoskeletal:  Positive for joint pain.   Skin:  Negative for rash.   Neurological:  Negative for focal weakness.       Chronic comorbid conditions affecting medical decision making today:  Past Medical History:   Diagnosis Date    Anxiety     Depression     GERD (gastroesophageal reflux disease)     Gestational hypertension, third trimester 2019    Hypertension     gestational     Hypoglycemia     Kidney calculi     2016    Migraines     Polycystic ovaries     PONV (postoperative nausea and vomiting)     STD (sexually transmitted disease)     Urinary tract infection      Past Surgical History:   Procedure Laterality Date    ANKLE SURGERY Right 2008     SECTION  10/2014    CYSTOSCOPY W/ URETERAL STENT PLACEMENT Left 10/02/2019    Procedure: CYSTOSCOPY, WITH URETERAL STENT INSERTION - Under ultrasound guidance;  Surgeon: Marsha Guevara MD;  Location: [facility];  Service: Urology;  Laterality: Left;    KIDNEY STONE SURGERY      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)    LASER LITHOTRIPSY Left 10/16/2019    Procedure: LITHOTRIPSY, USING LASER;  Surgeon: Marsha Guevara MD;  Location: [facility];  Service: Urology;  Laterality: Left;     TONSILLECTOMY      ULTRASOUND GUIDANCE Left 10/16/2019    Procedure: ULTRASOUND GUIDANCE, for laser litho;  Surgeon: Marsha Guevara MD;  Location: [facility];  Service: Urology;  Laterality: Left;    URETEROSCOPIC REMOVAL OF URETERIC CALCULUS Left 10/16/2019    Procedure: REMOVAL, CALCULUS, URETER, URETEROSCOPIC possible retro pyelo, possible lasere litho, possible stent;  Surgeon: Marsha Guevara MD;  Location: [facility] OR;  Service: Urology;  Laterality: Left;  25 WEEKS PREGNANT / FETAL MONITORING BEFORE AND AFTER PROCEDURE/   CALL PELON ELDER [phone]      WISDOM TOOTH EXTRACTION       Family History   Problem Relation Name Age of Onset    Hypertension Mother Luann     Hypertension Father Theodore     Heart disease Father Theodore     Depression Father Theodore     Hyperlipidemia Father Theodore     No Known Problems Sister      Nephrolithiasis Daughter      No Known Problems Sister      No Known Problems Sister      Breast cancer Paternal Grandmother Haley         50s at diagnosis    Cancer Paternal Grandmother Haley         breast    Hypertension Paternal Grandmother Haley     Heart disease Paternal Grandfather Dileep     Hyperlipidemia Paternal Grandfather Dileep     Hypertension Paternal Grandfather Dileep     Hyperlipidemia Maternal Grandfather Tara     Hypertension Maternal Grandmother Tara     Colon cancer Neg Hx      Ovarian cancer Neg Hx       Tobacco Use History[1]  Current Medications[2]     Objective:     Vitals:    02/25/25 1045   BP: 134/88   Pulse: 80     Physical Exam   Eyes: Conjunctivae are normal.   Pulmonary/Chest: Effort normal.   Neurological: She displays no weakness.   Skin: There is erythema (malar erythema).       Reviewed available old and all outside pertinent medical records available.    All lab results personally reviewed and interpreted by me.       ASSESSMENT / PLAN     1. Undifferentiated connective tissue disease  Intertrigo while on therapeutic trial of HCQ.  Currently not  amenable for re-challenge w/ DMARD, or alternative (offered SSZ for up to 4m)      2. SUDHA positive  AVISE-CTD      3. Rheumatoid factor positive  Negative MRI previously reassuring.      4. Bilateral carpal tunnel syndrome        5. Alopecia        6. TMJ syndrome        7. Chronic hypertension affecting pregnancy        8. Counseling on health promotion and disease prevention                Visit today included increased complexity associated with the care of the episodic problem Undifferentiated connective tissue disease addressed and managing the longitudinal care of the patient due to the serious and/or complex managed problem(s) SUDHA positive, Rheumatoid factor positive.    Mike Ventura M.D.         [1]   Social History  Tobacco Use   Smoking Status Never    Passive exposure: Current   Smokeless Tobacco Never   [2]   Current Outpatient Medications:     HYDROcodone-acetaminophen (NORCO) 5-325 mg per tablet, Take 1 tablet by mouth every 4 (four) hours as needed for Pain., Disp: 10 tablet, Rfl: 0    labetaloL (NORMODYNE) 100 MG tablet, Take 1 tablet (100 mg total) by mouth 2 (two) times daily., Disp: 60 tablet, Rfl: 11    multivit with calcium,iron,min (WOMEN'S DAILY MULTIVITAMIN ORAL), Women's Daily Multivitamin, Disp: , Rfl:     pantoprazole (PROTONIX) 40 MG tablet, Take 1 tablet (40 mg total) by mouth once daily., Disp: 30 tablet, Rfl: 1

## 2025-02-26 ENCOUNTER — PATIENT MESSAGE (OUTPATIENT)
Dept: OBSTETRICS AND GYNECOLOGY | Facility: CLINIC | Age: 35
End: 2025-02-26

## 2025-02-26 ENCOUNTER — POSTPARTUM VISIT (OUTPATIENT)
Dept: OBSTETRICS AND GYNECOLOGY | Facility: CLINIC | Age: 35
End: 2025-02-26
Payer: COMMERCIAL

## 2025-02-26 VITALS — BODY MASS INDEX: 39.73 KG/M2 | WEIGHT: 231.5 LBS | SYSTOLIC BLOOD PRESSURE: 131 MMHG | DIASTOLIC BLOOD PRESSURE: 78 MMHG

## 2025-02-26 DIAGNOSIS — I10 CHRONIC HYPERTENSION: ICD-10-CM

## 2025-02-26 PROBLEM — O99.213 OBESITY DURING PREGNANCY IN THIRD TRIMESTER: Status: RESOLVED | Noted: 2020-07-02 | Resolved: 2025-02-26

## 2025-02-26 PROBLEM — O34.219 HX OF CESAREAN SECTION COMPLICATING PREGNANCY: Status: RESOLVED | Noted: 2024-07-31 | Resolved: 2025-02-26

## 2025-02-26 PROBLEM — O09.93 HIGH-RISK PREGNANCY IN THIRD TRIMESTER: Status: RESOLVED | Noted: 2019-09-12 | Resolved: 2025-02-26

## 2025-02-26 PROBLEM — O34.219 VBAC (VAGINAL BIRTH AFTER CESAREAN): Status: RESOLVED | Noted: 2025-01-27 | Resolved: 2025-02-26

## 2025-02-26 PROCEDURE — 0503F POSTPARTUM CARE VISIT: CPT | Mod: CPTII,S$GLB,, | Performed by: ADVANCED PRACTICE MIDWIFE

## 2025-02-26 PROCEDURE — 99999 PR PBB SHADOW E&M-EST. PATIENT-LVL III: CPT | Mod: PBBFAC,,, | Performed by: ADVANCED PRACTICE MIDWIFE

## 2025-02-26 NOTE — PROGRESS NOTES
Subjective:       Krupa Walker is a 34 y.o. female who presents for a postpartum visit. She is 4 weeks postpartum following a spontaneous vaginal delivery. I have fully reviewed the prenatal and intrapartum course. The delivery was at 38.1 gestational weeks. Outcome: vaginal birth after  (). Anesthesia: epidural. Postpartum course has been uncomplicated. Baby's course has been complicated by lip tie. Planning to meet with feeding team.. Baby is feeding by breast. Bleeding no bleeding. Bowel function is normal. Bladder function is normal. Patient is sexually active. Contraception method is none. Postpartum depression screening: negative. Pt did report that her labor was very long, the baby was malpositioned for a good bit of the laboring process. She was not allowed in the tub when she really needed it. She is glad for a healthy delivery but just wished the process went differently.     The following portions of the patient's history were reviewed and updated as appropriate: allergies, current medications, past family history, past medical history, past social history, past surgical history, and problem list.    Review of Systems  Pertinent items are noted in HPI.     Objective:      /78 (Patient Position: Sitting)   Wt 105 kg (231 lb 7.7 oz)   LMP 2024 (Exact Date)   Breastfeeding Yes   BMI 39.73 kg/m²    General:  alert, appears stated age, and cooperative               Abdomen: normal findings: soft, non-tender                                  Assessment:      Routine postpartum exam. Pap smear not done at today's visit.     Plan:      1. Contraception: none  2. Last pap smear 23 WNL, next due 26  3. Follow up in: 1  year for annual exam  or as needed.

## 2025-03-14 ENCOUNTER — OFFICE VISIT (OUTPATIENT)
Dept: INTERNAL MEDICINE | Facility: CLINIC | Age: 35
End: 2025-03-14
Payer: COMMERCIAL

## 2025-03-14 ENCOUNTER — LAB VISIT (OUTPATIENT)
Dept: LAB | Facility: HOSPITAL | Age: 35
End: 2025-03-14
Attending: FAMILY MEDICINE
Payer: COMMERCIAL

## 2025-03-14 VITALS
TEMPERATURE: 98 F | SYSTOLIC BLOOD PRESSURE: 120 MMHG | HEART RATE: 72 BPM | WEIGHT: 233 LBS | OXYGEN SATURATION: 98 % | BODY MASS INDEX: 39.78 KG/M2 | DIASTOLIC BLOOD PRESSURE: 80 MMHG | HEIGHT: 64 IN

## 2025-03-14 DIAGNOSIS — Z00.00 ROUTINE GENERAL MEDICAL EXAMINATION AT A HEALTH CARE FACILITY: Primary | ICD-10-CM

## 2025-03-14 DIAGNOSIS — E66.813 CLASS 3 SEVERE OBESITY DUE TO EXCESS CALORIES WITHOUT SERIOUS COMORBIDITY WITH BODY MASS INDEX (BMI) OF 40.0 TO 44.9 IN ADULT: ICD-10-CM

## 2025-03-14 DIAGNOSIS — E66.01 CLASS 3 SEVERE OBESITY DUE TO EXCESS CALORIES WITHOUT SERIOUS COMORBIDITY WITH BODY MASS INDEX (BMI) OF 40.0 TO 44.9 IN ADULT: ICD-10-CM

## 2025-03-14 DIAGNOSIS — G43.109 MIGRAINE WITH AURA AND WITHOUT STATUS MIGRAINOSUS, NOT INTRACTABLE: Chronic | ICD-10-CM

## 2025-03-14 DIAGNOSIS — F41.1 GAD (GENERALIZED ANXIETY DISORDER): Chronic | ICD-10-CM

## 2025-03-14 DIAGNOSIS — J30.9 ALLERGIC RHINITIS, UNSPECIFIED SEASONALITY, UNSPECIFIED TRIGGER: Chronic | ICD-10-CM

## 2025-03-14 DIAGNOSIS — G47.00 INSOMNIA, UNSPECIFIED TYPE: Chronic | ICD-10-CM

## 2025-03-14 DIAGNOSIS — Z00.00 ROUTINE GENERAL MEDICAL EXAMINATION AT A HEALTH CARE FACILITY: ICD-10-CM

## 2025-03-14 LAB
CHOLEST SERPL-MCNC: 233 MG/DL (ref 120–199)
CHOLEST/HDLC SERPL: 2.6 {RATIO} (ref 2–5)
ESTIMATED AVG GLUCOSE: 91 MG/DL (ref 68–131)
HBA1C MFR BLD: 4.8 % (ref 4–5.6)
HDLC SERPL-MCNC: 89 MG/DL (ref 40–75)
HDLC SERPL: 38.2 % (ref 20–50)
LDLC SERPL CALC-MCNC: 134 MG/DL (ref 63–159)
NONHDLC SERPL-MCNC: 144 MG/DL
T4 FREE SERPL-MCNC: 0.84 NG/DL (ref 0.71–1.51)
TRIGL SERPL-MCNC: 50 MG/DL (ref 30–150)
TSH SERPL DL<=0.005 MIU/L-ACNC: 2.4 UIU/ML (ref 0.4–4)

## 2025-03-14 PROCEDURE — 36415 COLL VENOUS BLD VENIPUNCTURE: CPT | Mod: PO | Performed by: FAMILY MEDICINE

## 2025-03-14 PROCEDURE — 80061 LIPID PANEL: CPT | Performed by: FAMILY MEDICINE

## 2025-03-14 PROCEDURE — 84443 ASSAY THYROID STIM HORMONE: CPT | Performed by: FAMILY MEDICINE

## 2025-03-14 PROCEDURE — 99999 PR PBB SHADOW E&M-EST. PATIENT-LVL III: CPT | Mod: PBBFAC,,, | Performed by: FAMILY MEDICINE

## 2025-03-14 PROCEDURE — 84439 ASSAY OF FREE THYROXINE: CPT | Performed by: FAMILY MEDICINE

## 2025-03-14 PROCEDURE — 83036 HEMOGLOBIN GLYCOSYLATED A1C: CPT | Performed by: FAMILY MEDICINE

## 2025-03-14 RX ORDER — BUSPIRONE HYDROCHLORIDE 10 MG/1
10 TABLET ORAL 3 TIMES DAILY PRN
Qty: 30 TABLET | Refills: 6 | Status: SHIPPED | OUTPATIENT
Start: 2025-03-14

## 2025-03-14 RX ORDER — AMOXICILLIN 500 MG/1
CAPSULE ORAL
COMMUNITY
Start: 2025-02-27

## 2025-03-14 RX ORDER — TRAZODONE HYDROCHLORIDE 50 MG/1
50 TABLET ORAL NIGHTLY PRN
Qty: 30 TABLET | Refills: 6 | Status: SHIPPED | OUTPATIENT
Start: 2025-03-14

## 2025-03-14 NOTE — PROGRESS NOTES
Subjective     Patient ID: Krupa Walker is a 34 y.o. female.    Chief Complaint: Annual Exam    History of Present Illness    Krupa presents for annual visit with her .      Krupa reports sleep difficulties, specifically trouble falling asleep. She has had a few migraines since delivery, with one episode occurring about 2 weeks ago. During this episode, she took sumatriptan 2-3 times over 3 days as the migraine would recur. She discusses anxiety, for which she has an old prescription of buspirone from her previous OB. She reports using it less than daily, but feels she should have taken it the previous day. For sleep issues, she admits to taking a small amount of Children's Benadryl one night. She mentions post-nasal drip, describing it as persistent in the back of her throat and requiring constant water intake to manage. She uses Flonase for this issue but states it is ineffective.    She denies needing more sumatriptan at this time. She denies any current swelling.    MEDICATIONS:  Krupa is on prenatal vitamins and an extra magnesium supplement. She is taking Sumatriptan as needed for migraines and Buspirone 10 mg up to 3 times daily as needed for anxiety. She is also using Flonase nasal spray for nasal congestion.    MEDICAL HISTORY:  Krupa has a history of migraines and anxiety. She also has a history of kidney stones, with one kidney stone that was being monitored. Krupa is currently pregnant. She has a positive lactation history.    TEST RESULTS:  Krupa's past CBC showed MCH slightly low, one point below the normal range of 27-31, which was considered insignificant. She also had a past CMP completed with Dr. Ventura.        ROS:  General: -fever, -chills, -fatigue, -weight gain, -weight loss, +difficulty falling asleep  Eyes: -vision changes, -redness, -discharge  ENT: -ear pain, -nasal congestion, -sore throat, +post nasal drip  Cardiovascular: -chest pain, -palpitations, -lower extremity  edema  Respiratory: -cough, -shortness of breath  Gastrointestinal: -abdominal pain, -nausea, -vomiting, -diarrhea, -constipation, -blood in stool  Genitourinary: -dysuria, -hematuria, -frequency  Musculoskeletal: -joint pain, -muscle pain  Skin: -rash, -lesion  Neurological: -headache, -dizziness, -numbness, -tingling, +migraines  Psychiatric: -anxiety, -depression, -sleep difficulty            Objective     Physical Exam  Vitals and nursing note reviewed.   Constitutional:       Appearance: Normal appearance.   HENT:      Head: Normocephalic and atraumatic.      Right Ear: Tympanic membrane, ear canal and external ear normal.      Left Ear: Tympanic membrane, ear canal and external ear normal.      Nose: Nose normal.      Mouth/Throat:      Mouth: Mucous membranes are moist.      Pharynx: Oropharynx is clear.   Eyes:      Extraocular Movements: Extraocular movements intact.      Conjunctiva/sclera: Conjunctivae normal.   Cardiovascular:      Rate and Rhythm: Normal rate and regular rhythm.      Pulses: Normal pulses.      Heart sounds: Normal heart sounds.   Pulmonary:      Effort: Pulmonary effort is normal.      Breath sounds: Normal breath sounds.   Abdominal:      General: Abdomen is flat. Bowel sounds are normal.      Palpations: Abdomen is soft.   Musculoskeletal:         General: Normal range of motion.      Cervical back: Normal range of motion and neck supple.      Right lower leg: No edema.      Left lower leg: No edema.   Skin:     General: Skin is warm and dry.   Neurological:      General: No focal deficit present.      Mental Status: She is alert and oriented to person, place, and time.   Psychiatric:         Mood and Affect: Mood normal.         Behavior: Behavior normal.                Assessment and Plan     1. Routine general medical examination at a health care facility  Comments:  reviewed age appropriate screenings and immunizations  Orders:  -     Lipid Panel; Future; Expected date:  04/14/2025  -     TSH; Future; Expected date: 04/14/2025  -     T4, Free; Future; Expected date: 04/14/2025  -     HEMOGLOBIN A1C; Future; Expected date: 04/14/2025    2. JAVI (generalized anxiety disorder)  Overview:  Per Psych: Adderall 10mg BID but plans to start by only taking in the morning.    Continue Xanax 0.5mg daily PRN anxiety/insomnia.  She is very rarely taking Buspar PRN.      Orders:  -     busPIRone (BUSPAR) 10 MG tablet; Take 1 tablet (10 mg total) by mouth 3 (three) times daily as needed (anxiety).  Dispense: 30 tablet; Refill: 6    3. Insomnia, unspecified type  -     traZODone (DESYREL) 50 MG tablet; Take 1 tablet (50 mg total) by mouth nightly as needed for Insomnia.  Dispense: 30 tablet; Refill: 6    4. Migraine with aura and without status migrainosus, not intractable    5. Allergic rhinitis, unspecified seasonality, unspecified trigger    6. Class 3 severe obesity due to excess calories without serious comorbidity with body mass index (BMI) of 40.0 to 44.9 in adult  Comments:  stable, patient is currently breast feeding. recommend eating a well rounded diet and physical activity as needed  Overview:  Pre-gravid BMI 37.33, growth scans          Assessment & Plan    Assessed postpartum status, including blood pressure, migraines, anxiety, and sleep issues.  Evaluated recent lab results, noting MCH slightly below normal range but clinically insignificant. Explained that this does not indicate macrocytic anemia.  Considered trazodone for sleep issues, determining it safe for use while breastfeeding.  Examined ears, noting fluid present but no infection.    ACTION ITEMS/LIFESTYLE:   Krupa to get eyes checked.   Discussed using nasal serum to help flush out thick post-nasal drip.    MEDICATIONS:   Started trazodone at a very low dosage, with instructions to take 1/2 tablet as needed for sleep. Advised on potential drowsiness in infant as a side effect to monitor.   Refilled buspirone 10 mg.    Continued magnesium supplementation.   Continued prenatal vitamins.    ORDERS:   Ordered CBC, CMP, thyroid, cholesterol, and A1c for additional labs including thyroid, cholesterol, and diabetes screening.    FOLLOW UP:   Follow up to complete ordered labs at patient's convenience in the next few weeks.   Contact the office if refills for sumatriptan (Imitrex) are needed.              Follow up in about 1 year (around 3/14/2026) for Annual Exam.    This note was generated with the assistance of ambient listening technology. Verbal consent was obtained by the patient and accompanying visitor(s) for the recording of patient appointment to facilitate this note. I attest to having reviewed and edited the generated note for accuracy, though some syntax or spelling errors may persist. Please contact the author of this note for any clarification.

## 2025-04-28 ENCOUNTER — OFFICE VISIT (OUTPATIENT)
Dept: UROLOGY | Facility: CLINIC | Age: 35
End: 2025-04-28
Payer: COMMERCIAL

## 2025-04-28 VITALS — BODY MASS INDEX: 39.78 KG/M2 | WEIGHT: 233 LBS | HEIGHT: 64 IN

## 2025-04-28 DIAGNOSIS — N20.0 NEPHROLITHIASIS: Primary | ICD-10-CM

## 2025-04-28 PROBLEM — N20.1 URETEROLITHIASIS: Status: ACTIVE | Noted: 2025-04-28

## 2025-04-28 PROCEDURE — 99999 PR PBB SHADOW E&M-EST. PATIENT-LVL IV: CPT | Mod: PBBFAC,,, | Performed by: UROLOGY

## 2025-04-28 PROCEDURE — 1159F MED LIST DOCD IN RCRD: CPT | Mod: CPTII,S$GLB,, | Performed by: UROLOGY

## 2025-04-28 PROCEDURE — 99204 OFFICE O/P NEW MOD 45 MIN: CPT | Mod: S$GLB,,, | Performed by: UROLOGY

## 2025-04-28 PROCEDURE — 1160F RVW MEDS BY RX/DR IN RCRD: CPT | Mod: CPTII,S$GLB,, | Performed by: UROLOGY

## 2025-04-28 PROCEDURE — 3044F HG A1C LEVEL LT 7.0%: CPT | Mod: CPTII,S$GLB,, | Performed by: UROLOGY

## 2025-04-28 PROCEDURE — 3008F BODY MASS INDEX DOCD: CPT | Mod: CPTII,S$GLB,, | Performed by: UROLOGY

## 2025-04-28 RX ORDER — CEFAZOLIN SODIUM 2 G/50ML
2 SOLUTION INTRAVENOUS
Status: CANCELLED | OUTPATIENT
Start: 2025-04-28

## 2025-04-28 RX ORDER — CEFDINIR 300 MG/1
300 CAPSULE ORAL 2 TIMES DAILY
Qty: 20 CAPSULE | Refills: 0 | Status: SHIPPED | OUTPATIENT
Start: 2025-04-28 | End: 2025-05-08

## 2025-04-28 NOTE — H&P (VIEW-ONLY)
Chief Complaint:   Encounter Diagnosis   Name Primary?    Nephrolithiasis Yes       HPI:  34-year-old female who reports due to acute stone passage and a do pain system on the left.  She has had multiple stones and treated at other facilities, these include ureteroscopy x2 and multiple stents.  No history of ESWL.  Approximately 3 days ago she began to have temperatures of 100.1, with discomfort.  Pain continued and she reported to the outside ER with no fever and a normal urinalysis but a large stone in a duplicated system on the left.  Of note therefore that she has a very low duplication towards the bladder.  No other urological or gynecological history, no reported history of UTIs, currently not on antibiotics.  No gross hematuria, no history of smoking, no family history of urological cancers, her father has had stones.    Allergies:  Patient has no known allergies.    Medications:  See MAR    Review of Systems:  General: No fever, chills, fatigability, or weight loss.  Skin: No rashes, itching, or changes in color or texture of skin.  Chest: Denies OLIVAS, cyanosis, wheezing, cough, and sputum production.  Abdomen: Appetite fine. No weight loss. Denies diarrhea, abdominal pain, hematemesis, or blood in stool.  Musculoskeletal: No joint stiffness or swelling. Denies back pain.  : As above.  All other review of systems negative.    PMH:   has a past medical history of Anxiety, Depression, GERD (gastroesophageal reflux disease), Gestational hypertension, third trimester (2019), Hypertension, Hypoglycemia, Kidney calculi, Migraines, Polycystic ovaries, PONV (postoperative nausea and vomiting), STD (sexually transmitted disease), and Urinary tract infection.    PSH:   has a past surgical history that includes  section (10/2014); Tonsillectomy; Ankle surgery (Right, ); [place] tooth extraction; Kidney stone surgery; Cystoscopy w/ ureteral stent placement (Left, 10/02/2019); Ureteroscopic removal of  ureteric calculus (Left, 10/16/2019); Laser lithotripsy (Left, 10/16/2019); and Ultrasound guidance (Left, 10/16/2019).    FamHx: family history includes Breast cancer in her paternal grandmother; Cancer in her paternal grandmother; Depression in her father; Heart disease in her father and paternal grandfather; Hyperlipidemia in her father, maternal grandfather, and paternal grandfather; Hypertension in her father, maternal grandmother, mother, paternal grandfather, and paternal grandmother; Nephrolithiasis in her daughter; No Known Problems in her sister, sister, and sister.    SocHx:  reports that she has never smoked. She has been exposed to tobacco smoke. She has never used smokeless tobacco. She reports that she does not currently use alcohol. She reports that she does not use drugs.      Physical Exam:  There were no vitals filed for this visit.  General: A&Ox3, no apparent distress, no deformities  Neck: No masses, normal ROM  Lungs: normal inspiration, no use of accessory muscles  Heart: normal pulse, no arrhythmias  Abdomen: Soft, NT, ND, no masses, no hernias, no hepatosplenomegaly  Skin: The skin is warm and dry. No jaundice.  Ext: No c/c/e.  : No pelvic floor prolapse.  Normal introitus, no urethral abnomralities. No Perineal abnormalities.    Labs/Studies:   CT stone protocol left renal stone in the lower moiety, 9 mm left proximal ureteral stone in the upper moiety with hydronephrosis 4/25    Impression/Plan:     Nephrolithiasis- due to the reported temperature we will initiate cefdinir b.i.d. times 10 days.  In addition we will take her for cystoscopy, left ureteroscopy with laser lithotripsy, basket extraction, retrograde and stent placement.  Patient is aware that we may only be able to place a stent with definitive therapy in the future.  She has had to have this done before.  I am concerned due to the duplication and the fact that the duplication initiates just at the distal ureter.  Please see  below in regards to our discussion today.  Call with any complaints prior to the next appointment.      Patient understands the risks, benefits and alternatives to the above-stated procedure.  These include but are not limited to damage to the surrounding structures including the urethra, ureter, bladder and kidney.  Risk of perforation requiring open procedure.  Risk of recurrent stones, need for stents, need for secondary or more definitive procedures for these stones.  Risk of infection, hematuria, persistent pain or stent discomfort.  Risk of heart attack, stroke, death, DVT and PE.  Patient understanding of all the above has elected to proceed.

## 2025-04-28 NOTE — PROGRESS NOTES
Chief Complaint:   Encounter Diagnosis   Name Primary?    Nephrolithiasis Yes       HPI:  34-year-old female who reports due to acute stone passage and a do pain system on the left.  She has had multiple stones and treated at other facilities, these include ureteroscopy x2 and multiple stents.  No history of ESWL.  Approximately 3 days ago she began to have temperatures of 100.1, with discomfort.  Pain continued and she reported to the outside ER with no fever and a normal urinalysis but a large stone in a duplicated system on the left.  Of note therefore that she has a very low duplication towards the bladder.  No other urological or gynecological history, no reported history of UTIs, currently not on antibiotics.  No gross hematuria, no history of smoking, no family history of urological cancers, her father has had stones.    Allergies:  Patient has no known allergies.    Medications:  See MAR    Review of Systems:  General: No fever, chills, fatigability, or weight loss.  Skin: No rashes, itching, or changes in color or texture of skin.  Chest: Denies OLIVAS, cyanosis, wheezing, cough, and sputum production.  Abdomen: Appetite fine. No weight loss. Denies diarrhea, abdominal pain, hematemesis, or blood in stool.  Musculoskeletal: No joint stiffness or swelling. Denies back pain.  : As above.  All other review of systems negative.    PMH:   has a past medical history of Anxiety, Depression, GERD (gastroesophageal reflux disease), Gestational hypertension, third trimester (2019), Hypertension, Hypoglycemia, Kidney calculi, Migraines, Polycystic ovaries, PONV (postoperative nausea and vomiting), STD (sexually transmitted disease), and Urinary tract infection.    PSH:   has a past surgical history that includes  section (10/2014); Tonsillectomy; Ankle surgery (Right, ); [place] tooth extraction; Kidney stone surgery; Cystoscopy w/ ureteral stent placement (Left, 10/02/2019); Ureteroscopic removal of  ureteric calculus (Left, 10/16/2019); Laser lithotripsy (Left, 10/16/2019); and Ultrasound guidance (Left, 10/16/2019).    FamHx: family history includes Breast cancer in her paternal grandmother; Cancer in her paternal grandmother; Depression in her father; Heart disease in her father and paternal grandfather; Hyperlipidemia in her father, maternal grandfather, and paternal grandfather; Hypertension in her father, maternal grandmother, mother, paternal grandfather, and paternal grandmother; Nephrolithiasis in her daughter; No Known Problems in her sister, sister, and sister.    SocHx:  reports that she has never smoked. She has been exposed to tobacco smoke. She has never used smokeless tobacco. She reports that she does not currently use alcohol. She reports that she does not use drugs.      Physical Exam:  There were no vitals filed for this visit.  General: A&Ox3, no apparent distress, no deformities  Neck: No masses, normal ROM  Lungs: normal inspiration, no use of accessory muscles  Heart: normal pulse, no arrhythmias  Abdomen: Soft, NT, ND, no masses, no hernias, no hepatosplenomegaly  Skin: The skin is warm and dry. No jaundice.  Ext: No c/c/e.  : No pelvic floor prolapse.  Normal introitus, no urethral abnomralities. No Perineal abnormalities.    Labs/Studies:   CT stone protocol left renal stone in the lower moiety, 9 mm left proximal ureteral stone in the upper moiety with hydronephrosis 4/25    Impression/Plan:     Nephrolithiasis- due to the reported temperature we will initiate cefdinir b.i.d. times 10 days.  In addition we will take her for cystoscopy, left ureteroscopy with laser lithotripsy, basket extraction, retrograde and stent placement.  Patient is aware that we may only be able to place a stent with definitive therapy in the future.  She has had to have this done before.  I am concerned due to the duplication and the fact that the duplication initiates just at the distal ureter.  Please see  below in regards to our discussion today.  Call with any complaints prior to the next appointment.      Patient understands the risks, benefits and alternatives to the above-stated procedure.  These include but are not limited to damage to the surrounding structures including the urethra, ureter, bladder and kidney.  Risk of perforation requiring open procedure.  Risk of recurrent stones, need for stents, need for secondary or more definitive procedures for these stones.  Risk of infection, hematuria, persistent pain or stent discomfort.  Risk of heart attack, stroke, death, DVT and PE.  Patient understanding of all the above has elected to proceed.

## 2025-04-28 NOTE — PRE ADMISSION SCREENING
Pre op instructions reviewed with patient over telephone, verbalized understanding.    Procedure Date: 4/29/25  Arrival Time:  Please arrive at 2:00pm. We will call you after 2pm if your arrival time changes.    Address:   [facility] ([facility], 2nd Building on the left)  [id number] [place] [address] [place]. [address]  >>>Please enter through front entrance Lobby of 1st floor to Registration desk<<<      !!!INSTRUCTIONS IMPORTANT!!!  Do not eat after 12 midnight, Do not smoke or use chewing tobacco after 12 midnight!  OK to brush teeth, but no gum, candy, or mints!       Patients should stop full meals at midnight, but they can consume clear liquids up to 3 hours prior to scheduled arrival time.  Clear liquids include Gatorade, water, soda, black coffee, jello or tea (no milk or creamer), and clear juices.  Clear liquids do NOT include anything with pulp or food particles (Chicken broth, ice cream, yogurt etc.)      >>>MEDICATION INSTRUCTIONS<<<: Morning of Surgery, take small sip of water with ONLY these medications:Flomax  None    Stop taking *ADHD Medication 48 hrs prior to surgery, as this can affect the anesthesia used. Bariatric surgeries must HOLD 7 Days prior to surgery!    *Diabetic/ Prediabetic Patients: !!!If you take diabetic or weight loss medication, Do NOT take morning of surgery unless instructed by Doctor!!!  Metformin to be stopped 24 hrs prior to surgery.   Long Acting Insulin Instructions: HOLD the night before surgery unless instructed differently by Provider!  Ozempic/ Mounjaro/ Wegovy/ Trulicity/ Semaglutide injections or weight loss medication to be stopped 7 days prior to surgery.    If you take Ozempic/ Mounjaro / Wegovy / Trulicity / Semaglutide, any weight loss injections OR PHENTERMINE --->>> PLEASE LET US KNOW IMMEDIATELY, as these medications need to be stopped 7 days prior to surgery!    ____  Avoid Alcoholic beverages 3 days prior to surgery, as it can thin the  blood.  ____  NO Acrylic/fake nails or nail polish worn day of surgery (specifically hand/arm & foot surgeries).  ____  NO powder, lotions, deodorants, oils or cream on body.  ____  Remove all jewelry & piercings & foreign objects before arrival & leave at home.  ____  Remove Dentures, Hearing Aids & Contact Lens prior to surgery.  ____  Bring photo ID and insurance information to hospital (Leave Valuables at Home).  ____  If going home the same day, arrange for a ride home. You will not be able to drive for 24 hrs if Anesthesia was used.   ____  Females (ages 11-60): may need to give a urine sample the morning of surgery; please see Pre op Nurse prior to using the restroom.  ____  Wear clean, loose fitting clothing to allow for dressings/ bandages.      Bathing Instructions:    -Shower with anti-bacterial Soap (Hibiclens or Dial) the night before surgery and the morning of.   -Do not use Hibiclens on your face or genitals.   -Apply clean clothes after shower.  -Do not shave your face or body 2 days prior to surgery unless instructed otherwise by your Surgeon.  -Do not shave your pubic area 7 days prior to surgery (GYN PATIENTS)    [facility] Visitor/Ride Policy:  Only 2 adults allowed in pre op/recovery area during your procedure. You MUST HAVE A RIDE HOME from a responsible adult that you know and trust. Medical Transport, Uber or Lyft can ONLY be used if patient has a responsible adult to accompany them during ride home.       *Signs and symptoms of Infection Before or After Surgery:               !!!If you experience any fever, chills, nausea/ vomiting, foul odor/ excessive drainage from surgical site, flu-like symptoms, new wounds or cuts, PLEASE CALL THE SURGEON OFFICE at [phone] or SEND MESSAGE THROUGH [facility] PORTAL!!!     *If you are running late the morning of surgery, please call the Hospital Surgery Dept @ [phone].     *Billing questions:  [phone]     Thank you,  -[facility]  Sur[facility] Pre Admit Dept.  [phone] or [phone]  M-F 7:30 am-4:00 pm (Closed Major Holidays)

## 2025-04-28 NOTE — H&P (VIEW-ONLY)
Chief Complaint:   Encounter Diagnosis   Name Primary?    Nephrolithiasis Yes       HPI:  34-year-old female who reports due to acute stone passage and a do pain system on the left.  She has had multiple stones and treated at other facilities, these include ureteroscopy x2 and multiple stents.  No history of ESWL.  Approximately 3 days ago she began to have temperatures of 100.1, with discomfort.  Pain continued and she reported to the outside ER with no fever and a normal urinalysis but a large stone in a duplicated system on the left.  Of note therefore that she has a very low duplication towards the bladder.  No other urological or gynecological history, no reported history of UTIs, currently not on antibiotics.  No gross hematuria, no history of smoking, no family history of urological cancers, her father has had stones.    Allergies:  Patient has no known allergies.    Medications:  See MAR    Review of Systems:  General: No fever, chills, fatigability, or weight loss.  Skin: No rashes, itching, or changes in color or texture of skin.  Chest: Denies OLIVAS, cyanosis, wheezing, cough, and sputum production.  Abdomen: Appetite fine. No weight loss. Denies diarrhea, abdominal pain, hematemesis, or blood in stool.  Musculoskeletal: No joint stiffness or swelling. Denies back pain.  : As above.  All other review of systems negative.    PMH:   has a past medical history of Anxiety, Depression, GERD (gastroesophageal reflux disease), Gestational hypertension, third trimester (2019), Hypertension, Hypoglycemia, Kidney calculi, Migraines, Polycystic ovaries, PONV (postoperative nausea and vomiting), STD (sexually transmitted disease), and Urinary tract infection.    PSH:   has a past surgical history that includes  section (10/2014); Tonsillectomy; Ankle surgery (Right, ); Panther tooth extraction; Kidney stone surgery; Cystoscopy w/ ureteral stent placement (Left, 10/02/2019); Ureteroscopic removal of  ureteric calculus (Left, 10/16/2019); Laser lithotripsy (Left, 10/16/2019); and Ultrasound guidance (Left, 10/16/2019).    FamHx: family history includes Breast cancer in her paternal grandmother; Cancer in her paternal grandmother; Depression in her father; Heart disease in her father and paternal grandfather; Hyperlipidemia in her father, maternal grandfather, and paternal grandfather; Hypertension in her father, maternal grandmother, mother, paternal grandfather, and paternal grandmother; Nephrolithiasis in her daughter; No Known Problems in her sister, sister, and sister.    SocHx:  reports that she has never smoked. She has been exposed to tobacco smoke. She has never used smokeless tobacco. She reports that she does not currently use alcohol. She reports that she does not use drugs.      Physical Exam:  There were no vitals filed for this visit.  General: A&Ox3, no apparent distress, no deformities  Neck: No masses, normal ROM  Lungs: normal inspiration, no use of accessory muscles  Heart: normal pulse, no arrhythmias  Abdomen: Soft, NT, ND, no masses, no hernias, no hepatosplenomegaly  Skin: The skin is warm and dry. No jaundice.  Ext: No c/c/e.  : No pelvic floor prolapse.  Normal introitus, no urethral abnomralities. No Perineal abnormalities.    Labs/Studies:   CT stone protocol left renal stone in the lower moiety, 9 mm left proximal ureteral stone in the upper moiety with hydronephrosis 4/25    Impression/Plan:     Nephrolithiasis- due to the reported temperature we will initiate cefdinir b.i.d. times 10 days.  In addition we will take her for cystoscopy, left ureteroscopy with laser lithotripsy, basket extraction, retrograde and stent placement.  Patient is aware that we may only be able to place a stent with definitive therapy in the future.  She has had to have this done before.  I am concerned due to the duplication and the fact that the duplication initiates just at the distal ureter.  Please see  below in regards to our discussion today.  Call with any complaints prior to the next appointment.      Patient understands the risks, benefits and alternatives to the above-stated procedure.  These include but are not limited to damage to the surrounding structures including the urethra, ureter, bladder and kidney.  Risk of perforation requiring open procedure.  Risk of recurrent stones, need for stents, need for secondary or more definitive procedures for these stones.  Risk of infection, hematuria, persistent pain or stent discomfort.  Risk of heart attack, stroke, death, DVT and PE.  Patient understanding of all the above has elected to proceed.

## 2025-04-29 ENCOUNTER — ANESTHESIA EVENT (OUTPATIENT)
Dept: SURGERY | Facility: HOSPITAL | Age: 35
End: 2025-04-29
Payer: COMMERCIAL

## 2025-04-29 ENCOUNTER — ANESTHESIA (OUTPATIENT)
Dept: SURGERY | Facility: HOSPITAL | Age: 35
End: 2025-04-29
Payer: COMMERCIAL

## 2025-04-29 ENCOUNTER — HOSPITAL ENCOUNTER (OUTPATIENT)
Facility: HOSPITAL | Age: 35
Discharge: HOME | End: 2025-04-29
Attending: UROLOGY | Admitting: UROLOGY
Payer: COMMERCIAL

## 2025-04-29 VITALS
BODY MASS INDEX: 39.52 KG/M2 | SYSTOLIC BLOOD PRESSURE: 138 MMHG | HEART RATE: 71 BPM | HEIGHT: 64 IN | OXYGEN SATURATION: 94 % | RESPIRATION RATE: 18 BRPM | DIASTOLIC BLOOD PRESSURE: 72 MMHG | WEIGHT: 231.5 LBS | TEMPERATURE: 98 F

## 2025-04-29 DIAGNOSIS — N20.0 NEPHROLITHIASIS: ICD-10-CM

## 2025-04-29 LAB
ABSOLUTE EOSINOPHIL (OHS): 0.1 K/UL
ABSOLUTE MONOCYTE (OHS): 0.66 K/UL (ref 0.3–1)
ABSOLUTE NEUTROPHIL COUNT (OHS): 3.91 K/UL (ref 1.8–7.7)
ANION GAP (OHS): 8 MMOL/L (ref 8–16)
B-HCG UR QL: NEGATIVE
BASOPHILS # BLD AUTO: 0.04 K/UL
BASOPHILS NFR BLD AUTO: 0.5 %
BUN SERPL-MCNC: 13 MG/DL (ref 6–20)
CALCIUM SERPL-MCNC: 9.2 MG/DL (ref 8.7–10.5)
CHLORIDE SERPL-SCNC: 106 MMOL/L (ref 95–110)
CO2 SERPL-SCNC: 24 MMOL/L (ref 23–29)
CREAT SERPL-MCNC: 0.6 MG/DL (ref 0.5–1.4)
CTP QC/QA: YES
ERYTHROCYTE [DISTWIDTH] IN BLOOD BY AUTOMATED COUNT: 14.1 % (ref 11.5–14.5)
GFR SERPLBLD CREATININE-BSD FMLA CKD-EPI: >60 ML/MIN/1.73/M2
GLUCOSE SERPL-MCNC: 71 MG/DL (ref 70–110)
HCT VFR BLD AUTO: 38.6 % (ref 37–48.5)
HGB BLD-MCNC: 12.9 GM/DL (ref 12–16)
IMM GRANULOCYTES # BLD AUTO: 0.02 K/UL (ref 0–0.04)
IMM GRANULOCYTES NFR BLD AUTO: 0.2 % (ref 0–0.5)
LYMPHOCYTES # BLD AUTO: 3.4 K/UL (ref 1–4.8)
MCH RBC QN AUTO: 29.9 PG (ref 27–31)
MCHC RBC AUTO-ENTMCNC: 33.4 G/DL (ref 32–36)
MCV RBC AUTO: 89 FL (ref 82–98)
NUCLEATED RBC (/100WBC) (OHS): 0 /100 WBC
PLATELET # BLD AUTO: 279 K/UL (ref 150–450)
PMV BLD AUTO: 9.5 FL (ref 9.2–12.9)
POTASSIUM SERPL-SCNC: 3.9 MMOL/L (ref 3.5–5.1)
RBC # BLD AUTO: 4.32 M/UL (ref 4–5.4)
RELATIVE EOSINOPHIL (OHS): 1.2 %
RELATIVE LYMPHOCYTE (OHS): 41.8 % (ref 18–48)
RELATIVE MONOCYTE (OHS): 8.1 % (ref 4–15)
RELATIVE NEUTROPHIL (OHS): 48.2 % (ref 38–73)
SODIUM SERPL-SCNC: 138 MMOL/L (ref 136–145)
WBC # BLD AUTO: 8.13 K/UL (ref 3.9–12.7)

## 2025-04-29 PROCEDURE — 36000706: Performed by: UROLOGY

## 2025-04-29 PROCEDURE — C2617 STENT, NON-COR, TEM W/O DEL: HCPCS | Performed by: UROLOGY

## 2025-04-29 PROCEDURE — C1758 CATHETER, URETERAL: HCPCS | Performed by: UROLOGY

## 2025-04-29 PROCEDURE — 63600175 PHARM REV CODE 636 W HCPCS: Performed by: STUDENT IN AN ORGANIZED HEALTH CARE EDUCATION/TRAINING PROGRAM

## 2025-04-29 PROCEDURE — 71000033 HC RECOVERY, INTIAL HOUR: Performed by: UROLOGY

## 2025-04-29 PROCEDURE — 27201423 OPTIME MED/SURG SUP & DEVICES STERILE SUPPLY: Performed by: UROLOGY

## 2025-04-29 PROCEDURE — 25000003 PHARM REV CODE 250: Performed by: STUDENT IN AN ORGANIZED HEALTH CARE EDUCATION/TRAINING PROGRAM

## 2025-04-29 PROCEDURE — 74420 UROGRAPHY RTRGR +-KUB: CPT | Mod: 26,,, | Performed by: UROLOGY

## 2025-04-29 PROCEDURE — 25000003 PHARM REV CODE 250

## 2025-04-29 PROCEDURE — 71000039 HC RECOVERY, EACH ADD'L HOUR: Performed by: UROLOGY

## 2025-04-29 PROCEDURE — 63600175 PHARM REV CODE 636 W HCPCS: Performed by: UROLOGY

## 2025-04-29 PROCEDURE — 71000015 HC POSTOP RECOV 1ST HR: Performed by: UROLOGY

## 2025-04-29 PROCEDURE — 52356 CYSTO/URETERO W/LITHOTRIPSY: CPT | Mod: LT,,, | Performed by: UROLOGY

## 2025-04-29 PROCEDURE — 25500020 PHARM REV CODE 255: Performed by: UROLOGY

## 2025-04-29 PROCEDURE — 63600175 PHARM REV CODE 636 W HCPCS: Mod: JZ,TB | Performed by: STUDENT IN AN ORGANIZED HEALTH CARE EDUCATION/TRAINING PROGRAM

## 2025-04-29 PROCEDURE — 81025 URINE PREGNANCY TEST: CPT | Performed by: UROLOGY

## 2025-04-29 PROCEDURE — 82365 CALCULUS SPECTROSCOPY: CPT | Performed by: UROLOGY

## 2025-04-29 PROCEDURE — 37000008 HC ANESTHESIA 1ST 15 MINUTES: Performed by: UROLOGY

## 2025-04-29 PROCEDURE — 37000009 HC ANESTHESIA EA ADD 15 MINS: Performed by: UROLOGY

## 2025-04-29 PROCEDURE — 63600175 PHARM REV CODE 636 W HCPCS

## 2025-04-29 PROCEDURE — 36000707: Performed by: UROLOGY

## 2025-04-29 PROCEDURE — 85025 COMPLETE CBC W/AUTO DIFF WBC: CPT | Performed by: STUDENT IN AN ORGANIZED HEALTH CARE EDUCATION/TRAINING PROGRAM

## 2025-04-29 PROCEDURE — 80048 BASIC METABOLIC PNL TOTAL CA: CPT | Performed by: STUDENT IN AN ORGANIZED HEALTH CARE EDUCATION/TRAINING PROGRAM

## 2025-04-29 PROCEDURE — C1769 GUIDE WIRE: HCPCS | Performed by: UROLOGY

## 2025-04-29 DEVICE — STENT URETERAL UNIV 6FR 28CM: Type: IMPLANTABLE DEVICE | Site: URETER | Status: FUNCTIONAL

## 2025-04-29 RX ORDER — DEXMEDETOMIDINE HYDROCHLORIDE 100 UG/ML
INJECTION, SOLUTION INTRAVENOUS
Status: DISCONTINUED | OUTPATIENT
Start: 2025-04-29 | End: 2025-04-29

## 2025-04-29 RX ORDER — OXYCODONE AND ACETAMINOPHEN 5; 325 MG/1; MG/1
1 TABLET ORAL EVERY 4 HOURS PRN
Qty: 10 TABLET | Refills: 0 | Status: SHIPPED | OUTPATIENT
Start: 2025-04-29

## 2025-04-29 RX ORDER — MIDAZOLAM HYDROCHLORIDE 1 MG/ML
INJECTION INTRAMUSCULAR; INTRAVENOUS
Status: DISCONTINUED | OUTPATIENT
Start: 2025-04-29 | End: 2025-04-29

## 2025-04-29 RX ORDER — DEXAMETHASONE SODIUM PHOSPHATE 4 MG/ML
INJECTION, SOLUTION INTRA-ARTICULAR; INTRALESIONAL; INTRAMUSCULAR; INTRAVENOUS; SOFT TISSUE
Status: DISCONTINUED | OUTPATIENT
Start: 2025-04-29 | End: 2025-04-29

## 2025-04-29 RX ORDER — LIDOCAINE HYDROCHLORIDE 20 MG/ML
INJECTION INTRAVENOUS
Status: DISCONTINUED | OUTPATIENT
Start: 2025-04-29 | End: 2025-04-29

## 2025-04-29 RX ORDER — SODIUM CHLORIDE, SODIUM LACTATE, POTASSIUM CHLORIDE, CALCIUM CHLORIDE 600; 310; 30; 20 MG/100ML; MG/100ML; MG/100ML; MG/100ML
INJECTION, SOLUTION INTRAVENOUS CONTINUOUS PRN
Status: DISCONTINUED | OUTPATIENT
Start: 2025-04-29 | End: 2025-04-29

## 2025-04-29 RX ORDER — HYOSCYAMINE SULFATE 0.12 MG/1
0.25 TABLET SUBLINGUAL EVERY 4 HOURS PRN
Qty: 40 TABLET | Refills: 0 | Status: SHIPPED | OUTPATIENT
Start: 2025-04-29

## 2025-04-29 RX ORDER — SCOPOLAMINE 1 MG/3D
1 PATCH, EXTENDED RELEASE TRANSDERMAL ONCE
Status: COMPLETED | OUTPATIENT
Start: 2025-04-29 | End: 2025-04-29

## 2025-04-29 RX ORDER — GLUCAGON 1 MG
1 KIT INJECTION
Status: DISCONTINUED | OUTPATIENT
Start: 2025-04-29 | End: 2025-04-29 | Stop reason: HOSPADM

## 2025-04-29 RX ORDER — CEFAZOLIN 2 G/1
2 INJECTION, POWDER, FOR SOLUTION INTRAMUSCULAR; INTRAVENOUS
Status: COMPLETED | OUTPATIENT
Start: 2025-04-29 | End: 2025-04-29

## 2025-04-29 RX ORDER — ONDANSETRON HYDROCHLORIDE 2 MG/ML
4 INJECTION, SOLUTION INTRAVENOUS DAILY PRN
Status: DISCONTINUED | OUTPATIENT
Start: 2025-04-29 | End: 2025-04-29 | Stop reason: HOSPADM

## 2025-04-29 RX ORDER — ONDANSETRON HYDROCHLORIDE 2 MG/ML
INJECTION, SOLUTION INTRAVENOUS
Status: DISCONTINUED | OUTPATIENT
Start: 2025-04-29 | End: 2025-04-29

## 2025-04-29 RX ORDER — PROPOFOL 10 MG/ML
VIAL (ML) INTRAVENOUS CONTINUOUS PRN
Status: DISCONTINUED | OUTPATIENT
Start: 2025-04-29 | End: 2025-04-29

## 2025-04-29 RX ORDER — OXYCODONE AND ACETAMINOPHEN 5; 325 MG/1; MG/1
1 TABLET ORAL
Status: DISCONTINUED | OUTPATIENT
Start: 2025-04-29 | End: 2025-04-29 | Stop reason: HOSPADM

## 2025-04-29 RX ORDER — PHENAZOPYRIDINE HYDROCHLORIDE 200 MG/1
200 TABLET, FILM COATED ORAL 3 TIMES DAILY PRN
Qty: 15 TABLET | Refills: 0 | Status: SHIPPED | OUTPATIENT
Start: 2025-04-29

## 2025-04-29 RX ORDER — KETOROLAC TROMETHAMINE 30 MG/ML
15 INJECTION, SOLUTION INTRAMUSCULAR; INTRAVENOUS ONCE
Status: COMPLETED | OUTPATIENT
Start: 2025-04-29 | End: 2025-04-29

## 2025-04-29 RX ORDER — HYDROMORPHONE HYDROCHLORIDE 2 MG/ML
0.2 INJECTION, SOLUTION INTRAMUSCULAR; INTRAVENOUS; SUBCUTANEOUS EVERY 5 MIN PRN
Status: COMPLETED | OUTPATIENT
Start: 2025-04-29 | End: 2025-04-29

## 2025-04-29 RX ORDER — FENTANYL CITRATE 50 UG/ML
INJECTION, SOLUTION INTRAMUSCULAR; INTRAVENOUS
Status: DISCONTINUED | OUTPATIENT
Start: 2025-04-29 | End: 2025-04-29

## 2025-04-29 RX ORDER — PROPOFOL 10 MG/ML
VIAL (ML) INTRAVENOUS
Status: DISCONTINUED | OUTPATIENT
Start: 2025-04-29 | End: 2025-04-29

## 2025-04-29 RX ADMIN — HYDROMORPHONE HYDROCHLORIDE 0.2 MG: 2 INJECTION INTRAMUSCULAR; INTRAVENOUS; SUBCUTANEOUS at 05:04

## 2025-04-29 RX ADMIN — ONDANSETRON 4 MG: 2 INJECTION INTRAMUSCULAR; INTRAVENOUS at 04:04

## 2025-04-29 RX ADMIN — ONDANSETRON 4 MG: 2 INJECTION INTRAMUSCULAR; INTRAVENOUS at 03:04

## 2025-04-29 RX ADMIN — DEXMEDETOMIDINE 5 MCG: 200 INJECTION, SOLUTION INTRAVENOUS at 04:04

## 2025-04-29 RX ADMIN — HYDROMORPHONE HYDROCHLORIDE 0.2 MG: 2 INJECTION INTRAMUSCULAR; INTRAVENOUS; SUBCUTANEOUS at 04:04

## 2025-04-29 RX ADMIN — PROPOFOL 150 MG: 10 INJECTION, EMULSION INTRAVENOUS at 03:04

## 2025-04-29 RX ADMIN — MIDAZOLAM HYDROCHLORIDE 2 MG: 1 INJECTION, SOLUTION INTRAMUSCULAR; INTRAVENOUS at 03:04

## 2025-04-29 RX ADMIN — SCOPOLAMINE 1 PATCH: 1 SYSTEM TRANSDERMAL at 03:04

## 2025-04-29 RX ADMIN — DEXMEDETOMIDINE 5 MCG: 200 INJECTION, SOLUTION INTRAVENOUS at 03:04

## 2025-04-29 RX ADMIN — PROPOFOL 200 MCG/KG/MIN: 10 INJECTION, EMULSION INTRAVENOUS at 03:04

## 2025-04-29 RX ADMIN — KETOROLAC TROMETHAMINE 15 MG: 30 INJECTION, SOLUTION INTRAMUSCULAR; INTRAVENOUS at 05:04

## 2025-04-29 RX ADMIN — LIDOCAINE HYDROCHLORIDE 50 MG: 20 INJECTION INTRAVENOUS at 03:04

## 2025-04-29 RX ADMIN — SODIUM CHLORIDE, SODIUM LACTATE, POTASSIUM CHLORIDE, AND CALCIUM CHLORIDE: 600; 310; 30; 20 INJECTION, SOLUTION INTRAVENOUS at 03:04

## 2025-04-29 RX ADMIN — DEXAMETHASONE SODIUM PHOSPHATE 8 MG: 4 INJECTION, SOLUTION INTRA-ARTICULAR; INTRALESIONAL; INTRAMUSCULAR; INTRAVENOUS; SOFT TISSUE at 03:04

## 2025-04-29 RX ADMIN — FENTANYL CITRATE 100 MCG: 50 INJECTION, SOLUTION INTRAMUSCULAR; INTRAVENOUS at 03:04

## 2025-04-29 RX ADMIN — CEFAZOLIN 2 G: 2 INJECTION, POWDER, FOR SOLUTION INTRAMUSCULAR; INTRAVENOUS at 03:04

## 2025-04-29 NOTE — DISCHARGE SUMMARY
[facility] - Surgery (Hospital)  Discharge Note  Short Stay    Procedure(s) (LRB):  CYSTOURETEROSCOPY, WITH HOLMIUM LASER LITHOTRIPSY OF URETERAL CALCULUS AND STENT INSERTION (Left)  CYSTOSCOPY, WITH RETROGRADE PYELOGRAM (Left)      OUTCOME: Patient tolerated treatment/procedure well without complication and is now ready for discharge.    DISPOSITION: Home or Self Care    FINAL DIAGNOSIS:  nephrolithiasis    FOLLOWUP: In clinic    DISCHARGE INSTRUCTIONS:    Discharge Procedure Orders   X-Ray Abdomen AP 1 View   Standing Status: Future Standing Exp. Date: 04/29/26     Order Specific Question Answer Comments   May the Radiologist modify the order per protocol to meet the clinical needs of the patient? Yes      Diet Adult Regular     Notify your health care provider if you experience any of the following:  severe uncontrolled pain     Notify your health care provider if you experience any of the following:  persistent nausea and vomiting or diarrhea     Notify your health care provider if you experience any of the following:  temperature >100.4     Activity as tolerated        TIME SPENT ON DISCHARGE: 5 minutes

## 2025-04-29 NOTE — OP NOTE
Date of Procedure: 04/29/2025    PREOPERATIVE DIAGNOSIS:  Left ureteral stone.                                                                                                           POSTOPERATIVE DIAGNOSIS:  Left ureteral stone.                               PROCEDURES:                                                                  1.  Left ureteroscopy with laser lithotripsy and stone basket extraction.                          2.  Cystoscopy with placement of left double-J ureteral stent.              3.  Left retrograde pyelogram.                                               4.  Fluoro less than 1 hour.                                                 SURGEON:  Perez Su M.D.                                          Assistants: None    Specimen:  Stones for stone analysis    ANESTHESIA:  General endotracheal.                                           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                                    BLOOD LOSS:  None.                                                         DRAINS:  Left 6 Peruvian by 28 cm double-J ureteral stent.                      PROCEDURE IN DETAIL:  Patient is brought to the operative suite and placed under anesthesia in the dorsal lithotomy position.  After being sterilely prepped and draped a 21 Peruvian sheath cystoscope was placed into a normal urethra.  Bladder was otherwise unremarkable, no evidence of intravesical lesions or other abnormalities.  Bilateral ureteral orifices normal size, shape, caliber and location.  The left ureteral orifice was cannulated with a double floppy guidewire passed beyond the level of the stone which was easily visualized on fluoroscopy.  Unfortunately based on passage of the wire we could tell that we were in the lower moiety.  There was questions of the duplication within the proximal  ureter.  No evidence of secondary ureter, therefore we extracted it slowly and back to the level of the bladder.  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  A wire was inserted and passed beyond the stone which was easily visualized on fluoroscopy.  We are then able to insert a rigid scope up to the level of the stone, using laser we are able to fragmented into sufficient enough pieces in order to be basketed and removed.  Over the wire we placed a [place] catheter, wire was removed, retrograde was done demonstrating mild dilatation, otherwise unremarkable left retrograde nephrogram of the upper moiety.  Wire was reinserted in the [place] catheter was removed.  We then placed a 6 Greek by 28 cm double-J ureteral stent in the upper moiety with a good coil in the renal pelvis and in the bladder using fluoroscopic imaging.  Of note the lower moiety stone was not visualized but we are using a rigid scope.  Bladder was drained with the cystoscope.  The patient was taken to the PACU in stable condition.  The patient will follow back up in 1 week with a KUB to discuss cystoscopic removal of the stent versus a procedure for the left moiety stone.    COMPLICATIONS:  None.

## 2025-04-29 NOTE — ANESTHESIA PREPROCEDURE EVALUATION
2025  Krupa Walker is a 34 y.o., female    Past Medical History:   Diagnosis Date    Anxiety     Depression     GERD (gastroesophageal reflux disease)     Gestational hypertension, third trimester 2019    Hypertension     gestational     Hypoglycemia     Kidney calculi     2016    Migraines     Polycystic ovaries     PONV (postoperative nausea and vomiting)     STD (sexually transmitted disease)     Urinary tract infection      Past Surgical History:   Procedure Laterality Date    ANKLE SURGERY Right 2008     SECTION  10/2014    CYSTOSCOPY W/ URETERAL STENT PLACEMENT Left 10/02/2019    Procedure: CYSTOSCOPY, WITH URETERAL STENT INSERTION - Under ultrasound guidance;  Surgeon: Marsha Guevara MD;  Location: [facility];  Service: Urology;  Laterality: Left;    KIDNEY STONE SURGERY      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)    LASER LITHOTRIPSY Left 10/16/2019    Procedure: LITHOTRIPSY, USING LASER;  Surgeon: Marsha Guevara MD;  Location: [facility];  Service: Urology;  Laterality: Left;    TONSILLECTOMY      ULTRASOUND GUIDANCE Left 10/16/2019    Procedure: ULTRASOUND GUIDANCE, for laser litho;  Surgeon: Marsha Guevara MD;  Location: [facility];  Service: Urology;  Laterality: Left;    URETEROSCOPIC REMOVAL OF URETERIC CALCULUS Left 10/16/2019    Procedure: REMOVAL, CALCULUS, URETER, URETEROSCOPIC possible retro pyelo, possible lasere litho, possible stent;  Surgeon: Marsha Guevara MD;  Location: [facility];  Service: Urology;  Laterality: Left;  25 WEEKS PREGNANT / FETAL MONITORING BEFORE AND AFTER PROCEDURE/   CALL PELON ELDER [phone]      WISDOM TOOTH EXTRACTION         Chemistry        Component Value Date/Time     2025 1434     2025 1109    K 3.9 2025 1434    K 4.4 2025 1109      04/29/2025 1434     02/18/2025 1109    CO2 24 04/29/2025 1434    CO2 25 02/18/2025 1109    BUN 13 04/29/2025 1434    CREATININE 0.6 04/29/2025 1434    GLU 71 04/29/2025 1434    GLU 91 02/18/2025 1109        Component Value Date/Time    CALCIUM 9.2 04/29/2025 1434    CALCIUM 9.1 02/18/2025 1109    ALKPHOS 107 02/18/2025 1109    AST 24 02/18/2025 1109    ALT 18 02/18/2025 1109    BILITOT 0.4 02/18/2025 1109    ESTGFRAFRICA >60 06/04/2021 0723    EGFRNONAA >60 06/04/2021 0723        Lab Results   Component Value Date    WBC 8.13 04/29/2025    HGB 12.9 04/29/2025    HCT 38.6 04/29/2025    MCV 89 04/29/2025     04/29/2025             Pre-op Assessment    I have reviewed the Patient Summary Reports.    I have reviewed the NPO Status.   I have reviewed the Medications.     Review of Systems  Anesthesia Hx:  No problems with previous Anesthesia  Hx of severe PONV - has prolonged hospital stay  History of prior surgery of interest to airway management or planning:  Previous anesthesia: General, MAC         Personal Hx of Anesthesia complications, Post-Operative Nausea/Vomiting, with every anesthetic, despite treatment                    Social:  Non-Smoker       Hematology/Oncology:  Hematology Normal   Oncology Normal                                   Cardiovascular:     Hypertension              ECG has been reviewed. Sinus tachycardia (101)  Low voltage QRS   Borderline Abnormal ECG   When compared with ECG of 13-NOV-2018 11:15,   Vent. rate has increased BY  35 BPM   Criteria for Inferior infarct are no longer Present   Nonspecific T wave abnormality has replaced inverted T waves in Inferior   leads   Confirmed by Donis Rivera MD ([id number]) on 9/24/2024 12:35:48 AM                              Pulmonary:  Pulmonary Normal                       Renal/:   renal calculi               Hepatic/GI:     GERD                Musculoskeletal:         Spine Disorders: cervical            Neurological:    Neuromuscular  Disease,  Headaches                                 Endocrine:     BMI 40      Morbid Obesity / BMI > 40  Psych:  Psychiatric History anxiety depression                Physical Exam  General: Well nourished, Cooperative, Alert and Oriented    Airway:  Mallampati: II   Mouth Opening: Normal  TM Distance: Normal  Tongue: Normal  Neck ROM: Normal ROM    Dental:  Intact        Anesthesia Plan  Type of Anesthesia, risks & benefits discussed:    Anesthesia Type: Gen Supraglottic Airway, MAC  Intra-op Monitoring Plan: Standard ASA Monitors  Post Op Pain Control Plan: multimodal analgesia and IV/PO Opioids PRN  Induction:  IV  Informed Consent: Informed consent signed with the Patient and all parties understand the risks and agree with anesthesia plan.  All questions answered.   ASA Score: 2  Day of Surgery Review of History & Physical: H&P Update referred to the surgeon/provider.  Anesthesia Plan Notes: Scope patch in pre-op     Ready For Surgery From Anesthesia Perspective.     .

## 2025-04-29 NOTE — ANESTHESIA PROCEDURE NOTES
Intubation    Date/Time: 4/29/2025 3:37 PM    Performed by: Chris Victoria CRNA  Authorized by: Dom Valentine MD    Intubation:     Induction:  Intravenous    Intubated:  Postinduction    Mask Ventilation:  Easy mask    Attempts:  1    Attempted By:  CRNA    Difficult Airway Encountered?: No      Complications:  None    Airway Device:  Supraglottic airway/LMA    Airway Device Size:  4.0    Style/Cuff Inflation:  Cuffed (inflated to minimal occlusive pressure)    Placement Verified By:  Capnometry    Complicating Factors:  None    Findings Post-Intubation:  BS equal bilateral

## 2025-04-30 ENCOUNTER — TELEPHONE (OUTPATIENT)
Dept: UROLOGY | Facility: CLINIC | Age: 35
End: 2025-04-30
Payer: COMMERCIAL

## 2025-04-30 NOTE — ANESTHESIA POSTPROCEDURE EVALUATION
Anesthesia Post Evaluation    Patient: Krupa Walker    Procedure(s) Performed: Procedure(s) (LRB):  CYSTOURETEROSCOPY, WITH HOLMIUM LASER LITHOTRIPSY OF URETERAL CALCULUS AND STENT INSERTION (Left)  CYSTOSCOPY, WITH RETROGRADE PYELOGRAM (Left)    Final Anesthesia Type: general      Patient location during evaluation: PACU  Patient participation: Yes- Able to Participate  Level of consciousness: awake and alert and oriented  Post-procedure vital signs: reviewed and stable  Pain management: adequate  Airway patency: patent  TEGAN mitigation strategies: Multimodal analgesia  PONV status at discharge: No PONV  Anesthetic complications: no      Cardiovascular status: blood pressure returned to baseline and hemodynamically stable  Respiratory status: unassisted  Hydration status: euvolemic  Follow-up not needed.              Vitals Value Taken Time   /72 04/29/25 17:28   Temp 36.8 °C (98.2 °F) 04/29/25 17:15   Pulse 78 04/29/25 17:34   Resp 22 04/29/25 17:32   SpO2 95 % 04/29/25 17:34   Vitals shown include unfiled device data.      Event Time   Out of Recovery 17:35:00         Pain/Scott Score: Pain Rating Prior to Med Admin: 6 (4/29/2025  5:26 PM)

## 2025-04-30 NOTE — TELEPHONE ENCOUNTER
----- Message from Perez Su MD sent at 4/29/2025  4:28 PM CDT -----  One week with a KUB and cystoscopy with stent removal

## 2025-05-01 ENCOUNTER — OFFICE VISIT (OUTPATIENT)
Dept: PODIATRY | Facility: CLINIC | Age: 35
End: 2025-05-01
Payer: COMMERCIAL

## 2025-05-01 ENCOUNTER — PATIENT MESSAGE (OUTPATIENT)
Dept: OBSTETRICS AND GYNECOLOGY | Facility: CLINIC | Age: 35
End: 2025-05-01
Payer: COMMERCIAL

## 2025-05-01 VITALS — WEIGHT: 231.5 LBS | HEIGHT: 64 IN | BODY MASS INDEX: 39.52 KG/M2

## 2025-05-01 DIAGNOSIS — L60.3 DYSTROPHY OF NAIL DUE TO TRAUMA: Primary | ICD-10-CM

## 2025-05-01 DIAGNOSIS — L60.2 ONYCHOGRYPHOSIS: ICD-10-CM

## 2025-05-01 PROCEDURE — 99999 PR PBB SHADOW E&M-EST. PATIENT-LVL III: CPT | Mod: PBBFAC,,, | Performed by: PODIATRIST

## 2025-05-01 NOTE — PROGRESS NOTES
Subjective:       Patient ID: Krupa Walker is a 34 y.o. female.    Chief Complaint: Nail Problem (Thickened nail LLE Great toe. Pt non diabetic. Pt 2/10 pain. Pt last PCP appt was with Dr Madhuri Juarez 3/14/2025.)      HPI: Krupa Walker presents to the office with complaints of abnormal appearance to the left great  toe, due to dystrophic and thickened nail.  States she she initially dropped a wooden drawer on the top of her toe while she was pregnant which ultimately resulted in discoloration thickening of the nail.  She has since undergone multiple  nail avulsions previously attempting to have the nail grow in normally thereafter.  She continues to have abnormal growth and thickening of the nail plate as well.  She is not happy with the appearance of the nail and does cause pain with direct pressure to the nail plate.  Reports no signs of infection.  Patient's Primary Care Provider is Madhuri Juarez MD.     Review of patient's allergies indicates:  No Known Allergies    Past Medical History:   Diagnosis Date    Anxiety     Depression     GERD (gastroesophageal reflux disease)     Gestational hypertension, third trimester 07/11/2019    Hypertension     gestational     Hypoglycemia     Kidney calculi     2016    Migraines     Polycystic ovaries     PONV (postoperative nausea and vomiting)     STD (sexually transmitted disease)     Urinary tract infection        Family History   Problem Relation Name Age of Onset    Hypertension Mother Luann     Hypertension Father Theodore     Heart disease Father Theodore     Depression Father Theodore     Hyperlipidemia Father Theodore     No Known Problems Sister      Nephrolithiasis Daughter      No Known Problems Sister      No Known Problems Sister      Breast cancer Paternal Grandmother Haley         50s at diagnosis    Cancer Paternal Grandmother Haley         breast    Hypertension Paternal Grandmother Haley     Heart disease Paternal  Grandfather Dileep     Hyperlipidemia Paternal Grandfather Dileep     Hypertension Paternal Grandfather Dileep     Hyperlipidemia Maternal Grandfather Tara     Hypertension Maternal Grandmother Tara     Colon cancer Neg Hx      Ovarian cancer Neg Hx         Social History[1]    Past Surgical History:   Procedure Laterality Date    ANKLE SURGERY Right      SECTION  10/2014    CYSTOSCOPY W/ RETROGRADES Left 2025    Procedure: CYSTOSCOPY, WITH RETROGRADE PYELOGRAM;  Surgeon: Perez Su MD;  Location: [facility] OR;  Service: Urology;  Laterality: Left;    CYSTOSCOPY W/ URETERAL STENT PLACEMENT Left 10/02/2019    Procedure: CYSTOSCOPY, WITH URETERAL STENT INSERTION - Under ultrasound guidance;  Surgeon: Marsha Guevara MD;  Location: [facility];  Service: Urology;  Laterality: Left;    CYSTOURETEROSCOPY, WITH HOLMIUM LASER LITHOTRIPSY OF URETERAL CALCULUS AND STENT INSERTION Left 2025    Procedure: CYSTOURETEROSCOPY, WITH HOLMIUM LASER LITHOTRIPSY OF URETERAL CALCULUS AND STENT INSERTION;  Surgeon: Perez Su MD;  Location: [facility] OR;  Service: Urology;  Laterality: Left;    KIDNEY STONE SURGERY      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)    LASER LITHOTRIPSY Left 10/16/2019    Procedure: LITHOTRIPSY, USING LASER;  Surgeon: Marsha Guevara MD;  Location: [facility];  Service: Urology;  Laterality: Left;    TONSILLECTOMY      ULTRASOUND GUIDANCE Left 10/16/2019    Procedure: ULTRASOUND GUIDANCE, for laser litho;  Surgeon: Marsha Guevara MD;  Location: [facility];  Service: Urology;  Laterality: Left;    URETEROSCOPIC REMOVAL OF URETERIC CALCULUS Left 10/16/2019    Procedure: REMOVAL, CALCULUS, URETER, URETEROSCOPIC possible retro pyelo, possible lasere litho, possible stent;  Surgeon: Marsha Guevara MD;  Location: [facility];  Service: Urology;  Laterality: Left;  25 WEEKS PREGNANT / FETAL MONITORING BEFORE AND AFTER PROCEDURE/  "  EDUARDO ELDER [phone]      WISDOM TOOTH EXTRACTION         Review of Systems      Objective:   Ht 5' 4" (1.626 m)   Wt 105 kg (231 lb 7.7 oz)   LMP 04/01/2024   BMI 39.73 kg/m²     Physical Exam  LOWER EXTREMITY PHYSICAL EXAMINATION    NEUROLOGY: Sensation to light touch is intact. Proprioception is intact.  Vibratory sensation intact    VASCULAR:  The left dorsalis pedis pulse 2/4 and posterior tibial pulse on the left is 2/4.  Capillary refill is intact.  Pedal hair growth intact    DERMATOLOGY:  Thickened discolored nail of the left foot of the great toe.  The nail plate is dark and brown in color.  There is chalky subungual debris.  There is no signs of ingrowing to the medial lateral border.  Pain with dorsal plantar compression of the nail plate.  Mild edema noted.  There is no cellulitis noted.  No fluctuance.         Assessment:     1. Dystrophy of nail due to trauma    2. Onychogryphosis        Plan:     Dystrophy of nail due to trauma    Onychogryphosis        Thorough discussion is had with the patient today, concerning the diagnosis, its etiology, and the treatment algorithm at present.    Discussed pathology in etiology associated with onychodystrophy as it relates to direct/indirect trauma, fungus/onychomycosis, vitamin insufficiencies, smoking, rheumatological pathologies, chemotherapy agents, peripheral vascular disease, diabetes mellitus,  history of nail loss or bleeding under the toenail, chemicals in nail polishes, etc...    Given history of direct trauma and multiple attempts to remove the nail plate without success, she does request the nail be permanently removed.  We discussed other options regarding temporary avulsion, cutting the nail back to utilize a fake acrylic nail.  We also discuss that her beach trip within 6 weeks may cause issues.  She does not want the nail to be present during her trip.  Does understand the risk of infection and foreign material around the nail if having " difficulties healing.    We discussed patient's options for treating the dystrophic toenail.  We discussed temporary complete avulsion, and attempted permanent matricectomy.  Patient would like to proceed with attempted permanent matricectomy.  Discussed the risk and benefits of the procedure.  Discussed risk of recurrence.  Patient did give written consent to proceed with procedure.    Patient tolerated procedure well without complications.  The nail plate(s) was/were removed without complications.      Following the procedure, it was disclosed that patient is currently 3 months postpartum and breastfeeding.  Chemicals and nail bed was flushed multiple times with normal saline to rid the area of any residual chemical.  Out of caution, recommendation was made to dispose of breast milk for proximally 1 week.    Patient will start soaking in warm water and Epson salt twice daily.  Apply antibiotic cream and a Band-Aid to the affected borders following soaking and showering.  Patient will call if there is any acute signs of infection associated with increased redness, swelling, abnormal drainage, increased pain.    Continue current antibiotic.     Did discuss this situation with [facility] who stated that the amount of chemical utilize in a procedure would not have impact and negligible on absorption.  Does not need to dispose of breast milk. Did call patient with this information    Future Appointments   Date Time Provider Department Center   5/8/2025  3:00 PM [facility] XR2 [facility] XRAY [facility]   5/8/2025  3:30 PM Perez Su MD [facility] UROLOGY [facility]   10/28/2025 11:00 AM Mike Ventura MD [facility] RHE[facility] [place]            [1]   Social History  Socioeconomic History    Marital status:    Occupational History    Occupation: nurse     Comment: ICU- [employer]   Tobacco Use    Smoking status: Never     Passive exposure: Current    Smokeless tobacco: Never   Substance and Sexual  Activity    Alcohol use: Not Currently     Comment: 1 drink per month    Drug use: Never    Sexual activity: Yes     Partners: Male     Birth control/protection: Condom     Comment: IUD removed due to complications in July 2018     Social Drivers of Health     Financial Resource Strain: Low Risk  (1/27/2025)    Overall Financial Resource Strain (CARDIA)     Difficulty of Paying Living Expenses: Not hard at all   Food Insecurity: No Food Insecurity (1/27/2025)    Hunger Vital Sign     Worried About Running Out of Food in the Last Year: Never true     Ran Out of Food in the Last Year: Never true   Transportation Needs: No Transportation Needs (1/24/2024)    PRAPARE - Transportation     Lack of Transportation (Medical): No     Lack of Transportation (Non-Medical): No   Physical Activity: Insufficiently Active (1/27/2025)    Exercise Vital Sign     Days of Exercise per Week: 2 days     Minutes of Exercise per Session: 30 min   Stress: Stress Concern Present (1/27/2025)    [employer] [place] [employer] - Occupational Stress Questionnaire     Feeling of Stress : Rather much   Housing Stability: Low Risk  (1/24/2024)    Housing Stability Vital Sign     Unable to Pay for Housing in the Last Year: No     Number of Places Lived in the Last Year: 1     Unstable Housing in the Last Year: No

## 2025-05-01 NOTE — PROCEDURES
Nail Removal    Date/Time: 5/1/2025 9:45 AM    Performed by: Ella Tiwari DPM  Authorized by: Ella Tiwari DPM    Consent Done?:  Yes (Written)  Time out: Immediately prior to the procedure a time out was called    Prep: patient was prepped and draped in usual sterile fashion    Location:     Location:  Left foot    Location detail:  Left big toe  Anesthesia:     Anesthesia:  Digital block    Local anesthetic:  Lidocaine 1% without epinephrine (0.75% Marcaine plain)    Anesthetic total (ml):  3  Procedure Details:     Preparation:  Skin prepped with alcohol and skin prepped with Betadine    Amount removed:  Complete    Wedge excision of skin of nail fold: No      Nail bed sutured?: No      Nail matrix removed:  Complete    Removal method:  Phenol and alcohol    Removed nail replaced and anchored: No      Dressing applied:  4x4, antibiotic ointment and dressing applied    Patient tolerance:  Patient tolerated the procedure well with no immediate complications

## 2025-05-03 LAB
CELL MATERIAL STONE EST-MCNT: NORMAL %
LABORATORY COMMENT REPORT: NORMAL
SPECIMEN SOURCE: NORMAL

## 2025-05-08 ENCOUNTER — HOSPITAL ENCOUNTER (OUTPATIENT)
Dept: RADIOLOGY | Facility: HOSPITAL | Age: 35
Discharge: HOME | End: 2025-05-08
Attending: UROLOGY
Payer: COMMERCIAL

## 2025-05-08 ENCOUNTER — PROCEDURE VISIT (OUTPATIENT)
Dept: UROLOGY | Facility: CLINIC | Age: 35
End: 2025-05-08
Payer: COMMERCIAL

## 2025-05-08 DIAGNOSIS — N20.0 NEPHROLITHIASIS: Primary | ICD-10-CM

## 2025-05-08 DIAGNOSIS — N20.0 NEPHROLITHIASIS: ICD-10-CM

## 2025-05-08 PROCEDURE — 74018 RADEX ABDOMEN 1 VIEW: CPT | Mod: TC

## 2025-05-08 PROCEDURE — 99024 POSTOP FOLLOW-UP VISIT: CPT | Mod: S$GLB,,, | Performed by: UROLOGY

## 2025-05-08 PROCEDURE — 74018 RADEX ABDOMEN 1 VIEW: CPT | Mod: 26,,, | Performed by: STUDENT IN AN ORGANIZED HEALTH CARE EDUCATION/TRAINING PROGRAM

## 2025-05-08 RX ORDER — CEFAZOLIN SODIUM 2 G/50ML
2 SOLUTION INTRAVENOUS
OUTPATIENT
Start: 2025-05-08

## 2025-05-08 NOTE — PROCEDURES
Procedures  Chief Complaint:   Encounter Diagnosis   Name Primary?    Nephrolithiasis Yes       HPI:    25- here today for cystoscopy with stent removal versus further surgery.    34-year-old female who reports due to acute stone passage and a do pain system on the left.  She has had multiple stones and treated at other facilities, these include ureteroscopy x2 and multiple stents.  No history of ESWL.  Approximately 3 days ago she began to have temperatures of 100.1, with discomfort.  Pain continued and she reported to the outside ER with no fever and a normal urinalysis but a large stone in a duplicated system on the left.  Of note therefore that she has a very low duplication towards the bladder.  No other urological or gynecological history, no reported history of UTIs, currently not on antibiotics.  No gross hematuria, no history of smoking, no family history of urological cancers, her father has had stones.    Allergies:  Patient has no known allergies.    Medications:  See MAR    Review of Systems:  General: No fever, chills, fatigability, or weight loss.  Skin: No rashes, itching, or changes in color or texture of skin.  Chest: Denies OLIVAS, cyanosis, wheezing, cough, and sputum production.  Abdomen: Appetite fine. No weight loss. Denies diarrhea, abdominal pain, hematemesis, or blood in stool.  Musculoskeletal: No joint stiffness or swelling. Denies back pain.  : As above.  All other review of systems negative.    PMH:   has a past medical history of Anxiety, Depression, GERD (gastroesophageal reflux disease), Gestational hypertension, third trimester (2019), Hypertension, Hypoglycemia, Kidney calculi, Migraines, Polycystic ovaries, PONV (postoperative nausea and vomiting), STD (sexually transmitted disease), and Urinary tract infection.    PSH:   has a past surgical history that includes  section (10/2014); Tonsillectomy; Ankle surgery (Right, ); [place] tooth extraction; Kidney stone  surgery; Cystoscopy w/ ureteral stent placement (Left, 10/02/2019); Ureteroscopic removal of ureteric calculus (Left, 10/16/2019); Laser lithotripsy (Left, 10/16/2019); Ultrasound guidance (Left, 10/16/2019); cystoureteroscopy, with holmium laser lithotripsy of ureteral calculus and stent insertion (Left, 4/29/2025); and Cystoscopy w/ retrogrades (Left, 4/29/2025).    FamHx: family history includes Breast cancer in her paternal grandmother; Cancer in her paternal grandmother; Depression in her father; Heart disease in her father and paternal grandfather; Hyperlipidemia in her father, maternal grandfather, and paternal grandfather; Hypertension in her father, maternal grandmother, mother, paternal grandfather, and paternal grandmother; Nephrolithiasis in her daughter; No Known Problems in her sister, sister, and sister.    SocHx:  reports that she has never smoked. She has been exposed to tobacco smoke. She has never used smokeless tobacco. She reports that she does not currently use alcohol. She reports that she does not use drugs.      Physical Exam:  There were no vitals filed for this visit.  General: A&Ox3, no apparent distress, no deformities  Neck: No masses, normal ROM  Lungs: normal inspiration, no use of accessory muscles  Heart: normal pulse, no arrhythmias  Abdomen: Soft, NT, ND, no masses, no hernias, no hepatosplenomegaly  Skin: The skin is warm and dry. No jaundice.  Ext: No c/c/e.  : 5/25- No pelvic floor prolapse.  Normal introitus, no urethral abnomralities. No Perineal abnormalities.    Labs/Studies:   Stone analysis 100% Ca phosphate/apatite  KUB left lower moiety stone, left stent 5/25  CT stone protocol left renal stone in the lower moiety, 9 mm left proximal ureteral stone in the upper moiety with hydronephrosis 4/25    Impression/Plan:       Nephrolithiasis-  left ureteroscopy  4/26/25    Patient would like to go ahead and pursue stone extraction of the lower moiety, instead of pursuing  stent removal and monitoring of the stone.  Therefore will pull the stent at the next procedure.  Proceed with cystoscopy, left ureteroscopy, laser lithotripsy with basket extraction, retrograde and stent.  Please see below in regards to our discussion today.  Call with any complaints prior to the next appointment.      Patient understands the risks, benefits and alternatives to the above-stated procedure.  These include but are not limited to damage to the surrounding structures including the urethra, ureter, bladder and kidney.  Risk of perforation requiring open procedure.  Risk of recurrent stones, need for stents, need for secondary or more definitive procedures for these stones.  Risk of infection, hematuria, persistent pain or stent discomfort.  Risk of heart attack, stroke, death, DVT and PE.  Patient understanding of all the above has elected to proceed.

## 2025-05-09 ENCOUNTER — TELEPHONE (OUTPATIENT)
Dept: PREADMISSION TESTING | Facility: HOSPITAL | Age: 35
End: 2025-05-09
Payer: COMMERCIAL

## 2025-05-09 NOTE — PRE-PROCEDURE INSTRUCTIONS
Pre op instructions reviewed with PATIENT over telephone, verbalized understanding.    Procedure Date: 5/13/25  Arrival Time:  TBD; We will call you after 2pm the day before your procedure with your arrival time.    Address:   [facility] ([facility], 2nd Building on the left)  [address] [place] [address] [place] [address]  >>>Please enter through front entrance Lobby of 1st floor to Registration desk<<<        !!!INSTRUCTIONS IMPORTANT!!!  NO FOOD, DRINK OR TOBACCO PRODUCTS AFTER MIDNIGHT THE NIGHT BEFORE SURGERY.     Do not eat after 12 midnight, Do not smoke or use chewing tobacco after 12 midnight!  OK to brush teeth, but no gum, candy, or mints!       Patients should stop full meals at midnight, but they can consume clear liquids up to 3 hours prior to scheduled arrival time.  Clear liquids include Gatorade, water, soda, black coffee, jello or tea (no milk or creamer), and clear juices.  Clear liquids do NOT include anything with pulp or food particles (Chicken broth, ice cream, yogurt etc.)      >>>MEDICATION INSTRUCTIONS<<<: Morning of Surgery, take small sip of water with ONLY these medications:  NONE    *ADHD Medication: Stop taking ADHD/ADD medications 48 hrs prior to surgery, as this can affect the anesthesia used. Bariatric surgeries must HOLD 7 Days prior to surgery!    *Diabetic/ Prediabetic Patients: !!!If you take diabetic or weight loss medication, Do NOT take morning of surgery unless instructed by Doctor!!!  Metformin to be stopped 24 hrs prior to surgery.   Long Acting Insulin Instructions: HOLD the night before surgery unless instructed differently by Provider!  Ozempic/ Mounjaro/ Wegovy/ Trulicity/ Semaglutide injections or weight loss medication to be stopped 7 days prior to surgery.    If you take Ozempic/ Mounjaro / Wegovy / Trulicity / Semaglutide, any weight loss injections OR PHENTERMINE --->>> PLEASE LET US KNOW IMMEDIATELY, as these medications need to be stopped 7 days  prior to surgery!      !!!STOP ALL Aspirins, NSAIDS, WEIGHT LOSS INJECTIONS/PILLS, Herbal supplements, & Vitamins 7 DAYS BEFORE SURGERY!!!    ____  Avoid Alcoholic beverages 3 days prior to surgery, as it can thin the blood.  ____  NO Acrylic/fake nails or nail polish worn day of surgery (specifically hand/arm & foot surgeries).  ____  NO powder, lotions, deodorants, oils or cream on body.  ____  Remove all jewelry & piercings & foreign objects before arrival & leave at home.  ____  Remove Dentures, Hearing Aids & Contact Lens prior to surgery.  ____  Bring photo ID and insurance information to hospital (Leave Valuables at Home).  ____  If going home the same day, arrange for a ride home. You will not be able to drive for 24 hrs if Anesthesia was used.   ____  Females (ages 11-60): may need to give a urine sample the morning of surgery; please see Pre op Nurse prior to using the restroom.  ____  Males: Stop ED medications (Viagra, Cialis) 24 hrs prior to surgery.  ____  Wear clean, loose fitting clothing to allow for dressings/ bandages.      Bathing Instructions:    -Shower with anti-bacterial Soap (Hibiclens or Dial) the night before surgery and the morning of.   -Do not use Hibiclens on your face or genitals.   -Apply clean clothes after shower.  -Do not shave your face or body 2 days prior to surgery unless instructed otherwise by your Surgeon.  -Do not shave your pubic area 7 days prior to surgery (GYN PATIENTS)    [facility] Visitor/Ride Policy:  Only 2 adults allowed in pre op/recovery area during your procedure. You MUST HAVE A RIDE HOME from a responsible adult that you know and trust. Medical Transport, Uber or Lyft can ONLY be used if patient has a responsible adult to accompany them during ride home.       *Signs and symptoms of Infection Before or After Surgery:               !!!If you experience any fever, chills, nausea/ vomiting, foul odor/ excessive drainage from surgical site, flu-like symptoms, new  wounds or cuts, PLEASE CALL THE SURGEON OFFICE at [phone] or SEND MESSAGE THROUGH [employer] PORTAL!!!     *If you are running late the morning of surgery, please call the Hospital Surgery Dept @ [phone].     *Billing questions:  [phone]     Thank you,  -[facility] Pre Admit Dept.  [phone] or [phone]  M-F 7:30 am-4:00 pm (Closed Major Holidays)

## 2025-05-12 ENCOUNTER — TELEPHONE (OUTPATIENT)
Dept: PREADMISSION TESTING | Facility: HOSPITAL | Age: 35
End: 2025-05-12
Payer: COMMERCIAL

## 2025-05-12 ENCOUNTER — ANESTHESIA EVENT (OUTPATIENT)
Dept: SURGERY | Facility: HOSPITAL | Age: 35
End: 2025-05-12
Payer: COMMERCIAL

## 2025-05-12 NOTE — PRE-PROCEDURE INSTRUCTIONS
Called and spoke with PATIENT about the following:     Please arrive to [facility] ([facility]) at 5:30AM on 5/13/25 for your scheduled procedure.  Address: [address] [place] [address] [place]. [address] (2nd Building on left, 1st Floor Lobby)    !!!NO FOOD after midnight! You may have clear liquids up to 3 hrs before your arrival to the Hospital!!!  Clear liquids include Gatorade, water, soda, black coffee or tea (no milk or creamer), and clear juices.  Clear liquids do NOT include anything with pulp or food particles (Chicken broth, ice cream, yogurt, Jello, etc.)    Thank you,  -[facility] Pre Admit Dept.  Mon-Fri 7:30 am - 4 pm [phone]

## 2025-05-13 ENCOUNTER — ANESTHESIA (OUTPATIENT)
Dept: SURGERY | Facility: HOSPITAL | Age: 35
End: 2025-05-13
Payer: COMMERCIAL

## 2025-05-13 ENCOUNTER — TELEPHONE (OUTPATIENT)
Dept: UROLOGY | Facility: CLINIC | Age: 35
End: 2025-05-13
Payer: COMMERCIAL

## 2025-05-13 ENCOUNTER — HOSPITAL ENCOUNTER (OUTPATIENT)
Facility: HOSPITAL | Age: 35
Discharge: HOME | End: 2025-05-13
Attending: UROLOGY | Admitting: UROLOGY
Payer: COMMERCIAL

## 2025-05-13 DIAGNOSIS — N20.0 NEPHROLITHIASIS: Primary | ICD-10-CM

## 2025-05-13 LAB
B-HCG UR QL: NEGATIVE
CTP QC/QA: YES

## 2025-05-13 PROCEDURE — 81025 URINE PREGNANCY TEST: CPT | Performed by: UROLOGY

## 2025-05-13 PROCEDURE — 37000008 HC ANESTHESIA 1ST 15 MINUTES: Performed by: UROLOGY

## 2025-05-13 PROCEDURE — 36000707: Performed by: UROLOGY

## 2025-05-13 PROCEDURE — 52356 CYSTO/URETERO W/LITHOTRIPSY: CPT | Mod: LT,,, | Performed by: UROLOGY

## 2025-05-13 PROCEDURE — 63600175 PHARM REV CODE 636 W HCPCS

## 2025-05-13 PROCEDURE — C1769 GUIDE WIRE: HCPCS | Performed by: UROLOGY

## 2025-05-13 PROCEDURE — 71000033 HC RECOVERY, INTIAL HOUR: Performed by: UROLOGY

## 2025-05-13 PROCEDURE — 63600175 PHARM REV CODE 636 W HCPCS: Performed by: UROLOGY

## 2025-05-13 PROCEDURE — C1747 OPTIME ENDOSCOPE, SINGLE, URINARY TRACT: HCPCS | Performed by: UROLOGY

## 2025-05-13 PROCEDURE — 25500020 PHARM REV CODE 255: Performed by: UROLOGY

## 2025-05-13 PROCEDURE — 25000003 PHARM REV CODE 250

## 2025-05-13 PROCEDURE — 36000706: Performed by: UROLOGY

## 2025-05-13 PROCEDURE — 74420 UROGRAPHY RTRGR +-KUB: CPT | Mod: 26,,, | Performed by: UROLOGY

## 2025-05-13 PROCEDURE — 37000009 HC ANESTHESIA EA ADD 15 MINS: Performed by: UROLOGY

## 2025-05-13 PROCEDURE — C2617 STENT, NON-COR, TEM W/O DEL: HCPCS | Performed by: UROLOGY

## 2025-05-13 PROCEDURE — 25000003 PHARM REV CODE 250: Performed by: ANESTHESIOLOGY

## 2025-05-13 PROCEDURE — 27201423 OPTIME MED/SURG SUP & DEVICES STERILE SUPPLY: Performed by: UROLOGY

## 2025-05-13 PROCEDURE — 71000015 HC POSTOP RECOV 1ST HR: Performed by: UROLOGY

## 2025-05-13 PROCEDURE — 63600175 PHARM REV CODE 636 W HCPCS: Performed by: ANESTHESIOLOGY

## 2025-05-13 PROCEDURE — C1758 CATHETER, URETERAL: HCPCS | Performed by: UROLOGY

## 2025-05-13 DEVICE — STENT URETERAL UNIV 7FR 26CM: Type: IMPLANTABLE DEVICE | Site: URETER | Status: FUNCTIONAL

## 2025-05-13 RX ORDER — ONDANSETRON HYDROCHLORIDE 2 MG/ML
4 INJECTION, SOLUTION INTRAVENOUS ONCE AS NEEDED
Status: DISCONTINUED | OUTPATIENT
Start: 2025-05-13 | End: 2025-05-13 | Stop reason: HOSPADM

## 2025-05-13 RX ORDER — PHENAZOPYRIDINE HYDROCHLORIDE 200 MG/1
200 TABLET, FILM COATED ORAL 3 TIMES DAILY PRN
Qty: 15 TABLET | Refills: 0 | Status: SHIPPED | OUTPATIENT
Start: 2025-05-13

## 2025-05-13 RX ORDER — DIPHENHYDRAMINE HYDROCHLORIDE 50 MG/ML
INJECTION, SOLUTION INTRAMUSCULAR; INTRAVENOUS
Status: DISCONTINUED | OUTPATIENT
Start: 2025-05-13 | End: 2025-05-13

## 2025-05-13 RX ORDER — DEXMEDETOMIDINE HYDROCHLORIDE 100 UG/ML
INJECTION, SOLUTION INTRAVENOUS
Status: DISCONTINUED | OUTPATIENT
Start: 2025-05-13 | End: 2025-05-13

## 2025-05-13 RX ORDER — MIDAZOLAM HYDROCHLORIDE 1 MG/ML
INJECTION INTRAMUSCULAR; INTRAVENOUS
Status: DISCONTINUED | OUTPATIENT
Start: 2025-05-13 | End: 2025-05-13

## 2025-05-13 RX ORDER — HYOSCYAMINE SULFATE 0.12 MG/1
0.25 TABLET SUBLINGUAL EVERY 4 HOURS PRN
Qty: 40 TABLET | Refills: 0 | Status: SHIPPED | OUTPATIENT
Start: 2025-05-13

## 2025-05-13 RX ORDER — CEFDINIR 300 MG/1
300 CAPSULE ORAL 2 TIMES DAILY
Qty: 10 CAPSULE | Refills: 0 | Status: SHIPPED | OUTPATIENT
Start: 2025-05-13 | End: 2025-05-18

## 2025-05-13 RX ORDER — ONDANSETRON HYDROCHLORIDE 2 MG/ML
4 INJECTION, SOLUTION INTRAVENOUS DAILY PRN
Status: DISCONTINUED | OUTPATIENT
Start: 2025-05-13 | End: 2025-05-13 | Stop reason: HOSPADM

## 2025-05-13 RX ORDER — SCOPOLAMINE 1 MG/3D
1 PATCH, EXTENDED RELEASE TRANSDERMAL ONCE
Status: DISCONTINUED | OUTPATIENT
Start: 2025-05-13 | End: 2025-05-13 | Stop reason: HOSPADM

## 2025-05-13 RX ORDER — METOCLOPRAMIDE HYDROCHLORIDE 5 MG/ML
10 INJECTION INTRAMUSCULAR; INTRAVENOUS ONCE
Status: COMPLETED | OUTPATIENT
Start: 2025-05-13 | End: 2025-05-13

## 2025-05-13 RX ORDER — OXYCODONE AND ACETAMINOPHEN 5; 325 MG/1; MG/1
1 TABLET ORAL
Status: DISCONTINUED | OUTPATIENT
Start: 2025-05-13 | End: 2025-05-13 | Stop reason: HOSPADM

## 2025-05-13 RX ORDER — KETOROLAC TROMETHAMINE 30 MG/ML
INJECTION, SOLUTION INTRAMUSCULAR; INTRAVENOUS
Status: DISCONTINUED | OUTPATIENT
Start: 2025-05-13 | End: 2025-05-13

## 2025-05-13 RX ORDER — DEXAMETHASONE SODIUM PHOSPHATE 4 MG/ML
INJECTION, SOLUTION INTRA-ARTICULAR; INTRALESIONAL; INTRAMUSCULAR; INTRAVENOUS; SOFT TISSUE
Status: DISCONTINUED | OUTPATIENT
Start: 2025-05-13 | End: 2025-05-13

## 2025-05-13 RX ORDER — FENTANYL CITRATE 50 UG/ML
25 INJECTION, SOLUTION INTRAMUSCULAR; INTRAVENOUS EVERY 5 MIN PRN
Status: DISCONTINUED | OUTPATIENT
Start: 2025-05-13 | End: 2025-05-13 | Stop reason: HOSPADM

## 2025-05-13 RX ORDER — LIDOCAINE HYDROCHLORIDE 20 MG/ML
INJECTION INTRAVENOUS
Status: DISCONTINUED | OUTPATIENT
Start: 2025-05-13 | End: 2025-05-13

## 2025-05-13 RX ORDER — PROPOFOL 10 MG/ML
VIAL (ML) INTRAVENOUS
Status: DISCONTINUED | OUTPATIENT
Start: 2025-05-13 | End: 2025-05-13

## 2025-05-13 RX ORDER — ACETAMINOPHEN 10 MG/ML
INJECTION, SOLUTION INTRAVENOUS
Status: DISCONTINUED | OUTPATIENT
Start: 2025-05-13 | End: 2025-05-13

## 2025-05-13 RX ORDER — ONDANSETRON HYDROCHLORIDE 2 MG/ML
INJECTION, SOLUTION INTRAVENOUS
Status: DISCONTINUED | OUTPATIENT
Start: 2025-05-13 | End: 2025-05-13

## 2025-05-13 RX ORDER — FENTANYL CITRATE 50 UG/ML
INJECTION, SOLUTION INTRAMUSCULAR; INTRAVENOUS
Status: DISCONTINUED | OUTPATIENT
Start: 2025-05-13 | End: 2025-05-13

## 2025-05-13 RX ORDER — OXYCODONE AND ACETAMINOPHEN 5; 325 MG/1; MG/1
1 TABLET ORAL EVERY 4 HOURS PRN
Qty: 10 TABLET | Refills: 0 | Status: SHIPPED | OUTPATIENT
Start: 2025-05-13

## 2025-05-13 RX ORDER — SODIUM CHLORIDE, SODIUM LACTATE, POTASSIUM CHLORIDE, CALCIUM CHLORIDE 600; 310; 30; 20 MG/100ML; MG/100ML; MG/100ML; MG/100ML
INJECTION, SOLUTION INTRAVENOUS CONTINUOUS PRN
Status: DISCONTINUED | OUTPATIENT
Start: 2025-05-13 | End: 2025-05-13

## 2025-05-13 RX ORDER — FAMOTIDINE 10 MG/ML
20 INJECTION, SOLUTION INTRAVENOUS ONCE
Status: COMPLETED | OUTPATIENT
Start: 2025-05-13 | End: 2025-05-13

## 2025-05-13 RX ORDER — CEFAZOLIN 2 G/1
2 INJECTION, POWDER, FOR SOLUTION INTRAMUSCULAR; INTRAVENOUS
Status: COMPLETED | OUTPATIENT
Start: 2025-05-13 | End: 2025-05-13

## 2025-05-13 RX ADMIN — SODIUM CHLORIDE, SODIUM LACTATE, POTASSIUM CHLORIDE, AND CALCIUM CHLORIDE: 600; 310; 30; 20 INJECTION, SOLUTION INTRAVENOUS at 07:05

## 2025-05-13 RX ADMIN — FAMOTIDINE 20 MG: 10 INJECTION, SOLUTION INTRAVENOUS at 06:05

## 2025-05-13 RX ADMIN — LIDOCAINE HYDROCHLORIDE 100 MG: 20 INJECTION INTRAVENOUS at 07:05

## 2025-05-13 RX ADMIN — FENTANYL CITRATE 25 MCG: 50 INJECTION, SOLUTION INTRAMUSCULAR; INTRAVENOUS at 07:05

## 2025-05-13 RX ADMIN — DEXMEDETOMIDINE 4 MCG: 200 INJECTION, SOLUTION INTRAVENOUS at 07:05

## 2025-05-13 RX ADMIN — DEXAMETHASONE SODIUM PHOSPHATE 4 MG: 4 INJECTION, SOLUTION INTRA-ARTICULAR; INTRALESIONAL; INTRAMUSCULAR; INTRAVENOUS; SOFT TISSUE at 07:05

## 2025-05-13 RX ADMIN — DIPHENHYDRAMINE HYDROCHLORIDE 6.25 MG: 50 INJECTION INTRAMUSCULAR; INTRAVENOUS at 07:05

## 2025-05-13 RX ADMIN — MIDAZOLAM HYDROCHLORIDE 2 MG: 1 INJECTION, SOLUTION INTRAMUSCULAR; INTRAVENOUS at 07:05

## 2025-05-13 RX ADMIN — ACETAMINOPHEN 1000 MG: 10 INJECTION, SOLUTION INTRAVENOUS at 07:05

## 2025-05-13 RX ADMIN — ONDANSETRON 4 MG: 2 INJECTION INTRAMUSCULAR; INTRAVENOUS at 07:05

## 2025-05-13 RX ADMIN — METOCLOPRAMIDE 10 MG: 5 INJECTION, SOLUTION INTRAMUSCULAR; INTRAVENOUS at 06:05

## 2025-05-13 RX ADMIN — PROPOFOL 160 MG: 10 INJECTION, EMULSION INTRAVENOUS at 07:05

## 2025-05-13 RX ADMIN — SCOPOLAMINE 1 PATCH: 1.5 PATCH, EXTENDED RELEASE TRANSDERMAL at 06:05

## 2025-05-13 RX ADMIN — KETOROLAC TROMETHAMINE 30 MG: 30 INJECTION, SOLUTION INTRAMUSCULAR; INTRAVENOUS at 08:05

## 2025-05-13 RX ADMIN — CEFAZOLIN 2 G: 2 INJECTION, POWDER, FOR SOLUTION INTRAMUSCULAR; INTRAVENOUS at 07:05

## 2025-05-13 NOTE — TELEPHONE ENCOUNTER
----- Message from Perez Su MD sent at 5/13/2025  8:35 AM CDT -----  One week nurse visit stent removal, see me in 6 weeks with a KUB and renal ultrasound

## 2025-05-13 NOTE — PLAN OF CARE
POC discussed with pt. All questions asked and answered. NAD noted. Pt will now be prepared for d/c home.

## 2025-05-13 NOTE — TRANSFER OF CARE
"Anesthesia Transfer of Care Note    Patient: Krupa Walker    Procedure(s) Performed: Procedure(s) (LRB):  CYSTOURETEROSCOPY, W/ HOLMIUM LASER LITHOTRIPSY OF URETERAL CALCULUS & STENT INSERTION (Left)  PYELOGRAM, RETROGRADE (Left)  REMOVAL, CALCULUS, URETER, URETEROSCOPIC (Left)  REMOVAL-STENT (Left)    Patient location: PACU    Anesthesia Type: general    Transport from OR: Transported from OR on room air with adequate spontaneous ventilation    Post pain: adequate analgesia    Post assessment: no apparent anesthetic complications and tolerated procedure well    Post vital signs: stable    Level of consciousness: responds to stimulation and sedated    Nausea/Vomiting: no nausea/vomiting    Complications: none    Transfer of care protocol was followedComments: Report given to PACU RN at bedside. Hand off tool used. RN given opportunity to ask questions or clarify concerns. No Concerns verbalized. RN was asked if ready to assume care of patient. RN verbally confirmed. Pt. left in stable condition. SV. Vital Signs Return to Near Baseline. No s/s of distress noted.       Last vitals: Visit Vitals  BP (!) 177/97 (BP Location: Right arm, Patient Position: Sitting)   Pulse 75   Temp 36.5 °C (97.7 °F) (Temporal)   Resp 18   Ht 5' 3" (1.6 m)   Wt 102 kg (224 lb 13.9 oz)   LMP 04/01/2024   SpO2 99%   Breastfeeding Yes   BMI 39.83 kg/m²     "

## 2025-05-13 NOTE — ANESTHESIA PROCEDURE NOTES
Intubation    Date/Time: 5/13/2025 7:08 AM    Performed by: Gwen Hammonds CRNA  Authorized by: Freedom Perry MD    Intubation:     Induction:  Intravenous    Intubated:  Postinduction    Mask Ventilation:  Not attempted    Attempts:  1    Attempted By:  CRNA    Difficult Airway Encountered?: No      Complications:  None    Airway Device:  Supraglottic airway/LMA    Airway Device Size:  4.0    Secured at:  The lips    Placement Verified By:  Capnometry    Complicating Factors:  None    Findings Post-Intubation:  BS equal bilateral and atraumatic/condition of teeth unchanged

## 2025-05-13 NOTE — OP NOTE
Date of Procedure: 05/13/2025    PREOPERATIVE DIAGNOSIS:  left ureteral stone.                                                                                                           POSTOPERATIVE DIAGNOSIS:  left ureteral stone.                               PROCEDURES:                                                                  1.  left ureteroscopy with laser lithotripsy and stone basket extraction.                          2.  Cystoscopy with placement of left double-J ureteral stent.              3.  left retrograde pyelogram.                                               4.  Fluoro less than 1 hour.                                                 SURGEON:  Perez Su M.D.                                          Assistants: None    Specimen: none    ANESTHESIA:  General endotracheal.                                           FINDINGS:  Stone extracted, retrograde unremarkable, stent in good position.                                    BLOOD LOSS:  None.                                                         DRAINS:  left 7 Maltese x 26 cm double-J ureteral stent.                      PROCEDURE IN DETAIL:  Patient is brought to the operative suite and placed under anesthesia in the dorsal lithotomy position.  After being sterilely prepped and draped a 21 Maltese sheath cystoscope was placed into a normal urethra.  Bladder was otherwise unremarkable, no evidence of intravesical lesions or other abnormalities.  Bilateral ureteral orifices normal size, shape, caliber and location.  Wire was inserted up the left ureter, right stent was removed without incident.  We then inserted a rigid scope up to the level of the stone which had passed to the mid ureter, laser was used to fragment the stone into sufficient enough pieces in order to be basketed off.  We then reinserted the scope extracting as many pieces as possible, a 2nd wire was inserted and over the wire using Seldinger technique, we placed a  flexible ureteral scope.  Renal pelvis was then investigated demonstrating 1 more stone fragment in the lower segment which was extracted.  Scope was extracted slowly demonstrating no further stones within the ureter.  Over the wire using Seldinger technique we placed a Penn catheter and removed the wire.  Retrograde was done demonstrating no filling defects or other abnormalities, otherwise unremarkable left retrograde nephrogram of the lower pole moiety.  We then reinserted the wire, Penn catheter was removed, over the wire using Seldinger technique we placed a 7 Tristanian by 26 cm double-J ureteral stent with a good coil in renal pelvis the bladder.  The bladder was drained and the tether was tucked vaginally.  She will have this removed as a nurse visit in 1 week, see me in 6 weeks with a KUB and renal ultrasound.    COMPLICATIONS:  None.

## 2025-05-13 NOTE — DISCHARGE SUMMARY
[facility] - Surgery (Hospital)  Discharge Note  Short Stay    Procedure(s) (LRB):  CYSTOURETEROSCOPY, W/ HOLMIUM LASER LITHOTRIPSY OF URETERAL CALCULUS & STENT INSERTION (Left)  PYELOGRAM, RETROGRADE (Left)  REMOVAL, CALCULUS, URETER, URETEROSCOPIC (Left)  REMOVAL-STENT (Left)      OUTCOME: Patient tolerated treatment/procedure well without complication and is now ready for discharge.    DISPOSITION: Home or Self Care    FINAL DIAGNOSIS:  nephrolithiasis    FOLLOWUP: In clinic    DISCHARGE INSTRUCTIONS:    Discharge Procedure Orders   X-Ray Abdomen AP 1 View   Standing Status: Future Standing Exp. Date: 05/13/26     Order Specific Question Answer Comments   May the Radiologist modify the order per protocol to meet the clinical needs of the patient? Yes      US Retroperitoneal Complete   Standing Status: Future Standing Exp. Date: 05/13/26     Order Specific Question Answer Comments   May the Radiologist modify the order per protocol to meet the clinical needs of the patient? Yes      Diet Adult Regular     Notify your health care provider if you experience any of the following:  severe uncontrolled pain     Notify your health care provider if you experience any of the following:  persistent nausea and vomiting or diarrhea     Notify your health care provider if you experience any of the following:  temperature >100.4     Activity as tolerated        TIME SPENT ON DISCHARGE: 5 minutes

## 2025-05-13 NOTE — ANESTHESIA PREPROCEDURE EVALUATION
2025  Krupa Walker is a 34 y.o., female.    Past Medical History:   Diagnosis Date    Anxiety     Depression     GERD (gastroesophageal reflux disease)     Gestational hypertension, third trimester 2019    Hypertension     gestational     Hypoglycemia     Kidney calculi     2016    Migraines     Polycystic ovaries     PONV (postoperative nausea and vomiting)     STD (sexually transmitted disease)     Urinary tract infection      Past Surgical History:   Procedure Laterality Date    ANKLE SURGERY Right      SECTION  10/2014    CYSTOSCOPY W/ RETROGRADES Left 2025    Procedure: CYSTOSCOPY, WITH RETROGRADE PYELOGRAM;  Surgeon: Perez Su MD;  Location: [facility];  Service: Urology;  Laterality: Left;    CYSTOSCOPY W/ URETERAL STENT PLACEMENT Left 10/02/2019    Procedure: CYSTOSCOPY, WITH URETERAL STENT INSERTION - Under ultrasound guidance;  Surgeon: Marsha Guevara MD;  Location: [facility];  Service: Urology;  Laterality: Left;    CYSTOURETEROSCOPY, WITH HOLMIUM LASER LITHOTRIPSY OF URETERAL CALCULUS AND STENT INSERTION Left 2025    Procedure: CYSTOURETEROSCOPY, WITH HOLMIUM LASER LITHOTRIPSY OF URETERAL CALCULUS AND STENT INSERTION;  Surgeon: Perez Su MD;  Location: [facility] OR;  Service: Urology;  Laterality: Left;    KIDNEY STONE SURGERY      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)    LASER LITHOTRIPSY Left 10/16/2019    Procedure: LITHOTRIPSY, USING LASER;  Surgeon: Marsha Guevara MD;  Location: [facility];  Service: Urology;  Laterality: Left;    TONSILLECTOMY      ULTRASOUND GUIDANCE Left 10/16/2019    Procedure: ULTRASOUND GUIDANCE, for laser litho;  Surgeon: Marsha Guevara MD;  Location: [facility];  Service: Urology;  Laterality: Left;    URETEROSCOPIC REMOVAL OF URETERIC CALCULUS Left  10/16/2019    Procedure: REMOVAL, CALCULUS, URETER, URETEROSCOPIC possible retro pyelo, possible lasere litho, possible stent;  Surgeon: Marsha Guevara MD;  Location: [facility];  Service: Urology;  Laterality: Left;  25 WEEKS PREGNANT / FETAL MONITORING BEFORE AND AFTER PROCEDURE/   CALL PELON ELDER [phone]      WISDOM TOOTH EXTRACTION         Chemistry        Component Value Date/Time     04/29/2025 1434     02/18/2025 1109    K 3.9 04/29/2025 1434    K 4.4 02/18/2025 1109     04/29/2025 1434     02/18/2025 1109    CO2 24 04/29/2025 1434    CO2 25 02/18/2025 1109    BUN 13 04/29/2025 1434    CREATININE 0.6 04/29/2025 1434    GLU 71 04/29/2025 1434    GLU 91 02/18/2025 1109        Component Value Date/Time    CALCIUM 9.2 04/29/2025 1434    CALCIUM 9.1 02/18/2025 1109    ALKPHOS 107 02/18/2025 1109    AST 24 02/18/2025 1109    ALT 18 02/18/2025 1109    BILITOT 0.4 02/18/2025 1109    ESTGFRAFRICA >60 06/04/2021 0723    EGFRNONAA >60 06/04/2021 0723        Lab Results   Component Value Date    WBC 8.13 04/29/2025    HGB 12.9 04/29/2025    HCT 38.6 04/29/2025    MCV 89 04/29/2025     04/29/2025           Pre-op Assessment    I have reviewed the Patient Summary Reports.     I have reviewed the Nursing Notes. I have reviewed the NPO Status.   I have reviewed the Medications.     Review of Systems  Anesthesia Hx:  No problems with previous Anesthesia  Scope patch on the past for PONV prevention History of prior surgery of interest to airway management or planning:  Previous anesthesia: General         Personal Hx of Anesthesia complications, Post-Operative Nausea/Vomiting                    Social:  Non-Smoker       Hematology/Oncology:  Hematology Normal   Oncology Normal                                   Cardiovascular:     Hypertension              ECG has been reviewed. Sinus tachycardia   Low voltage QRS   Borderline Abnormal ECG   When compared with ECG of 13-NOV-2018 11:15,   Vent.  rate has increased BY  35 BPM   Criteria for Inferior infarct are no longer Present   Nonspecific T wave abnormality has replaced inverted T waves in Inferior   leads   Confirmed by Donis Rivera MD ([id number]) on 9/24/2024 12:35:48 AM                              Pulmonary:  Pulmonary Normal                       Renal/:  Chronic Renal Disease renal calculi  Duplicated left renal conducting system              Hepatic/GI:     GERD                Musculoskeletal:  Musculoskeletal Normal                Neurological:    Neuromuscular Disease,  Headaches     Cervical myofascial pain syndrome                             Endocrine:  Endocrine Normal    BMI 40      Morbid Obesity / BMI > 40  Dermatological:  Skin Normal    Psych:  Psychiatric History anxiety  JAVI               Physical Exam  General: Well nourished, Cooperative, Alert and Oriented    Airway:  Mallampati: II / II  Mouth Opening: Normal  TM Distance: Normal  Tongue: Normal  Neck ROM: Normal ROM    Dental:  Intact        Anesthesia Plan  Type of Anesthesia, risks & benefits discussed:    Anesthesia Type: Gen ETT, Gen Supraglottic Airway, MAC  Intra-op Monitoring Plan: Standard ASA Monitors  Post Op Pain Control Plan: multimodal analgesia and IV/PO Opioids PRN  Induction:  IV  Airway Plan: Direct  Informed Consent: Informed consent signed with the Patient and all parties understand the risks and agree with anesthesia plan.  All questions answered.   ASA Score: 3  Day of Surgery Review of History & Physical: H&P Update referred to the surgeon/provider.I have interviewed and examined the patient. I have reviewed the patient's H&P dated: There are no significant changes. H&P completed by Anesthesiologist.    Ready For Surgery From Anesthesia Perspective.     .

## 2025-05-14 VITALS
WEIGHT: 224.88 LBS | OXYGEN SATURATION: 96 % | BODY MASS INDEX: 39.84 KG/M2 | HEIGHT: 63 IN | HEART RATE: 74 BPM | SYSTOLIC BLOOD PRESSURE: 126 MMHG | RESPIRATION RATE: 17 BRPM | DIASTOLIC BLOOD PRESSURE: 57 MMHG | TEMPERATURE: 97 F

## 2025-05-16 ENCOUNTER — PATIENT MESSAGE (OUTPATIENT)
Dept: UROLOGY | Facility: CLINIC | Age: 35
End: 2025-05-16
Payer: COMMERCIAL

## 2025-05-16 NOTE — ANESTHESIA POSTPROCEDURE EVALUATION
Anesthesia Post Evaluation    Patient: Krupa Walker    Procedure(s) Performed: Procedure(s) (LRB):  CYSTOURETEROSCOPY, W/ HOLMIUM LASER LITHOTRIPSY OF URETERAL CALCULUS & STENT INSERTION (Left)  PYELOGRAM, RETROGRADE (Left)  REMOVAL, CALCULUS, URETER, URETEROSCOPIC (Left)  REMOVAL-STENT (Left)    Final Anesthesia Type: general      Patient location during evaluation: PACU  Patient participation: Yes- Able to Participate  Level of consciousness: awake and alert, oriented and awake  Post-procedure vital signs: reviewed and stable  Pain management: adequate  Airway patency: patent    PONV status at discharge: No PONV  Anesthetic complications: no      Cardiovascular status: blood pressure returned to baseline  Respiratory status: unassisted  Hydration status: euvolemic  Follow-up not needed.              Vitals Value Taken Time   /59 05/13/25 08:50   Temp 36.1 °C (97 °F) 05/13/25 08:45   Pulse 74 05/13/25 08:54   Resp 38 05/13/25 08:51   SpO2 92 % 05/13/25 08:51   Vitals shown include unfiled device data.      Event Time   Out of Recovery 08:50:37         Pain/Scott Score: No data recorded

## 2025-05-21 ENCOUNTER — CLINICAL SUPPORT (OUTPATIENT)
Dept: UROLOGY | Facility: CLINIC | Age: 35
End: 2025-05-21
Payer: COMMERCIAL

## 2025-05-21 DIAGNOSIS — N20.0 NEPHROLITHIASIS: Primary | ICD-10-CM

## 2025-05-21 NOTE — PROGRESS NOTES
Patient came in  today for stent pull, patient was placed in supine position and instructed to relax and take deep breathes. Stent was pulled, patient tolerated well. And left clinic per self ambulatory.

## 2025-05-28 NOTE — PROGRESS NOTES
CC: Well woman exam    Krupa Walker is a 32 y.o. female  presents for well woman exam.  LMP: No LMP recorded..  increased pelvic pressure and pain with intercourse, right> left, positional. Cycles have been prolonged, took provera about 2 months ago, currently day 37.  Some irritation of groin and mons.  Has had gardasil vaccine.    Past Medical History:   Diagnosis Date    Anxiety     Depression     GERD (gastroesophageal reflux disease)     Gestational hypertension, third trimester 2019    Hypertension     gestational     Hypoglycemia     Kidney calculi     2016    Migraines     Polycystic ovaries     PONV (postoperative nausea and vomiting)     STD (sexually transmitted disease)     Urinary tract infection      Past Surgical History:   Procedure Laterality Date    ANKLE SURGERY Right 2008     SECTION  10/2014    CYSTOSCOPY W/ URETERAL STENT PLACEMENT Left 10/2/2019    Procedure: CYSTOSCOPY, WITH URETERAL STENT INSERTION - Under ultrasound guidance;  Surgeon: Marsha Guevara MD;  Location: [facility];  Service: Urology;  Laterality: Left;    KIDNEY STONE SURGERY      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)    LASER LITHOTRIPSY Left 10/16/2019    Procedure: LITHOTRIPSY, USING LASER;  Surgeon: Marsha Guevara MD;  Location: [facility];  Service: Urology;  Laterality: Left;    TONSILLECTOMY      ULTRASOUND GUIDANCE Left 10/16/2019    Procedure: ULTRASOUND GUIDANCE, for laser litho;  Surgeon: Marsha Guevara MD;  Location: [facility];  Service: Urology;  Laterality: Left;    URETEROSCOPIC REMOVAL OF URETERIC CALCULUS Left 10/16/2019    Procedure: REMOVAL, CALCULUS, URETER, URETEROSCOPIC possible retro pyelo, possible lasere litho, possible stent;  Surgeon: Marsha Guevara MD;  Location: [facility];  Service: Urology;  Laterality: Left;  25 WEEKS PREGNANT / FETAL MONITORING BEFORE AND AFTER PROCEDURE/   CALL PELON ELDER [phone]      KATELYNN  "TOOTH EXTRACTION       Social History     Socioeconomic History    Marital status:    Occupational History    Occupation: nurse     Comment: ICU- [employer]   Tobacco Use    Smoking status: Never    Smokeless tobacco: Never   Substance and Sexual Activity    Alcohol use: Yes     Alcohol/week: 0.0 standard drinks     Comment: 1 drink per month    Drug use: Never    Sexual activity: Yes     Partners: Male     Birth control/protection: Condom     Comment: IUD removed due to complications in 2018     Family History   Problem Relation Age of Onset    Hypertension Mother     Hypertension Father     Heart disease Father     Depression Father     Hyperlipidemia Father     No Known Problems Sister     Nephrolithiasis Daughter     No Known Problems Sister     No Known Problems Sister     Breast cancer Paternal Grandmother         50s at diagnosis    Cancer Paternal Grandmother         breast    Hypertension Paternal Grandmother     Heart disease Paternal Grandfather     Hyperlipidemia Paternal Grandfather     Hypertension Paternal Grandfather     Hyperlipidemia Maternal Grandfather     Hypertension Maternal Grandmother     Colon cancer Neg Hx     Ovarian cancer Neg Hx      OB History          2    Para   2    Term   2            AB        Living   2         SAB        IAB        Ectopic        Multiple   0    Live Births   2                 BP (!) 142/86   Ht 5' 4" (1.626 m)   Wt 101.8 kg (224 lb 6.9 oz)   BMI 38.52 kg/m²       ROS:  GENERAL: Denies weight gain or weight loss. Feeling well overall.   SKIN: Denies rash or lesions.   HEAD: Denies head injury or headache.   NODES: Denies enlarged lymph nodes.   CHEST: Denies chest pain or shortness of breath.   CARDIOVASCULAR: Denies palpitations or left sided chest pain.   ABDOMEN: No abdominal pain, constipation, diarrhea, nausea, vomiting or rectal bleeding.   URINARY: No frequency, dysuria, hematuria, or burning on " urination.  REPRODUCTIVE: See HPI.   BREASTS: The patient performs breast self-examination and denies pain, lumps, or nipple discharge.   HEMATOLOGIC: No easy bruisability or excessive bleeding.   MUSCULOSKELETAL: Denies joint pain or swelling.   NEUROLOGIC: Denies syncope or weakness.   PSYCHIATRIC: Denies depression, anxiety or mood swings.    PHYSICAL EXAM:  APPEARANCE: Well nourished, well developed, in no acute distress.  AFFECT: WNL, alert and oriented x 3  SKIN: No acne or hirsutism. Splitting of skin on abdomen, yeast related  NECK: Neck symmetric without masses or thyromegaly  NODES: No inguinal, cervical, axillary, or femoral lymph node enlargement  CHEST: Good respiratory effect  ABDOMEN: Soft.  No tenderness or masses.  No hepatosplenomegaly.  No hernias.  BREASTS: Symmetrical, no skin changes or visible lesions.  No palpable masses, nipple discharge bilaterally.  PELVIC: Normal external genitalia without lesions.  Normal hair distribution.  Adequate perineal body, normal urethral meatus.  Vagina moist and well rugated without lesions or discharge.  Cervix pink, without lesions, discharge or tenderness.  No significant cystocele or rectocele.  Bimanual exam shows uterus to be normal size, regular, mobile and nontender.  Adnexa without masses or tenderness.  Fullness right  EXTREMITIES: No edema.    Well woman exam with routine gynecological exam  -     Liquid-Based Pap Smear, Screening  -     HPV High Risk Genotypes, PCR    Pelvic pain in female  -     US Pelvis Comp with Transvag NON-OB (xpd; Future; Expected date: 04/04/2023    Nonintractable headache, unspecified chronicity pattern, unspecified headache type  -     PROLACTIN; Future; Expected date: 04/04/2023    Nipple discharge  -     PROLACTIN; Future; Expected date: 04/04/2023    Yeast dermatitis  -     clotrimazole-betamethasone 1-0.05% (LOTRISONE) cream; Apply topically 2 (two) times daily.  Dispense: 45 g; Refill: 2  -     nystatin (MYCOSTATIN)  powder; Apply topically 2 (two) times daily.  Dispense: 60 g; Refill: 2            Patient was counseled today on A.C.S. Pap guidelines and recommendations for yearly pelvic exams, mammograms and monthly self breast exams; to see her PCP for other health maintenance.     No follow-ups on file.                Pelvic u/s     [Alert] : alert [Anterior B[place]ide Closed] : anterior fontanelle closed [Red Reflex Bilateral] : red reflex bilateral [Symmetric Light Reflex] : symmetric light reflex [PERRL] : PERRL [EOMI Bilateral] : EOMI bilateral [Normally Placed Ears] : normally placed ears [Auricles Well Formed] : auricles well formed [Clear Tympanic membranes with present light reflex and bony landmarks] : clear tympanic membranes with present light reflex and bony landmarks [No Discharge] : no discharge [Nares Patent] : nares patent [Palate Intact] : palate intact [Uvula Midline] : uvula midline [Tooth Eruption] : tooth eruption  [Supple, full passive range of motion] : supple, full passive range of motion [No Palpable Masses] : no palpable masses [Symmetric Chest Rise] : symmetric chest rise [Clear to Auscultation Bilaterally] : clear to auscultation bilaterally [Regular Rate and Rhythm] : regular rate and rhythm [S1, S2 present] : S1, S2 present [No Murmurs] : no murmurs [+2 Femoral Pulses] : +2 femoral pulses [Soft] : soft [NonTender] : non tender [Non Distended] : non distended [Normoactive Bowel Sounds] : normoactive bowel sounds [No Hepatomegaly] : no hepatomegaly [No Splenomegaly] : no splenomegaly [Leonard 1] : Leonard 1 [No Clitoromegaly] : no clitoromegaly [Normal Vaginal Introitus] : normal vaginal introitus [Patent] : patent [Normally Placed] : normally placed [No Abnormal Lymph Nodes Palpated] : no abnormal lymph nodes palpated [No Clavicular Crepitus] : no clavicular crepitus [Negative Chaparro-Ortalani] : negative Chaparro-Ortalani [Symmetric Buttocks Creases] : symmetric buttocks creases [No Spinal Dimple] : no spinal dimple [NoTuft of Hair] : no tuft of hair [Cranial Nerves Grossly Intact] : cranial nerves grossly intact [No Rash or Lesions] : no rash or lesions [FreeTextEntry1] : screams crying and fights during exam

## 2025-06-22 ENCOUNTER — PATIENT MESSAGE (OUTPATIENT)
Dept: INTERNAL MEDICINE | Facility: CLINIC | Age: 35
End: 2025-06-22
Payer: COMMERCIAL

## 2025-06-26 RX ORDER — SUMATRIPTAN SUCCINATE 100 MG/1
TABLET ORAL
Qty: 9 TABLET | Refills: 2 | Status: SHIPPED | OUTPATIENT
Start: 2025-06-26

## 2025-07-10 ENCOUNTER — HOSPITAL ENCOUNTER (OUTPATIENT)
Dept: RADIOLOGY | Facility: HOSPITAL | Age: 35
Discharge: HOME | End: 2025-07-10
Attending: UROLOGY
Payer: COMMERCIAL

## 2025-07-10 ENCOUNTER — OFFICE VISIT (OUTPATIENT)
Dept: UROLOGY | Facility: CLINIC | Age: 35
End: 2025-07-10
Attending: UROLOGY
Payer: COMMERCIAL

## 2025-07-10 VITALS
SYSTOLIC BLOOD PRESSURE: 113 MMHG | DIASTOLIC BLOOD PRESSURE: 81 MMHG | WEIGHT: 224.88 LBS | BODY MASS INDEX: 39.84 KG/M2 | HEIGHT: 63 IN | HEART RATE: 73 BPM

## 2025-07-10 DIAGNOSIS — N20.0 NEPHROLITHIASIS: ICD-10-CM

## 2025-07-10 DIAGNOSIS — N20.0 NEPHROLITHIASIS: Primary | ICD-10-CM

## 2025-07-10 PROCEDURE — 99024 POSTOP FOLLOW-UP VISIT: CPT | Mod: S$GLB,,, | Performed by: UROLOGY

## 2025-07-10 PROCEDURE — 1160F RVW MEDS BY RX/DR IN RCRD: CPT | Mod: CPTII,S$GLB,, | Performed by: UROLOGY

## 2025-07-10 PROCEDURE — 3074F SYST BP LT 130 MM HG: CPT | Mod: CPTII,S$GLB,, | Performed by: UROLOGY

## 2025-07-10 PROCEDURE — 74018 RADEX ABDOMEN 1 VIEW: CPT | Mod: 26,,, | Performed by: RADIOLOGY

## 2025-07-10 PROCEDURE — 3044F HG A1C LEVEL LT 7.0%: CPT | Mod: CPTII,S$GLB,, | Performed by: UROLOGY

## 2025-07-10 PROCEDURE — 99999 PR PBB SHADOW E&M-EST. PATIENT-LVL IV: CPT | Mod: PBBFAC,,, | Performed by: UROLOGY

## 2025-07-10 PROCEDURE — 1159F MED LIST DOCD IN RCRD: CPT | Mod: CPTII,S$GLB,, | Performed by: UROLOGY

## 2025-07-10 PROCEDURE — 76770 US EXAM ABDO BACK WALL COMP: CPT | Mod: TC

## 2025-07-10 PROCEDURE — 76770 US EXAM ABDO BACK WALL COMP: CPT | Mod: 26,,, | Performed by: RADIOLOGY

## 2025-07-10 PROCEDURE — 3079F DIAST BP 80-89 MM HG: CPT | Mod: CPTII,S$GLB,, | Performed by: UROLOGY

## 2025-07-10 PROCEDURE — 74018 RADEX ABDOMEN 1 VIEW: CPT | Mod: TC

## 2025-07-10 NOTE — PROGRESS NOTES
Chief Complaint:   Encounter Diagnosis   Name Primary?    Nephrolithiasis Yes       HPI:    7/10/25- patient has done well following stent removal, here today to discuss imaging.    34-year-old female who reports due to acute stone passage and a do pain system on the left.  She has had multiple stones and treated at other facilities, these include ureteroscopy x2 and multiple stents.  No history of ESWL.  Approximately 3 days ago she began to have temperatures of 100.1, with discomfort.  Pain continued and she reported to the outside ER with no fever and a normal urinalysis but a large stone in a duplicated system on the left.  Of note therefore that she has a very low duplication towards the bladder.  No other urological or gynecological history, no reported history of UTIs, currently not on antibiotics.  No gross hematuria, no history of smoking, no family history of urological cancers, her father has had stones.    Allergies:  Patient has no known allergies.    Medications:  See MAR    Review of Systems:  General: No fever, chills, fatigability, or weight loss.  Skin: No rashes, itching, or changes in color or texture of skin.  Chest: Denies OLIVAS, cyanosis, wheezing, cough, and sputum production.  Abdomen: Appetite fine. No weight loss. Denies diarrhea, abdominal pain, hematemesis, or blood in stool.  Musculoskeletal: No joint stiffness or swelling. Denies back pain.  : As above.  All other review of systems negative.    PMH:   has a past medical history of Anxiety, Depression, GERD (gastroesophageal reflux disease), Gestational hypertension, third trimester (2019), Hypertension, Hypoglycemia, Kidney calculi, Migraines, Polycystic ovaries, PONV (postoperative nausea and vomiting), STD (sexually transmitted disease), and Urinary tract infection.    PSH:   has a past surgical history that includes  section (10/2014); Tonsillectomy; Ankle surgery (Right, ); [employer] tooth extraction; Kidney stone  surgery; Cystoscopy w/ ureteral stent placement (Left, 10/02/2019); Ureteroscopic removal of ureteric calculus (Left, 10/16/2019); Laser lithotripsy (Left, 10/16/2019); Ultrasound guidance (Left, 10/16/2019); cystoureteroscopy, with holmium laser lithotripsy of ureteral calculus and stent insertion (Left, 4/29/2025); and Cystoscopy w/ retrogrades (Left, 4/29/2025).    FamHx: family history includes Breast cancer in her paternal grandmother; Cancer in her paternal grandmother; Depression in her father; Heart disease in her father and paternal grandfather; Hyperlipidemia in her father, maternal grandfather, and paternal grandfather; Hypertension in her father, maternal grandmother, mother, paternal grandfather, and paternal grandmother; Nephrolithiasis in her daughter; No Known Problems in her sister, sister, and sister.    SocHx:  reports that she has never smoked. She has been exposed to tobacco smoke. She has never used smokeless tobacco. She reports that she does not currently use alcohol. She reports that she does not use drugs.      Physical Exam:  There were no vitals filed for this visit.  General: A&Ox3, no apparent distress, no deformities  Neck: No masses, normal ROM  Lungs: normal inspiration, no use of accessory muscles  Heart: normal pulse, no arrhythmias  Abdomen: Soft, NT, ND, no masses, no hernias, no hepatosplenomegaly  Skin: The skin is warm and dry. No jaundice.  Ext: No c/c/e.  : 5/25- No pelvic floor prolapse.  Normal introitus, no urethral abnomralities. No Perineal abnormalities.    Labs/Studies:   Stone analysis 100% Ca phosphate/apatite 4/25  KUB/CHARLES normal 7/25  KUB left lower moiety stone, left stent 5/25  CT stone protocol left renal stone in the lower moiety, 9 mm left proximal ureteral stone in the upper moiety with hydronephrosis 4/25    Impression/Plan:       Nephrolithiasis-  left ureteroscopy  5/13/25    Imaging appears to be stable with no evidence of stone pain.  Will pursue a 24  hour urine and call with recommendations.  See me in 6 months with a KUB and renal ultrasound, unless she needs to be seen sooner.  If stable then can pursue yearly from there.  Patient has had multiple procedures and stone passages.

## 2025-07-23 ENCOUNTER — OFFICE VISIT (OUTPATIENT)
Dept: INTERNAL MEDICINE | Facility: CLINIC | Age: 35
End: 2025-07-23
Payer: COMMERCIAL

## 2025-07-23 VITALS
HEART RATE: 103 BPM | DIASTOLIC BLOOD PRESSURE: 78 MMHG | SYSTOLIC BLOOD PRESSURE: 112 MMHG | WEIGHT: 227.06 LBS | TEMPERATURE: 99 F | OXYGEN SATURATION: 97 % | BODY MASS INDEX: 40.23 KG/M2 | RESPIRATION RATE: 16 BRPM | HEIGHT: 63 IN

## 2025-07-23 DIAGNOSIS — K21.9 GASTROESOPHAGEAL REFLUX DISEASE, UNSPECIFIED WHETHER ESOPHAGITIS PRESENT: ICD-10-CM

## 2025-07-23 DIAGNOSIS — M70.71 BURSITIS OF RIGHT HIP, UNSPECIFIED BURSA: Primary | ICD-10-CM

## 2025-07-23 DIAGNOSIS — R20.2 PARESTHESIA OF LOWER EXTREMITY: ICD-10-CM

## 2025-07-23 DIAGNOSIS — J02.9 SORE THROAT: ICD-10-CM

## 2025-07-23 DIAGNOSIS — M25.551 HIP PAIN, ACUTE, RIGHT: ICD-10-CM

## 2025-07-23 PROCEDURE — 3044F HG A1C LEVEL LT 7.0%: CPT | Mod: CPTII,S$GLB,,

## 2025-07-23 PROCEDURE — 1160F RVW MEDS BY RX/DR IN RCRD: CPT | Mod: CPTII,S$GLB,,

## 2025-07-23 PROCEDURE — 3074F SYST BP LT 130 MM HG: CPT | Mod: CPTII,S$GLB,,

## 2025-07-23 PROCEDURE — 99214 OFFICE O/P EST MOD 30 MIN: CPT | Mod: S$GLB,,,

## 2025-07-23 PROCEDURE — 1159F MED LIST DOCD IN RCRD: CPT | Mod: CPTII,S$GLB,,

## 2025-07-23 PROCEDURE — 3008F BODY MASS INDEX DOCD: CPT | Mod: CPTII,S$GLB,,

## 2025-07-23 PROCEDURE — 99999 PR PBB SHADOW E&M-EST. PATIENT-LVL V: CPT | Mod: PBBFAC,,,

## 2025-07-23 PROCEDURE — 3078F DIAST BP <80 MM HG: CPT | Mod: CPTII,S$GLB,,

## 2025-07-23 RX ORDER — PANTOPRAZOLE SODIUM 40 MG/1
40 TABLET, DELAYED RELEASE ORAL DAILY
Qty: 30 TABLET | Refills: 2 | Status: SHIPPED | OUTPATIENT
Start: 2025-07-23 | End: 2025-10-21

## 2025-07-23 RX ORDER — IBUPROFEN 800 MG/1
800 TABLET, FILM COATED ORAL 3 TIMES DAILY
Qty: 30 TABLET | Refills: 0 | Status: SHIPPED | OUTPATIENT
Start: 2025-07-23

## 2025-07-23 RX ORDER — CYCLOBENZAPRINE HCL 10 MG
10 TABLET ORAL 3 TIMES DAILY PRN
Qty: 30 TABLET | Refills: 0 | Status: SHIPPED | OUTPATIENT
Start: 2025-07-23 | End: 2025-08-02

## 2025-07-23 NOTE — PROGRESS NOTES
"Subjective:       Patient ID: Krupa Walker is a 34 y.o. female.    Chief Complaint: Hip Pain (Rt. + Bilateral Leg Warming Sensation "Thighs to Calves")    History of Present Illness    CHIEF COMPLAINT:  Patient presents today for right hip pain.    HISTORY OF PRESENT ILLNESS:  She reports constant right hip pain with a deep, achy quality that has been progressively worsening over the last three weeks. Pain is located on the side of the hip with radiation to lateral thigh and is exacerbated by sitting, particularly while nursing. She experiences difficulty initiating movement after sitting. Pain severity fluctuates between a constant baseline ache and intermittent episodes of more intense discomfort. She denies sciatic pain characteristics. She has attempted various stretches with minimal relief and has tried interventions including purchasing a new mattress and shoes.    SLEEP:  Her sleep is significantly disrupted, waking at least 4 times nightly. She manages pain by sleeping with a heating pad.    POSTPARTUM HISTORY:  She is 6 months postpartum following a successful vaginal birth after  () delivery. She experienced significant sciatica during the last few weeks of her pregnancy, particularly severe when she could barely get up from bed. She indicates current symptoms are distinct from her pregnancy-related sciatica.    ASSOCIATED SYMPTOMS:  She reports experiencing intermittent warm sensations predominantly on the left thigh, described as feeling like spilled warm liquid, distinct from tingling. She notes occasional similar sensations on the right side.     CURRENT MEDICATIONS:  She takes ibuprofen, trazodone intermittently, and buspirone intermittently.  She was prescribed Flexeril in the last weeks of her pregnancy due to sciatica.      ROS:  General: -fever, -chills, -fatigue, -weight gain, -weight loss, +difficulty staying asleep, +sleep disturbances  Eyes: -vision changes, -redness, " "-discharge  ENT: +ear pain, -nasal congestion, +sore throat  Cardiovascular: -chest pain, -palpitations, -lower extremity edema  Respiratory: -cough, -shortness of breath  Gastrointestinal: -abdominal pain, -nausea, -vomiting, -diarrhea, -constipation, -blood in stool  Genitourinary: -dysuria, -hematuria, -frequency  Musculoskeletal: +joint pain, -muscle pain, +difficulty standing up  Skin: -rash, -lesion  Neurological: -headache, -dizziness, -numbness, -tingling, +feeling of increased warmth  Psychiatric: -anxiety, -depression, -sleep difficulty           /78 (BP Location: Left arm, Patient Position: Sitting)   Pulse 103   Temp 98.7 °F (37.1 °C) (Tympanic)   Resp 16   Ht 5' 3" (1.6 m)   Wt 103 kg (227 lb 1.2 oz)   LMP 06/25/2025 (Exact Date)   SpO2 97%   Breastfeeding Yes   BMI 40.22 kg/m²     Review of Systems   Constitutional:  Negative for chills and fever.   HENT:  Positive for ear pain and sore throat.    Eyes:  Negative for visual disturbance.   Respiratory:  Negative for chest tightness and shortness of breath.    Cardiovascular:  Negative for chest pain, palpitations and leg swelling.   Gastrointestinal:  Negative for constipation, diarrhea, nausea and vomiting.   Genitourinary:  Negative for difficulty urinating.   Musculoskeletal:  Positive for arthralgias and myalgias.   Neurological:  Negative for headaches.   Psychiatric/Behavioral:  Positive for sleep disturbance.    All other systems reviewed and are negative.      Objective:      Physical Exam  Vitals and nursing note reviewed.   Constitutional:       General: She is not in acute distress.     Appearance: Normal appearance. She is morbidly obese. She is not ill-appearing or toxic-appearing.   HENT:      Head: Normocephalic and atraumatic.   Eyes:      Pupils: Pupils are equal, round, and reactive to light.   Cardiovascular:      Rate and Rhythm: Normal rate and regular rhythm.   Pulmonary:      Effort: Pulmonary effort is normal.      " Breath sounds: Normal breath sounds.   Abdominal:      General: Bowel sounds are normal.      Palpations: Abdomen is soft.   Musculoskeletal:      Cervical back: Normal range of motion and neck supple.      Right hip: Tenderness present. Decreased range of motion.   Skin:     General: Skin is warm and dry.   Neurological:      General: No focal deficit present.      Mental Status: She is alert and oriented to person, place, and time.   Psychiatric:         Mood and Affect: Mood normal.         Behavior: Behavior normal.         Thought Content: Thought content normal.         Judgment: Judgment normal.         Assessment:       1. Bursitis of right hip, unspecified bursa    2. Hip pain, acute, right    3. Acute otalgia, right    4. Sore throat        Plan:       Krupa was seen today for hip pain.    Diagnoses and all orders for this visit:    Bursitis of right hip, unspecified bursa    Hip pain, acute, right  -     cyclobenzaprine (FLEXERIL) 10 MG tablet; Take 1 tablet (10 mg total) by mouth 3 (three) times daily as needed for Muscle spasms.  -     ibuprofen (ADVIL,MOTRIN) 800 MG tablet; Take 1 tablet (800 mg total) by mouth 3 (three) times daily.  -     Ambulatory Referral/Consult to Physical Therapy; Future  - Diagnosed hip bursitis based on patient's symptoms of constant deep ache in the right hip radiating to lateral thigh that worsens with sitting and pressure, progressively worsening over the last 3 weeks.  - Pain is different from sciatic pain.  - Ordered PT for hip pain management.  - Recommend continued use of heat therapy and performing stretching exercises (to be provided via [facility]).  - Prescribed Cyclobenzaprine (Flexeril) as a muscle relaxer with review of medication safety during lactation.  - Continued Ibuprofen for pain management.    PARESTHESIA OF SKIN:  - Patient experiences warm sensations on the left thigh and occasionally on the right side.  - Explained these sensations are likely related to  bursitis    Sore throat  - Throat exam revealed slight erythema, not severe enough to warrant testing unless symptoms worsen.    Follow up if symptoms worsen or fail to improve.

## 2025-07-25 ENCOUNTER — OFFICE VISIT (OUTPATIENT)
Dept: URGENT CARE | Facility: CLINIC | Age: 35
End: 2025-07-25
Payer: COMMERCIAL

## 2025-07-25 VITALS
BODY MASS INDEX: 37.56 KG/M2 | RESPIRATION RATE: 20 BRPM | TEMPERATURE: 98 F | DIASTOLIC BLOOD PRESSURE: 89 MMHG | HEIGHT: 64 IN | WEIGHT: 220 LBS | OXYGEN SATURATION: 98 % | SYSTOLIC BLOOD PRESSURE: 136 MMHG | HEART RATE: 84 BPM

## 2025-07-25 DIAGNOSIS — J02.9 SORE THROAT: Primary | ICD-10-CM

## 2025-07-25 LAB
CTP QC/QA: YES
MOLECULAR STREP A: NEGATIVE

## 2025-07-25 NOTE — PROGRESS NOTES
"Subjective:      Patient ID: Krupa Walker is a 34 y.o. female.    Vitals:  height is 5' 4" (1.626 m) and weight is 99.8 kg (220 lb). Her axillary temperature is 97.8 °F (36.6 °C). Her blood pressure is 136/89 and her pulse is 84. Her respiration is 20 and oxygen saturation is 98%.     Chief Complaint: Sore Throat    Patient present for sore throat. Symptoms started Wednesday. Exposed to Strep by her son. Administered Motrin.    Sore Throat   This is a new problem. The current episode started in the past 7 days (Wednesday). The problem has been gradually worsening. There has been no fever. The pain is at a severity of 4/10. The pain is mild. Associated symptoms include congestion, coughing, ear pain and a hoarse voice. Pertinent negatives include no abdominal pain, diarrhea, drooling, ear discharge, headaches, plugged ear sensation, neck pain, shortness of breath, stridor, swollen glands, trouble swallowing or vomiting. She has had exposure to strep. She has had no exposure to mono. The treatment provided no relief.       HENT:  Positive for ear pain, congestion and sore throat. Negative for ear discharge, drooling and trouble swallowing.    Neck: Negative for neck pain.   Respiratory:  Positive for cough. Negative for shortness of breath and stridor.    Gastrointestinal:  Negative for abdominal pain, vomiting and diarrhea.   Neurological:  Negative for headaches.      Objective:     Physical Exam   Constitutional: She is oriented to person, place, and time. She appears well-developed.   HENT:   Head: Normocephalic and atraumatic.   Ears:   Right Ear: Tympanic membrane and external ear normal.   Left Ear: Tympanic membrane and external ear normal.   Nose: Nose normal.   Mouth/Throat: Posterior oropharyngeal erythema present.   Eyes: Conjunctivae, EOM and lids are normal.   Neck: Trachea normal and phonation normal. Neck supple.   Cardiovascular: Normal rate.   Pulmonary/Chest: Effort normal. No respiratory " distress. She has no wheezes. She has no rhonchi.   Musculoskeletal: Normal range of motion.         General: Normal range of motion.   Neurological: She is alert and oriented to person, place, and time.   Skin: Skin is warm, dry and intact.   Psychiatric: Her speech is normal and behavior is normal. Judgment and thought content normal.   Nursing note and vitals reviewed.      Assessment:     1. Sore throat      Results for orders placed or performed in visit on 07/25/25   POCT Strep A, Molecular    Collection Time: 07/25/25 10:07 AM   Result Value Ref Range    Molecular Strep A, POC Negative Negative     Acceptable Yes        Plan:   VSS. Patient non-toxic appearing. Advised patient to follow up with PCP as needed.  Patient verbalized understanding, agrees with the plan, and is comfortable with discharge.      Sore throat  -     POCT Strep A, Molecular      Medical Decision Making:   Clinical Tests:   Lab Tests: Ordered and Reviewed       <> Summary of Lab: Strep negative

## 2025-07-30 ENCOUNTER — CLINICAL SUPPORT (OUTPATIENT)
Dept: REHABILITATION | Facility: HOSPITAL | Age: 35
End: 2025-07-30
Payer: COMMERCIAL

## 2025-07-30 DIAGNOSIS — M25.551 HIP PAIN, ACUTE, RIGHT: ICD-10-CM

## 2025-07-30 PROCEDURE — 97110 THERAPEUTIC EXERCISES: CPT | Mod: PN

## 2025-07-30 PROCEDURE — 97162 PT EVAL MOD COMPLEX 30 MIN: CPT | Mod: PN

## 2025-07-30 NOTE — PROGRESS NOTES
Outpatient Rehab    Physical Therapy Evaluation    Patient Name: Krupa Walker  MRN: [id number]  YOB: 1990  Encounter Date: 2025    Therapy Diagnosis:   Encounter Diagnosis   Name Primary?    Hip pain, acute, right      Physician: Leslye Thakur, *    Physician Orders: Eval and Treat  Medical Diagnosis: Acute hip pain, left  Surgical Diagnosis: Not applicable for this Episode   Surgical Date: Not applicable for this Episode  Days Since Last Surgery: Not applicable for this Episode    Visit # / Visits Authorized:    Insurance Authorization Period: 2025 to 2026  Date of Evaluation: 2025  Plan of Care Certification: 2025 to 2025     Time In: 1445   Time Out: 1525  Total Time (in minutes): 40   Total Billable Time (in minutes): 40    Intake Outcome Measure for FOTO Survey    Therapist reviewed FOTO scores for Krupa Walker on 2025.   FOTO report - see Media section or FOTO account episode details.     Intake Score (%): Not applicable for this Episode    Precautions: standard        Subjective   History of Present Illness  Krupa is a 34 y.o. female who reports to physical therapy with a chief concern of R hip pain.     The patient reports a medical diagnosis of M25.552 (ICD-10-CM) - Acute hip pain, left.                      History of Present Condition/Illness: She is 6 months postpartum following a successful vaginal birth after  () delivery. She experienced significant sciatica during the last few weeks of her pregnancy. Patient presents today for evaluation of right hip pain. Patient reports MD put order in wrong; its her right hip. She doesn't have stairs at home and has an elevator at work, but recently climbed stairs and found it painful.  She reports crossing legs hurts. She usually crosses her legs in piriformis stretch when breastfeeding but its has became painful. 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           Pain     Patient reports a current pain level of 2/10. Pain at best is reported as 1/10. Pain at worst is reported as 6/10.   Location: R hip  Clinical Progression (since onset): Improved  Pain-Relieving Factors: Medications - over-the-counter, Ice  Pain-Aggravating Factors: Bending, Walking, Stair climbing  Motrin wasn't helping but seems to be helping now, started rotating shoes, denies any mechanism of injury, NP gave her flexeril for maybe 3 nights        Employment  Does the patient's condition impact their ability to work?: Yes  Employment Status: Employed full-time  ICU nurse       Past Medical History/Physical Systems Review:   Krupa Walker  has a past medical history of Anxiety, Depression, GERD (gastroesophageal reflux disease), Gestational hypertension, third trimester, Hypertension, Hypoglycemia, Kidney calculi, Migraines, Polycystic ovaries, PONV (postoperative nausea and vomiting), STD (sexually transmitted disease), and Urinary tract infection.    Krupa Walker  has a past surgical history that includes  section (10/2014); Tonsillectomy; Ankle surgery (Right, ); [place] tooth extraction; Kidney stone surgery; Cystoscopy w/ ureteral stent placement (Left, 10/02/2019); Ureteroscopic removal of ureteric calculus (Left, 10/16/2019); Laser lithotripsy (Left, 10/16/2019); Ultrasound guidance (Left, 10/16/2019); cystoureteroscopy, with holmium laser lithotripsy of ureteral calculus and stent insertion (Left, 2025); Cystoscopy w/ retrogrades (Left, 2025); cystoureteroscopy, with holmium laser lithotripsy of ureteral calculus and stent insertion (Left, 2025); Retrograde pyelography (Left, 2025); Ureteroscopic removal of ureteric calculus (Left, 2025); and removal-stent (Left, 2025).    Krupa has a current medication list which includes the following prescription(s): buspirone, cyclobenzaprine, ibuprofen, multivitamin/iron/folic acid,  "pantoprazole, sumatriptan, and trazodone.    Review of patient's allergies indicates:  No Known Allergies     Objective      Hip Palpation  Right Hip Palpation  Abnormal: Hip Muscle  Right Hip Muscle Palpation Observations: tender to palpation of piriformis, hip flexor, and tensor fascia latae                          Hip Strength - Planes of Motion   Right Strength Right Pain Left Strength Left  Pain   Flexion (L2) 4   4     Extension           ABduction 4   4     ADduction 4   4     Internal Rotation           External Rotation               Knee Strength   Right Strength Right Pain Left Strength Left  Pain   Flexion (S2) 4   4     Prone Flexion           Extension (L3) 4   4            Ankle/Foot Strength - Planes of Motion   Right Strength Right Pain Left Strength Left  Pain   Dorsiflexion (L4) 5   5     Plantar Flexion (S1) 5   5     Inversion           Eversion           Great Toe Flexion           Great Toe Extension (L5)           Lesser Toes Flexion           Lesser Toes Extension                  Lumbar/Pelvic Girdle Special Tests  Pelvic Girdle / Sacrum Tests  Positive: Right FADIR  Negative: Right JOLANTA         Hip Special Tests  Intra-Articular/Impingement Tests  Positive: Right FADIR  Negative: Right JOLANTA and Right Scour  Flex/Imbalance/Structural Tests  Positive: Right Madhu's  Boni Test  Positive: Right          Knee Special Tests  Knee Flexibility Tests  Positive: Right Madhu's                     Treatment:  Therapeutic Exercise  TE 1: boni stretch x 1 min  TE 2: bridges x 10  TE 3: RTB SL clams 2 x 10  TE 4: shot gun 5" x 10  TE 5: steam boats x 10  TE 6: LTR x 2 min  TE 7: recumbent bike x 5 minutes      Time Entry(in minutes):  PT Evaluation (Moderate) Time Entry: 25  Therapeutic Exercise Time Entry: 15    Assessment & Plan   Assessment  Krupa presents with a condition of Moderate complexity.   Presentation of Symptoms: Changing  Will Comorbidities Impact Care: Yes  See co-morbidities listed " above     Functional Limitations: Activity tolerance, Completing work/school activities, Decreased ambulation distance/endurance, Functional mobility, Standing tolerance, Pain with ADLs/IADLs, Participating in leisure activities, Performing household chores  Impairments: Abnormal muscle tone, Impaired physical strength, Pain with functional activity, Lack of appropriate home exercise program  Personal Factors Affecting Prognosis: Pain    Prognosis: Good  Assessment Details: Patient presents with signs and symptoms consistent with piriformis syndrome and hip impingement.     Plan  From a physical therapy perspective, the patient would benefit from: Skilled Rehab Services    Planned therapy interventions include: Therapeutic exercise, Therapeutic activities, Neuromuscular re-education, Manual therapy, ADLs/IADLs, and Gait training.            Visit Frequency: 2 times Per Week for 8 Weeks.       This plan was discussed with Patient.   Discussion participants: Agreed Upon Plan of Care             The patient's spiritual, cultural, and educational needs were considered, and the patient is agreeable to the plan of care and goals.           Goals:   Active       Functional outcome       Patient will show a significant change in FOTO patient-reported outcome tool to demonstrate subjective improvement       Start:  07/30/25    Expected End:  09/24/25            Patient will demonstrate independence in home program for support of progression       Start:  07/30/25    Expected End:  09/24/25               Pain       Patient will report pain of 4/10 at its worse demonstrating a reduction of overall pain       Start:  07/30/25    Expected End:  09/24/25               Strength       Patient will achieve bilateral hip flexion strength of 5/5       Start:  07/30/25    Expected End:  09/24/25            Patient will achieve bilateral hip abduction strength of 5/5       Start:  07/30/25    Expected End:  09/24/25            Patient  will achieve bilateral hip adduction strength of 5/5       Start:  07/30/25    Expected End:  09/24/25                Maria Elena Hassan PT

## 2025-08-03 ENCOUNTER — OFFICE VISIT (OUTPATIENT)
Dept: URGENT CARE | Facility: CLINIC | Age: 35
End: 2025-08-03
Payer: COMMERCIAL

## 2025-08-03 VITALS
WEIGHT: 225 LBS | RESPIRATION RATE: 20 BRPM | HEIGHT: 64 IN | DIASTOLIC BLOOD PRESSURE: 77 MMHG | BODY MASS INDEX: 38.41 KG/M2 | OXYGEN SATURATION: 98 % | SYSTOLIC BLOOD PRESSURE: 138 MMHG | TEMPERATURE: 99 F | HEART RATE: 95 BPM
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
## 2025-08-03 DIAGNOSIS — L03.221 CELLULITIS OF NECK: ICD-10-CM

## 2025-08-03 DIAGNOSIS — M54.2 SORE NECK: ICD-10-CM

## 2025-08-03 DIAGNOSIS — R59.9 SWOLLEN LYMPH NODES: ICD-10-CM

## 2025-08-03 PROCEDURE — 96372 THER/PROPH/DIAG INJ SC/IM: CPT | Mod: S$GLB,,, | Performed by: PHYSICIAN ASSISTANT

## 2025-08-03 PROCEDURE — 87070 CULTURE OTHR SPECIMN AEROBIC: CPT | Performed by: PHYSICIAN ASSISTANT

## 2025-08-03 PROCEDURE — 87076 CULTURE ANAEROBE IDENT EACH: CPT | Performed by: PHYSICIAN ASSISTANT

## 2025-08-03 PROCEDURE — 99215 OFFICE O/P EST HI 40 MIN: CPT | Mod: 25,S$GLB,, | Performed by: PHYSICIAN ASSISTANT

## 2025-08-03 RX ORDER — CLINDAMYCIN PHOSPHATE 150 MG/ML
600 INJECTION, SOLUTION INTRAVENOUS
Status: COMPLETED | OUTPATIENT
Start: 2025-08-03 | End: 2025-08-03

## 2025-08-03 RX ORDER — CEPHALEXIN 500 MG/1
500 CAPSULE ORAL 4 TIMES DAILY
Qty: 28 CAPSULE | Refills: 0 | Status: SHIPPED | OUTPATIENT
Start: 2025-08-03 | End: 2025-08-10

## 2025-08-03 RX ADMIN — CLINDAMYCIN PHOSPHATE 600 MG: 150 INJECTION, SOLUTION INTRAVENOUS at 01:08

## 2025-08-03 NOTE — PROGRESS NOTES
"Subjective:      Patient ID: Krupa Walker is a 34 y.o. female.    Vitals:  height is 5' 4" (1.626 m) and weight is 102.1 kg (225 lb). Her oral temperature is 99 °F (37.2 °C). Her blood pressure is 138/77 and her pulse is 95. Her respiration is 20 and oxygen saturation is 98%.     Chief Complaint: Insect Bite    C/o insect bite on left side of neck, x 5 days (pt unaware of what kind of insect)  rates pain 5/10  Progressive visible swelling, itchiness, redness and scabbing  Of note, pt is breastfeeding   She has tried OTC and prescribed antibiotic ointments, benadryl and Hydrocortisone ointment with no relief    Insect Bite  This is a new problem. The current episode started in the past 7 days. The problem occurs constantly. The problem has been unchanged. Associated symptoms include neck pain. Pertinent negatives include no abdominal pain, anorexia, arthralgias, change in bowel habit, chest pain, chills, congestion, coughing, diaphoresis, fatigue, fever, headaches, joint swelling, myalgias, nausea, numbness, rash, sore throat, swollen glands, urinary symptoms, vertigo, visual change, vomiting or weakness. The symptoms are aggravated by twisting. The treatment provided no relief.       Constitution: Negative for chills, sweating, fatigue and fever.   HENT:  Negative for congestion and sore throat.    Neck: Positive for neck pain. Negative for neck stiffness.   Cardiovascular:  Negative for chest pain.   Respiratory:  Negative for cough.    Gastrointestinal:  Negative for abdominal pain, nausea and vomiting.   Musculoskeletal:  Negative for joint pain, joint swelling and muscle ache.   Skin:  Positive for wound, lesion and erythema. Negative for rash.   Neurological:  Negative for history of vertigo, headaches and numbness.      Objective:     Vitals:    08/03/25 1255   BP: 138/77   Pulse: 95   Resp: 20   Temp: 99 °F (37.2 °C)   TempSrc: Oral   SpO2: 98%   Weight: 102.1 kg (225 lb)   Height: 5' 4" (1.626 m) "       Physical Exam   Constitutional: She is oriented to person, place, and time. She appears well-developed.  Non-toxic appearance.   HENT:   Head: Normocephalic and atraumatic. Head is without abrasion, without contusion and without laceration.   Ears:   Right Ear: External ear normal.   Left Ear: External ear normal.   Nose: Nose normal.   Mouth/Throat: Uvula is midline, oropharynx is clear and moist and mucous membranes are normal. Tonsils are 0 on the right. Tonsils are 0 on the left. No tonsillar exudate.   Eyes: Conjunctivae, EOM and lids are normal. Pupils are equal, round, and reactive to light.   Neck: Trachea normal and phonation normal. Neck supple. No torticollis present. No decreased range of motion present. pain with movement (sore) present.   Cardiovascular: Normal rate, regular rhythm, normal heart sounds and normal pulses.   Pulmonary/Chest: Effort normal and breath sounds normal. No stridor. No respiratory distress.   Musculoskeletal: Normal range of motion.         General: Normal range of motion.   Lymphadenopathy:     She has cervical adenopathy.   Neurological: She is alert and oriented to person, place, and time.   Skin: Skin is warm, dry, intact and rash. Capillary refill takes less than 2 seconds. erythema and lesion No abrasion, No burn, No bruising and No ecchymosis   Psychiatric: Her speech is normal and behavior is normal. Mood, judgment and thought content normal.   Nursing note and vitals reviewed.      Assessment:     1. Bug bite with infection, initial encounter    2. Cellulitis of neck    3. Sore neck    4. Swollen lymph nodes        Plan:       Bug bite with infection, initial encounter  -     Ambulatory referral/consult to Dermatology    Cellulitis of neck  -     Aerobic culture  -     CULTURE, ANAEROBE  -     cephALEXin (KEFLEX) 500 MG capsule; Take 1 capsule (500 mg total) by mouth 4 (four) times daily. for 7 days  Dispense: 28 capsule; Refill: 0  -     clindamycin injection  "600 mg  -     Aerobic culture    Sore neck    Swollen lymph nodes          Medical Decision Making:   History:   Old Records Summarized: records from clinic visits.       <> Summary of Records: Patient up-to-date on tetanus.  2024  Initial Assessment:   Vital signs stable   Patient reports that she has ibuprofen 800 and Flexeril at home for pain    Pt states that she feels most comfortable following up outpatient on tomorrow with derm.    Strict ER precautions discussed with patient in the meantime    Skin cultures pending.  Differential Diagnosis:   Insect bite   (General vs brown recluse)    Shingles    Contact dermatitis    Cellulitis      Clinical Tests:   Lab Tests: Ordered  Urgent Care Management:  See entire note for further details    Reassessed pt at this time to discharge home. Discussed with pt all pertinent  information and results. Discussed pt dx and plan of tx.   Gave pt all f/u and return to the UC instructions. All questions and concerns were addressed at this time.   Pt expresses understanding of information and instructions.  patient was instructed to GO to ER immediately for any worsening or change in current symptoms.        Other:   I have discussed this case with another health care provider.  [facility] [place] collab chat: (dr. Alvarez) "Usually trial of 2 days of antibiotics prior to referring for inpatient management. if patient looks toxic or hemodynamically unstable then I would send. Brown Recluse can be pretty nasty and may need close follow up and debridement as outpatient"     I have reviewed the patients' previous visits, labs and images to look for any trends or previous treatments.    I spent a total of 45+ minutes on the day of the visit. This includes face to face time and non-face to face time preparing to see the patient (eg, review of tests), obtaining and/or reviewing separately obtained history, documenting clinical information in the electronic or other health record, independently " interpreting results and communicating results to the patient/family/caregiver, or care coordinator.         Patient Instructions   You require urgent follow-up with Dermatology.  Please go to the ER in meantime for any worsening symptoms.      Rest and drink plenty of fluids.  Take Tylenol or ibuprofen for fever or pain as needed.  Antibiotics  Take all antibiotics as prescribed until finished.  If on birth control pills, use extra pregnancy prevention while on antibiotics and for one cycle after.  Wound Care  Keep area clean and dry.  Wash daily with soap and water.    Watch for Improvement  You should feel better within 1-2 days of starting antibiotics.  If symptoms worsen or do not improve after 2 days, return to the clinic or seek medical care immediately.    Important Reminders  This treatment is urgent care only; follow up with your primary care provider or specialist as recommended.  If your condition worsens or you have concerns, go to the emergency room or contact your doctor.  Do not drive or make important decisions for 24 hours if you received pain or sedative medications today.    If you have questions or need to schedule follow-up care, call:  [phone]    [facility] REFERRAL ORDERED:  Please call clinic  for status of appointment. ([phone])

## 2025-08-03 NOTE — PATIENT INSTRUCTIONS
You require urgent follow-up with Dermatology.  Please go to the ER in meantime for any worsening symptoms.      Rest and drink plenty of fluids.  Take Tylenol or ibuprofen for fever or pain as needed.  Antibiotics  Take all antibiotics as prescribed until finished.  If on birth control pills, use extra pregnancy prevention while on antibiotics and for one cycle after.  Wound Care  Keep area clean and dry.  Wash daily with soap and water.    Watch for Improvement  You should feel better within 1-2 days of starting antibiotics.  If symptoms worsen or do not improve after 2 days, return to the clinic or seek medical care immediately.    Important Reminders  This treatment is urgent care only; follow up with your primary care provider or specialist as recommended.  If your condition worsens or you have concerns, go to the emergency room or contact your doctor.  Do not drive or make important decisions for 24 hours if you received pain or sedative medications today.    If you have questions or need to schedule follow-up care, call:  [phone]    [facility] REFERRAL ORDERED:  Please call clinic  for status of appointment. ([phone])

## 2025-08-04 ENCOUNTER — LAB VISIT (OUTPATIENT)
Dept: LAB | Facility: HOSPITAL | Age: 35
End: 2025-08-04
Payer: COMMERCIAL

## 2025-08-04 ENCOUNTER — OFFICE VISIT (OUTPATIENT)
Dept: NEUROLOGY | Facility: CLINIC | Age: 35
End: 2025-08-04
Payer: COMMERCIAL

## 2025-08-04 VITALS
HEART RATE: 85 BPM | BODY MASS INDEX: 38.42 KG/M2 | HEIGHT: 64 IN | SYSTOLIC BLOOD PRESSURE: 134 MMHG | DIASTOLIC BLOOD PRESSURE: 83 MMHG | WEIGHT: 225.06 LBS

## 2025-08-04 DIAGNOSIS — G43.119 INTRACTABLE MIGRAINE WITH AURA WITHOUT STATUS MIGRAINOSUS: ICD-10-CM

## 2025-08-04 DIAGNOSIS — H53.8 BLURRY VISION, RIGHT EYE: ICD-10-CM

## 2025-08-04 DIAGNOSIS — R11.0 NAUSEA: ICD-10-CM

## 2025-08-04 DIAGNOSIS — G56.03 BILATERAL CARPAL TUNNEL SYNDROME: ICD-10-CM

## 2025-08-04 DIAGNOSIS — G43.119 INTRACTABLE MIGRAINE WITH AURA WITHOUT STATUS MIGRAINOSUS: Primary | ICD-10-CM

## 2025-08-04 DIAGNOSIS — E66.813 CLASS 3 SEVERE OBESITY DUE TO EXCESS CALORIES WITHOUT SERIOUS COMORBIDITY WITH BODY MASS INDEX (BMI) OF 40.0 TO 44.9 IN ADULT: ICD-10-CM

## 2025-08-04 DIAGNOSIS — M54.2 CERVICAL PAIN (NECK): ICD-10-CM

## 2025-08-04 LAB
(HCYS)2 SERPL-MCNC: 4.9 UMOL/L (ref 4–15.5)
FOLATE SERPL-MCNC: 12.5 NG/ML (ref 4–24)
VIT B12 SERPL-MCNC: 707 PG/ML (ref 210–950)

## 2025-08-04 PROCEDURE — 3044F HG A1C LEVEL LT 7.0%: CPT | Mod: CPTII,S$GLB,,

## 2025-08-04 PROCEDURE — 82746 ASSAY OF FOLIC ACID SERUM: CPT

## 2025-08-04 PROCEDURE — 99215 OFFICE O/P EST HI 40 MIN: CPT | Mod: S$GLB,,,

## 2025-08-04 PROCEDURE — 3075F SYST BP GE 130 - 139MM HG: CPT | Mod: CPTII,S$GLB,,

## 2025-08-04 PROCEDURE — 3008F BODY MASS INDEX DOCD: CPT | Mod: CPTII,S$GLB,,

## 2025-08-04 PROCEDURE — 82652 VIT D 1 25-DIHYDROXY: CPT

## 2025-08-04 PROCEDURE — 83090 ASSAY OF HOMOCYSTEINE: CPT

## 2025-08-04 PROCEDURE — 1159F MED LIST DOCD IN RCRD: CPT | Mod: CPTII,S$GLB,,

## 2025-08-04 PROCEDURE — 36415 COLL VENOUS BLD VENIPUNCTURE: CPT

## 2025-08-04 PROCEDURE — 82607 VITAMIN B-12: CPT

## 2025-08-04 PROCEDURE — 84425 ASSAY OF VITAMIN B-1: CPT

## 2025-08-04 PROCEDURE — 3079F DIAST BP 80-89 MM HG: CPT | Mod: CPTII,S$GLB,,

## 2025-08-04 PROCEDURE — 1160F RVW MEDS BY RX/DR IN RCRD: CPT | Mod: CPTII,S$GLB,,

## 2025-08-04 PROCEDURE — 99999 PR PBB SHADOW E&M-EST. PATIENT-LVL V: CPT | Mod: PBBFAC,,,

## 2025-08-04 RX ORDER — ONDANSETRON 4 MG/1
4 TABLET, ORALLY DISINTEGRATING ORAL EVERY 8 HOURS PRN
Qty: 30 TABLET | Refills: 0 | Status: SHIPPED | OUTPATIENT
Start: 2025-08-04 | End: 2025-09-03

## 2025-08-04 NOTE — PROGRESS NOTES
"Subjective:       Patient ID: Krupa Walker is a 34 y.o. female.      Chief Complaint: Headache          HPI    HPI 34 y.o.  Years old female   with PMHx of  has a past medical history of Anxiety, Depression, GERD (gastroesophageal reflux disease), Gestational hypertension, third trimester, Hypertension, Hypoglycemia, Kidney calculi, Migraines, Polycystic ovaries, PONV (postoperative nausea and vomiting), STD (sexually transmitted disease), and Urinary tract infection.  and other medical conditions came with daughter for the evaluation and recommendation of "Headaches".    -Patient referred by No referring provider defined for this encounter.     History of Present Illness    CHIEF COMPLAINT:  Krupa presents today for migraines requiring increased medication dosage.    HISTORY OF PRESENT ILLNESS:  She is a 34-year-old female with migraine history dating back to early 20s. She currently experiences 3-4 migraines per month, with duration ranging from 16 hours to 4 days. Pain level is consistently 6-7 out of 10. Migraines are characterized by sharp, intense pain across the right side of face and eye, described as "like an ice pick going through the eye and coming out the back." Pain radiates from right neck to shoulder and jaw. Prodromal symptoms include right neck pain the day before migraine onset. Associated symptoms include right eye tearing, blurry vision, photophobia, phonophobia, and mild nausea with decreased appetite. Symptoms worsen with positional changes such as bending head down. She denies neurological deficits, vomiting, balance issues, dizziness, vertigo, tinnitus, weakness, or numbness. Migraines appear to worsen with menstrual cycles, typically occurring 1-2 weeks before menstruation and lasting 3-4 days. Potential triggers include lack of sleep and menstrual cycles. She notes significant improvement during previous pregnancy, experiencing only two migraines during that entire period. She reports " occasional sinus-type headaches between migraine episodes. The migraines are intractable and impact daily functioning.    CURRENT MEDICATIONS:  She currently takes sumatriptan for migraines and Flexeril for hip pain. She has Toradol prescribed as needed and visits urgent care approximately once monthly for Toradol injections to manage migraines.    PAST MEDICAL HISTORY:  She has bilateral carpal tunnel syndrome with right wrist progression since last study and left wrist improvement. She received a carpal tunnel injection approximately 6-8 weeks ago. She was diagnosed with bursitis approximately two weeks ago. She has TMJ and received Botox injections from a TMJ specialist, reporting significant improvement with absence of migraines for a few months following treatment. She recently received Botox treatment which has been helpful in managing migraine symptoms.    MEDICATION HISTORY:  Previously tried medications include Fioricet, rizatriptan (requiring multiple doses for symptom management), Topamax (discontinued due to excessive sleepiness), and propranolol (ineffective for migraines).    SOCIAL HISTORY:  She is currently 6 months postpartum and actively breastfeeding her 6-month-old infant.          Started: Headaches began in early 20s / not worsening / currently 6 months postpartum and breastfeeding  Describes: Sharp / pressure-like pain  Timing: Duration ranges from 16 hours to 4 days  Frequency: Migraines occur 3-4 times per month  Pain: 7/10  Location: Localized to right side only / originates in right neck, radiates into right shoulder and upwards into right occipital region, parietal and temporal areas, involving right side of face / described as stabbing pain behind right eye  Family: Paternal aunt and younger sister with migraine history  Medications: Imitrex / Advil / Toradol PRN / Flexeril  Worsen: 1-2 weeks prior to menstrual cycle / positional changes (e.g., bending head down)  Alleviated: Lying down  / heating pad / dark room  Associated symptoms: Jaw tightness / neck pain radiating to right shoulder and mid-back / right eye tearing and blurry vision / light and noise sensitivity / nausea / decreased appetite  Negatives: No vomiting / dizziness / tinnitus / weakness / numbness / tingling / focal neurological deficits / balance issues / syncope / seizures / stroke / MI / no falls  Triggers: Lack of sleep / menstrual cycle / wine  Prodrome symptoms: Right neck pain the day prior to migraine onset  No ER visits for headaches / multiple urgent care visits - treated with tramadol injection  Eye clinic: Evaluated 2 years ago - no abnormalities per patient  Medications Tried and Failed:  - Topamax: Not effective / caused severe fatigue  - Propranolol: Taken for anxiety - not effective for migraines  - Maxalt: Not effective  - Fioricet: Not effective  - Imitrex: Previously effective, no longer provides relief - currently requires second dose with minimal to no response    Review of Systems   Constitutional:  Positive for appetite change. Negative for activity change, chills, diaphoresis, fatigue, fever and unexpected weight change.   HENT:  Negative for congestion, dental problem, drooling, ear discharge, ear pain, facial swelling, hearing loss, mouth sores, nosebleeds, postnasal drip, rhinorrhea, sinus pressure, sinus pain, sneezing, sore throat, tinnitus, trouble swallowing and voice change.    Eyes:  Positive for photophobia, discharge and visual disturbance. Negative for pain, redness and itching.   Respiratory:  Negative for cough, chest tightness, shortness of breath and wheezing.    Cardiovascular:  Negative for chest pain, palpitations and leg swelling.   Gastrointestinal:  Positive for nausea. Negative for abdominal distention, abdominal pain, blood in stool, constipation, diarrhea and vomiting.   Endocrine: Negative for cold intolerance, heat intolerance, polydipsia, polyphagia and polyuria.   Genitourinary:   Negative for decreased urine volume, difficulty urinating, dysuria, flank pain, frequency, hematuria, pelvic pain, urgency and vaginal discharge.   Musculoskeletal:  Positive for neck pain. Negative for arthralgias, back pain, gait problem, joint swelling, myalgias and neck stiffness.   Skin:  Negative for color change and rash.   Allergic/Immunologic: Negative for immunocompromised state.   Neurological:  Positive for headaches. Negative for dizziness, tremors, seizures, syncope, facial asymmetry, speech difficulty, weakness, light-headedness and numbness.   Hematological:  Negative for adenopathy. Does not bruise/bleed easily.   Psychiatric/Behavioral:  Negative for agitation, behavioral problems, confusion, decreased concentration, dysphoric mood, hallucinations, self-injury, sleep disturbance and suicidal ideas. The patient is not nervous/anxious and is not hyperactive.    All other systems reviewed and are negative.              Current Medications[1]    Past Medical History:   Diagnosis Date    Anxiety     Depression     GERD (gastroesophageal reflux disease)     Gestational hypertension, third trimester 2019    Hypertension     gestational     Hypoglycemia     Kidney calculi     2016    Migraines     Polycystic ovaries     PONV (postoperative nausea and vomiting)     STD (sexually transmitted disease)     Urinary tract infection        Past Surgical History:   Procedure Laterality Date    ANKLE SURGERY Right 2008     SECTION  10/2014    CYSTOSCOPY W/ RETROGRADES Left 2025    Procedure: CYSTOSCOPY, WITH RETROGRADE PYELOGRAM;  Surgeon: Perez Su MD;  Location: [facility] OR;  Service: Urology;  Laterality: Left;    CYSTOSCOPY W/ URETERAL STENT PLACEMENT Left 10/02/2019    Procedure: CYSTOSCOPY, WITH URETERAL STENT INSERTION - Under ultrasound guidance;  Surgeon: Marsha Guevara MD;  Location: [facility] OR;  Service: Urology;  Laterality: Left;    CYSTOURETEROSCOPY, WITH HOLMIUM LASER  LITHOTRIPSY OF URETERAL CALCULUS AND STENT INSERTION Left 4/29/2025    Procedure: CYSTOURETEROSCOPY, WITH HOLMIUM LASER LITHOTRIPSY OF URETERAL CALCULUS AND STENT INSERTION;  Surgeon: Perez Su MD;  Location: [facility] OR;  Service: Urology;  Laterality: Left;    CYSTOURETEROSCOPY, WITH HOLMIUM LASER LITHOTRIPSY OF URETERAL CALCULUS AND STENT INSERTION Left 5/13/2025    Procedure: CYSTOURETEROSCOPY, W/ HOLMIUM LASER LITHOTRIPSY OF URETERAL CALCULUS & STENT INSERTION;  Surgeon: Perez Su MD;  Location: [facility] OR;  Service: Urology;  Laterality: Left;    KIDNEY STONE SURGERY      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)    LASER LITHOTRIPSY Left 10/16/2019    Procedure: LITHOTRIPSY, USING LASER;  Surgeon: Marsha Guevara MD;  Location: [facility];  Service: Urology;  Laterality: Left;    REMOVAL-STENT Left 5/13/2025    Procedure: REMOVAL-STENT;  Surgeon: Perez Su MD;  Location: [facility] OR;  Service: Urology;  Laterality: Left;    RETROGRADE PYELOGRAPHY Left 5/13/2025    Procedure: PYELOGRAM, RETROGRADE;  Surgeon: Perez Su MD;  Location: [facility] OR;  Service: Urology;  Laterality: Left;    TONSILLECTOMY      ULTRASOUND GUIDANCE Left 10/16/2019    Procedure: ULTRASOUND GUIDANCE, for laser litho;  Surgeon: Marsha Guevara MD;  Location: [facility];  Service: Urology;  Laterality: Left;    URETEROSCOPIC REMOVAL OF URETERIC CALCULUS Left 10/16/2019    Procedure: REMOVAL, CALCULUS, URETER, URETEROSCOPIC possible retro pyelo, possible lasere litho, possible stent;  Surgeon: Marsha Guevara MD;  Location: [facility];  Service: Urology;  Laterality: Left;  25 WEEKS PREGNANT / FETAL MONITORING BEFORE AND AFTER PROCEDURE/   CALL PELON ELDER [phone]      URETEROSCOPIC REMOVAL OF URETERIC CALCULUS Left 5/13/2025    Procedure: REMOVAL, CALCULUS, URETER, URETEROSCOPIC;  Surgeon: Perez Su MD;  Location: [facility] OR;  Service: Urology;   Laterality: Left;  basket extraction    WISDOM TOOTH EXTRACTION         Social History[2]      Past/Current Medical/Surgical History, Past/Current Social History, Past/Current Family History and Past/Current Medications were reviewed in detail.    Objective:           VITAL SIGNS WERE REVIEWED      GENERAL APPEARANCE:     The patient looks comfortable.    BMI    No signs of respiratory distress.    Normal breathing pattern.    No dysmorphic features    Normal eye contact.       GENERAL MEDICAL EXAM:    HEENT:  Head is atraumatic normocephalic.     FUNDUSCOPIC (OPHTHALMOSCOPIC) EXAMINATION showed no disc edema (papilledema).      NECK: No JVD. No visible lesions or goiters.     CHEST-CARDIOPULMONARY: No cyanosis. No tachypnea. Normal respiratory effort.    IYIUZEO-[id number]: No jaundice. No stomas or lesions. No visible hernias. No catheters.     SKIN, HAIR, NAILS: No pathognomonic skin rash.No neurofibromatosis. No visible lesions.No stigmata of autoimmune disease. No clubbing.    LIMBS: No varicose veins. No visible swelling.    MUSCULOSKELETAL: No visible deformities.No visible lesions.             Neurological Exam  Mental Status  Awake, alert and oriented to person, place and time. Oriented to person, place, time and situation. Recent and remote memory are intact. At 5 minutes recalls 3 of 3 objects. Speech is normal. Language is fluent with no aphasia. Attention and concentration are normal. Fund of knowledge is appropriate for level of education. Apraxia absent.    Cranial Nerves  CN I: Sense of smell is normal.  CN II: Visual acuity is normal. Visual fields full to confrontation. Right funduscopic exam: disc intact. Left funduscopic exam: disc intact.  CN III, IV, VI: Extraocular movements intact bilaterally. Normal lids and orbits bilaterally. Pupils equal round and reactive to light bilaterally.  CN V: Facial sensation is normal.  CN VII: Full and symmetric facial movement.  CN VIII:  Hearing is normal.  CN IX, X: Palate elevates symmetrically. Normal gag reflex.  CN XI: Shoulder shrug strength is normal.  CN XII: Tongue midline without atrophy or fasciculations.    Motor  Normal muscle bulk throughout. No fasciculations present. Normal muscle tone. No abnormal involuntary movements. Strength is 5/5 throughout all four extremities.    Sensory  Sensation is intact to light touch, pinprick, vibration and proprioception in all four extremities.    Reflexes  Deep tendon reflexes are 2+ and symmetric in all four extremities.    Coordination  Right: Finger-to-nose normal. Rapid alternating movement normal. Heel-to-shin normal.Left: Finger-to-nose normal. Rapid alternating movement normal. Heel-to-shin normal.    Gait  Casual gait is normal including stance, stride, and arm swing.Normal toe walking. Normal heel walking. Normal tandem gait. Romberg is absent. Normal pull test. Able to rise from chair without using arms.        Lab Results   Component Value Date    WBC 8.13 04/29/2025    HGB 12.9 04/29/2025    HCT 38.6 04/29/2025    MCV 89 04/29/2025     04/29/2025       Sodium   Date Value Ref Range Status   04/29/2025 138 136 - 145 mmol/L Final   02/18/2025 138 136 - 145 mmol/L Final     Potassium   Date Value Ref Range Status   04/29/2025 3.9 3.5 - 5.1 mmol/L Final   02/18/2025 4.4 3.5 - 5.1 mmol/L Final     Chloride   Date Value Ref Range Status   04/29/2025 106 95 - 110 mmol/L Final   02/18/2025 104 95 - 110 mmol/L Final     CO2   Date Value Ref Range Status   04/29/2025 24 23 - 29 mmol/L Final   02/18/2025 25 23 - 29 mmol/L Final     Glucose   Date Value Ref Range Status   04/29/2025 71 70 - 110 mg/dL Final   02/18/2025 91 70 - 110 mg/dL Final     BUN   Date Value Ref Range Status   04/29/2025 13 6 - 20 mg/dL Final     Creatinine   Date Value Ref Range Status   04/29/2025 0.6 0.5 - 1.4 mg/dL Final     Calcium   Date Value Ref Range Status   04/29/2025 9.2 8.7 - 10.5 mg/dL Final   02/18/2025  "9.1 8.7 - 10.5 mg/dL Final     Total Protein   Date Value Ref Range Status   02/18/2025 7.3 6.0 - 8.4 g/dL Final     Albumin   Date Value Ref Range Status   02/18/2025 3.8 3.5 - 5.2 g/dL Final     Total Bilirubin   Date Value Ref Range Status   02/18/2025 0.4 0.1 - 1.0 mg/dL Final     Comment:     For infants and newborns, interpretation of results should be based  on gestational age, weight and in agreement with clinical  observations.    Premature Infant recommended reference ranges:  Up to 24 hours.............<8.0 mg/dL  Up to 48 hours............<12.0 mg/dL  3-5 days..................<15.0 mg/dL  6-29 days.................<15.0 mg/dL       Alkaline Phosphatase   Date Value Ref Range Status   02/18/2025 107 40 - 150 U/L Final     AST   Date Value Ref Range Status   02/18/2025 24 10 - 40 U/L Final     ALT   Date Value Ref Range Status   02/18/2025 18 10 - 44 U/L Final     Anion Gap   Date Value Ref Range Status   04/29/2025 8 8 - 16 mmol/L Final     eGFR if    Date Value Ref Range Status   06/04/2021 >60 >60 mL/min/1.73 m^2 Final     eGFR if non    Date Value Ref Range Status   06/04/2021 >60 >60 mL/min/1.73 m^2 Final     Comment:     Calculation used to obtain the estimated glomerular filtration  rate (eGFR) is the CKD-EPI equation.          Lab Results   Component Value Date    GJYDLQEH57 835 10/19/2022       Lab Results   Component Value Date    TSH 2.405 03/14/2025    J4OUVBX 106 11/13/2018    E6VGMWJ 6.9 11/13/2018    THYROIDAB 7 12/28/2016    FREET4 0.84 03/14/2025       No results found in the last 24 hours.    No results found in the last 24 hours.    Reviewed the neuroimaging independently       Assessment:   34 y.o. Years old female  with PMH as above came for an evaluation of "Headaches".    1. Intractable migraine with aura without status migrainosus  Homocysteine, Serum    MRI Brain Without Contrast    Folate    Vitamin B12    Vitamin B1    Ambulatory Referral/Consult " to Physical Therapy    Calcitriol      2. Cervical pain (neck)        3. Nausea  ondansetron (ZOFRAN-ODT) 4 MG TbDL      4. Blurry vision, right eye  Ambulatory referral/consult to Ophthalmology      5. Class 3 severe obesity due to excess calories without serious comorbidity with body mass index (BMI) of 40.0 to 44.9 in adult        6. Bilateral carpal tunnel syndrome             Plan:   Patient Neurological Assessment is non-focal.       HEADACHE PLAN    Assessment & Plan    G43.119 Intractable migraine with aura without status migrainosus  M54.2 Cervical pain (neck)  E66.813, Z68.41 Class 3 severe obesity due to excess calories without serious comorbidity with body mass index (BMI) of 40.0 to 44.9 in adult  R11.0 Nausea  H53.8 Blurry vision, right eye  G56.03 Bilateral carpal tunnel syndrome    IMPRESSION:  - Assessed migraine history, symptoms, and current treatment regimen.  - Deferred preventative therapy due to breastfeeding status. Patient agrees.   - Evaluated neck pain and previous imaging results, noting degenerative changes at C4-C5 without significant spinal canal narrowing.  - Reviewed recent carpal tunnel syndrome diagnosis and treatment.  - Determined brain MRI appropriate for further evaluation of migraines.    INTRACTABLE MIGRAINE WITH AURA WITHOUT STATUS MIGRAINOSUS:   Continued sumatriptan 100 mg as needed for migraines.   Discussed safety considerations for medication use while breastfeeding.   Provided information on sumatriptan use, including potential risks and precautions.   Krupa to pump and dump breast milk for 12 hours after taking sumatriptan.   Ordered brain MRI WO Contrast.   Ordered bloodwork: vitamin B12, B1, homocysteine, folate, vitamin D. Follow up in 6 months or sooner if breastfeeding stops or symptoms worsen.   Contact office if unable to obtain sumatriptan refills from pharmacy.    CERVICAL PAIN (NECK):   Referred to physical therapy for neck and headache  management.    NAUSEA:   Started Zofran 4 mg as needed for nausea.   Provided information on Zofran use, including potential risks and precautions.   Krupa to pump and dump breast milk for 4 hours after taking Zofran.    BLURRY VISION, RIGHT EYE:   Referred to eye doctor for evaluation.    BILATERAL CARPAL TUNNEL SYNDROME:   Krupa to wear wrist brace at night and perform exercises for carpal tunnel syndrome.    Continue following hand speciality for CTS management     Discussion and Informed Consent:  Risks versus benefits of migraine treatment options were thoroughly discussed with the patient, including the potential for medication exposure through breast milk and associated side effects in the breastfeeding infant. Patient demonstrated comprehension of the information provided, verbalized understanding, and expressed a clear and informed desire to proceed with treatment. She confirmed full understanding of the risks and benefits and provided verbal consent to continue medication use while breastfeeding.    -Continue pain management referral for chronic pain management.     -Patient instructed to keep a headache diary for review at next follow-up visit.     -Order Brain MRI WO Contrast to rule out structural abnormalities contributing to Headaches. We will schedule follow up appointment after MRI brain has been completed.     -Order B1 / B-12 / FA / Homocysteine / SUDHA Screen / HIV / RPR / TSH / Free T-4 / SUDHA Screen / ESR / CRP / CBC / CMP for Neurologic Evaluation.     -Order PT Referral for Headache Prevention Therapy / Neck therapy / Vestibular Therapy.     -Continue routine ophthalmology evaluation.     -All medication indications and potential side effects were discussed in detail with the patient. Informational pamphlets were provided for further reference. The patient verbally confirmed full understanding of the medications and their instructions.    -Patient Instructions on Caffeine Limitation:  It is  recommended that you limit your caffeine intake to no more than 200-300 mg per day, which is roughly equivalent to one 8-ounce cup of coffee. This amount should be sufficient to avoid triggering headaches while still providing the benefits of caffeine.  You should avoid consuming energy drinks and large amounts of coffee due to their high caffeine content, which can contribute to headache exacerbation. If you experience withdrawal headaches or difficulty limiting intake, consider gradually reducing your caffeine consumption rather than quitting abruptly.     -We have discussed the value in aggressively controlling vascular risk factors such as Hypertension, Hyperlipidemia, Obesity, and Diabetes. (SBP <120, LDL <100, A1C <7.0)  We discussed the need to optimize lifestyle choices including a heart healthy diet (Mediterranean or DASH Diet), increased cardiovascular exercise (goal of 150 minutes of moderate intensity per week), and stay cognitively and socially active.   We recommended the MIND Diet, a combination of (Mediterranean and DASH Diet) because it includes a variety of brain-friendly foods to optimize cognitive health and longevity. Patient verbalized understanding.       1. Intractable migraine with aura without status migrainosus  - Homocysteine, Serum; Future  - MRI Brain Without Contrast; Future  - Folate; Future  - Vitamin B12; Future  - Vitamin B1; Future  - Ambulatory Referral/Consult to Physical Therapy; Future  - Calcitriol; Future    2. Cervical pain (neck)    3. Class 3 severe obesity due to excess calories without serious comorbidity with body mass index (BMI) of 40.0 to 44.9 in adult    4. Nausea  - ondansetron (ZOFRAN-ODT) 4 MG TbDL; Take 1 tablet (4 mg total) by mouth every 8 (eight) hours as needed (Nausea).  Dispense: 30 tablet; Refill: 0    5. Blurry vision, right eye  - Ambulatory referral/consult to Ophthalmology; Future    6. Bilateral carpal tunnel syndrome          Medication List             Accurate as of August 4, 2025  2:37 PM. If you have any questions, ask your nurse or doctor.                CONTINUE taking these medications      busPIRone 10 MG tablet  Commonly known as: BUSPAR  Take 1 tablet (10 mg total) by mouth 3 (three) times daily as needed (anxiety).     cephALEXin 500 MG capsule  Commonly known as: KEFLEX  Take 1 capsule (500 mg total) by mouth 4 (four) times daily. for 7 days     ibuprofen 800 MG tablet  Commonly known as: ADVIL,MOTRIN  Take 1 tablet (800 mg total) by mouth 3 (three) times daily.     pantoprazole 40 MG tablet  Commonly known as: PROTONIX  Take 1 tablet (40 mg total) by mouth once daily.     sumatriptan 100 MG tablet  Commonly known as: IMITREX  Take 1 tablet by mouth at onset of migraine. May repeat in 2 hours if needed. No more than twice per day or 3 days per week     traZODone 50 MG tablet  Commonly known as: DESYREL  Take 1 tablet (50 mg total) by mouth nightly as needed for Insomnia.     WOMEN'S DAILY MULTIVITAMIN ORAL                     LABORATORY EVALUATION    Labs: (2025) BMP / CBC / POC Preg Test, Ur / A1C / TSH / Free T-4 / ds DNA Ab / HIV / RPR /  -personally reviewed -non-significant abnormalities except     Lipid Panel: Cholesterol 233 - followed by PCP     CRP 12.2 / SUDHA Screen Positive Abnormal  - followed by Rheumatology     Rheumatoid Factor 26.0 - followed by Rheumatology       RADIOLOGY EVALUATION     Personally Reviewed X-Ray Cervical Spine AP Lat with Flex Ex  - done 01/2025 - no acute abnormalities    Personally Reviewed MRI C-Spine WO - done 04/2024 - no acute abnormalities / Straightening of the normal cervical lordosis and trace degenerative changes at C4-C5. No significant spinal canal or neural foraminal stenosis.     Personally Reviewed EMG-NCS - done 02/2025 - IMPRESSION  ABNORMAL study  2. There is electrodiagnostic evidence of a moderate demyelinating median neuropathy (Carpal tunnel syndrome) across the right wrist (progressed  since last study) and a mild demyelinating CTS across the left wrist (improved since last study)  3. There is clinical evidence of cervical myofascial pain and mild rotator cuff weakness        Personally Reviewed  Brain MRI W WO - done 2016 - no acute abnormalities      NEUROPHYSIOLOGY EVALUATION       PATHOLOGY EVALUATION        NEUROCOGNITIVE AND NEUROPSYCHOLOGY EVALUATION                  COMMON MIGRAINE WITHOUT AURA, EPISODIC, MENSTRUAL WITH EPISODIC TENSION HEADACHE       HEADACHE DIARY         DISCUSSED THE THREE-FOLD MANAGEMENT OF MIGRAINE:      LIFESTYLE CHANGES:       Good sleep hygiene  Avoid triggers  Minimize physical and emotional stress  Good hydration   Small frequent meals and avoid skipping meals   Moderate aerobic exercises.         ABORTIVE MEDICATIONS (ACUTE-RESCUE MEDICATIONS):       PRN medications are not safe.    First line is Acetaminophen (1000 mg orally) because it has the best maternal-fetal safety profile.     Acetaminophen 650 to 1000 mg and Metoclopramide / Reglan 10 mg.     -Discussed Metoclopramide / Reglan side effects including CNS Depression, Drowsiness, fatigue, lassitude, dizziness, confusion, dystonic reactions (such as involuntary limb movements, Akathisia (motor restlessness), manifesting as feelings of anxiety, agitation, insomnia, inability to sit still, pacing, and foot tapping, Parkinsonism symptoms, Tardive dyskinesia), Galactorrhea not associated with childbirth, amenorrhea, and gynecomastia. Patient verbalized full understanding.       PREVENTATIVE (MORE ACCURATELY MIGRAINE REDUCTION) MEDICATIONS:       Traditional preventative options are not safe including Botox.        PREGNANCY AND MIGRAINE           We had a lengthy discussion about managing migraine during pregnancy.      PRN medications are not safe. The safest option is Reglan PRN and not to be taken more than 2-3 times/week. Other options:  Fioricet PRN  and Steroids PRN as well. Fioricet should be limited to  only four to five days per month.     Traditional preventative options are not safe including Botox. Neuromodulation like  Cefaly and Relivion are safe during pregnancy.      MEDICAL/SURGICAL COMORBIDITIES     All relevant medical comorbidities noted and managed by primary care physician and medical care team.          HEALTHY LIFESTYLE AND PREVENTATIVE CARE    The patient to adhere to the age-appropriate health maintenance guidelines including screening tests and vaccinations. The patient to adhere to  healthy lifestyle, optimal weight, exercise, healthy diet, good sleep hygiene and avoiding drugs including smoking, alcohol and recreational drugs.    I spent a total of 57 minutes on the day of the visit.This includes face to face time and non-face to face time preparing to see the patient (eg, review of tests), obtaining and/or reviewing separately obtained history, documenting clinical information in the electronic or other health record, independently interpreting results and communicating results to the patient/family/caregiver, or care coordinator.     This note was generated with the assistance of ambient listening technology. Verbal consent was obtained by the patient and accompanying visitor(s) for the recording of patient appointment to facilitate this note. I attest to having reviewed and edited the generated note for accuracy, though some syntax or spelling errors may persist. Please contact the author of this note for any clarification.     Please do not hesitate to contact me with any updates, questions or concerns.    No follow-ups on file.    Todd Medeiros, MSN, FNP-C    General Neurology                          [1]   Current Outpatient Medications:     busPIRone (BUSPAR) 10 MG tablet, Take 1 tablet (10 mg total) by mouth 3 (three) times daily as needed (anxiety)., Disp: 30 tablet, Rfl: 6    cephALEXin (KEFLEX) 500 MG capsule, Take 1 capsule (500 mg total) by mouth 4 (four) times daily. for 7 days,  Disp: 28 capsule, Rfl: 0    ibuprofen (ADVIL,MOTRIN) 800 MG tablet, Take 1 tablet (800 mg total) by mouth 3 (three) times daily., Disp: 30 tablet, Rfl: 0    multivit with calcium,iron,min (WOMEN'S DAILY MULTIVITAMIN ORAL), Women's Daily Multivitamin, Disp: , Rfl:     pantoprazole (PROTONIX) 40 MG tablet, Take 1 tablet (40 mg total) by mouth once daily., Disp: 30 tablet, Rfl: 2    sumatriptan (IMITREX) 100 MG tablet, Take 1 tablet by mouth at onset of migraine. May repeat in 2 hours if needed. No more than twice per day or 3 days per week, Disp: 9 tablet, Rfl: 2    traZODone (DESYREL) 50 MG tablet, Take 1 tablet (50 mg total) by mouth nightly as needed for Insomnia., Disp: 30 tablet, Rfl: 6  [2]   Social History  Socioeconomic History    Marital status:    Occupational History    Occupation: nurse     Comment: [employer]   Tobacco Use    Smoking status: Never     Passive exposure: Current    Smokeless tobacco: Never   Substance and Sexual Activity    Alcohol use: Not Currently     Comment: 1 drink per month    Drug use: Never    Sexual activity: Yes     Partners: Male     Birth control/protection: Condom     Comment: IUD removed due to complications in July 2018     Social Drivers of Health     Financial Resource Strain: Low Risk  (1/27/2025)    Overall Financial Resource Strain (CARDIA)     Difficulty of Paying Living Expenses: Not hard at all   Food Insecurity: No Food Insecurity (1/27/2025)    Hunger Vital Sign     Worried About Running Out of Food in the Last Year: Never true     Ran Out of Food in the Last Year: Never true   Transportation Needs: No Transportation Needs (1/24/2024)    PRAPARE - Transportation     Lack of Transportation (Medical): No     Lack of Transportation (Non-Medical): No   Physical Activity: Insufficiently Active (1/27/2025)    Exercise Vital Sign     Days of Exercise per Week: 2 days     Minutes of Exercise per Session: 30 min   Stress: Stress Concern Present  (1/27/2025)    [employer] - Occupational Stress Questionnaire     Feeling of Stress : Rather much   Housing Stability: Low Risk  (1/24/2024)    Housing Stability Vital Sign     Unable to Pay for Housing in the Last Year: No     Number of Places Lived in the Last Year: 1     Unstable Housing in the Last Year: No

## 2025-08-05 ENCOUNTER — OFFICE VISIT (OUTPATIENT)
Dept: DERMATOLOGY | Facility: CLINIC | Age: 35
End: 2025-08-05
Payer: COMMERCIAL

## 2025-08-05 DIAGNOSIS — L30.9 DERMATITIS: Primary | ICD-10-CM

## 2025-08-05 PROCEDURE — 99214 OFFICE O/P EST MOD 30 MIN: CPT | Mod: S$GLB,,, | Performed by: PHYSICIAN ASSISTANT

## 2025-08-05 PROCEDURE — 3044F HG A1C LEVEL LT 7.0%: CPT | Mod: CPTII,S$GLB,, | Performed by: PHYSICIAN ASSISTANT

## 2025-08-05 PROCEDURE — 87070 CULTURE OTHR SPECIMN AEROBIC: CPT | Performed by: PHYSICIAN ASSISTANT

## 2025-08-05 PROCEDURE — G2211 COMPLEX E/M VISIT ADD ON: HCPCS | Mod: S$GLB,,, | Performed by: PHYSICIAN ASSISTANT

## 2025-08-05 PROCEDURE — 99999 PR PBB SHADOW E&M-EST. PATIENT-LVL III: CPT | Mod: PBBFAC,,, | Performed by: PHYSICIAN ASSISTANT

## 2025-08-05 PROCEDURE — 1159F MED LIST DOCD IN RCRD: CPT | Mod: CPTII,S$GLB,, | Performed by: PHYSICIAN ASSISTANT

## 2025-08-05 PROCEDURE — 87798 DETECT AGENT NOS DNA AMP: CPT | Performed by: PHYSICIAN ASSISTANT

## 2025-08-05 PROCEDURE — 1160F RVW MEDS BY RX/DR IN RCRD: CPT | Mod: CPTII,S$GLB,, | Performed by: PHYSICIAN ASSISTANT

## 2025-08-05 RX ORDER — VALACYCLOVIR HYDROCHLORIDE 1 G/1
1000 TABLET, FILM COATED ORAL 3 TIMES DAILY
Qty: 21 TABLET | Refills: 0 | Status: SHIPPED | OUTPATIENT
Start: 2025-08-05 | End: 2025-08-12

## 2025-08-05 RX ORDER — MUPIROCIN 20 MG/G
OINTMENT TOPICAL 3 TIMES DAILY
Qty: 30 G | Refills: 2 | Status: SHIPPED | OUTPATIENT
Start: 2025-08-05

## 2025-08-05 NOTE — PATIENT INSTRUCTIONS
Domeboro Soak   Domeboro soaks (compresses) are used to relieve itching, minor skin irritation, to help weeping skin and to loosen up crusted areas on the skin. These soaks can be purchased over-the-counter from the pharmacy. It comes as either a tablet or powder and needs to be mixed with water.     Mix the Solution:   1) Wash your hands.   2) Put warm water in a clean container.   3) Add the Domeboro medicine.   4) Stir the solution until the tablet or powder dissolves.     Apply the Domeboro soak:   1) Pour Domeboro solution from the large container into a clean bowl.  2) Prepare the patient for the soak by placing the area to be treated over a towel or plastic sheet.   3) Wash your hands.   4) Place a clean cloth towel, paper towel or gauze into the domeboros solution. Soak the affected area with the domeboros cloth for 10-15 minutes.   5) Apply the soak for 10-15 minutes to the affected area 3 times a day.        [facility] -    DERMATOLOGY    [place] [address]   Dept: [phone]   Dept Fax: [phone]

## 2025-08-05 NOTE — PROGRESS NOTES
Subjective:      Patient ID:  Krupa Walker is a 34 y.o. 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 Bell on 4/29/24. Here for new c/o rash of left neck. Started with a red, sunken sore of the left neck on Tuesday (7 days ago), but rapidly progressed to blisters and more redness. +Itching, burning pain, crusting, clear drainage. Also, notes some irritation from bandage used to cover the area.  Was seen by  on Sunday and treated for possible spider / insect bite and was given IM Clinda 600 mg and started keflex 500 mg bid x 7 days. No improvement in symptoms.     Previous tx: mupirocin oint, keflex, clinda IM    + Lacatation status    Aerobic and anaerobic cultures pending (doc: 8/3/25)        Review of Systems   Constitutional:  Negative for fever and chills.   Eyes:  Negative for itching, eye watering and eye irritation.   Gastrointestinal:  Negative for nausea and vomiting.   Skin:  Positive for itching, rash and activity-related sunscreen use. Negative for dry skin, sun sensitivity, daily sunscreen use, recent sunburn and dry lips.   Hematologic/Lymphatic: Does not bruise/bleed easily.       Objective:   Physical Exam   Constitutional: She appears well-developed and well-nourished. No distress.   Neurological: She is alert and oriented to person, place, and time. She is not disoriented.   Psychiatric: She has a normal mood and affect.   Skin:   Areas Examined (abnormalities noted in diagram):   Scalp / Hair Palpated and Inspected  Head / Face Inspection Performed  Neck Inspection Performed  Chest / Axilla Inspection Performed  Abdomen Inspection Performed  Back Inspection Performed  RUE Inspected  LUE Inspection Performed            Diagram Legend     Erythematous scaling macule/papule c/w actinic keratosis        Vascular papule c/w angioma      Pigmented verrucoid papule/plaque c/w seborrheic keratosis      Yellow umbilicated papule c/w sebaceous hyperplasia      Irregularly shaped tan macule c/w lentigo     1-2 mm smooth white papules consistent with Milia      Movable subcutaneous cyst with punctum c/w epidermal inclusion cyst      Subcutaneous movable cyst c/w pilar cyst      Firm pink to brown papule c/w dermatofibroma      Pedunculated fleshy papule(s) c/w skin tag(s)      Evenly pigmented macule c/w junctional nevus     Mildly variegated pigmented, slightly irregular-bordered macule c/w mildly atypical nevus      Flesh colored to evenly pigmented papule c/w intradermal nevus       Pink pearly papule/plaque c/w basal cell carcinoma      Erythematous hyperkeratotic cursted plaque c/w SCC      Surgical scar with no sign of skin cancer recurrence      Open and closed comedones      Inflammatory papules and pustules      Verrucoid papule consistent consistent with wart     Erythematous eczematous patches and plaques     Dystrophic onycholytic nail with subungual debris c/w onychomycosis     Umbilicated papule    Erythematous-base heme-crusted tan verrucoid plaque consistent with inflamed seborrheic keratosis     Erythematous Silvery Scaling Plaque c/w Psoriasis     See annotation      Assessment / Plan:        Dermatitis  -     Varicella Zoster By Rapid Pcr Tissue; Future; Expected date: 08/05/2025  -     Aerobic culture  -     mupirocin (BACTROBAN) 2 % ointment; Apply topically 3 (three) times daily. For broken skin.  Dispense: 30 g; Refill: 2  -     Ambulatory referral/consult to ENT; Future; Expected date: 08/12/2025  -     valACYclovir (VALTREX) 1000 MG tablet; Take 1 tablet (1,000 mg total) by mouth 3 (three) times daily. for 7 days  Dispense: 21 tablet; Refill: 0  Will get viral culture today. Agree w/empiric staph coverage w/keflex.  Will add mupirocin oint BID. Recommend valtrex even though outside of 72 hours  symptoms onset given h/o lupus.  Advised valtrex considered to be safe during lactation, but limited studies in humans.  Recommend patient defer to PCP provider regarding other meds for pain. Discussed otc domeboro soaks. Contact precautions emphasized. Will give work excuse for 7 days.      Counseled patient on medication and side affects:   Side effects that you should report to your doctor or health care professional as soon as possible:  allergic reactions like skin rash, itching or hives, swelling of the face, lips, or tongue  aggressive behavior  confusion  hallucinations  problems with balance, talking, walking  stomach pain  tremor  trouble passing urine or change in the amount of urine  Side effects that usually do not require medical attention (report to your doctor or health care professional if they continue or are bothersome):  dizziness  headache  nausea, vomiting  Patient voiced understanding and allowed to ask questions           Follow up in about 3 months (around 11/5/2025).

## 2025-08-07 LAB — BACTERIA SPEC AEROBE CULT: NO GROWTH

## 2025-08-08 ENCOUNTER — OFFICE VISIT (OUTPATIENT)
Dept: PSYCHIATRY | Facility: CLINIC | Age: 35
End: 2025-08-08
Payer: COMMERCIAL

## 2025-08-08 ENCOUNTER — OFFICE VISIT (OUTPATIENT)
Dept: OTOLARYNGOLOGY | Facility: CLINIC | Age: 35
End: 2025-08-08
Payer: COMMERCIAL

## 2025-08-08 VITALS — WEIGHT: 225.06 LBS | HEIGHT: 64 IN | BODY MASS INDEX: 38.42 KG/M2

## 2025-08-08 DIAGNOSIS — B02.9 HERPES ZOSTER WITHOUT COMPLICATION: Primary | ICD-10-CM

## 2025-08-08 DIAGNOSIS — H93.299 ABNORMAL AUDITORY PERCEPTION, UNSPECIFIED LATERALITY: ICD-10-CM

## 2025-08-08 DIAGNOSIS — L30.9 DERMATITIS: ICD-10-CM

## 2025-08-08 DIAGNOSIS — F41.1 GAD (GENERALIZED ANXIETY DISORDER): Primary | ICD-10-CM

## 2025-08-08 LAB
BACTERIA SPEC ANAEROBE CULT: ABNORMAL
W VITAMIN D, 1, 25-DIHYDROXY: 73 PG/ML

## 2025-08-08 PROCEDURE — 3044F HG A1C LEVEL LT 7.0%: CPT | Mod: CPTII,95,, | Performed by: INTERNAL MEDICINE

## 2025-08-08 PROCEDURE — 99999 PR PBB SHADOW E&M-EST. PATIENT-LVL III: CPT | Mod: PBBFAC,,,

## 2025-08-08 PROCEDURE — 1159F MED LIST DOCD IN RCRD: CPT | Mod: CPTII,95,, | Performed by: INTERNAL MEDICINE

## 2025-08-08 PROCEDURE — 1160F RVW MEDS BY RX/DR IN RCRD: CPT | Mod: CPTII,95,, | Performed by: INTERNAL MEDICINE

## 2025-08-08 PROCEDURE — 98005 SYNCH AUDIO-VIDEO EST LOW 20: CPT | Mod: 95,,, | Performed by: INTERNAL MEDICINE

## 2025-08-08 RX ORDER — METHYLPREDNISOLONE 4 MG/1
TABLET ORAL
Qty: 1 EACH | Refills: 0 | Status: SHIPPED | OUTPATIENT
Start: 2025-08-08

## 2025-08-08 NOTE — PROGRESS NOTES
"Subjective:   Patient ID: Krupa Walker is a 34 y.o. female.    Chief Complaint: Ear Problem (Pt wants eyes to make sure she has no shingles breakout in left ear )    History of Present Illness    Patient presents today for shingles infection behind the ear. She reports shingles infection that started last Tuesday. She is currently taking Acyclovir. She describes a burning sensation around the outside of the left ear with occasional sharp "zinging" pain, with the most intense pain located at the top of the ear. She characterizes the burning sensation as feeling like split skin. She reports chronic right ear fullness sensation and perceived decreased hearing in right ear. Denies tinnitus, otalgia or otorrhea. She has a history of three sets of ear tubes as a child. She denies acute ear pain or sudden hearing loss, describing the hearing changes as gradual. No associated symptoms of numbness or tingling. She is breastfeeding. Denies ocular involvement, facial numbness or weakness.       Review of patient's allergies indicates:  No Known Allergies        Review of Systems   HENT:  Positive for hearing loss. Negative for ear discharge, ear pain and tinnitus.    Skin:  Positive for rash (blistering rash on left neck and one lesion behind left ear).         Objective:   Ht 5' 4" (1.626 m)   Wt 102.1 kg (225 lb 1.4 oz)   LMP 06/25/2025 (Exact Date)   BMI 38.64 kg/m²     Physical Exam  Constitutional:       General: She is not in acute distress.     Appearance: Normal appearance. She is not ill-appearing.   HENT:      Head: Normocephalic and atraumatic.      Right Ear: Tympanic membrane, ear canal and external ear normal. There is impacted cerumen.      Left Ear: Tympanic membrane, ear canal and external ear normal. There is impacted cerumen.      Ears:      Comments: No lesions inside left ear. One small crusted over lesion in posterior crease of left ear. She complains of pain/burning on top of ear but no lesions " are present there.     Nose: Nose normal. No congestion or rhinorrhea.      Mouth/Throat:      Mouth: Mucous membranes are moist.      Pharynx: Oropharynx is clear. Uvula midline. No oropharyngeal exudate or posterior oropharyngeal erythema.   Eyes:      Extraocular Movements: Extraocular movements intact.      Conjunctiva/sclera: Conjunctivae normal.      Pupils: Pupils are equal, round, and reactive to light.   Neck:      Thyroid: No thyroid mass.        Comments: Blisters covered with bandaid to prevent spread  Pulmonary:      Effort: Pulmonary effort is normal. No respiratory distress.   Musculoskeletal:         General: Normal range of motion.      Cervical back: Full passive range of motion without pain. No edema, erythema or rigidity. Normal range of motion.   Lymphadenopathy:      Cervical: No cervical adenopathy.   Neurological:      General: No focal deficit present.      Mental Status: She is alert and oriented to person, place, and time.   Psychiatric:         Mood and Affect: Mood normal.         Behavior: Behavior normal. Behavior is cooperative.         Thought Content: Thought content normal.         Judgment: Judgment normal.            Assessment/Plan:     1. Herpes zoster without complication    2. Dermatitis    3. Abnormal auditory perception, unspecified laterality          Herpes zoster without complication  -     methylPREDNISolone (MEDROL DOSEPACK) 4 mg tablet; use as directed  Dispense: 1 each; Refill: 0    Dermatitis  -     Ambulatory referral/consult to ENT    Abnormal auditory perception, unspecified laterality  -     Ambulatory referral/consult to Audiology; Future; Expected date: 08/15/2025        Assessment & Plan    HERPES ZOSTER WITHOUT COMPLICATION:  - Assessed shingles infection behind the ear, which started last Tuesday.  - Evaluated for potential Deepti Hunt syndrome due to herpes zoster virus affecting the ear, causing burning pain.  - Discussed how nerve pain can create  sensation of lesions even when none are visible.  - Informed patient that shingles infection is contagious until lesions scab over.  - Continue keeping shingles lesions covered, especially due to having a baby.  - Started Medrol Dosepak for nerve pain and inflammation (pending OB approval due to breastfeeding).  - Continued Valcyte for antiviral treatment.    ABNORMAL AUDITORY PERCEPTION, UNSPECIFIED LATERALITY:  - Ordered hearing test to be scheduled in the next few weeks due to reported gradual hearing loss in right ear.    PLAN SUMMARY:  - Continue covering shingles lesions until they are scabbed over  - Start Medrol Dosepak for nerve pain and inflammation (pending OB approval)  - Continue Acyclovir for antiviral treatment  - Ordered hearing test to be scheduled in the next few weeks  - Follow-up after hearing test results are available          This note was generated with the assistance of ambient listening technology. Verbal consent was obtained by the patient and accompanying visitor(s) for the recording of patient appointment to facilitate this note. I attest to having reviewed and edited the generated note for accuracy, though some syntax or spelling errors may persist. Please contact the author of this note for any clarification.

## 2025-08-09 LAB
M VARICELLA-ZOSTER VIRUS PCR: NEGATIVE
SPECIMEN SOURCE: NORMAL

## 2025-08-12 ENCOUNTER — OFFICE VISIT (OUTPATIENT)
Dept: INTERNAL MEDICINE | Facility: CLINIC | Age: 35
End: 2025-08-12
Payer: COMMERCIAL

## 2025-08-12 ENCOUNTER — PATIENT MESSAGE (OUTPATIENT)
Dept: INTERNAL MEDICINE | Facility: CLINIC | Age: 35
End: 2025-08-12

## 2025-08-12 VITALS
DIASTOLIC BLOOD PRESSURE: 80 MMHG | SYSTOLIC BLOOD PRESSURE: 132 MMHG | HEART RATE: 108 BPM | BODY MASS INDEX: 38.39 KG/M2 | WEIGHT: 224.88 LBS | RESPIRATION RATE: 15 BRPM | OXYGEN SATURATION: 98 % | TEMPERATURE: 98 F | HEIGHT: 64 IN

## 2025-08-12 DIAGNOSIS — B02.29 NEURALGIA, POST-HERPETIC: ICD-10-CM

## 2025-08-12 DIAGNOSIS — B02.23 SHINGLES (HERPES ZOSTER) POLYNEUROPATHY: Primary | ICD-10-CM

## 2025-08-12 PROCEDURE — 3008F BODY MASS INDEX DOCD: CPT | Mod: CPTII,S$GLB,,

## 2025-08-12 PROCEDURE — 99999 PR PBB SHADOW E&M-EST. PATIENT-LVL IV: CPT | Mod: PBBFAC,,,

## 2025-08-12 PROCEDURE — 3079F DIAST BP 80-89 MM HG: CPT | Mod: CPTII,S$GLB,,

## 2025-08-12 PROCEDURE — 3075F SYST BP GE 130 - 139MM HG: CPT | Mod: CPTII,S$GLB,,

## 2025-08-12 PROCEDURE — 99214 OFFICE O/P EST MOD 30 MIN: CPT | Mod: S$GLB,,,

## 2025-08-12 PROCEDURE — 1159F MED LIST DOCD IN RCRD: CPT | Mod: CPTII,S$GLB,,

## 2025-08-12 PROCEDURE — 3044F HG A1C LEVEL LT 7.0%: CPT | Mod: CPTII,S$GLB,,

## 2025-08-12 PROCEDURE — 1160F RVW MEDS BY RX/DR IN RCRD: CPT | Mod: CPTII,S$GLB,,

## 2025-08-12 RX ORDER — GABAPENTIN 300 MG/1
300 CAPSULE ORAL NIGHTLY
Qty: 30 CAPSULE | Refills: 1 | Status: SHIPPED | OUTPATIENT
Start: 2025-08-12 | End: 2025-10-11

## 2025-08-12 RX ORDER — ONDANSETRON 4 MG/1
4 TABLET, ORALLY DISINTEGRATING ORAL EVERY 8 HOURS PRN
COMMUNITY

## 2025-08-14 ENCOUNTER — HOSPITAL ENCOUNTER (OUTPATIENT)
Dept: RADIOLOGY | Facility: HOSPITAL | Age: 35
Discharge: HOME | End: 2025-08-14
Payer: COMMERCIAL

## 2025-08-14 DIAGNOSIS — G43.119 INTRACTABLE MIGRAINE WITH AURA WITHOUT STATUS MIGRAINOSUS: ICD-10-CM

## 2025-08-14 PROCEDURE — 70551 MRI BRAIN STEM W/O DYE: CPT | Mod: 26,,, | Performed by: STUDENT IN AN ORGANIZED HEALTH CARE EDUCATION/TRAINING PROGRAM

## 2025-08-14 PROCEDURE — 70551 MRI BRAIN STEM W/O DYE: CPT | Mod: TC,PN

## 2025-08-20 ENCOUNTER — PATIENT MESSAGE (OUTPATIENT)
Dept: DERMATOLOGY | Facility: CLINIC | Age: 35
End: 2025-08-20
Payer: COMMERCIAL

## 2025-08-25 ENCOUNTER — OFFICE VISIT (OUTPATIENT)
Dept: URGENT CARE | Facility: CLINIC | Age: 35
End: 2025-08-25
Payer: COMMERCIAL

## 2025-08-25 ENCOUNTER — PATIENT MESSAGE (OUTPATIENT)
Dept: REHABILITATION | Facility: HOSPITAL | Age: 35
End: 2025-08-25
Payer: COMMERCIAL

## 2025-08-25 VITALS
BODY MASS INDEX: 37.56 KG/M2 | HEIGHT: 64 IN | HEART RATE: 109 BPM | WEIGHT: 220 LBS | DIASTOLIC BLOOD PRESSURE: 85 MMHG | SYSTOLIC BLOOD PRESSURE: 146 MMHG | OXYGEN SATURATION: 97 % | RESPIRATION RATE: 20 BRPM | TEMPERATURE: 99 F

## 2025-08-25 DIAGNOSIS — U07.1 COVID-19 VIRUS DETECTED: ICD-10-CM

## 2025-08-25 DIAGNOSIS — R09.81 COUGH WITH CONGESTION OF PARANASAL SINUS: ICD-10-CM

## 2025-08-25 DIAGNOSIS — R50.9 FEVER IN ADULT: ICD-10-CM

## 2025-08-25 DIAGNOSIS — U07.1 COVID-19: Primary | ICD-10-CM

## 2025-08-25 DIAGNOSIS — R05.8 COUGH WITH CONGESTION OF PARANASAL SINUS: ICD-10-CM

## 2025-08-25 DIAGNOSIS — R51.9 SINUS HEADACHE: ICD-10-CM

## 2025-08-25 LAB
CTP QC/QA: YES
SARS-COV+SARS-COV-2 AG RESP QL IA.RAPID: POSITIVE

## 2025-08-25 PROCEDURE — 87811 SARS-COV-2 COVID19 W/OPTIC: CPT | Mod: QW,S$GLB,, | Performed by: PHYSICIAN ASSISTANT

## 2025-08-25 PROCEDURE — 99213 OFFICE O/P EST LOW 20 MIN: CPT | Mod: S$GLB,,, | Performed by: PHYSICIAN ASSISTANT

## (undated) DEVICE — GUIDEWIRE NITINOL HYBRID 150CM

## (undated) DEVICE — NDL BLNT STD 1.5IN 18G

## (undated) DEVICE — URETEROSCOPE LITHOVUE REVERSE

## (undated) DEVICE — GUIDE WIRE MOTION .035 X 150CM

## (undated) DEVICE — CANISTER SUCTION JUMBO 12L

## (undated) DEVICE — SYR 10CC LUER LOCK

## (undated) DEVICE — SOLIDIFIER BOTTLE 3000CC

## (undated) DEVICE — UROVIEW 2600/2800

## (undated) DEVICE — FIBER 272 MICRON HOLMIUM

## (undated) DEVICE — FIBER QUANTA OPT SDT 272UM

## (undated) DEVICE — IRRIGATION SET Y-TYPE TUR/BLAD

## (undated) DEVICE — SPONGE COTTON TRAY 4X4IN

## (undated) DEVICE — GAUZE SPONGE 4X4 12PLY

## (undated) DEVICE — JELLY LUBRICANT STERILE 4 OZ

## (undated) DEVICE — TOWEL OR DISP STRL BLUE 4/PK

## (undated) DEVICE — SOL IRR NACL .9% 3000ML

## (undated) DEVICE — GLOVE SIGNATURE ESSNTL LTX 7.5

## (undated) DEVICE — CONTAINER SPECIMEN OR STER 4OZ

## (undated) DEVICE — SEE MEDLINE ITEM 157110

## (undated) DEVICE — DRAPE T CYSTOSCOPY STERILE

## (undated) DEVICE — CATH POLLACK OPEN-END FLEXI-TI

## (undated) DEVICE — SEE MEDLINE ITEM 146313

## (undated) DEVICE — SYR ONLY LUER LOCK 20CC

## (undated) DEVICE — SET CYSTO IRR DRP CHMBR 84IN

## (undated) DEVICE — SOL 9P NACL IRR PIC IL

## (undated) DEVICE — CATH URTRL OPEN END STR TIP 5F

## (undated) DEVICE — GLOVE BIOGEL SKINSENSE PI 6.5

## (undated) DEVICE — Device

## (undated) DEVICE — SOL NS 1000CC

## (undated) DEVICE — TRAY SKIN SCRUB WET 4 COMPART

## (undated) DEVICE — EXTRACTOR STNE 1.7FRX115CM

## (undated) DEVICE — BOWL STERILE LARGE 32OZ

## (undated) DEVICE — DRAPE UNDER BUTTOCKS WITH POUCH

## (undated) DEVICE — SET CYSTO IRRIGATION UNIV SPIK

## (undated) DEVICE — SOL IRRI STRL WATER 1000ML

## (undated) DEVICE — SET IRR URLGY 2LINE UNIV SPIKE

## (undated) DEVICE — GOWN POLY REINF X-LONG XL

## (undated) DEVICE — CANISTER OMNI-JUG SUCTION 16LT

## (undated) DEVICE — NEPTUNE 4 PORT MANIFOLD

## (undated) DEVICE — GOWN POLY REINF BRTH SLV XL

## (undated) DEVICE — BAG LINGEMAN DRAIN UROLOGY

## (undated) DEVICE — SOL NACL IRR 3000ML

## (undated) DEVICE — GLOVE BIOGEL SKINSENSE PI 7.0

## (undated) DEVICE — SYR CATHETER TIP 60ML

## (undated) DEVICE — DRESSING TRANS 2X2 TEGADERM

## (undated) DEVICE — CATH KIT FOLEY 18FR W/URIN

## (undated) DEVICE — BRUSH SCRUB SURGICALW/BETADINE

## (undated) DEVICE — SOL NORMAL USPCA 0.9%